# Patient Record
Sex: FEMALE | Race: BLACK OR AFRICAN AMERICAN | Employment: OTHER | URBAN - METROPOLITAN AREA
[De-identification: names, ages, dates, MRNs, and addresses within clinical notes are randomized per-mention and may not be internally consistent; named-entity substitution may affect disease eponyms.]

---

## 2017-07-10 ENCOUNTER — OFFICE VISIT (OUTPATIENT)
Dept: VASCULAR SURGERY | Age: 77
End: 2017-07-10

## 2017-07-10 VITALS
RESPIRATION RATE: 18 BRPM | SYSTOLIC BLOOD PRESSURE: 118 MMHG | HEIGHT: 65 IN | DIASTOLIC BLOOD PRESSURE: 60 MMHG | BODY MASS INDEX: 19.16 KG/M2 | HEART RATE: 62 BPM | WEIGHT: 115 LBS

## 2017-07-10 DIAGNOSIS — E11.59 TYPE 2 DIABETES MELLITUS WITH OTHER CIRCULATORY COMPLICATION, UNSPECIFIED LONG TERM INSULIN USE STATUS: ICD-10-CM

## 2017-07-10 DIAGNOSIS — Z53.1 REFUSAL OF BLOOD TRANSFUSIONS AS PATIENT IS JEHOVAH'S WITNESS: ICD-10-CM

## 2017-07-10 DIAGNOSIS — I70.213 ATHEROSCLEROSIS OF NATIVE ARTERY OF BOTH LOWER EXTREMITIES WITH INTERMITTENT CLAUDICATION (HCC): ICD-10-CM

## 2017-07-10 DIAGNOSIS — I74.09 AORTOILIAC OCCLUSIVE DISEASE (HCC): Primary | ICD-10-CM

## 2017-07-10 DIAGNOSIS — Z87.891 HISTORY OF TOBACCO ABUSE: ICD-10-CM

## 2017-07-10 RX ORDER — ATORVASTATIN CALCIUM 20 MG/1
TABLET, FILM COATED ORAL DAILY
COMMUNITY

## 2017-07-10 RX ORDER — GLIPIZIDE 5 MG/1
TABLET ORAL 2 TIMES DAILY
COMMUNITY
End: 2018-12-26

## 2017-07-10 RX ORDER — GUAIFENESIN 100 MG/5ML
81 LIQUID (ML) ORAL 2 TIMES DAILY
Status: ON HOLD | COMMUNITY
End: 2020-02-03 | Stop reason: SDUPTHER

## 2017-07-10 RX ORDER — LOSARTAN POTASSIUM 50 MG/1
TABLET ORAL DAILY
COMMUNITY
End: 2018-12-26

## 2017-07-10 RX ORDER — CHOLECALCIFEROL (VITAMIN D3) 125 MCG
CAPSULE ORAL
COMMUNITY

## 2017-07-10 RX ORDER — METFORMIN HYDROCHLORIDE 500 MG/1
TABLET ORAL 2 TIMES DAILY WITH MEALS
COMMUNITY
End: 2018-12-26

## 2017-07-10 NOTE — PROGRESS NOTES
Venecia Salinas    Chief Complaint   Patient presents with   [address] [place] [address]    Leg Pain       HPI    Venecia Salinas is a 65 y.o. female who presents to the office today with complaint of bilateral lower extremity claudication. She states that she is [employer] and does a good amount of walking. She states that several months ago she began experiencing increasing fatigue and weakness in her bilateral lower extremities extending from her hips to her feet. She states that she can only walk about 2-1/2 blocks now before she has to sit and rest.  She does not describe any \"pain\". No rest pain. Skin changes or ulcers. Patient is a diabetic and states that her blood sugars are \"somewhat controlled\". She is a former smoker but quit smoking over 20 years ago. 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 The iliofemoral and popliteal trifurcation vessels are affected. She also has total occlusion of the left popliteal and bilateral superficial femoral arteries as well as posterior tibial and dorsalis pedis arteries. She denies any fevers or chills. Otherwise she states that she is in her regular state of health and was without complaint. She denies any history of stroke or aneurysm. Past Medical History:   Diagnosis Date    Diabetes ([address]     Hypercholesterolemia     Hypertension      There is no problem list on file for this patient. History reviewed. No pertinent surgical history. Current Outpatient Prescriptions   Medication Sig Dispense Refill    metFORMIN (GLUCOPHAGE) 500 mg tablet Take  by mouth two (2) times daily (with meals).  glipiZIDE (GLUCOTROL) 5 mg tablet Take  by mouth two (2) times a day.  losartan (COZAAR) 50 mg tablet Take  by mouth daily.  cholecalciferol, vitamin D3, (VITAMIN D3) 2,000 unit tab Take  by mouth.       atorvastatin (LIPITOR) 20 mg tablet Take  by mouth daily.  aspirin 81 mg chewable tablet Take 81 mg by mouth two (2) times a day. No Known Allergies  Social History     Social History    Marital status: SINGLE     Spouse name: N/A    Number of children: N/A    Years of education: N/A     Occupational History    Not on file. Social History Main Topics    Smoking status: Former Smoker    Smokeless tobacco: Never Used    Alcohol use Not on file    Drug use: Not on file    Sexual activity: Not on file     Other Topics Concern    Not on file     Social History Narrative    No narrative on file      No family history on file. Review of Systems    Constitutional: negative   HEENT: negative   Respiratory: negative   Cardiovascular: negative   Gastrointestinal: negative   Genitourinary:negative   Hematologic/lymphatic: negative   Musculoskeletal: Positive for bilateral lower extremity weakness and fatigue with exertion  Neurological: negative   Behavioral/Psych: negative   Endocrine: negative   Allergic/Immunologic: negative      Physical Exam:    Visit Vitals    /60 (BP 1 Location: Right arm, BP Patient Position: Sitting)    Pulse 62    Resp 18    Ht 5' 5\" (1.651 m)    Wt 115 lb (52.2 kg)    BMI 19.14 kg/m2      General: Well-appearing female in no acute distress   HEENT: EOMI, no scleral icterus is noted. No carotid bruits are heard bilaterally   Cardiovascular: Regular rhythm normal S1-S2 no rubs murmurs or gallops   Pulmonary: No increased work or breathing is noted. Clear to auscultation bilaterally. No wheeze, rales or rhonchi. Abdomen: nondistended. Extremities: Warm and well perfused bilaterally. Pt has significant bilateral lower extremity edema on exam today. I am unable to palpate pedal pulses bilaterally. Unfortunately I am unable to find a Doppler signals as well.   Neuro: Cranial nerves II through XII are grossly intact   Integument: No ulcerations are identified visibly      Impression and Plan:  Fatuma Diaz is a 65 y.o. female with bilateral lower extremity claudication. 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 Arterial studies also showed total occlusion of the bilateral superficial femoral arteries and left popliteal artery. She also has total occlusions of the posterior tibial and dorsalis pedis arteries bilaterally by  arterial duplex. Discussed that we will obtain a CTA scan at this time to further assess the extent of her disease. She will follow-up afterwards for further surgical discussion. Patient does reiterate the fact that she is Islam and will refuse to receive any type of blood products including her own blood with Cell Saver. Patient is on daily aspirin and statin and will continue her current medication regimen. All questions were answered. Plan was discussed. Patient expresses understanding and agrees. [address] [place] [address]        PLEASE NOTE:  This document has been produced using voice recognition software. Unrecognized errors in transcription may be present.

## 2017-07-10 NOTE — MR AVS SNAPSHOT
Visit Information Date & Time Provider Department Dept. Phone Encounter #  
 7/10/2017 11:00 AM [facility] [phone] [id number] Your Appointments 8/3/2017 10:45 AM  
Follow Up with [facility] ([address]) Appt Note: Fup after studies at [facility] for CTA on 07.18.17 at [address] [place], [place] [address] [address] [facility] [address]  
[phone] [facility] [address] [facility] [address] Upcoming Health Maintenance Date Due DTaP/Tdap/Td series (1 - Tdap) 1/14/1960 ZOSTER VACCINE AGE 60> 1/14/1999 GLAUCOMA SCREENING Q2Y 1/14/2004 Pneumococcal 65+ Low/Medium Risk (1 of 2 - PCV13) 1/14/2004 MEDICARE YEARLY EXAM 1/14/2004 INFLUENZA AGE 9 TO ADULT 8/1/2017 Allergies as of 7/10/2017  Review Complete On: 7/10/2017 By: Tanmay Small LPN No Known Allergies Current Immunizations  Never Reviewed No immunizations on file. Not reviewed this visit You Were Diagnosed With   
  
 Codes Comments A[facility]iliac occlusive disease ([facility]    -  Primary ICD-10-CM: I74.09 
ICD-9-CM: 444.09 Atherosclerosis of native artery of both lower extremities with intermittent claudication ([facility]     ICD-10-CM: P60.311 ICD-9-CM: 440.21 Vitals BP Pulse Resp Height(growth percentile) Weight(growth percentile) BMI  
 118/60 (BP 1 Location: Right arm, BP Patient Position: Sitting) 62 18 5' 5\" (1.651 m) 115 lb (52.2 kg) 19.14 kg/m2 OB Status Smoking Status Unknown Former Smoker Vitals History BMI and BSA Data Body Mass Index Body Surface Area  
 19.14 kg/m 2 1.55 m 2 Your Updated Medication List  
  
   
This list is accurate as of: 7/10/17 11:50 AM.  Always use your most recent med list.  
  
  
  
  
 aspirin 81 mg chewable tablet Take 81 mg by mouth two (2) times a day.   
  
 atorvastatin 20 mg tablet Commonly known as:  LIPITOR Take  by mouth daily. glipiZIDE 5 mg tablet Commonly known as:  Annabel Gunn Take  by mouth two (2) times a day. losartan 50 mg tablet Commonly known as:  COZAAR Take  by mouth daily. metFORMIN 500 mg tablet Commonly known as:  GLUCOPHAGE Take  by mouth two (2) times daily (with meals). VITAMIN D3 2,000 unit Tab Generic drug:  cholecalciferol (vitamin D3) Take  by mouth. To-Do List   
 07/18/2017 9:00 AM  
  Appointment with [facility] CT RM 2 at [facility] RAD [address] ([phone]) DIET RESTRICTIONS  Nothing to eat or drink 4 hours prior to study May have water to take meds  GENERAL INSTRUCTIONS  If you were given medications to take for a contrast allergy prior to having this study, please arrange to have someone drive you to your appointment. If you have had a creatinine level drawn within the past 30 days, please bring most recent results to your appt. This study does not require you to drink contrast prior to your study. MEDICATIONS  Bring a complete list of all medications you are currently taking to include prescriptions, over-the-counter meds, herbals, vitamins & any dietary supplements. OUTSIDE FILMS  Bring outside films, CDs, and reports related to the study with you on the day of your exam.  QUESTIONS  Notify the CT Department if you have any questions concerning your study. [employer] - [phone] [facility] - [phone]  
  
 07/28/2017 Imaging:  CTA ABD ART W RUNOFF W WO CONT Referral Information Referral ID Referred By Referred To  
  
 [id number] MARY FELICIANO Not Available Visits Status Start Date End Date 1 New Request 7/10/17 7/10/18 If your referral has a status of pending review or denied, additional information will be sent to support the outcome of this decision. Introducing [facility]!    
 [employer] introduces [employer] patient portal. Now you can access parts of your medical record, email your doctor's office, and request medication refills online. 1. In your internet browser, go to [email or web address] 2. Click on the First Time User? Click Here link in the Sign In box. You will see the New Member Sign Up page. 3. Enter your [employer] Access Code exactly as it appears below. You will not need to use this code after youve completed the sign-up process. If you do not sign up before the expiration date, you must request a new code. · [employer] Access Code: [email or web address] Expires: 10/8/2017 11:02 AM 
 
4. Enter the last four digits of your Social Security Number (xxxx) and Date of Birth (mm/dd/yyyy) as indicated and click Submit. You will be taken to the next sign-up page. 5. Create a [employer] ID. This will be your [employer] login ID and cannot be changed, so think of one that is secure and easy to remember. 6. Create a [employer] password. You can change your password at any time. 7. Enter your Password Reset Question and Answer. This can be used at a later time if you forget your password. 8. Enter your e-mail address. You will receive e-mail notification when new information is available in [address] 9. Click Sign Up. You can now view and download portions of your medical record. 10. Click the Download Summary menu link to download a portable copy of your medical information. If you have questions, please visit the Frequently Asked Questions section of the [employer] website. Remember, [employer] is NOT to be used for urgent needs. For medical emergencies, dial 911. Now available from your iPhone and Android! Please provide this summary of care documentation to your next provider. Your primary care clinician is listed as ERASTO NAPIER. If you have any questions after today's visit, please call [phone].

## 2017-07-18 ENCOUNTER — HOSPITAL ENCOUNTER (OUTPATIENT)
Dept: CT IMAGING | Age: 77
Discharge: HOME | End: 2017-07-18
Attending: PHYSICIAN ASSISTANT
Payer: MEDICARE

## 2017-07-18 DIAGNOSIS — I70.213 ATHEROSCLEROSIS OF NATIVE ARTERY OF BOTH LOWER EXTREMITIES WITH INTERMITTENT CLAUDICATION (HCC): ICD-10-CM

## 2017-07-18 DIAGNOSIS — I74.09 AORTOILIAC OCCLUSIVE DISEASE (HCC): ICD-10-CM

## 2017-07-18 LAB — CREAT UR-MCNC: 1.5 MG/DL (ref 0.6–1.3)

## 2017-07-18 PROCEDURE — 82565 ASSAY OF CREATININE: CPT

## 2017-07-18 PROCEDURE — 75635 CT ANGIO ABDOMINAL ARTERIES: CPT

## 2017-07-18 PROCEDURE — 74011636320 HC RX REV CODE- 636/320: Performed by: PHYSICIAN ASSISTANT

## 2017-07-18 RX ADMIN — IOPAMIDOL 72 ML: 755 INJECTION, SOLUTION INTRAVENOUS at 10:26

## 2017-12-08 ENCOUNTER — HOSPITAL ENCOUNTER (OUTPATIENT)
Dept: MAMMOGRAPHY | Age: 77
Discharge: HOME | End: 2017-12-08
Attending: FAMILY MEDICINE
Payer: MEDICARE

## 2017-12-08 ENCOUNTER — HOSPITAL ENCOUNTER (OUTPATIENT)
Dept: ULTRASOUND IMAGING | Age: 77
Discharge: HOME | End: 2017-12-08
Attending: FAMILY MEDICINE
Payer: MEDICARE

## 2017-12-08 DIAGNOSIS — N64.4 BREAST PAIN, RIGHT: ICD-10-CM

## 2017-12-08 PROCEDURE — 77066 DX MAMMO INCL CAD BI: CPT

## 2018-12-18 ENCOUNTER — HOSPITAL ENCOUNTER (EMERGENCY)
Age: 78
Discharge: HOME | End: 2018-12-19
Attending: EMERGENCY MEDICINE | Admitting: EMERGENCY MEDICINE
Payer: MEDICARE

## 2018-12-18 DIAGNOSIS — R73.9 HYPERGLYCEMIA: Primary | ICD-10-CM

## 2018-12-18 LAB
ADMINISTERED INITIALS, ADMINIT: NORMAL
ALBUMIN SERPL-MCNC: 3.6 G/DL (ref 3.4–5)
ALBUMIN/GLOB SERPL: 0.8 {RATIO} (ref 0.8–1.7)
ALP SERPL-CCNC: 162 U/L (ref 45–117)
ALT SERPL-CCNC: 25 U/L (ref 13–56)
ANION GAP SERPL CALC-SCNC: 10 MMOL/L (ref 3–18)
AST SERPL-CCNC: 17 U/L (ref 15–37)
BASOPHILS # BLD: 0 K/UL (ref 0–0.1)
BASOPHILS NFR BLD: 0 % (ref 0–2)
BILIRUB SERPL-MCNC: 0.3 MG/DL (ref 0.2–1)
BUN SERPL-MCNC: 51 MG/DL (ref 7–18)
BUN/CREAT SERPL: 18 (ref 12–20)
CALCIUM SERPL-MCNC: 9.2 MG/DL (ref 8.5–10.1)
CHLORIDE SERPL-SCNC: 92 MMOL/L (ref 100–108)
CO2 SERPL-SCNC: 23 MMOL/L (ref 21–32)
CREAT SERPL-MCNC: 2.88 MG/DL (ref 0.6–1.3)
D50 ADMINISTERED, D50ADM: 0 ML
D50 ORDER, D50ORD: 0 ML
DIFFERENTIAL METHOD BLD: ABNORMAL
EOSINOPHIL # BLD: 0.1 K/UL (ref 0–0.4)
EOSINOPHIL NFR BLD: 1 % (ref 0–5)
ERYTHROCYTE [DISTWIDTH] IN BLOOD BY AUTOMATED COUNT: 12.6 % (ref 11.6–14.5)
EST. AVERAGE GLUCOSE BLD GHB EST-MCNC: ABNORMAL MG/DL
GLOBULIN SER CALC-MCNC: 4.7 G/DL (ref 2–4)
GLUCOSE BLD STRIP.AUTO-MCNC: 368 MG/DL (ref 70–110)
GLUCOSE BLD STRIP.AUTO-MCNC: 530 MG/DL (ref 70–110)
GLUCOSE BLD STRIP.AUTO-MCNC: >600 MG/DL (ref 70–110)
GLUCOSE BLD STRIP.AUTO-MCNC: >600 MG/DL (ref 70–110)
GLUCOSE SERPL-MCNC: 929 MG/DL (ref 74–99)
GLUCOSE, GLC: 368 MG/DL
GLUCOSE, GLC: 530 MG/DL
GLUCOSE, GLC: 601 MG/DL
GLUCOSE, GLC: 602 MG/DL
HBA1C MFR BLD: 15.6 % (ref 4.2–5.6)
HCT VFR BLD AUTO: 36 % (ref 35–45)
HGB BLD-MCNC: 11.8 G/DL (ref 12–16)
HIGH TARGET, HITG: 180 MG/DL
INR PPP: 1 (ref 0.8–1.2)
INSULIN ADMINSTERED, INSADM: 10.8 UNITS/HOUR
INSULIN ADMINSTERED, INSADM: 16.3 UNITS/HOUR
INSULIN ADMINSTERED, INSADM: 18.8 UNITS/HOUR
INSULIN ADMINSTERED, INSADM: 9.2 UNITS/HOUR
INSULIN ORDER, INSORD: 10.8 UNITS/HOUR
INSULIN ORDER, INSORD: 16.3 UNITS/HOUR
INSULIN ORDER, INSORD: 18.8 UNITS/HOUR
INSULIN ORDER, INSORD: 9.2 UNITS/HOUR
LOW TARGET, LOT: 140 MG/DL
LYMPHOCYTES # BLD: 1.4 K/UL (ref 0.9–3.6)
LYMPHOCYTES NFR BLD: 31 % (ref 21–52)
MAGNESIUM SERPL-MCNC: 2.9 MG/DL (ref 1.6–2.6)
MCH RBC QN AUTO: 33 PG (ref 24–34)
MCHC RBC AUTO-ENTMCNC: 32.8 G/DL (ref 31–37)
MCV RBC AUTO: 100.6 FL (ref 74–97)
MINUTES UNTIL NEXT BG, NBG: 60 MIN
MONOCYTES # BLD: 0.3 K/UL (ref 0.05–1.2)
MONOCYTES NFR BLD: 7 % (ref 3–10)
MULTIPLIER, MUL: 0.02
MULTIPLIER, MUL: 0.03
MULTIPLIER, MUL: 0.03
MULTIPLIER, MUL: 0.04
NEUTS SEG # BLD: 2.8 K/UL (ref 1.8–8)
NEUTS SEG NFR BLD: 61 % (ref 40–73)
ORDER INITIALS, ORDINIT: NORMAL
PHOSPHATE SERPL-MCNC: 3.9 MG/DL (ref 2.5–4.9)
PLATELET # BLD AUTO: 174 K/UL (ref 135–420)
PMV BLD AUTO: 12.1 FL (ref 9.2–11.8)
POTASSIUM SERPL-SCNC: 4.8 MMOL/L (ref 3.5–5.5)
PROT SERPL-MCNC: 8.3 G/DL (ref 6.4–8.2)
PROTHROMBIN TIME: 12.7 SEC (ref 11.5–15.2)
RBC # BLD AUTO: 3.58 M/UL (ref 4.2–5.3)
SODIUM SERPL-SCNC: 125 MMOL/L (ref 136–145)
WBC # BLD AUTO: 4.6 K/UL (ref 4.6–13.2)

## 2018-12-18 PROCEDURE — 83036 HEMOGLOBIN GLYCOSYLATED A1C: CPT

## 2018-12-18 PROCEDURE — 85610 PROTHROMBIN TIME: CPT

## 2018-12-18 PROCEDURE — 74011000258 HC RX REV CODE- 258: Performed by: EMERGENCY MEDICINE

## 2018-12-18 PROCEDURE — 99284 EMERGENCY DEPT VISIT MOD MDM: CPT

## 2018-12-18 PROCEDURE — 74011250636 HC RX REV CODE- 250/636: Performed by: EMERGENCY MEDICINE

## 2018-12-18 PROCEDURE — 84100 ASSAY OF PHOSPHORUS: CPT

## 2018-12-18 PROCEDURE — 74011636637 HC RX REV CODE- 636/637: Performed by: EMERGENCY MEDICINE

## 2018-12-18 PROCEDURE — 96375 TX/PRO/DX INJ NEW DRUG ADDON: CPT

## 2018-12-18 PROCEDURE — 96366 THER/PROPH/DIAG IV INF ADDON: CPT

## 2018-12-18 PROCEDURE — 96365 THER/PROPH/DIAG IV INF INIT: CPT

## 2018-12-18 PROCEDURE — 80053 COMPREHEN METABOLIC PANEL: CPT

## 2018-12-18 PROCEDURE — 83735 ASSAY OF MAGNESIUM: CPT

## 2018-12-18 PROCEDURE — 85025 COMPLETE CBC W/AUTO DIFF WBC: CPT

## 2018-12-18 PROCEDURE — 82962 GLUCOSE BLOOD TEST: CPT

## 2018-12-18 PROCEDURE — 74011250636 HC RX REV CODE- 250/636

## 2018-12-18 RX ORDER — MAGNESIUM SULFATE 100 %
4 CRYSTALS MISCELLANEOUS AS NEEDED
Status: DISCONTINUED | OUTPATIENT
Start: 2018-12-18 | End: 2018-12-19 | Stop reason: HOSPADM

## 2018-12-18 RX ORDER — DEXTROSE 50 % IN WATER (D50W) INTRAVENOUS SYRINGE
25-50 AS NEEDED
Status: DISCONTINUED | OUTPATIENT
Start: 2018-12-18 | End: 2018-12-19 | Stop reason: HOSPADM

## 2018-12-18 RX ORDER — MORPHINE SULFATE 4 MG/ML
2 INJECTION INTRAVENOUS
Status: COMPLETED | OUTPATIENT
Start: 2018-12-18 | End: 2018-12-18

## 2018-12-18 RX ORDER — MORPHINE SULFATE 4 MG/ML
INJECTION INTRAVENOUS
Status: COMPLETED
Start: 2018-12-18 | End: 2018-12-18

## 2018-12-18 RX ADMIN — SODIUM CHLORIDE 1000 ML: 900 INJECTION, SOLUTION INTRAVENOUS at 19:14

## 2018-12-18 RX ADMIN — SODIUM CHLORIDE 10.8 UNITS/HR: 9 INJECTION, SOLUTION INTRAVENOUS at 19:17

## 2018-12-18 RX ADMIN — MORPHINE SULFATE 2 MG: 4 INJECTION INTRAVENOUS at 21:48

## 2018-12-18 NOTE — ED PROVIDER NOTES
EMERGENCY DEPARTMENT HISTORY AND PHYSICAL EXAM    5:44 PM      Date: 12/18/2018  Patient Name: Tami Shea    History of Presenting Illness     No chief complaint on file. History Provided By: Patient    Chief Complaint: Hyperglycemia  Duration:  Today  Timing:  Acute  Location: Generalized  Quality: not described  Severity: not reported  Modifying Factors: No modifying or aggravating factors were reported. Associated Symptoms: denies any other associated signs or symptoms      Additional History (Context): Tami Shea is a 77 y.o. female with PMHx of HTN and DM who presents to the ED with c/o acute hyperglycemic episode today. States she feels \"okay\" currently. Patient takes Metformin for history of DM. Denies abdominal pain. No modifying or aggravating factors were reported. No other symptoms or concerns were expressed. PCP: Reji Hurley MD         Past History         Review of Systems       Review of Systems   Gastrointestinal: Negative for abdominal pain. Endocrine:        Positive for hyperglycemia   All other systems reviewed and are negative. Physical Exam         Physical Exam   Constitutional: She is oriented to person, place, and time. She appears well-developed. HENT:   Head: Normocephalic and atraumatic. Eyes: EOM are normal. Pupils are equal, round, and reactive to light. Neck: Normal range of motion. Neck supple. Cardiovascular: Normal rate, regular rhythm and normal heart sounds. Exam reveals no friction rub. No murmur heard. Pulmonary/Chest: Effort normal and breath sounds normal. No respiratory distress. She has no wheezes. Abdominal: Soft. She exhibits no distension. There is no tenderness. There is no rebound and no guarding. Musculoskeletal: Normal range of motion. Neurological: She is alert and oriented to person, place, and time. Skin: Skin is warm and dry. Psychiatric: She has a normal mood and affect.  Her behavior is normal. Thought content normal.         Diagnostic Study Results             Medical Decision Making   I am the first provider for this patient. I reviewed the vital signs, available nursing notes, past medical history, past surgical history, family history and social history. Vital Signs-Reviewed the patient's vital signs. ED Course: Progress Notes, Reevaluation, and Consults:      Provider Notes (Medical Decision Making): This is a 69-year-old female with a history of diabetes who had a fingerstick high reading in the office. I spoke to her physician Dr. Serrano Bodily back she came by the ED. We will check her glucose ensure she has not DKA. She feels well sensory doubtful she is in DKA. Likely his insulin and glucose reasonable amount for discharge. He spoke about her follow-up tomorrow. His phone [phone].     6:43 PM reviewed with pt  Will lower glucose and then have follow in am.   Turned over to dr Yumiko Jean Baptiste pending dispo. Diagnosis     Clinical Impression: No diagnosis found. _______________________________    Attestations:  Scribe Attestation     Raven acting as a scribe for and in the presence of Ran Nath MD      December 18, 2018 at 5:44 PM       Provider Attestation:      I personally performed the services described in the documentation, reviewed the documentation, as recorded by the scribe in my presence, and it accurately and completely records my words and actions.  December 18, 2018 at 5:44 PM - aRn Nath MD    _______________________________

## 2018-12-19 VITALS
OXYGEN SATURATION: 94 % | HEART RATE: 75 BPM | RESPIRATION RATE: 16 BRPM | SYSTOLIC BLOOD PRESSURE: 106 MMHG | DIASTOLIC BLOOD PRESSURE: 63 MMHG

## 2018-12-19 LAB
ANION GAP BLD CALC-SCNC: 19 MMOL/L (ref 10–20)
BUN BLD-MCNC: 40 MG/DL (ref 7–18)
CA-I BLD-MCNC: 1.22 MMOL/L (ref 1.12–1.32)
CHLORIDE BLD-SCNC: 107 MMOL/L (ref 100–108)
CO2 BLD-SCNC: 20 MMOL/L (ref 19–24)
CREAT UR-MCNC: 2.3 MG/DL (ref 0.6–1.3)
GLUCOSE BLD STRIP.AUTO-MCNC: 136 MG/DL (ref 74–106)
GLUCOSE BLD STRIP.AUTO-MCNC: 227 MG/DL (ref 70–110)
GLUCOSE BLD STRIP.AUTO-MCNC: 256 MG/DL (ref 70–110)
HCT VFR BLD CALC: 41 % (ref 36–49)
HGB BLD-MCNC: 13.9 G/DL (ref 12–16)
POTASSIUM BLD-SCNC: 3.6 MMOL/L (ref 3.5–5.5)
SODIUM BLD-SCNC: 141 MMOL/L (ref 136–145)

## 2018-12-19 PROCEDURE — 82962 GLUCOSE BLOOD TEST: CPT

## 2018-12-19 PROCEDURE — 80047 BASIC METABLC PNL IONIZED CA: CPT

## 2018-12-19 NOTE — DISCHARGE INSTRUCTIONS
Learning About High Blood Sugar  What is high blood sugar? Your body turns the food you eat into glucose (sugar), which it uses for energy. But if your body isn't able to use the sugar right away, it can build up in your blood and lead to high blood sugar. When the amount of sugar in your blood stays too high for too much of the time, you may have diabetes. Diabetes is a disease that can cause serious health problems. The good news is that lifestyle changes may help you get your blood sugar back to normal and avoid or delay diabetes. What causes high blood sugar? Sugar (glucose) can build up in your blood if you:  · Are overweight. · Have a family history of diabetes. · Take certain medicines, such as steroids. What are the symptoms? Having high blood sugar may not cause any symptoms at all. Or it may make you feel very thirsty or very hungry. You may also urinate more often than usual, have blurry vision, or lose weight without trying. How is high blood sugar treated? You can take steps to lower your blood sugar level if you understand what makes it get higher. Your doctor may want you to learn how to test your blood sugar level at home. Then you can see how illness, stress, or different kinds of food or medicine raise or lower your blood sugar level. Other tests may be needed to see if you have diabetes. How can you prevent high blood sugar? · Watch your weight. If you're overweight, losing just a small amount of weight may help. Reducing fat around your waist is most important. · Limit the amount of calories, sweets, and unhealthy fat you eat. Ask your doctor if a dietitian can help you. A registered dietitian can help you create meal plans that fit your lifestyle. · Get at least 30 minutes of exercise on most days of the week. Exercise helps control your blood sugar. It also helps you maintain a healthy weight. Walking is a good choice.  You also may want to do other activities, such as running, swimming, cycling, or playing tennis or team sports. · If your doctor prescribed medicines, take them exactly as prescribed. Call your doctor if you think you are having a problem with your medicine. You will get more details on the specific medicines your doctor prescribes. Follow-up care is a key part of your treatment and safety. Be sure to make and go to all appointments, and call your doctor if you are having problems. It's also a good idea to know your test results and keep a list of the medicines you take. Where can you learn more? Go to http://melvin-jesus.info/. Enter O108 in the search box to learn more about \"Learning About High Blood Sugar. \"  Current as of: December 7, 2017  Content Version: 11.8  © 5018-1223 [employer]. Care instructions adapted under license by [employer] (which disclaims liability or warranty for this information). If you have questions about a medical condition or this instruction, always ask your healthcare professional. [address] any warranty or liability for your use of this information.

## 2018-12-24 ENCOUNTER — HOSPITAL ENCOUNTER (OUTPATIENT)
Age: 78
Setting detail: OBSERVATION
Discharge: NURSING, REHAB OR LONG-TERM CARE | End: 2018-12-26
Attending: EMERGENCY MEDICINE | Admitting: INTERNAL MEDICINE
Payer: MEDICARE

## 2018-12-24 DIAGNOSIS — E87.5 ACUTE HYPERKALEMIA: ICD-10-CM

## 2018-12-24 DIAGNOSIS — R41.82 ALTERED MENTAL STATUS, UNSPECIFIED ALTERED MENTAL STATUS TYPE: ICD-10-CM

## 2018-12-24 DIAGNOSIS — R73.9 HYPERGLYCEMIA: Primary | ICD-10-CM

## 2018-12-24 DIAGNOSIS — R35.0 URINARY FREQUENCY: ICD-10-CM

## 2018-12-24 DIAGNOSIS — E87.1 HYPONATREMIA: ICD-10-CM

## 2018-12-24 DIAGNOSIS — G89.29 OTHER CHRONIC PAIN: ICD-10-CM

## 2018-12-24 LAB
ALBUMIN SERPL-MCNC: 3.1 G/DL (ref 3.4–5)
ALBUMIN/GLOB SERPL: 0.8 {RATIO} (ref 0.8–1.7)
ALP SERPL-CCNC: 145 U/L (ref 45–117)
ALT SERPL-CCNC: 38 U/L (ref 13–56)
ANION GAP SERPL CALC-SCNC: 15 MMOL/L (ref 3–18)
APPEARANCE UR: CLEAR
AST SERPL-CCNC: 46 U/L (ref 15–37)
BACTERIA URNS QL MICRO: ABNORMAL /HPF
BASOPHILS # BLD: 0 K/UL (ref 0–0.1)
BASOPHILS NFR BLD: 0 % (ref 0–2)
BILIRUB SERPL-MCNC: 0.4 MG/DL (ref 0.2–1)
BILIRUB UR QL: NEGATIVE
BUN SERPL-MCNC: 46 MG/DL (ref 7–18)
BUN/CREAT SERPL: 16 (ref 12–20)
CALCIUM SERPL-MCNC: 8.7 MG/DL (ref 8.5–10.1)
CHLORIDE SERPL-SCNC: 98 MMOL/L (ref 100–108)
CO2 SERPL-SCNC: 15 MMOL/L (ref 21–32)
COLOR UR: YELLOW
CREAT SERPL-MCNC: 2.9 MG/DL (ref 0.6–1.3)
DIFFERENTIAL METHOD BLD: ABNORMAL
EOSINOPHIL # BLD: 0 K/UL (ref 0–0.4)
EOSINOPHIL NFR BLD: 0 % (ref 0–5)
EPITH CASTS URNS QL MICRO: ABNORMAL /LPF (ref 0–5)
ERYTHROCYTE [DISTWIDTH] IN BLOOD BY AUTOMATED COUNT: 13.2 % (ref 11.6–14.5)
GLOBULIN SER CALC-MCNC: 3.9 G/DL (ref 2–4)
GLUCOSE BLD STRIP.AUTO-MCNC: >600 MG/DL (ref 70–110)
GLUCOSE SERPL-MCNC: 1155 MG/DL (ref 74–99)
GLUCOSE UR STRIP.AUTO-MCNC: >1000 MG/DL
HCT VFR BLD AUTO: 39 % (ref 35–45)
HGB BLD-MCNC: 12.4 G/DL (ref 12–16)
HGB UR QL STRIP: ABNORMAL
KETONES UR QL STRIP.AUTO: NEGATIVE MG/DL
LEUKOCYTE ESTERASE UR QL STRIP.AUTO: NEGATIVE
LYMPHOCYTES # BLD: 0.5 K/UL (ref 0.9–3.6)
LYMPHOCYTES NFR BLD: 10 % (ref 21–52)
MCH RBC QN AUTO: 33.2 PG (ref 24–34)
MCHC RBC AUTO-ENTMCNC: 31.8 G/DL (ref 31–37)
MCV RBC AUTO: 104.6 FL (ref 74–97)
MONOCYTES # BLD: 0.4 K/UL (ref 0.05–1.2)
MONOCYTES NFR BLD: 7 % (ref 3–10)
NEUTS SEG # BLD: 4.2 K/UL (ref 1.8–8)
NEUTS SEG NFR BLD: 83 % (ref 40–73)
NITRITE UR QL STRIP.AUTO: NEGATIVE
PH UR STRIP: 5 [PH] (ref 5–8)
PLATELET # BLD AUTO: 208 K/UL (ref 135–420)
PMV BLD AUTO: 12.4 FL (ref 9.2–11.8)
POTASSIUM SERPL-SCNC: 5.6 MMOL/L (ref 3.5–5.5)
PROT SERPL-MCNC: 7 G/DL (ref 6.4–8.2)
PROT UR STRIP-MCNC: NEGATIVE MG/DL
RBC # BLD AUTO: 3.73 M/UL (ref 4.2–5.3)
RBC #/AREA URNS HPF: ABNORMAL /HPF (ref 0–5)
SODIUM SERPL-SCNC: 128 MMOL/L (ref 136–145)
SP GR UR REFRACTOMETRY: 1.03 (ref 1–1.03)
UROBILINOGEN UR QL STRIP.AUTO: 0.2 EU/DL (ref 0.2–1)
WBC # BLD AUTO: 5.1 K/UL (ref 4.6–13.2)
WBC URNS QL MICRO: ABNORMAL /HPF (ref 0–4)

## 2018-12-24 PROCEDURE — 80053 COMPREHEN METABOLIC PANEL: CPT

## 2018-12-24 PROCEDURE — 94762 N-INVAS EAR/PLS OXIMTRY CONT: CPT

## 2018-12-24 PROCEDURE — 96361 HYDRATE IV INFUSION ADD-ON: CPT

## 2018-12-24 PROCEDURE — 82962 GLUCOSE BLOOD TEST: CPT

## 2018-12-24 PROCEDURE — 83036 HEMOGLOBIN GLYCOSYLATED A1C: CPT

## 2018-12-24 PROCEDURE — 93005 ELECTROCARDIOGRAM TRACING: CPT

## 2018-12-24 PROCEDURE — 74011250636 HC RX REV CODE- 250/636: Performed by: PHYSICIAN ASSISTANT

## 2018-12-24 PROCEDURE — 85610 PROTHROMBIN TIME: CPT

## 2018-12-24 PROCEDURE — 81001 URINALYSIS AUTO W/SCOPE: CPT

## 2018-12-24 PROCEDURE — 84100 ASSAY OF PHOSPHORUS: CPT

## 2018-12-24 PROCEDURE — 85025 COMPLETE CBC W/AUTO DIFF WBC: CPT

## 2018-12-24 PROCEDURE — 99285 EMERGENCY DEPT VISIT HI MDM: CPT

## 2018-12-24 PROCEDURE — 85730 THROMBOPLASTIN TIME PARTIAL: CPT

## 2018-12-24 RX ORDER — DEXTROSE 50 % IN WATER (D50W) INTRAVENOUS SYRINGE
25-50 AS NEEDED
Status: DISCONTINUED | OUTPATIENT
Start: 2018-12-24 | End: 2018-12-25 | Stop reason: SDUPTHER

## 2018-12-24 RX ORDER — MAGNESIUM SULFATE 100 %
4 CRYSTALS MISCELLANEOUS AS NEEDED
Status: DISCONTINUED | OUTPATIENT
Start: 2018-12-24 | End: 2018-12-26 | Stop reason: HOSPADM

## 2018-12-24 RX ORDER — SODIUM CHLORIDE 0.9 % (FLUSH) 0.9 %
5-10 SYRINGE (ML) INJECTION AS NEEDED
Status: DISCONTINUED | OUTPATIENT
Start: 2018-12-24 | End: 2018-12-26 | Stop reason: HOSPADM

## 2018-12-24 RX ADMIN — SODIUM CHLORIDE, SODIUM LACTATE, POTASSIUM CHLORIDE, AND CALCIUM CHLORIDE 1000 ML: 600; 310; 30; 20 INJECTION, SOLUTION INTRAVENOUS at 23:41

## 2018-12-24 RX ADMIN — SODIUM CHLORIDE 1000 ML: 900 INJECTION, SOLUTION INTRAVENOUS at 22:15

## 2018-12-24 RX ADMIN — SODIUM CHLORIDE, SODIUM LACTATE, POTASSIUM CHLORIDE, AND CALCIUM CHLORIDE 1000 ML: 600; 310; 30; 20 INJECTION, SOLUTION INTRAVENOUS at 23:43

## 2018-12-24 RX ADMIN — Medication 10 ML: at 22:15

## 2018-12-25 ENCOUNTER — APPOINTMENT (OUTPATIENT)
Dept: GENERAL RADIOLOGY | Age: 78
End: 2018-12-25
Attending: PHYSICIAN ASSISTANT
Payer: MEDICARE

## 2018-12-25 PROBLEM — R73.9 HYPERGLYCEMIA: Status: ACTIVE | Noted: 2018-12-25

## 2018-12-25 PROBLEM — N39.0 ACUTE UTI: Status: ACTIVE | Noted: 2018-12-25

## 2018-12-25 PROBLEM — E11.00 TYPE 2 DIABETES MELLITUS WITH HYPEROSMOLARITY (HCC): Status: ACTIVE | Noted: 2018-12-25

## 2018-12-25 PROBLEM — E86.0 DEHYDRATION: Status: ACTIVE | Noted: 2018-12-25

## 2018-12-25 PROBLEM — N17.9 ACUTE RENAL FAILURE (ARF) (HCC): Status: ACTIVE | Noted: 2018-12-25

## 2018-12-25 PROBLEM — Z91.199 NONCOMPLIANCE: Status: ACTIVE | Noted: 2018-12-25

## 2018-12-25 LAB
ADMINISTERED INITIALS, ADMINIT: NORMAL
ANION GAP SERPL CALC-SCNC: 12 MMOL/L (ref 3–18)
ANION GAP SERPL CALC-SCNC: 6 MMOL/L (ref 3–18)
ANION GAP SERPL CALC-SCNC: 8 MMOL/L (ref 3–18)
ANION GAP SERPL CALC-SCNC: 9 MMOL/L (ref 3–18)
APTT PPP: 30.6 SEC (ref 23–36.4)
ATRIAL RATE: 104 BPM
BASOPHILS # BLD: 0 K/UL (ref 0–0.1)
BASOPHILS NFR BLD: 0 % (ref 0–2)
BUN SERPL-MCNC: 27 MG/DL (ref 7–18)
BUN SERPL-MCNC: 32 MG/DL (ref 7–18)
BUN SERPL-MCNC: 39 MG/DL (ref 7–18)
BUN SERPL-MCNC: 40 MG/DL (ref 7–18)
BUN/CREAT SERPL: 13 (ref 12–20)
BUN/CREAT SERPL: 15 (ref 12–20)
BUN/CREAT SERPL: 16 (ref 12–20)
BUN/CREAT SERPL: 21 (ref 12–20)
CALCIUM SERPL-MCNC: 8.3 MG/DL (ref 8.5–10.1)
CALCIUM SERPL-MCNC: 8.4 MG/DL (ref 8.5–10.1)
CALCIUM SERPL-MCNC: 8.6 MG/DL (ref 8.5–10.1)
CALCIUM SERPL-MCNC: 9.4 MG/DL (ref 8.5–10.1)
CALCULATED P AXIS, ECG09: 67 DEGREES
CALCULATED R AXIS, ECG10: 20 DEGREES
CALCULATED T AXIS, ECG11: 25 DEGREES
CHLORIDE SERPL-SCNC: 109 MMOL/L (ref 100–108)
CHLORIDE SERPL-SCNC: 110 MMOL/L (ref 100–108)
CHLORIDE SERPL-SCNC: 111 MMOL/L (ref 100–108)
CHLORIDE SERPL-SCNC: 115 MMOL/L (ref 100–108)
CO2 SERPL-SCNC: 19 MMOL/L (ref 21–32)
CO2 SERPL-SCNC: 19 MMOL/L (ref 21–32)
CO2 SERPL-SCNC: 21 MMOL/L (ref 21–32)
CO2 SERPL-SCNC: 21 MMOL/L (ref 21–32)
CREAT SERPL-MCNC: 1.84 MG/DL (ref 0.6–1.3)
CREAT SERPL-MCNC: 2.03 MG/DL (ref 0.6–1.3)
CREAT SERPL-MCNC: 2.12 MG/DL (ref 0.6–1.3)
CREAT SERPL-MCNC: 2.51 MG/DL (ref 0.6–1.3)
D50 ADMINISTERED, D50ADM: 0 ML
D50 ADMINISTERED, D50ADM: NORMAL ML
D50 ORDER, D50ORD: 0 ML
D50 ORDER, D50ORD: NORMAL ML
DIAGNOSIS, 93000: NORMAL
DIFFERENTIAL METHOD BLD: ABNORMAL
EOSINOPHIL # BLD: 0 K/UL (ref 0–0.4)
EOSINOPHIL NFR BLD: 0 % (ref 0–5)
ERYTHROCYTE [DISTWIDTH] IN BLOOD BY AUTOMATED COUNT: 12.1 % (ref 11.6–14.5)
EST. AVERAGE GLUCOSE BLD GHB EST-MCNC: ABNORMAL MG/DL
GLUCOSE BLD STRIP.AUTO-MCNC: 176 MG/DL (ref 70–110)
GLUCOSE BLD STRIP.AUTO-MCNC: 264 MG/DL (ref 70–110)
GLUCOSE BLD STRIP.AUTO-MCNC: 306 MG/DL (ref 70–110)
GLUCOSE BLD STRIP.AUTO-MCNC: 471 MG/DL (ref 70–110)
GLUCOSE BLD STRIP.AUTO-MCNC: 481 MG/DL (ref 70–110)
GLUCOSE BLD STRIP.AUTO-MCNC: 527 MG/DL (ref 70–110)
GLUCOSE BLD STRIP.AUTO-MCNC: 562 MG/DL (ref 70–110)
GLUCOSE BLD STRIP.AUTO-MCNC: >600 MG/DL (ref 70–110)
GLUCOSE BLD STRIP.AUTO-MCNC: >600 MG/DL (ref 70–110)
GLUCOSE SERPL-MCNC: 278 MG/DL (ref 74–99)
GLUCOSE SERPL-MCNC: 347 MG/DL (ref 74–99)
GLUCOSE SERPL-MCNC: 388 MG/DL (ref 74–99)
GLUCOSE SERPL-MCNC: 562 MG/DL (ref 74–99)
GLUCOSE, GLC: 306 MG/DL
GLUCOSE, GLC: 471 MG/DL
GLUCOSE, GLC: 600 MG/DL
GLUCOSE, GLC: 601 MG/DL
GLUCOSE, GLC: 602 MG/DL
GLUCOSE, GLC: NORMAL MG/DL
HBA1C MFR BLD: >16 % (ref 4.2–5.6)
HCT VFR BLD AUTO: 30.3 % (ref 35–45)
HGB BLD-MCNC: 10.2 G/DL (ref 12–16)
HIGH TARGET, HITG: 180 MG/DL
HIGH TARGET, HITG: NORMAL MG/DL
INR PPP: 1 (ref 0.8–1.2)
INSULIN ADMINSTERED, INSADM: 10.8 UNITS/HOUR
INSULIN ADMINSTERED, INSADM: 12.3 UNITS/HOUR
INSULIN ADMINSTERED, INSADM: 16.2 UNITS/HOUR
INSULIN ADMINSTERED, INSADM: 21.7 UNITS/HOUR
INSULIN ADMINSTERED, INSADM: 4.9 UNITS/HOUR
INSULIN ADMINSTERED, INSADM: NORMAL UNITS/HOUR
INSULIN ORDER, INSORD: 10.8 UNITS/HOUR
INSULIN ORDER, INSORD: 12.3 UNITS/HOUR
INSULIN ORDER, INSORD: 16.2 UNITS/HOUR
INSULIN ORDER, INSORD: 21.7 UNITS/HOUR
INSULIN ORDER, INSORD: 4.9 UNITS/HOUR
INSULIN ORDER, INSORD: NORMAL UNITS/HOUR
LOW TARGET, LOT: 140 MG/DL
LOW TARGET, LOT: NORMAL MG/DL
LYMPHOCYTES # BLD: 0.6 K/UL (ref 0.9–3.6)
LYMPHOCYTES NFR BLD: 16 % (ref 21–52)
MCH RBC QN AUTO: 31.7 PG (ref 24–34)
MCHC RBC AUTO-ENTMCNC: 33.7 G/DL (ref 31–37)
MCV RBC AUTO: 94.1 FL (ref 74–97)
MINUTES UNTIL NEXT BG, NBG: 60 MIN
MINUTES UNTIL NEXT BG, NBG: NORMAL MIN
MONOCYTES # BLD: 0.2 K/UL (ref 0.05–1.2)
MONOCYTES NFR BLD: 6 % (ref 3–10)
MULTIPLIER, MUL: 0.02
MULTIPLIER, MUL: 0.02
MULTIPLIER, MUL: 0.03
MULTIPLIER, MUL: 0.03
MULTIPLIER, MUL: 0.04
MULTIPLIER, MUL: NORMAL
NEUTS SEG # BLD: 3.1 K/UL (ref 1.8–8)
NEUTS SEG NFR BLD: 78 % (ref 40–73)
ORDER INITIALS, ORDINIT: NORMAL
P-R INTERVAL, ECG05: 132 MS
PHOSPHATE SERPL-MCNC: 5.6 MG/DL (ref 2.5–4.9)
PLATELET # BLD AUTO: 188 K/UL (ref 135–420)
PMV BLD AUTO: 11.6 FL (ref 9.2–11.8)
POTASSIUM SERPL-SCNC: 4 MMOL/L (ref 3.5–5.5)
POTASSIUM SERPL-SCNC: 4.4 MMOL/L (ref 3.5–5.5)
POTASSIUM SERPL-SCNC: 4.6 MMOL/L (ref 3.5–5.5)
POTASSIUM SERPL-SCNC: 4.8 MMOL/L (ref 3.5–5.5)
PROTHROMBIN TIME: 12.9 SEC (ref 11.5–15.2)
Q-T INTERVAL, ECG07: 334 MS
QRS DURATION, ECG06: 80 MS
QTC CALCULATION (BEZET), ECG08: 439 MS
RBC # BLD AUTO: 3.22 M/UL (ref 4.2–5.3)
SODIUM SERPL-SCNC: 137 MMOL/L (ref 136–145)
SODIUM SERPL-SCNC: 138 MMOL/L (ref 136–145)
SODIUM SERPL-SCNC: 140 MMOL/L (ref 136–145)
SODIUM SERPL-SCNC: 145 MMOL/L (ref 136–145)
VENTRICULAR RATE, ECG03: 104 BPM
WBC # BLD AUTO: 4 K/UL (ref 4.6–13.2)

## 2018-12-25 PROCEDURE — 74011250636 HC RX REV CODE- 250/636: Performed by: INTERNAL MEDICINE

## 2018-12-25 PROCEDURE — 65390000012 HC CONDITION CODE 44 OBSERVATION

## 2018-12-25 PROCEDURE — 74011000258 HC RX REV CODE- 258: Performed by: PHYSICIAN ASSISTANT

## 2018-12-25 PROCEDURE — 96365 THER/PROPH/DIAG IV INF INIT: CPT

## 2018-12-25 PROCEDURE — 36415 COLL VENOUS BLD VENIPUNCTURE: CPT

## 2018-12-25 PROCEDURE — 96375 TX/PRO/DX INJ NEW DRUG ADDON: CPT

## 2018-12-25 PROCEDURE — 85025 COMPLETE CBC W/AUTO DIFF WBC: CPT

## 2018-12-25 PROCEDURE — 96366 THER/PROPH/DIAG IV INF ADDON: CPT

## 2018-12-25 PROCEDURE — 65660000000 HC RM CCU STEPDOWN

## 2018-12-25 PROCEDURE — 74011000250 HC RX REV CODE- 250: Performed by: PHYSICIAN ASSISTANT

## 2018-12-25 PROCEDURE — 74011636637 HC RX REV CODE- 636/637: Performed by: INTERNAL MEDICINE

## 2018-12-25 PROCEDURE — 74011250637 HC RX REV CODE- 250/637: Performed by: INTERNAL MEDICINE

## 2018-12-25 PROCEDURE — 74011636637 HC RX REV CODE- 636/637: Performed by: PHYSICIAN ASSISTANT

## 2018-12-25 PROCEDURE — 82962 GLUCOSE BLOOD TEST: CPT

## 2018-12-25 PROCEDURE — 96372 THER/PROPH/DIAG INJ SC/IM: CPT

## 2018-12-25 PROCEDURE — 74011000250 HC RX REV CODE- 250: Performed by: INTERNAL MEDICINE

## 2018-12-25 PROCEDURE — 74011250637 HC RX REV CODE- 250/637: Performed by: NURSE PRACTITIONER

## 2018-12-25 PROCEDURE — 80048 BASIC METABOLIC PNL TOTAL CA: CPT

## 2018-12-25 PROCEDURE — 77030038269 HC DRN EXT URIN PURWCK BARD -A

## 2018-12-25 PROCEDURE — 74011250636 HC RX REV CODE- 250/636: Performed by: PHYSICIAN ASSISTANT

## 2018-12-25 PROCEDURE — 71045 X-RAY EXAM CHEST 1 VIEW: CPT

## 2018-12-25 RX ORDER — INSULIN LISPRO 100 [IU]/ML
10 INJECTION, SOLUTION INTRAVENOUS; SUBCUTANEOUS
Status: COMPLETED | OUTPATIENT
Start: 2018-12-25 | End: 2018-12-25

## 2018-12-25 RX ORDER — HEPARIN SODIUM 5000 [USP'U]/ML
5000 INJECTION, SOLUTION INTRAVENOUS; SUBCUTANEOUS EVERY 8 HOURS
Status: DISCONTINUED | OUTPATIENT
Start: 2018-12-25 | End: 2018-12-26 | Stop reason: HOSPADM

## 2018-12-25 RX ORDER — DOCUSATE SODIUM 100 MG/1
100 CAPSULE, LIQUID FILLED ORAL
Status: DISCONTINUED | OUTPATIENT
Start: 2018-12-25 | End: 2018-12-26 | Stop reason: HOSPADM

## 2018-12-25 RX ORDER — ENOXAPARIN SODIUM 100 MG/ML
40 INJECTION SUBCUTANEOUS EVERY 24 HOURS
Status: DISCONTINUED | OUTPATIENT
Start: 2018-12-25 | End: 2018-12-25

## 2018-12-25 RX ORDER — INSULIN GLARGINE 100 [IU]/ML
10 INJECTION, SOLUTION SUBCUTANEOUS
Status: DISPENSED | OUTPATIENT
Start: 2018-12-25 | End: 2018-12-26

## 2018-12-25 RX ORDER — GUAIFENESIN 100 MG/5ML
81 LIQUID (ML) ORAL 2 TIMES DAILY
Status: DISCONTINUED | OUTPATIENT
Start: 2018-12-25 | End: 2018-12-26 | Stop reason: HOSPADM

## 2018-12-25 RX ORDER — AMLODIPINE BESYLATE 5 MG/1
5 TABLET ORAL DAILY
Status: DISCONTINUED | OUTPATIENT
Start: 2018-12-25 | End: 2018-12-26 | Stop reason: HOSPADM

## 2018-12-25 RX ORDER — SODIUM CHLORIDE 9 MG/ML
1000 INJECTION, SOLUTION INTRAVENOUS ONCE
Status: COMPLETED | OUTPATIENT
Start: 2018-12-25 | End: 2018-12-25

## 2018-12-25 RX ORDER — ATORVASTATIN CALCIUM 20 MG/1
20 TABLET, FILM COATED ORAL
Status: DISCONTINUED | OUTPATIENT
Start: 2018-12-25 | End: 2018-12-26 | Stop reason: HOSPADM

## 2018-12-25 RX ORDER — DEXTROSE 50 % IN WATER (D50W) INTRAVENOUS SYRINGE
25-50 AS NEEDED
Status: DISCONTINUED | OUTPATIENT
Start: 2018-12-25 | End: 2018-12-26 | Stop reason: HOSPADM

## 2018-12-25 RX ORDER — INSULIN GLARGINE 100 [IU]/ML
20 INJECTION, SOLUTION SUBCUTANEOUS DAILY
Status: DISCONTINUED | OUTPATIENT
Start: 2018-12-26 | End: 2018-12-26 | Stop reason: HOSPADM

## 2018-12-25 RX ORDER — SODIUM CHLORIDE 9 MG/ML
150 INJECTION, SOLUTION INTRAVENOUS CONTINUOUS
Status: DISPENSED | OUTPATIENT
Start: 2018-12-25 | End: 2018-12-26

## 2018-12-25 RX ORDER — ACETAMINOPHEN 325 MG/1
650 TABLET ORAL
Status: DISCONTINUED | OUTPATIENT
Start: 2018-12-25 | End: 2018-12-26 | Stop reason: HOSPADM

## 2018-12-25 RX ORDER — OXYCODONE AND ACETAMINOPHEN 5; 325 MG/1; MG/1
1 TABLET ORAL
Status: DISCONTINUED | OUTPATIENT
Start: 2018-12-25 | End: 2018-12-26 | Stop reason: HOSPADM

## 2018-12-25 RX ORDER — INSULIN LISPRO 100 [IU]/ML
INJECTION, SOLUTION INTRAVENOUS; SUBCUTANEOUS EVERY 4 HOURS
Status: DISCONTINUED | OUTPATIENT
Start: 2018-12-25 | End: 2018-12-26 | Stop reason: HOSPADM

## 2018-12-25 RX ORDER — NALOXONE HYDROCHLORIDE 0.4 MG/ML
0.4 INJECTION, SOLUTION INTRAMUSCULAR; INTRAVENOUS; SUBCUTANEOUS AS NEEDED
Status: DISCONTINUED | OUTPATIENT
Start: 2018-12-25 | End: 2018-12-26 | Stop reason: HOSPADM

## 2018-12-25 RX ORDER — FUROSEMIDE 10 MG/ML
40 INJECTION INTRAMUSCULAR; INTRAVENOUS ONCE
Status: COMPLETED | OUTPATIENT
Start: 2018-12-25 | End: 2018-12-25

## 2018-12-25 RX ORDER — IPRATROPIUM BROMIDE AND ALBUTEROL SULFATE 2.5; .5 MG/3ML; MG/3ML
3 SOLUTION RESPIRATORY (INHALATION)
Status: DISCONTINUED | OUTPATIENT
Start: 2018-12-25 | End: 2018-12-26 | Stop reason: HOSPADM

## 2018-12-25 RX ORDER — INSULIN GLARGINE 100 [IU]/ML
5 INJECTION, SOLUTION SUBCUTANEOUS ONCE
Status: COMPLETED | OUTPATIENT
Start: 2018-12-25 | End: 2018-12-25

## 2018-12-25 RX ORDER — INSULIN GLARGINE 100 [IU]/ML
10 INJECTION, SOLUTION SUBCUTANEOUS DAILY
Status: DISCONTINUED | OUTPATIENT
Start: 2018-12-25 | End: 2018-12-25

## 2018-12-25 RX ORDER — ONDANSETRON 2 MG/ML
4 INJECTION INTRAMUSCULAR; INTRAVENOUS
Status: DISCONTINUED | OUTPATIENT
Start: 2018-12-25 | End: 2018-12-26 | Stop reason: HOSPADM

## 2018-12-25 RX ADMIN — ATORVASTATIN CALCIUM 20 MG: 20 TABLET, FILM COATED ORAL at 23:42

## 2018-12-25 RX ADMIN — INSULIN GLARGINE 10 UNITS: 100 INJECTION, SOLUTION SUBCUTANEOUS at 16:32

## 2018-12-25 RX ADMIN — INSULIN LISPRO 10 UNITS: 100 INJECTION, SOLUTION INTRAVENOUS; SUBCUTANEOUS at 16:20

## 2018-12-25 RX ADMIN — HEPARIN SODIUM 5000 UNITS: 5000 INJECTION INTRAVENOUS; SUBCUTANEOUS at 11:03

## 2018-12-25 RX ADMIN — INSULIN LISPRO 10 UNITS: 100 INJECTION, SOLUTION INTRAVENOUS; SUBCUTANEOUS at 19:01

## 2018-12-25 RX ADMIN — INSULIN LISPRO 10 UNITS: 100 INJECTION, SOLUTION INTRAVENOUS; SUBCUTANEOUS at 16:18

## 2018-12-25 RX ADMIN — ASPIRIN 81 MG 81 MG: 81 TABLET ORAL at 18:56

## 2018-12-25 RX ADMIN — INSULIN GLARGINE 5 UNITS: 100 INJECTION, SOLUTION SUBCUTANEOUS at 23:46

## 2018-12-25 RX ADMIN — SODIUM CHLORIDE 1000 ML: 900 INJECTION, SOLUTION INTRAVENOUS at 06:11

## 2018-12-25 RX ADMIN — INSULIN LISPRO 6 UNITS: 100 INJECTION, SOLUTION INTRAVENOUS; SUBCUTANEOUS at 11:13

## 2018-12-25 RX ADMIN — ASPIRIN 81 MG 81 MG: 81 TABLET ORAL at 11:03

## 2018-12-25 RX ADMIN — SODIUM CHLORIDE 1000 ML: 900 INJECTION, SOLUTION INTRAVENOUS at 23:49

## 2018-12-25 RX ADMIN — SODIUM CHLORIDE 10.8 UNITS/HR: 9 INJECTION, SOLUTION INTRAVENOUS at 00:06

## 2018-12-25 RX ADMIN — ATORVASTATIN CALCIUM 20 MG: 20 TABLET, FILM COATED ORAL at 02:19

## 2018-12-25 RX ADMIN — INSULIN LISPRO 15 UNITS: 100 INJECTION, SOLUTION INTRAVENOUS; SUBCUTANEOUS at 23:45

## 2018-12-25 RX ADMIN — CEFTRIAXONE SODIUM 1 G: 1 INJECTION, POWDER, FOR SOLUTION INTRAMUSCULAR; INTRAVENOUS at 02:20

## 2018-12-25 RX ADMIN — AMLODIPINE BESYLATE 5 MG: 5 TABLET ORAL at 11:33

## 2018-12-25 RX ADMIN — INSULIN GLARGINE 10 UNITS: 100 INJECTION, SOLUTION SUBCUTANEOUS at 06:39

## 2018-12-25 RX ADMIN — HEPARIN SODIUM 5000 UNITS: 5000 INJECTION INTRAVENOUS; SUBCUTANEOUS at 16:28

## 2018-12-25 RX ADMIN — Medication 10 ML: at 00:11

## 2018-12-25 RX ADMIN — FUROSEMIDE 40 MG: 10 INJECTION, SOLUTION INTRAMUSCULAR; INTRAVENOUS at 23:44

## 2018-12-25 RX ADMIN — SODIUM CHLORIDE 150 ML/HR: 900 INJECTION, SOLUTION INTRAVENOUS at 11:23

## 2018-12-25 RX ADMIN — IPRATROPIUM BROMIDE AND ALBUTEROL SULFATE 3 ML: 2.5; .5 SOLUTION RESPIRATORY (INHALATION) at 23:48

## 2018-12-25 NOTE — ED PROVIDER NOTES
EMERGENCY DEPARTMENT HISTORY AND PHYSICAL EXAM    Date: 12/24/2018  Patient Name: Sydni Cabrera    History of Presenting Illness     Chief Complaint   Patient presents with    High Blood Sugar         History Provided By: Patient and EMS    Chief Complaint: High blood sugar   Duration: unknown   Timing:  Gradual  Location: n/a  Quality: n/a  Severity: Moderate  Modifying Factors: Refusing insulin therapy is making symptoms worse   Associated Symptoms: none       Additional History (Context): Sydni Cabrera is a 77 y.o. female with a history of Dm and HTN who presents for hyperglycemia. EMS reports her neighbors saw her crawling on her front porch and called 911, patient was seen on 12/18 for hyperglycemia as well, was not in DKA and was discharged home with [facility] follow up. Reports she did follow up and insulin was discussed patient states \" I am not taking that stuff\", patient reports unwilling to start insulin at home. Reports no pain. Pt denies any fevers or chills, headache, dizziness or light headedness, ENT issues, CP or discomfort, SOB, cough, n/v/d/c, abd pain, back pain, diaphoresis, melena/hematochezia, dysuria, hematuria, frequency, focal weakness/numbness/tingling, or rash. Patient has no other complaints at this time.       PCP: Gerber Dawson MD    Current Facility-Administered Medications   Medication Dose Route Frequency Provider Last Rate Last Dose    sodium chloride (NS) flush 5-10 mL  5-10 mL IntraVENous PRN Sharla Ulloa MD   10 mL at 12/25/18 0011    insulin regular (NOVOLIN R, HUMULIN R) 100 Units in 0.9% sodium chloride 100 mL infusion  0-50 Units/hr IntraVENous TITRATE PATRICE Fonseca 10.8 mL/hr at 12/25/18 0006 10.8 Units/hr at 12/25/18 0006    glucose chewable tablet 16 g  4 Tab Oral PRN PATRICE Fonseca        glucagon (GLUCAGEN) injection 1 mg  1 mg IntraMUSCular PRN PATRICE Fonseca        dextrose (D50W) injection syrg 12.5-25 g  25-50 mL IntraVENous PRN Abdiel Howard [place]        insulin regular (NOVOLIN,HUMULIN) 100 units/100 ml NS infusion (premix)  0-50 Units/hr IntraVENous NOW Abdiel Howard [place]         Current Outpatient Medications   Medication Sig Dispense Refill    metFORMIN (GLUCOPHAGE) 500 mg tablet Take  by mouth two (2) times daily (with meals).  glipiZIDE (GLUCOTROL) 5 mg tablet Take  by mouth two (2) times a day.  losartan (COZAAR) 50 mg tablet Take  by mouth daily.  cholecalciferol, vitamin D3, (VITAMIN D3) 2,000 unit tab Take  by mouth.  atorvastatin (LIPITOR) 20 mg tablet Take  by mouth daily.  aspirin 81 mg chewable tablet Take 81 mg by mouth two (2) times a day. Past History     Past Medical History:  Past Medical History:   Diagnosis Date    Diabetes ([facility] [address]     Hypercholesterolemia     Hypertension        Past Surgical History:  No past surgical history on file. Family History:  No family history on file. Social History:  Social History     Tobacco Use    Smoking status: Former Smoker    Smokeless tobacco: Never Used   Substance Use Topics    Alcohol use: Not on file    Drug use: Not on file       Allergies:  No Known Allergies      Review of Systems   Review of Systems   Constitutional: Negative for chills and fever. HENT: Negative for congestion, rhinorrhea and sore throat. Respiratory: Negative for cough and shortness of breath. Cardiovascular: Negative for chest pain. Gastrointestinal: Negative for abdominal pain, blood in stool, constipation, diarrhea, nausea and vomiting. Genitourinary: Negative for dysuria, frequency and hematuria. Musculoskeletal: Negative for back pain and myalgias. Skin: Negative for rash and wound. Neurological: Negative for dizziness and headaches. All other systems reviewed and are negative.     All Other Systems Negative  Physical Exam     Vitals:    12/24/18 2315 12/25/18 0015 12/25/18 0025 12/25/18 0033   BP:  139/57     Pulse: (!) 101   98   Resp: 20 Temp:       SpO2: 100%  95%    Weight:         Physical Exam   Constitutional: She is oriented to person, place, and time. She appears well-developed and well-nourished. No distress. HENT:   Head: Normocephalic and atraumatic. Eyes: Conjunctivae are normal.   Neck: Normal range of motion. Neck supple. Cardiovascular: Normal rate, regular rhythm and normal heart sounds. Pulmonary/Chest: Effort normal and breath sounds normal. No respiratory distress. She exhibits no tenderness. Abdominal: Soft. Bowel sounds are normal. She exhibits no distension. There is no tenderness. There is no rebound and no guarding. Musculoskeletal: She exhibits no edema or deformity. Neurological: She is alert and oriented to person, place, and time. Skin: Skin is warm and dry. She is not diaphoretic. Psychiatric: She has a normal mood and affect. Her behavior is normal.   Nursing note and vitals reviewed.         Diagnostic Study Results     Labs -     Recent Results (from the past 12 hour(s))   GLUCOSE, POC    Collection Time: 12/24/18  9:53 PM   Result Value Ref Range    Glucose (POC) >600 (HH) 70 - 110 mg/dL   URINALYSIS W/ RFLX MICROSCOPIC    Collection Time: 12/24/18 10:00 PM   Result Value Ref Range    Color YELLOW      Appearance CLEAR      Specific gravity 1.026 1.005 - 1.030      pH (UA) 5.0 5.0 - 8.0      Protein NEGATIVE  NEG mg/dL    Glucose >1,000 (A) NEG mg/dL    Ketone NEGATIVE  NEG mg/dL    Bilirubin NEGATIVE  NEG      Blood TRACE (A) NEG      Urobilinogen 0.2 0.2 - 1.0 EU/dL    Nitrites NEGATIVE  NEG      Leukocyte Esterase NEGATIVE  NEG     URINE MICROSCOPIC ONLY    Collection Time: 12/24/18 10:00 PM   Result Value Ref Range    WBC 5 to 9 0 - 4 /hpf    RBC 1 to 4 0 - 5 /hpf    Epithelial cells FEW 0 - 5 /lpf    Bacteria 1+ (A) NEG /hpf   EKG, 12 LEAD, INITIAL    Collection Time: 12/24/18 10:01 PM   Result Value Ref Range    Ventricular Rate 104 BPM    Atrial Rate 104 BPM    P-R Interval 132 ms    QRS Duration 80 ms    Q-T Interval 334 ms    QTC Calculation (Bezet) 439 ms    Calculated P Axis 67 degrees    Calculated R Axis 20 degrees    Calculated T Axis 25 degrees    Diagnosis       Sinus tachycardia  Nonspecific ST abnormality  Abnormal ECG  No previous ECGs available     CBC WITH AUTOMATED DIFF    Collection Time: 12/24/18 10:14 PM   Result Value Ref Range    WBC 5.1 4.6 - 13.2 K/uL    RBC 3.73 (L) 4.20 - 5.30 M/uL    HGB 12.4 12.0 - 16.0 g/dL    HCT 39.0 35.0 - 45.0 %    .6 (H) 74.0 - 97.0 FL    MCH 33.2 24.0 - 34.0 PG    MCHC 31.8 31.0 - 37.0 g/dL    RDW 13.2 11.6 - 14.5 %    PLATELET 608 516 - 592 K/uL    MPV 12.4 (H) 9.2 - 11.8 FL    NEUTROPHILS 83 (H) 40 - 73 %    LYMPHOCYTES 10 (L) 21 - 52 %    MONOCYTES 7 3 - 10 %    EOSINOPHILS 0 0 - 5 %    BASOPHILS 0 0 - 2 %    ABS. NEUTROPHILS 4.2 1.8 - 8.0 K/UL    ABS. LYMPHOCYTES 0.5 (L) 0.9 - 3.6 K/UL    ABS. MONOCYTES 0.4 0.05 - 1.2 K/UL    ABS. EOSINOPHILS 0.0 0.0 - 0.4 K/UL    ABS. BASOPHILS 0.0 0.0 - 0.1 K/UL    DF AUTOMATED     METABOLIC PANEL, COMPREHENSIVE    Collection Time: 12/24/18 10:30 PM   Result Value Ref Range    Sodium 128 (L) 136 - 145 mmol/L    Potassium 5.6 (H) 3.5 - 5.5 mmol/L    Chloride 98 (L) 100 - 108 mmol/L    CO2 15 (L) 21 - 32 mmol/L    Anion gap 15 3.0 - 18 mmol/L    Glucose 1,155 (HH) 74 - 99 mg/dL    BUN 46 (H) 7.0 - 18 MG/DL    Creatinine 2.90 (H) 0.6 - 1.3 MG/DL    BUN/Creatinine ratio 16 12 - 20      GFR est AA 19 (L) >60 ml/min/1.73m2    GFR est non-AA 16 (L) >60 ml/min/1.73m2    Calcium 8.7 8.5 - 10.1 MG/DL    Bilirubin, total 0.4 0.2 - 1.0 MG/DL    ALT (SGPT) 38 13 - 56 U/L    AST (SGOT) 46 (H) 15 - 37 U/L    Alk.  phosphatase 145 (H) 45 - 117 U/L    Protein, total 7.0 6.4 - 8.2 g/dL    Albumin 3.1 (L) 3.4 - 5.0 g/dL    Globulin 3.9 2.0 - 4.0 g/dL    A-G Ratio 0.8 0.8 - 1.7     GLUCOSTABILIZER    Collection Time: 12/25/18 12:10 AM   Result Value Ref Range    Glucose DELETED mg/dL    Insulin order DELETED units/hour Insulin adminstered DELETED units/hour    Multiplier DELETED     Low target DELETED mg/dL    High target DELETED mg/dL    D50 order DELETED ml    D50 administered DELETED ml    Minutes until next BG DELETED min    Order initials DELETED     Administered initials DELETED    GLUCOSTABILIZER    Collection Time: 12/25/18 12:15 AM   Result Value Ref Range    Glucose 600 mg/dL    Insulin order 10.8 units/hour    Insulin adminstered 10.8 units/hour    Multiplier 0.020     Low target 140 mg/dL    High target 180 mg/dL    D50 order 0.0 ml    D50 administered 0.00 ml    Minutes until next BG 60 min    Order initials rmm     Administered initials rmm        Radiologic Studies -   XR CHEST PORT    (Results Pending)     CT Results  (Last 48 hours)    None        CXR Results  (Last 48 hours)    None            Medical Decision Making   I am the first provider for this patient. I reviewed the vital signs, available nursing notes, past medical history, past surgical history, family history and social history. Vital Signs-Reviewed the patient's vital signs. Pulse Oximetry Analysis -  100 % RA    Records Reviewed: Nursing Notes and Old Medical Records     Procedures: None   Procedures    Provider Notes (Medical Decision Making):     Differential: DKA, hyper/hypo glycemia, electrolyte abnormalities, UTI    Plan: Will order fluids and basic labs and ua      11:37 PM  BG 1,155- K 5.6 will start glucose stabilizer, Not currently in DKA but likely in hyperosmolar state. Discussed case with Dr. Celina Saldana who agrees with admission for patient will order additional fluids and continue to assess and monitor    12:37 AM  Admission orders placed.  Patient agrees with plan and admission       MED RECONCILIATION:  Current Facility-Administered Medications   Medication Dose Route Frequency    sodium chloride (NS) flush 5-10 mL  5-10 mL IntraVENous PRN    insulin regular (NOVOLIN R, HUMULIN R) 100 Units in 0.9% sodium chloride 100 mL infusion  0-50 Units/hr IntraVENous TITRATE    glucose chewable tablet 16 g  4 Tab Oral PRN    glucagon (GLUCAGEN) injection 1 mg  1 mg IntraMUSCular PRN    dextrose (D50W) injection syrg 12.5-25 g  25-50 mL IntraVENous PRN    insulin regular (NOVOLIN,HUMULIN) 100 units/100 ml NS infusion (premix)  0-50 Units/hr IntraVENous NOW     Current Outpatient Medications   Medication Sig    metFORMIN (GLUCOPHAGE) 500 mg tablet Take  by mouth two (2) times daily (with meals).  glipiZIDE (GLUCOTROL) 5 mg tablet Take  by mouth two (2) times a day.  losartan (COZAAR) 50 mg tablet Take  by mouth daily.  cholecalciferol, vitamin D3, (VITAMIN D3) 2,000 unit tab Take  by mouth.  atorvastatin (LIPITOR) 20 mg tablet Take  by mouth daily.  aspirin 81 mg chewable tablet Take 81 mg by mouth two (2) times a day. Disposition:  Admit           Diagnosis     Clinical Impression:   1. Hyperglycemia    2. Acute hyperkalemia    3. Altered mental status, unspecified altered mental status type    4. Urinary frequency    5.  Hyponatremia

## 2018-12-25 NOTE — ED NOTES
Pt sleeping on stretcher in a position of comfort with no acute distress observed. Stable vital signs on monitor. Call bell within reach. Will continue to monitor pt and carry out orders.

## 2018-12-25 NOTE — ED NOTES
Pt resting on stretcher in a position of comfort with no acute distress observed. Stable vital signs on monitor. Call bell within reach. Will continue to monitor pt and carry out orders.

## 2018-12-25 NOTE — PROGRESS NOTES
Pt needs [facility] for HH, pt is [facility] can only use [facility] or [facility]. Returned to room, pt had many visitors, then pt was in heavy sleep.     Regino Marinelli, MSW  Case Management  [phone]

## 2018-12-25 NOTE — ED NOTES
TRANSFER - OUT REPORT:    Verbal report given to Philippe Smith RN(name) on Kirby Ibrahim  being transferred to (unit) for routine progression of care       Report consisted of patients Situation, Background, Assessment and   Recommendations(SBAR). Information from the following report(s) SBAR, ED Summary, Intake/Output, MAR and Recent Results was reviewed with the receiving nurse. Lines:   Peripheral IV 12/24/18 Right Arm (Active)   Site Assessment Clean, dry, & intact 12/24/2018 10:14 PM   Phlebitis Assessment 0 12/24/2018 10:14 PM   Infiltration Assessment 0 12/24/2018 10:14 PM   Dressing Status Clean, dry, & intact 12/24/2018 10:14 PM   Dressing Type Transparent 12/24/2018 10:14 PM   Hub Color/Line Status Pink 12/24/2018 10:14 PM        Opportunity for questions and clarification was provided.       Patient transported with:   [employer]

## 2018-12-25 NOTE — ED TRIAGE NOTES
Pt arrived via EMS with complaints of not feeling well since 0900. Per EMS pt's blood sugar was \"HI\" on glucometer.

## 2018-12-25 NOTE — PROGRESS NOTES
Reason for Admission:  Hyperglycemia                 RRAT Score:   9           Plan for utilizing home health:    Pt provided with list of [facility] agencies, asked for time to review the list. CM will return for [facility]. Likelihood of Readmission:   LOW                         Transition of Care Plan:            Initial assessment completed with patient. Face sheet information confirmed:  yes. The patient requests we communicate with patient in reference to medical care. This patient lives in a single family home with daughter. Patient is able to navigate steps as needed. Prior to hospitalization, patient was considered to be independent : yes . Cognitive status of patient: oriented to time, place, person and situation. Patient has a current ACP document on file: no  The patient's family will be available to transport patient home upon discharge. The patient has no DME in the home. Patient is not currently active with home health. Patient has not ever stayed in a skilled nursing facility or rehab. This patient is on dialysis :no    List of available Home Health was reviewed with the patient prior to discharge. Freedom of choice signed: no. Currently, the discharge plan is Home with Home Health. Care Management Interventions  PCP Verified by CM:  Yes  Last Visit to PCP: 12/21/18  Palliative Care Criteria Met (RRAT>21 & CHF Dx)?: No  Mode of Transport at Discharge: Self  Transition of Care Consult (CM Consult): [address] [place]: No  Discharge Durable Medical Equipment: No  Physical Therapy Consult: Yes  Occupational Therapy Consult: Yes  Speech Therapy Consult: No  Current Support Network: Lives Alone, Family Lives Nearby  Confirm Follow Up Transport: Family  Plan discussed with Pt/Family/Caregiver: Yes  [place] Resource Information Provided?: No  Discharge Location  Discharge Placement: Home with home health    LISA Garcia  Case Management  [phone]

## 2018-12-25 NOTE — H&P
History & Physical    Patient: Samia Rashid MRN: [id number]  CSN: [id number]    YOB: 1940  Age: 77 y.o. Sex: female      DOA: 12/24/2018    Chief Complaint:   Chief Complaint   Patient presents with    High Blood Sugar          HPI:     Samia Rashid is a 77 y.o.  female who has PMH of DM, HTN and non-compliance. Pt came to ER with a CC of generalized weakness and SOB. In ER, Pt had FS of > 1100 multiple electrolytes abnormalities that showed metabolic acidosis w/out widening AG. Pt admitted to not taking her Diabetic PO meds daily as she forgets while working. Pt also refused to start insulin at home  Pt was also found to have mild uti and will be admitted for hyperosmolar status and impending DKA   Slightly feeling better now and Denies CP/SOB/fever/abdominal pain and urinary Sx but continues to c/o generalized weakness and thirsty       Past Medical History:   Diagnosis Date    Diabetes ([facility] [address]     Hypercholesterolemia     Hypertension        No past surgical history on file. No family history on file. Social History     Socioeconomic History    Marital status: SINGLE     Spouse name: Not on file    Number of children: Not on file    Years of education: Not on file    Highest education level: Not on file   Tobacco Use    Smoking status: Former Smoker    Smokeless tobacco: Never Used       Prior to Admission medications    Medication Sig Start Date End Date Taking? Authorizing Provider   metFORMIN (GLUCOPHAGE) 500 mg tablet Take  by mouth two (2) times daily (with meals). Provider, Historical   glipiZIDE (GLUCOTROL) 5 mg tablet Take  by mouth two (2) times a day. Provider, Historical   losartan (COZAAR) 50 mg tablet Take  by mouth daily. Provider, Historical   cholecalciferol, vitamin D3, (VITAMIN D3) 2,000 unit tab Take  by mouth. Provider, Historical   atorvastatin (LIPITOR) 20 mg tablet Take  by mouth daily.     Provider, Historical aspirin 81 mg chewable tablet Take 81 mg by mouth two (2) times a day. Provider, Historical       No Known Allergies      Review of Systems  GENERAL: Patient alert, awake and oriented times 3, able to communicate full sentences but in distress. HEENT: No change in vision, no earache, tinnitus, sore throat or sinus congestion. NECK: No pain or stiffness. PULMONARY: +ve shortness of breath, No cough or wheeze. Cardiovascular: no pnd / orthopnea, no CP  GASTROINTESTINAL: No abdominal pain, nausea, vomiting or diarrhea, melena or bright red blood per rectum. GENITOURINARY: No urinary frequency, urgency, hesitancy or dysuria. MUSCULOSKELETAL: No joint or muscle pain, no back pain, no recent trauma. DERMATOLOGIC: No rash, no itching, no lesions. ENDOCRINE: No polyuria, polydipsia, no heat or cold intolerance. No recent change in weight. HEMATOLOGICAL: No anemia or easy bruising or bleeding. NEUROLOGIC: No headache, seizures, numbness, tingling or weakness. Physical Exam:     Physical Exam:  Visit Vitals  /57   Pulse 98   Temp 98.6 °F (37 °C)   Resp 20   Wt 70.3 kg (155 lb)   SpO2 95%   BMI 25.79 kg/m²      O2 Device: Room air    Temp (24hrs), Av.6 °F (37 °C), Min:98.6 °F (37 °C), Max:98.6 °F (37 °C)     190 -  0700  In: 1000 [I.V.:1000]  Out: -    No intake/output data recorded. General:  Alert, cooperative, in distress, appears stated age. Generally weak and dehydrated               Head: Normocephalic, without obvious abnormality, atraumatic. Eyes:  Conjunctivae/corneas clear. PERRL, EOMs intact. Nose: Nares normal. No drainage or sinus tenderness. Neck: Supple, symmetrical, trachea midline, no adenopathy, thyroid: no enlargement, no carotid bruit and no JVD. Lungs:   Decrease BS but hyerventilating    Heart:  Regular rate and rhythm, S1, S2 tachy     Abdomen: Soft, non-tender.  Bowel sounds normal.    Extremities: Extremities normal, atraumatic, no cyanosis or edema. Pulses: 2+ and symmetric all extremities. Skin:  No rashes or lesions   Neurologic: AAOx3, No focal motor or sensory deficit. Labs Reviewed:    Lab results reviewed. For significant abnormal values and values requiring intervention, see assessment and plan.   CXR and EKG    Procedures/imaging: see electronic medical records for all procedures/Xrays and details which were not copied into this note but were reviewed prior to creation of Plan      Assessment/Plan     Principal Problem:    Type [facility] diabetes mellitus [facility] (12/25/2018)    Active Problems:    Acute UTI (12/25/2018)      Dehydration (12/25/2018)      Acute renal failure (ARF) ([facility] [address] (12/25/2018)      Noncompliance (12/25/2018)       Pt is admitted for DM with hyperosmolar status and impending DKA  Non-compliance with her diabetic meds   ARF 2 ry to dehydration  Metabolic acidosis   UTI    Continue IVF and Insulin drip   Ceftriaxone  Hold ACE-I   Diabetic education       DVT/GI Prophylaxis: Hep SQ    Plan of care is discussed in details with Patient/Family at bedside and agreed upon    Romayne Silvas, MD  12/25/2018 12:44 AM

## 2018-12-25 NOTE — PROGRESS NOTES
[facility] Hospitalist Group  Progress Note    Patient: Audrey Males Age: 77 y.o. : 1940 MR#: [id number] SSN: [id number]  Date: 2018     Subjective:   Alert and oriented X 3. NAD. Resting comfortably in bed. Denies CP, f/c, n/v/d/c or abd pain. Pt reports she has been a diabetic for 8 years, followed by Dr. Freddie Ruano of [facility]. Pt states she was initially on insulin, which she did not like and was subsequently placed on oral medications for glycemic control. Pt states PCP Freddie Ruano has been wanting to place her back on insulin but she continues to refuse. She currently does not have a working glucometer. States has been taking all her medications as prescribed. Discussed case w/carmela Mcfarlandson Patrice at [phone]  Assessment/Plan:     1. DMT2 with hyperosmolarity. Uncontrolled. A1c >16.0  2. ARF  3. Dehydration-improved. 4. HTN  5. HLD-statin  6. Non-compliance    Plan. 1. Anion gap wnl. Initiated on insulin gtt, IVF in ED. Transitioned to SSI, Lantus with improvement of serum glucose. Electrolyte imbalance resolved. 2. Likely r/t #1, #3. Improving. Continue IVF, renal US pending. 3. Home regimen ACEi/ARBs held in setting of #2. Initiated on norvasc. Monitor. 4.  Discussed risks for non-compliance w/ medical management including but not limited to permanent injury or disability, death. Pt states understanding. Diabetic educator/case management following.     Additional Notes:      Case discussed with:  [x]Patient  [x]Family  [x]Nursing  [x]Case Management  DVT Prophylaxis:  []Lovenox  []Hep SQ  [x]SCDs  []Coumadin   []On Heparin gtt    Objective:   VS:   Visit Vitals  /82 (BP 1 Location: Left arm, BP Patient Position: At rest)   Pulse 98   Temp 97 °F (36.1 °C)   Resp 20   Wt 70.3 kg (155 lb)   SpO2 98%   BMI 25.79 kg/m²      Tmax/24hrs: Temp (24hrs), Av °F (36.7 °C), Min:97 °F (36.1 °C), Max:98.6 °F (37 °C)      Intake/Output Summary (Last 24 hours) at 12/25/2018 1103  Last data filed at 12/24/2018 2358  Gross per 24 hour   Intake 3000 ml   Output    Net 3000 ml       General:  Alert, NAD  Cardiovascular:  RRR  Pulmonary:  LSC throughout; respiratory effort WNL  GI:  +BS in all four quadrants, soft, non-tender  Extremities:  No edema; 2+ dorsalis pedis pulses bilaterally  Neuro: alert and oriented X 3.      Labs:    Recent Results (from the past 24 hour(s))   GLUCOSE, POC    Collection Time: 12/24/18  9:53 PM   Result Value Ref Range    Glucose (POC) >600 (HH) 70 - 110 mg/dL   URINALYSIS W/ RFLX MICROSCOPIC    Collection Time: 12/24/18 10:00 PM   Result Value Ref Range    Color YELLOW      Appearance CLEAR      Specific gravity 1.026 1.005 - 1.030      pH (UA) 5.0 5.0 - 8.0      Protein NEGATIVE  NEG mg/dL    Glucose >1,000 (A) NEG mg/dL    Ketone NEGATIVE  NEG mg/dL    Bilirubin NEGATIVE  NEG      Blood TRACE (A) NEG      Urobilinogen 0.2 0.2 - 1.0 EU/dL    Nitrites NEGATIVE  NEG      Leukocyte Esterase NEGATIVE  NEG     URINE MICROSCOPIC ONLY    Collection Time: 12/24/18 10:00 PM   Result Value Ref Range    WBC 5 to 9 0 - 4 /hpf    RBC 1 to 4 0 - 5 /hpf    Epithelial cells FEW 0 - 5 /lpf    Bacteria 1+ (A) NEG /hpf   EKG, 12 LEAD, INITIAL    Collection Time: 12/24/18 10:01 PM   Result Value Ref Range    Ventricular Rate 104 BPM    Atrial Rate 104 BPM    P-R Interval 132 ms    QRS Duration 80 ms    Q-T Interval 334 ms    QTC Calculation (Bezet) 439 ms    Calculated P Axis 67 degrees    Calculated R Axis 20 degrees    Calculated T Axis 25 degrees    Diagnosis       Sinus tachycardia  Nonspecific ST abnormality  Abnormal ECG  No previous ECGs available     CBC WITH AUTOMATED DIFF    Collection Time: 12/24/18 10:14 PM   Result Value Ref Range    WBC 5.1 4.6 - 13.2 K/uL    RBC 3.73 (L) 4.20 - 5.30 M/uL    HGB 12.4 12.0 - 16.0 g/dL    HCT 39.0 35.0 - 45.0 %    .6 (H) 74.0 - 97.0 FL    MCH 33.2 24.0 - 34.0 PG    MCHC 31.8 31.0 - 37.0 g/dL    RDW 13.2 11.6 - 14.5 %    PLATELET 720 640 - 354 K/uL    MPV 12.4 (H) 9.2 - 11.8 FL    NEUTROPHILS 83 (H) 40 - 73 %    LYMPHOCYTES 10 (L) 21 - 52 %    MONOCYTES 7 3 - 10 %    EOSINOPHILS 0 0 - 5 %    BASOPHILS 0 0 - 2 %    ABS. NEUTROPHILS 4.2 1.8 - 8.0 K/UL    ABS. LYMPHOCYTES 0.5 (L) 0.9 - 3.6 K/UL    ABS. MONOCYTES 0.4 0.05 - 1.2 K/UL    ABS. EOSINOPHILS 0.0 0.0 - 0.4 K/UL    ABS. BASOPHILS 0.0 0.0 - 0.1 K/UL    DF AUTOMATED     HEMOGLOBIN A1C WITH EAG    Collection Time: 12/24/18 10:14 PM   Result Value Ref Range    Hemoglobin A1c >16.0 (H) 4.2 - 5.6 %    Est. average glucose Cannot be calculated mg/dL   PHOSPHORUS    Collection Time: 12/24/18 10:14 PM   Result Value Ref Range    Phosphorus 5.6 (H) 2.5 - 4.9 MG/DL   PTT    Collection Time: 12/24/18 10:14 PM   Result Value Ref Range    aPTT 30.6 23.0 - 36.4 SEC   PROTHROMBIN TIME + INR    Collection Time: 12/24/18 10:14 PM   Result Value Ref Range    Prothrombin time 12.9 11.5 - 15.2 sec    INR 1.0 0.8 - 1.2     METABOLIC PANEL, COMPREHENSIVE    Collection Time: 12/24/18 10:30 PM   Result Value Ref Range    Sodium 128 (L) 136 - 145 mmol/L    Potassium 5.6 (H) 3.5 - 5.5 mmol/L    Chloride 98 (L) 100 - 108 mmol/L    CO2 15 (L) 21 - 32 mmol/L    Anion gap 15 3.0 - 18 mmol/L    Glucose 1,155 (HH) 74 - 99 mg/dL    BUN 46 (H) 7.0 - 18 MG/DL    Creatinine 2.90 (H) 0.6 - 1.3 MG/DL    BUN/Creatinine ratio 16 12 - 20      GFR est AA 19 (L) >60 ml/min/1.73m2    GFR est non-AA 16 (L) >60 ml/min/1.73m2    Calcium 8.7 8.5 - 10.1 MG/DL    Bilirubin, total 0.4 0.2 - 1.0 MG/DL    ALT (SGPT) 38 13 - 56 U/L    AST (SGOT) 46 (H) 15 - 37 U/L    Alk.  phosphatase 145 (H) 45 - 117 U/L    Protein, total 7.0 6.4 - 8.2 g/dL    Albumin 3.1 (L) 3.4 - 5.0 g/dL    Globulin 3.9 2.0 - 4.0 g/dL    A-G Ratio 0.8 0.8 - 1.7     GLUCOSTABILIZER    Collection Time: 12/25/18 12:10 AM   Result Value Ref Range    Glucose DELETED mg/dL    Insulin order DELETED units/hour    Insulin adminstered DELETED units/hour Multiplier DELETED     Low target DELETED mg/dL    High target DELETED mg/dL    D50 order DELETED ml    D50 administered DELETED ml    Minutes until next BG DELETED min    Order initials DELETED     Administered initials DELETED    GLUCOSTABILIZER    Collection Time: 12/25/18 12:15 AM   Result Value Ref Range    Glucose 600 mg/dL    Insulin order 10.8 units/hour    Insulin adminstered 10.8 units/hour    Multiplier 0.020     Low target 140 mg/dL    High target 180 mg/dL    D50 order 0.0 ml    D50 administered 0.00 ml    Minutes until next BG 60 min    Order initials rmm     Administered initials rmm    GLUCOSE, POC    Collection Time: 12/25/18  1:28 AM   Result Value Ref Range    Glucose (POC) >600 (HH) 70 - 110 mg/dL   GLUCOSTABILIZER    Collection Time: 12/25/18  1:28 AM   Result Value Ref Range    Glucose 601 mg/dL    Insulin order 16.2 units/hour    Insulin adminstered 16.2 units/hour    Multiplier 0.030     Low target 140 mg/dL    High target 180 mg/dL    D50 order 0.0 ml    D50 administered 0.00 ml    Minutes until next BG 60 min    Order initials rmm     Administered initials rmm    GLUCOSE, POC    Collection Time: 12/25/18  2:30 AM   Result Value Ref Range    Glucose (POC) >600 (HH) 70 - 110 mg/dL   GLUCOSTABILIZER    Collection Time: 12/25/18  2:31 AM   Result Value Ref Range    Glucose 602 mg/dL    Insulin order 21.7 units/hour    Insulin adminstered 21.7 units/hour    Multiplier 0.040     Low target 140 mg/dL    High target 180 mg/dL    D50 order 0.0 ml    D50 administered 0.00 ml    Minutes until next BG 60 min    Order initials rmm     Administered initials rmm    CBC WITH AUTOMATED DIFF    Collection Time: 12/25/18  3:20 AM   Result Value Ref Range    WBC 4.0 (L) 4.6 - 13.2 K/uL    RBC 3.22 (L) 4.20 - 5.30 M/uL    HGB 10.2 (L) 12.0 - 16.0 g/dL    HCT 30.3 (L) 35.0 - 45.0 %    MCV 94.1 74.0 - 97.0 FL    MCH 31.7 24.0 - 34.0 PG    MCHC 33.7 31.0 - 37.0 g/dL    RDW 12.1 11.6 - 14.5 %    PLATELET 453 298 - 420 K/uL    MPV 11.6 9.2 - 11.8 FL    NEUTROPHILS 78 (H) 40 - 73 %    LYMPHOCYTES 16 (L) 21 - 52 %    MONOCYTES 6 3 - 10 %    EOSINOPHILS 0 0 - 5 %    BASOPHILS 0 0 - 2 %    ABS. NEUTROPHILS 3.1 1.8 - 8.0 K/UL    ABS. LYMPHOCYTES 0.6 (L) 0.9 - 3.6 K/UL    ABS. MONOCYTES 0.2 0.05 - 1.2 K/UL    ABS. EOSINOPHILS 0.0 0.0 - 0.4 K/UL    ABS.  BASOPHILS 0.0 0.0 - 0.1 K/UL    DF AUTOMATED     METABOLIC PANEL, BASIC    Collection Time: 12/25/18  3:20 AM   Result Value Ref Range    Sodium 140 136 - 145 mmol/L    Potassium 4.0 3.5 - 5.5 mmol/L    Chloride 109 (H) 100 - 108 mmol/L    CO2 19 (L) 21 - 32 mmol/L    Anion gap 12 3.0 - 18 mmol/L    Glucose 388 (H) 74 - 99 mg/dL    BUN 40 (H) 7.0 - 18 MG/DL    Creatinine 2.51 (H) 0.6 - 1.3 MG/DL    BUN/Creatinine ratio 16 12 - 20      GFR est AA 22 (L) >60 ml/min/1.73m2    GFR est non-AA 18 (L) >60 ml/min/1.73m2    Calcium 9.4 8.5 - 10.1 MG/DL   GLUCOSE, POC    Collection Time: 12/25/18  3:48 AM   Result Value Ref Range    Glucose (POC) 471 (HH) 70 - 110 mg/dL   GLUCOSTABILIZER    Collection Time: 12/25/18  3:49 AM   Result Value Ref Range    Glucose 471 mg/dL    Insulin order 12.3 units/hour    Insulin adminstered 12.3 units/hour    Multiplier 0.030     Low target 140 mg/dL    High target 180 mg/dL    D50 order 0.0 ml    D50 administered 0.00 ml    Minutes until next BG 60 min    Order initials rmm     Administered initials rmm    GLUCOSE, POC    Collection Time: 12/25/18  4:55 AM   Result Value Ref Range    Glucose (POC) 306 (H) 70 - 110 mg/dL   GLUCOSTABILIZER    Collection Time: 12/25/18  4:55 AM   Result Value Ref Range    Glucose 306 mg/dL    Insulin order 4.9 units/hour    Insulin adminstered 4.9 units/hour    Multiplier 0.020     Low target 140 mg/dL    High target 180 mg/dL    D50 order 0.0 ml    D50 administered 0.00 ml    Minutes until next BG 60 min    Order initials rmm     Administered initials rmm    GLUCOSE, POC    Collection Time: 12/25/18  6:05 AM   Result Value Ref Range    Glucose (POC) 176 (H) 70 - 110 mg/dL       Signed By: Ayana Reyna NP     December 25, 2018

## 2018-12-26 ENCOUNTER — APPOINTMENT (OUTPATIENT)
Dept: ULTRASOUND IMAGING | Age: 78
End: 2018-12-26
Attending: NURSE PRACTITIONER
Payer: MEDICARE

## 2018-12-26 VITALS
OXYGEN SATURATION: 97 % | WEIGHT: 160.6 LBS | TEMPERATURE: 98.8 F | HEART RATE: 84 BPM | DIASTOLIC BLOOD PRESSURE: 66 MMHG | BODY MASS INDEX: 26.73 KG/M2 | RESPIRATION RATE: 18 BRPM | SYSTOLIC BLOOD PRESSURE: 119 MMHG

## 2018-12-26 LAB
ANION GAP SERPL CALC-SCNC: 7 MMOL/L (ref 3–18)
ANION GAP SERPL CALC-SCNC: 9 MMOL/L (ref 3–18)
ANION GAP SERPL CALC-SCNC: 9 MMOL/L (ref 3–18)
BASOPHILS # BLD: 0 K/UL (ref 0–0.1)
BASOPHILS NFR BLD: 0 % (ref 0–2)
BUN SERPL-MCNC: 22 MG/DL (ref 7–18)
BUN SERPL-MCNC: 23 MG/DL (ref 7–18)
BUN SERPL-MCNC: 24 MG/DL (ref 7–18)
BUN/CREAT SERPL: 13 (ref 12–20)
BUN/CREAT SERPL: 14 (ref 12–20)
BUN/CREAT SERPL: 15 (ref 12–20)
CALCIUM SERPL-MCNC: 7.8 MG/DL (ref 8.5–10.1)
CALCIUM SERPL-MCNC: 8.2 MG/DL (ref 8.5–10.1)
CALCIUM SERPL-MCNC: 8.6 MG/DL (ref 8.5–10.1)
CHLORIDE SERPL-SCNC: 112 MMOL/L (ref 100–108)
CHLORIDE SERPL-SCNC: 113 MMOL/L (ref 100–108)
CHLORIDE SERPL-SCNC: 116 MMOL/L (ref 100–108)
CO2 SERPL-SCNC: 18 MMOL/L (ref 21–32)
CO2 SERPL-SCNC: 22 MMOL/L (ref 21–32)
CO2 SERPL-SCNC: 22 MMOL/L (ref 21–32)
CREAT SERPL-MCNC: 1.57 MG/DL (ref 0.6–1.3)
CREAT SERPL-MCNC: 1.64 MG/DL (ref 0.6–1.3)
CREAT SERPL-MCNC: 1.65 MG/DL (ref 0.6–1.3)
DIFFERENTIAL METHOD BLD: ABNORMAL
EOSINOPHIL # BLD: 0.1 K/UL (ref 0–0.4)
EOSINOPHIL NFR BLD: 2 % (ref 0–5)
ERYTHROCYTE [DISTWIDTH] IN BLOOD BY AUTOMATED COUNT: 12.5 % (ref 11.6–14.5)
FOLATE SERPL-MCNC: >20 NG/ML (ref 3.1–17.5)
GLUCOSE BLD STRIP.AUTO-MCNC: 316 MG/DL (ref 70–110)
GLUCOSE BLD STRIP.AUTO-MCNC: 360 MG/DL (ref 70–110)
GLUCOSE BLD STRIP.AUTO-MCNC: 95 MG/DL (ref 70–110)
GLUCOSE BLD STRIP.AUTO-MCNC: >600 MG/DL (ref 70–110)
GLUCOSE SERPL-MCNC: 181 MG/DL (ref 74–99)
GLUCOSE SERPL-MCNC: 217 MG/DL (ref 74–99)
GLUCOSE SERPL-MCNC: 64 MG/DL (ref 74–99)
HCT VFR BLD AUTO: 30.6 % (ref 35–45)
HGB BLD-MCNC: 10.6 G/DL (ref 12–16)
LYMPHOCYTES # BLD: 1.7 K/UL (ref 0.9–3.6)
LYMPHOCYTES NFR BLD: 32 % (ref 21–52)
MAGNESIUM SERPL-MCNC: 1.9 MG/DL (ref 1.6–2.6)
MCH RBC QN AUTO: 33 PG (ref 24–34)
MCHC RBC AUTO-ENTMCNC: 34.6 G/DL (ref 31–37)
MCV RBC AUTO: 95.3 FL (ref 74–97)
MONOCYTES # BLD: 0.7 K/UL (ref 0.05–1.2)
MONOCYTES NFR BLD: 13 % (ref 3–10)
NEUTS SEG # BLD: 3 K/UL (ref 1.8–8)
NEUTS SEG NFR BLD: 53 % (ref 40–73)
PHOSPHATE SERPL-MCNC: 2 MG/DL (ref 2.5–4.9)
PLATELET # BLD AUTO: 158 K/UL (ref 135–420)
PMV BLD AUTO: 11.6 FL (ref 9.2–11.8)
POTASSIUM SERPL-SCNC: 3.7 MMOL/L (ref 3.5–5.5)
POTASSIUM SERPL-SCNC: 3.7 MMOL/L (ref 3.5–5.5)
POTASSIUM SERPL-SCNC: 4.3 MMOL/L (ref 3.5–5.5)
RBC # BLD AUTO: 3.21 M/UL (ref 4.2–5.3)
SODIUM SERPL-SCNC: 141 MMOL/L (ref 136–145)
SODIUM SERPL-SCNC: 143 MMOL/L (ref 136–145)
SODIUM SERPL-SCNC: 144 MMOL/L (ref 136–145)
VIT B12 SERPL-MCNC: 1247 PG/ML (ref 211–911)
WBC # BLD AUTO: 5.5 K/UL (ref 4.6–13.2)

## 2018-12-26 PROCEDURE — 85025 COMPLETE CBC W/AUTO DIFF WBC: CPT

## 2018-12-26 PROCEDURE — 77030027138 HC INCENT SPIROMETER -A

## 2018-12-26 PROCEDURE — G8979 MOBILITY GOAL STATUS: HCPCS

## 2018-12-26 PROCEDURE — 97165 OT EVAL LOW COMPLEX 30 MIN: CPT

## 2018-12-26 PROCEDURE — 74011250637 HC RX REV CODE- 250/637: Performed by: INTERNAL MEDICINE

## 2018-12-26 PROCEDURE — 97161 PT EVAL LOW COMPLEX 20 MIN: CPT

## 2018-12-26 PROCEDURE — 65390000012 HC CONDITION CODE 44 OBSERVATION

## 2018-12-26 PROCEDURE — 74011250636 HC RX REV CODE- 250/636: Performed by: INTERNAL MEDICINE

## 2018-12-26 PROCEDURE — 80048 BASIC METABOLIC PNL TOTAL CA: CPT

## 2018-12-26 PROCEDURE — 99218 HC RM OBSERVATION: CPT

## 2018-12-26 PROCEDURE — 76770 US EXAM ABDO BACK WALL COMP: CPT

## 2018-12-26 PROCEDURE — 97530 THERAPEUTIC ACTIVITIES: CPT

## 2018-12-26 PROCEDURE — 84100 ASSAY OF PHOSPHORUS: CPT

## 2018-12-26 PROCEDURE — 74011250637 HC RX REV CODE- 250/637: Performed by: NURSE PRACTITIONER

## 2018-12-26 PROCEDURE — 82607 VITAMIN B-12: CPT

## 2018-12-26 PROCEDURE — G8989 SELF CARE D/C STATUS: HCPCS

## 2018-12-26 PROCEDURE — G8987 SELF CARE CURRENT STATUS: HCPCS

## 2018-12-26 PROCEDURE — G8988 SELF CARE GOAL STATUS: HCPCS

## 2018-12-26 PROCEDURE — 96372 THER/PROPH/DIAG INJ SC/IM: CPT

## 2018-12-26 PROCEDURE — 83735 ASSAY OF MAGNESIUM: CPT

## 2018-12-26 PROCEDURE — 74011000250 HC RX REV CODE- 250: Performed by: INTERNAL MEDICINE

## 2018-12-26 PROCEDURE — G8978 MOBILITY CURRENT STATUS: HCPCS

## 2018-12-26 PROCEDURE — 36415 COLL VENOUS BLD VENIPUNCTURE: CPT

## 2018-12-26 PROCEDURE — 74011636637 HC RX REV CODE- 636/637: Performed by: INTERNAL MEDICINE

## 2018-12-26 RX ORDER — ACETAMINOPHEN 325 MG/1
650 TABLET ORAL
Qty: 30 TAB | Refills: 0 | Status: SHIPPED | OUTPATIENT
Start: 2018-12-26

## 2018-12-26 RX ORDER — INSULIN GLARGINE 100 [IU]/ML
30 INJECTION, SOLUTION SUBCUTANEOUS DAILY
Qty: 1 VIAL | Refills: 0 | Status: ON HOLD | OUTPATIENT
Start: 2018-12-26 | End: 2020-02-03 | Stop reason: SDUPTHER

## 2018-12-26 RX ORDER — IPRATROPIUM BROMIDE AND ALBUTEROL SULFATE 2.5; .5 MG/3ML; MG/3ML
3 SOLUTION RESPIRATORY (INHALATION)
Qty: 30 NEBULE | Refills: 0 | Status: SHIPPED | OUTPATIENT
Start: 2018-12-26

## 2018-12-26 RX ORDER — DOCUSATE SODIUM 100 MG/1
100 CAPSULE, LIQUID FILLED ORAL
Qty: 30 CAP | Refills: 0 | Status: SHIPPED | OUTPATIENT
Start: 2018-12-26 | End: 2019-03-26

## 2018-12-26 RX ORDER — AMLODIPINE BESYLATE 5 MG/1
5 TABLET ORAL DAILY
Qty: 30 TAB | Refills: 0 | Status: ON HOLD | OUTPATIENT
Start: 2018-12-27 | End: 2020-02-03 | Stop reason: SDUPTHER

## 2018-12-26 RX ORDER — OXYCODONE AND ACETAMINOPHEN 5; 325 MG/1; MG/1
1 TABLET ORAL
Qty: 2 TAB | Refills: 0 | Status: SHIPPED | OUTPATIENT
Start: 2018-12-26 | End: 2020-02-04

## 2018-12-26 RX ORDER — INSULIN LISPRO 100 [IU]/ML
INJECTION, SOLUTION INTRAVENOUS; SUBCUTANEOUS
Qty: 1 VIAL | Refills: 0 | Status: SHIPPED | OUTPATIENT
Start: 2018-12-26

## 2018-12-26 RX ADMIN — AMLODIPINE BESYLATE 5 MG: 5 TABLET ORAL at 10:12

## 2018-12-26 RX ADMIN — INSULIN GLARGINE 20 UNITS: 100 INJECTION, SOLUTION SUBCUTANEOUS at 10:12

## 2018-12-26 RX ADMIN — ASPIRIN 81 MG 81 MG: 81 TABLET ORAL at 10:12

## 2018-12-26 RX ADMIN — IPRATROPIUM BROMIDE AND ALBUTEROL SULFATE 3 ML: 2.5; .5 SOLUTION RESPIRATORY (INHALATION) at 15:00

## 2018-12-26 RX ADMIN — HEPARIN SODIUM 5000 UNITS: 5000 INJECTION INTRAVENOUS; SUBCUTANEOUS at 10:12

## 2018-12-26 RX ADMIN — INSULIN LISPRO 12 UNITS: 100 INJECTION, SOLUTION INTRAVENOUS; SUBCUTANEOUS at 14:00

## 2018-12-26 RX ADMIN — INSULIN LISPRO 15 UNITS: 100 INJECTION, SOLUTION INTRAVENOUS; SUBCUTANEOUS at 11:59

## 2018-12-26 RX ADMIN — HEPARIN SODIUM 5000 UNITS: 5000 INJECTION INTRAVENOUS; SUBCUTANEOUS at 02:12

## 2018-12-26 NOTE — PROGRESS NOTES
D/C order noted for today. Pt has been accepted to [facility]. Confirmed with Gina that bed is available today. Met with pt are agreeable to the transition plan today. [facility] authorization has been obtained. ** Transport to facility has been arranged through [address] at 6:30pm time. Pt's discharge summary has been forwarded to SNF via [facility]. Bedside RN has been updated to the plan. Please call report to [phone].     LISA Morris  Case Management  [phone]

## 2018-12-26 NOTE — PROGRESS NOTES
[facility] Hospitalist Group  Progress Note    Patient: Hill Ramirez Age: 77 y.o. : 1940 MR#: [id number] SSN: [id number]  Date: 2018     Subjective:   Alert and oriented X 3. NAD. Resting comfortably in bed. Denies CP, f/c, n/v/d/c or abd pain. followed by Dr. Alyse Gibson of [facility]. Discussed case w/carmela Jones at [phone], agrees w/ plan for SNF as rec by PT/OT-awaiting placement. Assessment/Plan:     1. DMT2 with hyperosmolarity. Uncontrolled. A1c >16.0  2. ARF on ? CKD- likely r/t #1. improving  3. Dehydration-resolved. 4. HTN- stable. 5. HLD-statin  6. Non-compliance  7. Physical decondition. 8. Hx falls, X 2 this month prior to admission. Plan. 1. Anion gap wnl. continue SSI, Lantus with improvement of serum glucose. Diabetic educator/case management following. 2. Likely r/t #1, #3. Improving. renal US pending. Avoid nephrotoxic agents. 3. Continue  norvasc. Monitor. 4.  Discussed risks for non-compliance w/ medical management including but not limited to permanent injury or disability, death. Pt states understanding. Diabetic educator/case management following. 5. PT/OT following. rec SNF. Awaiting placement. Additional Notes:      Case discussed with:  [x]Patient  [x]Family  [x]Nursing  [x]Case Management  DVT Prophylaxis:  []Lovenox  [x]Hep SQ  []SCDs  []Coumadin   []On Heparin gtt    Objective:   VS:   Visit Vitals  /70 (BP 1 Location: Left arm, BP Patient Position: At rest)   Pulse 82   Temp 98.6 °F (37 °C)   Resp 18   Wt 72.8 kg (160 lb 9.6 oz)   SpO2 100%   Breastfeeding?  No   BMI 26.73 kg/m²      Tmax/24hrs: Temp (24hrs), Av.3 °F (36.8 °C), Min:98.1 °F (36.7 °C), Max:98.6 °F (37 °C)      Intake/Output Summary (Last 24 hours) at 2018 0950  Last data filed at 2018 1148  Gross per 24 hour   Intake 2380 ml   Output 1600 ml   Net 780 ml       General:  Alert, NAD  Cardiovascular:  RRR  Pulmonary:  LSC throughout; respiratory effort WNL  GI:  +BS in all four quadrants, soft, non-tender  Extremities:  No edema; 2+ dorsalis pedis pulses bilaterally  Neuro: alert and oriented X 3.      Labs:    Recent Results (from the past 24 hour(s))   METABOLIC PANEL, BASIC    Collection Time: 12/25/18 10:05 AM   Result Value Ref Range    Sodium 145 136 - 145 mmol/L    Potassium 4.4 3.5 - 5.5 mmol/L    Chloride 115 (H) 100 - 108 mmol/L    CO2 21 21 - 32 mmol/L    Anion gap 9 3.0 - 18 mmol/L    Glucose 278 (H) 74 - 99 mg/dL    BUN 39 (H) 7.0 - 18 MG/DL    Creatinine 1.84 (H) 0.6 - 1.3 MG/DL    BUN/Creatinine ratio 21 (H) 12 - 20      GFR est AA 32 (L) >60 ml/min/1.73m2    GFR est non-AA 26 (L) >60 ml/min/1.73m2    Calcium 8.6 8.5 - 10.1 MG/DL   GLUCOSE, POC    Collection Time: 12/25/18 11:11 AM   Result Value Ref Range    Glucose (POC) 264 (H) 70 - 110 mg/dL   GLUCOSE, POC    Collection Time: 12/25/18  3:54 PM   Result Value Ref Range    Glucose (POC) 527 (HH) 70 - 440 mg/dL   METABOLIC PANEL, BASIC    Collection Time: 12/25/18  4:04 PM   Result Value Ref Range    Sodium 138 136 - 145 mmol/L    Potassium 4.8 3.5 - 5.5 mmol/L    Chloride 111 (H) 100 - 108 mmol/L    CO2 19 (L) 21 - 32 mmol/L    Anion gap 8 3.0 - 18 mmol/L    Glucose 562 (HH) 74 - 99 mg/dL    BUN 32 (H) 7.0 - 18 MG/DL    Creatinine 2.12 (H) 0.6 - 1.3 MG/DL    BUN/Creatinine ratio 15 12 - 20      GFR est AA 27 (L) >60 ml/min/1.73m2    GFR est non-AA 22 (L) >60 ml/min/1.73m2    Calcium 8.3 (L) 8.5 - 10.1 MG/DL   GLUCOSE, POC    Collection Time: 12/25/18  6:33 PM   Result Value Ref Range    Glucose (POC) >600 (HH) 70 - 110 mg/dL   GLUCOSE, POC    Collection Time: 12/25/18  6:48 PM   Result Value Ref Range    Glucose (POC) 562 (HH) 70 - 110 mg/dL   GLUCOSE, POC    Collection Time: 12/25/18  9:17 PM   Result Value Ref Range    Glucose (POC) 481 (HH) 70 - 272 mg/dL   METABOLIC PANEL, BASIC    Collection Time: 12/25/18  9:37 PM   Result Value Ref Range    Sodium 137 136 - 145 mmol/L    Potassium 4.6 3.5 - 5.5 mmol/L    Chloride 110 (H) 100 - 108 mmol/L    CO2 21 21 - 32 mmol/L    Anion gap 6 3.0 - 18 mmol/L    Glucose 347 (H) 74 - 99 mg/dL    BUN 27 (H) 7.0 - 18 MG/DL    Creatinine 2.03 (H) 0.6 - 1.3 MG/DL    BUN/Creatinine ratio 13 12 - 20      GFR est AA 29 (L) >60 ml/min/1.73m2    GFR est non-AA 24 (L) >60 ml/min/1.73m2    Calcium 8.4 (L) 8.5 - 10.1 MG/DL   MAGNESIUM    Collection Time: 12/26/18  4:52 AM   Result Value Ref Range    Magnesium 1.9 1.6 - 2.6 mg/dL   PHOSPHORUS    Collection Time: 12/26/18  4:52 AM   Result Value Ref Range    Phosphorus 2.0 (L) 2.5 - 4.9 MG/DL   CBC WITH AUTOMATED DIFF    Collection Time: 12/26/18  4:52 AM   Result Value Ref Range    WBC 5.5 4.6 - 13.2 K/uL    RBC 3.21 (L) 4.20 - 5.30 M/uL    HGB 10.6 (L) 12.0 - 16.0 g/dL    HCT 30.6 (L) 35.0 - 45.0 %    MCV 95.3 74.0 - 97.0 FL    MCH 33.0 24.0 - 34.0 PG    MCHC 34.6 31.0 - 37.0 g/dL    RDW 12.5 11.6 - 14.5 %    PLATELET 393 221 - 186 K/uL    MPV 11.6 9.2 - 11.8 FL    NEUTROPHILS 53 40 - 73 %    LYMPHOCYTES 32 21 - 52 %    MONOCYTES 13 (H) 3 - 10 %    EOSINOPHILS 2 0 - 5 %    BASOPHILS 0 0 - 2 %    ABS. NEUTROPHILS 3.0 1.8 - 8.0 K/UL    ABS. LYMPHOCYTES 1.7 0.9 - 3.6 K/UL    ABS. MONOCYTES 0.7 0.05 - 1.2 K/UL    ABS. EOSINOPHILS 0.1 0.0 - 0.4 K/UL    ABS.  BASOPHILS 0.0 0.0 - 0.1 K/UL    DF AUTOMATED     METABOLIC PANEL, BASIC    Collection Time: 12/26/18  4:52 AM   Result Value Ref Range    Sodium 143 136 - 145 mmol/L    Potassium 3.7 3.5 - 5.5 mmol/L    Chloride 116 (H) 100 - 108 mmol/L    CO2 18 (L) 21 - 32 mmol/L    Anion gap 9 3.0 - 18 mmol/L    Glucose 64 (L) 74 - 99 mg/dL    BUN 24 (H) 7.0 - 18 MG/DL    Creatinine 1.57 (H) 0.6 - 1.3 MG/DL    BUN/Creatinine ratio 15 12 - 20      GFR est AA 39 (L) >60 ml/min/1.73m2    GFR est non-AA 32 (L) >60 ml/min/1.73m2    Calcium 8.2 (L) 8.5 - 10.1 MG/DL   VITAMIN B12 & FOLATE    Collection Time: 12/26/18  4:52 AM   Result Value Ref Range    Vitamin B12 1,247 (H) 211 - 911 pg/mL    Folate >20.0 (H) 3.10 - 17.50 ng/mL   GLUCOSE, POC    Collection Time: 12/26/18  5:08 AM   Result Value Ref Range    Glucose (POC) 95 70 - 110 mg/dL       Signed By: Cynthia Fernandez NP     December 26, 2018

## 2018-12-26 NOTE — PROGRESS NOTES
Problem: Mobility Impaired (Adult and Pediatric)  Goal: *Acute Goals and Plan of Care (Insert Text)  Physical Therapy Goals  Initiated 12/26/2018 and to be accomplished within 7 day(s)  1. Patient will move from supine to sit and sit to supine  in bed with independence. 2.  Patient will transfer from bed to chair and chair to bed with independence using the least restrictive device. 3.  Patient will perform sit to stand with supervision/set-up. 4.  Patient will ambulate with supervision/set-up for 50 feet with the least restrictive device. Outcome: Progressing Towards Goal  physical Therapy EVALUATION    Patient: Josué Dillard (44 y.o. female)  Date: 12/26/2018  Primary Diagnosis: Hyperglycemia       Precautions:   Fall    OBJECTIVE/ASSESSMENT :  Based on the objective data described below, the patient presents with impaired functional mobility including bed mobility, transfers, ambulation, and general activity tolerance following admission for hyperglycemia. Patient presented today Semi-reclined in bed, agreeable to PT evaluation. Patient transferred sit-stand with min A and difficulty positioning R LE appropriately. Pt able to ambulate x 10 feet but had difficulty shifting weight onto R LE because of decreased sensation and pt stating it feels \"locked up\". Pt states this is a recent occurrence. Pt displayed impaired balance during standing activity. Pt returned to seated position and performed sit-supine with supervision. At conclusion of session, patient left resting comfortably in bed with call bell in reach, needs met, and nurse and NP notified.   Education:   [x]         Bed mobility  [x]         Transfers  [x]         Ambulation  [x]         Assistive device management  []         Stairs  []         Body mechanics  []         Position change  [x]         Activity pacing/energy conservation  []         Other:    Patient will benefit from skilled intervention to address the above impairments. Patients rehabilitation potential is considered to be Good  Factors which may influence rehabilitation potential include:   []         None noted  [x]         Mental ability/status  [x]         Medical condition  [x]         Home/family situation and support systems  []         Safety awareness  []         Pain tolerance/management  []         Other:      PLAN :  Recommendations and Planned Interventions:  [x]           Bed Mobility Training             [x]    Neuromuscular Re-Education  [x]           Transfer Training                   []    Orthotic/Prosthetic Training  [x]           Gait Training                          []    Modalities  [x]           Therapeutic Exercises          []    Edema Management/Control  [x]           Therapeutic Activities            [x]    Patient and Family Training/Education  []           Other (comment):    Frequency/Duration: Patient will be followed by physical therapy 1-2 times per day, 4-7 days per week to address goals. Discharge Recommendations: [facility]  Further Equipment Recommendations for Discharge: walker      SUBJECTIVE:   Patient stated My right foot feels locked up but I bet it will go away if I can just soak it in a hot pan of water.     OBJECTIVE DATA SUMMARY:     Past Medical History:   Diagnosis Date    Diabetes ([facility] [address]     Hypercholesterolemia     Hypertension    No past surgical history on file. Barriers to Learning/Limitations: yes;  cognitive  Compensate with: Visual Cues and Tactile Cues  Prior Level of Function/Home Situation: Pt lives alone in a 1st floor apartment, no steps to enter. Pt reports she has no assistance at home and was ambulating independently in her apartment with no AD. She was independent with ADLs.   Home Situation  Home Environment: Apartment  # Steps to Enter: 0  One/Two Story Residence: One story  Living Alone: Yes  Support Systems: None  Patient Expects to be Discharged to[de-identified] Private residence  Current DME Used/Available at Home: None  Critical Behavior:  Neurologic State: Alert;Confused(intermittent confusion)  Psychosocial  Patient Behaviors: Calm  Purposeful Interaction: Yes  Pt Identified Daily Priority: Clinical issues (comment)  Caritas Process: Establish trust  Caring Interventions: Reassure  Reassure: Therapeutic listening  Therapeutic Modalities: Humor  Strength:    Strength: Generally decreased, functional(R LE decreased grossly, does not support pt during amb)  Tone & Sensation:   Tone: Normal  Sensation: Impaired(decreased sensation R LE)   Range Of Motion:  AROM: Generally decreased, functional  Functional Mobility:  Bed Mobility:  Supine to Sit: Stand-by assistance  Sit to Supine: Stand-by assistance  Transfers:  Sit to Stand: Minimum assistance  Stand to Sit: Contact guard assistance  Balance:   Sitting: Intact  Standing: Impaired; With support  Standing - Static: Fair  Standing - Dynamic : Poor  Ambulation/Gait Training:  Distance (ft): 10 Feet (ft)  Assistive Device: Walker, rolling  Ambulation - Level of Assistance: Minimal assistance  Base of Support: Shift to left; Widened  Speed/Mariel: Slow    Pain:  Pre session: 0  Post session: 0  Activity Tolerance:     Please refer to the flowsheet for vital signs taken during this treatment. After treatment:   [] Patient left in no apparent distress sitting up in chair  [] Patient left sitting on EOB  [x] Patient left in no apparent distress in bed  [] Patient declined to be OOB at this time due to   _ Call bell left within reach  [x] Nursing notified(ELYSIA Kumar)  [] Caregiver present  [] Bed alarm activated  [] SCDs in place    COMMUNICATION/EDUCATION:   [x]         Fall prevention education was provided and the patient/caregiver indicated understanding. [x]         Patient/family have participated as able in goal setting and plan of care. [x]         Patient/family agree to work toward stated goals and plan of care.   [] Patient understands intent and goals of therapy, but is neutral about his/her participation. []         Patient is unable to participate in goal setting and plan of care. Thank you for this referral.  Koby Mcmahan, PT   Time Calculation: 11 mins      Mobility  Current  CJ= 20-39%   Goal  CI= 1-19%. The severity rating is based on the Level of Assistance required for Functional Mobility and ADLs.     Eval Complexity: History: MEDIUM  Complexity : 1-2 comorbidities / personal factors will impact the outcome/ POC Exam:LOW Complexity : 1-2 Standardized tests and measures addressing body structure, function, activity limitation and / or participation in recreation  Presentation: MEDIUM Complexity : Evolving with changing characteristics  Clinical Decision Making:Medium Complexity   Overall Complexity:MEDIUM

## 2018-12-26 NOTE — PROGRESS NOTES
conducted an initial consultation and Spiritual Assessment for Fanny Zapata, who is a 77 y.o.,female. Patients Primary Language is: [place]. According to the patients EMR Latter-day Affiliation is: Jehovah witness. The reason the Patient came to the hospital is:   Patient Active Problem List    Diagnosis Date Noted    Hyperglycemia 12/25/2018    Type 2 diabetes mellitus with hyperosmolarity ([facility] 12/25/2018    Acute UTI 12/25/2018    Dehydration 12/25/2018    [facility] 12/25/2018    Noncompliance 12/25/2018        The  provided the following Interventions:  Initiated a relationship of care and support. Provided information about Spiritual Care Services. Chart reviewed. Assessment:  Patient declined a visit from the . Plan:  Chaplains will continue to follow and will provide pastoral care on an as needed/requested basis.     [address]  [phone])

## 2018-12-26 NOTE — PROGRESS NOTES
Gardenia Lofton    Bedside report received from [facility],     Patient alert and oriented, no pain complaints. Vital signs are stable.

## 2018-12-26 NOTE — PROGRESS NOTES
.me    Bedside and Verbal shift change report given to *** (oncoming nurse) by Vielka Mohan RN (offgoing nurse). Report included the following information SBAR, Kardex, Intake/Output, MAR and Recent Results.

## 2018-12-26 NOTE — DIABETES MGMT
Diabetes Patient/Family Education Record  Factors That  May Influence Patients Ability  to Learn or  Comply with Recommendations   []   Language barrier    []   Cultural needs   [x]   Motivation    []   Cognitive limitation    []   Physical   []   Education    []   Physiological factors   []   Hearing/vision/speaking impairment   []   Mormonism beliefs    []   Financial factors   []  Other:   []  No factors identified at this time. Person Instructed:   [x]   Patient   []   Family   []  Other     Preference for Learning:   [x]   Verbal   [x]   Written   [x]  Demonstration     Level of Comprehension & Competence:    []  Good                                      [x] Fair                                     []  Poor                             [x]  Needs Reinforcement   [x]  Teachback completed    Education Component:   [x]  Medication management, including how to administer insulin (if appropriate) and potential medication interactions Pt reports only taking pills for her diabetes. States she used to take insulin but wanted to stop. She states she is willing to take insulin if needed.     [x]  Nutritional management -obtain usual meal pattern   []  Exercise   [x]  Signs, symptoms, and treatment of hyperglycemia and hypoglycemia   [] Prevention, recognition and treatment of hyperglycemia and hypoglycemia   [x]  Importance of blood glucose monitoring and how to obtain a blood glucose meter Pt provided with a Contour Next EZ glucometer   [x]  Instruction on use of the blood glucose meter   [x]  Discuss the importance of HbA1C monitoring >16%   []  Sick day guidelines   []  Proper use and disposal of lancets, needles, syringes or insulin pens (if appropriate)   [x]  Potential long-term complications (retinopathy, kidney disease, neuropathy, foot care)   [x] Information about whom to contact in case of emergency or for more information    [x]  Goal:  Patient/family will demonstrate understanding of Diabetes Self Management Skills by: 01/01/19  Plan for post-discharge education or self-management support:    [x] Outpatient class schedule provided            [] Patient Declined    [] Scheduled for outpatient classes (date) _______  Verify:  Does patient understand how diabetes medications work? Yes  Does patient know what their most recent A1c is? Yes, >16%  Does patient monitor glucose at home? No, reports needing a new glucometer  Does patient have a glucometer, testing supplies or difficulty obtaining diabetes medications?  Pt provided with a Contour Next [place], [address] [place] [address], CDE  pgr [phone]

## 2018-12-26 NOTE — DISCHARGE INSTRUCTIONS
DIET:  DIABETIC REGULAR DIET.       ACTIVITY: Activity as tolerated  Patient needs to be on Fall, aspiration, decubitus precaution. ·       PT/OT EVAL AND TREAT   ·             Speech EVAL AND TREAT   ·       Wound care EVAL AND TREAT   ·       Accuchecks  QAC and QHS  ·             DVT prophylaxis      ADDITIONAL INFORMATION: If you experience any of the following symptoms but not limited to Fever, chills, nausea, vomiting, diarrhea, change in mentation, falling, bleeding, shortness of breath, chest pain, please call your primary care physician or return to the emergency room if you cannot get hold of your doctor:      FOLLOW UP CARE:  1. Physician at [address] [place] [address] with diff, bmp, mg IN 1-2 DAYS. 2. CLOSE MONITOR OF GLYCEMIC LEVELS, BP.         Condition: Stable  Follow-up Appointments:   1. PCP IN 5-7 DAYS. 2. NEPHROLOGY IN 2-4 WEEKS.

## 2018-12-26 NOTE — PROGRESS NOTES
Pt is being recommended for SNF, met with pt, pt would only like to go to [address] [facility], [address] signed, referral sent in [facility]. Pt will need auth due to having Shyanne Garcia. Confirmed referral with Gudelia Mora.       Alicia Weems, MSADELAIDA  Case Management  [phone]

## 2018-12-26 NOTE — PROGRESS NOTES
Problem: Self Care Deficits Care Plan (Adult)  Goal: *Acute Goals and Plan of Care (Insert Text)  Outcome: Resolved/Met Date Met: 12/26/18  Occupational Therapy EVALUATION/discharge    Patient: Nicci Clayton (1 y.o. female)  Date: 12/26/2018  Primary Diagnosis: Hyperglycemia       Precautions:  Fall    ASSESSMENT AND RECOMMENDATIONS:  Upon entering the room, pt was semi-reclined in bed, alert, and agreeable to participate in OT evaluation. Based on the objective data described below, the patient presents with significant pain in her R foot affecting her functional balance. functional mobility, and ADL performance. She required (S) with increased time for bed mobility tasks and CG(A) for sit<>stand with use of a RW. Pt demonstrates she was able to walk around her room with CGA with Fair(-) dynamic standing balance. Will defer to PT for functional balance and mobility tasks. Pt reported it felt better standing up and did sit<>stand x3 during the session to relieve pain in her R foot. Pt demonstrated BUEs ROM are WFL, but has decreased, but functional BUE strength. Based on clinical judgement, the pt is able to do eating, grooming, and UB dressing with mod(I) to (I) and requires CGA for toileting and LB dressing (donning LB garments over hips) d/t Fair- dynamic standing balance. Educated pt on the role of OT, the benefits of skilled nursing facility, and compensatory strategies for donning LB garments and shoes with pt verbalizing good understanding. The pt has a supportive [employer] family to assist her PRN. Skilled occupational therapy is not indicated at this time. Discharge Recommendations: Skilled nursing facility; Pt may benefit from SNF to work on functional balance and mobility tasks to improve her performance in IADL tasks (cooking, cleaning, laundry) as pt stated she lives alone at home.   Further Equipment Recommendations for Discharge: Shower chair for added safety     Barriers to Learning/Limitations: None  Compensate with: visual, verbal, tactile, kinesthetic cues/model     COMPLEXITY     Eval Complexity: History: LOW Complexity : Brief history review ; Examination: LOW Complexity : 1-3 performance deficits relating to physical, cognitive , or psychosocial skils that result in activity limitations and / or participation restrictions ; Decision Making:LOW Complexity : No comorbidities that affect functional and no verbal or physical assistance needed to complete eval tasks  Assessment: Low Complexity        G-CODES:     Self Care  Current  CI= 1-19%   Goal  CI= 1-19%   D/C  CI= 1-19%. The severity rating is based on the Level of Assistance required for Functional Mobility and ADLs. SUBJECTIVE:   Patient stated My foot didn't feel like it was a part of me    OBJECTIVE DATA SUMMARY:     Past Medical History:   Diagnosis Date    Diabetes ([facility]     Hypercholesterolemia     Hypertension    No past surgical history on file. Prior Level of Function/Home Situation: Pt reports she was (I) with ADL/IADLs and did not require AD for ambulation PTA. Home Situation  Home Environment: Apartment  # Steps to Enter: 0  One/Two Story Residence: One story  Living Alone: Yes  Support Systems: [employer] / [employer] community, Friends \ neighbors  Patient Expects to be Discharged to[de-identified] Apartment  Current DME Used/Available at Home: Grab bars  Tub or Shower Type: Tub/Shower combination  [x]     Right hand dominant   []     Left hand dominant  Cognitive/Behavioral Status:  Neurologic State: Alert  Orientation Level: Oriented X4  Cognition: Follows commands  Safety/Judgement: Awareness of environment    Skin: Visible skin appeared intact    Edema: None noted    Vision/Perceptual:    Acuity: Within Defined Limits(without glasses)      Coordination:  Coordination: Within functional limits(BUEs)  Fine Motor Skills-Upper: Left Intact; Right Intact      Balance:  Sitting: Intact  Standing: Impaired; With support  Standing - Static: Fair  Standing - Dynamic : Fair(-)    Strength:  Strength: Generally decreased, functional(BUEs)    Tone & Sensation:  Tone: Normal(BUEs)  Sensation: Intact(BUEs); Impaired (R foot)    Range of Motion:  AROM: Within functional limits(BUEs)    Functional Mobility and Transfers for ADLs:  Bed Mobility:  Supine to Sit: Supervision  Sit to Supine: Supervision  Scooting: Supervision  Transfers:  Sit to Stand: Contact guard assistance (with RW; with increased time)    ADL Assessment:  Feeding: Independent    Oral Facial Hygiene/Grooming: Independent    Bathing: Contact guard assistance(d/t fair- standing balance)    Upper Body Dressing: Modified independent    Lower Body Dressing: Setup;Contact guard assistance(d/t fair- standing balance)    Toileting: Contact guard assistance(d/t fair- standing balance)    ADL Intervention:  Cognitive Retraining  Safety/Judgement: Awareness of environment      Pain:  Pt reports 7/10 pain or discomfort prior to treatment.    Pt reports 7/10 pain or discomfort post treatment. Activity Tolerance:   Fair    Please refer to the flowsheet for vital signs taken during this treatment. After treatment:   []  Patient left in no apparent distress sitting up in chair  [x]  Patient left in no apparent distress in bed  [x]  Call bell left within reach  [x]  Nursing notified  []  Caregiver present  []  Bed alarm activated    COMMUNICATION/EDUCATION:   Communication/Collaboration:  [x]      Home safety education was provided and the patient/caregiver indicated understanding. [x]      Patient/family have participated as able and agree with findings and recommendations. []      Patient is unable to participate in plan of care at this time.     Jeronimo Pablo MS, OTR/L  Time Calculation: 34 mins

## 2018-12-26 NOTE — ROUTINE PROCESS
Report received from [facility]. Patient is alert, receiving IV fluids as ordered. Continue to monitor. 2150  Accucheck  532, and 481. Dr Trev Peterson made aware, orders received. 2350  Lungs moist, bilateral wheezing noted.  made aware, orders received. Lasix given IV, duoneb given. Continue to monitor. 0200  Patient is alert, incontinent of urine, skin care given, linen changed. 0515  Accucheck 95, continue to monitor. 0745  Bedside, Verbal and Written shift change report given to Evie Garcia (oncoming nurse) by Nahid Beard (offgoing nurse). Report included the following information SBAR, Kardex, Intake/Output and MAR.

## 2018-12-26 NOTE — DIABETES MGMT
NUTRITIONAL ASSESSMENT GLYCEMIC CONTROL/ PLAN OF CARE     Nneka Brenal           77 y.o.           12/24/2018                 1. Hyperglycemia    2. Acute hyperkalemia    3. Altered mental status, unspecified altered mental status type    4. Urinary frequency    5. Hyponatremia       INTERVENTIONS/PLAN:   Consider changing frequency of correctional Lispro insulin to 4 times daily ACHS    ASSESSMENT:   Pt is a 77year old female with a past medical history significant for diabetes, hypertension, and hypercholesterolemia. Blood glucose >1100 on admission and A1c >16%. Blood glucose currently fluctuating. Pt is receiving Lantus insulin 20 units daily along with correctional Lispro insulin coverage every 4 hours. Pt visited at lunch. Pt was eating well and reports good appetite. Reviewed portion control for diabetes. Pt states she used to take insulin in the past but decided she no longer wanted to take it. Discussed elevated glucose and A1c. Pt states she would take insulin again if that is what she needs. Pt provided with a [employer] Next EZ glucometer with demonstration of use. Will continue to follow.      Diabetes Management:   Recent blood glucose:    12/25/2018 18:48 12/25/2018 21:17 12/26/2018 05:08 12/26/2018 11:32   562 (HH) 481 (HH) 95 360 (H)    Within target range (non-ICU: <140; ICU<180): [] Yes   [x]  No    Current Insulin regimen:  Lantus insulin 20 units daily   Lispro insulin every 4 hours (very resistant scale)  Home medication/insulin regimen:   Metformin 500 mg two times daily   Glipizide 5 mg two times daily   HbA1c: >16%  Adequate glycemic control PTA:  [] Yes  [x] No     SUBJECTIVE/OBJECTIVE:   Information obtained from: Patient, chart review     Diet: Diabetic consistent carbohydrate     Medications: [x]  Reviewed     Most Recent POC Glucose:   Recent Labs     12/26/18  0855 12/26/18  0452 12/25/18  2137 12/25/18  1604   * 64* 347* 562*       Labs:   Lab Results   Component Value Date/Time    Hemoglobin A1c >16.0 (H) 12/24/2018 10:14 PM     Lab Results   Component Value Date/Time    Sodium 141 12/26/2018 08:55 AM    Potassium 3.7 12/26/2018 08:55 AM    Chloride 112 (H) 12/26/2018 08:55 AM    CO2 22 12/26/2018 08:55 AM    Anion gap 7 12/26/2018 08:55 AM    Glucose 181 (H) 12/26/2018 08:55 AM    BUN 22 (H) 12/26/2018 08:55 AM    Creatinine 1.65 (H) 12/26/2018 08:55 AM    Calcium 8.6 12/26/2018 08:55 AM    Magnesium 1.9 12/26/2018 04:52 AM    Phosphorus 2.0 (L) 12/26/2018 04:52 AM    Albumin 3.1 (L) 12/24/2018 10:30 PM     Anthropometrics: Wt Readings from Last 1 Encounters:   12/26/18 72.8 kg (160 lb 9.6 oz)      Ht Readings from Last 1 Encounters:   07/10/17 5' 5\" (1.651 m)     Estimated Nutrition Needs:  2674-8396 Kcal/day, 58-73 grams protein/day   Based on:   [x]   Actual BW    []   IBW   []  Adjusted BW      Nutrition Diagnoses:    Altered nutrition related lab value related to diabetes as evidenced by Hemoglobin A1c of >16%  Nutrition Interventions: diabetes education, diabetic diet, coordination of care  Goal: Blood glucose will be within target range of  mg/dL by 12/29/18     Nutrition Monitoring and Evaluation    []     Monitor po intake on meal rounds  [x]     Continue inpatient monitoring and intervention  []     Other:    Marsha Morrison RD, CDE  pgr [phone]

## 2018-12-26 NOTE — DISCHARGE SUMMARY
[facility]  [facility] Summary       Patient: Araceli Brownlee Age: 77 y.o. : 1940 MR#: [id number] SSN: [id number]  PCP on record: Reji Kaye MD  Admit date: 2018  Discharge date: 2018    Disposition:    []Home   []Home with Home Health   [x]SNF/NH   []Rehab   []Home with family   []Alternate Facility:____________________    Admission Diagnoses:  Hyperglycemia    Discharge Diagnoses:                             1. DMT2 with hyperosmolarity. Uncontrolled. A1c >16.0 r/t non-compliance. 2. ARF on ? CKD- likely r/t #1. improving  3. Dehydration-resolved. 4. HTN- stable. 5. HLD-statin  6. Non-compliance  7. Physical decondition. No focal neuro deficits. 8. Hx falls, X 2 this month prior to admission. Discharge Medications:     Current Discharge Medication List      START taking these medications    Details   oxyCODONE-acetaminophen (PERCOCET) 5-325 mg per tablet Take 1 Tab by mouth every six (6) hours as needed. Max Daily Amount: 4 Tabs. Qty: 2 Tab, Refills: 0    Associated Diagnoses: Other chronic pain      insulin lispro (HUMALOG) 100 unit/mL injection Less than 150 =   0 units  150 -199 =   3 units  200 -249 =   6 units  250 -299 =   9 units  300 -349 =   12 units  350 and above =   15 units, CALL MD  Qty: 1 Vial, Refills: 0      insulin glargine (LANTUS) 100 unit/mL injection 30 Units by SubCUTAneous route daily. Qty: 1 Vial, Refills: 0      docusate sodium (COLACE) 100 mg capsule Take 1 Cap by mouth two (2) times daily as needed for Constipation for up to 90 days. Qty: 30 Cap, Refills: 0      amLODIPine (NORVASC) 5 mg tablet Take 1 Tab by mouth daily. Qty: 30 Tab, Refills: 0      albuterol-ipratropium (DUO-NEB) 2.5 mg-0.5 mg/3 ml nebu 3 mL by Nebulization route every six (6) hours as needed (wheezing).   Qty: 30 Nebule, Refills: 0      acetaminophen (TYLENOL) 325 mg tablet Take 2 Tabs by mouth every six (6) hours as needed. Qty: 30 Tab, Refills: 0      OTHER REPEAT CBC W/ DIFF, CMP, Mg in 1-2 days. Qty: 1 mL, Refills: 0         CONTINUE these medications which have NOT CHANGED    Details   cholecalciferol, vitamin D3, (VITAMIN D3) 2,000 unit tab Take  by mouth. atorvastatin (LIPITOR) 20 mg tablet Take  by mouth daily. aspirin 81 mg chewable tablet Take 81 mg by mouth two (2) times a day. STOP taking these medications       metFORMIN (GLUCOPHAGE) 500 mg tablet Comments:   Reason for Stopping:         glipiZIDE (GLUCOTROL) 5 mg tablet Comments:   Reason for Stopping:         losartan (COZAAR) 50 mg tablet Comments:   Reason for Stopping:               Consults:    - Diabetic Educator  - PT/OT     Procedures:  -   NONE    Significant Diagnostic Studies:   -  CXR ON 12/25/18  FINDINGS:     Frontal view of the chest obtained at 0035 hours. The cardiomediastinal  silhouette is within normal limits. Mild central venous congestion. .  There is  no evidence of focal consolidation, pleural effusion or pneumothorax.          IMPRESSION  IMPRESSION:     Mild central venous congestion. RENAL US ON 12/25/18  Findings:     Small echogenic kidneys noted bilaterally with right kidney measuring 7.9 x 3.8  x 4.0 cm. Left kidney measures 8.4 x 4.2 x 6 3.6 cm. Questionable duplicated  collecting system on the right. No hydronephrosis. No nephrolithiasis. Visualized spleen and bladder are within normal limits.     IMPRESSION  Impression:     No hydronephrosis or nephrolithiasis. -Questionable duplicated collecting system on the right. Hospital Course by Problem   HPI per admitting MD  \"Chantal Larson is a 77 y.o.  female who has PMH of DM, HTN and non-compliance. Pt came to ER with a CC of generalized weakness and SOB. In ER, Pt had FS of > 1100 multiple electrolytes abnormalities that showed metabolic acidosis w/out widening AG. Pt admitted to not taking her Diabetic PO meds daily as she forgets while working. Pt also refused to start insulin at home Pt was also found to have mild uti and will be admitted for hyperosmolar status and impending DKA. Slightly feeling better now and Denies CP/SOB/fever/abdominal pain and urinary Sx but continues to c/o generalized weakness and thirsty\"    1. DMT2 with hyperosmolarity. Uncontrolled. A1c >16.0 r/t non-compliance. Admitting diagnosis. Anion gap wnl on admission. Initiated on insulin gtt, IVF in ED. Transitioned to SSI, Lantus with improvement of serum glucose. Electrolyte imbalance resolved as well with stated interventions. Diabetic educator followed. No further inpatient interventions warranted. Exam reassuring. Discharged to SNF r/t #7, 8.     2. ARF superimposed on suspected CKD in the setting of uncontrolled DMT2. Improving. Renal US showed No hydronephrosis or nephrolithiasis. Questionable duplicated collecting system on the right. Cr continued to improve w/ discontinuation of home  ACEi/ARBs, IVF, correction of #1. No further inpatient interventions warranted. OP f/u with nephrology in 2-4 weeks. 3. Dehydration-resolved w/ ivf.     4. HTN- stable. home  ACEi/ARBs discontinued given EFRAIN w/ suspected underlying CKD in the setting of uncontrolled DM. Initiated on Norvasc. BP stable throughout hospital course. No further inpatient interventions warranted. OP f/u with  PCP. 5. HLD-statin    6. Non-compliance. Pt reports she has been a diabetic for 8 years, followed by Dr. Alvaro Gray of [place]. Pt states she was initially on insulin, which she did not like and was subsequently placed on oral medications for glycemic control. Pt states PCP Alvaro Gray has been wanting to place her back on insulin but she continues to refuse. She currently does not have a working glucometer home. Diabetic educator /  followed. No further inpatient interventions warranted. OP f/u PCP. 7. Physical decondition. No focal neuro deficits.  In pt w/ hx of frequent falls X 2 this month prior to admission. PT/OT followed. rec SNF. Today's examination of the patient revealed:     Subjective:   Alert and oriented X 3. NAD. Resting comfortably in bed. Denies CP, f/c, n/v/d/c or abd pain. followed by Dr. Linda Kasper of [place]. Discussed case w/carmela Grullon at [phone], agrees w/ plan for SNF as rec by PT/OT. Objective:   VS:   Visit Vitals  /66 (BP 1 Location: Left arm, BP Patient Position: At rest)   Pulse 84   Temp 98.8 °F (37.1 °C)   Resp 18   Wt 72.8 kg (160 lb 9.6 oz)   SpO2 97%   Breastfeeding? No   BMI 26.73 kg/m²      Tmax/24hrs: Temp (24hrs), Av.6 °F (37 °C), Min:98.2 °F (36.8 °C), Max:99.2 °F (37.3 °C)     Input/Output:     Intake/Output Summary (Last 24 hours) at 2018 1655  Last data filed at 2018 9185  Gross per 24 hour   Intake 2380 ml   Output 1600 ml   Net 780 ml       General:  Alert, NAD  Cardiovascular:  RRR  Pulmonary:  LSC throughout; respiratory effort WNL  GI:  +BS in all four quadrants, soft, non-tender  Extremities:  No edema; 2+ dorsalis pedis pulses bilaterally  Neuro: alert and oriented X 3. Strength 4/5 bilateral upper / lower ext.      Labs:    Recent Results (from the past 24 hour(s))   GLUCOSE, POC    Collection Time: 18  6:33 PM   Result Value Ref Range    Glucose (POC) >600 (HH) 70 - 110 mg/dL   GLUCOSE, POC    Collection Time: 18  6:48 PM   Result Value Ref Range    Glucose (POC) 562 (HH) 70 - 110 mg/dL   GLUCOSE, POC    Collection Time: 18  9:17 PM   Result Value Ref Range    Glucose (POC) 481 (HH) 70 - 441 mg/dL   METABOLIC PANEL, BASIC    Collection Time: 18  9:37 PM   Result Value Ref Range    Sodium 137 136 - 145 mmol/L    Potassium 4.6 3.5 - 5.5 mmol/L    Chloride 110 (H) 100 - 108 mmol/L    CO2 21 21 - 32 mmol/L    Anion gap 6 3.0 - 18 mmol/L    Glucose 347 (H) 74 - 99 mg/dL    BUN 27 (H) 7.0 - 18 MG/DL    Creatinine 2.03 (H) 0.6 - 1.3 MG/DL    BUN/Creatinine ratio 13 12 - 20 GFR est AA 29 (L) >60 ml/min/1.73m2    GFR est non-AA 24 (L) >60 ml/min/1.73m2    Calcium 8.4 (L) 8.5 - 10.1 MG/DL   MAGNESIUM    Collection Time: 12/26/18  4:52 AM   Result Value Ref Range    Magnesium 1.9 1.6 - 2.6 mg/dL   PHOSPHORUS    Collection Time: 12/26/18  4:52 AM   Result Value Ref Range    Phosphorus 2.0 (L) 2.5 - 4.9 MG/DL   CBC WITH AUTOMATED DIFF    Collection Time: 12/26/18  4:52 AM   Result Value Ref Range    WBC 5.5 4.6 - 13.2 K/uL    RBC 3.21 (L) 4.20 - 5.30 M/uL    HGB 10.6 (L) 12.0 - 16.0 g/dL    HCT 30.6 (L) 35.0 - 45.0 %    MCV 95.3 74.0 - 97.0 FL    MCH 33.0 24.0 - 34.0 PG    MCHC 34.6 31.0 - 37.0 g/dL    RDW 12.5 11.6 - 14.5 %    PLATELET 256 155 - 172 K/uL    MPV 11.6 9.2 - 11.8 FL    NEUTROPHILS 53 40 - 73 %    LYMPHOCYTES 32 21 - 52 %    MONOCYTES 13 (H) 3 - 10 %    EOSINOPHILS 2 0 - 5 %    BASOPHILS 0 0 - 2 %    ABS. NEUTROPHILS 3.0 1.8 - 8.0 K/UL    ABS. LYMPHOCYTES 1.7 0.9 - 3.6 K/UL    ABS. MONOCYTES 0.7 0.05 - 1.2 K/UL    ABS. EOSINOPHILS 0.1 0.0 - 0.4 K/UL    ABS.  BASOPHILS 0.0 0.0 - 0.1 K/UL    DF AUTOMATED     METABOLIC PANEL, BASIC    Collection Time: 12/26/18  4:52 AM   Result Value Ref Range    Sodium 143 136 - 145 mmol/L    Potassium 3.7 3.5 - 5.5 mmol/L    Chloride 116 (H) 100 - 108 mmol/L    CO2 18 (L) 21 - 32 mmol/L    Anion gap 9 3.0 - 18 mmol/L    Glucose 64 (L) 74 - 99 mg/dL    BUN 24 (H) 7.0 - 18 MG/DL    Creatinine 1.57 (H) 0.6 - 1.3 MG/DL    BUN/Creatinine ratio 15 12 - 20      GFR est AA 39 (L) >60 ml/min/1.73m2    GFR est non-AA 32 (L) >60 ml/min/1.73m2    Calcium 8.2 (L) 8.5 - 10.1 MG/DL   VITAMIN B12 & FOLATE    Collection Time: 12/26/18  4:52 AM   Result Value Ref Range    Vitamin B12 1,247 (H) 211 - 911 pg/mL    Folate >20.0 (H) 3.10 - 17.50 ng/mL   GLUCOSE, POC    Collection Time: 12/26/18  5:08 AM   Result Value Ref Range    Glucose (POC) 95 70 - 815 mg/dL   METABOLIC PANEL, BASIC    Collection Time: 12/26/18  8:55 AM   Result Value Ref Range    Sodium 141 136 - 145 mmol/L    Potassium 3.7 3.5 - 5.5 mmol/L    Chloride 112 (H) 100 - 108 mmol/L    CO2 22 21 - 32 mmol/L    Anion gap 7 3.0 - 18 mmol/L    Glucose 181 (H) 74 - 99 mg/dL    BUN 22 (H) 7.0 - 18 MG/DL    Creatinine 1.65 (H) 0.6 - 1.3 MG/DL    BUN/Creatinine ratio 13 12 - 20      GFR est AA 36 (L) >60 ml/min/1.73m2    GFR est non-AA 30 (L) >60 ml/min/1.73m2    Calcium 8.6 8.5 - 10.1 MG/DL   GLUCOSE, POC    Collection Time: 12/26/18 11:32 AM   Result Value Ref Range    Glucose (POC) 360 (H) 70 - 110 mg/dL   GLUCOSE, POC    Collection Time: 12/26/18  2:57 PM   Result Value Ref Range    Glucose (POC) 316 (H) 70 - 815 mg/dL   METABOLIC PANEL, BASIC    Collection Time: 12/26/18  4:12 PM   Result Value Ref Range    Sodium 144 136 - 145 mmol/L    Potassium 4.3 3.5 - 5.5 mmol/L    Chloride 113 (H) 100 - 108 mmol/L    CO2 22 21 - 32 mmol/L    Anion gap 9 3.0 - 18 mmol/L    Glucose 217 (H) 74 - 99 mg/dL    BUN 23 (H) 7.0 - 18 MG/DL    Creatinine 1.64 (H) 0.6 - 1.3 MG/DL    BUN/Creatinine ratio 14 12 - 20      GFR est AA 37 (L) >60 ml/min/1.73m2    GFR est non-AA 30 (L) >60 ml/min/1.73m2    Calcium 7.8 (L) 8.5 - 10.1 MG/DL       DIET:  DIABETIC REGULAR DIET. ACTIVITY: Activity as tolerated  Patient needs to be on Fall, aspiration, decubitus precaution. ·    PT/OT EVAL AND TREAT   ·             Speech EVAL AND TREAT   ·  Wound care EVAL AND TREAT   ·  Accuchecks  QAC and QHS  ·             DVT prophylaxis     ADDITIONAL INFORMATION: If you experience any of the following symptoms but not limited to Fever, chills, nausea, vomiting, diarrhea, change in mentation, falling, bleeding, shortness of breath, chest pain, please call your primary care physician or return to the emergency room if you cannot get hold of your doctor:     FOLLOW UP CARE:  1. Physician at [address] [place] [address] with diff, bmp, mg IN 1-2 DAYS. 2. CLOSE MONITOR OF GLYCEMIC LEVELS, BP. Condition: Stable  Follow-up Appointments:   1.  PCP IN 5-7 DAYS.    2. NEPHROLOGY IN 2-4 WEEKS.     >30 minutes spent coordinating this discharge (review instructions/follow-up, prescriptions, preparing report for sign off)    Signed:  Mauro Adams NP  12/26/2018  4:55 PM

## 2018-12-26 NOTE — PHYSICIAN ADVISORY
Letter of Admission Status Determination    Tiffanie Simmons   Age: 77 y.o. MRN: [id number]    Date of hospitalization: 12/24/2018    I have reviewed this case as it involves a patient admitted as Inpatient that does not meet payor's criteria for Inpatient admission. Discharge is anticipated today. Therefore, I recommend changing the admission status to OBSERVATION. The final decision regarding the patient's hospitalization status depends on the attending physician's judgment.       Richar Abreu MD, ROSA ISELA, [address] [facility], [address] [place] [address]  [phone]

## 2019-01-11 ENCOUNTER — HOME HEALTH ADMISSION (OUTPATIENT)
Dept: HOME HEALTH SERVICES | Facility: HOME HEALTH | Age: 79
End: 2019-01-11

## 2019-01-13 ENCOUNTER — HOME CARE VISIT (OUTPATIENT)
Dept: SCHEDULING | Facility: HOME HEALTH | Age: 79
End: 2019-01-13

## 2019-01-13 ENCOUNTER — HOME CARE VISIT (OUTPATIENT)
Dept: HOME HEALTH SERVICES | Facility: HOME HEALTH | Age: 79
End: 2019-01-13

## 2019-01-16 ENCOUNTER — HOME CARE VISIT (OUTPATIENT)
Dept: HOME HEALTH SERVICES | Facility: HOME HEALTH | Age: 79
End: 2019-01-16

## 2019-02-08 ENCOUNTER — HOSPITAL ENCOUNTER (OUTPATIENT)
Dept: BONE DENSITY | Age: 79
Discharge: HOME | End: 2019-02-08
Attending: FAMILY MEDICINE
Payer: MEDICARE

## 2019-02-08 DIAGNOSIS — Z13.820 SCREENING FOR OSTEOPOROSIS: ICD-10-CM

## 2019-02-08 PROCEDURE — 77080 DXA BONE DENSITY AXIAL: CPT

## 2019-05-31 ENCOUNTER — HOSPITAL ENCOUNTER (OUTPATIENT)
Dept: LAB | Age: 79
Discharge: HOME | End: 2019-05-31
Payer: MEDICARE

## 2019-05-31 LAB
FOLATE SERPL-MCNC: >20 NG/ML (ref 3.1–17.5)
IRON SATN MFR SERPL: 36 %
IRON SERPL-MCNC: 92 UG/DL (ref 50–175)
RETICS/RBC NFR AUTO: 1.5 % (ref 0.5–2.3)
TIBC SERPL-MCNC: 256 UG/DL (ref 250–450)
VIT B12 SERPL-MCNC: 423 PG/ML (ref 211–911)

## 2019-05-31 PROCEDURE — 83540 ASSAY OF IRON: CPT

## 2019-05-31 PROCEDURE — 85045 AUTOMATED RETICULOCYTE COUNT: CPT

## 2019-05-31 PROCEDURE — 36415 COLL VENOUS BLD VENIPUNCTURE: CPT

## 2019-05-31 PROCEDURE — 82607 VITAMIN B-12: CPT

## 2019-05-31 PROCEDURE — 82746 ASSAY OF FOLIC ACID SERUM: CPT

## 2020-01-02 ENCOUNTER — APPOINTMENT (OUTPATIENT)
Dept: GENERAL RADIOLOGY | Age: 80
DRG: 616 | End: 2020-01-02
Attending: STUDENT IN AN ORGANIZED HEALTH CARE EDUCATION/TRAINING PROGRAM
Payer: MEDICARE

## 2020-01-02 ENCOUNTER — HOSPITAL ENCOUNTER (INPATIENT)
Age: 80
LOS: 32 days | Discharge: NURSING, REHAB OR LONG-TERM CARE | DRG: 616 | End: 2020-02-04
Attending: EMERGENCY MEDICINE | Admitting: HOSPITALIST
Payer: MEDICARE

## 2020-01-02 DIAGNOSIS — I99.8 ISCHEMIA OF RIGHT LOWER EXTREMITY: ICD-10-CM

## 2020-01-02 DIAGNOSIS — N17.9 ACUTE RENAL FAILURE, UNSPECIFIED ACUTE RENAL FAILURE TYPE (HCC): ICD-10-CM

## 2020-01-02 DIAGNOSIS — E11.01 HYPERGLYCEMIC HYPEROSMOLAR NONKETOTIC COMA (HCC): ICD-10-CM

## 2020-01-02 DIAGNOSIS — I70.211 ATHEROSCLEROSIS OF NATIVE ARTERY OF RIGHT LOWER EXTREMITY WITH INTERMITTENT CLAUDICATION (HCC): ICD-10-CM

## 2020-01-02 DIAGNOSIS — I99.8 ISCHEMIA OF FOOT: ICD-10-CM

## 2020-01-02 DIAGNOSIS — K85.90 ACUTE PANCREATITIS, UNSPECIFIED COMPLICATION STATUS, UNSPECIFIED PANCREATITIS TYPE: Primary | ICD-10-CM

## 2020-01-02 DIAGNOSIS — E11.00 TYPE 2 DIABETES MELLITUS WITH HYPEROSMOLARITY WITHOUT COMA, UNSPECIFIED WHETHER LONG TERM INSULIN USE (HCC): ICD-10-CM

## 2020-01-02 DIAGNOSIS — Z71.89 GOALS OF CARE, COUNSELING/DISCUSSION: ICD-10-CM

## 2020-01-02 LAB
ADMINISTERED INITIALS, ADMINIT: NORMAL
ADMINISTERED INITIALS, ADMINIT: NORMAL
ANION GAP BLD CALC-SCNC: 18 MMOL/L (ref 10–20)
BASOPHILS # BLD: 0 K/UL (ref 0–0.1)
BASOPHILS NFR BLD: 0 % (ref 0–2)
BUN BLD-MCNC: 81 MG/DL (ref 7–18)
CA-I BLD-MCNC: 1.08 MMOL/L (ref 1.12–1.32)
CHLORIDE BLD-SCNC: 113 MMOL/L (ref 100–108)
CO2 BLD-SCNC: 16 MMOL/L (ref 19–24)
CREAT UR-MCNC: 4.5 MG/DL (ref 0.6–1.3)
D50 ADMINISTERED, D50ADM: 0 ML
D50 ADMINISTERED, D50ADM: 0 ML
D50 ORDER, D50ORD: 0 ML
D50 ORDER, D50ORD: 0 ML
DIFFERENTIAL METHOD BLD: ABNORMAL
EOSINOPHIL # BLD: 0 K/UL (ref 0–0.4)
EOSINOPHIL NFR BLD: 0 % (ref 0–5)
ERYTHROCYTE [DISTWIDTH] IN BLOOD BY AUTOMATED COUNT: 14.2 % (ref 11.6–14.5)
EST. AVERAGE GLUCOSE BLD GHB EST-MCNC: 361 MG/DL
GLUCOSE BLD STRIP.AUTO-MCNC: >600 MG/DL (ref 70–110)
GLUCOSE BLD STRIP.AUTO-MCNC: >600 MG/DL (ref 70–110)
GLUCOSE BLD STRIP.AUTO-MCNC: >700 MG/DL (ref 74–106)
GLUCOSE, GLC: 600 MG/DL
GLUCOSE, GLC: 602 MG/DL
HBA1C MFR BLD: 14.2 % (ref 4.2–5.6)
HCT VFR BLD AUTO: 41.8 % (ref 35–45)
HCT VFR BLD CALC: 38 % (ref 36–49)
HGB BLD-MCNC: 12.9 G/DL (ref 12–16)
HGB BLD-MCNC: 13.4 G/DL (ref 12–16)
HIGH TARGET, HITG: 180 MG/DL
HIGH TARGET, HITG: 180 MG/DL
INSULIN ADMINSTERED, INSADM: 10.8 UNITS/HOUR
INSULIN ADMINSTERED, INSADM: 16.3 UNITS/HOUR
INSULIN ORDER, INSORD: 10.8 UNITS/HOUR
INSULIN ORDER, INSORD: 16.3 UNITS/HOUR
LACTATE BLD-SCNC: 3 MMOL/L (ref 0.4–2)
LOW TARGET, LOT: 140 MG/DL
LOW TARGET, LOT: 140 MG/DL
LYMPHOCYTES # BLD: 0.6 K/UL (ref 0.9–3.6)
LYMPHOCYTES NFR BLD: 7 % (ref 21–52)
MAGNESIUM SERPL-MCNC: 3.5 MG/DL (ref 1.6–2.6)
MCH RBC QN AUTO: 32.6 PG (ref 24–34)
MCHC RBC AUTO-ENTMCNC: 32.1 G/DL (ref 31–37)
MCV RBC AUTO: 101.7 FL (ref 74–97)
MINUTES UNTIL NEXT BG, NBG: 60 MIN
MINUTES UNTIL NEXT BG, NBG: 60 MIN
MONOCYTES # BLD: 0.3 K/UL (ref 0.05–1.2)
MONOCYTES NFR BLD: 4 % (ref 3–10)
MULTIPLIER, MUL: 0.02
MULTIPLIER, MUL: 0.03
NEUTS SEG # BLD: 7.8 K/UL (ref 1.8–8)
NEUTS SEG NFR BLD: 89 % (ref 40–73)
ORDER INITIALS, ORDINIT: NORMAL
ORDER INITIALS, ORDINIT: NORMAL
PLATELET # BLD AUTO: 224 K/UL (ref 135–420)
PMV BLD AUTO: 12.9 FL (ref 9.2–11.8)
POTASSIUM BLD-SCNC: 5.6 MMOL/L (ref 3.5–5.5)
RBC # BLD AUTO: 4.11 M/UL (ref 4.2–5.3)
SODIUM BLD-SCNC: 140 MMOL/L (ref 136–145)
WBC # BLD AUTO: 8.8 K/UL (ref 4.6–13.2)

## 2020-01-02 PROCEDURE — 83735 ASSAY OF MAGNESIUM: CPT

## 2020-01-02 PROCEDURE — 96365 THER/PROPH/DIAG IV INF INIT: CPT

## 2020-01-02 PROCEDURE — 83930 ASSAY OF BLOOD OSMOLALITY: CPT

## 2020-01-02 PROCEDURE — 80047 BASIC METABLC PNL IONIZED CA: CPT

## 2020-01-02 PROCEDURE — 84484 ASSAY OF TROPONIN QUANT: CPT

## 2020-01-02 PROCEDURE — 84100 ASSAY OF PHOSPHORUS: CPT

## 2020-01-02 PROCEDURE — 85025 COMPLETE CBC W/AUTO DIFF WBC: CPT

## 2020-01-02 PROCEDURE — 99285 EMERGENCY DEPT VISIT HI MDM: CPT

## 2020-01-02 PROCEDURE — 80053 COMPREHEN METABOLIC PANEL: CPT

## 2020-01-02 PROCEDURE — 71045 X-RAY EXAM CHEST 1 VIEW: CPT

## 2020-01-02 PROCEDURE — 74011636637 HC RX REV CODE- 636/637: Performed by: STUDENT IN AN ORGANIZED HEALTH CARE EDUCATION/TRAINING PROGRAM

## 2020-01-02 PROCEDURE — 83690 ASSAY OF LIPASE: CPT

## 2020-01-02 PROCEDURE — 82962 GLUCOSE BLOOD TEST: CPT

## 2020-01-02 PROCEDURE — 83036 HEMOGLOBIN GLYCOSYLATED A1C: CPT

## 2020-01-02 PROCEDURE — 83605 ASSAY OF LACTIC ACID: CPT

## 2020-01-02 PROCEDURE — 82010 KETONE BODYS QUAN: CPT

## 2020-01-02 PROCEDURE — 93005 ELECTROCARDIOGRAM TRACING: CPT

## 2020-01-02 PROCEDURE — 96366 THER/PROPH/DIAG IV INF ADDON: CPT

## 2020-01-02 PROCEDURE — 87040 BLOOD CULTURE FOR BACTERIA: CPT

## 2020-01-02 PROCEDURE — 74011250636 HC RX REV CODE- 250/636: Performed by: STUDENT IN AN ORGANIZED HEALTH CARE EDUCATION/TRAINING PROGRAM

## 2020-01-02 RX ORDER — MAGNESIUM SULFATE 100 %
4 CRYSTALS MISCELLANEOUS AS NEEDED
Status: DISCONTINUED | OUTPATIENT
Start: 2020-01-02 | End: 2020-02-01

## 2020-01-02 RX ORDER — DEXTROSE 50 % IN WATER (D50W) INTRAVENOUS SYRINGE
25-50 AS NEEDED
Status: DISCONTINUED | OUTPATIENT
Start: 2020-01-02 | End: 2020-01-04 | Stop reason: RX

## 2020-01-02 RX ADMIN — HUMAN INSULIN 10.8 UNITS/HR: 100 INJECTION, SOLUTION SUBCUTANEOUS at 22:04

## 2020-01-02 RX ADMIN — SODIUM CHLORIDE 1000 ML: 900 INJECTION, SOLUTION INTRAVENOUS at 21:45

## 2020-01-02 NOTE — Clinical Note
Vessel: right AA w/ Runoff, Power injection to the artery. Single view taken. PSI = 400. Rate of rise = 0.5 sec. Injection rate = 4 mL/sec. Total injected volume = 30 mL.

## 2020-01-02 NOTE — Clinical Note
Peripheral Lesion 1. Balloon inflated using single inflation technique. Pressure = 10 drake; Duration = 21 sec.

## 2020-01-02 NOTE — Clinical Note
Power injection to the Aortagram, artery. Single view taken. PSI = 1000. Rate of rise = 0.5 sec. Injection rate = 10 mL/sec. Total injected volume = 20 mL.

## 2020-01-02 NOTE — Clinical Note
Vessel: right iliac, Power injection to the artery. Single view taken. PSI = 1000. Rate of rise = 0.5 sec. Injection rate = 5 mL/sec. Total injected volume = 10 mL.

## 2020-01-02 NOTE — Clinical Note
TRANSFER - OUT REPORT:     Verbal report given to: Ameena Decker RN. Report consisted of patient's Situation, Background, Assessment and   Recommendations(SBAR). Opportunity for questions and clarification was provided. Patient transported with a Cardiac Cath Tech / Patient Care Tech. Patient transported to: [address]

## 2020-01-02 NOTE — Clinical Note
Bilateral groin prepped with ChloraPrep and draped. Wet prep solution applied at: 1505. Wet prep solution dried at: 1508. Wet prep elapsed drying time: 3 mins.

## 2020-01-02 NOTE — Clinical Note
TRANSFER - IN REPORT:     Verbal report received from: Lorie Monae RN. Report consisted of patient's Situation, Background, Assessment and   Recommendations(SBAR). Opportunity for questions and clarification was provided. Assessment completed upon patient's arrival to unit and care assumed. Patient transported with a Cardiac Cath Tech / Patient Care Tech.

## 2020-01-03 ENCOUNTER — APPOINTMENT (OUTPATIENT)
Dept: CT IMAGING | Age: 80
DRG: 616 | End: 2020-01-03
Attending: EMERGENCY MEDICINE
Payer: MEDICARE

## 2020-01-03 PROBLEM — K85.90 PANCREATITIS: Status: ACTIVE | Noted: 2020-01-03

## 2020-01-03 PROBLEM — E11.01 HYPEROSMOLAR HYPERGLYCEMIC COMA DUE TO DIABETES MELLITUS WITHOUT KETOACIDOSIS (HCC): Status: ACTIVE | Noted: 2020-01-03

## 2020-01-03 PROBLEM — E11.00 HYPEROSMOLAR NON-KETOTIC STATE IN PATIENT WITH TYPE 2 DIABETES MELLITUS (HCC): Status: ACTIVE | Noted: 2020-01-03

## 2020-01-03 LAB
ADMINISTERED INITIALS, ADMINIT: NORMAL
ALBUMIN SERPL-MCNC: 3.2 G/DL (ref 3.4–5)
ALBUMIN/GLOB SERPL: 0.7 {RATIO} (ref 0.8–1.7)
ALP SERPL-CCNC: 109 U/L (ref 45–117)
ALT SERPL-CCNC: 22 U/L (ref 13–56)
ANION GAP SERPL CALC-SCNC: 11 MMOL/L (ref 3–18)
ANION GAP SERPL CALC-SCNC: 11 MMOL/L (ref 3–18)
ANION GAP SERPL CALC-SCNC: 14 MMOL/L (ref 3–18)
APPEARANCE UR: ABNORMAL
AST SERPL-CCNC: 12 U/L (ref 10–38)
ATRIAL RATE: 83 BPM
B-OH-BUTYR SERPL-SCNC: 0.11 MMOL/L
BILIRUB SERPL-MCNC: 0.3 MG/DL (ref 0.2–1)
BILIRUB UR QL: NEGATIVE
BUN SERPL-MCNC: 84 MG/DL (ref 7–18)
BUN SERPL-MCNC: 92 MG/DL (ref 7–18)
BUN SERPL-MCNC: 95 MG/DL (ref 7–18)
BUN/CREAT SERPL: 20 (ref 12–20)
BUN/CREAT SERPL: 22 (ref 12–20)
BUN/CREAT SERPL: 26 (ref 12–20)
CALCIUM SERPL-MCNC: 8.8 MG/DL (ref 8.5–10.1)
CALCIUM SERPL-MCNC: 8.9 MG/DL (ref 8.5–10.1)
CALCIUM SERPL-MCNC: 9.2 MG/DL (ref 8.5–10.1)
CALCULATED P AXIS, ECG09: 55 DEGREES
CALCULATED R AXIS, ECG10: -14 DEGREES
CALCULATED T AXIS, ECG11: 47 DEGREES
CHLORIDE SERPL-SCNC: 109 MMOL/L (ref 100–111)
CHLORIDE SERPL-SCNC: 122 MMOL/L (ref 100–111)
CHLORIDE SERPL-SCNC: 124 MMOL/L (ref 100–111)
CO2 SERPL-SCNC: 16 MMOL/L (ref 21–32)
CO2 SERPL-SCNC: 16 MMOL/L (ref 21–32)
CO2 SERPL-SCNC: 19 MMOL/L (ref 21–32)
COLOR UR: YELLOW
CREAT SERPL-MCNC: 3.27 MG/DL (ref 0.6–1.3)
CREAT SERPL-MCNC: 4.21 MG/DL (ref 0.6–1.3)
CREAT SERPL-MCNC: 4.7 MG/DL (ref 0.6–1.3)
D50 ADMINISTERED, D50ADM: 0 ML
D50 ORDER, D50ORD: 0 ML
DIAGNOSIS, 93000: NORMAL
EPITH CASTS URNS QL MICRO: NORMAL /LPF (ref 0–5)
GLOBULIN SER CALC-MCNC: 4.9 G/DL (ref 2–4)
GLUCOSE BLD STRIP.AUTO-MCNC: 161 MG/DL (ref 70–110)
GLUCOSE BLD STRIP.AUTO-MCNC: 193 MG/DL (ref 70–110)
GLUCOSE BLD STRIP.AUTO-MCNC: 194 MG/DL (ref 70–110)
GLUCOSE BLD STRIP.AUTO-MCNC: 208 MG/DL (ref 70–110)
GLUCOSE BLD STRIP.AUTO-MCNC: 221 MG/DL (ref 70–110)
GLUCOSE BLD STRIP.AUTO-MCNC: 231 MG/DL (ref 70–110)
GLUCOSE BLD STRIP.AUTO-MCNC: 262 MG/DL (ref 70–110)
GLUCOSE BLD STRIP.AUTO-MCNC: 294 MG/DL (ref 70–110)
GLUCOSE BLD STRIP.AUTO-MCNC: 390 MG/DL (ref 70–110)
GLUCOSE BLD STRIP.AUTO-MCNC: 485 MG/DL (ref 70–110)
GLUCOSE BLD STRIP.AUTO-MCNC: >600 MG/DL (ref 70–110)
GLUCOSE BLD STRIP.AUTO-MCNC: >600 MG/DL (ref 70–110)
GLUCOSE SERPL-MCNC: 180 MG/DL (ref 74–99)
GLUCOSE SERPL-MCNC: 344 MG/DL (ref 74–99)
GLUCOSE SERPL-MCNC: 935 MG/DL (ref 74–99)
GLUCOSE UR STRIP.AUTO-MCNC: >1000 MG/DL
GLUCOSE, GLC: 161 MG/DL
GLUCOSE, GLC: 193 MG/DL
GLUCOSE, GLC: 194 MG/DL
GLUCOSE, GLC: 208 MG/DL
GLUCOSE, GLC: 221 MG/DL
GLUCOSE, GLC: 294 MG/DL
GLUCOSE, GLC: 390 MG/DL
GLUCOSE, GLC: 485 MG/DL
GLUCOSE, GLC: 603 MG/DL
GLUCOSE, GLC: 604 MG/DL
HGB UR QL STRIP: NEGATIVE
HIGH TARGET, HITG: 180 MG/DL
HYALINE CASTS URNS QL MICRO: NORMAL /LPF (ref 0–2)
INSULIN ADMINSTERED, INSADM: 1 UNITS/HOUR
INSULIN ADMINSTERED, INSADM: 1.3 UNITS/HOUR
INSULIN ADMINSTERED, INSADM: 17 UNITS/HOUR
INSULIN ADMINSTERED, INSADM: 2.7 UNITS/HOUR
INSULIN ADMINSTERED, INSADM: 21.7 UNITS/HOUR
INSULIN ADMINSTERED, INSADM: 27.2 UNITS/HOUR
INSULIN ADMINSTERED, INSADM: 4.4 UNITS/HOUR
INSULIN ADMINSTERED, INSADM: 4.7 UNITS/HOUR
INSULIN ADMINSTERED, INSADM: 6.4 UNITS/HOUR
INSULIN ADMINSTERED, INSADM: 9.9 UNITS/HOUR
INSULIN ORDER, INSORD: 1 UNITS/HOUR
INSULIN ORDER, INSORD: 1.3 UNITS/HOUR
INSULIN ORDER, INSORD: 17 UNITS/HOUR
INSULIN ORDER, INSORD: 2.7 UNITS/HOUR
INSULIN ORDER, INSORD: 21.7 UNITS/HOUR
INSULIN ORDER, INSORD: 27.2 UNITS/HOUR
INSULIN ORDER, INSORD: 4.4 UNITS/HOUR
INSULIN ORDER, INSORD: 4.7 UNITS/HOUR
INSULIN ORDER, INSORD: 6.4 UNITS/HOUR
INSULIN ORDER, INSORD: 9.9 UNITS/HOUR
KETONES UR QL STRIP.AUTO: NEGATIVE MG/DL
LACTATE BLD-SCNC: 0.93 MMOL/L (ref 0.4–2)
LACTATE BLD-SCNC: 4.87 MMOL/L (ref 0.4–2)
LACTATE BLD-SCNC: 4.94 MMOL/L (ref 0.4–2)
LEUKOCYTE ESTERASE UR QL STRIP.AUTO: NEGATIVE
LIPASE SERPL-CCNC: ABNORMAL U/L (ref 73–393)
LOW TARGET, LOT: 140 MG/DL
MAGNESIUM SERPL-MCNC: 3.2 MG/DL (ref 1.6–2.6)
MAGNESIUM SERPL-MCNC: 3.3 MG/DL (ref 1.6–2.6)
MINUTES UNTIL NEXT BG, NBG: 60 MIN
MULTIPLIER, MUL: 0.01
MULTIPLIER, MUL: 0.01
MULTIPLIER, MUL: 0.02
MULTIPLIER, MUL: 0.02
MULTIPLIER, MUL: 0.03
MULTIPLIER, MUL: 0.03
MULTIPLIER, MUL: 0.04
MULTIPLIER, MUL: 0.05
NITRITE UR QL STRIP.AUTO: NEGATIVE
ORDER INITIALS, ORDINIT: NORMAL
OSMOLALITY SERPL: 391 MOSM/KG H2O (ref 280–301)
P-R INTERVAL, ECG05: 140 MS
PH UR STRIP: 5 [PH] (ref 5–8)
PHOSPHATE SERPL-MCNC: 2.8 MG/DL (ref 2.5–4.9)
PHOSPHATE SERPL-MCNC: 3.1 MG/DL (ref 2.5–4.9)
PHOSPHATE SERPL-MCNC: 6.6 MG/DL (ref 2.5–4.9)
POTASSIUM SERPL-SCNC: 4.2 MMOL/L (ref 3.5–5.5)
POTASSIUM SERPL-SCNC: 4.9 MMOL/L (ref 3.5–5.5)
POTASSIUM SERPL-SCNC: 5.6 MMOL/L (ref 3.5–5.5)
PROT SERPL-MCNC: 8.1 G/DL (ref 6.4–8.2)
PROT UR STRIP-MCNC: ABNORMAL MG/DL
Q-T INTERVAL, ECG07: 378 MS
QRS DURATION, ECG06: 98 MS
QTC CALCULATION (BEZET), ECG08: 444 MS
RBC #/AREA URNS HPF: NEGATIVE /HPF (ref 0–5)
SODIUM SERPL-SCNC: 139 MMOL/L (ref 136–145)
SODIUM SERPL-SCNC: 151 MMOL/L (ref 136–145)
SODIUM SERPL-SCNC: 152 MMOL/L (ref 136–145)
SP GR UR REFRACTOMETRY: 1.02 (ref 1–1.03)
TROPONIN I SERPL-MCNC: <0.02 NG/ML (ref 0–0.04)
UROBILINOGEN UR QL STRIP.AUTO: 0.2 EU/DL (ref 0.2–1)
VENTRICULAR RATE, ECG03: 83 BPM
WBC URNS QL MICRO: NORMAL /HPF (ref 0–4)

## 2020-01-03 PROCEDURE — 74176 CT ABD & PELVIS W/O CONTRAST: CPT

## 2020-01-03 PROCEDURE — 83735 ASSAY OF MAGNESIUM: CPT

## 2020-01-03 PROCEDURE — 74011250636 HC RX REV CODE- 250/636: Performed by: INTERNAL MEDICINE

## 2020-01-03 PROCEDURE — 74011636637 HC RX REV CODE- 636/637: Performed by: STUDENT IN AN ORGANIZED HEALTH CARE EDUCATION/TRAINING PROGRAM

## 2020-01-03 PROCEDURE — 74011636637 HC RX REV CODE- 636/637: Performed by: PHYSICIAN ASSISTANT

## 2020-01-03 PROCEDURE — 51798 US URINE CAPACITY MEASURE: CPT

## 2020-01-03 PROCEDURE — 74011250636 HC RX REV CODE- 250/636: Performed by: STUDENT IN AN ORGANIZED HEALTH CARE EDUCATION/TRAINING PROGRAM

## 2020-01-03 PROCEDURE — 76450000000

## 2020-01-03 PROCEDURE — 83605 ASSAY OF LACTIC ACID: CPT

## 2020-01-03 PROCEDURE — 84100 ASSAY OF PHOSPHORUS: CPT

## 2020-01-03 PROCEDURE — 81001 URINALYSIS AUTO W/SCOPE: CPT

## 2020-01-03 PROCEDURE — 74011000250 HC RX REV CODE- 250: Performed by: EMERGENCY MEDICINE

## 2020-01-03 PROCEDURE — 74011000258 HC RX REV CODE- 258: Performed by: PHYSICIAN ASSISTANT

## 2020-01-03 PROCEDURE — 74011000250 HC RX REV CODE- 250: Performed by: INTERNAL MEDICINE

## 2020-01-03 PROCEDURE — 74011250636 HC RX REV CODE- 250/636: Performed by: EMERGENCY MEDICINE

## 2020-01-03 PROCEDURE — 80048 BASIC METABOLIC PNL TOTAL CA: CPT

## 2020-01-03 PROCEDURE — 65660000000 HC RM CCU STEPDOWN

## 2020-01-03 PROCEDURE — 82962 GLUCOSE BLOOD TEST: CPT

## 2020-01-03 PROCEDURE — 74011250636 HC RX REV CODE- 250/636: Performed by: PHYSICIAN ASSISTANT

## 2020-01-03 RX ORDER — FENTANYL CITRATE 50 UG/ML
INJECTION, SOLUTION INTRAMUSCULAR; INTRAVENOUS
Status: DISPENSED
Start: 2020-01-03 | End: 2020-01-03

## 2020-01-03 RX ORDER — HEPARIN SODIUM 5000 [USP'U]/ML
5000 INJECTION, SOLUTION INTRAVENOUS; SUBCUTANEOUS EVERY 8 HOURS
Status: DISCONTINUED | OUTPATIENT
Start: 2020-01-03 | End: 2020-02-04 | Stop reason: HOSPADM

## 2020-01-03 RX ORDER — INSULIN GLARGINE 100 [IU]/ML
20 INJECTION, SOLUTION SUBCUTANEOUS ONCE
Status: COMPLETED | OUTPATIENT
Start: 2020-01-03 | End: 2020-01-03

## 2020-01-03 RX ORDER — FENTANYL CITRATE 50 UG/ML
25 INJECTION, SOLUTION INTRAMUSCULAR; INTRAVENOUS
Status: COMPLETED | OUTPATIENT
Start: 2020-01-03 | End: 2020-01-03

## 2020-01-03 RX ORDER — DEXTROSE MONOHYDRATE AND SODIUM CHLORIDE 5; .45 G/100ML; G/100ML
125 INJECTION, SOLUTION INTRAVENOUS CONTINUOUS
Status: DISCONTINUED | OUTPATIENT
Start: 2020-01-03 | End: 2020-01-03

## 2020-01-03 RX ORDER — SODIUM CHLORIDE 9 MG/ML
1000 INJECTION, SOLUTION INTRAVENOUS ONCE
Status: COMPLETED | OUTPATIENT
Start: 2020-01-03 | End: 2020-01-03

## 2020-01-03 RX ORDER — INSULIN GLARGINE 100 [IU]/ML
20 INJECTION, SOLUTION SUBCUTANEOUS DAILY
Status: DISCONTINUED | OUTPATIENT
Start: 2020-01-04 | End: 2020-01-04

## 2020-01-03 RX ORDER — DEXTROSE 50 % IN WATER (D50W) INTRAVENOUS SYRINGE
25-50 AS NEEDED
Status: DISCONTINUED | OUTPATIENT
Start: 2020-01-03 | End: 2020-01-04 | Stop reason: RX

## 2020-01-03 RX ORDER — INSULIN LISPRO 100 [IU]/ML
INJECTION, SOLUTION INTRAVENOUS; SUBCUTANEOUS EVERY 6 HOURS
Status: DISCONTINUED | OUTPATIENT
Start: 2020-01-03 | End: 2020-01-10

## 2020-01-03 RX ADMIN — HUMAN INSULIN 4.7 UNITS/HR: 100 INJECTION, SOLUTION SUBCUTANEOUS at 05:04

## 2020-01-03 RX ADMIN — HEPARIN SODIUM 5000 UNITS: 5000 INJECTION INTRAVENOUS; SUBCUTANEOUS at 04:39

## 2020-01-03 RX ADMIN — SODIUM BICARBONATE: 84 INJECTION, SOLUTION INTRAVENOUS at 15:27

## 2020-01-03 RX ADMIN — HUMAN INSULIN 2.7 UNITS/HR: 100 INJECTION, SOLUTION SUBCUTANEOUS at 08:30

## 2020-01-03 RX ADMIN — FAMOTIDINE 20 MG: 10 INJECTION, SOLUTION INTRAVENOUS at 04:40

## 2020-01-03 RX ADMIN — HUMAN INSULIN 17 UNITS/HR: 100 INJECTION, SOLUTION SUBCUTANEOUS at 03:34

## 2020-01-03 RX ADMIN — SODIUM CHLORIDE 1000 ML: 900 INJECTION, SOLUTION INTRAVENOUS at 01:07

## 2020-01-03 RX ADMIN — SODIUM CHLORIDE, SODIUM ACETATE ANHYDROUS, SODIUM GLUCONATE, POTASSIUM CHLORIDE, AND MAGNESIUM CHLORIDE 1000 ML: 526; 222; 502; 37; 30 INJECTION, SOLUTION INTRAVENOUS at 04:40

## 2020-01-03 RX ADMIN — HEPARIN SODIUM 5000 UNITS: 5000 INJECTION INTRAVENOUS; SUBCUTANEOUS at 21:19

## 2020-01-03 RX ADMIN — HUMAN INSULIN 1.3 UNITS/HR: 100 INJECTION, SOLUTION SUBCUTANEOUS at 06:06

## 2020-01-03 RX ADMIN — DEXTROSE MONOHYDRATE AND SODIUM CHLORIDE 125 ML/HR: 5; .45 INJECTION, SOLUTION INTRAVENOUS at 08:58

## 2020-01-03 RX ADMIN — INSULIN GLARGINE 20 UNITS: 100 INJECTION, SOLUTION SUBCUTANEOUS at 09:25

## 2020-01-03 RX ADMIN — HUMAN INSULIN 1 UNITS/HR: 100 INJECTION, SOLUTION SUBCUTANEOUS at 07:12

## 2020-01-03 RX ADMIN — HEPARIN SODIUM 5000 UNITS: 5000 INJECTION INTRAVENOUS; SUBCUTANEOUS at 11:09

## 2020-01-03 RX ADMIN — SODIUM CHLORIDE 1000 ML: 900 INJECTION, SOLUTION INTRAVENOUS at 03:38

## 2020-01-03 RX ADMIN — HUMAN INSULIN 9.9 UNITS/HR: 100 INJECTION, SOLUTION SUBCUTANEOUS at 03:59

## 2020-01-03 RX ADMIN — FENTANYL CITRATE 25 MCG: 50 INJECTION, SOLUTION INTRAMUSCULAR; INTRAVENOUS at 01:09

## 2020-01-03 RX ADMIN — HUMAN INSULIN 17 UNITS/HR: 100 INJECTION, SOLUTION SUBCUTANEOUS at 02:57

## 2020-01-03 NOTE — ACP (ADVANCE CARE PLANNING)
Palliative Medicine    Pt does not have an Advance Directive on file in EMR. She is unable to complete one d/t mental status (confused d/t high glucose). Pt's niece by marriage Elvie Kemp was present at the bedside and was able to provide some history on the pt. Pt is  and never had children. She has [place]" adult siblings that are living, mostly in [place]. They would be the legal NOK. Pt had been living alone prior to admission and per Tom Woodward was a [employer] running the [email or web address]. She states that over the past few months that the pt's house has fallen into a state of disrepair. She is unsure if the pt was supposed to be taking insulin but said she saw no signs of it at the pt's house. When speaking with the pt all she would say is \"I'm fine, I'm fine\". Our team will reassess on Monday in the hopes that the pt will be mentally clear and able to complete an AMD.      ACP documents you currently have include:  [] Advance Directive or Living Will  [] Durable Do Not Resuscitate  [] Physician Orders for Scope of Treatment (POST)  [] Medical Power of   [x] Other - None    Thank you for the Palliative Medicine consult and allowing us to participate in the care of Mrs. Bonita Garcia. Will continue to monitor and provide support.     Lady Hilario RN, BSN  Palliative Medicine Inpatient RN  [facility]  Palliative COPE Line: [phone] ([phone])

## 2020-01-03 NOTE — CONSULTS
Consult Note  Consult requested by:   Sarai Valdovinos is a [de-identified] y.o. female [address] who is being seen on consult for renal failure  Chief Complaint   Patient presents with    High Blood Sugar     Admission diagnosis: <principal problem not specified>     HPI: [de-identified] y o  [place] female admitted with hyperglycemic hyperosmolar coma,asked to evaluate for acute/crf .pt was referred to my office by her pcp for evaluation of crf stage 3,hx of dm and htn,however she was admitted last night to the hospital for severe hyperglycemia,worsening renal failure,had ct scan last night with iv contrast.according to the daughter,pt is not compliant with her medications ,insulin. she had poor intake over past week. pt is unable to give any history. her ct scan showed mild pancreatitis and atrophic kidneys  Past Medical History:   Diagnosis Date    Diabetes ([address]     Hypercholesterolemia     Hypertension       History reviewed. No pertinent surgical history.     Social History     Socioeconomic History    Marital status:      Spouse name: Not on file    Number of children: Not on file    Years of education: Not on file    Highest education level: Not on file   Occupational History    Not on file   Social Needs    Financial resource strain: Not on file    Food insecurity:     Worry: Not on file     Inability: Not on file    Transportation needs:     Medical: Not on file     Non-medical: Not on file   Tobacco Use    Smoking status: Former Smoker    Smokeless tobacco: Never Used   Substance and Sexual Activity    Alcohol use: Not on file    Drug use: Not on file    Sexual activity: Not on file   Lifestyle    Physical activity:     Days per week: Not on file     Minutes per session: Not on file    Stress: Not on file   Relationships    Social connections:     Talks on phone: Not on file     Gets together: Not on file     Attends Voodoo service: Not on file Active member of club or organization: Not on file     Attends meetings of clubs or organizations: Not on file     Relationship status: Not on file    Intimate partner violence:     Fear of current or ex partner: Not on file     Emotionally abused: Not on file     Physically abused: Not on file     Forced sexual activity: Not on file   Other Topics Concern    Not on file   Social History Narrative    Not on file       History reviewed. No pertinent family history. No Known Allergies     Home Medications:     Prior to Admission Medications   Prescriptions Last Dose Informant Patient Reported? Taking? OTHER   No No   Sig: REPEAT CBC W/ DIFF, CMP, Mg in 1-2 days. acetaminophen (TYLENOL) 325 mg tablet   No No   Sig: Take 2 Tabs by mouth every six (6) hours as needed. albuterol-ipratropium (DUO-NEB) 2.5 mg-0.5 mg/3 ml nebu   No No   Sig: 3 mL by Nebulization route every six (6) hours as needed (wheezing). amLODIPine (NORVASC) 5 mg tablet   No No   Sig: Take 1 Tab by mouth daily. aspirin 81 mg chewable tablet   Yes No   Sig: Take 81 mg by mouth two (2) times a day. atorvastatin (LIPITOR) 20 mg tablet   Yes No   Sig: Take  by mouth daily. cholecalciferol, vitamin D3, (VITAMIN D3) 2,000 unit tab   Yes No   Sig: Take  by mouth. glipiZIDE (GLUCOTROL) 5 mg tablet   Yes No   Sig: Take 5 mg by mouth two (2) times a day. hydroCHLOROthiazide (HYDRODIURIL) 25 mg tablet   Yes No   Sig: Take 25 mg by mouth daily. insulin glargine (LANTUS) 100 unit/mL injection   No No   Si Units by SubCUTAneous route daily. insulin lispro (HUMALOG) 100 unit/mL injection   No No   Sig: Less than 150 =   0 units  150 -199 =   3 units  200 -249 =   6 units  250 -299 =   9 units  300 -349 =   12 units  350 and above =   15 units, CALL MD   loratadine (CLARITIN) 10 mg tablet   Yes No   Sig: Take 10 mg by mouth daily. losartan (COZAAR) 100 mg tablet   Yes No   Sig: Take 100 mg by mouth daily.    metFORMIN (GLUCOPHAGE) 500 mg tablet   Yes No   Sig: Take 500 mg by mouth daily (with breakfast). oxyCODONE-acetaminophen (PERCOCET) 5-325 mg per tablet   No No   Sig: Take 1 Tab by mouth every six (6) hours as needed. Max Daily Amount: 4 Tabs. Facility-Administered Medications: None       Current Facility-Administered Medications   Medication Dose Route Frequency    heparin (porcine) injection 5,000 Units  5,000 Units SubCUTAneous Q8H    famotidine (PF) (PEPCID) 20 mg in 0.9% sodium chloride 10 mL injection  20 mg IntraVENous Q48H    dextrose 5 % - 0.45% NaCl infusion  125 mL/hr IntraVENous CONTINUOUS    insulin lispro (HUMALOG) injection   SubCUTAneous Q6H    dextrose (D50W) injection syrg 12.5-25 g  25-50 mL IntraVENous PRN    glucose chewable tablet 16 g  4 Tab Oral PRN    glucagon (GLUCAGEN) injection 1 mg  1 mg IntraMUSCular PRN    dextrose (D50W) injection syrg 12.5-25 g  25-50 mL IntraVENous PRN    insulin regular (NOVOLIN,HUMULIN) 100 units/100 ml NS infusion (premix)  0-50 Units/hr IntraVENous TITRATE     Current Outpatient Medications   Medication Sig    hydroCHLOROthiazide (HYDRODIURIL) 25 mg tablet Take 25 mg by mouth daily.  loratadine (CLARITIN) 10 mg tablet Take 10 mg by mouth daily.  losartan (COZAAR) 100 mg tablet Take 100 mg by mouth daily.  glipiZIDE (GLUCOTROL) 5 mg tablet Take 5 mg by mouth two (2) times a day.  metFORMIN (GLUCOPHAGE) 500 mg tablet Take 500 mg by mouth daily (with breakfast).  oxyCODONE-acetaminophen (PERCOCET) 5-325 mg per tablet Take 1 Tab by mouth every six (6) hours as needed. Max Daily Amount: 4 Tabs.  insulin lispro (HUMALOG) 100 unit/mL injection Less than 150 =   0 units  150 -199 =   3 units  200 -249 =   6 units  250 -299 =   9 units  300 -349 =   12 units  350 and above =   15 units, CALL MD    insulin glargine (LANTUS) 100 unit/mL injection 30 Units by SubCUTAneous route daily.  amLODIPine (NORVASC) 5 mg tablet Take 1 Tab by mouth daily.     albuterol-ipratropium (DUO-NEB) 2.5 mg-0.5 mg/3 ml nebu 3 mL by Nebulization route every six (6) hours as needed (wheezing).  acetaminophen (TYLENOL) 325 mg tablet Take 2 Tabs by mouth every six (6) hours as needed.  OTHER REPEAT CBC W/ DIFF, CMP, Mg in 1-2 days.  cholecalciferol, vitamin D3, (VITAMIN D3) 2,000 unit tab Take  by mouth.  atorvastatin (LIPITOR) 20 mg tablet Take  by mouth daily.  aspirin 81 mg chewable tablet Take 81 mg by mouth two (2) times a day. Review of Systems:   Review of systems not obtained due to patient factors. Data Review:    Labs: Results:       Chemistry Recent Labs     01/03/20  0430 01/02/20  2325   * 935*   * 139   K 4.2 5.6*   * 109   CO2 16* 16*   BUN 92* 95*   CREA 4.21* 4.70*   CA 8.8 9.2   AGAP 11 14   BUCR 22* 20   AP  --  109   TP  --  8.1   ALB  --  3.2*   GLOB  --  4.9*   AGRAT  --  0.7*      PTH  Lab Results   Component Value Date/Time    Calcium 8.8 01/03/2020 04:30 AM    Phosphorus 2.8 01/03/2020 04:30 AM      CBC w/Diff Recent Labs     01/02/20  2116   WBC 8.8   RBC 4.11*   HGB 13.4   HCT 41.8      GRANS 89*   LYMPH 7*   EOS 0      Coagulation No results for input(s): PTP, INR, APTT, INREXT in the last 72 hours. Iron/Ferritin No results for input(s): IRON in the last 72 hours. No lab exists for component: TIBCCALC   BNP No results for input(s): BNPP in the last 72 hours. Cardiac Enzymes No results for input(s): CPK, CKND1, WILDA in the last 72 hours.     No lab exists for component: CKRMB, TROIP   Liver Enzymes Recent Labs     01/02/20  2325   TP 8.1   ALB 3.2*      SGOT 12      Thyroid Studies Lab Results   Component Value Date/Time    TSH 3.70 04/23/2012 12:02 PM        Urinalysis Lab Results   Component Value Date/Time    Color YELLOW 01/03/2020 01:00 AM    Appearance CLOUDY 01/03/2020 01:00 AM    Specific gravity 1.024 01/03/2020 01:00 AM    pH (UA) 5.0 01/03/2020 01:00 AM    Protein TRACE (A) 01/03/2020 01:00 AM    Glucose >1,000 (A) 01/03/2020 01:00 AM    Ketone NEGATIVE  01/03/2020 01:00 AM    Bilirubin NEGATIVE  01/03/2020 01:00 AM    Urobilinogen 0.2 01/03/2020 01:00 AM    Nitrites NEGATIVE  01/03/2020 01:00 AM    Leukocyte Esterase NEGATIVE  01/03/2020 01:00 AM    Epithelial cells FEW 01/03/2020 01:00 AM    Bacteria 1+ (A) 12/24/2018 10:00 PM    WBC 0 to 2 01/03/2020 01:00 AM    RBC NEGATIVE  01/03/2020 01:00 AM         IMAGES:   XR Results (maximum last 3): Results from Hospital Encounter encounter on 01/02/20   XR CHEST PORT    Narrative Examination: Portable AP chest     History: Altered mental status    Comparison: December 25, 2018    Findings: There is bibasilar atelectasis. There is mild pulmonary vascular  congestion. There is no pneumothorax. Heart size is stable. Tortuous thoracic  aorta. Degenerative changes of the left shoulder. Impression Impression:  1. Mild pulmonary vascular congestion without overt edema. Results from [facility] encounter on 12/24/18   XR CHEST PORT    Narrative PORTABLE CHEST RADIOGRAPH     CPT CODE: 54472     INDICATION: Malaise, hyperglycemia. COMPARISON: 5/23/2006. FINDINGS:    Frontal view of the chest obtained at 0035 hours. The cardiomediastinal  silhouette is within normal limits. Mild central venous congestion. .  There is  no evidence of focal consolidation, pleural effusion or pneumothorax. Impression IMPRESSION:    Mild central venous congestion. CT Results (maximum last 3): Results from [facility] encounter on 01/02/20   CT ABD PELV WO CONT    Narrative CT Abdomen And Pelvis with Intravenous Contrast    INDICATION: Generalized abdominal pain. Elevated lipase. .    TECHNIQUE: 5 mm collimation axial images obtained from the diaphragm to the  level of the pubic symphysis following the uneventful administration of  100 cc  of low osmolar, nonionic intravenous contrast.    Dose reduction techniques used:  Automated exposure control, adjustment of the  mAs and/or kVp according to patient size, standardized low-dose protocol, and/or  iterative reconstruction technique. COMPARISON: July 18, 2017. ABDOMEN FINDINGS:    Lung Bases: There is bibasilar atelectasis. Atelectasis is most conspicuous in  the lingula. The heart is top normal in size. .    Liver: Normal attenuation. No evidence for mass. Gallbladder: Present and appears normal. No biliary ductal dilatation. Pancreas: There is mild peripancreatic edema. .    Spleen: Normal in size. No evidence of mass. .    Adrenal Glands: No evidence for mass. Kidneys:     Right: Atrophic kidneys. No cortical mass. No hydronephrosis. Left:  Atrophic kidneys. No cortical mass. No hydronephrosis. Lymph Nodes: No lymphadenopathy. Aorta: Atherosclerosis. PELVIS FINDINGS:     Bowel: Small Bowel: Normal in caliber with normal wall thickness. Large Bowel: There are scattered colonic diverticula. Sims Pulliam Appendix: No secondary signs of acute appendicitis. Bladder: Underdistended. Extensive bilateral pelvic masses with calcification, likely fibroid uterus. No  definite suspicious adnexal mass. No ascites. Bones: Degenerative changes of the spine. Osteopenia. Impression Impression:    Mild uncomplicated pancreatitis. Fibroid uterus. Additional incidentals as above. Results from Hospital Encounter encounter on 07/18/17   CTA ABD ART W RUNOFF W WO CONT    Narrative EXAM: CT Scan Of Abdomen And Pelvis With CT Angiography Abdominal Aorta And  Bilateral Runoff    TECHNIQUE: Axial CT images were acquired with CT angiographic evaluation using a    multi detector CT. 3D CT angiographic reconstructions were performed and  analyzed for the abdominal aorta, iliac vessels and lower extremity arteries. The lungs bases are clear  There is fatty infiltration of the liver. The spleen is unremarkable.   The kidneys pancreas and kidneys are unremarkable. The IVC is unremarkable. Stomach and small bowel are unremarkable. Diverticulosis is evident without  diverticulitis. The bladder is unremarkable. Myomatous uterus    CT Angiographic Evaluation Of The Aorta, Iliacs And Peripheral Runoff: The abdominal aorta measures 2.3 cm as it enters the abdomen and is relatively  smooth in contour. The celiac is widely patent. The SMA is widely patent. The right renal artery originates in the descending thoracic aorta. The left  renal artery is unremarkable. The mid abdominal aorta measures 1.3 x 1.6 cm with irregular soft plaque    The distal abdominal aorta measures 1.5 x 1.6 cm with the lumen of 8 mm. There  is calcified and noncalcified plaque narrowing the lumen    There is extensive plaque within the right common iliac which has a near  complete occlusion in its midportion. The internal iliac is highly stenotic with predominantly calcified plaque. The right external iliac is widely patent. The right common femoral is patent. Profunda is patent. Official femoral on the right is occluded    Popliteal reconstitutes in the popliteal fossa. There is calcified and  noncalcified plaque and a moderate degree of stenosis evident. At the  trifurcation level and anterior tibial is patent. The tibioperoneal trunk is  occluded at that point. There is reconstitution of the peroneal in the mid calf  which is patent down to the ankle. The posterior tibial is not opacified. The anterior tibial is patent to the ankle at which point no longer is  opacified. Dorsalis pedis appears to be patent and is supplied by collateral  from the reconstituted peroneal. The plantar artery is not evident proximally        Left common iliac measures about 1 cm with about 50% of its lumen occluded by  soft tissue plaque. Left internal iliac is occluded at its origin. It reconstitutes distally  The external is widely patent. The common femoral is widely patent.   Profunda is patent. Superficial femoral is occluded at its origin  A threadlike popliteal reconstitutes in the popliteal fossa. The anterior tibial  is patent to the dorsalis pedis. 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 There is high-grade stenosis of the right common iliac compromised right-sided  inflow. On the left the inflow appears adequate. Both superficial arteries are occluded. Reconstitution of a right popliteal. The proximal calf the anterior tibial is  patent; only the peroneal extends to the foot and feces the dorsalis pedis    On the left there is only a threadlike reconstituted popliteal. The anterior  tibial is patent to the dorsalis pedis. Both peroneal and posterior tibial are  occluded    All CT scans at this facility are performed using dose optimization technique as  appropriate to the performed exam, to include automated exposure control,  adjustment of the mA and/or kV according to patient's size (Including  appropriate matching for site-specific examinations), or use of iterative  reconstruction technique. MRI Results (maximum last 3): No results found for this or any previous visit. Nuclear Medicine Results (maximum last 3): No results found for this or any previous visit. US Results (maximum last 3): Results from [facility] encounter on 12/24/18   US RETROPERITONEUM COMP    Narrative Ultrasound retroperitoneal complete    Indication: Acute kidney injury, chronic kidney disease    Comparison: None    Findings:    Small echogenic kidneys noted bilaterally with right kidney measuring 7.9 x 3.8  x 4.0 cm. Left kidney measures 8.4 x 4.2 x 6 3.6 cm. Questionable duplicated  collecting system on the right. No hydronephrosis. No nephrolithiasis. Visualized spleen and bladder are within normal limits.       Impression Impression:    No hydronephrosis or nephrolithiasis. -Questionable duplicated collecting system on the right. Results from [facility] encounter on 04/25/16   US BREAST LT LIMITED=<3 QUAD    Narrative EXAM: Diagnostic Mammogram of Both Breasts and Focused Ultrasound of the Left  Breast    INDICATION: Follow-up on calcifications in the right breast and lesion in the  left breast.    TECHNIQUE: Diagnostic digital mammogram of both breasts performed with CC and  MLO views. CAD was utilized to further enhance the diagnostic ability of the  digital mammogram.    Additional spot compression CC and MLO views obtained of the left breast.  Magnification views of the right breast obtained. Full field ML views of both  breasts obtained. Focus ultrasound of the left breast performed. COMPARISON: 3/24/2014 and 3/10/2014    PARENCHYMAL RADIODENSITY: The breast parenchyma shows scattered fibroglandular  densities. FINDINGS:   Mammogram: The calcifications in the right breast are unchanged. No area of  suspicious microcalcifications identified. The punctate area of calcifications  in the upper-outer quadrant is unchanged. The nodule in the left breast is  unchanged. No suspicious mass identified. Left Breast Focused Ultrasound: Scanning was performed by the radiologist and  sonographer. Left breast 3 cm from the nipple at the 9:00 position, there is a  0.8 x 0.5 x 1.1 cm wider than tall mildly hypoechoic nodule with through  transmission and no internal vascularity with a smooth border. This is unchanged  since the prior examination. Impression IMPRESSION:  1. Benign findings. Resume annual screening mammography. BI-RADS Category 2    These findings were discussed with the patient in person. Indications to return  sooner were discussed with the patient such as new palpable nodule, nipple  inversion, spontaneous nipple discharge especially if bloody, and architectural  changes. Patient demonstrated understanding.      The lack of mammographic evidence of malignancy should not deter further work-up  of a clinically significant palpable finding. Radiodense breast tissue may  obscure an early malignancy or a palpable finding. 10-15% of breast cancers are  not detected by mammography. Results from [facility] encounter on 03/24/14   US BREAST LT    Narrative Diagnostic Digital Mammogram bilateral with CAD  Targeted breast ultrasound    HISTORY: Callback from screening mammogram    COMPARISON: mammogram March 10 2014  MAMMOGRAM FINDINGS: Digital mammography was performed with left spot CC, spot  MLO, and ML , magnification right CC and right mediolateral views. Interpretation performed in conjunction with computed aided detection system  (iCAD software version 7.2-H high setting). Any areas marked were correlated  with the mammogram.    The breast tissue is composed of scattered fibroglandular tissue densities. There is a 3 mm group of predominantly coarse appearing microcalcifications in  the upper outer right breast, 8 cm from the nipple at 11:00. The small mass in  the lower inner left breast persists. ULTRASOUND FINDINGS: Ultrasound evaluation was performed of the inner left  breast in the area of the mass seen on the mammogram. There is an 8 x 5 x 10 mm  hypoechoic circumscribed shadowing solid mass in the 9:00 left breast, 3 cm  from the nipple. Ultrasound-guided core biopsy is recommended. Findings were  discussed with the patient. She does not want biopsy at this time and said she  prefer to wait. Patient was referred to Titi Messina, breast center nurse coordinator  to facilitate notification of the ordering physician and scheduling of followup  appointments, however she refused. Ultrasound evaluation of the left axilla demonstrates no adenopathy. Impression Impression:    1. Ultrasound guided core biopsy is recommended of a 1 cm mass in the 9:00 left  breast. Patient does not want biopsy at this time as above.   2. Six-month followup right diagnostic mammogram with magnification views is  recommended to document stability of a probably benign 3 mm group of  microcalcifications in the upper outer right breast.    BI-RADS 4 - Suspicious abnormality, biopsy should be considered       DEXA Results (maximum last 3): Results from Hospital Encounter encounter on 02/08/19   DEXA BONE DENSITY STUDY AXIAL    Narrative DEXA BONE DENSITOMETRY, CENTRAL    CPT CODE: 38976    INDICATION: Postmenopausal. History of osteopenia. Hypertension. High  cholesterol. Vitamin D deficiency. Taking calcium and vitamin D.       TECHNIQUE: Using GE LUNAR Prodigy densitometer, bone density measurement was  performed in the lumbar spine in addition to the proximal left and right femora  and the right forearm. T score refers to standard deviations above or below  average compared to a young adult of the same sex. Z score refers to standard  deviations above or below average compared to a patient of the same sex, age,  race and weight. COMPARISON: The patient's prior studies are no longer available on the bone  densitometry unit for comparison trending due to a prior computer issue on the  densitometry unit. The data from the prior study of 25 April 2016 has been  included on this study. FINDINGS:     Lumbar Spine Levels: L1-L2  Mean Bone Mineral Density (BMD):  1.147 g/cm2  (1.160 BMD on previous study)  T Score: -0.2     (-0.1 T score on previous)  Z Score: 0.8    On PA image of the lumbar spine obtained for localization of vertebral levels  (not a diagnostic quality radiograph), it is noted that there is apparent  increased density at L3 and L4. The density is not well characterized on the  basis of this image, and may be due to underlying degenerative changes, prior  surgery, trauma, or other osseous process. Since this density spuriously  increases measured bone mineral density, this level has been excluded.      Distal 1/3 Radius BMD:  0.656 g/cm2  (0.693 BMD on previous study)  T Score: -2.6     (-2.2 T score on previous)  Z Score: -0.6    Left Total Proximal Femur BMD: 0.751  (0.741 BMD on previous study)  T Score: -2.0     (-2.1 T score on previous)  Z Score: -1.1    Right Total Proximal Femur BMD: 0.786  (0.795 BMD on previous study)  T Score: -1.8     (-1.7 T score on previous)  Z Score: -0.8    Left Femoral Neck BMD:  0.713 g/cm2    T Score: -2.3  Z Score: -1.2    Right Femoral Neck BMD:  0.747 g/cm2    T Score: -2.1  Z Score: -0.9       Impression IMPRESSION:    1. BMD measures consistent with osteoporosis. 2.  This will serve as the new baseline study at this institution. Based upon current ISCD guidelines, the patient's overall diagnostic category,  selected using WHO criteria in postmenopausal women and males aged 48 and above,  is selected based upon the lowest T score from among the lumbar spine, total  femur, femoral neck, (or distal third radius if measured). WHO Definition of Osteoporosis and Osteopenia on DXA (specified for post  menopausal  females):      Normal:                     T Score at or above -1 SD    Osteopenia:               T Score between -1 and -2.5 SD    Osteoporosis:             T Score at or below -2.5 SD    The risk of fracture approximately doubles for each 1 SD decrease in T score. It is important to consider other factors in assessing a patient's risk of  fracture, including age, risk of falling/injury, history of fragility fracture,  family history of osteoporosis, smoking, low weight. Various fracture risk tools have been developed for adult patients and are  available online. For example, the FRAX tool developed by [facility] is widely used. Reference www.iscd.org.     It is also important to note that DXA measures bone density but does not  distinguish among causes of decreased bone density, which include primary vs.  secondary osteoporosis (such as metabolic bone disorders or possible effects of  medications) and also other conditions (such as osteomalacia). Clinical  considerations should determine what additional evaluation may be warranted to  exclude secondary conditions in a patient with low bone density. It is  suggested that secondary causes of low bone density be considered in patients  with Z score lower than -2.0, and patients who are not responding to therapy for  osteoporosis on followup DXA despite compliance with medications. Please note that reliable, valid comparisons can not be made between studies  which have been performed on different densitometers. If clinically warranted,  follow up study performed at this site would best permit assessment of trend for  possible change in bone mineral density over time in comparison to this study. Thank you for this referral.   Results from [facility] encounter on 04/25/16   DEXA BONE DENSITY STUDY AXIAL    Narrative DEXA BONE DENSITOMETRY, CENTRAL    CPT CODE: 45436    INDICATION: Postmenopausal. History of osteopenia. Hypertension. TECHNIQUE: Using GE LUNAR Prodigy densitometer, bone density measurement was  performed in the lumbar spine the proximal left and right femora and the right  forearm. T Score refers to standard deviations above or below average compared  to a young adult of the same sex. Z Score refers to standard deviations above or  below average compared to a patient of the same sex, age, race and weight. COMPARISON: March 10, 2014. FINDINGS:     Lumbar Spine Levels: L1-L4  Mean Bone Mineral Density (BMD):  1.299 g/cm2    T Score: 0.8       Z Score: 2.5      BMD increased 5.0%, which is statistically significant within a 95 percent  confidence interval compared to preceding study. On PA image of the lumbar spine obtained for localization of vertebral levels  (not a diagnostic quality radiograph), there is     apparent increased density  at multiple levels.   The density is not well characterized on the basis of this  image, but likely degenerative. These changes render the lumbar spine of  questionable diagnostic validity as a site for densitometry in this patient. Distal1/3 Radius BMD:  0.693 g/cm2   T Score: -2.2       Z Score: -0.4   BMD decreased 1.3%, which is not statistically significant within a 95 percent  confidence interval compared to preceding study. Left Total Proximal Femur BMD: 0.741 g/cm2    T Score:  -2.1       Z Score:  -0.8       BMD decreased 5.4%, which is statistically significant within a 95 percent  confidence interval compared to preceding study. Right Total Proximal Femur BMD: 0.795 g/cm2  T Score:  -1.7      Z Score:  -0.4       BMD decreased 3.5%, which is not statistically significant within a 95 percent  confidence interval compared to preceding study. Left Femoral Neck BMD:  0.714 g/cm2   T Score:  -2.3  Z Score:  -0.9    Right Femoral Neck BMD:  0.768 g/cm2   T Score:  -1.9  Z Score:  -0.5        Impression IMPRESSION:    1. BMD MEASURES CONSISTENT WITH OSTEOPENIA. 2.  COMPARED TO THE PRECEDING STUDY, BMD HAS DECREASED. MEASURED BMD OF THE  LUMBAR SPINE HAS SLIGHTLY INCREASED COMPARED TO THE PRECEDING STUDY, THE LUMBAR  SPINE IS CONSIDERED OF QUESTIONABLE VALIDITY AS DISCUSSED ABOVE. Based upon current ISCD guidelines, the patient's overall diagnostic category,  selected using WHO criteria in postmenopausal women and males aged 48 and above,  is selected based upon the lowest T Score from among the lumbar spine, total  femur, femoral neck, (or distal third radius if measured). WHO Definition of Osteoporosis and Osteopenia on DXA (specified for  post-menopausal  females):      Normal:                     T Score at or above -1 SD    Osteopenia:              T Score between -1 and -2.5 SD    Osteoporosis:           T Score at or below -2.5 SD    The risk of fracture approximately doubles for each 1 SD decrease in T Score.    It is important to consider other factors in assessing a patient's risk of  fracture, including age, risk of falling/injury, history of fragility fracture,  family history of osteoporosis, smoking, low weight. It is also important to note that DXA measures bone density but does not  distinguish among causes of decreased bone density, which include primary versus  secondary osteoporosis (such as metabolic bone disorders or possible effects of  medications) and also other conditions (such as osteomalacia). Clinical  considerations should determine what additional evaluation may be warranted to  exclude secondary conditions in a patient with low bone density. Please note that reliable, valid comparisons can not be made between studies  which have been performed on different densitometers. If clinically warranted,  follow up study performed at this site would best permit assessment of trend for  possible change in bone mineral density over time in comparison to this study. Thank you for this referral.     Results from [facility] encounter on 03/10/14   DEXA BONE DENSITY STUDY AXIAL    Narrative DXA BONE DENSITOMETRY, CENTRAL    CPT CODE: 43023, 20396    INDICATION: Post menopausal.    TECHNIQUE: Using GE LUNAR Prodigy densitometer, bone density measurement was  performed in the lumbar spine, the proximal left and right femora and the right  forearm. T Score refers to standard deviations above or below average compared  to a young adult of the same sex. Z Score refers to standard deviations above  or below average compared to a patient of the same sex, age, race and weight. COMPARISON: None available. FINDINGS:    Lumbar Spine Levels: L1-L4  Mean Bone Mineral Density (BMD):  1.237 g/cm2  T Score: 0.3  Z Score: 1.1    On PA image of the lumbar spine obtained for localization of vertebral levels  (not a diagnostic quality radiograph),there is     apparent increased density  at  multiple levels.   The density is not well characterized on the basis of  this image, but likely degenerative. These changes render the lumbar spine of  questionable diagnostic validity as a site for densitometry in this patient. Distal 1/3 Radius BMD:  0.702 g/cm2  T Score: -2.1  Z Score: -0.5    Left Total Proximal Femur BMD: 0.783 g/cm2  T Score:  -1.8  Z Score:  -1.3    Right Total Proximal Femur BMD: 0.824 g/cm2  T Score:  -1.5  Z Score:  -0.9    Left Femoral Neck BMD:  0.795 g/cm2  T Score: -1.7  Z Score: -1.0    Right Femoral Neck BMD:  0.801 g/cm2  T Score: -1.7  Z Score: -0.9      Impression IMPRESSION:    BMD MEASURES CONSISTENT WITH OSTEOPENIA. Based upon current ISCD guidelines, the patient's overall diagnostic category,  selected using WHO criteria in postmenopausal women and males aged 48 and  above, is selected based upon the lowest T Score from among the lumbar spine,  total femur, femoral neck, (or distal third radius if measured). WHO Definition of Osteoporosis and Osteopenia on DXA (specified for post  menopausal  females):    Normal:                     T Score at or above -1 SD  Osteopenia:              T Score between -1 and -2.5 SD  Osteoporosis:          T Score at or below -2.5 SD    The risk of fracture approximately doubles for each 1 SD decrease in T Score. It is important to consider other factors in assessing a patient's risk of  fracture, including age, risk of falling/injury, history of fragility fracture,  family history of osteoporosis, smoking, low weight. It is also important to note that DXA measures bone density but does not  distinguish among causes of decreased bone density, which include primary  versus secondary osteoporosis (such as metabolic bone disorders or possible  effects of medications) and also other conditions (such as osteomalacia).   Clinical considerations should determine what additional evaluation may be  warranted to exclude secondary conditions in a patient with low bone density. Please note that reliable, valid comparisons can not be made between studies  which have been performed on different densitometers. If clinically warranted,  follow up study performed at this site would best permit assessment of trend  for possible change in bone mineral density over time in comparison to this  study. Thank you for this referral.       [facility] Results (maximum last 3): Results from [place] encounter on 12/08/17   [facility] MAMMO BI DX INCL CAD    Narrative DIGITAL BILATERAL DIAGNOSTIC MAMMOGRAM with CAD     HISTORY: 3 day duration of short bursts of sharp right breast pain which move  around in the upper outer quadrant of the right breast.    COMPARISON: Mammogram 16, 14.    TECHNIQUE: Computer assisted detection, iCAD Second Look 7. 2- H was utilized. CC  and MLO views obtained of both breasts. Additional right breast ML view  obtained. FINDINGS:     No new focal mass is seen. There is no distortion . There are no suspicious  calcifications. Impression IMPRESSION:    No suspicious finding. BIRADS 2: Benign finding(s)    There are scattered areas of fibroglandular density. Results from [place] encounter on 04/25/16   [facility] MAMMO BI DX DIGTL    Narrative EXAM: Diagnostic Mammogram of Both Breasts and Focused Ultrasound of the Left  Breast    INDICATION: Follow-up on calcifications in the right breast and lesion in the  left breast.    TECHNIQUE: Diagnostic digital mammogram of both breasts performed with CC and  MLO views. CAD was utilized to further enhance the diagnostic ability of the  digital mammogram.    Additional spot compression CC and MLO views obtained of the left breast.  Magnification views of the right breast obtained. Full field ML views of both  breasts obtained. Focus ultrasound of the left breast performed.     COMPARISON: 3/24/2014 and 3/10/2014    PARENCHYMAL RADIODENSITY: The breast parenchyma shows scattered fibroglandular  densities. FINDINGS:   Mammogram: The calcifications in the right breast are unchanged. No area of  suspicious microcalcifications identified. The punctate area of calcifications  in the upper-outer quadrant is unchanged. The nodule in the left breast is  unchanged. No suspicious mass identified. Left Breast Focused Ultrasound: Scanning was performed by the radiologist and  sonographer. Left breast 3 cm from the nipple at the 9:00 position, there is a  0.8 x 0.5 x 1.1 cm wider than tall mildly hypoechoic nodule with through  transmission and no internal vascularity with a smooth border. This is unchanged  since the prior examination. Impression IMPRESSION:  1. Benign findings. Resume annual screening mammography. BI-RADS Category 2    These findings were discussed with the patient in person. Indications to return  sooner were discussed with the patient such as new palpable nodule, nipple  inversion, spontaneous nipple discharge especially if bloody, and architectural  changes. Patient demonstrated understanding. The lack of mammographic evidence of malignancy should not deter further work-up  of a clinically significant palpable finding. Radiodense breast tissue may  obscure an early malignancy or a palpable finding. 10-15% of breast cancers are  not detected by mammography. Results from [place] encounter on 03/24/14   [facility] MAMMO BI DX DIGTL    Narrative Diagnostic Digital Mammogram bilateral with CAD  Targeted breast ultrasound    HISTORY: Callback from screening mammogram    COMPARISON: mammogram March 10 2014  MAMMOGRAM FINDINGS: Digital mammography was performed with left spot CC, spot  MLO, and ML , magnification right CC and right mediolateral views. Interpretation performed in conjunction with computed aided detection system  (iCAD software version 7.2-H high setting).  Any areas marked were correlated  with the mammogram.    The breast tissue is composed of scattered fibroglandular tissue densities. There is a 3 mm group of predominantly coarse appearing microcalcifications in  the upper outer right breast, 8 cm from the nipple at 11:00. The small mass in  the lower inner left breast persists. ULTRASOUND FINDINGS: Ultrasound evaluation was performed of the inner left  breast in the area of the mass seen on the mammogram. There is an 8 x 5 x 10 mm  hypoechoic circumscribed shadowing solid mass in the 9:00 left breast, 3 cm  from the nipple. Ultrasound-guided core biopsy is recommended. Findings were  discussed with the patient. She does not want biopsy at this time and said she  prefer to wait. Patient was referred to [facility] nurse coordinator  to facilitate notification of the ordering physician and scheduling of followup  appointments, however she refused. Ultrasound evaluation of the left axilla demonstrates no adenopathy. Impression Impression:    1. Ultrasound guided core biopsy is recommended of a 1 cm mass in the 9:00 left  breast. Patient does not want biopsy at this time as above. 2. Six-month followup right diagnostic mammogram with magnification views is  recommended to document stability of a probably benign 3 mm group of  microcalcifications in the upper outer right breast.    BI-RADS 4 - Suspicious abnormality, biopsy should be considered       IR Results (maximum last 3): No results found for this or any previous visit. VAS/US Results (maximum last 3): Results from [facility] encounter on 07/12/17   DUPLEX LOWER EXT ARTERY BILAT       PET Results (maximum last 3): No results found for this or any previous visit. No results found for this or any previous visit.   @[email or web address]([id number])    CULTURE:   )  Recent Labs     01/02/20  2206   CULT NO GROWTH AFTER 8 HOURS  NO GROWTH AFTER 8 HOURS     Recent Labs     01/02/20  2206   CULT NO GROWTH AFTER 8 HOURS  NO GROWTH AFTER 8 HOURS       Physical Assessment:     Visit Vitals  BP 154/83 (BP 1 Location: Right arm, BP Patient Position: At rest)   Pulse (!) 106   Temp 97.6 °F (36.4 °C)   Resp 21   Wt 68.9 kg (151 lb 14.4 oz)   SpO2 97%   BMI 25.28 kg/m²     Last 3 Recorded Weights in this Encounter    01/02/20 2149   Weight: 68.9 kg (151 lb 14.4 oz)       Intake/Output Summary (Last 24 hours) at 1/3/2020 1332  Last data filed at 1/3/2020 0858  Gross per 24 hour   Intake 537.5 ml   Output    Net 537.5 ml       Physial Exam:  General appearance: poorly responsive  Skin: normal coloration and turgor, no rashes, no suspicious skin lesions noted. HEENT: Head; normocephalic, atraumatic. CONTRERAS. ENT- ENT exam normal, no neck nodes or sinus tenderness. Lungs: clear to auscultation bilaterally  Heart: regular rate and rhythm, S1, S2 normal, no murmur, click, rub or gallop  Abdomen: soft, non-tender. Bowel sounds normal. No masses,  no organomegaly  Extremities: extremities normal, atraumatic, no cyanosis or edema    PLAN / RECOMMENDATION:    Acute/crf stage 3,related to dehydration from hyperglycemia ,poor po intake.her daughter brought her Forrest Cee is taking hctz,not taking cozaar or metformin. continue iv hydration,recovery will probably be delayed due to iv contrast that she received last night. discussed with daughter about potential need for dialysis  Questionable urinary retention,gutierrez cath placed,no documented urine output  Pancreatitis,very high lipase  Hypernatremia due to ivf,change to hypotonic fluids  Metabolic acidosis,add bicarb to ivf  Hyperglycemic coma,was not taking her insulin according to daughter     Thank you for the consultation to participate in patient's care. I have personally discussed my plan with the referring physician.      Stephanie Dsouza MD  January 3, 2020

## 2020-01-03 NOTE — H&P
[facility]  Pulmonary, Critical Care, and Sleep Medicine    Name: Sloan Mascorro MRN: [id number]   : 1940 Hospital: [address] [place] [address]   Date: 1/3/2020        Critical Care History and Physical      IMPRESSION:   · HHS with anion gap acidosis, no ketonuria  · Metabolic acidosis due to above  · Metabolic encephalopathy due to hyperglycemia, hyperuremia   · Acute pancreatitis- no abdominal pain, Lipase 24,474  · Corrected Na 152  · Hyperkalemia- will correct on insulin gtt  · Acute on chronic renal failure- Cr 4.7, likely multifactorial of pre renal with hypovolemia w/ ? Post renal as patient has had urinary retention   · DMII- uncontrolled, refused insulin, A1C 14.2     Patient Active Problem List   Diagnosis Code    Hyperglycemia R73.9    Type 2 diabetes mellitus with hyperosmolarity (HCC) E11.00    Acute UTI N39.0    Dehydration E86.0    Acute renal failure (ARF) ([facility] [address] N17.9    Noncompliance Z91.19    Pancreatitis K85.90    Hyperosmolar hyperglycemic coma due to diabetes mellitus without ketoacidosis ([facility] [address] E11.01        RECOMMENDATIONS:   Neuro: no acute issues   Pulm: Supplemental O2 via NC, titrate flow for goal SPO2> 90% pulmonary hygiene care, Aspiration precautions, Keep HOB >30 degrees  CVS:no acute issues  GI: SUP, NPO, fluids for pancreatitis, NGT if needed  Renal:  Trend Renal indices, Strict Is/Os, Gutierrez if needed for continued retention   Hem/Onc: Daily CBC. Monitor for s/o active bleeding. I/D:Trend WBCs and temperature curve. Endocrine: glucose stabilizer, insulin gtt  Metabolic:  Q4 BMP, mag, phos. Trend lytes, replace as needed. Musc/Skin: no acute issues, wound care  Full Code  Discussed w/ attending physician      Best practice :  Glycemic control  IHI ICU bundles:   none  Mech Vent patients- VAP bundle, aim to keep peak plateau pressure 32-50DW H2O  Stress ulcer prophylaxis. famotidine   DVT prophylaxis.  SQH  Need for Lines, gutierrez assessed. Restraints need. Palliative care evaluation. ordered    Subjective/History: This patient has been seen and evaluated at the request of Dr. Jeremiah Conklin for HHS.    01/03/20    Patient is a [de-identified] y.o. female w/ history of DM presenting to the ICU for management of HHS and pancreatitis. Patient was brought to ED for lethargy and found to be hyperglycemic with high lipase. Insulin gtt started and CT abd completed showing mild pancreatitis. On my exam patient is sleepy and not answering questions, there is no family at bedside. History obtained via chart. Per chart review patient is noncompliant with medication and has refused insulin. Past Medical History:   Diagnosis Date    Diabetes ([facility] [address]     Hypercholesterolemia     Hypertension         History reviewed. No pertinent surgical history. Prior to Admission medications    Medication Sig Start Date End Date Taking? Authorizing Provider   hydroCHLOROthiazide (HYDRODIURIL) 25 mg tablet Take 25 mg by mouth daily. Provider, Historical   loratadine (CLARITIN) 10 mg tablet Take 10 mg by mouth daily. Provider, Historical   losartan (COZAAR) 100 mg tablet Take 100 mg by mouth daily. Provider, Historical   glipiZIDE (GLUCOTROL) 5 mg tablet Take 5 mg by mouth two (2) times a day. Provider, Historical   metFORMIN (GLUCOPHAGE) 500 mg tablet Take 500 mg by mouth daily (with breakfast). Provider, Historical   oxyCODONE-acetaminophen (PERCOCET) 5-325 mg per tablet Take 1 Tab by mouth every six (6) hours as needed. Max Daily Amount: 4 Tabs. 12/26/18   Maureen Velazquez NP   insulin lispro (HUMALOG) 100 unit/mL injection Less than 150 =   0 units  150 -199 =   3 units  200 -249 =   6 units  250 -299 =   9 units  300 -349 =   12 units  350 and above =   15 units, CALL MD 12/26/18   Maureen Velazquez NP   insulin glargine (LANTUS) 100 unit/mL injection 30 Units by SubCUTAneous route daily.  12/26/18   Maureen Velazquez NP   amLODIPine (NORVASC) 5 mg tablet Take 1 Tab by mouth daily. 18   Dee Lunsford NP   albuterol-ipratropium (DUO-NEB) 2.5 mg-0.5 mg/3 ml nebu 3 mL by Nebulization route every six (6) hours as needed (wheezing). 18   Dee Lunsford NP   acetaminophen (TYLENOL) 325 mg tablet Take 2 Tabs by mouth every six (6) hours as needed. 18   Dee Lunsford NP   OTHER REPEAT CBC W/ DIFF, CMP, Mg in 1-2 days. 18   Dee Lunsford NP   cholecalciferol, vitamin D3, (VITAMIN D3) 2,000 unit tab Take  by mouth. Provider, Historical   atorvastatin (LIPITOR) 20 mg tablet Take  by mouth daily. Provider, Historical   aspirin 81 mg chewable tablet Take 81 mg by mouth two (2) times a day. Provider, Historical       Current Facility-Administered Medications   Medication Dose Route Frequency    fentaNYL citrate (PF) 50 mcg/mL injection        electrolyte-r (NORMOSOL R) infusion   IntraVENous CONTINUOUS    0.9% sodium chloride infusion 1,000 mL  1,000 mL IntraVENous ONCE    insulin regular (NOVOLIN,HUMULIN) 100 units/100 ml NS infusion (premix)  0-50 Units/hr IntraVENous TITRATE       No Known Allergies     Social History     Tobacco Use    Smoking status: Former Smoker    Smokeless tobacco: Never Used   Substance Use Topics    Alcohol use: Not on file        History reviewed. No pertinent family history. Review of Systems:  Review of systems not obtained due to patient factors. Objective:   Vital Signs:    Visit Vitals  /68   Pulse (!) 109   Temp 97.1 °F (36.2 °C)   Resp 16   Wt 68.9 kg (151 lb 14.4 oz)   SpO2 100%   BMI 25.28 kg/m²       O2 Device: Room air       Temp (24hrs), Av.1 °F (36.2 °C), Min:97.1 °F (36.2 °C), Max:97.1 °F (36.2 °C)       Intake/Output:   Last shift:      No intake/output data recorded. Last 3 shifts: No intake/output data recorded. No intake or output data in the 24 hours ending 20 0338        Physical Exam:     General:  Alert, cooperative, no distress. Lethargic    Head:  Normocephalic, without obvious abnormality, atraumatic. Eyes:  Conjunctivae/corneas clear. PERRL,   Nose: Nares normal. Septum midline. Mucosa normal. No drainage or sinus tenderness. Throat: Lips, mucosa, and tongue normal. Teeth and gums normal.   Neck: Supple, symmetrical, trachea midline, no adenopathy   Back:   Symmetric, no curvature. ROM normal.   Lungs:   Clear to auscultation bilaterally. Chest wall:  No tenderness or deformity. Heart:  Regular rate and rhythm, S1, S2 normal, no murmur, click, rub or gallop. Abdomen:   Soft, non-tender. Bowel sounds normal. No masses,  No organomegaly. Extremities: Extremities normal, atraumatic, no cyanosis or edema. Pulses: 2+ and symmetric all extremities.    Skin: Skin color, texture, turgor normal. No rashes or lesions   Lymph nodes:  Cervical, supraclavicular, and axillary nodes normal.   Neurologic: Grossly nonfocal, alert to self, states she \"feels good\"     Devices:  · none    Data:     Recent Results (from the past 24 hour(s))   EKG, 12 LEAD, INITIAL    Collection Time: 01/02/20  8:25 PM   Result Value Ref Range    Ventricular Rate 83 BPM    Atrial Rate 83 BPM    P-R Interval 140 ms    QRS Duration 98 ms    Q-T Interval 378 ms    QTC Calculation (Bezet) 444 ms    Calculated P Axis 55 degrees    Calculated R Axis -14 degrees    Calculated T Axis 47 degrees    Diagnosis       Normal sinus rhythm  Anterior infarct , age undetermined  Abnormal ECG  When compared with ECG of 24-DEC-2018 22:01,  ST elevation now present in Inferior leads  ST no longer depressed in Lateral leads  Nonspecific T wave abnormality no longer evident in Inferior leads     POC LACTIC ACID    Collection Time: 01/02/20  9:14 PM   Result Value Ref Range    Lactic Acid (POC) 3.00 (HH) 0.40 - 2.00 mmol/L   HEMOGLOBIN A1C WITH EAG    Collection Time: 01/02/20  9:14 PM   Result Value Ref Range    Hemoglobin A1c 14.2 (H) 4.2 - 5.6 %    Est. average glucose 361 mg/dL CBC WITH AUTOMATED DIFF    Collection Time: 01/02/20  9:16 PM   Result Value Ref Range    WBC 8.8 4.6 - 13.2 K/uL    RBC 4.11 (L) 4.20 - 5.30 M/uL    HGB 13.4 12.0 - 16.0 g/dL    HCT 41.8 35.0 - 45.0 %    .7 (H) 74.0 - 97.0 FL    MCH 32.6 24.0 - 34.0 PG    MCHC 32.1 31.0 - 37.0 g/dL    RDW 14.2 11.6 - 14.5 %    PLATELET 693 957 - 869 K/uL    MPV 12.9 (H) 9.2 - 11.8 FL    NEUTROPHILS 89 (H) 40 - 73 %    LYMPHOCYTES 7 (L) 21 - 52 %    MONOCYTES 4 3 - 10 %    EOSINOPHILS 0 0 - 5 %    BASOPHILS 0 0 - 2 %    ABS. NEUTROPHILS 7.8 1.8 - 8.0 K/UL    ABS. LYMPHOCYTES 0.6 (L) 0.9 - 3.6 K/UL    ABS. MONOCYTES 0.3 0.05 - 1.2 K/UL    ABS. EOSINOPHILS 0.0 0.0 - 0.4 K/UL    ABS.  BASOPHILS 0.0 0.0 - 0.1 K/UL    DF AUTOMATED     GLUCOSE, POC    Collection Time: 01/02/20  9:52 PM   Result Value Ref Range    Glucose (POC) >600 (HH) 70 - 110 mg/dL   GLUCOSTABILIZER    Collection Time: 01/02/20 10:12 PM   Result Value Ref Range    Glucose 600 mg/dL    Insulin order 10.8 units/hour    Insulin adminstered 10.8 units/hour    Multiplier 0.020     Low target 140 mg/dL    High target 180 mg/dL    D50 order 0.0 ml    D50 administered 0.00 ml    Minutes until next BG 60 min    Order initials SS     Administered initials SS    GLUCOSE, POC    Collection Time: 01/02/20 11:17 PM   Result Value Ref Range    Glucose (POC) >600 (HH) 70 - 110 mg/dL   GLUCOSTABILIZER    Collection Time: 01/02/20 11:22 PM   Result Value Ref Range    Glucose 602 mg/dL    Insulin order 16.3 units/hour    Insulin adminstered 16.3 units/hour    Multiplier 0.030     Low target 140 mg/dL    High target 180 mg/dL    D50 order 0.0 ml    D50 administered 0.00 ml    Minutes until next BG 60 min    Order initials SS     Administered initials SS    LIPASE    Collection Time: 01/02/20 11:25 PM   Result Value Ref Range    Lipase 24,474 (H) 73 - 393 U/L   MAGNESIUM    Collection Time: 01/02/20 11:25 PM   Result Value Ref Range    Magnesium 3.5 (H) 1.6 - 2.6 mg/dL   METABOLIC PANEL, COMPREHENSIVE    Collection Time: 01/02/20 11:25 PM   Result Value Ref Range    Sodium 139 136 - 145 mmol/L    Potassium 5.6 (H) 3.5 - 5.5 mmol/L    Chloride 109 100 - 111 mmol/L    CO2 16 (L) 21 - 32 mmol/L    Anion gap 14 3.0 - 18 mmol/L    Glucose 935 (HH) 74 - 99 mg/dL    BUN 95 (H) 7.0 - 18 MG/DL    Creatinine 4.70 (H) 0.6 - 1.3 MG/DL    BUN/Creatinine ratio 20 12 - 20      GFR est AA 11 (L) >60 ml/min/1.73m2    GFR est non-AA 9 (L) >60 ml/min/1.73m2    Calcium 9.2 8.5 - 10.1 MG/DL    Bilirubin, total 0.3 0.2 - 1.0 MG/DL    ALT (SGPT) 22 13 - 56 U/L    AST (SGOT) 12 10 - 38 U/L    Alk.  phosphatase 109 45 - 117 U/L    Protein, total 8.1 6.4 - 8.2 g/dL    Albumin 3.2 (L) 3.4 - 5.0 g/dL    Globulin 4.9 (H) 2.0 - 4.0 g/dL    A-G Ratio 0.7 (L) 0.8 - 1.7     TROPONIN I    Collection Time: 01/02/20 11:25 PM   Result Value Ref Range    Troponin-I, QT <0.02 0.0 - 0.045 NG/ML   PHOSPHORUS    Collection Time: 01/02/20 11:25 PM   Result Value Ref Range    Phosphorus 6.6 (H) 2.5 - 4.9 MG/DL   POC CHEM8    Collection Time: 01/02/20 11:32 PM   Result Value Ref Range    CO2, POC 16 (L) 19 - 24 MMOL/L    Glucose, POC >700 (HH) 74 - 106 MG/DL    BUN, POC 81 (H) 7 - 18 MG/DL    Creatinine, POC 4.5 (H) 0.6 - 1.3 MG/DL    GFRAA, POC 11 (L) >60 ml/min/1.73m2    GFRNA, POC 9 (L) >60 ml/min/1.73m2    Sodium,  136 - 145 MMOL/L    Potassium, POC 5.6 (H) 3.5 - 5.5 MMOL/L    Calcium, ionized (POC) 1.08 (L) 1.12 - 1.32 mmol/L    Chloride,  (H) 100 - 108 MMOL/L    Anion gap, POC 18 10 - 20      Hematocrit, POC 38 36 - 49 %    Hemoglobin, POC 12.9 12 - 16 G/DL   POC LACTIC ACID    Collection Time: 01/03/20 12:31 AM   Result Value Ref Range    Lactic Acid (POC) 4.87 (HH) 0.40 - 2.00 mmol/L   GLUCOSE, POC    Collection Time: 01/03/20 12:32 AM   Result Value Ref Range    Glucose (POC) >600 (HH) 70 - 110 mg/dL   GLUCOSTABILIZER    Collection Time: 01/03/20 12:33 AM   Result Value Ref Range    Glucose 603 mg/dL    Insulin order 21.7 units/hour    Insulin adminstered 21.7 units/hour    Multiplier 0.040     Low target 140 mg/dL    High target 180 mg/dL    D50 order 0.0 ml    D50 administered 0.00 ml    Minutes until next BG 60 min    Order initials SS     Administered initials SS    URINALYSIS W/ RFLX MICROSCOPIC    Collection Time: 01/03/20  1:00 AM   Result Value Ref Range    Color YELLOW      Appearance CLOUDY      Specific gravity 1.024 1.005 - 1.030      pH (UA) 5.0 5.0 - 8.0      Protein TRACE (A) NEG mg/dL    Glucose >1,000 (A) NEG mg/dL    Ketone NEGATIVE  NEG mg/dL    Bilirubin NEGATIVE  NEG      Blood NEGATIVE  NEG      Urobilinogen 0.2 0.2 - 1.0 EU/dL    Nitrites NEGATIVE  NEG      Leukocyte Esterase NEGATIVE  NEG     URINE MICROSCOPIC ONLY    Collection Time: 01/03/20  1:00 AM   Result Value Ref Range    WBC 0 to 2 0 - 4 /hpf    RBC NEGATIVE  0 - 5 /hpf    Epithelial cells FEW 0 - 5 /lpf    Hyaline cast 0 to 1 0 - 2 /lpf   GLUCOSE, POC    Collection Time: 01/03/20  1:44 AM   Result Value Ref Range    Glucose (POC) >600 (HH) 70 - 110 mg/dL   GLUCOSTABILIZER    Collection Time: 01/03/20  1:49 AM   Result Value Ref Range    Glucose 604 mg/dL    Insulin order 27.2 units/hour    Insulin adminstered 27.2 units/hour    Multiplier 0.050     Low target 140 mg/dL    High target 180 mg/dL    D50 order 0.0 ml    D50 administered 0.00 ml    Minutes until next BG 60 min    Order initials SS     Administered initials SS    GLUCOSE, POC    Collection Time: 01/03/20  2:54 AM   Result Value Ref Range    Glucose (POC) 485 (HH) 70 - 110 mg/dL   GLUCOSTABILIZER    Collection Time: 01/03/20  2:55 AM   Result Value Ref Range    Glucose 485 mg/dL    Insulin order 17.0 units/hour    Insulin adminstered 17.0 units/hour    Multiplier 0.040     Low target 140 mg/dL    High target 180 mg/dL    D50 order 0.0 ml    D50 administered 0.00 ml    Minutes until next BG 60 min    Order initials SS     Administered initials SS            No results for input(s): FIO2I, IFO2, HCO3I, IHCO3, HCOPOC, PCO2I, PCOPOC, IPHI, PHI, PHPOC, PO2I, PO2POC in the last 72 hours. No lab exists for component: IPOC2    Telemetry:normal sinus rhythm    Imaging:  I have personally reviewed the patients radiographs and have reviewed the reports:    XR Results (most recent):  Results from Hospital Encounter encounter on 12/24/18   XR CHEST PORT    Narrative PORTABLE CHEST RADIOGRAPH     CPT CODE: 94220     INDICATION: Malaise, hyperglycemia. COMPARISON: 5/23/2006. FINDINGS:    Frontal view of the chest obtained at 0035 hours. The cardiomediastinal  silhouette is within normal limits. Mild central venous congestion. .  There is  no evidence of focal consolidation, pleural effusion or pneumothorax. Impression IMPRESSION:    Mild central venous congestion. CT Results (most recent):  Results from Hospital Encounter encounter on 01/02/20   CT ABD PELV WO CONT    Narrative CT Abdomen And Pelvis with Intravenous Contrast    INDICATION: Generalized abdominal pain. Elevated lipase. .    TECHNIQUE: 5 mm collimation axial images obtained from the diaphragm to the  level of the pubic symphysis following the uneventful administration of  100 cc  of low osmolar, nonionic intravenous contrast.    Dose reduction techniques used: Automated exposure control, adjustment of the  mAs and/or kVp according to patient size, standardized low-dose protocol, and/or  iterative reconstruction technique. COMPARISON: July 18, 2017. ABDOMEN FINDINGS:    Lung Bases: There is bibasilar atelectasis. Atelectasis is most conspicuous in  the lingula. The heart is top normal in size. .    Liver: Normal attenuation. No evidence for mass. Gallbladder: Present and appears normal. No biliary ductal dilatation. Pancreas: There is mild peripancreatic edema. .    Spleen: Normal in size. No evidence of mass. .    Adrenal Glands: No evidence for mass. Kidneys:     Right: Atrophic kidneys.   No cortical mass. No hydronephrosis. Left:  Atrophic kidneys. No cortical mass. No hydronephrosis. Lymph Nodes: No lymphadenopathy. Aorta: Atherosclerosis. PELVIS FINDINGS:     Bowel: Small Bowel: Normal in caliber with normal wall thickness. Large Bowel: There are scattered colonic diverticula. Louie Doroteo Appendix: No secondary signs of acute appendicitis. Bladder: Underdistended. Extensive bilateral pelvic masses with calcification, likely fibroid uterus. No  definite suspicious adnexal mass. No ascites. Bones: Degenerative changes of the spine. Osteopenia. Impression Impression:    Mild uncomplicated pancreatitis. Fibroid uterus. Additional incidentals as above. Ck Mohan PA-C  01/03/20  Pulmonary, Critical Care Medicine  [address] [place] [address] [facility]         Late entry for date of service 1/3/2020: I saw and evaluated the patient, performing the key elements of the service. I discussed the findings, assessment and plan with the PA and agree with the PA's findings and plan as documented in the PA's note. All edits and changes made above or are mentioned in my addendum. Total of 79 min critical care time spent at bedside during the course of care providing evaluation,management and care decisions and ordering appropriate treatment related to critical care problems exclusive of procedures. The reason for providing this level of medical care for this critically ill patient was due a critical illness that impaired one or more vital organ systems such that there was a high probability of imminent or life threatening deterioration in the patients condition. This care involved high complexity decision making to assess, manipulate, and support vital system functions, to treat this degree vital organ system failure and to prevent further life threatening deterioration of the patients condition.     79-year-old female with a past medical history of diabetes, presented to the [facility]"" ER for symptoms of lethargy and weakness, along with high blood sugar. While in the ER, patient was found to have a very elevated blood glucose, serum glucose initially was 935, with mild elevated anion gap, secondary to lactic acidosis, so hyperglycemia was secondary to nonketotic hyperosmolar hyperglycemia. Further work-up revealed patient had elevated lipase of 24,000. Beta hydroxybutyrate was 0.11, lactic acid initially was 3, trended up to 5, repeated this evening down to 1. Family notes that the patient's mental status was a bit limited, although patient is sleepy, able to answer questions appropriately, denies any abdominal pain on abdominal exam which is a bit odd. Started patient on aggressive IV fluids, along with IV insulin. This morning since patient not have an anion gap, glycemic control was consulted and Lantus was given, and then transitioned off of insulin drip. Multiple phone calls had to be made to the ER nurse, as she was not obtaining labs on a consistent basis, difficult to obtain repeat labs. Will trend lipase, if improves, will switch patient from dextrose fluid to p.o. Given patient's age and pancreatitis, she is at a high risk for decompensation however patient is not on vasopressors, so I reevaluated the patient again and transferred the patient to stepdown. This was also discussed with the hospitalist service. Nephrology consulted in the ER, they will monitor electrolytes and adjust IV fluids. Also concerns for contrast-induced nephropathy given patient received IV contrast.  CT scan shows mild uncomplicated pancreatitis. Continue supportive care. Of note patient's hemoglobin A1c is 14.2    Prognosis is guarded given pancreatitis in the setting of advanced age and burden of chronic disease (uncontrolled diabetes and its sequela).       Donell Meyer MD/MPH     Pulmonary, Critical Care Medicine  [facility]

## 2020-01-03 NOTE — ED TRIAGE NOTES
Patient arrived via EMS with c/o high blood sugar. Patient is oriented to person and place. Patient denies any complaints. Patient denies any at home care for her blood sugar.

## 2020-01-03 NOTE — PROGRESS NOTES
[facility]  [facility]    Patient: Rigoberto Conley Age: [de-identified] y.o. : 1940 MR#: [id number] SSN: [id number]  Date/Time: 1/3/2020     Subjective:     Patient seen and evaluated, lying in bed, NAD, sleepy, niece at bedside        Assessment/Plan:     1. Type 2 diabetes  uncontrolled with HHS and anion gap acidosis-was initially started on insulin drip however her numbers appear to be better now and she is currently on Lantus and sliding scale, seen by diabetic educator in the ER and will be transferred to telemetry. It appears that the patient has been noncompliant with her insulin. 2. Acute on chronic renal failure Stage 3 likely secondary to hyperglycemia, IV fluids, nephrology consulted. 3. Electrolyte abnormalities secondary to hyperglycemia. 4. Acute pancreatitis lipase 24K -n.p.o., IV fluidswe will monitor  DVT prophylaxisHeparin  Full code          Case discussed with:  []Patient  [x]Family  []Nursing  []Case Management  DVT Prophylaxis:  []Lovenox  [x]Hep SQ  []SCDs  []Coumadin   []On Heparin gtt    Objective:   VS:   Visit Vitals  /83 (BP 1 Location: Right arm, BP Patient Position: At rest)   Pulse (!) 106   Temp 97.6 °F (36.4 °C)   Resp 21   Wt 68.9 kg (151 lb 14.4 oz)   SpO2 97%   BMI 25.28 kg/m²      Tmax/24hrs: Temp (24hrs), Av.8 °F (36.6 °C), Min:97.1 °F (36.2 °C), Max:98.8 °F (37.1 °C)  IOBRIEF    Intake/Output Summary (Last 24 hours) at 1/3/2020 1401  Last data filed at 1/3/2020 0858  Gross per 24 hour   Intake 537.5 ml   Output    Net 537.5 ml       General:  Alert, cooperative, no acute distress    HEENT: PERRLA, anicteric sclerae. Pulmonary:  CTA Bilaterally. No Wheezing/Rhonchi/Rales. Cardiovascular: Regular rate and Rhythm. GI:  Soft, Non distended, Non tender. + Bowel sounds. Extremities:  No edema, cyanosis, clubbing. No calf tenderness.    Neurologic: Sleepy but arousable  Additional:    Medications:   Current Facility-Administered Medications   Medication Dose Route Frequency    heparin (porcine) injection 5,000 Units  5,000 Units SubCUTAneous Q8H    famotidine (PF) (PEPCID) 20 mg in 0.9% sodium chloride 10 mL injection  20 mg IntraVENous Q48H    insulin lispro (HUMALOG) injection   SubCUTAneous Q6H    dextrose (D50W) injection syrg 12.5-25 g  25-50 mL IntraVENous PRN    sodium bicarbonate (8.4%) 50 mEq in dextrose 5% 1,000 mL infusion   IntraVENous CONTINUOUS    glucose chewable tablet 16 g  4 Tab Oral PRN    glucagon (GLUCAGEN) injection 1 mg  1 mg IntraMUSCular PRN    dextrose (D50W) injection syrg 12.5-25 g  25-50 mL IntraVENous PRN    insulin regular (NOVOLIN,HUMULIN) 100 units/100 ml NS infusion (premix)  0-50 Units/hr IntraVENous TITRATE     Current Outpatient Medications   Medication Sig    hydroCHLOROthiazide (HYDRODIURIL) 25 mg tablet Take 25 mg by mouth daily.  loratadine (CLARITIN) 10 mg tablet Take 10 mg by mouth daily.  losartan (COZAAR) 100 mg tablet Take 100 mg by mouth daily.  glipiZIDE (GLUCOTROL) 5 mg tablet Take 5 mg by mouth two (2) times a day.  metFORMIN (GLUCOPHAGE) 500 mg tablet Take 500 mg by mouth daily (with breakfast).  oxyCODONE-acetaminophen (PERCOCET) 5-325 mg per tablet Take 1 Tab by mouth every six (6) hours as needed. Max Daily Amount: 4 Tabs.  insulin lispro (HUMALOG) 100 unit/mL injection Less than 150 =   0 units  150 -199 =   3 units  200 -249 =   6 units  250 -299 =   9 units  300 -349 =   12 units  350 and above =   15 units, CALL MD    insulin glargine (LANTUS) 100 unit/mL injection 30 Units by SubCUTAneous route daily.  amLODIPine (NORVASC) 5 mg tablet Take 1 Tab by mouth daily.  albuterol-ipratropium (DUO-NEB) 2.5 mg-0.5 mg/3 ml nebu 3 mL by Nebulization route every six (6) hours as needed (wheezing).  acetaminophen (TYLENOL) 325 mg tablet Take 2 Tabs by mouth every six (6) hours as needed.  OTHER REPEAT CBC W/ DIFF, CMP, Mg in 1-2 days.  cholecalciferol, vitamin D3, (VITAMIN D3) 2,000 unit tab Take  by mouth.  atorvastatin (LIPITOR) 20 mg tablet Take  by mouth daily.  aspirin 81 mg chewable tablet Take 81 mg by mouth two (2) times a day. Labs:    Recent Results (from the past 48 hour(s))   EKG, 12 LEAD, INITIAL    Collection Time: 01/02/20  8:25 PM   Result Value Ref Range    Ventricular Rate 83 BPM    Atrial Rate 83 BPM    P-R Interval 140 ms    QRS Duration 98 ms    Q-T Interval 378 ms    QTC Calculation (Bezet) 444 ms    Calculated P Axis 55 degrees    Calculated R Axis -14 degrees    Calculated T Axis 47 degrees    Diagnosis       Normal sinus rhythm  Cannot exclude anterior MI versus lead placement issue. Abnormal ECG  When compared with ECG of 24-DEC-2018 22:01,  Nonspecific T wave abnormality no longer evident in Inferior leads  Confirmed by Julia Goodman MD, Sammie Mcghee ([id number]) on 1/3/2020 8:46:15 AM     POC LACTIC ACID    Collection Time: 01/02/20  9:14 PM   Result Value Ref Range    Lactic Acid (POC) 3.00 (HH) 0.40 - 2.00 mmol/L   HEMOGLOBIN A1C WITH EAG    Collection Time: 01/02/20  9:14 PM   Result Value Ref Range    Hemoglobin A1c 14.2 (H) 4.2 - 5.6 %    Est. average glucose 361 mg/dL   CBC WITH AUTOMATED DIFF    Collection Time: 01/02/20  9:16 PM   Result Value Ref Range    WBC 8.8 4.6 - 13.2 K/uL    RBC 4.11 (L) 4.20 - 5.30 M/uL    HGB 13.4 12.0 - 16.0 g/dL    HCT 41.8 35.0 - 45.0 %    .7 (H) 74.0 - 97.0 FL    MCH 32.6 24.0 - 34.0 PG    MCHC 32.1 31.0 - 37.0 g/dL    RDW 14.2 11.6 - 14.5 %    PLATELET 235 588 - 607 K/uL    MPV 12.9 (H) 9.2 - 11.8 FL    NEUTROPHILS 89 (H) 40 - 73 %    LYMPHOCYTES 7 (L) 21 - 52 %    MONOCYTES 4 3 - 10 %    EOSINOPHILS 0 0 - 5 %    BASOPHILS 0 0 - 2 %    ABS. NEUTROPHILS 7.8 1.8 - 8.0 K/UL    ABS. LYMPHOCYTES 0.6 (L) 0.9 - 3.6 K/UL    ABS. MONOCYTES 0.3 0.05 - 1.2 K/UL    ABS. EOSINOPHILS 0.0 0.0 - 0.4 K/UL    ABS.  BASOPHILS 0.0 0.0 - 0.1 K/UL    DF AUTOMATED     GLUCOSE, POC    Collection Time: 01/02/20  9:52 PM   Result Value Ref Range    Glucose (POC) >600 (HH) 70 - 110 mg/dL   CULTURE, BLOOD    Collection Time: 01/02/20 10:06 PM   Result Value Ref Range    Special Requests: NO SPECIAL REQUESTS      Culture result: NO GROWTH AFTER 8 HOURS     CULTURE, BLOOD    Collection Time: 01/02/20 10:06 PM   Result Value Ref Range    Special Requests: NO SPECIAL REQUESTS      Culture result: NO GROWTH AFTER 8 HOURS     GLUCOSTABILIZER    Collection Time: 01/02/20 10:12 PM   Result Value Ref Range    Glucose 600 mg/dL    Insulin order 10.8 units/hour    Insulin adminstered 10.8 units/hour    Multiplier 0.020     Low target 140 mg/dL    High target 180 mg/dL    D50 order 0.0 ml    D50 administered 0.00 ml    Minutes until next BG 60 min    Order initials SS     Administered initials SS    GLUCOSE, POC    Collection Time: 01/02/20 11:17 PM   Result Value Ref Range    Glucose (POC) >600 (HH) 70 - 110 mg/dL   GLUCOSTABILIZER    Collection Time: 01/02/20 11:22 PM   Result Value Ref Range    Glucose 602 mg/dL    Insulin order 16.3 units/hour    Insulin adminstered 16.3 units/hour    Multiplier 0.030     Low target 140 mg/dL    High target 180 mg/dL    D50 order 0.0 ml    D50 administered 0.00 ml    Minutes until next BG 60 min    Order initials SS     Administered initials SS    BETA-HYDROXYBUTYRATE    Collection Time: 01/02/20 11:25 PM   Result Value Ref Range    B-hydroxybutyrate 0.11 <0.40 mmol/L   LIPASE    Collection Time: 01/02/20 11:25 PM   Result Value Ref Range    Lipase 24,474 (H) 73 - 393 U/L   MAGNESIUM    Collection Time: 01/02/20 11:25 PM   Result Value Ref Range    Magnesium 3.5 (H) 1.6 - 2.6 mg/dL   METABOLIC PANEL, COMPREHENSIVE    Collection Time: 01/02/20 11:25 PM   Result Value Ref Range    Sodium 139 136 - 145 mmol/L    Potassium 5.6 (H) 3.5 - 5.5 mmol/L    Chloride 109 100 - 111 mmol/L    CO2 16 (L) 21 - 32 mmol/L    Anion gap 14 3.0 - 18 mmol/L    Glucose 935 (HH) 74 - 99 mg/dL    BUN 95 (H) 7.0 - 18 MG/DL    Creatinine 4.70 (H) 0.6 - 1.3 MG/DL    BUN/Creatinine ratio 20 12 - 20      GFR est AA 11 (L) >60 ml/min/1.73m2    GFR est non-AA 9 (L) >60 ml/min/1.73m2    Calcium 9.2 8.5 - 10.1 MG/DL    Bilirubin, total 0.3 0.2 - 1.0 MG/DL    ALT (SGPT) 22 13 - 56 U/L    AST (SGOT) 12 10 - 38 U/L    Alk.  phosphatase 109 45 - 117 U/L    Protein, total 8.1 6.4 - 8.2 g/dL    Albumin 3.2 (L) 3.4 - 5.0 g/dL    Globulin 4.9 (H) 2.0 - 4.0 g/dL    A-G Ratio 0.7 (L) 0.8 - 1.7     TROPONIN I    Collection Time: 01/02/20 11:25 PM   Result Value Ref Range    Troponin-I, QT <0.02 0.0 - 0.045 NG/ML   PHOSPHORUS    Collection Time: 01/02/20 11:25 PM   Result Value Ref Range    Phosphorus 6.6 (H) 2.5 - 4.9 MG/DL   POC CHEM8    Collection Time: 01/02/20 11:32 PM   Result Value Ref Range    CO2, POC 16 (L) 19 - 24 MMOL/L    Glucose, POC >700 (HH) 74 - 106 MG/DL    BUN, POC 81 (H) 7 - 18 MG/DL    Creatinine, POC 4.5 (H) 0.6 - 1.3 MG/DL    GFRAA, POC 11 (L) >60 ml/min/1.73m2    GFRNA, POC 9 (L) >60 ml/min/1.73m2    Sodium,  136 - 145 MMOL/L    Potassium, POC 5.6 (H) 3.5 - 5.5 MMOL/L    Calcium, ionized (POC) 1.08 (L) 1.12 - 1.32 mmol/L    Chloride,  (H) 100 - 108 MMOL/L    Anion gap, POC 18 10 - 20      Hematocrit, POC 38 36 - 49 %    Hemoglobin, POC 12.9 12 - 16 G/DL   POC LACTIC ACID    Collection Time: 01/03/20 12:31 AM   Result Value Ref Range    Lactic Acid (POC) 4.87 (HH) 0.40 - 2.00 mmol/L   GLUCOSE, POC    Collection Time: 01/03/20 12:32 AM   Result Value Ref Range    Glucose (POC) >600 (HH) 70 - 110 mg/dL   GLUCOSTABILIZER    Collection Time: 01/03/20 12:33 AM   Result Value Ref Range    Glucose 603 mg/dL    Insulin order 21.7 units/hour    Insulin adminstered 21.7 units/hour    Multiplier 0.040     Low target 140 mg/dL    High target 180 mg/dL    D50 order 0.0 ml    D50 administered 0.00 ml    Minutes until next BG 60 min    Order initials SS     Administered initials SS    URINALYSIS W/ RFLX MICROSCOPIC Collection Time: 01/03/20  1:00 AM   Result Value Ref Range    Color YELLOW      Appearance CLOUDY      Specific gravity 1.024 1.005 - 1.030      pH (UA) 5.0 5.0 - 8.0      Protein TRACE (A) NEG mg/dL    Glucose >1,000 (A) NEG mg/dL    Ketone NEGATIVE  NEG mg/dL    Bilirubin NEGATIVE  NEG      Blood NEGATIVE  NEG      Urobilinogen 0.2 0.2 - 1.0 EU/dL    Nitrites NEGATIVE  NEG      Leukocyte Esterase NEGATIVE  NEG     URINE MICROSCOPIC ONLY    Collection Time: 01/03/20  1:00 AM   Result Value Ref Range    WBC 0 to 2 0 - 4 /hpf    RBC NEGATIVE  0 - 5 /hpf    Epithelial cells FEW 0 - 5 /lpf    Hyaline cast 0 to 1 0 - 2 /lpf   GLUCOSE, POC    Collection Time: 01/03/20  1:44 AM   Result Value Ref Range    Glucose (POC) >600 (HH) 70 - 110 mg/dL   GLUCOSTABILIZER    Collection Time: 01/03/20  1:49 AM   Result Value Ref Range    Glucose 604 mg/dL    Insulin order 27.2 units/hour    Insulin adminstered 27.2 units/hour    Multiplier 0.050     Low target 140 mg/dL    High target 180 mg/dL    D50 order 0.0 ml    D50 administered 0.00 ml    Minutes until next BG 60 min    Order initials SS     Administered initials SS    GLUCOSE, POC    Collection Time: 01/03/20  2:54 AM   Result Value Ref Range    Glucose (POC) 485 (HH) 70 - 110 mg/dL   GLUCOSTABILIZER    Collection Time: 01/03/20  2:55 AM   Result Value Ref Range    Glucose 485 mg/dL    Insulin order 17.0 units/hour    Insulin adminstered 17.0 units/hour    Multiplier 0.040     Low target 140 mg/dL    High target 180 mg/dL    D50 order 0.0 ml    D50 administered 0.00 ml    Minutes until next BG 60 min    Order initials SS     Administered initials SS    GLUCOSE, POC    Collection Time: 01/03/20  3:58 AM   Result Value Ref Range    Glucose (POC) 390 (H) 70 - 110 mg/dL   GLUCOSTABILIZER    Collection Time: 01/03/20  3:58 AM   Result Value Ref Range    Glucose 390 mg/dL    Insulin order 9.9 units/hour    Insulin adminstered 9.9 units/hour    Multiplier 0.030     Low target 140 mg/dL    High target 180 mg/dL    D50 order 0.0 ml    D50 administered 0.00 ml    Minutes until next BG 60 min    Order initials SS     Administered initials SS    METABOLIC PANEL, BASIC    Collection Time: 01/03/20  4:30 AM   Result Value Ref Range    Sodium 151 (H) 136 - 145 mmol/L    Potassium 4.2 3.5 - 5.5 mmol/L    Chloride 124 (H) 100 - 111 mmol/L    CO2 16 (L) 21 - 32 mmol/L    Anion gap 11 3.0 - 18 mmol/L    Glucose 344 (H) 74 - 99 mg/dL    BUN 92 (H) 7.0 - 18 MG/DL    Creatinine 4.21 (H) 0.6 - 1.3 MG/DL    BUN/Creatinine ratio 22 (H) 12 - 20      GFR est AA 12 (L) >60 ml/min/1.73m2    GFR est non-AA 10 (L) >60 ml/min/1.73m2    Calcium 8.8 8.5 - 10.1 MG/DL   MAGNESIUM    Collection Time: 01/03/20  4:30 AM   Result Value Ref Range    Magnesium 3.2 (H) 1.6 - 2.6 mg/dL   PHOSPHORUS    Collection Time: 01/03/20  4:30 AM   Result Value Ref Range    Phosphorus 2.8 2.5 - 4.9 MG/DL   POC LACTIC ACID    Collection Time: 01/03/20  4:35 AM   Result Value Ref Range    Lactic Acid (POC) 4.94 (HH) 0.40 - 2.00 mmol/L   GLUCOSE, POC    Collection Time: 01/03/20  5:02 AM   Result Value Ref Range    Glucose (POC) 294 (H) 70 - 110 mg/dL   GLUCOSTABILIZER    Collection Time: 01/03/20  5:03 AM   Result Value Ref Range    Glucose 294 mg/dL    Insulin order 4.7 units/hour    Insulin adminstered 4.7 units/hour    Multiplier 0.020     Low target 140 mg/dL    High target 180 mg/dL    D50 order 0.0 ml    D50 administered 0.00 ml    Minutes until next BG 60 min    Order initials SS     Administered initials SS    GLUCOSE, POC    Collection Time: 01/03/20  6:05 AM   Result Value Ref Range    Glucose (POC) 193 (H) 70 - 110 mg/dL   GLUCOSTABILIZER    Collection Time: 01/03/20  6:05 AM   Result Value Ref Range    Glucose 193 mg/dL    Insulin order 1.3 units/hour    Insulin adminstered 1.3 units/hour    Multiplier 0.010     Low target 140 mg/dL    High target 180 mg/dL    D50 order 0.0 ml    D50 administered 0.00 ml    Minutes until next BG 60 min    Order initials SS     Administered initials SS    GLUCOSE, POC    Collection Time: 01/03/20  7:10 AM   Result Value Ref Range    Glucose (POC) 161 (H) 70 - 110 mg/dL   GLUCOSTABILIZER    Collection Time: 01/03/20  7:11 AM   Result Value Ref Range    Glucose 161 mg/dL    Insulin order 1.0 units/hour    Insulin adminstered 1.0 units/hour    Multiplier 0.010     Low target 140 mg/dL    High target 180 mg/dL    D50 order 0.0 ml    D50 administered 0.00 ml    Minutes until next BG 60 min    Order initials SS     Administered initials SS    GLUCOSE, POC    Collection Time: 01/03/20  8:27 AM   Result Value Ref Range    Glucose (POC) 194 (H) 70 - 110 mg/dL   GLUCOSTABILIZER    Collection Time: 01/03/20  8:28 AM   Result Value Ref Range    Glucose 194 mg/dL    Insulin order 2.7 units/hour    Insulin adminstered 2.7 units/hour    Multiplier 0.020     Low target 140 mg/dL    High target 180 mg/dL    D50 order 0.0 ml    D50 administered 0.00 ml    Minutes until next BG 60 min    Order initials SS     Administered initials SS    GLUCOSE, POC    Collection Time: 01/03/20  9:53 AM   Result Value Ref Range    Glucose (POC) 208 (H) 70 - 110 mg/dL   GLUCOSTABILIZER    Collection Time: 01/03/20  9:58 AM   Result Value Ref Range    Glucose 208 mg/dL    Insulin order 4.4 units/hour    Insulin adminstered 4.4 units/hour    Multiplier 0.030     Low target 140 mg/dL    High target 180 mg/dL    D50 order 0.0 ml    D50 administered 0.00 ml    Minutes until next BG 60 min    Order initials rdc     Administered initials rdc    GLUCOSE, POC    Collection Time: 01/03/20 10:59 AM   Result Value Ref Range    Glucose (POC) 221 (H) 70 - 110 mg/dL   GLUCOSTABILIZER    Collection Time: 01/03/20 11:02 AM   Result Value Ref Range    Glucose 221 mg/dL    Insulin order 6.4 units/hour    Insulin adminstered 6.4 units/hour    Multiplier 0.040     Low target 140 mg/dL    High target 180 mg/dL    D50 order 0.0 ml    D50 administered 0.00 ml Minutes until next BG 60 min    Order initials rdc     Administered initials rdc    GLUCOSE, POC    Collection Time: 01/03/20 12:09 PM   Result Value Ref Range    Glucose (POC) 231 (H) 70 - 110 mg/dL       Signed By: Reggie Mackenzie MD     January 3, 2020      Disclaimer: Sections of this note are dictated using utilizing voice recognition software. Minor typographical errors may be present. If questions arise, please do not hesitate to contact me or call our department.

## 2020-01-03 NOTE — DIABETES MGMT
Glycemic Control Plan of Care    Reattempted to speak to patient this afternoon but she remain sleepy. T2DM with current A1c of 14.2% (02/02/2020). Pending assessment of home diabetes management and educational needs when patient is able to stay awake. Noted her niece, Roberta Aguayo, came to ED but she was gone by the time I was able to meet/speak to her. No phone number available for contact at this time. Home diabetes medications: Patient remain sleepy therefore unable to verify the following list of home diabetes medications on 01/03/2019:  Glipizide 5 mg BID. Metformin 500 mg daily with breakfast  Humalog sliding scale insulin   Lantus insulin 30 units daily. 01/03/2020: Transitioned from regular insulin drip via GlucoStabilizer to SC insulin. Rhetta Hussar was discontinued after lantus insulin 20 units was given. Called and discussed daily lantus insulin rec with Dr. Zeus Stewart. Recommendation(s):  1.) Basal lantus insulin 20 units daily starting 01/04/2020. Titrate dose to 30 units daily if BG remain above target range. Assessment:  Patient is [de-identified]year old with past medical history including diabetes mellitus, hypertension and hypercholesterolemia - was admitted to ED on 01/03/2020 with report of high blood sugar and high lipase. Noted:  HHS. T2DM. Acute pancreatitis. Acute on chronic renal failure stage 3. Most recent blood glucose values:    Results for Joy Bobby (MRN [id number]) as of 1/3/2020 14:29   Ref.  Range 1/3/2020 00:32 1/3/2020 01:44 1/3/2020 02:54 1/3/2020 03:58 1/3/2020 04:35 1/3/2020 05:02 1/3/2020 06:05 1/3/2020 07:10 1/3/2020 08:27 1/3/2020 09:53 1/3/2020 10:59 1/3/2020 12:09   GLUCOSE,FAST - POC Latest Ref Range: 70 - 110 mg/dL >600 (HH) >600 (HH) 485 (HH) 390 (H)  294 (H) 193 (H) 161 (H) 194 (H) 208 (H) 221 (H) 231 (H)     Current A1C: 14.2% (02/02/2020) which is equivalent to estimated average blood glucose of 361 mg/dL during the past 2-3 months. Current hospital diabetes medications:  Correctional lispro insulin every 6 hours. Very resistant dose. Total daily dose insulin requirement previous day: pt admitted 01/03/2020 (ED). Diet: NPO.     Goals:  Blood glucose will be within target range of  mg/dL by 01/06/2020    Education:  Pending assessment of home diabetes management and educational needs when patient is able to stay awake.   ___  Refer to Diabetes Education Record  ___  Education not indicated at this time    Kain Duran RN [facility]  Pager: [phone]

## 2020-01-03 NOTE — ED NOTES
Delayed administration of lantus due to none in pyxis, pharmacy called made aware states will bring some.  Pt remains stable on insulin drip will continue to monitor

## 2020-01-03 NOTE — DIABETES MGMT
GLYCEMIC CONTROL:     Seen patient briefly in ED. Pt falls asleep when I attempted to speak to her therefore unable to obtain/verify home diabetes meds she was taking at home. No family members or home caregiver available at bedside. Recommendation(s): transition from regular insulin drip via GlucoStabilizer to SC insulin when labs, insulin drip and BG values remain stable. Noted order entered for lantus insulin 20 units x1 dose. Discussed with JUNE Blount, to d/c regular insulin drip via GlucoStabilizer two hours after first dose of lantus insulin.     Marcia Smith RN [facility]  Pager: [phone]

## 2020-01-03 NOTE — ED PROVIDER NOTES
EMERGENCY DEPARTMENT HISTORY AND PHYSICAL EXAM    11:01 PM      Date: 1/2/2020  Patient Name: Ryder Carty    History of Presenting Illness     Chief Complaint   Patient presents with    High Blood Sugar         History Provided By: Patient and EMS  Location/Duration/Severity/Modifying factors   [de-identified] y/o F with generalized weakness and AMS- somnolence. Family called EMS and accucheck was >600. Patient manages her own medication but often forgets and has refused to be on insulin during past admissions. Only takes glipizide and metformin. No n/v.          PCP: Erica Ye MD    Current Facility-Administered Medications   Medication Dose Route Frequency Provider Last Rate Last Dose    sodium chloride 0.9 % bolus infusion 1,000 mL  1,000 mL IntraVENous ONCE Homar Brooke MD        glucose chewable tablet 16 g  4 Tab Oral PRN Homar Brooke MD        glucagon (GLUCAGEN) injection 1 mg  1 mg IntraMUSCular PRN La Cassidy MD        dextrose (D50W) injection syrg 12.5-25 g  25-50 mL IntraVENous PRN Homra Villatoro MD        insulin regular (NOVOLIN,HUMULIN) 100 units/100 ml NS infusion (premix)  0-50 Units/hr IntraVENous TITRATE Breann Villatoro MD 10.8 mL/hr at 01/02/20 2204 10.8 Units/hr at 01/02/20 2204     Current Outpatient Medications   Medication Sig Dispense Refill    hydroCHLOROthiazide (HYDRODIURIL) 25 mg tablet Take 25 mg by mouth daily.  loratadine (CLARITIN) 10 mg tablet Take 10 mg by mouth daily.  losartan (COZAAR) 100 mg tablet Take 100 mg by mouth daily.  glipiZIDE (GLUCOTROL) 5 mg tablet Take 5 mg by mouth two (2) times a day.  metFORMIN (GLUCOPHAGE) 500 mg tablet Take 500 mg by mouth daily (with breakfast).  oxyCODONE-acetaminophen (PERCOCET) 5-325 mg per tablet Take 1 Tab by mouth every six (6) hours as needed. Max Daily Amount: 4 Tabs.  2 Tab 0    insulin lispro (HUMALOG) 100 unit/mL injection Less than 150 =   0 units  150 -199 =   3 units  200 -249 =   6 units  250 -299 =   9 units  300 -349 =   12 units  350 and above =   15 units, CALL MD 1 Vial 0    insulin glargine (LANTUS) 100 unit/mL injection 30 Units by SubCUTAneous route daily. 1 Vial 0    amLODIPine (NORVASC) 5 mg tablet Take 1 Tab by mouth daily. 30 Tab 0    albuterol-ipratropium (DUO-NEB) 2.5 mg-0.5 mg/3 ml nebu 3 mL by Nebulization route every six (6) hours as needed (wheezing). 30 Nebule 0    acetaminophen (TYLENOL) 325 mg tablet Take 2 Tabs by mouth every six (6) hours as needed. 30 Tab 0    OTHER REPEAT CBC W/ DIFF, CMP, Mg in 1-2 days. 1 mL 0    cholecalciferol, vitamin D3, (VITAMIN D3) 2,000 unit tab Take  by mouth.  atorvastatin (LIPITOR) 20 mg tablet Take  by mouth daily.  aspirin 81 mg chewable tablet Take 81 mg by mouth two (2) times a day. Past History     Past Medical History:  Past Medical History:   Diagnosis Date    Diabetes ([address]     Hypercholesterolemia     Hypertension        Past Surgical History:  History reviewed. No pertinent surgical history. Family History:  History reviewed. No pertinent family history. Social History:  Social History     Tobacco Use    Smoking status: Former Smoker    Smokeless tobacco: Never Used   Substance Use Topics    Alcohol use: Not on file    Drug use: Not on file       Allergies:  No Known Allergies      Review of Systems     Review of Systems   Unable to perform ROS: Mental status change         Physical Exam     Visit Vitals  Wt 68.9 kg (151 lb 14.4 oz)   BMI 25.28 kg/m²       Physical Exam  Vitals signs and nursing note reviewed. Constitutional:       Appearance: She is ill-appearing. Comments: Somnolent   HENT:      Head: Normocephalic and atraumatic. Right Ear: External ear normal.      Left Ear: External ear normal.      Nose: Nose normal.      Mouth/Throat:      Mouth: Mucous membranes are dry.    Eyes:      Conjunctiva/sclera: Conjunctivae normal. Pupils: Pupils are equal, round, and reactive to light. Neck:      Musculoskeletal: Neck supple. Cardiovascular:      Rate and Rhythm: Normal rate and regular rhythm. Pulses: Normal pulses. Heart sounds: No murmur. Pulmonary:      Effort: Pulmonary effort is normal. No respiratory distress. Breath sounds: Normal breath sounds. Abdominal:      General: There is no distension. Palpations: Abdomen is soft. Tenderness: There is no tenderness. Musculoskeletal:         General: No swelling or tenderness. Skin:     General: Skin is warm and dry. Capillary Refill: Capillary refill takes less than 2 seconds. Neurological:      General: No focal deficit present. Cranial Nerves: No cranial nerve deficit. Motor: No weakness.       Comments: Oriented x 2, somnolent           Diagnostic Study Results     Labs -  Recent Results (from the past 12 hour(s))   EKG, 12 LEAD, INITIAL    Collection Time: 01/02/20  8:25 PM   Result Value Ref Range    Ventricular Rate 83 BPM    Atrial Rate 83 BPM    P-R Interval 140 ms    QRS Duration 98 ms    Q-T Interval 378 ms    QTC Calculation (Bezet) 444 ms    Calculated P Axis 55 degrees    Calculated R Axis -14 degrees    Calculated T Axis 47 degrees    Diagnosis       Normal sinus rhythm  Anterior infarct , age undetermined  Abnormal ECG  When compared with ECG of 24-DEC-2018 22:01,  ST elevation now present in Inferior leads  ST no longer depressed in Lateral leads  Nonspecific T wave abnormality no longer evident in Inferior leads     POC LACTIC ACID    Collection Time: 01/02/20  9:14 PM   Result Value Ref Range    Lactic Acid (POC) 3.00 (HH) 0.40 - 2.00 mmol/L   CBC WITH AUTOMATED DIFF    Collection Time: 01/02/20  9:16 PM   Result Value Ref Range    WBC 8.8 4.6 - 13.2 K/uL    RBC 4.11 (L) 4.20 - 5.30 M/uL    HGB 13.4 12.0 - 16.0 g/dL    HCT 41.8 35.0 - 45.0 %    .7 (H) 74.0 - 97.0 FL    MCH 32.6 24.0 - 34.0 PG    MCHC 32.1 31.0 - 37.0 g/dL    RDW 14.2 11.6 - 14.5 %    PLATELET 582 251 - 991 K/uL    MPV 12.9 (H) 9.2 - 11.8 FL    NEUTROPHILS 89 (H) 40 - 73 %    LYMPHOCYTES 7 (L) 21 - 52 %    MONOCYTES 4 3 - 10 %    EOSINOPHILS 0 0 - 5 %    BASOPHILS 0 0 - 2 %    ABS. NEUTROPHILS 7.8 1.8 - 8.0 K/UL    ABS. LYMPHOCYTES 0.6 (L) 0.9 - 3.6 K/UL    ABS. MONOCYTES 0.3 0.05 - 1.2 K/UL    ABS. EOSINOPHILS 0.0 0.0 - 0.4 K/UL    ABS. BASOPHILS 0.0 0.0 - 0.1 K/UL    DF AUTOMATED     GLUCOSE, POC    Collection Time: 01/02/20  9:52 PM   Result Value Ref Range    Glucose (POC) >600 (HH) 70 - 110 mg/dL   GLUCOSTABILIZER    Collection Time: 01/02/20 10:12 PM   Result Value Ref Range    Glucose 600 mg/dL    Insulin order 10.8 units/hour    Insulin adminstered 10.8 units/hour    Multiplier 0.020     Low target 140 mg/dL    High target 180 mg/dL    D50 order 0.0 ml    D50 administered 0.00 ml    Minutes until next BG 60 min    Order initials SS     Administered initials SS        Radiologic Studies -   XR CHEST PORT    (Results Pending)         Medical Decision Making   I am the first provider for this patient. I reviewed the vital signs, available nursing notes, past medical history, past surgical history, family history and social history. Vital Signs-Reviewed the patient's vital signs. EKG: NSR at rate 83, normal intervals, ST elevation in V1 and V2 consistent with early repolarization. Unchanged from previous    Records Reviewed: Nursing Notes, Old Medical Records, Previous electrocardiograms, Previous Radiology Studies and Previous Laboratory Studies (Time of Review: 11:01 PM)    ED Course: Progress Notes, Reevaluation, and Consults:         Provider Notes (Medical Decision Making):   MDM  [de-identified] y/o F with DKA and likely some component of HHS/dehydration. Patient ill-appearing and oriented x 2 with somnolence on arrival. More alert after 1L IVF but still altered and now complaining of abdominal pain. Lactate elevated at 3, no leukocytosis.  CXR, EKG unremarkable. UA ordered, Chem panel has been sent to lab 3 times and keeps hemolyzing. Istat with potassium 5.6 and glucose >700. Started on insulin drip. Chem panel finally resulted with  but no elevated anion gap. PH 7.1. Troponin negative. Lipase 24,474 and Cr 4.7 with BUN 95. Will get CT noncon of abd/pelvis and admit to ICU. HR high 90s/low 100s and BP stable during ED stay. Diagnosis     Clinical Impression:   1. Acute pancreatitis, unspecified complication status, unspecified pancreatitis type    2. Hyperglycemic hyperosmolar nonketotic coma ([facility] [address]    3. Acute renal failure, unspecified acute renal failure type ([facility] [address]        Disposition: Admitted    Follow-up Information    None          Patient's Medications   Start Taking    No medications on file   Continue Taking    ACETAMINOPHEN (TYLENOL) 325 MG TABLET    Take 2 Tabs by mouth every six (6) hours as needed. ALBUTEROL-IPRATROPIUM (DUO-NEB) 2.5 MG-0.5 MG/3 ML NEBU    3 mL by Nebulization route every six (6) hours as needed (wheezing). AMLODIPINE (NORVASC) 5 MG TABLET    Take 1 Tab by mouth daily. ASPIRIN 81 MG CHEWABLE TABLET    Take 81 mg by mouth two (2) times a day. ATORVASTATIN (LIPITOR) 20 MG TABLET    Take  by mouth daily. CHOLECALCIFEROL, VITAMIN D3, (VITAMIN D3) 2,000 UNIT TAB    Take  by mouth. GLIPIZIDE (GLUCOTROL) 5 MG TABLET    Take 5 mg by mouth two (2) times a day. HYDROCHLOROTHIAZIDE (HYDRODIURIL) 25 MG TABLET    Take 25 mg by mouth daily. INSULIN GLARGINE (LANTUS) 100 UNIT/ML INJECTION    30 Units by SubCUTAneous route daily. INSULIN LISPRO (HUMALOG) 100 UNIT/ML INJECTION    Less than 150 =   0 units  150 -199 =   3 units  200 -249 =   6 units  250 -299 =   9 units  300 -349 =   12 units  350 and above =   15 units, CALL MD    LORATADINE (CLARITIN) 10 MG TABLET    Take 10 mg by mouth daily. LOSARTAN (COZAAR) 100 MG TABLET    Take 100 mg by mouth daily.     METFORMIN (GLUCOPHAGE) 500 MG TABLET    Take 500 mg by mouth daily (with breakfast). OTHER    REPEAT CBC W/ DIFF, CMP, Mg in 1-2 days. OXYCODONE-ACETAMINOPHEN (PERCOCET) 5-325 MG PER TABLET    Take 1 Tab by mouth every six (6) hours as needed. Max Daily Amount: 4 Tabs. These Medications have changed    No medications on file   Stop Taking    No medications on file     Disclaimer: Sections of this note are dictated using utilizing voice recognition software. Minor typographical errors may be present. If questions arise, please do not hesitate to contact me or call our department.

## 2020-01-03 NOTE — PALLIATIVE CARE
PALLIATIVE MEDICINE NOTE      Palliative med team consisting of this author and Juan Jose Garzon RN, met with Pt and her niece, Marianela Napoles, at bedside in the ED. Briefly introduced our team and described our role in Pt's care. Pt was asleep and difficult to rouse. She only responded, \"I'm fine\" a few times when we asked how she is and if she knows where she is. Marianela Napoles is Pt's niece by marriage. Pt is  and doesn't have any children. She lives alone and was still driving and quite active \"running the streets\" prior to admission. Marianela Napoles was aware the Pt has diabetes which was dx'ed about 6 years ago, but she is unaware that Pt was supposed to be on insulin. \"I just knew about the pills, not that she was on the needle. \"  Pt wasn't \"one to ask for help and didn't want anyone in her house. \" She went on to state that Pt's house was recently more cluttered which isn't typical for her, so she may have been declining and struggling for awhile. Pt has several living siblings, though they're not very involved with Pt, and Marianela Napoles is closest to her. Pt does not have an AMD on file, so this team will meet with her Monday, 1/6, to see if she's alert enough to complete one. Provided our contact info to Marianela Napoles. She thanked us for our visit and concern. Thank you for the consult and the opportunity to assist in Ms. Thompson's care. We will cont to follow to provide support to Pt and family.     Julia Shipley, [facility]  Palliative Medicine

## 2020-01-03 NOTE — PROGRESS NOTES
Pt has been downgraded to Tele by ICU this morning   Pt seen & evaluated , discussed with family at bedside.   Continue Lantus & SSi   [facility] - Nephrology on board , continue IVF   Full note to follow

## 2020-01-03 NOTE — PROGRESS NOTES
NUTRITION    Nursing Referral: [facility]     RECOMMENDATIONS / PLAN:     - Monitor GI symptoms and readiness for diet initiation pending improvement in mentation.  - Continue IV fluids.  - Continue RD inpatient monitoring and evaluation. NUTRITION INTERVENTIONS & DIAGNOSIS:     - Meals/snacks: NPO  - IV fluids: sodium bicarbonate in D5 at 125 mL/hr     Nutrition Diagnosis: Inadequate oral intake related to altered GI function as evidenced by pt NPO with acute pancreatitis     ASSESSMENT:     Pancreatitis, hyperglycemia and acute on chronic renal failure. Per MD appears to be non-compliant with insulin, transitioned off insulin drip to lantus. Remains NPO on maintenance IVF with altered mentation and drowsy per chart review. Nutritional intake adequate to meet patients estimated nutritional needs:  No    Diet: DIET NPO      Food Allergies: NKFA  Current Appetite: Not Applicable - NPO  Appetite/meal intake prior to admission: Unable to determine at this time  Feeding Limitations:  [] Swallowing difficulty    [] Chewing difficulty    [] Other:  Current Meal Intake: No data found. BM: PTA  Skin Integrity: WDL  Edema:   [x] No     [] Yes   Pertinent Medications: Reviewed: famotidine, SSI, (lantus 20 units to start)    Recent Labs     01/03/20  1448 01/03/20  0430 01/02/20  2325   * 151* 139   K 4.9 4.2 5.6*   * 124* 109   CO2 19* 16* 16*   * 344* 935*   BUN 84* 92* 95*   CREA 3.27* 4.21* 4.70*   CA 8.9 8.8 9.2   MG 3.3* 3.2* 3.5*   PHOS 3.1 2.8 6.6*   ALB  --   --  3.2*   SGOT  --   --  12   ALT  --   --  22       Intake/Output Summary (Last 24 hours) at 1/3/2020 1528  Last data filed at 1/3/2020 1459  Gross per 24 hour   Intake 1837.5 ml   Output    Net 1837.5 ml       Anthropometrics:  Ht Readings from Last 1 Encounters:   07/10/17 5' 5\" (1.651 m)     Last 3 Recorded Weights in this Encounter    01/02/20 2149   Weight: 68.9 kg (151 lb 14.4 oz)     Body mass index is 25.28 kg/m².        Weight History:     Weight Metrics 1/2/2020 12/26/2018 7/10/2017   Weight 151 lb 14.4 oz 160 lb 9.6 oz 115 lb   BMI 25.28 kg/m2 26.73 kg/m2 19.14 kg/m2        Admitting Diagnosis: Pancreatitis [K85.90]  Hyperosmolar hyperglycemic coma due to diabetes mellitus without ketoacidosis ([facility] [address] [E11.01]  Pertinent PMHx: DM, HTN, hypercholesterolemia    Education Needs:        [x] None identified  [] Identified - Not appropriate at this time  []  Identified and addressed - refer to education log  Learning Limitations:   [] None identified  [x] Identified: confused; disoriented    Cultural, Advent & ethnic food preferences:  [x] None identified    [] Identified and addressed     ESTIMATED NUTRITION NEEDS:     Calories: 3235-7243 kcal (MSJx1.2-1.3) based on  [x] Actual BW: 69 kg      [] IBW   Protein:  gm (1.2-1.5 gm/kg) based on  [x] Actual BW      [] IBW   Fluid: 1 mL/kcal     MONITORING & EVALUATION:     Nutrition Goal(s):   - Nutritional needs will be met through adequate oral intake or nutrition support within the next 5-7 days. Outcome: New/Initial goal     Monitoring:   [x] Food and nutrient intake   [x] Food and nutrient administration  [x] Comparative standards   [x] Nutrition-focused physical findings   [x] Anthropometric Measurements   [x] Treatment/therapy   [x] Biochemical data, medical tests, and procedures        Previous Recommendations (for follow-up assessments only):  Not Applicable     Discharge Planning: Nutritional discharge needs unknown at this time. Participated in care planning, discharge planning, & interdisciplinary rounds as appropriate.       Monica Oconnell RD  Pager: [phone]

## 2020-01-03 NOTE — DIABETES MGMT
Glycemic Control Plan of Care    Reattempted to speak to patient this afternoon but she remain sleepy. T2DM with current A1c of 14.2% (02/02/2020). Pending assessment of home diabetes management and educational needs when patient is able to stay awake. Noted her niece, Felicia Multani, came to ED but she was gone by the time I was able to meet/speak to her. No phone number available for contact at this time. Home diabetes medications: Patient remain sleepy therefore unable to verify the following list of home diabetes medications on 01/03/2019:  Glipizide 5 mg BID. Metformin 500 mg daily with breakfast  Humalog sliding scale insulin   Lantus insulin 30 units daily. 01/03/2020: Transitioned from regular insulin drip via GlucoStabilizer to SC insulin. [employer] was discontinued after lantus insulin 20 units was given. Recommendation(s):  1.) Basal lantus insulin 20 units daily starting 01/04/2020. Titrate dose to 30 units daily if BG remain above target range. Assessment:  Patient is [de-identified]year old with past medical history including diabetes mellitus, hypertension and hypercholesterolemia - was admitted to ED on 01/03/2020 with report of high blood sugar and high lipase. Noted:  HHS. T2DM. Acute pancreatitis. Acute on chronic renal failure stage 3. Most recent blood glucose values:    Results for Emily Proctor (MRN [id number]) as of 1/3/2020 14:29   Ref. Range 1/3/2020 00:32 1/3/2020 01:44 1/3/2020 02:54 1/3/2020 03:58 1/3/2020 04:35 1/3/2020 05:02 1/3/2020 06:05 1/3/2020 07:10 1/3/2020 08:27 1/3/2020 09:53 1/3/2020 10:59 1/3/2020 12:09   GLUCOSE,FAST - POC Latest Ref Range: 70 - 110 mg/dL >600 (HH) >600 (HH) 485 (HH) 390 (H)  294 (H) 193 (H) 161 (H) 194 (H) 208 (H) 221 (H) 231 (H)     Current A1C: 14.2% (02/02/2020) which is equivalent to estimated average blood glucose of 361 mg/dL during the past 2-3 months.     Current hospital diabetes medications:  Correctional lispro insulin every 6 hours. Very resistant dose. Total daily dose insulin requirement previous day: pt admitted 01/03/2020 (ED). Diet: NPO.     Goals:  Blood glucose will be within target range of  mg/dL by 01/06/2020    Education:  Pending assessment of home diabetes management and educational needs when patient is able to stay awake.   ___  Refer to Diabetes Education Record  ___  Education not indicated at this time    Stella Marin RN [facility]  Pager: [phone]

## 2020-01-04 ENCOUNTER — APPOINTMENT (OUTPATIENT)
Dept: VASCULAR SURGERY | Age: 80
DRG: 616 | End: 2020-01-04
Attending: SURGERY
Payer: MEDICARE

## 2020-01-04 LAB
ALBUMIN SERPL-MCNC: 2.6 G/DL (ref 3.4–5)
ALBUMIN/GLOB SERPL: 0.7 {RATIO} (ref 0.8–1.7)
ALP SERPL-CCNC: 100 U/L (ref 45–117)
ALT SERPL-CCNC: 29 U/L (ref 13–56)
ANION GAP SERPL CALC-SCNC: 8 MMOL/L (ref 3–18)
AST SERPL-CCNC: 56 U/L (ref 10–38)
BASOPHILS # BLD: 0 K/UL (ref 0–0.1)
BASOPHILS NFR BLD: 0 % (ref 0–2)
BILIRUB DIRECT SERPL-MCNC: 0.1 MG/DL (ref 0–0.2)
BILIRUB SERPL-MCNC: 0.6 MG/DL (ref 0.2–1)
BUN SERPL-MCNC: 51 MG/DL (ref 7–18)
BUN SERPL-MCNC: 69 MG/DL (ref 7–18)
BUN SERPL-MCNC: 73 MG/DL (ref 7–18)
BUN/CREAT SERPL: 25 (ref 12–20)
BUN/CREAT SERPL: 26 (ref 12–20)
BUN/CREAT SERPL: 26 (ref 12–20)
CALCIUM SERPL-MCNC: 8 MG/DL (ref 8.5–10.1)
CALCIUM SERPL-MCNC: 8.1 MG/DL (ref 8.5–10.1)
CALCIUM SERPL-MCNC: 8.5 MG/DL (ref 8.5–10.1)
CHLORIDE SERPL-SCNC: 119 MMOL/L (ref 100–111)
CHLORIDE SERPL-SCNC: 119 MMOL/L (ref 100–111)
CHLORIDE SERPL-SCNC: 120 MMOL/L (ref 100–111)
CHOLEST SERPL-MCNC: 173 MG/DL
CO2 SERPL-SCNC: 21 MMOL/L (ref 21–32)
CO2 SERPL-SCNC: 21 MMOL/L (ref 21–32)
CO2 SERPL-SCNC: 23 MMOL/L (ref 21–32)
CREAT SERPL-MCNC: 2.02 MG/DL (ref 0.6–1.3)
CREAT SERPL-MCNC: 2.63 MG/DL (ref 0.6–1.3)
CREAT SERPL-MCNC: 2.78 MG/DL (ref 0.6–1.3)
DIFFERENTIAL METHOD BLD: ABNORMAL
EOSINOPHIL # BLD: 0.1 K/UL (ref 0–0.4)
EOSINOPHIL NFR BLD: 2 % (ref 0–5)
ERYTHROCYTE [DISTWIDTH] IN BLOOD BY AUTOMATED COUNT: 13.7 % (ref 11.6–14.5)
GLOBULIN SER CALC-MCNC: 3.8 G/DL (ref 2–4)
GLUCOSE BLD STRIP.AUTO-MCNC: 164 MG/DL (ref 70–110)
GLUCOSE BLD STRIP.AUTO-MCNC: 202 MG/DL (ref 70–110)
GLUCOSE BLD STRIP.AUTO-MCNC: 265 MG/DL (ref 70–110)
GLUCOSE BLD STRIP.AUTO-MCNC: 301 MG/DL (ref 70–110)
GLUCOSE BLD STRIP.AUTO-MCNC: 335 MG/DL (ref 70–110)
GLUCOSE SERPL-MCNC: 201 MG/DL (ref 74–99)
GLUCOSE SERPL-MCNC: 241 MG/DL (ref 74–99)
GLUCOSE SERPL-MCNC: 299 MG/DL (ref 74–99)
HCT VFR BLD AUTO: 31.6 % (ref 35–45)
HDLC SERPL-MCNC: 33 MG/DL (ref 40–60)
HDLC SERPL: 5.2 {RATIO} (ref 0–5)
HGB BLD-MCNC: 10.8 G/DL (ref 12–16)
LACTATE SERPL-SCNC: 1.4 MMOL/L (ref 0.4–2)
LACTATE SERPL-SCNC: 2.8 MMOL/L (ref 0.4–2)
LDLC SERPL CALC-MCNC: 109 MG/DL (ref 0–100)
LIPASE SERPL-CCNC: 4389 U/L (ref 73–393)
LIPID PROFILE,FLP: ABNORMAL
LYMPHOCYTES # BLD: 1.2 K/UL (ref 0.9–3.6)
LYMPHOCYTES NFR BLD: 19 % (ref 21–52)
MAGNESIUM SERPL-MCNC: 2.7 MG/DL (ref 1.6–2.6)
MAGNESIUM SERPL-MCNC: 3 MG/DL (ref 1.6–2.6)
MAGNESIUM SERPL-MCNC: 3 MG/DL (ref 1.6–2.6)
MCH RBC QN AUTO: 32 PG (ref 24–34)
MCHC RBC AUTO-ENTMCNC: 34.2 G/DL (ref 31–37)
MCV RBC AUTO: 93.5 FL (ref 74–97)
MONOCYTES # BLD: 0.6 K/UL (ref 0.05–1.2)
MONOCYTES NFR BLD: 9 % (ref 3–10)
NEUTS SEG # BLD: 4.4 K/UL (ref 1.8–8)
NEUTS SEG NFR BLD: 70 % (ref 40–73)
PHOSPHATE SERPL-MCNC: 1.8 MG/DL (ref 2.5–4.9)
PHOSPHATE SERPL-MCNC: 2.2 MG/DL (ref 2.5–4.9)
PHOSPHATE SERPL-MCNC: 2.3 MG/DL (ref 2.5–4.9)
PLATELET # BLD AUTO: 136 K/UL (ref 135–420)
PMV BLD AUTO: 11.4 FL (ref 9.2–11.8)
POTASSIUM SERPL-SCNC: 4 MMOL/L (ref 3.5–5.5)
POTASSIUM SERPL-SCNC: 4.1 MMOL/L (ref 3.5–5.5)
POTASSIUM SERPL-SCNC: 4.6 MMOL/L (ref 3.5–5.5)
PROT SERPL-MCNC: 6.4 G/DL (ref 6.4–8.2)
RBC # BLD AUTO: 3.38 M/UL (ref 4.2–5.3)
SODIUM SERPL-SCNC: 148 MMOL/L (ref 136–145)
SODIUM SERPL-SCNC: 149 MMOL/L (ref 136–145)
SODIUM SERPL-SCNC: 150 MMOL/L (ref 136–145)
TRIGL SERPL-MCNC: 155 MG/DL (ref ?–150)
VLDLC SERPL CALC-MCNC: 31 MG/DL
WBC # BLD AUTO: 6.3 K/UL (ref 4.6–13.2)

## 2020-01-04 PROCEDURE — 74011000250 HC RX REV CODE- 250: Performed by: INTERNAL MEDICINE

## 2020-01-04 PROCEDURE — 83735 ASSAY OF MAGNESIUM: CPT

## 2020-01-04 PROCEDURE — 74011636637 HC RX REV CODE- 636/637: Performed by: HOSPITALIST

## 2020-01-04 PROCEDURE — 74011250636 HC RX REV CODE- 250/636: Performed by: PHYSICIAN ASSISTANT

## 2020-01-04 PROCEDURE — 84100 ASSAY OF PHOSPHORUS: CPT

## 2020-01-04 PROCEDURE — 36415 COLL VENOUS BLD VENIPUNCTURE: CPT

## 2020-01-04 PROCEDURE — 93922 UPR/L XTREMITY ART 2 LEVELS: CPT

## 2020-01-04 PROCEDURE — 74011250636 HC RX REV CODE- 250/636: Performed by: INTERNAL MEDICINE

## 2020-01-04 PROCEDURE — 80048 BASIC METABOLIC PNL TOTAL CA: CPT

## 2020-01-04 PROCEDURE — 83690 ASSAY OF LIPASE: CPT

## 2020-01-04 PROCEDURE — 82787 IGG 1 2 3 OR 4 EACH: CPT

## 2020-01-04 PROCEDURE — 85025 COMPLETE CBC W/AUTO DIFF WBC: CPT

## 2020-01-04 PROCEDURE — 80076 HEPATIC FUNCTION PANEL: CPT

## 2020-01-04 PROCEDURE — 83605 ASSAY OF LACTIC ACID: CPT

## 2020-01-04 PROCEDURE — 65660000000 HC RM CCU STEPDOWN

## 2020-01-04 PROCEDURE — 82962 GLUCOSE BLOOD TEST: CPT

## 2020-01-04 PROCEDURE — 93923 UPR/LXTR ART STDY 3+ LVLS: CPT

## 2020-01-04 PROCEDURE — 80061 LIPID PANEL: CPT

## 2020-01-04 PROCEDURE — 74011000258 HC RX REV CODE- 258: Performed by: INTERNAL MEDICINE

## 2020-01-04 RX ORDER — INSULIN GLARGINE 100 [IU]/ML
26 INJECTION, SOLUTION SUBCUTANEOUS DAILY
Status: DISCONTINUED | OUTPATIENT
Start: 2020-01-05 | End: 2020-01-06

## 2020-01-04 RX ORDER — GUAIFENESIN 100 MG/5ML
81 LIQUID (ML) ORAL DAILY
Status: DISCONTINUED | OUTPATIENT
Start: 2020-01-05 | End: 2020-02-04 | Stop reason: HOSPADM

## 2020-01-04 RX ORDER — DEXTROSE MONOHYDRATE AND SODIUM CHLORIDE 5; .45 G/100ML; G/100ML
125 INJECTION, SOLUTION INTRAVENOUS CONTINUOUS
Status: DISCONTINUED | OUTPATIENT
Start: 2020-01-04 | End: 2020-01-06

## 2020-01-04 RX ORDER — MORPHINE SULFATE 2 MG/ML
2 INJECTION, SOLUTION INTRAMUSCULAR; INTRAVENOUS
Status: DISCONTINUED | OUTPATIENT
Start: 2020-01-04 | End: 2020-01-18 | Stop reason: SDUPTHER

## 2020-01-04 RX ORDER — DEXTROSE MONOHYDRATE 100 MG/ML
125-250 INJECTION, SOLUTION INTRAVENOUS AS NEEDED
Status: DISCONTINUED | OUTPATIENT
Start: 2020-01-04 | End: 2020-02-04 | Stop reason: HOSPADM

## 2020-01-04 RX ORDER — OXYCODONE AND ACETAMINOPHEN 5; 325 MG/1; MG/1
1-2 TABLET ORAL
Status: DISCONTINUED | OUTPATIENT
Start: 2020-01-04 | End: 2020-02-04 | Stop reason: HOSPADM

## 2020-01-04 RX ORDER — DEXTROSE MONOHYDRATE 100 MG/ML
125-250 INJECTION, SOLUTION INTRAVENOUS AS NEEDED
Status: DISCONTINUED | OUTPATIENT
Start: 2020-01-04 | End: 2020-01-05

## 2020-01-04 RX ORDER — INSULIN GLARGINE 100 [IU]/ML
6 INJECTION, SOLUTION SUBCUTANEOUS
Status: COMPLETED | OUTPATIENT
Start: 2020-01-04 | End: 2020-01-04

## 2020-01-04 RX ADMIN — HEPARIN SODIUM 5000 UNITS: 5000 INJECTION INTRAVENOUS; SUBCUTANEOUS at 13:12

## 2020-01-04 RX ADMIN — DEXTROSE MONOHYDRATE AND SODIUM CHLORIDE 125 ML/HR: 5; .45 INJECTION, SOLUTION INTRAVENOUS at 18:52

## 2020-01-04 RX ADMIN — INSULIN LISPRO 9 UNITS: 100 INJECTION, SOLUTION INTRAVENOUS; SUBCUTANEOUS at 18:54

## 2020-01-04 RX ADMIN — INSULIN GLARGINE 6 UNITS: 100 INJECTION, SOLUTION SUBCUTANEOUS at 12:44

## 2020-01-04 RX ADMIN — INSULIN LISPRO 12 UNITS: 100 INJECTION, SOLUTION INTRAVENOUS; SUBCUTANEOUS at 06:57

## 2020-01-04 RX ADMIN — INSULIN LISPRO 12 UNITS: 100 INJECTION, SOLUTION INTRAVENOUS; SUBCUTANEOUS at 00:41

## 2020-01-04 RX ADMIN — SODIUM BICARBONATE: 84 INJECTION, SOLUTION INTRAVENOUS at 00:10

## 2020-01-04 RX ADMIN — POTASSIUM PHOSPHATE, MONOBASIC AND POTASSIUM PHOSPHATE, DIBASIC: 224; 236 INJECTION, SOLUTION INTRAVENOUS at 14:35

## 2020-01-04 RX ADMIN — HEPARIN SODIUM 5000 UNITS: 5000 INJECTION INTRAVENOUS; SUBCUTANEOUS at 22:31

## 2020-01-04 RX ADMIN — INSULIN GLARGINE 20 UNITS: 100 INJECTION, SOLUTION SUBCUTANEOUS at 09:00

## 2020-01-04 RX ADMIN — HEPARIN SODIUM 5000 UNITS: 5000 INJECTION INTRAVENOUS; SUBCUTANEOUS at 07:01

## 2020-01-04 NOTE — PROGRESS NOTES
[facility]  Progress Note    Patient: Francesco Ramachandran Age: [de-identified] y.o. : 1940 MR#: [id number] SSN: [id number]  Date/Time: 2020     Subjective: Patient lying in the bed, denies any abdominal pain, no nausea or vomiting. Patient complains she is hungry and wants to eat. Patient also complained of having soreness in the right foot. On asking states that she has been having soreness on and off and feeling cold in her foot for last few days. Patient states her soreness got worse last couple of days. Patient has known history of peripheral artery disease. 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 Assessment/Plan:     1. Type 2 diabetes  uncontrolled with HHS: Off insulin drip, will increase Lantus and continue SSI. 2. Acute on chronic renal failure Stage 3: Multifactorial including dehydration, hyperglycemia: Continue IV fluids with bicarb per nephrology, nephrology input noted  3. Right lower extremity ischemic changes: Acute on chronic: We will get stat arterial duplex, discussed with Dr. Theresa Mcleod, recommends duplex and will see the patient later on today. Vascular does not recommend any further intervention including heparinization. Vascular thinks this is probably chronic. 4. Acute pancreatitis: Unclear etiology, CT scan negative for GB stones, pain improving, lipase trending down, will get right upper quadrant ultrasound, will get GI involved. Will be able to start clear liquids once vascular clears. 5. Metabolic acidosis due to renal issues: Continue bicarb drip per nephrology  6. Acute metabolic encephalopathy: Due to above reasons, slow improvement. Unknown baseline. 7. Hypertension: BP lower side, will continue to hold all hypertensive medications for now. 8. Hypophosphatemia: Replacement per nephrology   9. DVT prophylaxis: Heparin  10.  Full code  Discussed with the patient in detail including my above assessment and plan. Also tried to reach the family member niece but no response left a message. Patient states she does have sister and brothers but cannot recall any of the phone numbers. Discussed with RN, to obtain stat duplex and call me with results. Patient can be downgraded to telemetry bed. I spent 40 minutes with the patient in face-to-face consultation, of which greater than 50% was spent in counseling and coordination of care as described above    Case discussed with:  [x]Patient  []Family  [x]Nursing  []Case Management  DVT Prophylaxis:  []Lovenox  [x]Hep SQ  []SCDs  []Coumadin   []On Heparin gtt    Objective:   VS:   Visit Vitals  /73   Pulse 86   Temp 97.6 °F (36.4 °C)   Resp 19   Wt 68.9 kg (151 lb 14.4 oz)   SpO2 97%   Breastfeeding No   BMI 25.28 kg/m²      Tmax/24hrs: No data recorded. IOBRIEF    Intake/Output Summary (Last 24 hours) at 1/4/2020 1324  Last data filed at 1/3/2020 1459  Gross per 24 hour   Intake 1300 ml   Output    Net 1300 ml       General:  Alert, cooperative, no acute distress    HEENT: PERRLA, anicteric sclerae. Pulmonary:  CTA Bilaterally. No Wheezing/Rales. Cardiovascular: Regular rate and Rhythm. GI:  Soft, Non distended, Non tender. + Bowel sounds. Extremities:  No edema  Neurologic: AA O x2, mild confusion, moves all extremities well. Additional: Right feet: cold to touch, toes are more colder, toes are slightly dusky in color, tenderness present, signs of cyanosis present. Chronic skin changes noted. Left foot warm to touch and no signs of cyanosis.     Medications:   Current Facility-Administered Medications   Medication Dose Route Frequency    potassium phosphate 20 mmol in 0.9% sodium chloride 250 mL infusion   IntraVENous ONCE    [START ON 1/5/2020] insulin glargine (LANTUS) injection 26 Units  26 Units SubCUTAneous DAILY    insulin glargine (LANTUS) injection 6 Units  6 Units SubCUTAneous NOW    [START ON 1/5/2020] aspirin chewable tablet 81 mg  81 mg Oral DAILY    heparin (porcine) injection 5,000 Units  5,000 Units SubCUTAneous Q8H    famotidine (PF) (PEPCID) 20 mg in 0.9% sodium chloride 10 mL injection  20 mg IntraVENous Q48H    insulin lispro (HUMALOG) injection   SubCUTAneous Q6H    dextrose (D50W) injection syrg 12.5-25 g  25-50 mL IntraVENous PRN    sodium bicarbonate (8.4%) 50 mEq in dextrose 5% 1,000 mL infusion   IntraVENous CONTINUOUS    influenza vaccine 2019-20 (6 mos+)(PF) (FLUARIX/FLULAVAL/FLUZONE QUAD) injection 0.5 mL  0.5 mL IntraMUSCular PRIOR TO DISCHARGE    glucose chewable tablet 16 g  4 Tab Oral PRN    glucagon (GLUCAGEN) injection 1 mg  1 mg IntraMUSCular PRN    dextrose (D50W) injection syrg 12.5-25 g  25-50 mL IntraVENous PRN     Current Outpatient Medications   Medication Sig    hydroCHLOROthiazide (HYDRODIURIL) 25 mg tablet Take 25 mg by mouth daily.  loratadine (CLARITIN) 10 mg tablet Take 10 mg by mouth daily.  losartan (COZAAR) 100 mg tablet Take 100 mg by mouth daily.  glipiZIDE (GLUCOTROL) 5 mg tablet Take 5 mg by mouth two (2) times a day.  metFORMIN (GLUCOPHAGE) 500 mg tablet Take 500 mg by mouth daily (with breakfast).  oxyCODONE-acetaminophen (PERCOCET) 5-325 mg per tablet Take 1 Tab by mouth every six (6) hours as needed. Max Daily Amount: 4 Tabs.  insulin lispro (HUMALOG) 100 unit/mL injection Less than 150 =   0 units  150 -199 =   3 units  200 -249 =   6 units  250 -299 =   9 units  300 -349 =   12 units  350 and above =   15 units, CALL MD    insulin glargine (LANTUS) 100 unit/mL injection 30 Units by SubCUTAneous route daily.  amLODIPine (NORVASC) 5 mg tablet Take 1 Tab by mouth daily.  albuterol-ipratropium (DUO-NEB) 2.5 mg-0.5 mg/3 ml nebu 3 mL by Nebulization route every six (6) hours as needed (wheezing).  acetaminophen (TYLENOL) 325 mg tablet Take 2 Tabs by mouth every six (6) hours as needed.     OTHER REPEAT CBC W/ DIFF, CMP, Mg in 1-2 days.  cholecalciferol, vitamin D3, (VITAMIN D3) 2,000 unit tab Take  by mouth.  atorvastatin (LIPITOR) 20 mg tablet Take  by mouth daily.  aspirin 81 mg chewable tablet Take 81 mg by mouth two (2) times a day. Labs:    Recent Results (from the past 48 hour(s))   EKG, 12 LEAD, INITIAL    Collection Time: 01/02/20  8:25 PM   Result Value Ref Range    Ventricular Rate 83 BPM    Atrial Rate 83 BPM    P-R Interval 140 ms    QRS Duration 98 ms    Q-T Interval 378 ms    QTC Calculation (Bezet) 444 ms    Calculated P Axis 55 degrees    Calculated R Axis -14 degrees    Calculated T Axis 47 degrees    Diagnosis       Normal sinus rhythm  Cannot exclude anterior MI versus lead placement issue. Abnormal ECG  When compared with ECG of 24-DEC-2018 22:01,  Nonspecific T wave abnormality no longer evident in Inferior leads  Confirmed by Norma Pendleton MD, Kimberli Hankins ([id number]) on 1/3/2020 8:46:15 AM     POC LACTIC ACID    Collection Time: 01/02/20  9:14 PM   Result Value Ref Range    Lactic Acid (POC) 3.00 (HH) 0.40 - 2.00 mmol/L   HEMOGLOBIN A1C WITH EAG    Collection Time: 01/02/20  9:14 PM   Result Value Ref Range    Hemoglobin A1c 14.2 (H) 4.2 - 5.6 %    Est. average glucose 361 mg/dL   CBC WITH AUTOMATED DIFF    Collection Time: 01/02/20  9:16 PM   Result Value Ref Range    WBC 8.8 4.6 - 13.2 K/uL    RBC 4.11 (L) 4.20 - 5.30 M/uL    HGB 13.4 12.0 - 16.0 g/dL    HCT 41.8 35.0 - 45.0 %    .7 (H) 74.0 - 97.0 FL    MCH 32.6 24.0 - 34.0 PG    MCHC 32.1 31.0 - 37.0 g/dL    RDW 14.2 11.6 - 14.5 %    PLATELET 128 460 - 292 K/uL    MPV 12.9 (H) 9.2 - 11.8 FL    NEUTROPHILS 89 (H) 40 - 73 %    LYMPHOCYTES 7 (L) 21 - 52 %    MONOCYTES 4 3 - 10 %    EOSINOPHILS 0 0 - 5 %    BASOPHILS 0 0 - 2 %    ABS. NEUTROPHILS 7.8 1.8 - 8.0 K/UL    ABS. LYMPHOCYTES 0.6 (L) 0.9 - 3.6 K/UL    ABS. MONOCYTES 0.3 0.05 - 1.2 K/UL    ABS. EOSINOPHILS 0.0 0.0 - 0.4 K/UL    ABS.  BASOPHILS 0.0 0.0 - 0.1 K/UL    DF AUTOMATED     GLUCOSE, POC Collection Time: 01/02/20  9:52 PM   Result Value Ref Range    Glucose (POC) >600 (HH) 70 - 110 mg/dL   CULTURE, BLOOD    Collection Time: 01/02/20 10:06 PM   Result Value Ref Range    Special Requests: NO SPECIAL REQUESTS      Culture result: NO GROWTH 2 DAYS     CULTURE, BLOOD    Collection Time: 01/02/20 10:06 PM   Result Value Ref Range    Special Requests: NO SPECIAL REQUESTS      Culture result: NO GROWTH 2 DAYS     GLUCOSTABILIZER    Collection Time: 01/02/20 10:12 PM   Result Value Ref Range    Glucose 600 mg/dL    Insulin order 10.8 units/hour    Insulin adminstered 10.8 units/hour    Multiplier 0.020     Low target 140 mg/dL    High target 180 mg/dL    D50 order 0.0 ml    D50 administered 0.00 ml    Minutes until next BG 60 min    Order initials SS     Administered initials SS    GLUCOSE, POC    Collection Time: 01/02/20 11:17 PM   Result Value Ref Range    Glucose (POC) >600 (HH) 70 - 110 mg/dL   GLUCOSTABILIZER    Collection Time: 01/02/20 11:22 PM   Result Value Ref Range    Glucose 602 mg/dL    Insulin order 16.3 units/hour    Insulin adminstered 16.3 units/hour    Multiplier 0.030     Low target 140 mg/dL    High target 180 mg/dL    D50 order 0.0 ml    D50 administered 0.00 ml    Minutes until next BG 60 min    Order initials SS     Administered initials SS    BETA-HYDROXYBUTYRATE    Collection Time: 01/02/20 11:25 PM   Result Value Ref Range    B-hydroxybutyrate 0.11 <0.40 mmol/L   LIPASE    Collection Time: 01/02/20 11:25 PM   Result Value Ref Range    Lipase 24,474 (H) 73 - 393 U/L   MAGNESIUM    Collection Time: 01/02/20 11:25 PM   Result Value Ref Range    Magnesium 3.5 (H) 1.6 - 2.6 mg/dL   METABOLIC PANEL, COMPREHENSIVE    Collection Time: 01/02/20 11:25 PM   Result Value Ref Range    Sodium 139 136 - 145 mmol/L    Potassium 5.6 (H) 3.5 - 5.5 mmol/L    Chloride 109 100 - 111 mmol/L    CO2 16 (L) 21 - 32 mmol/L    Anion gap 14 3.0 - 18 mmol/L    Glucose 935 (HH) 74 - 99 mg/dL    BUN 95 (H) 7.0 - 18 MG/DL    Creatinine 4.70 (H) 0.6 - 1.3 MG/DL    BUN/Creatinine ratio 20 12 - 20      GFR est AA 11 (L) >60 ml/min/1.73m2    GFR est non-AA 9 (L) >60 ml/min/1.73m2    Calcium 9.2 8.5 - 10.1 MG/DL    Bilirubin, total 0.3 0.2 - 1.0 MG/DL    ALT (SGPT) 22 13 - 56 U/L    AST (SGOT) 12 10 - 38 U/L    Alk.  phosphatase 109 45 - 117 U/L    Protein, total 8.1 6.4 - 8.2 g/dL    Albumin 3.2 (L) 3.4 - 5.0 g/dL    Globulin 4.9 (H) 2.0 - 4.0 g/dL    A-G Ratio 0.7 (L) 0.8 - 1.7     TROPONIN I    Collection Time: 01/02/20 11:25 PM   Result Value Ref Range    Troponin-I, QT <0.02 0.0 - 0.045 NG/ML   PHOSPHORUS    Collection Time: 01/02/20 11:25 PM   Result Value Ref Range    Phosphorus 6.6 (H) 2.5 - 4.9 MG/DL   OSMOLALITY, SERUM/PLASMA    Collection Time: 01/02/20 11:25 PM   Result Value Ref Range    Osmolality, serum/plasma 391 (H) 280 - 301 MOSM/kg H2O   POC CHEM8    Collection Time: 01/02/20 11:32 PM   Result Value Ref Range    CO2, POC 16 (L) 19 - 24 MMOL/L    Glucose, POC >700 (HH) 74 - 106 MG/DL    BUN, POC 81 (H) 7 - 18 MG/DL    Creatinine, POC 4.5 (H) 0.6 - 1.3 MG/DL    GFRAA, POC 11 (L) >60 ml/min/1.73m2    GFRNA, POC 9 (L) >60 ml/min/1.73m2    Sodium,  136 - 145 MMOL/L    Potassium, POC 5.6 (H) 3.5 - 5.5 MMOL/L    Calcium, ionized (POC) 1.08 (L) 1.12 - 1.32 mmol/L    Chloride,  (H) 100 - 108 MMOL/L    Anion gap, POC 18 10 - 20      Hematocrit, POC 38 36 - 49 %    Hemoglobin, POC 12.9 12 - 16 G/DL   POC LACTIC ACID    Collection Time: 01/03/20 12:31 AM   Result Value Ref Range    Lactic Acid (POC) 4.87 (HH) 0.40 - 2.00 mmol/L   GLUCOSE, POC    Collection Time: 01/03/20 12:32 AM   Result Value Ref Range    Glucose (POC) >600 (HH) 70 - 110 mg/dL   GLUCOSTABILIZER    Collection Time: 01/03/20 12:33 AM   Result Value Ref Range    Glucose 603 mg/dL    Insulin order 21.7 units/hour    Insulin adminstered 21.7 units/hour    Multiplier 0.040     Low target 140 mg/dL    High target 180 mg/dL    D50 order 0.0 ml D50 administered 0.00 ml    Minutes until next BG 60 min    Order initials SS     Administered initials SS    URINALYSIS W/ RFLX MICROSCOPIC    Collection Time: 01/03/20  1:00 AM   Result Value Ref Range    Color YELLOW      Appearance CLOUDY      Specific gravity 1.024 1.005 - 1.030      pH (UA) 5.0 5.0 - 8.0      Protein TRACE (A) NEG mg/dL    Glucose >1,000 (A) NEG mg/dL    Ketone NEGATIVE  NEG mg/dL    Bilirubin NEGATIVE  NEG      Blood NEGATIVE  NEG      Urobilinogen 0.2 0.2 - 1.0 EU/dL    Nitrites NEGATIVE  NEG      Leukocyte Esterase NEGATIVE  NEG     URINE MICROSCOPIC ONLY    Collection Time: 01/03/20  1:00 AM   Result Value Ref Range    WBC 0 to 2 0 - 4 /hpf    RBC NEGATIVE  0 - 5 /hpf    Epithelial cells FEW 0 - 5 /lpf    Hyaline cast 0 to 1 0 - 2 /lpf   GLUCOSE, POC    Collection Time: 01/03/20  1:44 AM   Result Value Ref Range    Glucose (POC) >600 (HH) 70 - 110 mg/dL   GLUCOSTABILIZER    Collection Time: 01/03/20  1:49 AM   Result Value Ref Range    Glucose 604 mg/dL    Insulin order 27.2 units/hour    Insulin adminstered 27.2 units/hour    Multiplier 0.050     Low target 140 mg/dL    High target 180 mg/dL    D50 order 0.0 ml    D50 administered 0.00 ml    Minutes until next BG 60 min    Order initials SS     Administered initials SS    GLUCOSE, POC    Collection Time: 01/03/20  2:54 AM   Result Value Ref Range    Glucose (POC) 485 (HH) 70 - 110 mg/dL   GLUCOSTABILIZER    Collection Time: 01/03/20  2:55 AM   Result Value Ref Range    Glucose 485 mg/dL    Insulin order 17.0 units/hour    Insulin adminstered 17.0 units/hour    Multiplier 0.040     Low target 140 mg/dL    High target 180 mg/dL    D50 order 0.0 ml    D50 administered 0.00 ml    Minutes until next BG 60 min    Order initials SS     Administered initials SS    GLUCOSE, POC    Collection Time: 01/03/20  3:58 AM   Result Value Ref Range    Glucose (POC) 390 (H) 70 - 110 mg/dL   GLUCOSTABILIZER    Collection Time: 01/03/20  3:58 AM   Result Value Ref Range    Glucose 390 mg/dL    Insulin order 9.9 units/hour    Insulin adminstered 9.9 units/hour    Multiplier 0.030     Low target 140 mg/dL    High target 180 mg/dL    D50 order 0.0 ml    D50 administered 0.00 ml    Minutes until next BG 60 min    Order initials SS     Administered initials SS    METABOLIC PANEL, BASIC    Collection Time: 01/03/20  4:30 AM   Result Value Ref Range    Sodium 151 (H) 136 - 145 mmol/L    Potassium 4.2 3.5 - 5.5 mmol/L    Chloride 124 (H) 100 - 111 mmol/L    CO2 16 (L) 21 - 32 mmol/L    Anion gap 11 3.0 - 18 mmol/L    Glucose 344 (H) 74 - 99 mg/dL    BUN 92 (H) 7.0 - 18 MG/DL    Creatinine 4.21 (H) 0.6 - 1.3 MG/DL    BUN/Creatinine ratio 22 (H) 12 - 20      GFR est AA 12 (L) >60 ml/min/1.73m2    GFR est non-AA 10 (L) >60 ml/min/1.73m2    Calcium 8.8 8.5 - 10.1 MG/DL   MAGNESIUM    Collection Time: 01/03/20  4:30 AM   Result Value Ref Range    Magnesium 3.2 (H) 1.6 - 2.6 mg/dL   PHOSPHORUS    Collection Time: 01/03/20  4:30 AM   Result Value Ref Range    Phosphorus 2.8 2.5 - 4.9 MG/DL   POC LACTIC ACID    Collection Time: 01/03/20  4:35 AM   Result Value Ref Range    Lactic Acid (POC) 4.94 (HH) 0.40 - 2.00 mmol/L   GLUCOSE, POC    Collection Time: 01/03/20  5:02 AM   Result Value Ref Range    Glucose (POC) 294 (H) 70 - 110 mg/dL   GLUCOSTABILIZER    Collection Time: 01/03/20  5:03 AM   Result Value Ref Range    Glucose 294 mg/dL    Insulin order 4.7 units/hour    Insulin adminstered 4.7 units/hour    Multiplier 0.020     Low target 140 mg/dL    High target 180 mg/dL    D50 order 0.0 ml    D50 administered 0.00 ml    Minutes until next BG 60 min    Order initials SS     Administered initials SS    GLUCOSE, POC    Collection Time: 01/03/20  6:05 AM   Result Value Ref Range    Glucose (POC) 193 (H) 70 - 110 mg/dL   GLUCOSTABILIZER    Collection Time: 01/03/20  6:05 AM   Result Value Ref Range    Glucose 193 mg/dL    Insulin order 1.3 units/hour    Insulin adminstered 1.3 units/hour    Multiplier 0.010     Low target 140 mg/dL    High target 180 mg/dL    D50 order 0.0 ml    D50 administered 0.00 ml    Minutes until next BG 60 min    Order initials SS     Administered initials SS    GLUCOSE, POC    Collection Time: 01/03/20  7:10 AM   Result Value Ref Range    Glucose (POC) 161 (H) 70 - 110 mg/dL   GLUCOSTABILIZER    Collection Time: 01/03/20  7:11 AM   Result Value Ref Range    Glucose 161 mg/dL    Insulin order 1.0 units/hour    Insulin adminstered 1.0 units/hour    Multiplier 0.010     Low target 140 mg/dL    High target 180 mg/dL    D50 order 0.0 ml    D50 administered 0.00 ml    Minutes until next BG 60 min    Order initials SS     Administered initials SS    GLUCOSE, POC    Collection Time: 01/03/20  8:27 AM   Result Value Ref Range    Glucose (POC) 194 (H) 70 - 110 mg/dL   GLUCOSTABILIZER    Collection Time: 01/03/20  8:28 AM   Result Value Ref Range    Glucose 194 mg/dL    Insulin order 2.7 units/hour    Insulin adminstered 2.7 units/hour    Multiplier 0.020     Low target 140 mg/dL    High target 180 mg/dL    D50 order 0.0 ml    D50 administered 0.00 ml    Minutes until next BG 60 min    Order initials SS     Administered initials SS    GLUCOSE, POC    Collection Time: 01/03/20  9:53 AM   Result Value Ref Range    Glucose (POC) 208 (H) 70 - 110 mg/dL   GLUCOSTABILIZER    Collection Time: 01/03/20  9:58 AM   Result Value Ref Range    Glucose 208 mg/dL    Insulin order 4.4 units/hour    Insulin adminstered 4.4 units/hour    Multiplier 0.030     Low target 140 mg/dL    High target 180 mg/dL    D50 order 0.0 ml    D50 administered 0.00 ml    Minutes until next BG 60 min    Order initials rdc     Administered initials rdc    GLUCOSE, POC    Collection Time: 01/03/20 10:59 AM   Result Value Ref Range    Glucose (POC) 221 (H) 70 - 110 mg/dL   GLUCOSTABILIZER    Collection Time: 01/03/20 11:02 AM   Result Value Ref Range    Glucose 221 mg/dL    Insulin order 6.4 units/hour    Insulin adminstered 6.4 units/hour    Multiplier 0.040     Low target 140 mg/dL    High target 180 mg/dL    D50 order 0.0 ml    D50 administered 0.00 ml    Minutes until next BG 60 min    Order initials rdc     Administered initials rdc    GLUCOSE, POC    Collection Time: 01/03/20 12:09 PM   Result Value Ref Range    Glucose (POC) 231 (H) 70 - 266 mg/dL   METABOLIC PANEL, BASIC    Collection Time: 01/03/20  2:48 PM   Result Value Ref Range    Sodium 152 (H) 136 - 145 mmol/L    Potassium 4.9 3.5 - 5.5 mmol/L    Chloride 122 (H) 100 - 111 mmol/L    CO2 19 (L) 21 - 32 mmol/L    Anion gap 11 3.0 - 18 mmol/L    Glucose 180 (H) 74 - 99 mg/dL    BUN 84 (H) 7.0 - 18 MG/DL    Creatinine 3.27 (H) 0.6 - 1.3 MG/DL    BUN/Creatinine ratio 26 (H) 12 - 20      GFR est AA 16 (L) >60 ml/min/1.73m2    GFR est non-AA 14 (L) >60 ml/min/1.73m2    Calcium 8.9 8.5 - 10.1 MG/DL   MAGNESIUM    Collection Time: 01/03/20  2:48 PM   Result Value Ref Range    Magnesium 3.3 (H) 1.6 - 2.6 mg/dL   PHOSPHORUS    Collection Time: 01/03/20  2:48 PM   Result Value Ref Range    Phosphorus 3.1 2.5 - 4.9 MG/DL   GLUCOSE, POC    Collection Time: 01/03/20  6:56 PM   Result Value Ref Range    Glucose (POC) 262 (H) 70 - 110 mg/dL   POC LACTIC ACID    Collection Time: 01/03/20  7:16 PM   Result Value Ref Range    Lactic Acid (POC) 0.93 0.40 - 2.00 mmol/L   GLUCOSE, POC    Collection Time: 01/04/20 12:33 AM   Result Value Ref Range    Glucose (POC) 335 (H) 70 - 829 mg/dL   METABOLIC PANEL, BASIC    Collection Time: 01/04/20  2:35 AM   Result Value Ref Range    Sodium 149 (H) 136 - 145 mmol/L    Potassium 4.6 3.5 - 5.5 mmol/L    Chloride 120 (H) 100 - 111 mmol/L    CO2 21 21 - 32 mmol/L    Anion gap 8 3.0 - 18 mmol/L    Glucose 299 (H) 74 - 99 mg/dL    BUN 73 (H) 7.0 - 18 MG/DL    Creatinine 2.78 (H) 0.6 - 1.3 MG/DL    BUN/Creatinine ratio 26 (H) 12 - 20      GFR est AA 20 (L) >60 ml/min/1.73m2    GFR est non-AA 16 (L) >60 ml/min/1.73m2    Calcium 8.1 (L) 8.5 - 10.1 MG/DL   MAGNESIUM    Collection Time: 01/04/20  2:35 AM   Result Value Ref Range    Magnesium 3.0 (H) 1.6 - 2.6 mg/dL   PHOSPHORUS    Collection Time: 01/04/20  2:35 AM   Result Value Ref Range    Phosphorus 2.2 (L) 2.5 - 4.9 MG/DL   LIPID PANEL    Collection Time: 01/04/20  2:35 AM   Result Value Ref Range    LIPID PROFILE          Cholesterol, total 173 <200 MG/DL    Triglyceride 155 (H) <150 MG/DL    HDL Cholesterol 33 (L) 40 - 60 MG/DL    LDL, calculated 109 (H) 0 - 100 MG/DL    VLDL, calculated 31 MG/DL    CHOL/HDL Ratio 5.2 (H) 0 - 5.0     METABOLIC PANEL, BASIC    Collection Time: 01/04/20  5:00 AM   Result Value Ref Range    Sodium 148 (H) 136 - 145 mmol/L    Potassium 4.0 3.5 - 5.5 mmol/L    Chloride 119 (H) 100 - 111 mmol/L    CO2 21 21 - 32 mmol/L    Anion gap 8 3.0 - 18 mmol/L    Glucose 241 (H) 74 - 99 mg/dL    BUN 69 (H) 7.0 - 18 MG/DL    Creatinine 2.63 (H) 0.6 - 1.3 MG/DL    BUN/Creatinine ratio 26 (H) 12 - 20      GFR est AA 21 (L) >60 ml/min/1.73m2    GFR est non-AA 17 (L) >60 ml/min/1.73m2    Calcium 8.5 8.5 - 10.1 MG/DL   MAGNESIUM    Collection Time: 01/04/20  5:00 AM   Result Value Ref Range    Magnesium 3.0 (H) 1.6 - 2.6 mg/dL   PHOSPHORUS    Collection Time: 01/04/20  5:00 AM   Result Value Ref Range    Phosphorus 1.8 (L) 2.5 - 4.9 MG/DL   CBC WITH AUTOMATED DIFF    Collection Time: 01/04/20  5:00 AM   Result Value Ref Range    WBC 6.3 4.6 - 13.2 K/uL    RBC 3.38 (L) 4.20 - 5.30 M/uL    HGB 10.8 (L) 12.0 - 16.0 g/dL    HCT 31.6 (L) 35.0 - 45.0 %    MCV 93.5 74.0 - 97.0 FL    MCH 32.0 24.0 - 34.0 PG    MCHC 34.2 31.0 - 37.0 g/dL    RDW 13.7 11.6 - 14.5 %    PLATELET 509 555 - 558 K/uL    MPV 11.4 9.2 - 11.8 FL    NEUTROPHILS 70 40 - 73 %    LYMPHOCYTES 19 (L) 21 - 52 %    MONOCYTES 9 3 - 10 %    EOSINOPHILS 2 0 - 5 %    BASOPHILS 0 0 - 2 %    ABS. NEUTROPHILS 4.4 1.8 - 8.0 K/UL    ABS. LYMPHOCYTES 1.2 0.9 - 3.6 K/UL    ABS. MONOCYTES 0.6 0.05 - 1.2 K/UL    ABS.  EOSINOPHILS 0.1 0.0 - 0.4 K/UL ABS. BASOPHILS 0.0 0.0 - 0.1 K/UL    DF AUTOMATED     LIPASE    Collection Time: 01/04/20  5:00 AM   Result Value Ref Range    Lipase 4,389 (H) 73 - 393 U/L   GLUCOSE, POC    Collection Time: 01/04/20  6:54 AM   Result Value Ref Range    Glucose (POC) 301 (H) 70 - 110 mg/dL   GLUCOSE, POC    Collection Time: 01/04/20  9:32 AM   Result Value Ref Range    Glucose (POC) 265 (H) 70 - 110 mg/dL       Signed By: Mare Olson MD     January 4, 2020      Disclaimer: Sections of this note are dictated using utilizing voice recognition software. Minor typographical errors may be present. If questions arise, please do not hesitate to contact me or call our department.

## 2020-01-04 NOTE — ED NOTES
Bedside, Verbal and Written shift change report given to [address] [place] [address] (oncoming nurse) by Cabrera WATKINS(offgoing nurse). Report included the following information SBAR, Kardex, and MAR. Hourly rounding and  chart checks completed.

## 2020-01-04 NOTE — CONSULTS
[email or web address]  [phone]    GASTROENTEROLOGY CONSULT      Impression:   1. Acute pancreatitis, lipase 24K on admit; trending down. Mild inflammation noted on imaging (no gallstones on CT). . BISAP score 4. Will get IgG 4  2. DMII, uncontrolled with HHS with anion gap acidosis  3. Hyperkalemia  4. Acute on chronic renal failure  5. Metabolic encephalopathy      Plan:     1. IVF, monitor BUN/HCT  2. Glucose control per primary team  3. NPO for now- can trial clears and advance as tolerated per primary team  4. Will get RUQ US to r/o gallstones  4 Medical management per primary team.       Chief Complaint: acute pancreatitis. HPI:  Kady Varela is a [de-identified] y.o. female who I am being asked to see in consultation for an opinion regarding the above. Patient is alert and oriented now but poor historian regarding events that brought her to the hospital.  She states she had \"some kind of seizure or something\" that brought her to the ER. She states she was told her glucose levels were up but she is not sure she \"believes it\"  She denies abdominal pain, N/V, changes in bowel habits, constipation/diarrhea and states she is hungry and would like to eat. Denies hematochezia/melena. She denies any previous episodes of pancreatitis. She denies having previous EGD or colonoscopy. PMH:   Past Medical History:   Diagnosis Date    Diabetes ([facility] [address]     Hypercholesterolemia     Hypertension        PSH:   History reviewed. No pertinent surgical history.     Social HX:   Social History     Socioeconomic History    Marital status:      Spouse name: Not on file    Number of children: Not on file    Years of education: Not on file    Highest education level: Not on file   Occupational History    Not on file   Social Needs    Financial resource strain: Not on file    Food insecurity:     Worry: Not on file     Inability: Not on file    Transportation needs:     Medical: Not on file     Non-medical: Not on file   Tobacco Use    Smoking status: Former Smoker    Smokeless tobacco: Never Used   Substance and Sexual Activity    Alcohol use: Not on file    Drug use: Not on file    Sexual activity: Not on file   Lifestyle    Physical activity:     Days per week: Not on file     Minutes per session: Not on file    Stress: Not on file   Relationships    Social connections:     Talks on phone: Not on file     Gets together: Not on file     Attends Mosque service: Not on file     Active member of club or organization: Not on file     Attends meetings of clubs or organizations: Not on file     Relationship status: Not on file    Intimate partner violence:     Fear of current or ex partner: Not on file     Emotionally abused: Not on file     Physically abused: Not on file     Forced sexual activity: Not on file   Other Topics Concern    Not on file   Social History Narrative    Not on file       FHX:   History reviewed. No pertinent family history. Allergy:   No Known Allergies    Patient Active Problem List   Diagnosis Code    Hyperglycemia R73.9    Type 2 diabetes mellitus with hyperosmolarity ([facility]) E11.00    Acute UTI N39.0    Dehydration E86.0    Acute renal failure (ARF) ([facility]) N17.9    Noncompliance Z91.19    Pancreatitis K85.90    Hyperosmolar hyperglycemic coma due to diabetes mellitus without ketoacidosis ([facility]) E11.01    Hyperosmolar non-ketotic state in patient with type 2 diabetes mellitus ([facility] [address] E11.00       Home Medications:     (Not in a hospital admission)      Review of Systems:     Constitutional: No fevers, chills, weight loss, fatigue. Skin: No rashes, pruritis, jaundice, ulcerations, erythema. HENT: No headaches, nosebleeds, sinus pressure, rhinorrhea, sore throat. Eyes: No visual changes, blurred vision, eye pain, photophobia, jaundice. Cardiovascular: No chest pain, heart palpitations. Respiratory: No cough, SOB, wheezing, chest discomfort, orthopnea. Gastrointestinal: See HPI   Genitourinary: No dysuria, bleeding, discharge, pyuria. Musculoskeletal: No weakness, arthralgias, wasting. Endo: No sweats. Heme: No bruising, easy bleeding. Allergies: As noted. Neurological: Cranial nerves intact. Alert and oriented. Gait not assessed. Psychiatric:  No anxiety, depression, hallucinations. Visit Vitals  /73   Pulse 86   Temp 97.6 °F (36.4 °C)   Resp 19   Wt 68.9 kg (151 lb 14.4 oz)   SpO2 97%   Breastfeeding No   BMI 25.28 kg/m²       Physical Assessment:     constitutional: appearance: well developed, well nourished, normal habitus, no deformities, in no acute distress. skin: inspection: no rashes, ulcers, icterus or other lesions; no clubbing or telangiectasias. palpation: no induration or subcutaneos nodules. eyes: inspection: normal conjunctivae and lids; no jaundice pupils: normal  ENMT: mouth: normal oral mucosa,lips and gums; good dentition. oropharynx: normal tongue, hard and soft palate; posterior pharynx without erithema, exudate or lesions. neck: thyroid: normal size, consistency and position; no masses or tenderness. respiratory: effort: normal chest excursion; no intercostal retraction or accessory muscle use. cardiovascular: abdominal aorta: normal size and position; no bruits. palpation: PMI of normal size and position; normal rhythm; no thrill or murmurs. abdominal: abdomen: normal consistency; no tenderness or masses. hernias: no hernias appreciated. liver: normal size and consistency. spleen: not palpable. rectal: hemoccult/guaiac: not performed. musculoskeletal: digits and nails: no clubbing, cyanosis, petechiae or other inflammatory conditions. gait: not evaluated head and neck: normal range of motion; no pain, crepitation or contracture. spine/ribs/pelvis: normal range of motion; no pain, deformity or contracture.    neurologic: cranial nerves: II-XII normal.   psychiatric: judgement/insight: within normal limits. memory: poor historian mood and affect: no evidence of depression, anxiety or agitation. orientation: oriented to time, space and person. Basic Metabolic Profile   Recent Labs     01/04/20  0500   *   K 4.0   *   CO2 21   BUN 69*   *   CA 8.5   MG 3.0*   PHOS 1.8*         CBC w/Diff    Recent Labs     01/04/20  0500   WBC 6.3   RBC 3.38*   HGB 10.8*   HCT 31.6*   MCV 93.5   MCH 32.0   MCHC 34.2   RDW 13.7       Recent Labs     01/04/20  0500   GRANS 70   LYMPH 19*   EOS 2        Hepatic Function   Recent Labs     01/04/20  0500 01/02/20  2325   ALB  --  3.2*   TP  --  8.1   TBILI  --  0.3   SGOT  --  12   AP  --  109   LPSE 4,389* 24,474*        Coags   No results for input(s): PTP, INR, APTT, INREXT in the last 72 hours. Lyle Call NP. [facility] 1948, [address] [facility]  [email or web address]

## 2020-01-04 NOTE — PROGRESS NOTES
RENAL DAILY PROGRESS NOTE    Patient: Bere Mccoy               Sex: female          DOA: 1/2/2020  7:37 PM        YOB: 1940      Age:  [de-identified] y.o.        LOS:  LOS: 1 day     Subjective:     Bere Mccoy is a [de-identified] y.o.  who presents with Pancreatitis [K85.90]  Hyperosmolar hyperglycemic coma due to diabetes mellitus without ketoacidosis ([facility] [address] [E11.01]  Hyperosmolar non-ketotic state in patient with type 2 diabetes mellitus ([facility] [address] [E11.00].    Asked to evaluate for worsening renal failure,hx of crf stage 3,admitted with hyperglycemic coma  Chief complains: Patient denies nausea, vomiting, chest pain, dizziness, shortness of breath or headache.  - Reviewed last 24 hrs events     Current Facility-Administered Medications   Medication Dose Route Frequency    potassium phosphate 20 mmol in 0.9% sodium chloride 250 mL infusion   IntraVENous ONCE    [START ON 1/5/2020] insulin glargine (LANTUS) injection 26 Units  26 Units SubCUTAneous DAILY    [START ON 1/5/2020] aspirin chewable tablet 81 mg  81 mg Oral DAILY    oxyCODONE-acetaminophen (PERCOCET) 5-325 mg per tablet 1-2 Tab  1-2 Tab Oral Q6H PRN    morphine injection 2 mg  2 mg IntraVENous Q4H PRN    dextrose 5 % - 0.45% NaCl infusion  125 mL/hr IntraVENous CONTINUOUS    heparin (porcine) injection 5,000 Units  5,000 Units SubCUTAneous Q8H    famotidine (PF) (PEPCID) 20 mg in 0.9% sodium chloride 10 mL injection  20 mg IntraVENous Q48H    insulin lispro (HUMALOG) injection   SubCUTAneous Q6H    dextrose (D50W) injection syrg 12.5-25 g  25-50 mL IntraVENous PRN    influenza vaccine 2019-20 (6 mos+)(PF) (FLUARIX/FLULAVAL/FLUZONE QUAD) injection 0.5 mL  0.5 mL IntraMUSCular PRIOR TO DISCHARGE    glucose chewable tablet 16 g  4 Tab Oral PRN    glucagon (GLUCAGEN) injection 1 mg  1 mg IntraMUSCular PRN    dextrose (D50W) injection syrg 12.5-25 g  25-50 mL IntraVENous PRN       Objective:     Visit Vitals  /73   Pulse 86 Temp 97.6 °F (36.4 °C)   Resp 19   Wt 68.9 kg (151 lb 14.4 oz)   SpO2 97%   Breastfeeding No   BMI 25.28 kg/m²     No intake or output data in the 24 hours ending 01/04/20 1552    Physical Examination:     RS: Chest is bilateral equal, no wheezing / rales / crackles  CVS: S1-S2 heard, RRR, No S3 / murmur  Abdomen: Soft, Non tender, Not distended, Positive bowel sounds, no organomegaly, no CVA / supra pubic tenderness  Extremities: No edema, diminished pulses  CNS: Awake   HEENT: Head is atraumatic, PERRLA, conjunctiva pink & non icteric. No JVD or carotid bruit       Data Review:      Labs:     Hematology:   Recent Labs     01/04/20  0500 01/02/20  2116   WBC 6.3 8.8   HGB 10.8* 13.4   HCT 31.6* 41.8     Chemistry:   Recent Labs     01/04/20  0500 01/04/20  0235 01/03/20  1448 01/03/20  0430 01/02/20  2325   BUN 69* 73* 84* 92* 95*   CREA 2.63* 2.78* 3.27* 4.21* 4.70*   CA 8.5 8.1* 8.9 8.8 9.2   ALB 2.6*  --   --   --  3.2*   K 4.0 4.6 4.9 4.2 5.6*   * 149* 152* 151* 139   * 120* 122* 124* 109   CO2 21 21 19* 16* 16*   PHOS 1.8* 2.2* 3.1 2.8 6.6*   * 299* 180* 344* 935*        Images:    XR (Most Recent). CXR reviewed by me and compared with previous CXR Results from Hospital Encounter encounter on 01/02/20   XR CHEST PORT    Narrative Examination: Portable AP chest     History: Altered mental status    Comparison: December 25, 2018    Findings: There is bibasilar atelectasis. There is mild pulmonary vascular  congestion. There is no pneumothorax. Heart size is stable. Tortuous thoracic  aorta. Degenerative changes of the left shoulder. Impression Impression:  1. Mild pulmonary vascular congestion without overt edema. CT (Most Recent) Results from Hospital Encounter encounter on 01/02/20   CT ABD PELV WO CONT    Narrative CT Abdomen And Pelvis with Intravenous Contrast    INDICATION: Generalized abdominal pain. Elevated lipase. .    TECHNIQUE: 5 mm collimation axial images obtained from the diaphragm to the  level of the pubic symphysis following the uneventful administration of  100 cc  of low osmolar, nonionic intravenous contrast.    Dose reduction techniques used: Automated exposure control, adjustment of the  mAs and/or kVp according to patient size, standardized low-dose protocol, and/or  iterative reconstruction technique. COMPARISON: July 18, 2017. ABDOMEN FINDINGS:    Lung Bases: There is bibasilar atelectasis. Atelectasis is most conspicuous in  the lingula. The heart is top normal in size. .    Liver: Normal attenuation. No evidence for mass. Gallbladder: Present and appears normal. No biliary ductal dilatation. Pancreas: There is mild peripancreatic edema. .    Spleen: Normal in size. No evidence of mass. .    Adrenal Glands: No evidence for mass. Kidneys:     Right: Atrophic kidneys. No cortical mass. No hydronephrosis. Left:  Atrophic kidneys. No cortical mass. No hydronephrosis. Lymph Nodes: No lymphadenopathy. Aorta: Atherosclerosis. PELVIS FINDINGS:     Bowel: Small Bowel: Normal in caliber with normal wall thickness. Large Bowel: There are scattered colonic diverticula. Leonor Ku Appendix: No secondary signs of acute appendicitis. Bladder: Underdistended. Extensive bilateral pelvic masses with calcification, likely fibroid uterus. No  definite suspicious adnexal mass. No ascites. Bones: Degenerative changes of the spine. Osteopenia. Impression Impression:    Mild uncomplicated pancreatitis. Fibroid uterus. Additional incidentals as above. EKG No results found for this or any previous visit. I have personally reviewed the old medical records and patient's labs    Plan / Recommendation:      1.  Acute/crf stage 3,improved with ivf ,correction of hyperglycemia  2.hypernatremia,change to hypotonic fluids  3.acidosis improved,stop iv bicarb  4.hypokalemia,hypophosphatemia,replace  5.pancreatitis improved  6.pvd,for dopplers study    D/w Dr. Rohith Schmitt MD  Nephrology  1/4/2020

## 2020-01-04 NOTE — ED NOTES
TRANSFER - OUT REPORT:    Verbal report given to Javon Nguyen RN(name) on Kady Varela  being transferred to [address] [place] [address](unit) for routine progression of care       Report consisted of patients Situation, Background, Assessment and   Recommendations(SBAR). Information from the following report(s) SBAR, ED Summary and MAR was reviewed with the receiving nurse. Lines:   Peripheral IV 01/02/20 Right Antecubital (Active)       Peripheral IV 01/02/20 Left; Outer Wrist (Active)       Peripheral IV 01/03/20 Right; Inner Wrist (Active)        Opportunity for questions and clarification was provided.

## 2020-01-04 NOTE — PROGRESS NOTES
Received call from vascular regarding patient's duplex LUCIEN. Per vascular, patient demonstrates severe to critical insufficiency in both legs. Dr. Giovanni Schroeder notified. New orders to advance diet to clear liquid diet and advance as tolerated to Diabetes consistent carb diet.

## 2020-01-04 NOTE — PROGRESS NOTES
Patient arrived on unit with family at bedside. No complaints of pain or discomfort or evidence of respiratory distress. Patient AOx3. Admission assessment and 4 eye skin assessment completed. Bed in lowest locked position, call light within reach, side rails x3.

## 2020-01-05 ENCOUNTER — APPOINTMENT (OUTPATIENT)
Dept: VASCULAR SURGERY | Age: 80
DRG: 616 | End: 2020-01-05
Attending: PHYSICIAN ASSISTANT
Payer: MEDICARE

## 2020-01-05 LAB
ALBUMIN SERPL-MCNC: 2.2 G/DL (ref 3.4–5)
ALBUMIN/GLOB SERPL: 0.5 {RATIO} (ref 0.8–1.7)
ALP SERPL-CCNC: 96 U/L (ref 45–117)
ALT SERPL-CCNC: 34 U/L (ref 13–56)
ANION GAP SERPL CALC-SCNC: 7 MMOL/L (ref 3–18)
AST SERPL-CCNC: 76 U/L (ref 10–38)
BASOPHILS # BLD: 0 K/UL (ref 0–0.1)
BASOPHILS NFR BLD: 0 % (ref 0–2)
BILIRUB SERPL-MCNC: 0.7 MG/DL (ref 0.2–1)
BUN SERPL-MCNC: 46 MG/DL (ref 7–18)
BUN/CREAT SERPL: 23 (ref 12–20)
CALCIUM SERPL-MCNC: 8.2 MG/DL (ref 8.5–10.1)
CHLORIDE SERPL-SCNC: 115 MMOL/L (ref 100–111)
CO2 SERPL-SCNC: 22 MMOL/L (ref 21–32)
CREAT SERPL-MCNC: 2 MG/DL (ref 0.6–1.3)
DIFFERENTIAL METHOD BLD: ABNORMAL
EOSINOPHIL # BLD: 0.1 K/UL (ref 0–0.4)
EOSINOPHIL NFR BLD: 1 % (ref 0–5)
ERYTHROCYTE [DISTWIDTH] IN BLOOD BY AUTOMATED COUNT: 13.9 % (ref 11.6–14.5)
GLOBULIN SER CALC-MCNC: 4.5 G/DL (ref 2–4)
GLUCOSE BLD STRIP.AUTO-MCNC: 194 MG/DL (ref 70–110)
GLUCOSE BLD STRIP.AUTO-MCNC: 212 MG/DL (ref 70–110)
GLUCOSE BLD STRIP.AUTO-MCNC: 340 MG/DL (ref 70–110)
GLUCOSE BLD STRIP.AUTO-MCNC: 376 MG/DL (ref 70–110)
GLUCOSE SERPL-MCNC: 267 MG/DL (ref 74–99)
HCT VFR BLD AUTO: 32.3 % (ref 35–45)
HGB BLD-MCNC: 10.8 G/DL (ref 12–16)
LACTATE SERPL-SCNC: 2 MMOL/L (ref 0.4–2)
LIPASE SERPL-CCNC: 4542 U/L (ref 73–393)
LYMPHOCYTES # BLD: 1.2 K/UL (ref 0.9–3.6)
LYMPHOCYTES NFR BLD: 21 % (ref 21–52)
MAGNESIUM SERPL-MCNC: 2.8 MG/DL (ref 1.6–2.6)
MCH RBC QN AUTO: 31.5 PG (ref 24–34)
MCHC RBC AUTO-ENTMCNC: 33.4 G/DL (ref 31–37)
MCV RBC AUTO: 94.2 FL (ref 74–97)
MONOCYTES # BLD: 0.6 K/UL (ref 0.05–1.2)
MONOCYTES NFR BLD: 11 % (ref 3–10)
NEUTS SEG # BLD: 3.7 K/UL (ref 1.8–8)
NEUTS SEG NFR BLD: 67 % (ref 40–73)
PHOSPHATE SERPL-MCNC: 2.4 MG/DL (ref 2.5–4.9)
PLATELET # BLD AUTO: 139 K/UL (ref 135–420)
PMV BLD AUTO: 12.1 FL (ref 9.2–11.8)
POTASSIUM SERPL-SCNC: 4.5 MMOL/L (ref 3.5–5.5)
PROT SERPL-MCNC: 6.7 G/DL (ref 6.4–8.2)
RBC # BLD AUTO: 3.43 M/UL (ref 4.2–5.3)
SODIUM SERPL-SCNC: 144 MMOL/L (ref 136–145)
WBC # BLD AUTO: 5.5 K/UL (ref 4.6–13.2)

## 2020-01-05 PROCEDURE — 83735 ASSAY OF MAGNESIUM: CPT

## 2020-01-05 PROCEDURE — 74011636637 HC RX REV CODE- 636/637: Performed by: HOSPITALIST

## 2020-01-05 PROCEDURE — 74011250636 HC RX REV CODE- 250/636: Performed by: PHYSICIAN ASSISTANT

## 2020-01-05 PROCEDURE — 82962 GLUCOSE BLOOD TEST: CPT

## 2020-01-05 PROCEDURE — 74011000250 HC RX REV CODE- 250: Performed by: EMERGENCY MEDICINE

## 2020-01-05 PROCEDURE — 74011250636 HC RX REV CODE- 250/636: Performed by: EMERGENCY MEDICINE

## 2020-01-05 PROCEDURE — 85025 COMPLETE CBC W/AUTO DIFF WBC: CPT

## 2020-01-05 PROCEDURE — 74011250637 HC RX REV CODE- 250/637: Performed by: HOSPITALIST

## 2020-01-05 PROCEDURE — 65660000000 HC RM CCU STEPDOWN

## 2020-01-05 PROCEDURE — 74011000258 HC RX REV CODE- 258: Performed by: HOSPITALIST

## 2020-01-05 PROCEDURE — 83690 ASSAY OF LIPASE: CPT

## 2020-01-05 PROCEDURE — 80053 COMPREHEN METABOLIC PANEL: CPT

## 2020-01-05 PROCEDURE — 74011250637 HC RX REV CODE- 250/637: Performed by: NURSE PRACTITIONER

## 2020-01-05 PROCEDURE — 84100 ASSAY OF PHOSPHORUS: CPT

## 2020-01-05 PROCEDURE — 36415 COLL VENOUS BLD VENIPUNCTURE: CPT

## 2020-01-05 PROCEDURE — 93925 LOWER EXTREMITY STUDY: CPT

## 2020-01-05 PROCEDURE — 83605 ASSAY OF LACTIC ACID: CPT

## 2020-01-05 RX ORDER — FACIAL-BODY WIPES
10 EACH TOPICAL DAILY PRN
Status: DISCONTINUED | OUTPATIENT
Start: 2020-01-05 | End: 2020-02-04 | Stop reason: HOSPADM

## 2020-01-05 RX ORDER — POLYETHYLENE GLYCOL 3350 17 G/17G
17 POWDER, FOR SOLUTION ORAL DAILY
Status: DISCONTINUED | OUTPATIENT
Start: 2020-01-05 | End: 2020-02-04 | Stop reason: HOSPADM

## 2020-01-05 RX ADMIN — INSULIN LISPRO 15 UNITS: 100 INJECTION, SOLUTION INTRAVENOUS; SUBCUTANEOUS at 13:45

## 2020-01-05 RX ADMIN — INSULIN LISPRO 3 UNITS: 100 INJECTION, SOLUTION INTRAVENOUS; SUBCUTANEOUS at 00:20

## 2020-01-05 RX ADMIN — HEPARIN SODIUM 5000 UNITS: 5000 INJECTION INTRAVENOUS; SUBCUTANEOUS at 13:46

## 2020-01-05 RX ADMIN — INSULIN LISPRO 12 UNITS: 100 INJECTION, SOLUTION INTRAVENOUS; SUBCUTANEOUS at 05:04

## 2020-01-05 RX ADMIN — Medication 81 MG: at 10:23

## 2020-01-05 RX ADMIN — POLYETHYLENE GLYCOL 3350 17 G: 17 POWDER, FOR SOLUTION ORAL at 13:44

## 2020-01-05 RX ADMIN — INSULIN GLARGINE 26 UNITS: 100 INJECTION, SOLUTION SUBCUTANEOUS at 10:23

## 2020-01-05 RX ADMIN — FAMOTIDINE 20 MG: 10 INJECTION, SOLUTION INTRAVENOUS at 04:53

## 2020-01-05 RX ADMIN — INSULIN LISPRO 6 UNITS: 100 INJECTION, SOLUTION INTRAVENOUS; SUBCUTANEOUS at 17:55

## 2020-01-05 RX ADMIN — INSULIN LISPRO 3 UNITS: 100 INJECTION, SOLUTION INTRAVENOUS; SUBCUTANEOUS at 23:59

## 2020-01-05 RX ADMIN — DEXTROSE MONOHYDRATE AND SODIUM CHLORIDE 125 ML/HR: 5; .45 INJECTION, SOLUTION INTRAVENOUS at 17:59

## 2020-01-05 RX ADMIN — DEXTROSE MONOHYDRATE AND SODIUM CHLORIDE 200 ML/HR: 5; .45 INJECTION, SOLUTION INTRAVENOUS at 05:08

## 2020-01-05 RX ADMIN — DEXTROSE MONOHYDRATE AND SODIUM CHLORIDE 200 ML/HR: 5; .45 INJECTION, SOLUTION INTRAVENOUS at 00:48

## 2020-01-05 RX ADMIN — HEPARIN SODIUM 5000 UNITS: 5000 INJECTION INTRAVENOUS; SUBCUTANEOUS at 04:53

## 2020-01-05 NOTE — PROGRESS NOTES
Patient Lactic Acid level at 2.0. Md aware. Decreased IV fluid D5 1/2 NS rate to 125ml/hr as per Md order. Patient tolerated clear liquid diet. Made MD aware that patient requested a change in diet. New order for GI Lite.

## 2020-01-05 NOTE — PROGRESS NOTES
Patient is not available to be assessed at this time.       [address] [facility].   [phone]

## 2020-01-05 NOTE — PROGRESS NOTES
[email or web address]  [phone]    Gastroenterology follow up-Progress note    Impression:  1. Acute pancreatitis, lipase 24K on admit; trending down. Mild inflammation noted on imaging (no gallstones on CT). . BISAP score 4. Will get IgG 4 (pending) BUN/HCT stable  2. DMII, uncontrolled with HHS with anion gap acidosis  3. Hyperkalemia  4. Acute on chronic renal failure  5. Metabolic encephalopathy  6. Constipation:     Plan:  1. IVF, monitor BUN/HCT  2. Glucose control per primary team  3. Can have diet  4. Will get RUQ US to r/o gallstones  5. Will add dulcolax and miralax for bowel regimen  6 Medical management per primary team    Chief Complaint: pancreatitis      Subjective:  No abdominal pain but constipated and wants bowel regimen. Tolerating diet, no N/V.     ROS: Denies any fevers, chills, rash.      Eyes: conjunctiva normal, EOM normal   Neck: ROM normal, supple and trachea normal   Cardiovascular: heart normal, intact distal pulses, normal rate and regular rhythm   Pulmonary/Chest Wall: breath sounds normal and effort normal   Abdominal: appearance normal, bowel sounds normal and soft, non-acute, non-tender     Patient Active Problem List   Diagnosis Code    Hyperglycemia R73.9    Type 2 diabetes mellitus with hyperosmolarity ([facility]) E11.00    Acute UTI N39.0    Dehydration E86.0    Acute renal failure (ARF) ([facility]) N17.9    Noncompliance Z91.19    Pancreatitis K85.90    Hyperosmolar hyperglycemic coma due to diabetes mellitus without ketoacidosis ([facility]) E11.01    Hyperosmolar non-ketotic state in patient with type 2 diabetes mellitus ([facility] [address] E11.00         Visit Vitals  /65   Pulse 88   Temp 97.8 °F (36.6 °C)   Resp 16   Wt 68.9 kg (151 lb 14.4 oz)   SpO2 100%   Breastfeeding No   BMI 25.28 kg/m²           Intake/Output Summary (Last 24 hours) at 1/5/2020 1041  Last data filed at 1/5/2020 1014  Gross per 24 hour   Intake 0 ml   Output 1202 ml   Net -1202 ml       CBC w/Diff    Lab Results   Component Value Date/Time    WBC 5.5 01/05/2020 01:19 AM    RBC 3.43 (L) 01/05/2020 01:19 AM    HGB 10.8 (L) 01/05/2020 01:19 AM    HCT 32.3 (L) 01/05/2020 01:19 AM    MCV 94.2 01/05/2020 01:19 AM    MCH 31.5 01/05/2020 01:19 AM    MCHC 33.4 01/05/2020 01:19 AM    RDW 13.9 01/05/2020 01:19 AM     01/05/2020 01:19 AM    Lab Results   Component Value Date/Time    GRANS 67 01/05/2020 01:19 AM    LYMPH 21 01/05/2020 01:19 AM    EOS 1 01/05/2020 01:19 AM    BASOS 0 01/05/2020 01:19 AM      Basic Metabolic Profile   Recent Labs     01/05/20  0119      K 4.5   *   CO2 22   BUN 46*   CA 8.2*   MG 2.8*   PHOS 2.4*        Hepatic Function    Lab Results   Component Value Date/Time    ALB 2.2 (L) 01/05/2020 01:19 AM    TP 6.7 01/05/2020 01:19 AM    AP 96 01/05/2020 01:19 AM    Lab Results   Component Value Date/Time    SGOT 76 (H) 01/05/2020 01:19 AM          Coags   No results for input(s): PTP, INR, APTT, INREXT in the last 72 hours. Cody Pereyra NP    [facility]. [email or web address]  Phone: [phone]

## 2020-01-05 NOTE — CONSULTS
[address]    Name:  Willa Benitez  MR#:   [id number]  :  1940  ACCOUNT #:  [de-identified]  DATE OF SERVICE:  2020    REASON FOR CONSULTATION:  Vascular consult called regarding no pulses, right foot. HISTORY OF PRESENT ILLNESS:  She has been admitted to the hospital, but she has been in the emergency department because of no bed status. She was admitted with anion gap metabolic acidosis, metabolic encephalopathy, acute pancreatitis, hyperkalemia, and acute-on-chronic chronic renal failure, uncontrolled diabetes. I am able to arouse her now, she says she is not having any pain, she just is sleepy. I had asked her if her right foot has been hurting her, she said yes, she has had progressive claudication. Two to three years ago was two-and-a-half blocks, now it is down to less than a block. She does not have pain at rest right now. PAST MEDICAL HISTORY:  Significant for hyperglycemia, diabetes, UTI, dehydration, renal failure, noncompliance, pancreatitis, hyperglycemic coma. PAST SURGICAL HISTORY:  She denies. SOCIAL HISTORY:  She is a [employer], does not receive blood under any circumstance. She is a former smoker, not currently. No alcohol use. ALLERGIES:  NONE. HOME MEDICATIONS:  Include hydrochlorothiazide, Claritin, Cozaar, Glucotrol, Glucophage, Percocet, Humalog, Lantus, Norvasc, Tylenol, vitamin D, Lipitor 20, and 81 mg of aspirin. REVIEW OF SYSTEMS:  NEURO:  She is status post hyperosmolar coma, difficult to get a history regarding this as well. ENDOCRINE:  Positive for diabetes. PULMONARY:  She is a former smoker. Denies shortness of breath. CARDIAC:  History of hypertension. GI:  Denies constipation or diarrhea. EXTREMITIES:  History of PAD. History of claudication. No history of blood clot reported to me. History of noncompliance.     PHYSICAL EXAMINATION:  GENERAL:  I saw her at the bedside in the ER, the nurse was present with me. VITAL SIGNS:  Her current vital signs, her pulse rate is 86, blood pressure is 112/73, her last temperature was 97.6. HEENT:  Head is atraumatic and normocephalic. NECK:  No JVD. CHEST:  Clear at this time. CARDIAC:  Regular. ABDOMEN:  Soft, nontender. EXTREMITIES:  Upper extremities, range of motion and strength seem equal.  She is slow to move on command just because of somnolent state. Her left lower extremity seems warm. Skin is intact. On the right lower extremity, she has some contractures of the toes, the foot is more cold, there are nonpalpable pulses. LABORATORY DATA:  Her current labs reveal a hemoglobin of 10, a creatinine of 2.6, but this is down from 4.7 two days ago. 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 On the left, the superficial femoral artery is occluded. Popliteal string sign and tibial vessels are occluded. DIAGNOSIS AND PLAN:  The patient is admitted to the hospital with critical diabetic issues. She is going up to her bed now. We will follow up with her exam and history again when she is more awake. She does not have any acute vascular issue right now, but she certainly has some critical chronic issues. She has bilateral critical vascular disease. I asked what her decisions and goals are regarding this, again this testing was from 07/2017. I am not going to reorder a CTA because of her creatinine. She is just recovering from acute-on-chronic renal injury. Certainly, her options will be limited, angiogram or a CTA, will require a contrast.  We can start with a Doppler as we were to assess change since her 2017 studies.       Phi Verdin DO [id number]  D:  01/04/2020 14:45  T:  01/04/2020 19:04  JOB #:  [id number]

## 2020-01-05 NOTE — PROGRESS NOTES
Problem: Diabetes Self-Management  Goal: *Disease process and treatment process  Description  Define diabetes and identify own type of diabetes; list 3 options for treating diabetes. Outcome: Progressing Towards Goal  Goal: *Incorporating nutritional management into lifestyle  Description  Describe effect of type, amount and timing of food on blood glucose; list 3 methods for planning meals. Outcome: Progressing Towards Goal  Goal: *Incorporating physical activity into lifestyle  Description  State effect of exercise on blood glucose levels. Outcome: Progressing Towards Goal  Goal: *Developing strategies to promote health/change behavior  Description  Define the ABC's of diabetes; identify appropriate screenings, schedule and personal plan for screenings. Outcome: Progressing Towards Goal  Goal: *Using medications safely  Description  State effect of diabetes medications on diabetes; name diabetes medication taking, action and side effects. Outcome: Progressing Towards Goal  Goal: *Monitoring blood glucose, interpreting and using results  Description  Identify recommended blood glucose targets  and personal targets. Outcome: Progressing Towards Goal  Goal: *Prevention, detection, treatment of acute complications  Description  List symptoms of hyper- and hypoglycemia; describe how to treat low blood sugar and actions for lowering  high blood glucose level. Outcome: Progressing Towards Goal  Goal: *Prevention, detection and treatment of chronic complications  Description  Define the natural course of diabetes and describe the relationship of blood glucose levels to long term complications of diabetes.   Outcome: Progressing Towards Goal  Goal: *Developing strategies to address psychosocial issues  Description  Describe feelings about living with diabetes; identify support needed and support network  Outcome: Progressing Towards Goal  Goal: *Insulin pump training  Outcome: Progressing Towards Goal  Goal: *Sick day guidelines  Outcome: Progressing Towards Goal  Goal: *Patient Specific Goal (EDIT GOAL, INSERT TEXT)  Outcome: Progressing Towards Goal     Problem: Patient Education: Go to Patient Education Activity  Goal: Patient/Family Education  Outcome: Progressing Towards Goal     Problem: Falls - Risk of  Goal: *Absence of Falls  Description  Document Raman Reas Fall Risk and appropriate interventions in the flowsheet. Outcome: Progressing Towards Goal  Note: Fall Risk Interventions:  Mobility Interventions: Bed/chair exit alarm, Patient to call before getting OOB, Utilize walker, cane, or other assistive device    Mentation Interventions: Bed/chair exit alarm    Medication Interventions: Bed/chair exit alarm, Patient to call before getting OOB    Elimination Interventions: Bed/chair exit alarm, Call light in reach, Patient to call for help with toileting needs              Problem: Patient Education: Go to Patient Education Activity  Goal: Patient/Family Education  Outcome: Progressing Towards Goal     Problem: Pressure Injury - Risk of  Goal: *Prevention of pressure injury  Description  Document Marc Scale and appropriate interventions in the flowsheet.   Outcome: Progressing Towards Goal  Note: Pressure Injury Interventions:  Sensory Interventions: Assess need for specialty bed, Keep linens dry and wrinkle-free, Minimize linen layers    Moisture Interventions: Absorbent underpads, Check for incontinence Q2 hours and as needed, Minimize layers    Activity Interventions: Assess need for specialty bed, Increase time out of bed, PT/OT evaluation    Mobility Interventions: Assess need for specialty bed, Float heels, Pressure redistribution bed/mattress (bed type)    Nutrition Interventions: Document food/fluid/supplement intake    Friction and Shear Interventions: Apply protective barrier, creams and emollients, Lift sheet, Minimize layers                Problem: Patient Education: Go to Patient Education Activity  Goal: Patient/Family Education  Outcome: Progressing Towards Goal     Problem: Pain  Goal: *Control of Pain  Outcome: Progressing Towards Goal  Goal: *PALLIATIVE CARE:  Alleviation of Pain  Outcome: Progressing Towards Goal     Problem: Patient Education: Go to Patient Education Activity  Goal: Patient/Family Education  Outcome: Progressing Towards Goal     Problem: Injury - Risk of, Adverse Drug Event  Goal: *Absence of adverse drug events  Outcome: Progressing Towards Goal  Goal: *Absence of medication errors  Outcome: Progressing Towards Goal  Goal: *Knowledge of prescribed medications  Outcome: Progressing Towards Goal     Problem: Patient Education: Go to Patient Education Activity  Goal: Patient/Family Education  Outcome: Progressing Towards Goal     Problem: Altered Thought Process (Adult/Pediatric)  Goal: *STG: Participates in treatment plan  Outcome: Progressing Towards Goal  Goal: *STG: Remains safe in hospital  Outcome: Progressing Towards Goal  Goal: *STG: Seeks staff when feelings of anxiety and fear arise  Outcome: Progressing Towards Goal  Goal: *STG: Complies with medication therapy  Outcome: Progressing Towards Goal  Goal: *STG: Attends activities and groups  Outcome: Progressing Towards Goal  Goal: *STG: Decreased delusional thinking  Outcome: Progressing Towards Goal  Goal: *STG: Decreased hallucinations  Outcome: Progressing Towards Goal  Goal: *STG: Absence of lethality  Outcome: Progressing Towards Goal  Goal: *STG: Demonstrates ability to understand and use improved judgment in daily activities and relationships  Outcome: Progressing Towards Goal  Goal: *LTG: Returns to baseline functioning  Outcome: Progressing Towards Goal  Goal: Interventions  Outcome: Progressing Towards Goal     Problem: Patient Education: Go to Patient Education Activity  Goal: Patient/Family Education  Outcome: Progressing Towards Goal     Problem: Nutrition Deficit  Goal: *Optimize nutritional status  Outcome: Progressing Towards Goal     Problem: Infection - Risk of, Urinary Catheter-Associated Urinary Tract Infection  Goal: *Absence of infection signs and symptoms  1/5/2020 0932 by Donald Mejia, RN  Outcome: Progressing Towards Goal  1/5/2020 0931 by Donald Mejia, RN  Outcome: Progressing Towards Goal     Problem: Patient Education: Go to Patient Education Activity  Goal: Patient/Family Education  1/5/2020 0932 by Donald Mejia, RN  Outcome: Progressing Towards Goal  1/5/2020 0931 by Donald Mejia RN  Outcome: Progressing Towards Goal

## 2020-01-05 NOTE — PROGRESS NOTES
Problem: Infection - Risk of, Urinary Catheter-Associated Urinary Tract Infection  Goal: *Absence of infection signs and symptoms  Outcome: Progressing Towards Goal     Problem: Patient Education: Go to Patient Education Activity  Goal: Patient/Family Education  Outcome: Progressing Towards Goal

## 2020-01-05 NOTE — PROGRESS NOTES
Received critical lab for lactic acid @ 2.8. Spoke with Md and recived VO to increase IV fluids D5 1/2 NS to 200 ml/hx 5 hours then recheck CMP Phos Mag and Lactic Acid at 0400. Iv fluid rate increased to 200  as ordered. New orders placed for labs.

## 2020-01-05 NOTE — PROGRESS NOTES
RENAL DAILY PROGRESS NOTE    Patient: Jaron Yao               Sex: female          DOA: 1/2/2020  7:37 PM        YOB: 1940      Age:  [de-identified] y.o.        LOS:  LOS: 2 days     Subjective:     Jaron Yao is a [de-identified] y.o.  who presents with Pancreatitis [K85.90]  Hyperosmolar hyperglycemic coma due to diabetes mellitus without ketoacidosis ([facility] [address] [E11.01]  Hyperosmolar non-ketotic state in patient with type 2 diabetes mellitus ([facility] [address] [E11.00].    Asked to evaluate for worsening renal failure,hx of crf stage 3,admitted with hyperglycemic coma  Chief complains: Patient denies nausea, vomiting, chest pain, dizziness, shortness of breath or headache.  - Reviewed last 24 hrs events     Current Facility-Administered Medications   Medication Dose Route Frequency    bisacodyl (DULCOLAX) suppository 10 mg  10 mg Rectal DAILY PRN    polyethylene glycol (MIRALAX) packet 17 g  17 g Oral DAILY    .HEIGHT DOCUMENTATION REQUIRED  1 Each Other ONCE    insulin glargine (LANTUS) injection 26 Units  26 Units SubCUTAneous DAILY    aspirin chewable tablet 81 mg  81 mg Oral DAILY    oxyCODONE-acetaminophen (PERCOCET) 5-325 mg per tablet 1-2 Tab  1-2 Tab Oral Q6H PRN    morphine injection 2 mg  2 mg IntraVENous Q4H PRN    dextrose 5 % - 0.45% NaCl infusion  125 mL/hr IntraVENous CONTINUOUS    dextrose 10% infusion 125-250 mL  125-250 mL IntraVENous PRN    heparin (porcine) injection 5,000 Units  5,000 Units SubCUTAneous Q8H    famotidine (PF) (PEPCID) 20 mg in 0.9% sodium chloride 10 mL injection  20 mg IntraVENous Q48H    insulin lispro (HUMALOG) injection   SubCUTAneous Q6H    influenza vaccine 2019-20 (6 mos+)(PF) (FLUARIX/FLULAVAL/FLUZONE QUAD) injection 0.5 mL  0.5 mL IntraMUSCular PRIOR TO DISCHARGE    glucose chewable tablet 16 g  4 Tab Oral PRN    glucagon (GLUCAGEN) injection 1 mg  1 mg IntraMUSCular PRN       Objective:     Visit Vitals  /63 (BP 1 Location: Left arm, BP Patient Position: At rest)   Pulse 100   Temp 98.8 °F (37.1 °C)   Resp 16   Wt 68.9 kg (151 lb 14.4 oz)   SpO2 99%   Breastfeeding No   BMI 25.28 kg/m²       Intake/Output Summary (Last 24 hours) at 1/5/2020 1413  Last data filed at 1/5/2020 1208  Gross per 24 hour   Intake 0 ml   Output 1902 ml   Net -1902 ml       Physical Examination:     RS: Chest is bilateral equal, no wheezing / rales / crackles  CVS: S1-S2 heard, RRR, No S3 / murmur  Abdomen: Soft, Non tender, Not distended, Positive bowel sounds, no organomegaly, no CVA / supra pubic tenderness  Extremities: No edema, diminished pulses  CNS: Awake   HEENT: Head is atraumatic, PERRLA, conjunctiva pink & non icteric. No JVD or carotid bruit       Data Review:      Labs:     Hematology:   Recent Labs     01/05/20  0119 01/04/20  0500 01/02/20  2116   WBC 5.5 6.3 8.8   HGB 10.8* 10.8* 13.4   HCT 32.3* 31.6* 41.8     Chemistry:   Recent Labs     01/05/20  0119 01/04/20  2101 01/04/20  0500 01/04/20  0235 01/03/20  1448 01/03/20  0430 01/02/20  2325   BUN 46* 51* 69* 73* 84* 92* 95*   CREA 2.00* 2.02* 2.63* 2.78* 3.27* 4.21* 4.70*   CA 8.2* 8.0* 8.5 8.1* 8.9 8.8 9.2   ALB 2.2*  --  2.6*  --   --   --  3.2*   K 4.5 4.1 4.0 4.6 4.9 4.2 5.6*    150* 148* 149* 152* 151* 139   * 119* 119* 120* 122* 124* 109   CO2 22 23 21 21 19* 16* 16*   PHOS 2.4* 2.3* 1.8* 2.2* 3.1 2.8 6.6*   * 201* 241* 299* 180* 344* 935*        Images:    XR (Most Recent). CXR reviewed by me and compared with previous CXR Results from Hospital Encounter encounter on 01/02/20   XR CHEST PORT    Narrative Examination: Portable AP chest     History: Altered mental status    Comparison: December 25, 2018    Findings: There is bibasilar atelectasis. There is mild pulmonary vascular  congestion. There is no pneumothorax. Heart size is stable. Tortuous thoracic  aorta. Degenerative changes of the left shoulder. Impression Impression:  1.   Mild pulmonary vascular congestion without overt edema. CT (Most Recent) Results from Hospital Encounter encounter on 01/02/20   CT ABD PELV WO CONT    Narrative CT Abdomen And Pelvis with Intravenous Contrast    INDICATION: Generalized abdominal pain. Elevated lipase. .    TECHNIQUE: 5 mm collimation axial images obtained from the diaphragm to the  level of the pubic symphysis following the uneventful administration of  100 cc  of low osmolar, nonionic intravenous contrast.    Dose reduction techniques used: Automated exposure control, adjustment of the  mAs and/or kVp according to patient size, standardized low-dose protocol, and/or  iterative reconstruction technique. COMPARISON: July 18, 2017. ABDOMEN FINDINGS:    Lung Bases: There is bibasilar atelectasis. Atelectasis is most conspicuous in  the lingula. The heart is top normal in size. .    Liver: Normal attenuation. No evidence for mass. Gallbladder: Present and appears normal. No biliary ductal dilatation. Pancreas: There is mild peripancreatic edema. .    Spleen: Normal in size. No evidence of mass. .    Adrenal Glands: No evidence for mass. Kidneys:     Right: Atrophic kidneys. No cortical mass. No hydronephrosis. Left:  Atrophic kidneys. No cortical mass. No hydronephrosis. Lymph Nodes: No lymphadenopathy. Aorta: Atherosclerosis. PELVIS FINDINGS:     Bowel: Small Bowel: Normal in caliber with normal wall thickness. Large Bowel: There are scattered colonic diverticula. Lerry Mulhall Appendix: No secondary signs of acute appendicitis. Bladder: Underdistended. Extensive bilateral pelvic masses with calcification, likely fibroid uterus. No  definite suspicious adnexal mass. No ascites. Bones: Degenerative changes of the spine. Osteopenia. Impression Impression:    Mild uncomplicated pancreatitis. Fibroid uterus. Additional incidentals as above. EKG No results found for this or any previous visit.      I have personally reviewed the old medical records and patient's labs    Plan / Recommendation:      1.  Acute/crf stage 3,improved with ivf ,correction of hyperglycemia  2.hypernatremia,improving on  hypotonic fluids  3.acidosis improved,stop iv bicarb  4.hypokalemia,hypophosphatemia,replaced  5.pancreatitis improved  6.pvd,plans by dr Marcos Alaniz    D/w Dr. Maik Quintana MD  Nephrology  1/5/2020

## 2020-01-05 NOTE — PROGRESS NOTES
Pt sleeping   Did not awaken  As suggested in consult yesterday by dr Juan A Vega, get arterial duplex of lower extremities.  Ok to do tomorrow  Pt not able to get CTA due to renal status  Known critical dz based on key  Duplex will show areas of patency for consideration of any treatment options to discuss

## 2020-01-05 NOTE — PROGRESS NOTES
Problem: Diabetes Self-Management  Goal: *Disease process and treatment process  Description  Define diabetes and identify own type of diabetes; list 3 options for treating diabetes. Outcome: Progressing Towards Goal  Goal: *Incorporating nutritional management into lifestyle  Description  Describe effect of type, amount and timing of food on blood glucose; list 3 methods for planning meals. Outcome: Progressing Towards Goal  Goal: *Incorporating physical activity into lifestyle  Description  State effect of exercise on blood glucose levels. Outcome: Progressing Towards Goal  Goal: *Developing strategies to promote health/change behavior  Description  Define the ABC's of diabetes; identify appropriate screenings, schedule and personal plan for screenings. Outcome: Progressing Towards Goal  Goal: *Using medications safely  Description  State effect of diabetes medications on diabetes; name diabetes medication taking, action and side effects. Outcome: Progressing Towards Goal  Goal: *Monitoring blood glucose, interpreting and using results  Description  Identify recommended blood glucose targets  and personal targets. Outcome: Progressing Towards Goal  Goal: *Prevention, detection, treatment of acute complications  Description  List symptoms of hyper- and hypoglycemia; describe how to treat low blood sugar and actions for lowering  high blood glucose level. Outcome: Progressing Towards Goal  Goal: *Prevention, detection and treatment of chronic complications  Description  Define the natural course of diabetes and describe the relationship of blood glucose levels to long term complications of diabetes.   Outcome: Progressing Towards Goal  Goal: *Developing strategies to address psychosocial issues  Description  Describe feelings about living with diabetes; identify support needed and support network  Outcome: Progressing Towards Goal  Goal: *Insulin pump training  Outcome: Progressing Towards Goal  Goal: *Sick day guidelines  Outcome: Progressing Towards Goal  Goal: *Patient Specific Goal (EDIT GOAL, INSERT TEXT)  Outcome: Progressing Towards Goal     Problem: Falls - Risk of  Goal: *Absence of Falls  Description  Document Raman Reas Fall Risk and appropriate interventions in the flowsheet. Outcome: Progressing Towards Goal  Note: Fall Risk Interventions:  Mobility Interventions: Bed/chair exit alarm, Patient to call before getting OOB, Utilize walker, cane, or other assistive device    Mentation Interventions: Bed/chair exit alarm    Medication Interventions: Bed/chair exit alarm, Patient to call before getting OOB    Elimination Interventions: Bed/chair exit alarm, Call light in reach, Patient to call for help with toileting needs              Problem: Pressure Injury - Risk of  Goal: *Prevention of pressure injury  Description  Document Marc Scale and appropriate interventions in the flowsheet.   Outcome: Progressing Towards Goal  Note: Pressure Injury Interventions:  Sensory Interventions: Assess need for specialty bed, Keep linens dry and wrinkle-free, Minimize linen layers    Moisture Interventions: Absorbent underpads, Check for incontinence Q2 hours and as needed, Minimize layers    Activity Interventions: Assess need for specialty bed, Increase time out of bed, PT/OT evaluation    Mobility Interventions: Assess need for specialty bed, Float heels, Pressure redistribution bed/mattress (bed type)    Nutrition Interventions: Document food/fluid/supplement intake    Friction and Shear Interventions: Apply protective barrier, creams and emollients, Lift sheet, Minimize layers                Problem: Nutrition Deficit  Goal: *Optimize nutritional status  Outcome: Progressing Towards Goal

## 2020-01-05 NOTE — ROUTINE PROCESS
Bedside shift change report given to Savana Carrasco RN  (oncoming nurse) by Judit Staples RN  (offgoing nurse). Report included the following information SBAR, Kardex and MAR.

## 2020-01-05 NOTE — PROGRESS NOTES
[facility] Hospitalist Group  Progress Note    Patient: Bere Mccoy Age: [de-identified] y.o. : 1940 MR#: [id number] SSN: [id number]  Date/Time: 2020     Subjective:    Pt has c/o constipation  Assessment/Plan:     1. Type 2 diabetes  uncontrolled with HHS: Off insulin drip,  Lantus increased, pt has only received one dose of increased lantus thus far, will cont to monitor and adjust and continue SSI. 2. Acute on chronic renal failure Stage 3: Multifactorial including dehydration, hyperglycemia: Continue IV fluids with bicarb per nephrology, nephrology input noted  3. 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 4. Acute pancreatitis: Unclear etiology, CT scan negative for GB stones, pain improving, lipase trending down, will get right upper quadrant ultrasound, will get GI involved. Will start diet as tolerated. 5. Metabolic acidosis due to renal issues-resolved  6. Acute metabolic encephalopathy: Improved  7. Hypertension:   8. Hypophosphatemia: Replacement per nephrology   9. DVT prophylaxis: Heparin  10.  Full code        Case discussed with:  [x]Patient  []Family  [x]Nursing  []Case Management  DVT Prophylaxis:  []Lovenox  [x]Hep SQ  []SCDs  []Coumadin   []On Heparin gtt    Objective:   VS:   Visit Vitals  /63 (BP 1 Location: Left arm, BP Patient Position: At rest)   Pulse 100   Temp 98.8 °F (37.1 °C)   Resp 16   Wt 68.9 kg (151 lb 14.4 oz)   SpO2 99%   Breastfeeding No   BMI 25.28 kg/m²      Tmax/24hrs: Temp (24hrs), Av.4 °F (36.9 °C), Min:97.7 °F (36.5 °C), Max:99 °F (37.2 °C)  IOBRIEF    Intake/Output Summary (Last 24 hours) at 2020 1543  Last data filed at 2020 1534  Gross per 24 hour   Intake 0 ml   Output 2102 ml   Net -2102 ml       General:  Alert, cooperative, no acute distress    HEENT: PERRLA, anicteric sclerae. Pulmonary:  CTA Bilaterally. No Wheezing/Rales. Cardiovascular: Regular rate and Rhythm. GI:  Soft, Non distended, Non tender. + Bowel sounds. Extremities:  No edema  Neurologic: AA O x2,  moves all extremities well. Additional: Right feet:distal cyanosis and cool to touch mostly from the first metatarsophalangeal joint distally, toes are colder, toes are slightly dusky in color, tenderness present, signs of cyanosis present. Chronic skin changes noted. Left foot warm to touch and no signs of cyanosis.     Medications:   Current Facility-Administered Medications   Medication Dose Route Frequency    bisacodyl (DULCOLAX) suppository 10 mg  10 mg Rectal DAILY PRN    polyethylene glycol (MIRALAX) packet 17 g  17 g Oral DAILY    .HEIGHT DOCUMENTATION REQUIRED  1 Each Other ONCE    insulin glargine (LANTUS) injection 26 Units  26 Units SubCUTAneous DAILY    aspirin chewable tablet 81 mg  81 mg Oral DAILY    oxyCODONE-acetaminophen (PERCOCET) 5-325 mg per tablet 1-2 Tab  1-2 Tab Oral Q6H PRN    morphine injection 2 mg  2 mg IntraVENous Q4H PRN    dextrose 5 % - 0.45% NaCl infusion  125 mL/hr IntraVENous CONTINUOUS    dextrose 10% infusion 125-250 mL  125-250 mL IntraVENous PRN    heparin (porcine) injection 5,000 Units  5,000 Units SubCUTAneous Q8H    famotidine (PF) (PEPCID) 20 mg in 0.9% sodium chloride 10 mL injection  20 mg IntraVENous Q48H    insulin lispro (HUMALOG) injection   SubCUTAneous Q6H    influenza vaccine 2019-20 (6 mos+)(PF) (FLUARIX/FLULAVAL/FLUZONE QUAD) injection 0.5 mL  0.5 mL IntraMUSCular PRIOR TO DISCHARGE    glucose chewable tablet 16 g  4 Tab Oral PRN    glucagon (GLUCAGEN) injection 1 mg  1 mg IntraMUSCular PRN       Labs:    Recent Results (from the past 48 hour(s))   GLUCOSE, POC    Collection Time: 01/03/20  6:56 PM   Result Value Ref Range    Glucose (POC) 262 (H) 70 - 110 mg/dL   POC LACTIC ACID    Collection Time: 01/03/20  7:16 PM   Result Value Ref Range    Lactic Acid (POC) 0.93 0.40 - 2.00 mmol/L   GLUCOSE, POC    Collection Time: 01/04/20 12:33 AM   Result Value Ref Range    Glucose (POC) 335 (H) 70 - 169 mg/dL   METABOLIC PANEL, BASIC    Collection Time: 01/04/20  2:35 AM   Result Value Ref Range    Sodium 149 (H) 136 - 145 mmol/L    Potassium 4.6 3.5 - 5.5 mmol/L    Chloride 120 (H) 100 - 111 mmol/L    CO2 21 21 - 32 mmol/L    Anion gap 8 3.0 - 18 mmol/L    Glucose 299 (H) 74 - 99 mg/dL    BUN 73 (H) 7.0 - 18 MG/DL    Creatinine 2.78 (H) 0.6 - 1.3 MG/DL    BUN/Creatinine ratio 26 (H) 12 - 20      GFR est AA 20 (L) >60 ml/min/1.73m2    GFR est non-AA 16 (L) >60 ml/min/1.73m2    Calcium 8.1 (L) 8.5 - 10.1 MG/DL   MAGNESIUM    Collection Time: 01/04/20  2:35 AM   Result Value Ref Range    Magnesium 3.0 (H) 1.6 - 2.6 mg/dL   PHOSPHORUS    Collection Time: 01/04/20  2:35 AM   Result Value Ref Range    Phosphorus 2.2 (L) 2.5 - 4.9 MG/DL   LIPID PANEL    Collection Time: 01/04/20  2:35 AM   Result Value Ref Range    LIPID PROFILE          Cholesterol, total 173 <200 MG/DL    Triglyceride 155 (H) <150 MG/DL    HDL Cholesterol 33 (L) 40 - 60 MG/DL    LDL, calculated 109 (H) 0 - 100 MG/DL    VLDL, calculated 31 MG/DL    CHOL/HDL Ratio 5.2 (H) 0 - 5.0     METABOLIC PANEL, BASIC    Collection Time: 01/04/20  5:00 AM   Result Value Ref Range    Sodium 148 (H) 136 - 145 mmol/L    Potassium 4.0 3.5 - 5.5 mmol/L    Chloride 119 (H) 100 - 111 mmol/L    CO2 21 21 - 32 mmol/L    Anion gap 8 3.0 - 18 mmol/L    Glucose 241 (H) 74 - 99 mg/dL    BUN 69 (H) 7.0 - 18 MG/DL    Creatinine 2.63 (H) 0.6 - 1.3 MG/DL    BUN/Creatinine ratio 26 (H) 12 - 20      GFR est AA 21 (L) >60 ml/min/1.73m2    GFR est non-AA 17 (L) >60 ml/min/1.73m2    Calcium 8.5 8.5 - 10.1 MG/DL   MAGNESIUM    Collection Time: 01/04/20  5:00 AM   Result Value Ref Range    Magnesium 3.0 (H) 1.6 - 2.6 mg/dL   PHOSPHORUS    Collection Time: 01/04/20  5:00 AM   Result Value Ref Range    Phosphorus 1.8 (L) 2.5 - 4.9 MG/DL   CBC WITH AUTOMATED DIFF    Collection Time: 01/04/20  5:00 AM   Result Value Ref Range    WBC 6.3 4.6 - 13.2 K/uL    RBC 3.38 (L) 4.20 - 5.30 M/uL    HGB 10.8 (L) 12.0 - 16.0 g/dL    HCT 31.6 (L) 35.0 - 45.0 %    MCV 93.5 74.0 - 97.0 FL    MCH 32.0 24.0 - 34.0 PG    MCHC 34.2 31.0 - 37.0 g/dL    RDW 13.7 11.6 - 14.5 %    PLATELET 122 145 - 964 K/uL    MPV 11.4 9.2 - 11.8 FL    NEUTROPHILS 70 40 - 73 %    LYMPHOCYTES 19 (L) 21 - 52 %    MONOCYTES 9 3 - 10 %    EOSINOPHILS 2 0 - 5 %    BASOPHILS 0 0 - 2 %    ABS. NEUTROPHILS 4.4 1.8 - 8.0 K/UL    ABS. LYMPHOCYTES 1.2 0.9 - 3.6 K/UL    ABS. MONOCYTES 0.6 0.05 - 1.2 K/UL    ABS. EOSINOPHILS 0.1 0.0 - 0.4 K/UL    ABS. BASOPHILS 0.0 0.0 - 0.1 K/UL    DF AUTOMATED     LIPASE    Collection Time: 01/04/20  5:00 AM   Result Value Ref Range    Lipase 4,389 (H) 73 - 393 U/L   HEPATIC FUNCTION PANEL    Collection Time: 01/04/20  5:00 AM   Result Value Ref Range    Protein, total 6.4 6.4 - 8.2 g/dL    Albumin 2.6 (L) 3.4 - 5.0 g/dL    Globulin 3.8 2.0 - 4.0 g/dL    A-G Ratio 0.7 (L) 0.8 - 1.7      Bilirubin, total 0.6 0.2 - 1.0 MG/DL    Bilirubin, direct 0.1 0.0 - 0.2 MG/DL    Alk.  phosphatase 100 45 - 117 U/L    AST (SGOT) 56 (H) 10 - 38 U/L    ALT (SGPT) 29 13 - 56 U/L   GLUCOSE, POC    Collection Time: 01/04/20  6:54 AM   Result Value Ref Range    Glucose (POC) 301 (H) 70 - 110 mg/dL   GLUCOSE, POC    Collection Time: 01/04/20  9:32 AM   Result Value Ref Range    Glucose (POC) 265 (H) 70 - 110 mg/dL   GLUCOSE, POC    Collection Time: 01/04/20  1:47 PM   Result Value Ref Range    Glucose (POC) 202 (H) 70 - 110 mg/dL   LACTIC ACID    Collection Time: 01/04/20  4:50 PM   Result Value Ref Range    Lactic acid 1.4 0.4 - 2.0 MMOL/L   METABOLIC PANEL, BASIC    Collection Time: 01/04/20  9:01 PM   Result Value Ref Range    Sodium 150 (H) 136 - 145 mmol/L    Potassium 4.1 3.5 - 5.5 mmol/L    Chloride 119 (H) 100 - 111 mmol/L    CO2 23 21 - 32 mmol/L    Anion gap 8 3.0 - 18 mmol/L    Glucose 201 (H) 74 - 99 mg/dL    BUN 51 (H) 7.0 - 18 MG/DL    Creatinine 2.02 (H) 0.6 - 1.3 MG/DL    BUN/Creatinine ratio 25 (H) 12 - 20      GFR est AA 29 (L) >60 ml/min/1.73m2    GFR est non-AA 24 (L) >60 ml/min/1.73m2    Calcium 8.0 (L) 8.5 - 10.1 MG/DL   MAGNESIUM    Collection Time: 01/04/20  9:01 PM   Result Value Ref Range    Magnesium 2.7 (H) 1.6 - 2.6 mg/dL   PHOSPHORUS    Collection Time: 01/04/20  9:01 PM   Result Value Ref Range    Phosphorus 2.3 (L) 2.5 - 4.9 MG/DL   LACTIC ACID    Collection Time: 01/04/20  9:01 PM   Result Value Ref Range    Lactic acid 2.8 (HH) 0.4 - 2.0 MMOL/L   GLUCOSE, POC    Collection Time: 01/04/20 11:42 PM   Result Value Ref Range    Glucose (POC) 164 (H) 70 - 110 mg/dL   CBC WITH AUTOMATED DIFF    Collection Time: 01/05/20  1:19 AM   Result Value Ref Range    WBC 5.5 4.6 - 13.2 K/uL    RBC 3.43 (L) 4.20 - 5.30 M/uL    HGB 10.8 (L) 12.0 - 16.0 g/dL    HCT 32.3 (L) 35.0 - 45.0 %    MCV 94.2 74.0 - 97.0 FL    MCH 31.5 24.0 - 34.0 PG    MCHC 33.4 31.0 - 37.0 g/dL    RDW 13.9 11.6 - 14.5 %    PLATELET 003 842 - 366 K/uL    MPV 12.1 (H) 9.2 - 11.8 FL    NEUTROPHILS 67 40 - 73 %    LYMPHOCYTES 21 21 - 52 %    MONOCYTES 11 (H) 3 - 10 %    EOSINOPHILS 1 0 - 5 %    BASOPHILS 0 0 - 2 %    ABS. NEUTROPHILS 3.7 1.8 - 8.0 K/UL    ABS. LYMPHOCYTES 1.2 0.9 - 3.6 K/UL    ABS. MONOCYTES 0.6 0.05 - 1.2 K/UL    ABS. EOSINOPHILS 0.1 0.0 - 0.4 K/UL    ABS.  BASOPHILS 0.0 0.0 - 0.1 K/UL    DF AUTOMATED     METABOLIC PANEL, COMPREHENSIVE    Collection Time: 01/05/20  1:19 AM   Result Value Ref Range    Sodium 144 136 - 145 mmol/L    Potassium 4.5 3.5 - 5.5 mmol/L    Chloride 115 (H) 100 - 111 mmol/L    CO2 22 21 - 32 mmol/L    Anion gap 7 3.0 - 18 mmol/L    Glucose 267 (H) 74 - 99 mg/dL    BUN 46 (H) 7.0 - 18 MG/DL    Creatinine 2.00 (H) 0.6 - 1.3 MG/DL    BUN/Creatinine ratio 23 (H) 12 - 20      GFR est AA 29 (L) >60 ml/min/1.73m2    GFR est non-AA 24 (L) >60 ml/min/1.73m2    Calcium 8.2 (L) 8.5 - 10.1 MG/DL    Bilirubin, total 0.7 0.2 - 1.0 MG/DL    ALT (SGPT) 34 13 - 56 U/L    AST (SGOT) 76 (H) 10 - 38 U/L    Alk.  phosphatase 96 45 - 117 U/L    Protein, total 6.7 6.4 - 8.2 g/dL    Albumin 2.2 (L) 3.4 - 5.0 g/dL    Globulin 4.5 (H) 2.0 - 4.0 g/dL    A-G Ratio 0.5 (L) 0.8 - 1.7     LIPASE    Collection Time: 01/05/20  1:19 AM   Result Value Ref Range    Lipase 4,542 (H) 73 - 393 U/L   MAGNESIUM    Collection Time: 01/05/20  1:19 AM   Result Value Ref Range    Magnesium 2.8 (H) 1.6 - 2.6 mg/dL   PHOSPHORUS    Collection Time: 01/05/20  1:19 AM   Result Value Ref Range    Phosphorus 2.4 (L) 2.5 - 4.9 MG/DL   LACTIC ACID    Collection Time: 01/05/20  1:19 AM   Result Value Ref Range    Lactic acid 2.0 0.4 - 2.0 MMOL/L   GLUCOSE, POC    Collection Time: 01/05/20  4:47 AM   Result Value Ref Range    Glucose (POC) 340 (H) 70 - 110 mg/dL   GLUCOSE, POC    Collection Time: 01/05/20 11:37 AM   Result Value Ref Range    Glucose (POC) 376 (H) 70 - 110 mg/dL   DUPLEX LOWER EXT ARTERY BILAT    Collection Time: 01/05/20  1:17 PM   Result Value Ref Range    Right CFA dist sys PSV 89.5 cm/s    Right Prox PFA A PSV 74.2 cm/s    Right super femoral dist sys PSV 22.9 cm/s    Right super femoral mid sys PSV 14.7 cm/s    Right super femoral prox sys PSV 31.4 cm/s    Right popliteal dist sys PSV 21.0 cm/s    Right popliteal prox sys PSV 16.8 cm/s    Right mid PTA sys PSV 23.8 cm/s    Right Prox PTA PSV 26.4 cm/s    Right Dist EDIE Velocity 22.9 cm/s    Right Mid EDIE Velocity 18.2 cm/s    Right Prox EDIE Velocity 17.9 cm/s    Left CFA dist sys .0 cm/s    Left Prox PFA A .8 cm/s    Left super femoral dist sys PSV 48.2 cm/s    Left super femoral mid sys PSV 36.0 cm/s    Left super femoral prox sys PSV 28.6 cm/s    Left popliteal dist sys PSV 22.6 cm/s    Left popliteal prox sys PSV 32.6 cm/s    Left Dist PTA PSV 25.8 cm/s    Left mid PTA sys PSV 26.5 cm/s    Left Prox PTA PSV 42.0 cm/s    Left Dist EDIE Velocity 36.9 cm/s    Left Mid EDIE Velocity 33.8 cm/s    Left Prox EDIE Velocity 23.9 cm/s    Right SFA Prox Michael Ratio 0.4     Right SFA Mid Michael Ratio 0.5     Right SFA Dist Michael Ratio 1.56     Right Pop A Prox Michael Ratio 0.73     Left SFA Prox Michael Ratio 0.19     Left SFA Mid Michael Ratio 1.26     Left SFA Dist Michael Ratio 1.34     Left Pop A Prox Michael Ratio 0.68        Signed By: Marck Velez MD     January 5, 2020      Disclaimer: Sections of this note are dictated using utilizing voice recognition software. Minor typographical errors may be present. If questions arise, please do not hesitate to contact me or call our department.

## 2020-01-05 NOTE — PROGRESS NOTES
Bedside shift change report given to Judit Staples RN (oncoming nurse) by Richard mcwilliams RN (offgoing nurse). Report included the following information SBAR, Kardex and MAR.

## 2020-01-05 NOTE — PROGRESS NOTES
Patient is Aox4 with no complaints of pain or discomfort at this time. AM assessment completed. Bed in lowest locked position, call light within reach, side rails x3. Problem: Diabetes Self-Management  Goal: *Disease process and treatment process  Description  Define diabetes and identify own type of diabetes; list 3 options for treating diabetes. Outcome: Progressing Towards Goal  Goal: *Incorporating nutritional management into lifestyle  Description  Describe effect of type, amount and timing of food on blood glucose; list 3 methods for planning meals. Outcome: Progressing Towards Goal  Goal: *Incorporating physical activity into lifestyle  Description  State effect of exercise on blood glucose levels. Outcome: Progressing Towards Goal  Goal: *Developing strategies to promote health/change behavior  Description  Define the ABC's of diabetes; identify appropriate screenings, schedule and personal plan for screenings. Outcome: Progressing Towards Goal  Goal: *Using medications safely  Description  State effect of diabetes medications on diabetes; name diabetes medication taking, action and side effects. Outcome: Progressing Towards Goal  Goal: *Monitoring blood glucose, interpreting and using results  Description  Identify recommended blood glucose targets  and personal targets. Outcome: Progressing Towards Goal  Goal: *Prevention, detection, treatment of acute complications  Description  List symptoms of hyper- and hypoglycemia; describe how to treat low blood sugar and actions for lowering  high blood glucose level. Outcome: Progressing Towards Goal  Goal: *Prevention, detection and treatment of chronic complications  Description  Define the natural course of diabetes and describe the relationship of blood glucose levels to long term complications of diabetes.   Outcome: Progressing Towards Goal  Goal: *Developing strategies to address psychosocial issues  Description  Describe feelings about living with diabetes; identify support needed and support network  Outcome: Progressing Towards Goal  Goal: *Insulin pump training  Outcome: Progressing Towards Goal  Goal: *Sick day guidelines  Outcome: Progressing Towards Goal  Goal: *Patient Specific Goal (EDIT GOAL, INSERT TEXT)  Outcome: Progressing Towards Goal     Problem: Patient Education: Go to Patient Education Activity  Goal: Patient/Family Education  Outcome: Progressing Towards Goal     Problem: Falls - Risk of  Goal: *Absence of Falls  Description  Document Nadya Lowry Fall Risk and appropriate interventions in the flowsheet. Outcome: Progressing Towards Goal  Note: Fall Risk Interventions:  Mobility Interventions: Bed/chair exit alarm, Patient to call before getting OOB, PT Consult for mobility concerns    Mentation Interventions: Bed/chair exit alarm    Medication Interventions: Bed/chair exit alarm    Elimination Interventions: Bed/chair exit alarm, Call light in reach, Patient to call for help with toileting needs              Problem: Patient Education: Go to Patient Education Activity  Goal: Patient/Family Education  Outcome: Progressing Towards Goal     Problem: Pressure Injury - Risk of  Goal: *Prevention of pressure injury  Description  Document Marc Scale and appropriate interventions in the flowsheet. Outcome: Progressing Towards Goal  Note: Pressure Injury Interventions:  Sensory Interventions: Assess changes in LOC, Maintain/enhance activity level, Minimize linen layers, Pressure redistribution bed/mattress (bed type)    Moisture Interventions: Apply protective barrier, creams and emollients, Absorbent underpads, Internal/External urinary devices    Activity Interventions: Increase time out of bed, Assess need for specialty bed    Mobility Interventions: Pressure redistribution bed/mattress (bed type), PT/OT evaluation, Turn and reposition approx.  every two hours(pillow and wedges)    Nutrition Interventions: Document food/fluid/supplement intake    Friction and Shear Interventions: Apply protective barrier, creams and emollients, Transfer aides:transfer board/Anibal lift/ceiling lift, Transferring/repositioning devices                Problem: Patient Education: Go to Patient Education Activity  Goal: Patient/Family Education  Outcome: Progressing Towards Goal     Problem: Pain  Goal: *Control of Pain  Outcome: Progressing Towards Goal  Goal: *PALLIATIVE CARE:  Alleviation of Pain  Outcome: Progressing Towards Goal     Problem: Patient Education: Go to Patient Education Activity  Goal: Patient/Family Education  Outcome: Progressing Towards Goal     Problem: Injury - Risk of, Adverse Drug Event  Goal: *Absence of adverse drug events  Outcome: Progressing Towards Goal  Goal: *Absence of medication errors  Outcome: Progressing Towards Goal  Goal: *Knowledge of prescribed medications  Outcome: Progressing Towards Goal     Problem: Patient Education: Go to Patient Education Activity  Goal: Patient/Family Education  Outcome: Progressing Towards Goal     Problem: Altered Thought Process (Adult/Pediatric)  Goal: *STG: Participates in treatment plan  Outcome: Progressing Towards Goal  Goal: *STG: Remains safe in hospital  Outcome: Progressing Towards Goal  Goal: *STG: Seeks staff when feelings of anxiety and fear arise  Outcome: Progressing Towards Goal  Goal: *STG: Complies with medication therapy  Outcome: Progressing Towards Goal  Goal: *STG: Attends activities and groups  Outcome: Progressing Towards Goal  Goal: *STG: Decreased delusional thinking  Outcome: Progressing Towards Goal  Goal: *STG: Decreased hallucinations  Outcome: Progressing Towards Goal  Goal: *STG: Absence of lethality  Outcome: Progressing Towards Goal  Goal: *STG: Demonstrates ability to understand and use improved judgment in daily activities and relationships  Outcome: Progressing Towards Goal  Goal: *LTG: Returns to baseline functioning  Outcome: Progressing Towards Goal  Goal: Interventions  Outcome: Progressing Towards Goal     Problem: Patient Education: Go to Patient Education Activity  Goal: Patient/Family Education  Outcome: Progressing Towards Goal     Problem: Nutrition Deficit  Goal: *Optimize nutritional status  Outcome: Progressing Towards Goal     Problem: Infection - Risk of, Urinary Catheter-Associated Urinary Tract Infection  Goal: *Absence of infection signs and symptoms  Outcome: Progressing Towards Goal     Problem: Patient Education: Go to Patient Education Activity  Goal: Patient/Family Education  Outcome: Progressing Towards Goal

## 2020-01-06 ENCOUNTER — HOSPITAL ENCOUNTER (OUTPATIENT)
Dept: ULTRASOUND IMAGING | Age: 80
Discharge: HOME | DRG: 616 | End: 2020-01-06
Attending: HOSPITALIST
Payer: MEDICARE

## 2020-01-06 LAB
BASOPHILS # BLD: 0 K/UL (ref 0–0.1)
BASOPHILS NFR BLD: 0 % (ref 0–2)
DIFFERENTIAL METHOD BLD: ABNORMAL
EOSINOPHIL # BLD: 0.2 K/UL (ref 0–0.4)
EOSINOPHIL NFR BLD: 3 % (ref 0–5)
ERYTHROCYTE [DISTWIDTH] IN BLOOD BY AUTOMATED COUNT: 13.7 % (ref 11.6–14.5)
GLUCOSE BLD STRIP.AUTO-MCNC: 140 MG/DL (ref 70–110)
GLUCOSE BLD STRIP.AUTO-MCNC: 211 MG/DL (ref 70–110)
GLUCOSE BLD STRIP.AUTO-MCNC: 214 MG/DL (ref 70–110)
HCT VFR BLD AUTO: 30.6 % (ref 35–45)
HGB BLD-MCNC: 10.4 G/DL (ref 12–16)
LEFT CFA DIST SYS PSV: 153 CM/S
LEFT DIST ATA VELOCITY: 36.9 CM/S
LEFT DIST PTA PSV: 25.8 CM/S
LEFT MID ATA VELOCITY: 33.8 CM/S
LEFT POP A PROX VEL RATIO: 0.68
LEFT POPLITEAL DIST SYS PSV: 22.6 CM/S
LEFT POPLITEAL PROX SYS PSV: 32.6 CM/S
LEFT PROX ATA VELOCITY: 23.9 CM/S
LEFT PROX PFA A PSV: 114.8 CM/S
LEFT PROX PTA PSV: 42 CM/S
LEFT PTA MID SYS PSV: 26.5 CM/S
LEFT SFA DIST VEL RATIO: 1.34
LEFT SFA MID VEL RATIO: 1.26
LEFT SFA PROX VEL RATIO: 0.19
LEFT SUPER FEMORAL DIST SYS PSV: 48.2 CM/S
LEFT SUPER FEMORAL MID SYS PSV: 36 CM/S
LEFT SUPER FEMORAL PROX SYS PSV: 28.6 CM/S
LYMPHOCYTES # BLD: 1.5 K/UL (ref 0.9–3.6)
LYMPHOCYTES NFR BLD: 25 % (ref 21–52)
MCH RBC QN AUTO: 32.4 PG (ref 24–34)
MCHC RBC AUTO-ENTMCNC: 34 G/DL (ref 31–37)
MCV RBC AUTO: 95.3 FL (ref 74–97)
MONOCYTES # BLD: 0.7 K/UL (ref 0.05–1.2)
MONOCYTES NFR BLD: 11 % (ref 3–10)
NEUTS SEG # BLD: 3.7 K/UL (ref 1.8–8)
NEUTS SEG NFR BLD: 61 % (ref 40–73)
PLATELET # BLD AUTO: 134 K/UL (ref 135–420)
PMV BLD AUTO: 11.8 FL (ref 9.2–11.8)
RBC # BLD AUTO: 3.21 M/UL (ref 4.2–5.3)
RIGHT CFA DIST SYS PSV: 89.5 CM/S
RIGHT DIST ATA VELOCITY: 22.9 CM/S
RIGHT MID ATA VELOCITY: 18.2 CM/S
RIGHT POP A PROX VEL RATIO: 0.73
RIGHT POPLITEAL DIST SYS PSV: 21 CM/S
RIGHT POPLITEAL PROX SYS PSV: 16.8 CM/S
RIGHT PROX ATA VELOCITY: 17.9 CM/S
RIGHT PROX PFA A PSV: 74.2 CM/S
RIGHT PROX PTA PSV: 26.4 CM/S
RIGHT PTA MID SYS PSV: 23.8 CM/S
RIGHT SFA DIST VEL RATIO: 1.56
RIGHT SFA MID VEL RATIO: 0.5
RIGHT SFA PROX VEL RATIO: 0.4
RIGHT SUPER FEMORAL DIST SYS PSV: 22.9 CM/S
RIGHT SUPER FEMORAL MID SYS PSV: 14.7 CM/S
RIGHT SUPER FEMORAL PROX SYS PSV: 31.4 CM/S
WBC # BLD AUTO: 6 K/UL (ref 4.6–13.2)

## 2020-01-06 PROCEDURE — 85025 COMPLETE CBC W/AUTO DIFF WBC: CPT

## 2020-01-06 PROCEDURE — 74011250636 HC RX REV CODE- 250/636: Performed by: INTERNAL MEDICINE

## 2020-01-06 PROCEDURE — 82962 GLUCOSE BLOOD TEST: CPT

## 2020-01-06 PROCEDURE — 76705 ECHO EXAM OF ABDOMEN: CPT

## 2020-01-06 PROCEDURE — 90471 IMMUNIZATION ADMIN: CPT

## 2020-01-06 PROCEDURE — 74011636637 HC RX REV CODE- 636/637: Performed by: INTERNAL MEDICINE

## 2020-01-06 PROCEDURE — 74011000258 HC RX REV CODE- 258: Performed by: INTERNAL MEDICINE

## 2020-01-06 PROCEDURE — 65660000000 HC RM CCU STEPDOWN

## 2020-01-06 PROCEDURE — 74011250637 HC RX REV CODE- 250/637: Performed by: HOSPITALIST

## 2020-01-06 PROCEDURE — 74011636637 HC RX REV CODE- 636/637: Performed by: HOSPITALIST

## 2020-01-06 PROCEDURE — 36415 COLL VENOUS BLD VENIPUNCTURE: CPT

## 2020-01-06 PROCEDURE — 74011250636 HC RX REV CODE- 250/636: Performed by: PHYSICIAN ASSISTANT

## 2020-01-06 PROCEDURE — 74011250637 HC RX REV CODE- 250/637: Performed by: NURSE PRACTITIONER

## 2020-01-06 PROCEDURE — 90686 IIV4 VACC NO PRSV 0.5 ML IM: CPT | Performed by: INTERNAL MEDICINE

## 2020-01-06 PROCEDURE — 74011000258 HC RX REV CODE- 258: Performed by: HOSPITALIST

## 2020-01-06 RX ORDER — SODIUM CHLORIDE 450 MG/100ML
40 INJECTION, SOLUTION INTRAVENOUS CONTINUOUS
Status: DISCONTINUED | OUTPATIENT
Start: 2020-01-06 | End: 2020-01-07

## 2020-01-06 RX ORDER — INSULIN GLARGINE 100 [IU]/ML
28 INJECTION, SOLUTION SUBCUTANEOUS DAILY
Status: DISCONTINUED | OUTPATIENT
Start: 2020-01-07 | End: 2020-01-23

## 2020-01-06 RX ORDER — INSULIN GLARGINE 100 [IU]/ML
2 INJECTION, SOLUTION SUBCUTANEOUS ONCE
Status: COMPLETED | OUTPATIENT
Start: 2020-01-06 | End: 2020-01-06

## 2020-01-06 RX ADMIN — HEPARIN SODIUM 5000 UNITS: 5000 INJECTION INTRAVENOUS; SUBCUTANEOUS at 20:58

## 2020-01-06 RX ADMIN — INSULIN GLARGINE 2 UNITS: 100 INJECTION, SOLUTION SUBCUTANEOUS at 16:32

## 2020-01-06 RX ADMIN — OXYCODONE HYDROCHLORIDE AND ACETAMINOPHEN 2 TABLET: 5; 325 TABLET ORAL at 18:55

## 2020-01-06 RX ADMIN — DEXTROSE MONOHYDRATE AND SODIUM CHLORIDE 125 ML/HR: 5; .45 INJECTION, SOLUTION INTRAVENOUS at 12:06

## 2020-01-06 RX ADMIN — INSULIN LISPRO 6 UNITS: 100 INJECTION, SOLUTION INTRAVENOUS; SUBCUTANEOUS at 18:48

## 2020-01-06 RX ADMIN — INSULIN LISPRO 6 UNITS: 100 INJECTION, SOLUTION INTRAVENOUS; SUBCUTANEOUS at 23:32

## 2020-01-06 RX ADMIN — POLYETHYLENE GLYCOL 3350 17 G: 17 POWDER, FOR SOLUTION ORAL at 09:00

## 2020-01-06 RX ADMIN — SODIUM CHLORIDE 40 ML/HR: 450 INJECTION, SOLUTION INTRAVENOUS at 16:16

## 2020-01-06 RX ADMIN — INSULIN GLARGINE 26 UNITS: 100 INJECTION, SOLUTION SUBCUTANEOUS at 09:00

## 2020-01-06 RX ADMIN — Medication 81 MG: at 10:02

## 2020-01-06 RX ADMIN — INFLUENZA VIRUS VACCINE 0.5 ML: 15; 15; 15; 15 SUSPENSION INTRAMUSCULAR at 10:10

## 2020-01-06 RX ADMIN — DEXTROSE MONOHYDRATE AND SODIUM CHLORIDE 125 ML/HR: 5; .45 INJECTION, SOLUTION INTRAVENOUS at 02:53

## 2020-01-06 RX ADMIN — INSULIN LISPRO 6 UNITS: 100 INJECTION, SOLUTION INTRAVENOUS; SUBCUTANEOUS at 11:25

## 2020-01-06 RX ADMIN — HEPARIN SODIUM 5000 UNITS: 5000 INJECTION INTRAVENOUS; SUBCUTANEOUS at 05:16

## 2020-01-06 RX ADMIN — OXYCODONE HYDROCHLORIDE AND ACETAMINOPHEN 1 TABLET: 5; 325 TABLET ORAL at 10:00

## 2020-01-06 NOTE — PROGRESS NOTES
[facility] Hospitalist Group  Progress Note    Patient: Abigail Aparicio Age: [de-identified] y.o. : 1940 MR#: [id number] SSN: [id number]  Date/Time: 2020     Subjective:    Pt has no complaints. Assessment/Plan:     1. Type 2 diabetes Now Off insulin drip,  Lantus increased recently. 2. Acute on chronic renal failure Stage 3: likely mostly prerenal.  Continue IV fluids, nephrology input noted  3. Right lower extremity ischemic changes: LORENA indicative of severe to critical arterial insuff at illeo-fem level, fem-pop level.:Arterial duplex result noted pt noted to have monophasic flow, vasc following awaiting medical clearance and improvement in renal function before intervening. 4. Acute pancreatitis: Unclear etiology, Pt does have history of regular EtOH consumption and AST:ALT ratio that goes along with EtOH habit. CT scan negative for GB stones, RUQ us pending, IgG4 result pending. GI following. Pain improving, lipase trending down, GI following,   5. Metabolic acidosis due to renal issues-resolved  6. Acute metabolic encephalopathy: Improved  7. Hypertension:   8. Hypophosphatemia: Replacement per nephrology   9. DVT prophylaxis: Heparin  10.  Full code        Case discussed with:  [x]Patient  []Family  [x]Nursing  []Case Management  DVT Prophylaxis:  []Lovenox  [x]Hep SQ  []SCDs  []Coumadin   []On Heparin gtt    Objective:   VS:   Visit Vitals  /58 (BP 1 Location: Right arm, BP Patient Position: At rest)   Pulse 61   Temp 98.6 °F (37 °C)   Resp 15   Ht 5' 5\" (1.651 m)   Wt 74.7 kg (164 lb 9.6 oz)   SpO2 98%   Breastfeeding No   BMI 27.39 kg/m²      Tmax/24hrs: Temp (24hrs), Av.2 °F (36.8 °C), Min:97.7 °F (36.5 °C), Max:98.6 °F (37 °C)  IOBRIEF    Intake/Output Summary (Last 24 hours) at 2020 1725  Last data filed at 2020 0920  Gross per 24 hour   Intake 370 ml   Output 1750 ml   Net -1380 ml       General:  Alert, cooperative, no acute distress    HEENT: PERRLA, anicteric sclerae. Pulmonary:  CTA Bilaterally. No Wheezing/Rales. Cardiovascular: Regular rate and Rhythm. GI:  Soft, Non distended, Non tender. + Bowel sounds. Extremities:  No edema  Neurologic: AA O x2,  moves all extremities well. Additional: Right feet:distal cyanosis and cool to touch mostly from the first metatarsophalangeal joint distally, toes, with clear demarcation between dusky distal aspect of foot and rest of foot. Left foot warm to touch and no signs of cyanosis.     Medications:   Current Facility-Administered Medications   Medication Dose Route Frequency    0.45% sodium chloride infusion  40 mL/hr IntraVENous CONTINUOUS    [START ON 1/7/2020] insulin glargine (LANTUS) injection 28 Units  28 Units SubCUTAneous DAILY    bisacodyl (DULCOLAX) suppository 10 mg  10 mg Rectal DAILY PRN    polyethylene glycol (MIRALAX) packet 17 g  17 g Oral DAILY    aspirin chewable tablet 81 mg  81 mg Oral DAILY    oxyCODONE-acetaminophen (PERCOCET) 5-325 mg per tablet 1-2 Tab  1-2 Tab Oral Q6H PRN    morphine injection 2 mg  2 mg IntraVENous Q4H PRN    dextrose 10% infusion 125-250 mL  125-250 mL IntraVENous PRN    heparin (porcine) injection 5,000 Units  5,000 Units SubCUTAneous Q8H    famotidine (PF) (PEPCID) 20 mg in 0.9% sodium chloride 10 mL injection  20 mg IntraVENous Q48H    insulin lispro (HUMALOG) injection   SubCUTAneous Q6H    glucose chewable tablet 16 g  4 Tab Oral PRN    glucagon (GLUCAGEN) injection 1 mg  1 mg IntraMUSCular PRN       Labs:    Recent Results (from the past 48 hour(s))   METABOLIC PANEL, BASIC    Collection Time: 01/04/20  9:01 PM   Result Value Ref Range    Sodium 150 (H) 136 - 145 mmol/L    Potassium 4.1 3.5 - 5.5 mmol/L    Chloride 119 (H) 100 - 111 mmol/L    CO2 23 21 - 32 mmol/L    Anion gap 8 3.0 - 18 mmol/L    Glucose 201 (H) 74 - 99 mg/dL    BUN 51 (H) 7.0 - 18 MG/DL    Creatinine 2.02 (H) 0.6 - 1.3 MG/DL    BUN/Creatinine ratio 25 (H) 12 - 20      GFR est AA 29 (L) >60 ml/min/1.73m2    GFR est non-AA 24 (L) >60 ml/min/1.73m2    Calcium 8.0 (L) 8.5 - 10.1 MG/DL   MAGNESIUM    Collection Time: 01/04/20  9:01 PM   Result Value Ref Range    Magnesium 2.7 (H) 1.6 - 2.6 mg/dL   PHOSPHORUS    Collection Time: 01/04/20  9:01 PM   Result Value Ref Range    Phosphorus 2.3 (L) 2.5 - 4.9 MG/DL   LACTIC ACID    Collection Time: 01/04/20  9:01 PM   Result Value Ref Range    Lactic acid 2.8 (HH) 0.4 - 2.0 MMOL/L   GLUCOSE, POC    Collection Time: 01/04/20 11:42 PM   Result Value Ref Range    Glucose (POC) 164 (H) 70 - 110 mg/dL   CBC WITH AUTOMATED DIFF    Collection Time: 01/05/20  1:19 AM   Result Value Ref Range    WBC 5.5 4.6 - 13.2 K/uL    RBC 3.43 (L) 4.20 - 5.30 M/uL    HGB 10.8 (L) 12.0 - 16.0 g/dL    HCT 32.3 (L) 35.0 - 45.0 %    MCV 94.2 74.0 - 97.0 FL    MCH 31.5 24.0 - 34.0 PG    MCHC 33.4 31.0 - 37.0 g/dL    RDW 13.9 11.6 - 14.5 %    PLATELET 463 119 - 058 K/uL    MPV 12.1 (H) 9.2 - 11.8 FL    NEUTROPHILS 67 40 - 73 %    LYMPHOCYTES 21 21 - 52 %    MONOCYTES 11 (H) 3 - 10 %    EOSINOPHILS 1 0 - 5 %    BASOPHILS 0 0 - 2 %    ABS. NEUTROPHILS 3.7 1.8 - 8.0 K/UL    ABS. LYMPHOCYTES 1.2 0.9 - 3.6 K/UL    ABS. MONOCYTES 0.6 0.05 - 1.2 K/UL    ABS. EOSINOPHILS 0.1 0.0 - 0.4 K/UL    ABS. BASOPHILS 0.0 0.0 - 0.1 K/UL    DF AUTOMATED     METABOLIC PANEL, COMPREHENSIVE    Collection Time: 01/05/20  1:19 AM   Result Value Ref Range    Sodium 144 136 - 145 mmol/L    Potassium 4.5 3.5 - 5.5 mmol/L    Chloride 115 (H) 100 - 111 mmol/L    CO2 22 21 - 32 mmol/L    Anion gap 7 3.0 - 18 mmol/L    Glucose 267 (H) 74 - 99 mg/dL    BUN 46 (H) 7.0 - 18 MG/DL    Creatinine 2.00 (H) 0.6 - 1.3 MG/DL    BUN/Creatinine ratio 23 (H) 12 - 20      GFR est AA 29 (L) >60 ml/min/1.73m2    GFR est non-AA 24 (L) >60 ml/min/1.73m2    Calcium 8.2 (L) 8.5 - 10.1 MG/DL    Bilirubin, total 0.7 0.2 - 1.0 MG/DL    ALT (SGPT) 34 13 - 56 U/L    AST (SGOT) 76 (H) 10 - 38 U/L    Alk.  phosphatase 96 45 - 117 U/L Protein, total 6.7 6.4 - 8.2 g/dL    Albumin 2.2 (L) 3.4 - 5.0 g/dL    Globulin 4.5 (H) 2.0 - 4.0 g/dL    A-G Ratio 0.5 (L) 0.8 - 1.7     LIPASE    Collection Time: 01/05/20  1:19 AM   Result Value Ref Range    Lipase 4,542 (H) 73 - 393 U/L   MAGNESIUM    Collection Time: 01/05/20  1:19 AM   Result Value Ref Range    Magnesium 2.8 (H) 1.6 - 2.6 mg/dL   PHOSPHORUS    Collection Time: 01/05/20  1:19 AM   Result Value Ref Range    Phosphorus 2.4 (L) 2.5 - 4.9 MG/DL   LACTIC ACID    Collection Time: 01/05/20  1:19 AM   Result Value Ref Range    Lactic acid 2.0 0.4 - 2.0 MMOL/L   GLUCOSE, POC    Collection Time: 01/05/20  4:47 AM   Result Value Ref Range    Glucose (POC) 340 (H) 70 - 110 mg/dL   GLUCOSE, POC    Collection Time: 01/05/20 11:37 AM   Result Value Ref Range    Glucose (POC) 376 (H) 70 - 110 mg/dL   DUPLEX LOWER EXT ARTERY BILAT    Collection Time: 01/05/20  1:17 PM   Result Value Ref Range    Right CFA dist sys PSV 89.5 cm/s    Right Prox PFA A PSV 74.2 cm/s    Right super femoral dist sys PSV 22.9 cm/s    Right super femoral mid sys PSV 14.7 cm/s    Right super femoral prox sys PSV 31.4 cm/s    Right popliteal dist sys PSV 21.0 cm/s    Right popliteal prox sys PSV 16.8 cm/s    Right mid PTA sys PSV 23.8 cm/s    Right Prox PTA PSV 26.4 cm/s    Right Dist EDIE Velocity 22.9 cm/s    Right Mid EDIE Velocity 18.2 cm/s    Right Prox EDIE Velocity 17.9 cm/s    Left CFA dist sys .0 cm/s    Left Prox PFA A .8 cm/s    Left super femoral dist sys PSV 48.2 cm/s    Left super femoral mid sys PSV 36.0 cm/s    Left super femoral prox sys PSV 28.6 cm/s    Left popliteal dist sys PSV 22.6 cm/s    Left popliteal prox sys PSV 32.6 cm/s    Left Dist PTA PSV 25.8 cm/s    Left mid PTA sys PSV 26.5 cm/s    Left Prox PTA PSV 42.0 cm/s    Left Dist EDIE Velocity 36.9 cm/s    Left Mid EDIE Velocity 33.8 cm/s    Left Prox EDIE Velocity 23.9 cm/s    Right SFA Prox Michael Ratio 0.4     Right SFA Mid Michael Ratio 0.5     Right SFA Dist Michael Ratio 1.56     Right Pop A Prox Michael Ratio 0.73     Left SFA Prox Michael Ratio 0.19     Left SFA Mid Michael Ratio 1.26     Left SFA Dist Michael Ratio 1.34     Left Pop A Prox Michael Ratio 0.68    GLUCOSE, POC    Collection Time: 01/05/20  4:39 PM   Result Value Ref Range    Glucose (POC) 212 (H) 70 - 110 mg/dL   GLUCOSE, POC    Collection Time: 01/05/20 11:47 PM   Result Value Ref Range    Glucose (POC) 194 (H) 70 - 110 mg/dL   CBC WITH AUTOMATED DIFF    Collection Time: 01/06/20  4:59 AM   Result Value Ref Range    WBC 6.0 4.6 - 13.2 K/uL    RBC 3.21 (L) 4.20 - 5.30 M/uL    HGB 10.4 (L) 12.0 - 16.0 g/dL    HCT 30.6 (L) 35.0 - 45.0 %    MCV 95.3 74.0 - 97.0 FL    MCH 32.4 24.0 - 34.0 PG    MCHC 34.0 31.0 - 37.0 g/dL    RDW 13.7 11.6 - 14.5 %    PLATELET 046 (L) 420 - 420 K/uL    MPV 11.8 9.2 - 11.8 FL    NEUTROPHILS 61 40 - 73 %    LYMPHOCYTES 25 21 - 52 %    MONOCYTES 11 (H) 3 - 10 %    EOSINOPHILS 3 0 - 5 %    BASOPHILS 0 0 - 2 %    ABS. NEUTROPHILS 3.7 1.8 - 8.0 K/UL    ABS. LYMPHOCYTES 1.5 0.9 - 3.6 K/UL    ABS. MONOCYTES 0.7 0.05 - 1.2 K/UL    ABS. EOSINOPHILS 0.2 0.0 - 0.4 K/UL    ABS. BASOPHILS 0.0 0.0 - 0.1 K/UL    DF AUTOMATED     GLUCOSE, POC    Collection Time: 01/06/20  5:26 AM   Result Value Ref Range    Glucose (POC) 140 (H) 70 - 110 mg/dL   GLUCOSE, POC    Collection Time: 01/06/20 11:02 AM   Result Value Ref Range    Glucose (POC) 214 (H) 70 - 110 mg/dL       Signed By: Bill Kohli MD     January 6, 2020      Disclaimer: Sections of this note are dictated using utilizing voice recognition software. Minor typographical errors may be present. If questions arise, please do not hesitate to contact me or call our department.

## 2020-01-06 NOTE — PROGRESS NOTES
Problem: Diabetes Self-Management  Goal: *Disease process and treatment process  Description  Define diabetes and identify own type of diabetes; list 3 options for treating diabetes. Outcome: Not Progressing Towards Goal  Goal: *Incorporating nutritional management into lifestyle  Description  Describe effect of type, amount and timing of food on blood glucose; list 3 methods for planning meals. Outcome: Not Progressing Towards Goal  Goal: *Incorporating physical activity into lifestyle  Description  State effect of exercise on blood glucose levels. Outcome: Not Progressing Towards Goal  Goal: *Developing strategies to promote health/change behavior  Description  Define the ABC's of diabetes; identify appropriate screenings, schedule and personal plan for screenings. Outcome: Not Progressing Towards Goal  Goal: *Using medications safely  Description  State effect of diabetes medications on diabetes; name diabetes medication taking, action and side effects. Outcome: Not Progressing Towards Goal  Goal: *Monitoring blood glucose, interpreting and using results  Description  Identify recommended blood glucose targets  and personal targets. Outcome: Not Progressing Towards Goal  Goal: *Prevention, detection, treatment of acute complications  Description  List symptoms of hyper- and hypoglycemia; describe how to treat low blood sugar and actions for lowering  high blood glucose level. Outcome: Not Progressing Towards Goal  Goal: *Prevention, detection and treatment of chronic complications  Description  Define the natural course of diabetes and describe the relationship of blood glucose levels to long term complications of diabetes.   Outcome: Not Progressing Towards Goal  Goal: *Developing strategies to address psychosocial issues  Description  Describe feelings about living with diabetes; identify support needed and support network  Outcome: Not Progressing Towards Goal  Goal: *Insulin pump training  Outcome: Not Progressing Towards Goal  Goal: *Sick day guidelines  Outcome: Not Progressing Towards Goal  Goal: *Patient Specific Goal (EDIT GOAL, INSERT TEXT)  Outcome: Not Progressing Towards Goal     Problem: Patient Education: Go to Patient Education Activity  Goal: Patient/Family Education  Outcome: Not Progressing Towards Goal     Problem: Falls - Risk of  Goal: *Absence of Falls  Description  Document You Sims Fall Risk and appropriate interventions in the flowsheet. Outcome: Not Progressing Towards Goal  Note: Fall Risk Interventions:  Mobility Interventions: Bed/chair exit alarm, Patient to call before getting OOB    Mentation Interventions: Bed/chair exit alarm, Door open when patient unattended, Eyeglasses and hearing aids    Medication Interventions: Bed/chair exit alarm, Patient to call before getting OOB    Elimination Interventions: Bed/chair exit alarm, Call light in reach, Patient to call for help with toileting needs              Problem: Patient Education: Go to Patient Education Activity  Goal: Patient/Family Education  Outcome: Not Progressing Towards Goal     Problem: Pressure Injury - Risk of  Goal: *Prevention of pressure injury  Description  Document Marc Scale and appropriate interventions in the flowsheet.   Outcome: Not Progressing Towards Goal  Note: Pressure Injury Interventions:  Sensory Interventions: Assess changes in LOC, Float heels, Keep linens dry and wrinkle-free, Minimize linen layers    Moisture Interventions: Absorbent underpads, Internal/External urinary devices, Minimize layers    Activity Interventions: Increase time out of bed, PT/OT evaluation    Mobility Interventions: Pressure redistribution bed/mattress (bed type)    Nutrition Interventions: Document food/fluid/supplement intake    Friction and Shear Interventions: Apply protective barrier, creams and emollients, Minimize layers                Problem: Patient Education: Go to Patient Education Activity  Goal: Patient/Family Education  Outcome: Not Progressing Towards Goal     Problem: Pain  Goal: *Control of Pain  Outcome: Not Progressing Towards Goal  Goal: *PALLIATIVE CARE:  Alleviation of Pain  Outcome: Not Progressing Towards Goal     Problem: Patient Education: Go to Patient Education Activity  Goal: Patient/Family Education  Outcome: Not Progressing Towards Goal     Problem: Injury - Risk of, Adverse Drug Event  Goal: *Absence of adverse drug events  Outcome: Not Progressing Towards Goal  Goal: *Absence of medication errors  Outcome: Not Progressing Towards Goal  Goal: *Knowledge of prescribed medications  Outcome: Not Progressing Towards Goal     Problem: Patient Education: Go to Patient Education Activity  Goal: Patient/Family Education  Outcome: Not Progressing Towards Goal     Problem: Altered Thought Process (Adult/Pediatric)  Goal: *STG: Participates in treatment plan  Outcome: Not Progressing Towards Goal  Goal: *STG: Remains safe in hospital  Outcome: Not Progressing Towards Goal  Goal: *STG: Seeks staff when feelings of anxiety and fear arise  Outcome: Not Progressing Towards Goal  Goal: *STG: Complies with medication therapy  Outcome: Not Progressing Towards Goal  Goal: *STG: Attends activities and groups  Outcome: Not Progressing Towards Goal  Goal: *STG: Decreased delusional thinking  Outcome: Not Progressing Towards Goal  Goal: *STG: Decreased hallucinations  Outcome: Not Progressing Towards Goal  Goal: *STG: Absence of lethality  Outcome: Not Progressing Towards Goal  Goal: *STG: Demonstrates ability to understand and use improved judgment in daily activities and relationships  Outcome: Not Progressing Towards Goal  Goal: *LTG: Returns to baseline functioning  Outcome: Not Progressing Towards Goal  Goal: Interventions  Outcome: Not Progressing Towards Goal     Problem: Patient Education: Go to Patient Education Activity  Goal: Patient/Family Education  Outcome: Not Progressing Towards Goal     Problem: Nutrition Deficit  Goal: *Optimize nutritional status  Outcome: Not Progressing Towards Goal     Problem: Infection - Risk of, Urinary Catheter-Associated Urinary Tract Infection  Goal: *Absence of infection signs and symptoms  Outcome: Not Progressing Towards Goal     Problem: Patient Education: Go to Patient Education Activity  Goal: Patient/Family Education  Outcome: Not Progressing Towards Goal

## 2020-01-06 NOTE — ROUTINE PROCESS
Bedside shift change report given to Radha Gonzalez RN  (oncoming nurse) by Tiffany Brown RN  (offgoing nurse). Report included the following information SBAR, Kardex and MAR.

## 2020-01-06 NOTE — PROGRESS NOTES
Palliative Medicine    Palliative Medicine team [facility] CHRISTOPHERW, Latrell Doyle NP and Rosalba Harris RN met with pt at her bedside. Present also at the bedside were her nephew Dorothy Wolfe and a \"spiritual friend\". The pt was drowsy but oriented. She kept on falling asleep during our conversation and the pt's nephew told us that she had recently been medicated for pain. Code status was briefly discussed and the pt is leaning towards a DNR, however we did not feel comfortable taking this response from her d/t her drowsiness. Will broach code status again when pt is more alert. Dorothy Wolfe reports that pt has completed an AMD in the past and that they will get a copy for the hospital.  Ms. Klever Waller was thankful for our visit. For now, the pt remains FULL code with FULL aggressive medical management. Potential dispo plan will be home once medically stable. Thank you for the Palliative Medicine consult and allowing us to participate in the care of Ms. Klever Waller. Will continue to monitor and provide support.     Rosalba Harris RN, BSN  Palliative Medicine Inpatient RN  [facility]  Palliative COPE Line: [phone]

## 2020-01-06 NOTE — PROGRESS NOTES
Bedside shift change report given to Khanh Sargent RN (oncoming nurse) by Daniella Cowart RN (offgoing nurse). Report included the following information SBAR, Kardex and MAR.

## 2020-01-06 NOTE — PROGRESS NOTES
Problem: Diabetes Self-Management  Goal: *Disease process and treatment process  Description  Define diabetes and identify own type of diabetes; list 3 options for treating diabetes. Outcome: Progressing Towards Goal  Goal: *Incorporating nutritional management into lifestyle  Description  Describe effect of type, amount and timing of food on blood glucose; list 3 methods for planning meals. Outcome: Progressing Towards Goal  Goal: *Incorporating physical activity into lifestyle  Description  State effect of exercise on blood glucose levels. Outcome: Progressing Towards Goal  Goal: *Developing strategies to promote health/change behavior  Description  Define the ABC's of diabetes; identify appropriate screenings, schedule and personal plan for screenings. Outcome: Progressing Towards Goal  Goal: *Using medications safely  Description  State effect of diabetes medications on diabetes; name diabetes medication taking, action and side effects. Outcome: Progressing Towards Goal  Goal: *Monitoring blood glucose, interpreting and using results  Description  Identify recommended blood glucose targets  and personal targets. Outcome: Progressing Towards Goal  Goal: *Prevention, detection, treatment of acute complications  Description  List symptoms of hyper- and hypoglycemia; describe how to treat low blood sugar and actions for lowering  high blood glucose level. Outcome: Progressing Towards Goal  Goal: *Prevention, detection and treatment of chronic complications  Description  Define the natural course of diabetes and describe the relationship of blood glucose levels to long term complications of diabetes.   Outcome: Progressing Towards Goal  Goal: *Developing strategies to address psychosocial issues  Description  Describe feelings about living with diabetes; identify support needed and support network  Outcome: Progressing Towards Goal  Goal: *Insulin pump training  Outcome: Progressing Towards Goal  Goal: *Sick day guidelines  Outcome: Progressing Towards Goal  Goal: *Patient Specific Goal (EDIT GOAL, INSERT TEXT)  Outcome: Progressing Towards Goal     Problem: Infection - Risk of, Urinary Catheter-Associated Urinary Tract Infection  Goal: *Absence of infection signs and symptoms  Outcome: Progressing Towards Goal

## 2020-01-06 NOTE — DIABETES MGMT
Glycemic Control Plan of Care    Reattempted to speak to patient this afternoon but she remain sleepy. T2DM with current A1c of 14.2% (02/02/2020). See separate notes, 01/06/2020, for assessment of home diabetes management and education. Patient stayed awake, participated in discussion and answered the questions appropriately. Patient stated, \"I didn't take my insulin because I got tired of it. I only took the glipizide and metformin. \"  Home diabetes medications: As reported by patient on 01/06/2020:  Glipizide but she cannot remember dose and frequency. Metformin but she cannot remember dose and frequency. Lantus insulin daily but she cannot remember the dose and stated, \"it's been years since the last time I took it. I stopped it on my own because I got tired taking injections. She didn't tell her doctor. Patient stated that she didn't have problem drawing and injecting insulin when she was taking it. POC BG range on 01/05/2020: 194-376. Patient on 26 units lantus insulin daily and received a total of 39 units sliding scale insulin at very resistant dose. POC BG report on 01/06/2020 at time of review; 140, 18    Called Dr. Malathi Wilkerson for lantus insulin dose recommendation. Recommendation(s):  1.) obtained order to increase basal lantus insulin dose to 28 units daily starting today, 01/06/2020.  2.) modified GI lite diet no concentrated sweets by adding diabetic consistent [id number].     Assessment:  Patient is [de-identified]year old with past medical history including diabetes mellitus, hypertension and hypercholesterolemia - was admitted to ED on 01/03/2020 with report of high blood sugar and high lipase. Noted:  HHS. T2DM. Acute pancreatitis. Acute on chronic renal failure stage 3. Most recent blood glucose values:    Results for Emily Proctor (MRN [id number]) as of 1/6/2020 15:12   Ref.  Range 1/5/2020 04:47 1/5/2020 11:37 1/5/2020 16:39 1/5/2020 23:47   GLUCOSE,FAST - POC Latest Ref Range: 70 - 110 mg/dL 340 (H) 376 (H) 212 (H) 194 (H)     Results for Apurva Dos Santos (MRN [id number]) as of 1/6/2020 15:12   Ref. Range 1/6/2020 05:26 1/6/2020 11:02   GLUCOSE,FAST - POC Latest Ref Range: 70 - 110 mg/dL 140 (H) 214 (H)     Current A1C: 14.2% (02/02/2020) which is equivalent to estimated average blood glucose of 361 mg/dL during the past 2-3 months. Current hospital diabetes medications:  Basal lantus insulin 28 units daily. Correctional lispro insulin every 6 hours. Very resistant dose.     Total daily dose insulin requirement previous day: 01/05/2020  Lantus: 26 units  Lispro: 39 units  TDD insulin: 65 units    Diet: GI lite no concentrated sweets; diabetic consistent carb 1500-1600kcal    Goals:  Blood glucose will be within target range of  mg/dL by 01/09/2020    Education:    _X__  Refer to Diabetes Education Record: 01/06/2020  ___  Education not indicated at this time    Christopher Herrera RN [facility]  Pager: [phone]

## 2020-01-06 NOTE — PROGRESS NOTES
[email or web address]  [phone]    Gastroenterology follow up-Progress note    Impression:  1. Acute pancreatitis, lipase 24K on admit; trending down. Mild inflammation noted on imaging (no gallstones on CT). . BISAP score 4. Will get IgG 4 (pending) BUN/HCT stable, improving  2. DMII, uncontrolled with HHS with anion gap acidosis  3. Hyperkalemia  4. Acute on chronic renal failure  5. Metabolic encephalopathy  6. Constipation- now having BMs    Plan:  1. IVF, monitor BUN/HCT  2. Glucose control per primary team  3. Can have diet  4. Will get RUQ US to r/o gallstones- pending  5. Continue dulcolax and miralax for bowel regimen  6 Medical management per primary team    Chief Complaint: pancreatitis      Subjective:  No abdominal pain; tolerating diet without N/V. Moving her bowels now. ROS: Denies any fevers, chills, rash.      Eyes: conjunctiva normal, EOM normal   Neck: ROM normal, supple and trachea normal   Cardiovascular: heart normal, intact distal pulses, normal rate and regular rhythm   Pulmonary/Chest Wall: breath sounds normal and effort normal   Abdominal: appearance normal, bowel sounds normal and soft, non-acute, non-tender     Patient Active Problem List   Diagnosis Code    Hyperglycemia R73.9    Type 2 diabetes mellitus with hyperosmolarity ([facility]) E11.00    Acute UTI N39.0    Dehydration E86.0    Acute renal failure (ARF) ([facility]) N17.9    Noncompliance Z91.19    Pancreatitis K85.90    Hyperosmolar hyperglycemic coma due to diabetes mellitus without ketoacidosis ([facility]) E11.01    Hyperosmolar non-ketotic state in patient with type 2 diabetes mellitus ([facility] [address] E11.00         Visit Vitals  /58 (BP 1 Location: Right arm, BP Patient Position: At rest)   Pulse 61   Temp 98.6 °F (37 °C)   Resp 15   Ht 5' 5\" (1.651 m)   Wt 74.7 kg (164 lb 9.6 oz)   SpO2 98%   Breastfeeding No   BMI 27.39 kg/m²           Intake/Output Summary (Last 24 hours) at 1/6/2020 1111  Last data filed at 1/6/2020 0920  Gross per 24 hour   Intake 370 ml   Output 2650 ml   Net -2280 ml       CBC w/Diff    Lab Results   Component Value Date/Time    WBC 6.0 01/06/2020 04:59 AM    RBC 3.21 (L) 01/06/2020 04:59 AM    HGB 10.4 (L) 01/06/2020 04:59 AM    HCT 30.6 (L) 01/06/2020 04:59 AM    MCV 95.3 01/06/2020 04:59 AM    MCH 32.4 01/06/2020 04:59 AM    MCHC 34.0 01/06/2020 04:59 AM    RDW 13.7 01/06/2020 04:59 AM     (L) 01/06/2020 04:59 AM    Lab Results   Component Value Date/Time    GRANS 61 01/06/2020 04:59 AM    LYMPH 25 01/06/2020 04:59 AM    EOS 3 01/06/2020 04:59 AM    BASOS 0 01/06/2020 04:59 AM      Basic Metabolic Profile   Recent Labs     01/05/20  0119      K 4.5   *   CO2 22   BUN 46*   CA 8.2*   MG 2.8*   PHOS 2.4*        Hepatic Function    Lab Results   Component Value Date/Time    ALB 2.2 (L) 01/05/2020 01:19 AM    TP 6.7 01/05/2020 01:19 AM    AP 96 01/05/2020 01:19 AM    Lab Results   Component Value Date/Time    SGOT 76 (H) 01/05/2020 01:19 AM          Coags   No results for input(s): PTP, INR, APTT, INREXT, INREXT in the last 72 hours. Irene Macias NP    [facility]. [email or web address]  Phone: [phone]

## 2020-01-06 NOTE — PROGRESS NOTES
Reason for Admission:  Pancreatitis [K85.90]  Hyperosmolar hyperglycemic coma due to diabetes mellitus without ketoacidosis ([facility] [address] [E11.01]  Hyperosmolar non-ketotic state in patient with type 2 diabetes mellitus ([facility] [address] [E11.00]                 RRAT Score:    18           Plan for utilizing home health:    None at this time                     Likelihood of Readmission:   Moderate                         Do you (patient/family) have any concerns for transition/discharge?  no    Transition of Care Plan:   Pt will discharge to home    Initial assessment completed with patient. Cognitive status of patient: alert and oriented. Face sheet information confirmed:  no. The patient designates her niece Daysi Just to participate in her discharge plan and to receive any needed information. This patient lives in a apartment alone. Patient is not able to navigate steps as needed. Prior to hospitalization, patient was considered to be independent with ADLs/IADLS : yes . Patient has a current ACP document on file: no     The patient's family will be available to transport patient home upon discharge. The patient does not have any medical equipment available in the home. Patient is not currently active with home health. Patient has not stayed in a skilled nursing facility or rehab. This patient is on dialysis :no    Currently, the discharge plan is Home. Pts PCP is Manuel Albarran. The patient states that she can obtain her medications from the pharmacy, and take her medications as directed. Patient's current insurance is [facility]      Care Management Interventions  PCP Verified by CM: Yes(John Weir)  Mode of Transport at Discharge:  Other (see comment)(Pt states her family will transport her home)  Transition of Care Consult (CM Consult): Discharge Planning  Current Support Network: Lives Alone  Confirm Follow Up Transport: Family  Discharge Location  Discharge Placement: MARINE Garcia, RN    Pager: [phone]

## 2020-01-06 NOTE — CONSULTS
Palliative Medicine Consult    Patient Name: Abigail Aparicio  YOB: 1940    Date of Initial Consult: 1/6/2020  Reason for Consult: Goals of care discussions  Requesting Provider: Jonathan Gonzalez MD  Primary Care Physician: Eleni Garcia MD      SUMMARY:   Abigail Aparicio is a [de-identified] y.o. with a past history of DMT2, hypercholesterolemia, and hypertension, who was admitted on 1/2/2020 from home with a diagnosis of DMT2, uncontrolled with HHS, Acute on chronic renal failure Stage 3, Right lower extremity ischemic changes, acute pancreatitis, and metabolic acidosis. Current medical issues leading to Palliative Medicine involvement include: goals of care discussion for an [de-identified]year old female with uncontrolled DMT2 with history of medical noncompliance with insulin regimen. PALLIATIVE DIAGNOSES:   1. Goals of care discussions  2. Uncontrolled DMT2  3. Acute on chronic renal failure Stage 3  4. Right lower extremity ischemia  5. Acute pancreatitis       PLAN:   1. Goals of care discussions: Palliative medicine team including LISA Garcia RN, and I met with patient at patient's bedside. Patient is alert and oriented x 4, though drowsy, nodding off to sleep frequently during conversation. Present at bedside were patient's nephew Ellen Ashton and a \"Spiritual friend. \" Discussed our role as palliative, and offered support as patient shares her desire to get back to walking in her community and socializing. Patient states she is quite active in her community and loves talking to all the residents. Discussed the importance of an AMD, and per [facility], patient has an AMD, and will bring us a copy today. Per patient, she is a Rastafari who does not ever wish to receive blood products. Discussed the benefits and burdens of CPR in the event of cardiac or pulmonary arrest. Patient is leaning toward DNR but she is nodding off to sleep so it is not appropriate to continue conversation at this time. Will follow up once patient has had some rest. Until then, patient remains a full code with full interventions. Will continue to follow for more goals of care discussions and support. 2. Uncontrolled DMT2: Patient arrived in [facility], requiring insulin gtt. Off insulin gtt,on lantus and sliding scale. Patient is not convinced she has blood sugar problems. Known history of medical noncompliance to insulin regimen. 3. Acute of chronic renal failure, stage 3:  Multifactorial including dehydration and hyperglycemia. Start half NS and monitor per nephrology. 4. Right lower extremity ischemia: LORENA indicative of severe to critical arterial insuff at [place]-fem level, fem-pop level. Vascular following, and recommends arterial duplex. Result pending. 5. Acute pancreatitis: GI following. lipase 24K on admit; trending down. RUQ US to r/o gallstones- pending. 6. Initial consult note routed to primary continuity provider  7.  Communicated plan of care with: Palliative IDT, patient, family       GOALS OF CARE / TREATMENT PREFERENCES:   [====Goals of Care====]  GOALS OF CARE: Full code with full interventions  Patient/Health Care Proxy Stated Goals: Prolong life      TREATMENT PREFERENCES:   Code Status: Full Code    Advance Care Planning:  Advance Care Planning 1/3/2020   Patient's Healthcare Decision Maker is: Legal Next of Kin   Confirm Advance Directive None   Patient Would Like to Complete Advance Directive Unable       Medical Interventions: Full interventions           The palliative care team has discussed with patient / health care proxy about goals of care / treatment preferences for patient.  [====Goals of Care====]         HISTORY:     History obtained from: patient, chart    CHIEF COMPLAINT: Feeling better    HPI/SUBJECTIVE:    The patient is:   [x] Verbal and participatory  [] Non-participatory due to:   Alert and oriented x 4, drowsy    Clinical Pain Assessment (nonverbal scale for severity on nonverbal patients): Clinical Pain Assessment  Severity: 0            FUNCTIONAL ASSESSMENT:     Palliative Performance Scale (PPS):  PPS: 60       PSYCHOSOCIAL/SPIRITUAL SCREENING:     Advance Care Planning:  Advance Care Planning 1/3/2020   Patient's Healthcare Decision Maker is: Legal Next of Kin   Confirm Advance Directive None   Patient Would Like to Complete Advance Directive Unable        Any spiritual / Mandaen concerns: Jeanna Expose witness, NO BLOOD  [x] Yes /  [x] No    Caregiver Burnout:  [] Yes /  [x] No /  [] No Caregiver Present      Anticipatory grief assessment:   [x] Normal  / [] Maladaptive          REVIEW OF SYSTEMS:     Positive and pertinent negative findings in ROS are noted above in HPI. The following systems were [x] reviewed / [] unable to be reviewed as noted in HPI  Other findings are noted below. Systems: constitutional, ears/nose/mouth/throat, respiratory, gastrointestinal, genitourinary, musculoskeletal, integumentary, neurologic, psychiatric, endocrine. Positive findings noted below. Modified ESAS Completed by: provider     Drowsiness: 6     Pain: 0   Anxiety: 0 Nausea: 0     Dyspnea: 0     Constipation: No              PHYSICAL EXAM:     From RN flowsheet:  Wt Readings from Last 3 Encounters:   01/05/20 74.7 kg (164 lb 9.6 oz)   12/26/18 72.8 kg (160 lb 9.6 oz)   07/10/17 52.2 kg (115 lb)     Blood pressure 142/58, pulse 61, temperature 98.6 °F (37 °C), resp. rate 15, height 5' 5\" (1.651 m), weight 74.7 kg (164 lb 9.6 oz), SpO2 98 %, not currently breastfeeding.     Pain Scale 1: Numeric (0 - 10)  Pain Intensity 1: 0     Pain Location 1: Leg  Pain Orientation 1: Right     Pain Intervention(s) 1: Medication (see MAR)      Constitutional: Awake, Drowsy, NAD  Eyes: pupils equal, anicteric  ENMT: no nasal discharge, moist mucous membranes  Cardiovascular: regular rhythm  Respiratory: breathing not labored, symmetric  Gastrointestinal: soft non-tender, +bowel sounds  Musculoskeletal: no deformity, no tenderness to palpation  Skin: warm, dry  Neurologic: following commands, moving all extremities  Psychiatric: full affect, no hallucinations         HISTORY:     Active Problems:    Pancreatitis (1/3/2020)      Hyperosmolar hyperglycemic coma due to diabetes mellitus without ketoacidosis ([facility] (1/3/2020)      Hyperosmolar non-ketotic state in patient with type 2 diabetes mellitus ([facility] (1/3/2020)      Past Medical History:   Diagnosis Date    Diabetes ([facility]     Hypercholesterolemia     Hypertension       History reviewed. No pertinent surgical history. History reviewed. No pertinent family history. History reviewed, no pertinent family history.   Social History     Tobacco Use    Smoking status: Former Smoker    Smokeless tobacco: Never Used   Substance Use Topics    Alcohol use: Not on file     No Known Allergies   Current Facility-Administered Medications   Medication Dose Route Frequency    0.45% sodium chloride infusion  40 mL/hr IntraVENous CONTINUOUS    [START ON 1/7/2020] insulin glargine (LANTUS) injection 28 Units  28 Units SubCUTAneous DAILY    insulin glargine (LANTUS) injection 2 Units  2 Units SubCUTAneous ONCE    bisacodyl (DULCOLAX) suppository 10 mg  10 mg Rectal DAILY PRN    polyethylene glycol (MIRALAX) packet 17 g  17 g Oral DAILY    aspirin chewable tablet 81 mg  81 mg Oral DAILY    oxyCODONE-acetaminophen (PERCOCET) 5-325 mg per tablet 1-2 Tab  1-2 Tab Oral Q6H PRN    morphine injection 2 mg  2 mg IntraVENous Q4H PRN    dextrose 10% infusion 125-250 mL  125-250 mL IntraVENous PRN    heparin (porcine) injection 5,000 Units  5,000 Units SubCUTAneous Q8H    famotidine (PF) (PEPCID) 20 mg in 0.9% sodium chloride 10 mL injection  20 mg IntraVENous Q48H    insulin lispro (HUMALOG) injection   SubCUTAneous Q6H    glucose chewable tablet 16 g  4 Tab Oral PRN    glucagon (GLUCAGEN) injection 1 mg  1 mg IntraMUSCular PRN          LAB AND IMAGING FINDINGS:     Lab Results Component Value Date/Time    WBC 6.0 01/06/2020 04:59 AM    HGB 10.4 (L) 01/06/2020 04:59 AM    PLATELET 766 (L) 53/90/8316 04:59 AM     Lab Results   Component Value Date/Time    Sodium 144 01/05/2020 01:19 AM    Potassium 4.5 01/05/2020 01:19 AM    Chloride 115 (H) 01/05/2020 01:19 AM    CO2 22 01/05/2020 01:19 AM    BUN 46 (H) 01/05/2020 01:19 AM    Creatinine 2.00 (H) 01/05/2020 01:19 AM    Calcium 8.2 (L) 01/05/2020 01:19 AM    Magnesium 2.8 (H) 01/05/2020 01:19 AM    Phosphorus 2.4 (L) 01/05/2020 01:19 AM      Lab Results   Component Value Date/Time    AST (SGOT) 76 (H) 01/05/2020 01:19 AM    Alk. phosphatase 96 01/05/2020 01:19 AM    Protein, total 6.7 01/05/2020 01:19 AM    Albumin 2.2 (L) 01/05/2020 01:19 AM    Globulin 4.5 (H) 01/05/2020 01:19 AM     Lab Results   Component Value Date/Time    INR 1.0 12/24/2018 10:14 PM    Prothrombin time 12.9 12/24/2018 10:14 PM    aPTT 30.6 12/24/2018 10:14 PM      Lab Results   Component Value Date/Time    Iron 92 05/31/2019 12:07 PM    TIBC 256 05/31/2019 12:07 PM    Iron % saturation 36 05/31/2019 12:07 PM      No results found for: PH, PCO2, PO2  No components found for: GLPOC   No results found for: CPK, CKMB             Total time: 30 minutes  Counseling / coordination time, spent as noted above: 20 minutes  > 50% counseling / coordination?: yes, patient, family    Prolonged service was provided for  []30 min   []75 min in face to face time in the presence of the patient, spent as noted above. Time Start:   Time End:   Note: this can only be billed with 67745 (initial) or 37123 (follow up). If multiple start / stop times, list each separately.

## 2020-01-06 NOTE — PALLIATIVE CARE
PALLIATIVE MEDICINE NOTE    Palliative med team consisting of this author, Marty Castillo, ELYSIA, and Julián Mills, RN, met with Pt at bedside. Her nephew, Nicky Bansal, who is  to Alli whom we met Friday was present along with \"a spiritual friend\" from her [employer] who was reading prayers to her. Introduced our team since Pt wasn't alert when we met her on Friday. Pt was initially more alert and talkative in the first 5 min of the visit, but then she got sleepy and started to drift in and out. Our team broached whether Pt is interested in completing an AMD, and Nicky Bansal relayed that Pt has one at home. \"We have an advance directive through our [employer], [employer]. \"  Pt then stated that it's on file with [employer]. The current one names Alli and Nicky Bansal and medical power of attorneys. Unfortunately, we don't have a copy here, so Nicky Bansal will bring one in today. Pt may complete a new one naming someone different as she would like Nicky Bansal and Alli to handle mainly financial issues . We then briefly broached code status and whether Pt would want resuscitation, and she quickly responded, \"No\". We asked if she'd like to sign a DDNR, and she stated that she would. However, she was very sleepy by that time, so she did not sign the document. She is agreeable to us following up with her tomorrow. Thank you for the consult ands the opportunity to assist in Ms. Thompson's care. We will cont to follow to provide support to Pt and family.     Sheryl Lang, [facility]  Palliative medicine

## 2020-01-06 NOTE — PROGRESS NOTES
Vascular Surgery Progress Note    Admit Date: 2020  POD * No surgery found *    Procedure:  * No surgery found *      Subjective:     Patient has no new complaints. Remains quite lethargic and falling asleep on exam. Easily arousable. Denies pain currently. Objective:     Blood pressure 142/58, pulse 61, temperature 98.6 °F (37 °C), resp. rate 15, height 5' 5\" (1.651 m), weight 164 lb 9.6 oz (74.7 kg), SpO2 98 %, not currently breastfeeding. Temp (24hrs), Av.3 °F (36.8 °C), Min:97.7 °F (36.5 °C), Max:98.7 °F (37.1 °C)      Physical Exam:  GENERAL: fatigued, cooperative, no distress, appears stated age, lethargic, LUNG:  no resp distress, ABDOMEN:  ND, soft. and EXTREMITIES:  no BLE edema. feet warm to touch, right toes cool compared to left. she does have darkened discoloration of the right toes to the mid foot. no ulcers. Labs: Results:       Chemistry Recent Labs     20  01120  21020  0500   * 201* 241*    150* 148*   K 4.5 4.1 4.0   * 119* 119*   CO2 22 23 21   BUN 46* 51* 69*   CREA 2.00* 2.02* 2.63*   CA 8.2* 8.0* 8.5   AGAP 7 8 8   BUCR 23* 25* 26*   AP 96  --  100   TP 6.7  --  6.4   ALB 2.2*  --  2.6*   GLOB 4.5*  --  3.8   AGRAT 0.5*  --  0.7*      CBC w/Diff Recent Labs     20  0459 20  0119 20  0500   WBC 6.0 5.5 6.3   RBC 3.21* 3.43* 3.38*   HGB 10.4* 10.8* 10.8*   HCT 30.6* 32.3* 31.6*   * 139 136   GRANS 61 67 70   LYMPH 25 21 19*   EOS 3 1 2      Microbiology No results for input(s): CULT in the last 72 hours. Coagulation No results for input(s): PTP, INR, APTT, INREXT in the last 72 hours.       Data Review: images and reports reviewed    Assessment:     Active Problems:    Pancreatitis (1/3/2020)      Hyperosmolar hyperglycemic coma due to diabetes mellitus without ketoacidosis ([facility] [address] (1/3/2020)      Hyperosmolar non-ketotic state in patient with type 2 diabetes mellitus ([facility] [address] (1/3/2020)        Plan/Recommendations/Medical Decision Making:     Continue present treatment per primary team  Arterial study shows diffuse plaquing seen throughout bilaterally with monophasic flow throughout bilaterally. Discussed intervention with angiogram once renal function improves and she is cleared medically. Will continue to follow along peripherally and further surgical planning pending improvement in medical status   Please call with further questions or concerns.      Greer Valentine

## 2020-01-06 NOTE — PROGRESS NOTES
RENAL DAILY PROGRESS NOTE             [de-identified] F with DM, HTN, PAD, admitted with acute pancreatitis, seen for renal failure  Subjective:     Complaint:   Overnight events noted  Feels okay   She has gutierrez catheter   no nausea, vomiting, chest pain, short of breath, cough, seizure. IMPRESSION:   Acute kidney injury , pre renal, dehydration from severe pancreatitis   Metabolic acidosis , better   Hypernatremia   Anemia  Severe PAD, right foot cynosis    PLAN:    She has non oliguric EFRAIN , improving renal function gradually. I would change IV fluid to half NS at rate 40cc/hr, monitor renal function and serum sodium. DC gutierrez catheter and monitor for urine retention, discussed with nursing staff. Discussed with Dr. Jelani Romero. Current Facility-Administered Medications   Medication Dose Route Frequency    bisacodyl (DULCOLAX) suppository 10 mg  10 mg Rectal DAILY PRN    polyethylene glycol (MIRALAX) packet 17 g  17 g Oral DAILY    insulin glargine (LANTUS) injection 26 Units  26 Units SubCUTAneous DAILY    aspirin chewable tablet 81 mg  81 mg Oral DAILY    oxyCODONE-acetaminophen (PERCOCET) 5-325 mg per tablet 1-2 Tab  1-2 Tab Oral Q6H PRN    morphine injection 2 mg  2 mg IntraVENous Q4H PRN    dextrose 5 % - 0.45% NaCl infusion  125 mL/hr IntraVENous CONTINUOUS    dextrose 10% infusion 125-250 mL  125-250 mL IntraVENous PRN    heparin (porcine) injection 5,000 Units  5,000 Units SubCUTAneous Q8H    famotidine (PF) (PEPCID) 20 mg in 0.9% sodium chloride 10 mL injection  20 mg IntraVENous Q48H    insulin lispro (HUMALOG) injection   SubCUTAneous Q6H    glucose chewable tablet 16 g  4 Tab Oral PRN    glucagon (GLUCAGEN) injection 1 mg  1 mg IntraMUSCular PRN       Review of Symptoms: comprehensive ROS negative except above.    Objective:     Patient Vitals for the past 24 hrs:   Temp Pulse Resp BP SpO2   01/06/20 1059 98.6 °F (37 °C) 61 15 142/58 98 %   01/06/20 0729 98.6 °F (37 °C) 93 18 148/54 93 %   01/06/20 0536 98 °F (36.7 °C) 96 17 153/73 98 %   01/06/20 0046 97.7 °F (36.5 °C) 94 16 160/78 98 %   01/05/20 2010 98.3 °F (36.8 °C) 82 16 105/60 98 %   01/05/20 1618 98.7 °F (37.1 °C) 90 18 120/71 99 %        Weight change:      01/04 1901 - 01/06 0700  In: 120 [P.O.:120]  Out: 3351 [Urine:3350]    Intake/Output Summary (Last 24 hours) at 1/6/2020 1302  Last data filed at 1/6/2020 0920  Gross per 24 hour   Intake 370 ml   Output 1950 ml   Net -1580 ml     Physical Exam:   General: comfortable, no acute distress   HEENT sclera anicteric, supple neck, no thyromegaly  CVS: S1S2 heard,  no rub  RS: + air entry b/l,   Abd: Soft, Non tender, Not distended,  Neuro: non focal, awake, alert , CN II-XII are grossly intact  Extrm: right foot cynosis , cool on touch  Musculoskeletal: No gross joints or bone deformities         Data Review:     LABS:   Hematology:   Recent Labs     01/06/20  0459 01/05/20  0119 01/04/20  0500   WBC 6.0 5.5 6.3   HGB 10.4* 10.8* 10.8*   HCT 30.6* 32.3* 31.6*     Chemistry:   Recent Labs     01/05/20  0119 01/04/20  2101 01/04/20  0500 01/04/20  0235 01/03/20  1448   BUN 46* 51* 69* 73* 84*   CREA 2.00* 2.02* 2.63* 2.78* 3.27*   CA 8.2* 8.0* 8.5 8.1* 8.9   ALB 2.2*  --  2.6*  --   --    K 4.5 4.1 4.0 4.6 4.9    150* 148* 149* 152*   * 119* 119* 120* 122*   CO2 22 23 21 21 19*   PHOS 2.4* 2.3* 1.8* 2.2* 3.1   * 201* 241* 299* 180*            Procedures/imaging: see electronic medical records for all procedures, Xrays and details which were not copied into this note but were reviewed prior to creation of Plan          Assessment & Plan:     As above         Clifton Orozco MD  1/6/2020  1:02 PM

## 2020-01-06 NOTE — DIABETES MGMT
Diabetes Patient/Family Education Record  Factors That  May Influence Patients Ability  to Learn or  Comply with Recommendations   []   Language barrier    []   Cultural needs   []   Motivation    []   Cognitive limitation    []   Physical   [x]   Education    []   Physiological factors   []   Hearing/vision/speaking impairment   []   Holiness beliefs    []   Financial factors   []  Other:   []  No factors identified at this time. Person Instructed:   [x]   Patient   []   Family   []  Other     Preference for Learning:   [x]   Verbal   [x]   Written: diab educ packet   []  Demonstration     Level of Comprehension & Competence:    [x]  Good                                      [] Fair                                     []  Poor                             []  Needs Reinforcement   [x]  Teachback completed    Education Component: Seen patient this afternoon and she was able to stay awake. She answered and provided information about home diabetes management. Patient stated, \"I didn't do what I was told to do to take care of my diabetes. \"     [x]  Medication management, including how to administer insulin (if appropriate) and potential medication interactions: Yes. Patient reported list of prescribed diabetes medications:  Lantus insulin (vial). She cannot remember the dose because - \"it has been a very long since I last took it. I got tired taking insulin and only took my diabetes pills. \"  Glipizide once daily. She cannot remember the dose. Metformin 500 mg daily. She cannot remember the dose. [x]  Nutritional management -obtain usual meal pattern: Yes. Patient reported that she - \"ate everything I wanted and drank regular pepsi 2-4x daily. My family told me to stop drinking it but I didn't listen. \" Educated patient that one regular can of regular soda has about 10-12 teaspoon of sugar. Patient responded, \"then I drank a lot of sugar. \"   Patient is willing to make dietary changes to follow recommended serving size/portion control of carbs (starches, fruits, dairy) and limit intake of concentrated sweets. She's willing to switch to diet soda. []  Exercise   [x]  Signs, symptoms, and treatment of hyperglycemia and hypoglycemia: Yes. Patient stated, \"I know about low blood sugar but I did not have that problem because my sugar has been high for a long time. \"     [x] Prevention, recognition and treatment of hyperglycemia and hypoglycemia: Yes. Patient verbalized understanding. [x]  Importance of blood glucose monitoring and how to obtain a blood glucose meter: Yes. Patient stated, \"I have the machine and supplies to check my sugar but I seldom checked my sugar. \" Educated patient about home blood sugar monitoring. She verbalized understanding and agreed to monitor her blood sugar regularly from now on.      []  Instruction on use of the blood glucose meter   [x]  Discuss the importance of HbA1C monitoring: Yes. Discussed A1c level with patient and educated her. At this time, her current A1c level is 14.2% (02/02/2020) which is equivalent to estimated average blood glucose of 361 mg/dL during the past 2-3 months. Informed patient the recommended A1c level is <7% therefore her current A1c level of 14.2% indicate that her diabetes is out of control and discussed the risks of potential complications including kidney damage. See list below discussed with patient. []  Sick day guidelines   [x]  Proper use and disposal of lancets, needles, syringes or insulin pens (if appropriate): Yes. Educated. [x]  Potential long-term complications (retinopathy, kidney disease, neuropathy, foot care): Yes. Educated. [x] Information about whom to contact in case of emergency or for more information: Yes.     [x]  Goal:  Patient/family will demonstrate understanding of Diabetes Self Management Skills by: 01/13/2020  Plan for post-discharge education or self-management support:    [x] Outpatient class schedule provided            [] Patient Declined    [] Scheduled for outpatient classes (date) _______  Verify:  Does patient understand how diabetes medications work? No, because she did not understand the risks of stopping insulin. Educated patient. Does patient know what their most recent A1c is? No. Educated. Does patient monitor glucose at home? No, not regularly despite having glucose meter and testing supplies. Does patient have difficulty obtaining diabetes medications and testing supplies?  No.       Kevon Haq RN [facility]  Pager: [phone]

## 2020-01-07 LAB
ANION GAP SERPL CALC-SCNC: 6 MMOL/L (ref 3–18)
BUN SERPL-MCNC: 26 MG/DL (ref 7–18)
BUN/CREAT SERPL: 18 (ref 12–20)
CALCIUM SERPL-MCNC: 7.4 MG/DL (ref 8.5–10.1)
CHLORIDE SERPL-SCNC: 117 MMOL/L (ref 100–111)
CO2 SERPL-SCNC: 21 MMOL/L (ref 21–32)
CREAT SERPL-MCNC: 1.47 MG/DL (ref 0.6–1.3)
GLUCOSE BLD STRIP.AUTO-MCNC: 139 MG/DL (ref 70–110)
GLUCOSE BLD STRIP.AUTO-MCNC: 209 MG/DL (ref 70–110)
GLUCOSE BLD STRIP.AUTO-MCNC: 242 MG/DL (ref 70–110)
GLUCOSE BLD STRIP.AUTO-MCNC: 57 MG/DL (ref 70–110)
GLUCOSE BLD STRIP.AUTO-MCNC: 58 MG/DL (ref 70–110)
GLUCOSE BLD STRIP.AUTO-MCNC: 79 MG/DL (ref 70–110)
GLUCOSE SERPL-MCNC: 150 MG/DL (ref 74–99)
IGG4 SER-MCNC: 10 MG/DL (ref 2–96)
POTASSIUM SERPL-SCNC: 4.4 MMOL/L (ref 3.5–5.5)
SODIUM SERPL-SCNC: 144 MMOL/L (ref 136–145)

## 2020-01-07 PROCEDURE — 74011250636 HC RX REV CODE- 250/636: Performed by: HOSPITALIST

## 2020-01-07 PROCEDURE — 74011000250 HC RX REV CODE- 250: Performed by: EMERGENCY MEDICINE

## 2020-01-07 PROCEDURE — 74011250637 HC RX REV CODE- 250/637: Performed by: NURSE PRACTITIONER

## 2020-01-07 PROCEDURE — 74011250636 HC RX REV CODE- 250/636: Performed by: PHYSICIAN ASSISTANT

## 2020-01-07 PROCEDURE — 82962 GLUCOSE BLOOD TEST: CPT

## 2020-01-07 PROCEDURE — 74011250637 HC RX REV CODE- 250/637: Performed by: HOSPITALIST

## 2020-01-07 PROCEDURE — 74011636637 HC RX REV CODE- 636/637: Performed by: HOSPITALIST

## 2020-01-07 PROCEDURE — 74011636637 HC RX REV CODE- 636/637: Performed by: INTERNAL MEDICINE

## 2020-01-07 PROCEDURE — 65660000000 HC RM CCU STEPDOWN

## 2020-01-07 PROCEDURE — 74011250636 HC RX REV CODE- 250/636: Performed by: EMERGENCY MEDICINE

## 2020-01-07 PROCEDURE — 80048 BASIC METABOLIC PNL TOTAL CA: CPT

## 2020-01-07 PROCEDURE — 36415 COLL VENOUS BLD VENIPUNCTURE: CPT

## 2020-01-07 RX ORDER — FAMOTIDINE 20 MG/1
20 TABLET, FILM COATED ORAL DAILY
Status: DISCONTINUED | OUTPATIENT
Start: 2020-01-08 | End: 2020-02-04 | Stop reason: HOSPADM

## 2020-01-07 RX ADMIN — MORPHINE SULFATE 2 MG: 2 INJECTION, SOLUTION INTRAMUSCULAR; INTRAVENOUS at 10:10

## 2020-01-07 RX ADMIN — OXYCODONE HYDROCHLORIDE AND ACETAMINOPHEN 2 TABLET: 5; 325 TABLET ORAL at 18:12

## 2020-01-07 RX ADMIN — OXYCODONE HYDROCHLORIDE AND ACETAMINOPHEN 2 TABLET: 5; 325 TABLET ORAL at 23:10

## 2020-01-07 RX ADMIN — OXYCODONE HYDROCHLORIDE AND ACETAMINOPHEN 1 TABLET: 5; 325 TABLET ORAL at 07:04

## 2020-01-07 RX ADMIN — HEPARIN SODIUM 5000 UNITS: 5000 INJECTION INTRAVENOUS; SUBCUTANEOUS at 03:46

## 2020-01-07 RX ADMIN — INSULIN GLARGINE 28 UNITS: 100 INJECTION, SOLUTION SUBCUTANEOUS at 10:02

## 2020-01-07 RX ADMIN — FAMOTIDINE 20 MG: 10 INJECTION, SOLUTION INTRAVENOUS at 03:46

## 2020-01-07 RX ADMIN — Medication 81 MG: at 10:02

## 2020-01-07 RX ADMIN — INSULIN LISPRO 6 UNITS: 100 INJECTION, SOLUTION INTRAVENOUS; SUBCUTANEOUS at 18:12

## 2020-01-07 RX ADMIN — HEPARIN SODIUM 5000 UNITS: 5000 INJECTION INTRAVENOUS; SUBCUTANEOUS at 12:39

## 2020-01-07 RX ADMIN — INSULIN LISPRO 6 UNITS: 100 INJECTION, SOLUTION INTRAVENOUS; SUBCUTANEOUS at 12:24

## 2020-01-07 RX ADMIN — HEPARIN SODIUM 5000 UNITS: 5000 INJECTION INTRAVENOUS; SUBCUTANEOUS at 19:34

## 2020-01-07 NOTE — PROGRESS NOTES
Problem: Diabetes Self-Management  Goal: *Disease process and treatment process  Description  Define diabetes and identify own type of diabetes; list 3 options for treating diabetes. Outcome: Progressing Towards Goal  Goal: *Incorporating nutritional management into lifestyle  Description  Describe effect of type, amount and timing of food on blood glucose; list 3 methods for planning meals. Outcome: Progressing Towards Goal  Goal: *Incorporating physical activity into lifestyle  Description  State effect of exercise on blood glucose levels. Outcome: Progressing Towards Goal  Goal: *Developing strategies to promote health/change behavior  Description  Define the ABC's of diabetes; identify appropriate screenings, schedule and personal plan for screenings. Outcome: Progressing Towards Goal  Goal: *Using medications safely  Description  State effect of diabetes medications on diabetes; name diabetes medication taking, action and side effects. Outcome: Progressing Towards Goal  Goal: *Monitoring blood glucose, interpreting and using results  Description  Identify recommended blood glucose targets  and personal targets. Outcome: Progressing Towards Goal  Goal: *Prevention, detection, treatment of acute complications  Description  List symptoms of hyper- and hypoglycemia; describe how to treat low blood sugar and actions for lowering  high blood glucose level. Outcome: Progressing Towards Goal  Goal: *Prevention, detection and treatment of chronic complications  Description  Define the natural course of diabetes and describe the relationship of blood glucose levels to long term complications of diabetes.   Outcome: Progressing Towards Goal  Goal: *Developing strategies to address psychosocial issues  Description  Describe feelings about living with diabetes; identify support needed and support network  Outcome: Progressing Towards Goal  Goal: *Insulin pump training  Outcome: Progressing Towards Goal  Goal: *Sick day guidelines  Outcome: Progressing Towards Goal  Goal: *Patient Specific Goal (EDIT GOAL, INSERT TEXT)  Outcome: Progressing Towards Goal     Problem: Patient Education: Go to Patient Education Activity  Goal: Patient/Family Education  Outcome: Progressing Towards Goal     Problem: Falls - Risk of  Goal: *Absence of Falls  Description  Document You Sims Fall Risk and appropriate interventions in the flowsheet. Outcome: Progressing Towards Goal  Note: Fall Risk Interventions:  Mobility Interventions: Patient to call before getting OOB, PT Consult for mobility concerns    Mentation Interventions: Adequate sleep, hydration, pain control, Bed/chair exit alarm    Medication Interventions: Patient to call before getting OOB, Teach patient to arise slowly    Elimination Interventions: Toileting schedule/hourly rounds, Call light in reach              Problem: Patient Education: Go to Patient Education Activity  Goal: Patient/Family Education  Outcome: Progressing Towards Goal     Problem: Pressure Injury - Risk of  Goal: *Prevention of pressure injury  Description  Document Marc Scale and appropriate interventions in the flowsheet.   Outcome: Progressing Towards Goal  Note: Pressure Injury Interventions:  Sensory Interventions: Minimize linen layers    Moisture Interventions: Check for incontinence Q2 hours and as needed, Absorbent underpads    Activity Interventions: PT/OT evaluation, Increase time out of bed    Mobility Interventions: PT/OT evaluation    Nutrition Interventions: Offer support with meals,snacks and hydration, Document food/fluid/supplement intake    Friction and Shear Interventions: Minimize layers                Problem: Patient Education: Go to Patient Education Activity  Goal: Patient/Family Education  Outcome: Progressing Towards Goal     Problem: Pain  Goal: *Control of Pain  Outcome: Progressing Towards Goal  Goal: *PALLIATIVE CARE:  Alleviation of Pain  Outcome: Progressing Towards Goal Problem: Patient Education: Go to Patient Education Activity  Goal: Patient/Family Education  Outcome: Progressing Towards Goal     Problem: Injury - Risk of, Adverse Drug Event  Goal: *Absence of adverse drug events  Outcome: Progressing Towards Goal  Goal: *Absence of medication errors  Outcome: Progressing Towards Goal  Goal: *Knowledge of prescribed medications  Outcome: Progressing Towards Goal     Problem: Patient Education: Go to Patient Education Activity  Goal: Patient/Family Education  Outcome: Progressing Towards Goal     Problem: Altered Thought Process (Adult/Pediatric)  Goal: *STG: Participates in treatment plan  Outcome: Progressing Towards Goal  Goal: *STG: Remains safe in hospital  Outcome: Progressing Towards Goal  Goal: *STG: Seeks staff when feelings of anxiety and fear arise  Outcome: Progressing Towards Goal  Goal: *STG: Complies with medication therapy  Outcome: Progressing Towards Goal  Goal: *STG: Attends activities and groups  Outcome: Progressing Towards Goal  Goal: *STG: Decreased delusional thinking  Outcome: Progressing Towards Goal  Goal: *STG: Decreased hallucinations  Outcome: Progressing Towards Goal  Goal: *STG: Absence of lethality  Outcome: Progressing Towards Goal  Goal: *STG: Demonstrates ability to understand and use improved judgment in daily activities and relationships  Outcome: Progressing Towards Goal  Goal: *LTG: Returns to baseline functioning  Outcome: Progressing Towards Goal  Goal: Interventions  Outcome: Progressing Towards Goal     Problem: Patient Education: Go to Patient Education Activity  Goal: Patient/Family Education  Outcome: Progressing Towards Goal     Problem: Nutrition Deficit  Goal: *Optimize nutritional status  Outcome: Progressing Towards Goal     Problem: Infection - Risk of, Urinary Catheter-Associated Urinary Tract Infection  Goal: *Absence of infection signs and symptoms  Outcome: Progressing Towards Goal     Problem: Patient Education: Go to Patient Education Activity  Goal: Patient/Family Education  Outcome: Progressing Towards Goal

## 2020-01-07 NOTE — PALLIATIVE CARE
PALLIATIVE MEDICINE NOTE      Palliative med team consisting of this author and Saw Ward NP, met with Pt at bedside. She's feeling OK other than being a little drowsy after medications. She initially thought we were the \"Medi[employer]Ca[facility] people to help me with this list\", so we clarified our role as PMT. She again tried to show us the list of tasks of that she needs assistance with at home, so we stressed that care mgt would address those concerns. Pt completed a new AMD yesterday which has been scanned into EMR. We  broached code status and reviewed the benefits/burdens of CPR. She is leaning towards DNR but would like to review the form and think about this choice until tomorrow. Provided her with a voided copy of the DDNR. She denied other concerns at this time. Thank you for the consult and the opportunity to assist in Ms. Thompson's care. We will cont to follow to provide support to Pt and family.     Raquel Rico, OSORIO  Palliative Medicine

## 2020-01-07 NOTE — PROGRESS NOTES
1130: Lab notified RN that patient refused lab draw. 1140: RN spoke with Dr. Gabriel Holliday who the PICC line can be used for lab draws and heparin infusion if it flushes and is not occuluded.

## 2020-01-07 NOTE — PROGRESS NOTES
Vascular Surgery Progress Note    Admit Date: 2020  POD * No surgery found *    Procedure:  * No surgery found *      Subjective:     Patient has no new complaints. Much more alert today and feeling better. family at bedside visiting. She is complaining of pain in the right foot today now that she is more alert. Objective:     Blood pressure 135/70, pulse 65, temperature 97.7 °F (36.5 °C), resp. rate 16, height 5' 5\" (1.651 m), weight 164 lb 9.6 oz (74.7 kg), SpO2 98 %, not currently breastfeeding. Temp (24hrs), Av.4 °F (36.3 °C), Min:97 °F (36.1 °C), Max:97.8 °F (36.6 °C)      Physical Exam:  GENERAL: alert, cooperative, no distress, appears stated age, lethargic, LUNG:  no resp distress, ABDOMEN:  ND, soft. and EXTREMITIES:  no BLE edema. feet warm to touch, right toes cool compared to left. right toes to mid foot dusky in appearance. no ulcers. Labs: Results:       Chemistry Recent Labs     20  0930 20  0119 20  2101   * 267* 201*    144 150*   K 4.4 4.5 4.1   * 115* 119*   CO2 21 22 23   BUN 26* 46* 51*   CREA 1.47* 2.00* 2.02*   CA 7.4* 8.2* 8.0*   AGAP 6 7 8   BUCR 18 23* 25*   AP  --  96  --    TP  --  6.7  --    ALB  --  2.2*  --    GLOB  --  4.5*  --    AGRAT  --  0.5*  --       CBC w/Diff Recent Labs     20  0459 20  0119   WBC 6.0 5.5   RBC 3.21* 3.43*   HGB 10.4* 10.8*   HCT 30.6* 32.3*   * 139   GRANS 61 67   LYMPH 25 21   EOS 3 1      Microbiology No results for input(s): CULT in the last 72 hours. Coagulation No results for input(s): PTP, INR, APTT, INREXT, INREXT in the last 72 hours.       Data Review: images and reports reviewed    Assessment:     Active Problems:    Pancreatitis (1/3/2020)      Hyperosmolar hyperglycemic coma due to diabetes mellitus without ketoacidosis ([facility] [address] (1/3/2020)      Hyperosmolar non-ketotic state in patient with type 2 diabetes mellitus ([facility] [address] (1/3/2020)        Plan/Recommendations/Medical Decision Making:     Continue present treatment per primary team  Patient discussed with Renal and Medicine. OK to proceed with vascular workup for ischemic right foot. CTA abd with run off ordered. Monitor renal function closely.    Further surgical discussion pending results of CTA  Patient and family express understanding     Greer Valentine

## 2020-01-07 NOTE — DIABETES MGMT
GLYCEMIC CONTROL AND NUTRITION    Assessment/Recommendations:  Blood glucose this am 139 mg/dl  Noted lantus dose increased to 28 units daily  Continue corrective insulin coverage as needed  Will continue inpatient monitoring. Most recent blood glucose values:  Results for Mert Low (MRN [id number]) as of 1/7/2020 13:21   Ref. Range 1/6/2020 05:26 1/6/2020 11:02 1/6/2020 23:29 1/7/2020 05:27 1/7/2020 11:36   GLUCOSE,FAST - POC Latest Ref Range: 70 - 110 mg/dL 140 (H) 214 (H) 211 (H) 139 (H) 209 (H)     Current A1C of 14.2 % is equivalent to average blood glucose of 361 mg/dl over the past 2-3 months. Current hospital diabetes medications:   lantus 28 units daily  Lispro corrective insulin coverage  Previous day's insulin requirements:   Lantus 28 units  Lispro 18 units corrective insulin coverage  Home diabetes medications: per RN Diabetes noted from 01/06/19  Patient reported list of prescribed diabetes medications:  Lantus insulin (vial). She cannot remember the dose because - \"it has been a very long since I last took it. I got tired taking insulin and only took my diabetes pills. \"  Glipizide once daily. She cannot remember the dose. Metformin 500 mg daily. She cannot remember the dose. Diet:    GI lite.  No concentrated sweets  Education:  _x__Refer to Diabetes Education Record             ____Education not indicated at this time      Emma Singh [facility] CDE  Ext [phone]

## 2020-01-07 NOTE — ROUTINE PROCESS
Bedside and Verbal shift change report given to Sharon and Juli, RN (oncoming nurse) by Juan Xiao RN (offgoing nurse). Report included the following information SBAR, Kardex and MAR.

## 2020-01-07 NOTE — PROGRESS NOTES
Bedside shift change report given to Shine (oncoming nurse) by Zerita Phoenix (offgoing nurse). Report included the following information SBAR, Kardex, Intake/Output and MAR.

## 2020-01-07 NOTE — PROGRESS NOTES
[email or web address]  [phone]    Gastroenterology follow up-Progress note    Impression:  1. Acute pancreatitis, lipase 24K on admit; trending down. Mild inflammation noted on imaging (no gallstones on CT). . BISAP score 4. Will get IgG 4 (pending) BUN/HCT stable, improving. RUQ US pending; likely alcohol related  2. DMII, uncontrolled with HHS with anion gap acidosis  3. Hyperkalemia  4. Acute on chronic renal failure  5. Metabolic encephalopathy  6. Constipation- now having BMs    Plan:  1. IVF, monitor BUN/HCT  2. Glucose control per primary team  3. Regular diet  4. RUQ US to r/o gallstones- pending  5. Continue dulcolax and miralax for bowel regimen  6. Alcohol cessation  7 Medical management per primary team  8. Will need outpatient CT pancreas in about 8-12 weeks to ensure no residual changes and no mass. Chief Complaint: pancreatitis      Subjective:  No abdominal pain; tolerating diet without N/V. Moving her bowels now. ROS: Denies any fevers, chills, rash.      Eyes: conjunctiva normal, EOM normal   Neck: ROM normal, supple and trachea normal   Cardiovascular: heart normal, intact distal pulses, normal rate and regular rhythm   Pulmonary/Chest Wall: breath sounds normal and effort normal   Abdominal: appearance normal, bowel sounds normal and soft, non-acute, non-tender     Patient Active Problem List   Diagnosis Code    Hyperglycemia R73.9    Type 2 diabetes mellitus with hyperosmolarity (HCC) E11.00    Acute UTI N39.0    Dehydration E86.0    Acute renal failure (ARF) (HCC) N17.9    Noncompliance Z91.19    Pancreatitis K85.90    Hyperosmolar hyperglycemic coma due to diabetes mellitus without ketoacidosis (HCC) E11.01    Hyperosmolar non-ketotic state in patient with type 2 diabetes mellitus ([facility] [address] E11.00         Visit Vitals  /70 (BP 1 Location: Right arm, BP Patient Position: At rest)   Pulse 65   Temp 97.7 °F (36.5 °C)   Resp 16   Ht 5' 5\" (1.651 m)   Wt 74.7 kg (164 lb 9.6 oz)   SpO2 98%   Breastfeeding No   BMI 27.39 kg/m²           Intake/Output Summary (Last 24 hours) at 1/7/2020 1257  Last data filed at 1/7/2020 0904  Gross per 24 hour   Intake 490 ml   Output    Net 490 ml       CBC w/Diff    Lab Results   Component Value Date/Time    WBC 6.0 01/06/2020 04:59 AM    RBC 3.21 (L) 01/06/2020 04:59 AM    HGB 10.4 (L) 01/06/2020 04:59 AM    HCT 30.6 (L) 01/06/2020 04:59 AM    MCV 95.3 01/06/2020 04:59 AM    MCH 32.4 01/06/2020 04:59 AM    MCHC 34.0 01/06/2020 04:59 AM    RDW 13.7 01/06/2020 04:59 AM     (L) 01/06/2020 04:59 AM    Lab Results   Component Value Date/Time    GRANS 61 01/06/2020 04:59 AM    LYMPH 25 01/06/2020 04:59 AM    EOS 3 01/06/2020 04:59 AM    BASOS 0 01/06/2020 04:59 AM      Basic Metabolic Profile   Recent Labs     01/07/20  0930 01/05/20  0119    144   K 4.4 4.5   * 115*   CO2 21 22   BUN 26* 46*   CA 7.4* 8.2*   MG  --  2.8*   PHOS  --  2.4*        Hepatic Function    Lab Results   Component Value Date/Time    ALB 2.2 (L) 01/05/2020 01:19 AM    TP 6.7 01/05/2020 01:19 AM    AP 96 01/05/2020 01:19 AM    Lab Results   Component Value Date/Time    SGOT 76 (H) 01/05/2020 01:19 AM          Coags   No results for input(s): PTP, INR, APTT, INREXT, INREXT in the last 72 hours. Karthik Coronel NP    [facility]. [email or web address]  Phone: [phone]

## 2020-01-07 NOTE — ROUTINE PROCESS
Bedside shift change report given to Larose Curling, RN (oncoming nurse) by Pato Murray RN (offgoing nurse). Report included the following information SBAR, Kardex and MAR.

## 2020-01-07 NOTE — CONSULTS
Palliative Medicine Consult    Patient Name: Tyree Coy  YOB: 1940    Date of Initial Consult: 1/6/2020, Follow up 1/7/2020  Reason for Consult: Goals of care discussions  Requesting Provider: Ling Daly MD  Primary Care Physician: Rody Escoto MD      SUMMARY:   Tyree Coy is a [de-identified] y.o. with a past history of DMT2, hypercholesterolemia, and hypertension, who was admitted on 1/2/2020 from home with a diagnosis of DMT2, uncontrolled with HHS, Acute on chronic renal failure Stage 3, Right lower extremity ischemic changes, acute pancreatitis, and metabolic acidosis. Current medical issues leading to Palliative Medicine involvement include: goals of care discussion for an [de-identified]year old female with uncontrolled DMT2 with history of medical noncompliance with insulin regimen. 1/7/2020: Patient is feeling well, states she recently had morphine for pain and feels drowsy. Denies pain at current time. AMD on patient's chart. PALLIATIVE DIAGNOSES:   1. Goals of care discussions  2. Uncontrolled DMT2  3. Acute on chronic renal failure Stage 3  4. Right lower extremity ischemia  5. Acute pancreatitis       PLAN:   1/7/2020: Palliative medicine team including LISA Fernandes LCSW and I met with patient at patient's bedside. No family present. Patient is awake, alert, and oriented x 4. Reviewed current AMD on file, naming Barbara Linton as primary MPOA. Reeducated on the benefits and burdens of CPR in the event of cardiac or pulmonary arrest. Patient is leaning toward DNR but she wants some time to review DDNR form prior to signing. Left a voided copy of the DDNR form at bedside, and wants to remain a full code with full interventions while she is reviewing the form and thinking about DNR status. Will continue to follow for goals of care discussions and support. See previous visits below:    1/6/2020  1. Goals of care discussions: Palliative medicine team including Joe Rowland, RN, and I met with patient at patient's bedside. Patient is alert and oriented x 4, though drowsy, nodding off to sleep frequently during conversation. Present at bedside were patient's nephew Rl Bella and a \"Spiritual friend. \" Discussed our role as palliative, and offered support as patient shares her desire to get back to walking in her community and socializing. Patient states she is quite active in her community and loves talking to all the residents. Discussed the importance of an AMD, and per Dorothy Gutierrez, patient has an AMD, and will bring us a copy today. Per patient, she is a [employer] who does not ever wish to receive blood products. Discussed the benefits and burdens of CPR in the event of cardiac or pulmonary arrest. Patient is leaning toward DNR but she is nodding off to sleep so it is not appropriate to continue conversation at this time. Will follow up once patient has had some rest. Until then, patient remains a full code with full interventions. Will continue to follow for more goals of care discussions and support. 1. Uncontrolled DMT2: Patient arrived in [facility], requiring insulin gtt. Off insulin gtt,on lantus and sliding scale. Patient is not convinced she has blood sugar problems. Known history of medical noncompliance to insulin regimen. 2. Acute of chronic renal failure, stage 3:  Multifactorial including dehydration and hyperglycemia. Start half NS and monitor per nephrology. 3. Right lower extremity ischemia: LORENA indicative of severe to critical arterial insuff at illeo-fem level, fem-pop level. Vascular following, and recommends arterial duplex. Result pending. 4. Acute pancreatitis: GI following. lipase 24K on admit; trending down. RUQ US to r/o gallstones- pending. 5. Initial consult note routed to primary continuity provider  6.  Communicated plan of care with: Palliative IDT, patient, family       GOALS OF CARE / TREATMENT PREFERENCES:   [====Goals of Care====]  GOALS OF CARE: Full code with full interventions  Patient/Health Care Proxy Stated Goals: Prolong life      TREATMENT PREFERENCES:   Code Status: Full Code    Advance Care Planning:  Advance Care Planning 1/3/2020   Patient's Healthcare Decision Maker is: Legal Next of Kin   Confirm Advance Directive None   Patient Would Like to Complete Advance Directive Unable       Medical Interventions: Full interventions           The palliative care team has discussed with patient / health care proxy about goals of care / treatment preferences for patient.  [====Goals of Care====]         HISTORY:     History obtained from: patient, chart    CHIEF COMPLAINT: Feeling better    HPI/SUBJECTIVE:    The patient is:   [x] Verbal and participatory  [] Non-participatory due to:   Alert and oriented x 4    Clinical Pain Assessment (nonverbal scale for severity on nonverbal patients):   Clinical Pain Assessment  Severity: 0            FUNCTIONAL ASSESSMENT:     Palliative Performance Scale (PPS):  PPS: 60        PSYCHOSOCIAL/SPIRITUAL SCREENING:     Advance Care Planning:  Advance Care Planning 1/3/2020   Patient's Healthcare Decision Maker is: Legal Next of Kin   Confirm Advance Directive None   Patient Would Like to Complete Advance Directive Unable        Any spiritual / Worship concerns: Jerelyn Gianni witness, NO BLOOD  [x] Yes /  [] No    Caregiver Burnout:  [] Yes /  [x] No /  [] No Caregiver Present      Anticipatory grief assessment:   [x] Normal  / [] Maladaptive          REVIEW OF SYSTEMS:     Positive and pertinent negative findings in ROS are noted above in HPI. The following systems were [x] reviewed / [] unable to be reviewed as noted in HPI  Other findings are noted below. Systems: constitutional, ears/nose/mouth/throat, respiratory, gastrointestinal, genitourinary, musculoskeletal, integumentary, neurologic, psychiatric, endocrine. Positive findings noted below.   Modified ESAS Completed by: provider     Drowsiness: 6     Pain: 0 Anxiety: 0 Nausea: 0     Dyspnea: 0     Constipation: No              PHYSICAL EXAM:     From RN flowsheet:  Wt Readings from Last 3 Encounters:   01/05/20 74.7 kg (164 lb 9.6 oz)   12/26/18 72.8 kg (160 lb 9.6 oz)   07/10/17 52.2 kg (115 lb)     Blood pressure 135/70, pulse 65, temperature 97.7 °F (36.5 °C), resp. rate 16, height 5' 5\" (1.651 m), weight 74.7 kg (164 lb 9.6 oz), SpO2 98 %, not currently breastfeeding. Pain Scale 1: Numeric (0 - 10)  Pain Intensity 1: 8     Pain Location 1: Leg  Pain Orientation 1: Right  Pain Description 1: Aching  Pain Intervention(s) 1: Medication (see MAR)      Constitutional: Awake, Drowsy, NAD  Eyes: pupils equal, anicteric  ENMT: no nasal discharge, moist mucous membranes  Cardiovascular: regular rhythm  Respiratory: breathing not labored, symmetric  Gastrointestinal: soft non-tender, +bowel sounds  Musculoskeletal: no deformity, no tenderness to palpation  Skin: warm, dry  Neurologic: following commands, moving all extremities  Psychiatric: full affect, no hallucinations         HISTORY:     Active Problems:    Pancreatitis (1/3/2020)      Hyperosmolar hyperglycemic coma due to diabetes mellitus without ketoacidosis ([facility] [address] (1/3/2020)      Hyperosmolar non-ketotic state in patient with type 2 diabetes mellitus ([facility] [address] (1/3/2020)      Past Medical History:   Diagnosis Date    [facility]     Hypercholesterolemia     Hypertension       History reviewed. No pertinent surgical history. History reviewed. No pertinent family history. History reviewed, no pertinent family history.   Social History     Tobacco Use    Smoking status: Former Smoker    Smokeless tobacco: Never Used   Substance Use Topics    Alcohol use: Not on file     No Known Allergies   Current Facility-Administered Medications   Medication Dose Route Frequency    [START ON 1/8/2020] famotidine (PEPCID) tablet 20 mg  20 mg Oral DAILY    insulin glargine (LANTUS) injection 28 Units  28 Units SubCUTAneous DAILY    bisacodyl (DULCOLAX) suppository 10 mg  10 mg Rectal DAILY PRN    polyethylene glycol (MIRALAX) packet 17 g  17 g Oral DAILY    aspirin chewable tablet 81 mg  81 mg Oral DAILY    oxyCODONE-acetaminophen (PERCOCET) 5-325 mg per tablet 1-2 Tab  1-2 Tab Oral Q6H PRN    morphine injection 2 mg  2 mg IntraVENous Q4H PRN    dextrose 10% infusion 125-250 mL  125-250 mL IntraVENous PRN    heparin (porcine) injection 5,000 Units  5,000 Units SubCUTAneous Q8H    insulin lispro (HUMALOG) injection   SubCUTAneous Q6H    glucose chewable tablet 16 g  4 Tab Oral PRN    glucagon (GLUCAGEN) injection 1 mg  1 mg IntraMUSCular PRN          LAB AND IMAGING FINDINGS:     Lab Results   Component Value Date/Time    WBC 6.0 01/06/2020 04:59 AM    HGB 10.4 (L) 01/06/2020 04:59 AM    PLATELET 160 (L) 31/60/0336 04:59 AM     Lab Results   Component Value Date/Time    Sodium 144 01/07/2020 09:30 AM    Potassium 4.4 01/07/2020 09:30 AM    Chloride 117 (H) 01/07/2020 09:30 AM    CO2 21 01/07/2020 09:30 AM    BUN 26 (H) 01/07/2020 09:30 AM    Creatinine 1.47 (H) 01/07/2020 09:30 AM    Calcium 7.4 (L) 01/07/2020 09:30 AM    Magnesium 2.8 (H) 01/05/2020 01:19 AM    Phosphorus 2.4 (L) 01/05/2020 01:19 AM      Lab Results   Component Value Date/Time    AST (SGOT) 76 (H) 01/05/2020 01:19 AM    Alk.  phosphatase 96 01/05/2020 01:19 AM    Protein, total 6.7 01/05/2020 01:19 AM    Albumin 2.2 (L) 01/05/2020 01:19 AM    Globulin 4.5 (H) 01/05/2020 01:19 AM     Lab Results   Component Value Date/Time    INR 1.0 12/24/2018 10:14 PM    Prothrombin time 12.9 12/24/2018 10:14 PM    aPTT 30.6 12/24/2018 10:14 PM      Lab Results   Component Value Date/Time    Iron 92 05/31/2019 12:07 PM    TIBC 256 05/31/2019 12:07 PM    Iron % saturation 36 05/31/2019 12:07 PM      No results found for: PH, PCO2, PO2  No components found for: GLPOC   No results found for: CPK, CKMB             Total time: 15 minutes  Counseling / coordination time, spent as noted above: 10 minutes  > 50% counseling / coordination?: yes, patient, family    Prolonged service was provided for  []30 min   []75 min in face to face time in the presence of the patient, spent as noted above. Time Start:   Time End:   Note: this can only be billed with 73317 (initial) or 88372 (follow up). If multiple start / stop times, list each separately.

## 2020-01-07 NOTE — ACP (ADVANCE CARE PLANNING)
Palliative Medicine      Pt has an Advance Directive on file in EMR. It's an AMD done through their [facility] witness Congregation which specifies no blood products. Primary Decision Maker (Health Care Agent): Shanika Henson  Relationship to patient: Jazz Giron number: [phone]  X Named in a scanned document   Legal Next of Kin  Guardian    Secondary Decision Maker ([address]): Felicia Zaragozatoñito  Relationship to patient: Niece by marriage   Phone number: [phone]  X Named in a scanned document   Legal Next of Kin  Guardian      ACP documents you current have include:  X Advance Directive or Living Will  Durable Do Not Resuscitate  Physician Orders for Scope of Treatment (POST)  Medical Power of   Other    CODE STATUS:  FULL CODE WITH FULL AGGRESSIVE MEASURES    Discharge Plan:   Home with additional help with ADLs    Thank you for the consult and the opportunity to assist in Ms. Thompson's Care.     Cassidy Bonner, [facility]  Palliative Medicine Inpatient

## 2020-01-07 NOTE — PROGRESS NOTES
Hospitalist Progress Note    Patient: Lucero Sanchez Age: [de-identified] y.o. : 1940 MR#: [id number] SSN: [id number]  Date/Time: 2020 8:36 AM    DOA: 2020  PCP: Jon Cardenas MD    Subjective:     Patient has pain in her right foot, otherwise, she is doing well without complaint. No stomach pain, eating ok now. No fever. FSBS better control. Renal function improved, Nephrology follows. GI follows. Vascular follows, night be able to go for vascular procedure pending imaging finding        ROS: no fever/chills, no headache, no dizziness, no facial pain, no sinus congestion,   No swallowing pain, No chest pain, no palpitation, no shortness of breath, no abd pain,  No diarrhea, no urinary complaint, +right leg pain or swelling      Assessment/Plan:     1)  Right lower extremity ischemia   2)  Hyperglycemia with Type 2 diabetes Now Off insulin drip,  Lantus increased recently. 3)  Acute on chronic renal failure Stage 3: likely mostly prerenal.  4)  Acute pancreatitis, resolving   5)  Metabolic acidosis due to renal issues-resolved  6)  Acute metabolic encephalopathy: Improved  7)  Hypertension:   8)  Hypophosphatemia  9)  Normocytic anemia of chronic disease     Patient is stable with improve renal function, CTA abd with run off ordered by vascular. Pain control. Monitor renal function closely. Avoid nephrotoxicity, gutierrez cath removed. Hold IV fluid  GI follows, RUQ US pending   Advanced diet  ISS, Lantus continues. Resume statin, aspirin   Alcohol cessation advised. Palliative care team follow up for goal of care.      Full Code     Additional Notes:    Time spent >30 minutes    Case discussed with:  [x]Patient  []Family  [x]Nursing  [x]Case Management  DVT Prophylaxis:  []Lovenox  [x]Hep SQ  []SCDs  []Coumadin   []On Heparin gtt    Signed By: Yuni Bennett MD     2020 8:36 AM              Objective:   VS:   Visit Vitals  /70 (BP 1 Location: Right arm, BP Patient Position: At rest)   Pulse 65   Temp 97.7 °F (36.5 °C)   Resp 16   Ht 5' 5\" (1.651 m)   Wt 74.7 kg (164 lb 9.6 oz)   SpO2 98%   Breastfeeding No   BMI 27.39 kg/m²      Tmax/24hrs: Temp (24hrs), Av.6 °F (36.4 °C), Min:97 °F (36.1 °C), Max:98.6 °F (37 °C)      Intake/Output Summary (Last 24 hours) at 2020 0836  Last data filed at 2020 0615  Gross per 24 hour   Intake 620 ml   Output 500 ml   Net 120 ml       General:  Cooperative, Not in acute distress, speaks in full sentence while in bed  HEENT: PERRL, EOMI, supple neck, no JVD, dry oral mucosa  Cardiovascular: S1S2 regular, no rub/gallop   Pulmonary: Clear air entry bilaterally, no wheezing, no crackle  GI:  Soft, non tender, non distended, +bs, no guarding   Extremities:  No pedal edema, +distal pulses appreciated   Ischemic purpura right lower foot  Neuro: AOx3, moving all extremities, no gross deficit.      Additional:       Current Facility-Administered Medications   Medication Dose Route Frequency    0.45% sodium chloride infusion  40 mL/hr IntraVENous CONTINUOUS    insulin glargine (LANTUS) injection 28 Units  28 Units SubCUTAneous DAILY    bisacodyl (DULCOLAX) suppository 10 mg  10 mg Rectal DAILY PRN    polyethylene glycol (MIRALAX) packet 17 g  17 g Oral DAILY    aspirin chewable tablet 81 mg  81 mg Oral DAILY    oxyCODONE-acetaminophen (PERCOCET) 5-325 mg per tablet 1-2 Tab  1-2 Tab Oral Q6H PRN    morphine injection 2 mg  2 mg IntraVENous Q4H PRN    dextrose 10% infusion 125-250 mL  125-250 mL IntraVENous PRN    heparin (porcine) injection 5,000 Units  5,000 Units SubCUTAneous Q8H    famotidine (PF) (PEPCID) 20 mg in 0.9% sodium chloride 10 mL injection  20 mg IntraVENous Q48H    insulin lispro (HUMALOG) injection   SubCUTAneous Q6H    glucose chewable tablet 16 g  4 Tab Oral PRN    glucagon (GLUCAGEN) injection 1 mg  1 mg IntraMUSCular PRN            Lab/Data Review:  Labs: Results:       Chemistry Recent Labs     20  0119 01/04/20  2101   * 201*    150*   K 4.5 4.1   * 119*   CO2 22 23   BUN 46* 51*   CREA 2.00* 2.02*   BUCR 23* 25*   AGAP 7 8   CA 8.2* 8.0*   PHOS 2.4* 2.3*     Recent Labs     01/05/20  0119   ALT 34   SGOT 76*   TP 6.7   ALB 2.2*   GLOB 4.5*   AGRAT 0.5*      CBC w/Diff Recent Labs     01/06/20  0459 01/05/20  0119   WBC 6.0 5.5   RBC 3.21* 3.43*   HGB 10.4* 10.8*   HCT 30.6* 32.3*   MCV 95.3 94.2   MCH 32.4 31.5   MCHC 34.0 33.4   RDW 13.7 13.9   * 139   GRANS 61 67   LYMPH 25 21   EOS 3 1      Coagulation No results for input(s): PTP, INR, APTT, INREXT in the last 72 hours. Iron/Ferritin Lab Results   Component Value Date/Time    Iron 92 05/31/2019 12:07 PM    TIBC 256 05/31/2019 12:07 PM    Iron % saturation 36 05/31/2019 12:07 PM       BNP    Cardiac Enzymes Lab Results   Component Value Date/Time    Troponin-I, QT <0.02 01/02/2020 11:25 PM        Lactic Acid    Thyroid Studies          All Micro Results     Procedure Component Value Units Date/Time    CULTURE, BLOOD [[id number]] Collected:  01/02/20 2206    Order Status:  Completed Specimen:  Blood Updated:  01/07/20 0814     Special Requests: NO SPECIAL REQUESTS        Culture result: NO GROWTH 5 DAYS       CULTURE, BLOOD [[id number]] Collected:  01/02/20 2206    Order Status:  Completed Specimen:  Blood Updated:  01/07/20 0814     Special Requests: NO SPECIAL REQUESTS        Culture result: NO GROWTH 5 DAYS               Images:    CT (Most Recent). CT Results (most recent):  Results from Hospital Encounter encounter on 01/02/20   CT ABD PELV WO CONT    Narrative CT Abdomen And Pelvis with Intravenous Contrast    INDICATION: Generalized abdominal pain. Elevated lipase. .    TECHNIQUE: 5 mm collimation axial images obtained from the diaphragm to the  level of the pubic symphysis following the uneventful administration of  100 cc  of low osmolar, nonionic intravenous contrast.    Dose reduction techniques used:  Automated exposure control, adjustment of the  mAs and/or kVp according to patient size, standardized low-dose protocol, and/or  iterative reconstruction technique. COMPARISON: July 18, 2017. ABDOMEN FINDINGS:    Lung Bases: There is bibasilar atelectasis. Atelectasis is most conspicuous in  the lingula. The heart is top normal in size. .    Liver: Normal attenuation. No evidence for mass. Gallbladder: Present and appears normal. No biliary ductal dilatation. Pancreas: There is mild peripancreatic edema. .    Spleen: Normal in size. No evidence of mass. .    Adrenal Glands: No evidence for mass. Kidneys:     Right: Atrophic kidneys. No cortical mass. No hydronephrosis. Left:  Atrophic kidneys. No cortical mass. No hydronephrosis. Lymph Nodes: No lymphadenopathy. Aorta: Atherosclerosis. PELVIS FINDINGS:     Bowel: Small Bowel: Normal in caliber with normal wall thickness. Large Bowel: There are scattered colonic diverticula. Salo Shillings Appendix: No secondary signs of acute appendicitis. Bladder: Underdistended. Extensive bilateral pelvic masses with calcification, likely fibroid uterus. No  definite suspicious adnexal mass. No ascites. Bones: Degenerative changes of the spine. Osteopenia. Impression Impression:    Mild uncomplicated pancreatitis. Fibroid uterus. Additional incidentals as above. XRAY (Most Recent)      EKG No results found for this or any previous visit.      2D ECHO

## 2020-01-07 NOTE — PROGRESS NOTES
CM completed and successfully submitted LTSS screening for processing in DMAS portal.     MARINE Lee, RN    Pager: [phone]

## 2020-01-07 NOTE — INTERDISCIPLINARY ROUNDS
Interdisciplinary Round Note   Patient Information:   Nuris Phipps   [birth year]   Reason for Admission: Pancreatitis [K85.90]  Hyperosmolar hyperglycemic coma due to diabetes mellitus without ketoacidosis ([facility] [address] [E11.01]  Hyperosmolar non-ketotic state in patient with type 2 diabetes mellitus ([facility] [address] [E11.00]   Attending Provider:   Troy Lindsey MD  Primary Care Physician:       Rowdy Gabriel MD       [phone]   Estimated discharge date:  01/09/20   Hospital day: 3  [unfilled]  3d 21h  RRAT Score: Medium Risk            18       Total Score        3 Has Seen PCP in Last 6 Months (Yes=3, No=0)    5 Pt. Coverage (Medicare=5 , Medicaid, or Self-Pay=4)    10 Charlson Comorbidity Score (Age + Comorbid Conditions)        Criteria that do not apply:    . Living with Significant Other. Assisted Living. LTAC. SNF. or   Rehab    Patient Length of Stay (>5 days = 3)    IP Visits Last 12 Months (1-3=4, 4=9, >4=11)       normal sinus rhythm     No   none   Other  None      Chemical      Lines, Drains, & Airways  peripheral IV       IV Antibiotics:    Current Antimicrobial Therapy (168h ago, onward)    None        GI Prophylaxis: GI Prophylaxis: no   Type:  None       Recent Glucose Results:   Lab Results   Component Value Date/Time    GLUCPOC 214 (H) 01/06/2020 11:02 AM    GLUCPOC 140 (H) 01/06/2020 05:26 AM    GLUCPOC 194 (H) 01/05/2020 11:47 PM      Activity Level:   Activity Level: Bed Rest    Needs assistance with ADLs: no       Goals for Today:  D/C US brenda of the ADB, Pain control, Hyratyion   Recommendations:   Discharge Disposition: TBD, pending progress  P.T, O.T. and Nutrition  Care Management involvement for home health follow up for:  DME, mobility and assistance with ADL's    Needs for Discharge: TBD IDR Team:  Dr. Ruben Judge RN, Silvano Jimenez RN  Recommendations from IDR team:  Vascular team consulted about foot    Other Notes:  None

## 2020-01-07 NOTE — PROGRESS NOTES
RENAL DAILY PROGRESS NOTE             [de-identified] F with DM, HTN, PAD, admitted with acute pancreatitis, seen for renal failure  Subjective:     Complaint:   Overnight events noted      IMPRESSION:   Acute kidney injury , pre renal, dehydration from severe pancreatitis   Metabolic acidosis , better   Hypernatremia   Anemia  Severe PAD, right foot cynosis    PLAN:   Her renal function continue to improve, likely close to her baseline. Discussed with vascular colleague. DC IV fluid today. Monitor for urine retention. DC gutierrez catheter and monitor for urine retention, discussed with nursing staff. Discussed with Dr. Heide Thapa. Current Facility-Administered Medications   Medication Dose Route Frequency    [START ON 1/8/2020] famotidine (PEPCID) tablet 20 mg  20 mg Oral DAILY    insulin glargine (LANTUS) injection 28 Units  28 Units SubCUTAneous DAILY    bisacodyl (DULCOLAX) suppository 10 mg  10 mg Rectal DAILY PRN    polyethylene glycol (MIRALAX) packet 17 g  17 g Oral DAILY    aspirin chewable tablet 81 mg  81 mg Oral DAILY    oxyCODONE-acetaminophen (PERCOCET) 5-325 mg per tablet 1-2 Tab  1-2 Tab Oral Q6H PRN    morphine injection 2 mg  2 mg IntraVENous Q4H PRN    dextrose 10% infusion 125-250 mL  125-250 mL IntraVENous PRN    heparin (porcine) injection 5,000 Units  5,000 Units SubCUTAneous Q8H    insulin lispro (HUMALOG) injection   SubCUTAneous Q6H    glucose chewable tablet 16 g  4 Tab Oral PRN    glucagon (GLUCAGEN) injection 1 mg  1 mg IntraMUSCular PRN       Review of Symptoms: comprehensive ROS negative except above.    Objective:     Patient Vitals for the past 24 hrs:   Temp Pulse Resp BP SpO2   01/07/20 0756 97.7 °F (36.5 °C) 65 16 135/70 98 %   01/07/20 0426 97.8 °F (36.6 °C) 61 18 128/66 98 %   01/07/20 0024 97 °F (36.1 °C) 60 17 133/63 97 %   01/06/20 2014 97.1 °F (36.2 °C) 72 16 139/69 98 %        Weight change:      01/05 1901 - 01/07 0700  In: 620 [P.O.:620]  Out: 1750 [PCMPD:3260]    Intake/Output Summary (Last 24 hours) at 1/7/2020 1115  Last data filed at 1/7/2020 0904  Gross per 24 hour   Intake 490 ml   Output    Net 490 ml     Physical Exam:   General: comfortable, no acute distress   HEENT sclera anicteric, supple neck, no thyromegaly  CVS: S1S2 heard,  no rub  RS: + air entry b/l,   Abd: Soft, Non tender, Not distended,  Neuro: non focal, awake, alert , CN II-XII are grossly intact  Extrm: right foot cynosis , cool on touch  Musculoskeletal: No gross joints or bone deformities         Data Review:     LABS:   Hematology:   Recent Labs     01/06/20  0459 01/05/20  0119   WBC 6.0 5.5   HGB 10.4* 10.8*   HCT 30.6* 32.3*     Chemistry:   Recent Labs     01/07/20  0930 01/05/20  0119 01/04/20  2101   BUN 26* 46* 51*   CREA 1.47* 2.00* 2.02*   CA 7.4* 8.2* 8.0*   ALB  --  2.2*  --    K 4.4 4.5 4.1    144 150*   * 115* 119*   CO2 21 22 23   PHOS  --  2.4* 2.3*   * 267* 201*            Procedures/imaging: see electronic medical records for all procedures, Xrays and details which were not copied into this note but were reviewed prior to creation of Plan          Assessment & Plan:     As above         Nick Thornton MD  1/7/2020  1:02 PM

## 2020-01-08 ENCOUNTER — APPOINTMENT (OUTPATIENT)
Dept: CT IMAGING | Age: 80
DRG: 616 | End: 2020-01-08
Attending: PHYSICIAN ASSISTANT
Payer: MEDICARE

## 2020-01-08 LAB
ANION GAP SERPL CALC-SCNC: 5 MMOL/L (ref 3–18)
BACTERIA SPEC CULT: NORMAL
BACTERIA SPEC CULT: NORMAL
BUN SERPL-MCNC: 25 MG/DL (ref 7–18)
BUN/CREAT SERPL: 15 (ref 12–20)
CALCIUM SERPL-MCNC: 7.7 MG/DL (ref 8.5–10.1)
CHLORIDE SERPL-SCNC: 116 MMOL/L (ref 100–111)
CO2 SERPL-SCNC: 20 MMOL/L (ref 21–32)
CREAT SERPL-MCNC: 1.67 MG/DL (ref 0.6–1.3)
GLUCOSE BLD STRIP.AUTO-MCNC: 226 MG/DL (ref 70–110)
GLUCOSE BLD STRIP.AUTO-MCNC: 252 MG/DL (ref 70–110)
GLUCOSE BLD STRIP.AUTO-MCNC: 99 MG/DL (ref 70–110)
GLUCOSE SERPL-MCNC: 129 MG/DL (ref 74–99)
POTASSIUM SERPL-SCNC: 4.8 MMOL/L (ref 3.5–5.5)
SERVICE CMNT-IMP: NORMAL
SERVICE CMNT-IMP: NORMAL
SODIUM SERPL-SCNC: 141 MMOL/L (ref 136–145)

## 2020-01-08 PROCEDURE — 74011250637 HC RX REV CODE- 250/637: Performed by: HOSPITALIST

## 2020-01-08 PROCEDURE — 74011250637 HC RX REV CODE- 250/637: Performed by: EMERGENCY MEDICINE

## 2020-01-08 PROCEDURE — 74011636637 HC RX REV CODE- 636/637: Performed by: HOSPITALIST

## 2020-01-08 PROCEDURE — 74011636637 HC RX REV CODE- 636/637: Performed by: INTERNAL MEDICINE

## 2020-01-08 PROCEDURE — 75635 CT ANGIO ABDOMINAL ARTERIES: CPT

## 2020-01-08 PROCEDURE — 74011636320 HC RX REV CODE- 636/320: Performed by: INTERNAL MEDICINE

## 2020-01-08 PROCEDURE — 74011250636 HC RX REV CODE- 250/636: Performed by: PHYSICIAN ASSISTANT

## 2020-01-08 PROCEDURE — 74011250637 HC RX REV CODE- 250/637: Performed by: NURSE PRACTITIONER

## 2020-01-08 PROCEDURE — 65660000000 HC RM CCU STEPDOWN

## 2020-01-08 PROCEDURE — 36415 COLL VENOUS BLD VENIPUNCTURE: CPT

## 2020-01-08 PROCEDURE — 82962 GLUCOSE BLOOD TEST: CPT

## 2020-01-08 PROCEDURE — 80048 BASIC METABOLIC PNL TOTAL CA: CPT

## 2020-01-08 RX ADMIN — HEPARIN SODIUM 5000 UNITS: 5000 INJECTION INTRAVENOUS; SUBCUTANEOUS at 04:25

## 2020-01-08 RX ADMIN — OXYCODONE HYDROCHLORIDE AND ACETAMINOPHEN 1 TABLET: 5; 325 TABLET ORAL at 17:30

## 2020-01-08 RX ADMIN — HEPARIN SODIUM 5000 UNITS: 5000 INJECTION INTRAVENOUS; SUBCUTANEOUS at 12:10

## 2020-01-08 RX ADMIN — HEPARIN SODIUM 5000 UNITS: 5000 INJECTION INTRAVENOUS; SUBCUTANEOUS at 19:58

## 2020-01-08 RX ADMIN — FAMOTIDINE 20 MG: 20 TABLET, FILM COATED ORAL at 10:00

## 2020-01-08 RX ADMIN — POLYETHYLENE GLYCOL 3350 17 G: 17 POWDER, FOR SOLUTION ORAL at 10:00

## 2020-01-08 RX ADMIN — INSULIN GLARGINE 28 UNITS: 100 INJECTION, SOLUTION SUBCUTANEOUS at 10:00

## 2020-01-08 RX ADMIN — INSULIN LISPRO 6 UNITS: 100 INJECTION, SOLUTION INTRAVENOUS; SUBCUTANEOUS at 17:12

## 2020-01-08 RX ADMIN — INSULIN LISPRO 9 UNITS: 100 INJECTION, SOLUTION INTRAVENOUS; SUBCUTANEOUS at 11:30

## 2020-01-08 RX ADMIN — IOPAMIDOL 70 ML: 755 INJECTION, SOLUTION INTRAVENOUS at 09:18

## 2020-01-08 RX ADMIN — Medication 81 MG: at 09:00

## 2020-01-08 NOTE — PROGRESS NOTES
Palliative Medicine Consult  [facility] [phone]  [facility]: [phone]    Palliative Medicine team members Chantal Gaines LCSW and this writer met with Patient at bedside. No family or friends present in room on our arrival, but 2 ladies friends arrived just behind us. Ms. Nic Jose shared she did not get time to read the papers left with her yesterday. She stated Edger Ruby are the wrong friends, I need their husbands before I can do the paperwork. Pt had just returned from vascular study. Pt expressed some pain in right lower extremity, no shortness of breath or nausea, having completed most of her morning tray. Right lower extremity ischemia noted. Due to patient with visitors at this time, code status was not discussed during this encounter. Palliative team will continue to be follow to address code status and goals of care.      Current code status = FULL CODE-FULL INTERVENTIONS       Fernando Irene RN, [facility]  Palliative Medicine Inpatient RN   [facility]   Palliative COPE Line: [phone])

## 2020-01-08 NOTE — PROGRESS NOTES
NUTRITION    Nursing Referral: [facility]     RECOMMENDATIONS / PLAN:     - Add supplement: Glucerna Shake BID  - Update food preferences in diet order  - Continue RD inpatient monitoring and evaluation. NUTRITION INTERVENTIONS & DIAGNOSIS:     - Meals/snacks: modified composition  - Medical food supplement therapy: initiate     Nutrition Diagnosis: Inadequate oral intake related to decreased appetite, food preferences, and altered GI function as evidenced by fair/variable po intake of some meals, c/o of some foods being \"too heavy\" for her to tolerate. ASSESSMENT:     1/8: Pt on po diet. Tolerating most meals; stated some foods are \"too heavy\" feeling, preferences discussed. Sometimes has fair meal intake. Agreeable to nutrition supplement. 1/3: Pancreatitis, hyperglycemia and acute on chronic renal failure. Per MD appears to be non-compliant with insulin, transitioned off insulin drip to lantus. Remains NPO on maintenance IVF with altered mentation and drowsy per chart review. Nutritional intake adequate to meet patients estimated nutritional needs:  No    Diet: DIET GI LITE (POST SURGICAL) No Conc.  Sweets      Food Allergies: NKFA  Current Appetite: Fair  Appetite/meal intake prior to admission: Unable to determine at this time  Feeding Limitations:  [] Swallowing difficulty    [] Chewing difficulty    [] Other:  Current Meal Intake:   Patient Vitals for the past 100 hrs:   % Diet Eaten   01/08/20 0934 0 %   01/07/20 1809 100 %   01/07/20 1459 50 %   01/07/20 0904 90 %   01/06/20 1750 75 %   01/06/20 0911 25 %   01/05/20 1758 0 %   01/05/20 1534 0 %   01/05/20 1014 0 %   01/04/20 1831 0 %       BM: 1/6  Skin Integrity: WDL  Edema:   [x] No     [] Yes   Pertinent Medications: Reviewed: pepcid, bowel regimen, lantus 28 units, SSI    Recent Labs     01/08/20  0220 01/07/20  0930    144   K 4.8 4.4   * 117*   CO2 20* 21   * 150*   BUN 25* 26*   CREA 1.67* 1.47*   CA 7.7* 7.4* Intake/Output Summary (Last 24 hours) at 1/8/2020 1126  Last data filed at 1/8/2020 0934  Gross per 24 hour   Intake 600 ml   Output    Net 600 ml       Anthropometrics:  Ht Readings from Last 1 Encounters:   01/05/20 5' 5\" (1.651 m)     Last 3 Recorded Weights in this Encounter    01/02/20 2149 01/05/20 1617   Weight: 68.9 kg (151 lb 14.4 oz) 74.7 kg (164 lb 9.6 oz)     Body mass index is 27.39 kg/m². Weight History:     Weight Metrics 1/5/2020 12/26/2018 7/10/2017   Weight 164 lb 9.6 oz 160 lb 9.6 oz 115 lb   BMI 27.39 kg/m2 26.73 kg/m2 19.14 kg/m2        Admitting Diagnosis: Pancreatitis [K85.90]  Hyperosmolar hyperglycemic coma due to diabetes mellitus without ketoacidosis (HCC) [E11.01]  Hyperosmolar non-ketotic state in patient with type 2 diabetes mellitus ([facility] [E11.00]  Pertinent PMHx: DM, HTN, hypercholesterolemia    Education Needs:        [x] None identified  [] Identified - Not appropriate at this time  []  Identified and addressed - refer to education log  Learning Limitations:   [x] None identified  [] Identified:   Cultural, Gnosticism & ethnic food preferences:  [x] None identified    [] Identified and addressed     ESTIMATED NUTRITION NEEDS:     Calories: 6646-9286 kcal (MSJx1.2-1.3) based on  [x] Actual BW: 69 kg      [] IBW   Protein:  gm (1.2-1.5 gm/kg) based on  [x] Actual BW      [] IBW   Fluid: 1 mL/kcal     MONITORING & EVALUATION:     Nutrition Goal(s):   - Nutritional needs will be met through adequate oral intake or nutrition support within the next 5-7 days. Outcome: Progressing towards goal     Monitoring:   [x] Food and nutrient intake   [x] Food and nutrient administration  [x] Comparative standards   [x] Nutrition-focused physical findings   [x] Anthropometric Measurements   [x] Treatment/therapy   [x] Biochemical data, medical tests, and procedures        Previous Recommendations (for follow-up assessments only):   Implemented      Discharge Planning: Nutritional discharge needs unknown at this time. Participated in care planning, discharge planning, & interdisciplinary rounds as appropriate.       Gayla Kent RD  Pager: [phone]

## 2020-01-08 NOTE — PROGRESS NOTES
Problem: Diabetes Self-Management  Goal: *Disease process and treatment process  Description  Define diabetes and identify own type of diabetes; list 3 options for treating diabetes. Outcome: Progressing Towards Goal  Goal: *Incorporating nutritional management into lifestyle  Description  Describe effect of type, amount and timing of food on blood glucose; list 3 methods for planning meals. Outcome: Progressing Towards Goal  Goal: *Incorporating physical activity into lifestyle  Description  State effect of exercise on blood glucose levels. Outcome: Progressing Towards Goal  Goal: *Developing strategies to promote health/change behavior  Description  Define the ABC's of diabetes; identify appropriate screenings, schedule and personal plan for screenings. Outcome: Progressing Towards Goal  Goal: *Using medications safely  Description  State effect of diabetes medications on diabetes; name diabetes medication taking, action and side effects. Outcome: Progressing Towards Goal  Goal: *Monitoring blood glucose, interpreting and using results  Description  Identify recommended blood glucose targets  and personal targets. Outcome: Progressing Towards Goal  Goal: *Prevention, detection, treatment of acute complications  Description  List symptoms of hyper- and hypoglycemia; describe how to treat low blood sugar and actions for lowering  high blood glucose level. Outcome: Progressing Towards Goal  Goal: *Prevention, detection and treatment of chronic complications  Description  Define the natural course of diabetes and describe the relationship of blood glucose levels to long term complications of diabetes.   Outcome: Progressing Towards Goal  Goal: *Developing strategies to address psychosocial issues  Description  Describe feelings about living with diabetes; identify support needed and support network  Outcome: Progressing Towards Goal     Problem: Falls - Risk of  Goal: *Absence of Falls  Description  Document Vlad Fall Risk and appropriate interventions in the flowsheet. Outcome: Progressing Towards Goal  Note: Fall Risk Interventions:  Mobility Interventions: Bed/chair exit alarm, Patient to call before getting OOB    Mentation Interventions: Adequate sleep, hydration, pain control    Medication Interventions: Bed/chair exit alarm    Elimination Interventions: Bed/chair exit alarm              Problem: Pressure Injury - Risk of  Goal: *Prevention of pressure injury  Description  Document Marc Scale and appropriate interventions in the flowsheet.   Outcome: Progressing Towards Goal  Note: Pressure Injury Interventions:  Sensory Interventions: Minimize linen layers    Moisture Interventions: Absorbent underpads    Activity Interventions: Pressure redistribution bed/mattress(bed type)    Mobility Interventions: Pressure redistribution bed/mattress (bed type)    Nutrition Interventions: Document food/fluid/supplement intake    Friction and Shear Interventions: Minimize layers                Problem: Pain  Goal: *Control of Pain  Outcome: Progressing Towards Goal  Goal: *PALLIATIVE CARE:  Alleviation of Pain  Outcome: Progressing Towards Goal     Problem: Patient Education: Go to Patient Education Activity  Goal: Patient/Family Education  Outcome: Progressing Towards Goal

## 2020-01-08 NOTE — PROGRESS NOTES
Problem: Diabetes Self-Management  Goal: *Disease process and treatment process  Description  Define diabetes and identify own type of diabetes; list 3 options for treating diabetes. Outcome: Progressing Towards Goal  Goal: *Incorporating nutritional management into lifestyle  Description  Describe effect of type, amount and timing of food on blood glucose; list 3 methods for planning meals. Outcome: Progressing Towards Goal  Goal: *Incorporating physical activity into lifestyle  Description  State effect of exercise on blood glucose levels. Outcome: Progressing Towards Goal  Goal: *Developing strategies to promote health/change behavior  Description  Define the ABC's of diabetes; identify appropriate screenings, schedule and personal plan for screenings. Outcome: Progressing Towards Goal  Goal: *Using medications safely  Description  State effect of diabetes medications on diabetes; name diabetes medication taking, action and side effects. Outcome: Progressing Towards Goal  Goal: *Monitoring blood glucose, interpreting and using results  Description  Identify recommended blood glucose targets  and personal targets. Outcome: Progressing Towards Goal  Goal: *Prevention, detection, treatment of acute complications  Description  List symptoms of hyper- and hypoglycemia; describe how to treat low blood sugar and actions for lowering  high blood glucose level. Outcome: Progressing Towards Goal  Goal: *Prevention, detection and treatment of chronic complications  Description  Define the natural course of diabetes and describe the relationship of blood glucose levels to long term complications of diabetes.   Outcome: Progressing Towards Goal  Goal: *Developing strategies to address psychosocial issues  Description  Describe feelings about living with diabetes; identify support needed and support network  Outcome: Progressing Towards Goal  Goal: *Sick day guidelines  Outcome: Progressing Towards Goal  Goal: *Patient Specific Goal (EDIT GOAL, INSERT TEXT)  Outcome: Progressing Towards Goal     Problem: Patient Education: Go to Patient Education Activity  Goal: Patient/Family Education  Outcome: Progressing Towards Goal     Problem: Falls - Risk of  Goal: *Absence of Falls  Description  Document Durenda Stefano Fall Risk and appropriate interventions in the flowsheet. Outcome: Progressing Towards Goal  Note: Fall Risk Interventions:  Mobility Interventions: Bed/chair exit alarm, Patient to call before getting OOB    Mentation Interventions: Adequate sleep, hydration, pain control, Bed/chair exit alarm    Medication Interventions: Bed/chair exit alarm    Elimination Interventions: Bed/chair exit alarm, Call light in reach              Problem: Patient Education: Go to Patient Education Activity  Goal: Patient/Family Education  Outcome: Progressing Towards Goal     Problem: Pressure Injury - Risk of  Goal: *Prevention of pressure injury  Description  Document Marc Scale and appropriate interventions in the flowsheet.   Outcome: Progressing Towards Goal  Note: Pressure Injury Interventions:  Sensory Interventions: Minimize linen layers    Moisture Interventions: Absorbent underpads, Check for incontinence Q2 hours and as needed    Activity Interventions: Pressure redistribution bed/mattress(bed type), PT/OT evaluation    Mobility Interventions: Pressure redistribution bed/mattress (bed type), PT/OT evaluation    Nutrition Interventions: Document food/fluid/supplement intake    Friction and Shear Interventions: Minimize layers                Problem: Patient Education: Go to Patient Education Activity  Goal: Patient/Family Education  Outcome: Progressing Towards Goal     Problem: Pain  Goal: *Control of Pain  Outcome: Progressing Towards Goal     Problem: Patient Education: Go to Patient Education Activity  Goal: Patient/Family Education  Outcome: Progressing Towards Goal     Problem: Injury - Risk of, Adverse Drug Event  Goal: *Absence of adverse drug events  Outcome: Progressing Towards Goal  Goal: *Absence of medication errors  Outcome: Progressing Towards Goal  Goal: *Knowledge of prescribed medications  Outcome: Progressing Towards Goal     Problem: Patient Education: Go to Patient Education Activity  Goal: Patient/Family Education  Outcome: Progressing Towards Goal     Problem: Altered Thought Process (Adult/Pediatric)  Goal: *STG: Participates in treatment plan  Outcome: Progressing Towards Goal  Goal: *STG: Remains safe in hospital  Outcome: Progressing Towards Goal  Goal: *STG: Seeks staff when feelings of anxiety and fear arise  Outcome: Progressing Towards Goal  Goal: *STG: Complies with medication therapy  Outcome: Progressing Towards Goal  Goal: *STG: Attends activities and groups  Outcome: Progressing Towards Goal  Goal: *STG: Decreased delusional thinking  Outcome: Progressing Towards Goal  Goal: *STG: Decreased hallucinations  Outcome: Progressing Towards Goal  Goal: *STG: Absence of lethality  Outcome: Progressing Towards Goal  Goal: *STG: Demonstrates ability to understand and use improved judgment in daily activities and relationships  Outcome: Progressing Towards Goal  Goal: *LTG: Returns to baseline functioning  Outcome: Progressing Towards Goal  Goal: Interventions  Outcome: Progressing Towards Goal     Problem: Patient Education: Go to Patient Education Activity  Goal: Patient/Family Education  Outcome: Progressing Towards Goal     Problem: Nutrition Deficit  Goal: *Optimize nutritional status  Outcome: Progressing Towards Goal     Problem: Infection - Risk of, Urinary Catheter-Associated Urinary Tract Infection  Goal: *Absence of infection signs and symptoms  Outcome: Progressing Towards Goal     Problem: Patient Education: Go to Patient Education Activity  Goal: Patient/Family Education  Outcome: Progressing Towards Goal

## 2020-01-08 NOTE — PROGRESS NOTES
Pt off floor for CTA  Will await reading and then follow up later today/tomorrow for results discussion

## 2020-01-08 NOTE — PROGRESS NOTES
Hospitalist Progress Note    Patient: Liz Crowell Age: [de-identified] y.o. : 1940 MR#: [id number] SSN: [id number]  Date/Time: 2020 9:31 AM    DOA: 2020  PCP: Yahaira Stephen MD    Subjective:     Still with right foot pain. No stomach pain, no nausea/vomiting. No fever. FSBS better control, HgbA1c 14.2% (stated that her PCP took her off insulin)  Renal function is stabilized   GI sign off, she needs outpt CT pancreas in 8-12 weeks to ensure no residual change    Vascular follows, CTA abd with run off pending. Family members at bedside, pt allowed for update on her clinical status to her family member     ROS: no fever/chills, no headache, no dizziness, no facial pain, no sinus congestion,   No swallowing pain, No chest pain, no palpitation, no shortness of breath, no abd pain,  No diarrhea, no urinary complaint, ++righ leg pain or swelling    Assessment/Plan:     1)  Right lower extremity ischemia associate with uncontrolled DM2       LORENA indicates severe to critical arterial insuff at illeo-fem level, fem-pop level. Arterial duplex result noted to have monophasic flow  2)  Hyperglycemia with Type 2 diabetes, stable       Hyperosmolar non-ketotic state in type 2 diabetes mellitus   3)  Acute on chronic renal failure Stage 3: likely mostly prerenal.  4)  Metabolic acidosis due to renal issues-resolved  5)  Acute metabolic encephalopathy  6)  Acute pancreatitis, resolved, no pain with food intake   7)  Hypertension   8)  Hypophosphatemia  9)  Normocytic anemia of chronic disease        CTA abd with run off ordered by vascular. Vascular plan pending CTA finding   Family updated on plan of care  Pain control. Monitor renal function closely. Avoid nephrotoxicity, gutierrez cath removed. Hold IV fluid  Advanced diet  ISS, Lantus continues. Resumes statin, aspirin   Alcohol cessation advised. Palliative care team follow up for goal of care.    GI signed off, pt needs repeat CT pancreas in 8-12weeks     Full Code     Additional Notes:    Time spent >30 minutes    Case discussed with:  [x]Patient  []Family  [x]Nursing  [x]Case Management  DVT Prophylaxis:  []Lovenox  [x]Hep SQ  []SCDs  []Coumadin   []On Heparin gtt    Signed By: Dale Dahl MD     2020 9:31 AM              Objective:   VS:   Visit Vitals  /67 (BP 1 Location: Left arm, BP Patient Position: At rest)   Pulse 76   Temp 98.7 °F (37.1 °C)   Resp 16   Ht 5' 5\" (1.651 m)   Wt 74.7 kg (164 lb 9.6 oz)   SpO2 97%   Breastfeeding No   BMI 27.39 kg/m²      Tmax/24hrs: Temp (24hrs), Av.2 °F (36.8 °C), Min:97.6 °F (36.4 °C), Max:98.7 °F (37.1 °C)      Intake/Output Summary (Last 24 hours) at 2020 0931  Last data filed at 2020 0003  Gross per 24 hour   Intake 600 ml   Output    Net 600 ml       General:  Cooperative, Not in acute distress, speaks in full sentence while in bed  Family at bedside  HEENT: PERRL, EOMI, supple neck, no JVD, dry oral mucosa  Cardiovascular: S1S2 regular, no rub/gallop   Pulmonary: Clear air entry bilaterally, no wheezing, no crackle  GI:  Soft, non tender, non distended, +bs, no guarding   Extremities:  No pedal edema, +distal pulses appreciated   Ischemic change in right lower foot   Neuro: AOx3, moving all extremities, no gross deficit.      Additional:       Current Facility-Administered Medications   Medication Dose Route Frequency    famotidine (PEPCID) tablet 20 mg  20 mg Oral DAILY    insulin glargine (LANTUS) injection 28 Units  28 Units SubCUTAneous DAILY    bisacodyl (DULCOLAX) suppository 10 mg  10 mg Rectal DAILY PRN    polyethylene glycol (MIRALAX) packet 17 g  17 g Oral DAILY    aspirin chewable tablet 81 mg  81 mg Oral DAILY    oxyCODONE-acetaminophen (PERCOCET) 5-325 mg per tablet 1-2 Tab  1-2 Tab Oral Q6H PRN    morphine injection 2 mg  2 mg IntraVENous Q4H PRN    dextrose 10% infusion 125-250 mL  125-250 mL IntraVENous PRN    heparin (porcine) injection 5,000 Units 5,000 Units SubCUTAneous Q8H    insulin lispro (HUMALOG) injection   SubCUTAneous Q6H    glucose chewable tablet 16 g  4 Tab Oral PRN    glucagon (GLUCAGEN) injection 1 mg  1 mg IntraMUSCular PRN            Lab/Data Review:  Labs: Results:       Chemistry Recent Labs     01/08/20  0220 01/07/20  0930   * 150*    144   K 4.8 4.4   * 117*   CO2 20* 21   BUN 25* 26*   CREA 1.67* 1.47*   BUCR 15 18   AGAP 5 6   CA 7.7* 7.4*     No results for input(s): TBIL, ALT, SGOT, ALKP, TP, ALB, GLOB, AGRAT in the last 72 hours. CBC w/Diff Recent Labs     01/06/20  0459   WBC 6.0   RBC 3.21*   HGB 10.4*   HCT 30.6*   MCV 95.3   MCH 32.4   MCHC 34.0   RDW 13.7   *   GRANS 61   LYMPH 25   EOS 3      Coagulation No results for input(s): PTP, INR, APTT, INREXT, INREXT in the last 72 hours. Iron/Ferritin Lab Results   Component Value Date/Time    Iron 92 05/31/2019 12:07 PM    TIBC 256 05/31/2019 12:07 PM    Iron % saturation 36 05/31/2019 12:07 PM       BNP    Cardiac Enzymes Lab Results   Component Value Date/Time    Troponin-I, QT <0.02 01/02/2020 11:25 PM        Lactic Acid    Thyroid Studies          All Micro Results     Procedure Component Value Units Date/Time    CULTURE, BLOOD [[id number]] Collected:  01/02/20 2206    Order Status:  Completed Specimen:  Blood Updated:  01/08/20 0642     Special Requests: NO SPECIAL REQUESTS        Culture result: NO GROWTH 6 DAYS       CULTURE, BLOOD [[id number]] Collected:  01/02/20 2206    Order Status:  Completed Specimen:  Blood Updated:  01/08/20 1284     Special Requests: NO SPECIAL REQUESTS        Culture result: NO GROWTH 6 DAYS               Images:    CT (Most Recent). CT Results (most recent):  Results from Hospital Encounter encounter on 01/02/20   CT ABD PELV WO CONT    Narrative CT Abdomen And Pelvis with Intravenous Contrast    INDICATION: Generalized abdominal pain. Elevated lipase. .    TECHNIQUE: 5 mm collimation axial images obtained from the diaphragm to the  level of the pubic symphysis following the uneventful administration of  100 cc  of low osmolar, nonionic intravenous contrast.    Dose reduction techniques used: Automated exposure control, adjustment of the  mAs and/or kVp according to patient size, standardized low-dose protocol, and/or  iterative reconstruction technique. COMPARISON: July 18, 2017. ABDOMEN FINDINGS:    Lung Bases: There is bibasilar atelectasis. Atelectasis is most conspicuous in  the lingula. The heart is top normal in size. .    Liver: Normal attenuation. No evidence for mass. Gallbladder: Present and appears normal. No biliary ductal dilatation. Pancreas: There is mild peripancreatic edema. .    Spleen: Normal in size. No evidence of mass. .    Adrenal Glands: No evidence for mass. Kidneys:     Right: Atrophic kidneys. No cortical mass. No hydronephrosis. Left:  Atrophic kidneys. No cortical mass. No hydronephrosis. Lymph Nodes: No lymphadenopathy. Aorta: Atherosclerosis. PELVIS FINDINGS:     Bowel: Small Bowel: Normal in caliber with normal wall thickness. Large Bowel: There are scattered colonic diverticula. Niya Alfred Appendix: No secondary signs of acute appendicitis. Bladder: Underdistended. Extensive bilateral pelvic masses with calcification, likely fibroid uterus. No  definite suspicious adnexal mass. No ascites. Bones: Degenerative changes of the spine. Osteopenia. Impression Impression:    Mild uncomplicated pancreatitis. Fibroid uterus. Additional incidentals as above. [id number] Results (most recent):  Results from [place] encounter on 12/24/18   US RETROPERITONEUM COMP    Narrative Ultrasound retroperitoneal complete    Indication: Acute kidney injury, chronic kidney disease    Comparison: None    Findings:    Small echogenic kidneys noted bilaterally with right kidney measuring 7.9 x 3.8  x 4.0 cm. Left kidney measures 8.4 x 4.2 x 6 3.6 cm. Questionable duplicated  collecting system on the right. No hydronephrosis. No nephrolithiasis. Visualized spleen and bladder are within normal limits. Impression Impression:    No hydronephrosis or nephrolithiasis. -Questionable duplicated collecting system on the right. EKG No results found for this or any previous visit. Duplex art Interpretation Summary     · Diffuse plaquing seen throughout the vessels interrogated in the bilateral lower extremities. · Monophasic flow also noted in these vessels. · Technically difficult study due to calcific plaquing. · Unable to take an ankle-brachial index due to the diminished nature of the waveforms and patient's inability to withstand pressures. Lower Extremity Arterial Findings     Right Lower Arterial     Monophasic Doppler waveforms in the right distal common femoral, profunda femoris, proximal superficial femoral, middle superficial femoral, distal superficial femoral, proximal popliteal, distal popliteal, anterior tibial, posterior tibial and dorsalis pedis artery. Unable to image the distal posterior tibial artery. Left Lower Arterial     Monophasic Doppler waveforms in the left distal common femoral, profunda femoris, proximal superficial femoral, middle superficial femoral, distal superficial femoral, proximal popliteal, distal popliteal, anterior tibial, posterior tibial and dorsalis pedis artery.

## 2020-01-08 NOTE — ROUTINE PROCESS
Bedside and Verbal shift change report given to Garfield Narayanan RN (oncoming nurse) by Kimberli Neil RN (offgoing nurse). Report included the following information SBAR, Kardex and MAR.

## 2020-01-08 NOTE — PALLIATIVE CARE
PALLIATIVE MEDICINE NOTE      Palliative med team consisting of this author and Joseluis Gill RN, met with Pt at bedside. As soon as she saw this [place], she said, \"I didn't do that yet because the brothers haven't been here yet. I recognized you right away\" (referring to the [facility] form this team left with her for review yesterday). Francisco Bernal should be coming by later, so we'll talk about it then. \"  Pt also had 2 female visitors who arrived at the same time as this team.  Pt relayed that \"that vascular testing went easier than I thought this morning, so that was good. \"  She continues to have concerns about her rt foot but denied other pain or discomfort. Pt reported that she ate breakfast well and denied any nausea/vomiting. She is agreeable to this team continuing to follow. Thank you for the consult and the opportunity to assist in Ms. Thompson's care. We will cont to follow to provide support the Pt and family.     Aditi Proctor, [facility]  Palliative Medicine

## 2020-01-08 NOTE — PROGRESS NOTES
[email or web address]  [phone]    Gastroenterology follow up-Progress note    Impression:  1. Acute pancreatitis, lipase 24K on admit; trending down. Mild inflammation noted on imaging (no gallstones on CT). . BISAP score 4. IgG4 normal; BUN/HCT stable, improving. RUQ US pending; likely alcohol related  2. DMII, uncontrolled with HHS with anion gap acidosis  3. Hyperkalemia  4. Acute on chronic renal failure  5. Metabolic encephalopathy  6. Constipation- now having BMs    Plan:  1. Glucose control per primary team  2. Regular diet  3. RUQ US to r/o gallstones- pending  4. Continue dulcolax and miralax for bowel regimen  5. Alcohol cessation  6 Medical management per primary team  7. Will need outpatient CT pancreas in about 8-12 weeks to ensure no residual changes and no mass. 8.  Once RUQ US reviewed we will plan to sign off and have patient f/u as outpatient in a few weeks    Chief Complaint: pancreatitis      Subjective:  No abdominal pain; tolerating diet without N/V (prior to being made NPO. Moving her bowels now. ROS: Denies any fevers, chills, rash. Eyes: conjunctiva normal, EOM normal   Neck: ROM normal, supple and trachea normal   Cardiovascular: heart normal, intact distal pulses, normal rate and regular rhythm   Pulmonary/Chest Wall: breath sounds normal and effort normal   Abdominal: appearance normal, bowel sounds normal and soft, non-acute, non-tender     Patient Active Problem List   Diagnosis Code    Hyperglycemia R73.9    Type 2 diabetes mellitus with hyperosmolarity (HCC) E11.00    Acute UTI N39.0    Dehydration E86.0    Acute renal failure (ARF) (HCC) N17.9    Noncompliance Z91.19    Pancreatitis K85.90    Hyperosmolar hyperglycemic coma due to diabetes mellitus without ketoacidosis (HCC) E11.01    Hyperosmolar non-ketotic state in patient with type 2 diabetes mellitus ([facility] [address] E11.00         Visit Vitals  /67 (BP 1 Location: Left arm, BP Patient Position:  At rest)   Pulse 76   Temp 98.7 °F (37.1 °C)   Resp 16   Ht 5' 5\" (1.651 m)   Wt 74.7 kg (164 lb 9.6 oz)   SpO2 97%   Breastfeeding No   BMI 27.39 kg/m²           Intake/Output Summary (Last 24 hours) at 1/8/2020 0901  Last data filed at 1/8/2020 0003  Gross per 24 hour   Intake 720 ml   Output    Net 720 ml       CBC w/Diff    Lab Results   Component Value Date/Time    WBC 6.0 01/06/2020 04:59 AM    RBC 3.21 (L) 01/06/2020 04:59 AM    HGB 10.4 (L) 01/06/2020 04:59 AM    HCT 30.6 (L) 01/06/2020 04:59 AM    MCV 95.3 01/06/2020 04:59 AM    MCH 32.4 01/06/2020 04:59 AM    MCHC 34.0 01/06/2020 04:59 AM    RDW 13.7 01/06/2020 04:59 AM     (L) 01/06/2020 04:59 AM    Lab Results   Component Value Date/Time    GRANS 61 01/06/2020 04:59 AM    LYMPH 25 01/06/2020 04:59 AM    EOS 3 01/06/2020 04:59 AM    BASOS 0 01/06/2020 04:59 AM      Basic Metabolic Profile   Recent Labs     01/08/20  0220      K 4.8   *   CO2 20*   BUN 25*   CA 7.7*        Hepatic Function    Lab Results   Component Value Date/Time    ALB 2.2 (L) 01/05/2020 01:19 AM    TP 6.7 01/05/2020 01:19 AM    AP 96 01/05/2020 01:19 AM    Lab Results   Component Value Date/Time    SGOT 76 (H) 01/05/2020 01:19 AM          Coags   No results for input(s): PTP, INR, APTT, INREXT, INREXT in the last 72 hours. Arlyn Bains NP    [facility]. [email or web address]  Phone: [phone]

## 2020-01-08 NOTE — DIABETES MGMT
GLYCEMIC CONTROL AND NUTRITION    Assessment/Recommendations:  Lab glucose this am 129 mg/dl  Continue basal and corrective insulin coverage as ordered  Will continue inpatient monitoring. Most recent blood glucose values:  Results for Alden Young (MRN [id number]) as of 1/8/2020 08:22   Ref. Range 1/7/2020 15:32 1/7/2020 23:17 1/7/2020 23:18 1/7/2020 23:37 1/8/2020 05:41   GLUCOSE,FAST - POC Latest Ref Range: 70 - 110 mg/dL 242 (H) 57 (L) 58 (L) 79 99       Current A1C of 14.2 % is equivalent to average blood glucose of 361 mg/dl over the past 2-3 months. Current hospital diabetes medications:   lantus 28 units daily  Lispro corrective insulin coverage  Previous day's insulin requirements:   Lantus 28 units  Lispro 12 units corrective insulin coverage  Home diabetes medications: per RN Diabetes noted from 01/06/19  Patient reported list of prescribed diabetes medications:  Lantus insulin (vial). She cannot remember the dose because - \"it has been a very long since I last took it. I got tired taking insulin and only took my diabetes pills. \"  Glipizide once daily. She cannot remember the dose. Metformin 500 mg daily. She cannot remember the dose. Diet:    GI lite.  No concentrated sweets  Education:  _x__Refer to Diabetes Education Record             ____Education not indicated at this time      Fifi Madrigal [facility] CDE  Ext [phone]

## 2020-01-09 ENCOUNTER — APPOINTMENT (OUTPATIENT)
Dept: GENERAL RADIOLOGY | Age: 80
DRG: 616 | End: 2020-01-09
Attending: INTERNAL MEDICINE
Payer: MEDICARE

## 2020-01-09 LAB
ANION GAP SERPL CALC-SCNC: 6 MMOL/L (ref 3–18)
BASOPHILS # BLD: 0 K/UL (ref 0–0.06)
BASOPHILS NFR BLD: 0 % (ref 0–3)
BUN SERPL-MCNC: 25 MG/DL (ref 7–18)
BUN/CREAT SERPL: 15 (ref 12–20)
CALCIUM SERPL-MCNC: 8.3 MG/DL (ref 8.5–10.1)
CHLORIDE SERPL-SCNC: 116 MMOL/L (ref 100–111)
CO2 SERPL-SCNC: 22 MMOL/L (ref 21–32)
CREAT SERPL-MCNC: 1.63 MG/DL (ref 0.6–1.3)
DIFFERENTIAL METHOD BLD: ABNORMAL
EOSINOPHIL # BLD: 0.2 K/UL (ref 0–0.4)
EOSINOPHIL NFR BLD: 3 % (ref 0–5)
ERYTHROCYTE [DISTWIDTH] IN BLOOD BY AUTOMATED COUNT: 14.4 % (ref 11.6–14.5)
GLUCOSE BLD STRIP.AUTO-MCNC: 107 MG/DL (ref 70–110)
GLUCOSE BLD STRIP.AUTO-MCNC: 111 MG/DL (ref 70–110)
GLUCOSE BLD STRIP.AUTO-MCNC: 152 MG/DL (ref 70–110)
GLUCOSE BLD STRIP.AUTO-MCNC: 168 MG/DL (ref 70–110)
GLUCOSE BLD STRIP.AUTO-MCNC: 207 MG/DL (ref 70–110)
GLUCOSE BLD STRIP.AUTO-MCNC: 316 MG/DL (ref 70–110)
GLUCOSE BLD STRIP.AUTO-MCNC: 64 MG/DL (ref 70–110)
GLUCOSE BLD STRIP.AUTO-MCNC: 64 MG/DL (ref 70–110)
GLUCOSE SERPL-MCNC: 50 MG/DL (ref 74–99)
HCT VFR BLD AUTO: 25.5 % (ref 35–45)
HGB BLD-MCNC: 8.6 G/DL (ref 12–16)
LIPASE SERPL-CCNC: 1160 U/L (ref 73–393)
LYMPHOCYTES # BLD: 3.2 K/UL (ref 0.8–3.5)
LYMPHOCYTES NFR BLD: 43 % (ref 20–51)
MCH RBC QN AUTO: 32.8 PG (ref 24–34)
MCHC RBC AUTO-ENTMCNC: 33.7 G/DL (ref 31–37)
MCV RBC AUTO: 97.3 FL (ref 74–97)
MONOCYTES # BLD: 1.5 K/UL (ref 0–1)
MONOCYTES NFR BLD: 20 % (ref 2–9)
NEUTS BAND NFR BLD MANUAL: 2 % (ref 0–5)
NEUTS SEG # BLD: 2.6 K/UL (ref 1.8–8)
NEUTS SEG NFR BLD: 32 % (ref 42–75)
PLATELET # BLD AUTO: 219 K/UL (ref 135–420)
PLATELET COMMENTS,PCOM: ABNORMAL
PMV BLD AUTO: 11.3 FL (ref 9.2–11.8)
POTASSIUM SERPL-SCNC: 5 MMOL/L (ref 3.5–5.5)
RBC # BLD AUTO: 2.62 M/UL (ref 4.2–5.3)
RBC MORPH BLD: ABNORMAL
RBC MORPH BLD: ABNORMAL
SODIUM SERPL-SCNC: 144 MMOL/L (ref 136–145)
WBC # BLD AUTO: 7.5 K/UL (ref 4.6–13.2)

## 2020-01-09 PROCEDURE — 83690 ASSAY OF LIPASE: CPT

## 2020-01-09 PROCEDURE — 74011636637 HC RX REV CODE- 636/637: Performed by: HOSPITALIST

## 2020-01-09 PROCEDURE — 74011250637 HC RX REV CODE- 250/637: Performed by: HOSPITALIST

## 2020-01-09 PROCEDURE — 74011636637 HC RX REV CODE- 636/637: Performed by: INTERNAL MEDICINE

## 2020-01-09 PROCEDURE — 80048 BASIC METABOLIC PNL TOTAL CA: CPT

## 2020-01-09 PROCEDURE — 74011250637 HC RX REV CODE- 250/637: Performed by: EMERGENCY MEDICINE

## 2020-01-09 PROCEDURE — 65660000000 HC RM CCU STEPDOWN

## 2020-01-09 PROCEDURE — 85025 COMPLETE CBC W/AUTO DIFF WBC: CPT

## 2020-01-09 PROCEDURE — 36415 COLL VENOUS BLD VENIPUNCTURE: CPT

## 2020-01-09 PROCEDURE — 74011250637 HC RX REV CODE- 250/637: Performed by: NURSE PRACTITIONER

## 2020-01-09 PROCEDURE — 82962 GLUCOSE BLOOD TEST: CPT

## 2020-01-09 PROCEDURE — 74011250636 HC RX REV CODE- 250/636: Performed by: PHYSICIAN ASSISTANT

## 2020-01-09 PROCEDURE — 73630 X-RAY EXAM OF FOOT: CPT

## 2020-01-09 RX ADMIN — HEPARIN SODIUM 5000 UNITS: 5000 INJECTION INTRAVENOUS; SUBCUTANEOUS at 12:05

## 2020-01-09 RX ADMIN — POLYETHYLENE GLYCOL 3350 17 G: 17 POWDER, FOR SOLUTION ORAL at 09:00

## 2020-01-09 RX ADMIN — FAMOTIDINE 20 MG: 20 TABLET, FILM COATED ORAL at 08:54

## 2020-01-09 RX ADMIN — OXYCODONE HYDROCHLORIDE AND ACETAMINOPHEN 1 TABLET: 5; 325 TABLET ORAL at 16:23

## 2020-01-09 RX ADMIN — INSULIN LISPRO 2 UNITS: 100 INJECTION, SOLUTION INTRAVENOUS; SUBCUTANEOUS at 23:18

## 2020-01-09 RX ADMIN — HEPARIN SODIUM 5000 UNITS: 5000 INJECTION INTRAVENOUS; SUBCUTANEOUS at 04:02

## 2020-01-09 RX ADMIN — Medication 81 MG: at 08:54

## 2020-01-09 RX ADMIN — OXYCODONE HYDROCHLORIDE AND ACETAMINOPHEN 2 TABLET: 5; 325 TABLET ORAL at 00:03

## 2020-01-09 RX ADMIN — HEPARIN SODIUM 5000 UNITS: 5000 INJECTION INTRAVENOUS; SUBCUTANEOUS at 23:18

## 2020-01-09 RX ADMIN — INSULIN LISPRO 6 UNITS: 100 INJECTION, SOLUTION INTRAVENOUS; SUBCUTANEOUS at 00:14

## 2020-01-09 RX ADMIN — INSULIN GLARGINE 28 UNITS: 100 INJECTION, SOLUTION SUBCUTANEOUS at 09:54

## 2020-01-09 RX ADMIN — INSULIN LISPRO 8 UNITS: 100 INJECTION, SOLUTION INTRAVENOUS; SUBCUTANEOUS at 11:30

## 2020-01-09 NOTE — PROGRESS NOTES
Multilevel vascular occlusive disease  Will discuss with pt and team what are expectations for intervention and when  Had CTA, watch creat  rec to follow

## 2020-01-09 NOTE — PROGRESS NOTES
conducted a Follow up consultation and Spiritual Assessment for Abigail Aparicio, who is a [de-identified] y.o.,female. The  provided the following Interventions:  Continued the relationship of care and support. Listened empathically. Offered prayer and assurance of continued prayer on patients behalf. Chart reviewed. The following outcomes were achieved:  Patient expressed gratitude for 's visit. Assessment:  There are no further spiritual or Yarsani issues which require Spiritual Care Services interventions at this time. Plan:  Chaplains will continue to follow and will provide pastoral care on an as needed/requested basis.  recommends bedside caregivers page  on duty if patient shows signs of acute spiritual or emotional distress.        [id number] [facility]   [phone]

## 2020-01-09 NOTE — PROGRESS NOTES
Hospitalist Progress Note    Patient: Susan Ray Age: [de-identified] y.o. : 1940 MR#: [id number] SSN: [id number]  Date/Time: 2020 10:05 AM    DOA: 2020  PCP: Vernon Lambert MD    Subjective:     CTA abd with run off with multifocal stenosis right lower extremity. Spoke with Dr. Lexi Krishnamurthy, he is to speak with patient of possible angioplasty and stenting +possible bypass graft. Pt was updated on care plan, she stated that she will do if that is the recommendation   Still with right foot pain. No stomach pain, no nausea/vomiting. NO FEVER  FSBS better control, HgbA1c 14.2% (stated that her PCP took her off insulin)  Renal function is stabilized         ROS: no fever/chills, no headache, no dizziness, no facial pain, no sinus congestion,   No swallowing pain, No chest pain, no palpitation, no shortness of breath, no abd pain,  No diarrhea, no urinary complaint, ++righ leg pain or swelling    Assessment/Plan:     1)  Right lower extremity ischemia associate with uncontrolled DM2       LORENA indicates severe to critical arterial insuff at [place]o-fem level, fem-pop level. Arterial duplex result noted to have monophasic flow       CTA with multifocal stenosis of right lower extremity  2)  Hyperglycemia with Type 2 diabetes, stable       Hyperosmolar non-ketotic state in type 2 diabetes mellitus   3)  Acute on chronic renal failure Stage 3: likely mostly prerenal.  4)  Metabolic acidosis due to renal issues-resolved  5)  Acute metabolic encephalopathy  6)  Acute pancreatitis, resolved, no pain with food intake   7)  Hypertension   8)  Hypophosphatemia  9)  Normocytic anemia of chronic disease        Vascular considers angioplasty with stent vs bypass graft  Renal function is stabilizing and might not be prohibiting for further vascular procedure   Currently, no evidence of infection but with check with xray as mentioned on CT. Pain control.  Close monitor as high risk    Monitor renal function closely. Avoid nephrotoxicity, gutierrez cath removed. Hold IV fluid  Advanced diet  ISS, Lantus continues. Resumes statin, aspirin   Alcohol cessation advised. Palliative care team follow up for goal of care. GI signed off, pt needs repeat CT pancreas in 8-12weeks     Full Code     Additional Notes:    Time spent >30 minutes    Case discussed with:  [x]Patient  []Family  [x]Nursing  [x]Case Management  DVT Prophylaxis:  []Lovenox  [x]Hep SQ  []SCDs  []Coumadin   []On Heparin gtt    Signed By: Yuni Bennett MD     2020 10:05 AM              Objective:   VS:   Visit Vitals  BP 93/68 (BP 1 Location: Left arm, BP Patient Position: At rest)   Pulse 83   Temp 97.2 °F (36.2 °C)   Resp 13   Ht 5' 5\" (1.651 m)   Wt 74.7 kg (164 lb 9.6 oz)   SpO2 97%   Breastfeeding No   BMI 27.39 kg/m²      Tmax/24hrs: Temp (24hrs), Av.2 °F (36.8 °C), Min:97.2 °F (36.2 °C), Max:98.8 °F (37.1 °C)      Intake/Output Summary (Last 24 hours) at 2020 1005  Last data filed at 2020 1804  Gross per 24 hour   Intake 360 ml   Output    Net 360 ml       General:  Cooperative, Not in acute distress, speaks in full sentence while in bed  Family at bedside  HEENT: PERRL, EOMI, supple neck, no JVD, dry oral mucosa  Cardiovascular: S1S2 regular, no rub/gallop   Pulmonary: Clear air entry bilaterally, no wheezing, no crackle  GI:  Soft, non tender, non distended, +bs, no guarding   Extremities:  No pedal edema, +distal pulses appreciated   Ischemic change in right lower foot   Neuro: AOx3, moving all extremities, no gross deficit.      Additional:       Current Facility-Administered Medications   Medication Dose Route Frequency    famotidine (PEPCID) tablet 20 mg  20 mg Oral DAILY    insulin glargine (LANTUS) injection 28 Units  28 Units SubCUTAneous DAILY    bisacodyl (DULCOLAX) suppository 10 mg  10 mg Rectal DAILY PRN    polyethylene glycol (MIRALAX) packet 17 g  17 g Oral DAILY    aspirin chewable tablet 81 mg  81 mg Oral DAILY  oxyCODONE-acetaminophen (PERCOCET) 5-325 mg per tablet 1-2 Tab  1-2 Tab Oral Q6H PRN    morphine injection 2 mg  2 mg IntraVENous Q4H PRN    dextrose 10% infusion 125-250 mL  125-250 mL IntraVENous PRN    heparin (porcine) injection 5,000 Units  5,000 Units SubCUTAneous Q8H    insulin lispro (HUMALOG) injection   SubCUTAneous Q6H    glucose chewable tablet 16 g  4 Tab Oral PRN    glucagon (GLUCAGEN) injection 1 mg  1 mg IntraMUSCular PRN            Lab/Data Review:  Labs: Results:       Chemistry Recent Labs     01/09/20  0335 01/08/20 0220 01/07/20  0930   GLU 50* 129* 150*    141 144   K 5.0 4.8 4.4   * 116* 117*   CO2 22 20* 21   BUN 25* 25* 26*   CREA 1.63* 1.67* 1.47*   BUCR 15 15 18   AGAP 6 5 6   CA 8.3* 7.7* 7.4*     No results for input(s): TBIL, ALT, SGOT, ALKP, TP, ALB, GLOB, AGRAT in the last 72 hours. CBC w/Diff Recent Labs     01/09/20 0335   WBC 7.5   RBC 2.62*   HGB 8.6*   HCT 25.5*   MCV 97.3*   MCH 32.8   MCHC 33.7   RDW 14.4      BANDS 2   GRANS 32*   LYMPH 43   EOS 3      Coagulation No results for input(s): PTP, INR, APTT, INREXT, INREXT in the last 72 hours.     Iron/Ferritin Lab Results   Component Value Date/Time    Iron 92 05/31/2019 12:07 PM    TIBC 256 05/31/2019 12:07 PM    Iron % saturation 36 05/31/2019 12:07 PM       BNP    Cardiac Enzymes Lab Results   Component Value Date/Time    Troponin-I, QT <0.02 01/02/2020 11:25 PM        Lactic Acid    Thyroid Studies          All Micro Results     Procedure Component Value Units Date/Time    CULTURE, BLOOD [[id number]] Collected:  01/02/20 2206    Order Status:  Completed Specimen:  Blood Updated:  01/08/20 0642     Special Requests: NO SPECIAL REQUESTS        Culture result: NO GROWTH 6 DAYS       CULTURE, BLOOD [[id number]] Collected:  01/02/20 2206    Order Status:  Completed Specimen:  Blood Updated:  01/08/20 0642     Special Requests: NO SPECIAL REQUESTS        Culture result: NO GROWTH 6 DAYS Images:    CT (Most Recent). CT Results (most recent):  Results from Hospital Encounter encounter on 01/02/20   CT ABD PELV WO CONT    Narrative CT Abdomen And Pelvis with Intravenous Contrast    INDICATION: Generalized abdominal pain. Elevated lipase. .    TECHNIQUE: 5 mm collimation axial images obtained from the diaphragm to the  level of the pubic symphysis following the uneventful administration of  100 cc  of low osmolar, nonionic intravenous contrast.    Dose reduction techniques used: Automated exposure control, adjustment of the  mAs and/or kVp according to patient size, standardized low-dose protocol, and/or  iterative reconstruction technique. COMPARISON: July 18, 2017. ABDOMEN FINDINGS:    Lung Bases: There is bibasilar atelectasis. Atelectasis is most conspicuous in  the lingula. The heart is top normal in size. .    Liver: Normal attenuation. No evidence for mass. Gallbladder: Present and appears normal. No biliary ductal dilatation. Pancreas: There is mild peripancreatic edema. .    Spleen: Normal in size. No evidence of mass. .    Adrenal Glands: No evidence for mass. Kidneys:     Right: Atrophic kidneys. No cortical mass. No hydronephrosis. Left:  Atrophic kidneys. No cortical mass. No hydronephrosis. Lymph Nodes: No lymphadenopathy. Aorta: Atherosclerosis. PELVIS FINDINGS:     Bowel: Small Bowel: Normal in caliber with normal wall thickness. Large Bowel: There are scattered colonic diverticula. Loletta Nunnery Appendix: No secondary signs of acute appendicitis. Bladder: Underdistended. Extensive bilateral pelvic masses with calcification, likely fibroid uterus. No  definite suspicious adnexal mass. No ascites. Bones: Degenerative changes of the spine. Osteopenia. Impression Impression:    Mild uncomplicated pancreatitis. Fibroid uterus. Additional incidentals as above.      CT Results (most recent):  Results from [facility] Encounter encounter on 01/02/20   CTA ABD ART W RUNOFF W WO CONT    Narrative PROCEDURE: CTA of abdomen and pelvis and bilateral lower extremity runoff with  intravenous contrast administration. HISTORY: PAD, ischemia of the right foot. TECHNIQUE: Multidetector helical CT angiography was carried out from low chest  through the feet following dynamic 70 cc Isovue-300 intravenous contrast  administration . Scan timing was performed using automated bolus tracking/SMART  Prep. 3-D and MPR angiographic image reconstruction was performed on  independent workstation and separately reviewed. Parenchymal imaging is limited  by the arterial phase of contrast administration. All CT scans at this facility  are performed using dose optimization techniques as appropriate to a performed  exam, to include automated exposure control, adjustment of the mA and/or kV  according to patient's size (including appropriate matching for site specific  examinations), or use of iterative reconstruction technique. COMPARISON: CT abdomen/pelvis 1/3/2020. CTA abdomen/pelvis with extremity runoff  7/18/2017. FINDINGS:    VASCULAR FINDINGS:    Right lower extremity:  Multifocal stenosis throughout the right posterior tibialis artery due to  atherosclerotic calcifications as well as the peroneal artery.  -The right posterior tibialis artery is not opacified beyond the mid right lower  leg.  -The anterior tibial artery is not opacified beyond the right lower leg just  above the tibial plafond.  -The peroneal artery is opacified beyond the level of the tibial plafond. Proximally, there is multifocal stenosis of the right femoral artery with loss  of opacification at the mid to distal thigh. Reconstitution of opacification at  the level of the posterior tibialis artery likely due to collaterals from  geniculate arteries and the deep femoral artery.     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  -Left lower leg perfusion is contributed by collaterals from the deep femoral  artery and geniculate arteries. Multifocal stenosis/occlusion of the left  posterior tibialis artery and left peroneal artery. No opacification of the  peroneal artery or posterior tibialis artery is seen to be on the mid to  proximal third of the left lower leg. The left dorsal pedis is opacified, and  likely contributes to some additional opacified vessel seen in the left foot. Abdominal aorta:  -Severe atherosclerosis with moderate compromise of the lumen, similar to prior  exam of 7/2017.  -No evidence for abdominal aortic aneurysm. -Severe stenosis at the proximal aspect of the right common iliac artery, well  opacified distally, similar to prior exam. Multifocal moderate stenosis at the  left common iliac artery similar to prior exam.  -Multifocal stenosis at the internal iliac arteries, with good opacification  distally.  -Celiac artery, SMA, left renal artery, and right renal artery are grossly  patent. The right renal artery again shown to originate from the descending  thoracic aorta. ALFREDA is not well visualized. ABDOMEN/PELVIS FINDINGS:  Lower chest: Small right pleural effusion with overlying atelectasis. Liver: Stable subcentimeter hypodense lesions liver, too small to characterize  but stability suggests benignity    Biliary: Gallbladder is mildly distended but otherwise unremarkable. Spleen: Negative    Pancreas: There is some peripancreatic edema adjacent to the pancreatic tail. Adrenal glands: Diffuse thickening of the adrenal glands, without discrete mass,  similar to prior exam.    Kidneys: Malrotated kidneys again noted, without evidence for hydronephrosis. GI tract: The bowel appears nonobstructed. Question of mild mural thickening at  the distal esophagus. Questionable mild mural thickening at the greater  curvature of the stomach adjacent to the edema along the pancreatic tail.   Colonic diverticulosis, without evidence for diverticulitis. Normal appendix. Peritoneum: No free air    Lymph nodes: No lymphadenopathy. Pelvis: Urinary bladder is unremarkable. Fibroid uterus again noted. Body wall/soft tissues: Small fat-containing umbilical hernia. Small right lower  spigelian hernia containing a small amount of fluid measuring 2.5 x 1.2 cm  (image 203), previously measured 2.9 x 1.6 cm. Mild edema along the right flank. Bones:  -Bilateral moderate knee osteoarthrosis. Small right knee joint effusion and  trace left knee joint effusion.  -Diffuse soft tissue swelling at the feet, right greater than left. Trace  subcutaneous emphysema adjacent to the right fifth MTP joint. Left distal tibial  and talar hardware noted. -Multilevel degenerative spondylosis and disc disease noted, not well evaluated  on current exam.  -Moderate degenerative change at the hips      Impression IMPRESSION:  1. Right lower extremity:  Multifocal stenosis throughout the right posterior tibialis artery due to  atherosclerotic calcifications as well as the peroneal artery.  -The right posterior tibialis artery is not opacified beyond the mid right lower  leg.  -The anterior tibial artery is not opacified beyond the right lower leg just  above the tibial plafond.  -The peroneal artery is opacified beyond the level of the tibial plafond. Proximally, there is multifocal stenosis of the right femoral artery with loss  of opacification at the mid to distal thigh. Reconstitution of opacification at  the level of the posterior tibialis artery likely due to collaterals from  geniculate arteries and the deep femoral artery. 2. Diffuse soft tissue swelling at bilateral feet, right greater than left. -Suggestion of trace air along the right fifth MTP joint, could be degenerative  but infectious process not excluded. Consider dedicated right foot x-ray for  further evaluation.     3. Chronic multilevel stenosis of the aorta, iliac arteries, and left lower  extremity as described. 4. Peripancreatic edema along the pancreatic tail is increased since 1/3/2020.  -Associated adjacent mild presumed reactive mural thickening of the greater  curvature of the stomach. 5. Small right pleural effusion. 6. Suggestion of mild mural thickening of the distal esophagus, may indicate  nonspecific esophagitis. 7. Bilateral knee osteoarthrosis with small right knee joint effusion and trace  left knee joint effusion. 8. Fibroid uterus. 9. Colonic diverticulosis, without evidence for diverticulitis. 10. Additional nonacute findings are as described. XR Results (most recent):  Results from Hospital Encounter encounter on 01/02/20   XR FOOT RT MIN 3 V    Narrative EXAM:  XR FOOT RT MIN 3 V    INDICATION:   report of air in right fifth MTP join    COMPARISON:  None. FINDINGS:  3 views of the right foot demonstrate limited study, the patient has  cooperation issues with positioning the foot. Considerable hammertoe deformity  of all the toes noted. The foot is held in extension at the ankle. There is no  obvious fracture or dislocation. Small plantar calcaneal spur noted. Impression IMPRESSION: Positioning issues, no acute abnormality identified. No bone  destruction or soft tissue air       [id number] [facility] Results (most recent):  Results from Hospital Encounter encounter on 01/02/20   US ABD LTD    Narrative EXAMINATION: Ultrasound abdomen limited, right upper quadrant    INDICATION: Pancreatitis    COMPARISON: CT 1/3/2020    TECHNIQUE: Grayscale, color, spectral sonographic images of the right upper  quadrant obtained. FINDINGS:    Pancreas: Unremarkable. Tail not well visualized. IVC: Unremarkable. Liver: 15.2 cm length. Slightly coarsened echotexture. Portal vein normal  caliber with antegrade flow. Right kidney: 6.1 cm length. Increased echotexture. No hydronephrosis. No focal  abnormality. Gallbladder: No calculi or wall thickening. Negative Woodlake sign. Biliary tree: Common bile duct 0.5 cm caliber. No intrahepatic duct dilatation. Impression IMPRESSION:    Coarsened hepatic echotexture suggests nonspecific hepatocellular disease. Atrophic echogenic right kidney consistent with chronic medical renal disease. No other significant findings. No evidence of cholelithiasis. EKG No results found for this or any previous visit. Duplex art Interpretation Summary     · Diffuse plaquing seen throughout the vessels interrogated in the bilateral lower extremities. · Monophasic flow also noted in these vessels. · Technically difficult study due to calcific plaquing. · Unable to take an ankle-brachial index due to the diminished nature of the waveforms and patient's inability to withstand pressures. Lower Extremity Arterial Findings     Right Lower Arterial     Monophasic Doppler waveforms in the right distal common femoral, profunda femoris, proximal superficial femoral, middle superficial femoral, distal superficial femoral, proximal popliteal, distal popliteal, anterior tibial, posterior tibial and dorsalis pedis artery. Unable to image the distal posterior tibial artery. Left Lower Arterial     Monophasic Doppler waveforms in the left distal common femoral, profunda femoris, proximal superficial femoral, middle superficial femoral, distal superficial femoral, proximal popliteal, distal popliteal, anterior tibial, posterior tibial and dorsalis pedis artery.

## 2020-01-09 NOTE — INTERDISCIPLINARY ROUNDS
Interdisciplinary Round Note   Patient Information:   Fawn Billings   109[birth year]   Reason for Admission: Pancreatitis [K85.90]  Hyperosmolar hyperglycemic coma due to diabetes mellitus without ketoacidosis ([facility] [E11.01]  Hyperosmolar non-ketotic state in patient with type 2 diabetes mellitus ([facility] [E11.00]   Attending Provider:   Omari Oh MD  Primary Care Physician:       Jonathan Castro MD       [phone]   Estimated discharge date:  1/20/2020   Hospital day: 5  [unfilled]  3d 21h  RRAT Score: High Risk            21       Total Score        3 Has Seen PCP in Last 6 Months (Yes=3, No=0)    3 Patient Length of Stay (>5 days = 3)    5 Pt. Coverage (Medicare=5 , Medicaid, or Self-Pay=4)    10 Charlson Comorbidity Score (Age + Comorbid Conditions)        Criteria that do not apply:    . Living with Significant Other. Assisted Living. LTAC. SNF. or   Rehab    IP Visits Last 12 Months (1-3=4, 4=9, >4=11)       normal sinus rhythm     No    Other      Chemical      Lines, Drains, & Airways  Peripheral IV lines       IV Antibiotics:    Current Antimicrobial Therapy (168h ago, onward)    None        GI Prophylaxis: GI Prophylaxis: no   Type: PPI      Recent Glucose Results:   Lab Results   Component Value Date/Time     (H) 01/08/2020 02:20 AM    GLUCPOC 226 (H) 01/08/2020 03:45 PM    GLUCPOC 252 (H) 01/08/2020 11:54 AM    GLUCPOC 99 01/08/2020 05:41 AM      Activity Level: Activity Level:  Up with Assistance    Needs assistance with ADLs: yes       Goals for Today: pain management,    Recommendations:   Discharge Disposition: Home Independent  P.T, O.T. and Nutrition  Care Management involvement for home health follow up for:  mobility and assistance with ADL's    Needs for Discharge:  IDR Team: Dr. Arron Holt, RN  Recommendations from IDR team: none    Other Notes: none

## 2020-01-09 NOTE — DIABETES MGMT
GLYCEMIC CONTROL AND NUTRITION    Assessment/Recommendations:  Lab glucose this am 50 mg/dl  Recommend decreasing lantus dose to 26 units daily  Continue corrective insulin coverage as needed  Reduced corrective scale to normal sensitivity related to hypoglycemia  Will continue inpatient monitoring. Most recent blood glucose values:  Results for Maria C Mcdonnell (MRN [id number]) as of 1/9/2020 12:16   Ref. Range 1/9/2020 05:35 1/9/2020 05:37 1/9/2020 05:57 1/9/2020 08:14 1/9/2020 11:37   GLUCOSE,FAST - POC Latest Ref Range: 70 - 110 mg/dL 64 (L) 64 (L) 107 168 (H) 316 (H)       Current A1C of 14.2 % is equivalent to average blood glucose of 361 mg/dl over the past 2-3 months. Current hospital diabetes medications:   lantus 28 units daily  Lispro corrective insulin coverage  Previous day's insulin requirements:   Lantus 28 units  Lispro 15 units corrective insulin coverage  Home diabetes medications: per RN Diabetes noted from 01/06/19  Patient reported list of prescribed diabetes medications:  Lantus insulin (vial). She cannot remember the dose because - \"it has been a very long since I last took it. I got tired taking insulin and only took my diabetes pills. \"  Glipizide once daily. She cannot remember the dose. Metformin 500 mg daily. She cannot remember the dose. Diet:    GI lite. No concentrated sweets.  With supplements  Education:  _x__Refer to Diabetes Education Record             ____Education not indicated at this time      Babar Marquez, [address] [facility] CDE  Ext [phone]

## 2020-01-09 NOTE — CONSULTS
Palliative Medicine Consult    Patient Name: Nidia Walker  YOB: 1940    Date of Initial Consult: 1/6/2020, Follow up 1/7/2020, Follow up 1/9/2020  Reason for Consult: Goals of care discussions  Requesting Provider: Lissa Taylor MD  Primary Care Physician: Ran David MD      SUMMARY:   Nidia Walker is a [de-identified] y.o. with a past history of DMT2, hypercholesterolemia, and hypertension, who was admitted on 1/2/2020 from home with a diagnosis of DMT2, uncontrolled with HHS, Acute on chronic renal failure Stage 3, Right lower extremity ischemic changes, acute pancreatitis, and metabolic acidosis. Current medical issues leading to Palliative Medicine involvement include: goals of care discussion for an [de-identified]year old female with uncontrolled DMT2 with history of medical noncompliance with insulin regimen. 1/7/2020: Patient is feeling well, states she recently had morphine for pain and feels drowsy. Denies pain at current time. AMD on patient's chart. 1/9/2020: Patient is feeling well, more awake today and less drowsy. She is hopeful for hospital discharge soon. No distress. No changes in goals of care. PALLIATIVE DIAGNOSES:   1. Goals of care discussions  2. Uncontrolled DMT2  3. Acute on chronic renal failure Stage 3  4. Right lower extremity ischemia  5. Acute pancreatitis       PLAN:   1/9/2020: Palliative medicine team including LISA Fernandes LCSW and I met with patient at patient's bedside. No family present. Patient is awake, alert, and oriented x 4. Support offered as she shares her desire to discharge from hospital. Revisited the benefits and burdens of CPR in the event of cardiac or pulmonary arrest. Patient is desiring full code with full interventions, as she doesn't feel comfortable signing a DDNR form or committing to DNR status, though she states she wouldn't want resuscitation multiple times.  Encouraged patient to complete the DDNR form with her PCP if she is more comfortable doing this. Our team left card with patient in the event she decides to change her code status and complete the DDNR form. Patient is aware that without this DDNR form, she is at risk for resuscitation. At this time, continue full code with full interventions per patient wishes. We will sign off as goals of care have been established. We will continue to follow should patient call us for assistance with form completion/code status changes. See previous visits below:    1/7/2020: Palliative medicine team including LISA Fernandes LCSW and I met with patient at patient's bedside. No family present. Patient is awake, alert, and oriented x 4. Reviewed current AMD on file, naming Aidee Schuster as primary MPOA. Reeducated on the benefits and burdens of CPR in the event of cardiac or pulmonary arrest. Patient is leaning toward DNR but she wants some time to review DDNR form prior to signing. Left a voided copy of the DDNR form at bedside, and wants to remain a full code with full interventions while she is reviewing the form and thinking about DNR status. Will continue to follow for goals of care discussions and support. 1/6/2020  1. Goals of care discussions: Palliative medicine team including LISA Braga RN, and I met with patient at patient's bedside. Patient is alert and oriented x 4, though drowsy, nodding off to sleep frequently during conversation. Present at bedside were patient's nephew Jerod Matthew and a \"Spiritual friend. \" Discussed our role as palliative, and offered support as patient shares her desire to get back to walking in her community and socializing. Patient states she is quite active in her community and loves talking to all the residents. Discussed the importance of an AMD, and per Adam Cassidy, patient has an AMD, and will bring us a copy today. Per patient, she is a [employer] who does not ever wish to receive blood products.  Discussed the benefits and burdens of CPR in the event of cardiac or pulmonary arrest. Patient is leaning toward DNR but she is nodding off to sleep so it is not appropriate to continue conversation at this time. Will follow up once patient has had some rest. Until then, patient remains a full code with full interventions. Will continue to follow for more goals of care discussions and support. 1. Uncontrolled DMT2: Patient arrived in [facility], requiring insulin gtt. Off insulin gtt,on lantus and sliding scale. Patient is not convinced she has blood sugar problems. Known history of medical noncompliance to insulin regimen. 2. Acute of chronic renal failure, stage 3:  Multifactorial including dehydration and hyperglycemia. Start half NS and monitor per nephrology. 3. Right lower extremity ischemia: LORENA indicative of severe to critical arterial insuff at illeo-fem level, fem-pop level. Vascular following, and recommends arterial duplex. Result pending. 4. Acute pancreatitis: GI following. lipase 24K on admit; trending down. RUQ US to r/o gallstones- pending. 5. Initial consult note routed to primary continuity provider  6.  Communicated plan of care with: Palliative IDT, patient, family       GOALS OF CARE / TREATMENT PREFERENCES:   [====Goals of Care====]  GOALS OF CARE: Full code with full interventions  Patient/Health Care Proxy Stated Goals: Prolong life      TREATMENT PREFERENCES:   Code Status: Full Code    Advance Care Planning:  Advance Care Planning 1/3/2020   Patient's Healthcare Decision Maker is: Legal Next of Kin   Confirm Advance Directive None   Patient Would Like to Complete Advance Directive Unable       Medical Interventions: Full interventions           The palliative care team has discussed with patient / health care proxy about goals of care / treatment preferences for patient.  [====Goals of Care====]         HISTORY:     History obtained from: patient, chart    CHIEF COMPLAINT: Feeling better    HPI/SUBJECTIVE:    The patient is:   [x] Verbal and participatory  [] Non-participatory due to:   Alert and oriented x 4, more awake    Clinical Pain Assessment (nonverbal scale for severity on nonverbal patients):   Clinical Pain Assessment  Severity: 0            FUNCTIONAL ASSESSMENT:     Palliative Performance Scale (PPS):  PPS: 60        PSYCHOSOCIAL/SPIRITUAL SCREENING:     Advance Care Planning:  Advance Care Planning 1/3/2020   Patient's Healthcare Decision Maker is: Legal Next of Kin   Confirm Advance Directive None   Patient Would Like to Complete Advance Directive Unable        Any spiritual / Roman [employer] concerns: Deep Eder witness, NO BLOOD  [x] Yes /  [] No    Caregiver Burnout:  [] Yes /  [x] No /  [] No Caregiver Present      Anticipatory grief assessment:   [x] Normal  / [] Maladaptive          REVIEW OF SYSTEMS:     Positive and pertinent negative findings in ROS are noted above in HPI. The following systems were [x] reviewed / [] unable to be reviewed as noted in HPI  Other findings are noted below. Systems: constitutional, ears/nose/mouth/throat, respiratory, gastrointestinal, genitourinary, musculoskeletal, integumentary, neurologic, psychiatric, endocrine. Positive findings noted below. Modified ESAS Completed by: provider     Drowsiness: 6     Pain: 0   Anxiety: 0 Nausea: 0     Dyspnea: 0     Constipation: No     Stool Occurrence(s): 1        PHYSICAL EXAM:     From RN flowsheet:  Wt Readings from Last 3 Encounters:   01/05/20 74.7 kg (164 lb 9.6 oz)   12/26/18 72.8 kg (160 lb 9.6 oz)   07/10/17 52.2 kg (115 lb)     Blood pressure 93/68, pulse 83, temperature 97.2 °F (36.2 °C), resp. rate 13, height 5' 5\" (1.651 m), weight 74.7 kg (164 lb 9.6 oz), SpO2 97 %, not currently breastfeeding.     Pain Scale 1: Numeric (0 - 10)  Pain Intensity 1: 0     Pain Location 1: Foot, Leg  Pain Orientation 1: Right  Pain Description 1: Aching, Burning  Pain Intervention(s) 1: Medication (see MAR)      Constitutional: Awake, Drowsy, NAD  Eyes: pupils equal, anicteric  ENMT: no nasal discharge, moist mucous membranes  Cardiovascular: regular rhythm  Respiratory: breathing not labored, symmetric  Gastrointestinal: soft non-tender, +bowel sounds  Musculoskeletal: no deformity, no tenderness to palpation  Skin: warm, dry  Neurologic: following commands, moving all extremities  Psychiatric: full affect, no hallucinations         HISTORY:     Active Problems:    Pancreatitis (1/3/2020)      Hyperosmolar hyperglycemic coma due to diabetes mellitus without ketoacidosis ([facility] (1/3/2020)      Hyperosmolar non-ketotic state in patient with type 2 diabetes mellitus ([facility] (1/3/2020)      Past Medical History:   Diagnosis Date    Diabetes ([facility]     Hypercholesterolemia     Hypertension       History reviewed. No pertinent surgical history. History reviewed. No pertinent family history. History reviewed, no pertinent family history.   Social History     Tobacco Use    Smoking status: Former Smoker    Smokeless tobacco: Never Used   Substance Use Topics    Alcohol use: Not on file     No Known Allergies   Current Facility-Administered Medications   Medication Dose Route Frequency    famotidine (PEPCID) tablet 20 mg  20 mg Oral DAILY    insulin glargine (LANTUS) injection 28 Units  28 Units SubCUTAneous DAILY    bisacodyl (DULCOLAX) suppository 10 mg  10 mg Rectal DAILY PRN    polyethylene glycol (MIRALAX) packet 17 g  17 g Oral DAILY    aspirin chewable tablet 81 mg  81 mg Oral DAILY    oxyCODONE-acetaminophen (PERCOCET) 5-325 mg per tablet 1-2 Tab  1-2 Tab Oral Q6H PRN    morphine injection 2 mg  2 mg IntraVENous Q4H PRN    dextrose 10% infusion 125-250 mL  125-250 mL IntraVENous PRN    heparin (porcine) injection 5,000 Units  5,000 Units SubCUTAneous Q8H    insulin lispro (HUMALOG) injection   SubCUTAneous Q6H    glucose chewable tablet 16 g  4 Tab Oral PRN    glucagon (GLUCAGEN) injection 1 mg  1 mg IntraMUSCular PRN          LAB AND IMAGING FINDINGS:     Lab Results   Component Value Date/Time    WBC 7.5 01/09/2020 03:35 AM    HGB 8.6 (L) 01/09/2020 03:35 AM    PLATELET 426 93/37/3320 03:35 AM     Lab Results   Component Value Date/Time    Sodium 144 01/09/2020 03:35 AM    Potassium 5.0 01/09/2020 03:35 AM    Chloride 116 (H) 01/09/2020 03:35 AM    CO2 22 01/09/2020 03:35 AM    BUN 25 (H) 01/09/2020 03:35 AM    Creatinine 1.63 (H) 01/09/2020 03:35 AM    Calcium 8.3 (L) 01/09/2020 03:35 AM    Magnesium 2.8 (H) 01/05/2020 01:19 AM    Phosphorus 2.4 (L) 01/05/2020 01:19 AM      Lab Results   Component Value Date/Time    AST (SGOT) 76 (H) 01/05/2020 01:19 AM    Alk. phosphatase 96 01/05/2020 01:19 AM    Protein, total 6.7 01/05/2020 01:19 AM    Albumin 2.2 (L) 01/05/2020 01:19 AM    Globulin 4.5 (H) 01/05/2020 01:19 AM     Lab Results   Component Value Date/Time    INR 1.0 12/24/2018 10:14 PM    Prothrombin time 12.9 12/24/2018 10:14 PM    aPTT 30.6 12/24/2018 10:14 PM      Lab Results   Component Value Date/Time    Iron 92 05/31/2019 12:07 PM    TIBC 256 05/31/2019 12:07 PM    Iron % saturation 36 05/31/2019 12:07 PM      No results found for: PH, PCO2, PO2  No components found for: GLPOC   No results found for: CPK, CKMB             Total time: 15 minutes  Counseling / coordination time, spent as noted above: 10 minutes  > 50% counseling / coordination?: yes, patient    Prolonged service was provided for  []30 min   []75 min in face to face time in the presence of the patient, spent as noted above. Time Start:   Time End:   Note: this can only be billed with 74904 (initial) or 44521 (follow up). If multiple start / stop times, list each separately.

## 2020-01-09 NOTE — PROGRESS NOTES
[facility]: [phone] ([phone])   [facility]: [phone]     Goals of care defined, PM team will sign off. Please reconsult team as needed, if appropriate. Thank you for the Palliative Medicine consult and allowing us to participate in the care of Ms. Eris San.      CODE STATUS- FULL CODE - FULL INTERVENTIONS          Imelda Leigh RN, [facility]  Palliative Medicine Inpatient RN  DR. CARREONS [facility]   Palliative [facility] Line: [phone] ([phone])

## 2020-01-09 NOTE — ROUTINE PROCESS
Bedside shift change report given to Blake Lucas RN (oncoming nurse) by Kelley Herrera RN (offgoing nurse). Report included the following information SBAR, Kardex and MAR.

## 2020-01-09 NOTE — PROGRESS NOTES
Problem: Diabetes Self-Management  Goal: *Disease process and treatment process  Description  Define diabetes and identify own type of diabetes; list 3 options for treating diabetes. Outcome: Progressing Towards Goal  Goal: *Incorporating nutritional management into lifestyle  Description  Describe effect of type, amount and timing of food on blood glucose; list 3 methods for planning meals. Outcome: Progressing Towards Goal  Goal: *Incorporating physical activity into lifestyle  Description  State effect of exercise on blood glucose levels. Outcome: Progressing Towards Goal  Goal: *Developing strategies to promote health/change behavior  Description  Define the ABC's of diabetes; identify appropriate screenings, schedule and personal plan for screenings. Outcome: Progressing Towards Goal  Goal: *Using medications safely  Description  State effect of diabetes medications on diabetes; name diabetes medication taking, action and side effects. Outcome: Progressing Towards Goal  Goal: *Monitoring blood glucose, interpreting and using results  Description  Identify recommended blood glucose targets  and personal targets. Outcome: Progressing Towards Goal  Goal: *Prevention, detection, treatment of acute complications  Description  List symptoms of hyper- and hypoglycemia; describe how to treat low blood sugar and actions for lowering  high blood glucose level. Outcome: Progressing Towards Goal  Goal: *Prevention, detection and treatment of chronic complications  Description  Define the natural course of diabetes and describe the relationship of blood glucose levels to long term complications of diabetes.   Outcome: Progressing Towards Goal  Goal: *Developing strategies to address psychosocial issues  Description  Describe feelings about living with diabetes; identify support needed and support network  Outcome: Progressing Towards Goal     Problem: Patient Education: Go to Patient Education Activity  Goal: Patient/Family Education  Outcome: Progressing Towards Goal     Problem: Patient Education: Go to Patient Education Activity  Goal: Patient/Family Education  Outcome: Progressing Towards Goal     Problem: Pressure Injury - Risk of  Goal: *Prevention of pressure injury  Description  Document Marc Scale and appropriate interventions in the flowsheet. Outcome: Progressing Towards Goal  Note: Pressure Injury Interventions:  Sensory Interventions: Minimize linen layers    Moisture Interventions: Absorbent underpads    Activity Interventions: Pressure redistribution bed/mattress(bed type)    Mobility Interventions: Pressure redistribution bed/mattress (bed type)    Nutrition Interventions: Document food/fluid/supplement intake    Friction and Shear Interventions: Minimize layers                Problem: Pressure Injury - Risk of  Goal: *Prevention of pressure injury  Description  Document Marc Scale and appropriate interventions in the flowsheet.   Outcome: Progressing Towards Goal  Note: Pressure Injury Interventions:  Sensory Interventions: Minimize linen layers    Moisture Interventions: Absorbent underpads    Activity Interventions: Pressure redistribution bed/mattress(bed type)    Mobility Interventions: Pressure redistribution bed/mattress (bed type)    Nutrition Interventions: Document food/fluid/supplement intake    Friction and Shear Interventions: Minimize layers                Problem: Patient Education: Go to Patient Education Activity  Goal: Patient/Family Education  Outcome: Progressing Towards Goal     Problem: Pain  Goal: *Control of Pain  Outcome: Progressing Towards Goal     Problem: Patient Education: Go to Patient Education Activity  Goal: Patient/Family Education  Outcome: Progressing Towards Goal     Problem: Altered Thought Process (Adult/Pediatric)  Goal: *STG: Remains safe in hospital  Outcome: Progressing Towards Goal  Goal: *STG: Absence of lethality  Outcome: Progressing Towards Goal  Goal: *LTG: Returns to baseline functioning  Outcome: Progressing Towards Goal     Problem: Nutrition Deficit  Goal: *Optimize nutritional status  Outcome: Progressing Towards Goal

## 2020-01-09 NOTE — ROUTINE PROCESS
Bedside and Verbal shift change report given to Bonita Blanco RN (oncoming nurse) by Cynthia Skinner RN (offgoing nurse). Report included the following information SBAR, Kardex and MAR.

## 2020-01-09 NOTE — PROGRESS NOTES
Problem: Diabetes Self-Management  Goal: *Disease process and treatment process  Description  Define diabetes and identify own type of diabetes; list 3 options for treating diabetes. Outcome: Progressing Towards Goal  Goal: *Incorporating nutritional management into lifestyle  Description  Describe effect of type, amount and timing of food on blood glucose; list 3 methods for planning meals. Outcome: Progressing Towards Goal  Goal: *Incorporating physical activity into lifestyle  Description  State effect of exercise on blood glucose levels. Outcome: Progressing Towards Goal  Goal: *Developing strategies to promote health/change behavior  Description  Define the ABC's of diabetes; identify appropriate screenings, schedule and personal plan for screenings. Outcome: Progressing Towards Goal  Goal: *Using medications safely  Description  State effect of diabetes medications on diabetes; name diabetes medication taking, action and side effects. Outcome: Progressing Towards Goal  Goal: *Monitoring blood glucose, interpreting and using results  Description  Identify recommended blood glucose targets  and personal targets. Outcome: Progressing Towards Goal  Goal: *Prevention, detection, treatment of acute complications  Description  List symptoms of hyper- and hypoglycemia; describe how to treat low blood sugar and actions for lowering  high blood glucose level. Outcome: Progressing Towards Goal  Goal: *Prevention, detection and treatment of chronic complications  Description  Define the natural course of diabetes and describe the relationship of blood glucose levels to long term complications of diabetes.   Outcome: Progressing Towards Goal  Goal: *Developing strategies to address psychosocial issues  Description  Describe feelings about living with diabetes; identify support needed and support network  Outcome: Progressing Towards Goal  Goal: *Sick day guidelines  Outcome: Progressing Towards Goal  Goal: *Patient Specific Goal (EDIT GOAL, INSERT TEXT)  Outcome: Progressing Towards Goal     Problem: Patient Education: Go to Patient Education Activity  Goal: Patient/Family Education  Outcome: Progressing Towards Goal     Problem: Falls - Risk of  Goal: *Absence of Falls  Description  Document Manuelmildred Jose Raulmoy Fall Risk and appropriate interventions in the flowsheet. Outcome: Progressing Towards Goal  Note: Fall Risk Interventions:  Mobility Interventions: Bed/chair exit alarm, Patient to call before getting OOB    Mentation Interventions: Adequate sleep, hydration, pain control, Bed/chair exit alarm    Medication Interventions: Bed/chair exit alarm, Patient to call before getting OOB    Elimination Interventions: Bed/chair exit alarm, Call light in reach              Problem: Patient Education: Go to Patient Education Activity  Goal: Patient/Family Education  Outcome: Progressing Towards Goal     Problem: Pressure Injury - Risk of  Goal: *Prevention of pressure injury  Description  Document Marc Scale and appropriate interventions in the flowsheet.   Outcome: Progressing Towards Goal  Note: Pressure Injury Interventions:  Sensory Interventions: Minimize linen layers    Moisture Interventions: Absorbent underpads    Activity Interventions: Pressure redistribution bed/mattress(bed type)    Mobility Interventions: Pressure redistribution bed/mattress (bed type)    Nutrition Interventions: Document food/fluid/supplement intake    Friction and Shear Interventions: Minimize layers                Problem: Patient Education: Go to Patient Education Activity  Goal: Patient/Family Education  Outcome: Progressing Towards Goal     Problem: Pain  Goal: *Control of Pain  Outcome: Progressing Towards Goal  Goal: *PALLIATIVE CARE:  Alleviation of Pain  Outcome: Progressing Towards Goal     Problem: Patient Education: Go to Patient Education Activity  Goal: Patient/Family Education  Outcome: Progressing Towards Goal     Problem: Injury - Risk of, Adverse Drug Event  Goal: *Absence of adverse drug events  Outcome: Progressing Towards Goal  Goal: *Absence of medication errors  Outcome: Progressing Towards Goal  Goal: *Knowledge of prescribed medications  Outcome: Progressing Towards Goal     Problem: Patient Education: Go to Patient Education Activity  Goal: Patient/Family Education  Outcome: Progressing Towards Goal     Problem: Altered Thought Process (Adult/Pediatric)  Goal: *STG: Participates in treatment plan  Outcome: Progressing Towards Goal  Goal: *STG: Remains safe in hospital  Outcome: Progressing Towards Goal  Goal: *STG: Seeks staff when feelings of anxiety and fear arise  Outcome: Progressing Towards Goal  Goal: *STG: Complies with medication therapy  Outcome: Progressing Towards Goal  Goal: *STG: Attends activities and groups  Outcome: Progressing Towards Goal  Goal: *STG: Decreased delusional thinking  Outcome: Progressing Towards Goal  Goal: *STG: Decreased hallucinations  Outcome: Progressing Towards Goal  Goal: *STG: Absence of lethality  Outcome: Progressing Towards Goal  Goal: *STG: Demonstrates ability to understand and use improved judgment in daily activities and relationships  Outcome: Progressing Towards Goal  Goal: *LTG: Returns to baseline functioning  Outcome: Progressing Towards Goal  Goal: Interventions  Outcome: Progressing Towards Goal     Problem: Patient Education: Go to Patient Education Activity  Goal: Patient/Family Education  Outcome: Progressing Towards Goal     Problem: Nutrition Deficit  Goal: *Optimize nutritional status  Outcome: Progressing Towards Goal     Problem: Infection - Risk of, Urinary Catheter-Associated Urinary Tract Infection  Goal: *Absence of infection signs and symptoms  Outcome: Progressing Towards Goal     Problem: Patient Education: Go to Patient Education Activity  Goal: Patient/Family Education  Outcome: Progressing Towards Goal

## 2020-01-09 NOTE — INTERDISCIPLINARY ROUNDS
Interdisciplinary Round Note   Patient Information:   Sunny Khan   025/31   Reason for Admission: Pancreatitis [K85.90]  Hyperosmolar hyperglycemic coma due to diabetes mellitus without ketoacidosis ([facility] [address] [E11.01]  Hyperosmolar non-ketotic state in patient with type 2 diabetes mellitus ([facility] [address] [E11.00]   Attending Provider:   Trey Arroyo MD  Primary Care Physician:       Neida Camilo MD       [phone]   Estimated discharge date:   01/15/20   Hospital day: 5  [unfilled]  3d 21h  RRAT Score: High Risk            21       Total Score        3 Has Seen PCP in Last 6 Months (Yes=3, No=0)    3 Patient Length of Stay (>5 days = 3)    5 Pt. Coverage (Medicare=5 , Medicaid, or Self-Pay=4)    10 Charlson Comorbidity Score (Age + Comorbid Conditions)        Criteria that do not apply:    . Living with Significant Other. Assisted Living. LTAC. SNF. or   Rehab    IP Visits Last 12 Months (1-3=4, 4=9, >4=11)       normal sinus rhythm     No   none   Other  None      Not needed      Lines, Drains, & Airways  None       IV Antibiotics:    Current Antimicrobial Therapy (168h ago, onward)    None        GI Prophylaxis: GI Prophylaxis: no   Type:  None       Recent Glucose Results:   Lab Results   Component Value Date/Time     (H) 01/08/2020 02:20 AM    GLUCPOC 226 (H) 01/08/2020 03:45 PM    GLUCPOC 252 (H) 01/08/2020 11:54 AM    GLUCPOC 99 01/08/2020 05:41 AM      Activity Level: Activity Level:  Up with Assistance    Needs assistance with ADLs: no       Goals for Today: CT, DM control, pain management   Recommendations:   Discharge Disposition: TBD, pending progress  P.T, O.T. and DM management   Care Management involvement for home health follow up for:  mobility and assistance with ADL's    Needs for Discharge:  transportation IDR Team:  Dr. Nicol Michelle RN  Recommendations from [address] [place] [address] team:  None     Other Notes:  Waiting on vascular consult

## 2020-01-10 LAB
ANION GAP SERPL CALC-SCNC: 6 MMOL/L (ref 3–18)
BUN SERPL-MCNC: 24 MG/DL (ref 7–18)
BUN/CREAT SERPL: 14 (ref 12–20)
CALCIUM SERPL-MCNC: 9.2 MG/DL (ref 8.5–10.1)
CHLORIDE SERPL-SCNC: 112 MMOL/L (ref 100–111)
CO2 SERPL-SCNC: 23 MMOL/L (ref 21–32)
CREAT SERPL-MCNC: 1.66 MG/DL (ref 0.6–1.3)
ERYTHROCYTE [DISTWIDTH] IN BLOOD BY AUTOMATED COUNT: 14.5 % (ref 11.6–14.5)
GLUCOSE BLD STRIP.AUTO-MCNC: 180 MG/DL (ref 70–110)
GLUCOSE BLD STRIP.AUTO-MCNC: 301 MG/DL (ref 70–110)
GLUCOSE BLD STRIP.AUTO-MCNC: 301 MG/DL (ref 70–110)
GLUCOSE BLD STRIP.AUTO-MCNC: 85 MG/DL (ref 70–110)
GLUCOSE SERPL-MCNC: 177 MG/DL (ref 74–99)
HCT VFR BLD AUTO: 28.2 % (ref 35–45)
HGB BLD-MCNC: 9.1 G/DL (ref 12–16)
MCH RBC QN AUTO: 31.8 PG (ref 24–34)
MCHC RBC AUTO-ENTMCNC: 32.3 G/DL (ref 31–37)
MCV RBC AUTO: 98.6 FL (ref 74–97)
PLATELET # BLD AUTO: 250 K/UL (ref 135–420)
PMV BLD AUTO: 10.9 FL (ref 9.2–11.8)
POTASSIUM SERPL-SCNC: 5.7 MMOL/L (ref 3.5–5.5)
RBC # BLD AUTO: 2.86 M/UL (ref 4.2–5.3)
SODIUM SERPL-SCNC: 141 MMOL/L (ref 136–145)
WBC # BLD AUTO: 7.5 K/UL (ref 4.6–13.2)

## 2020-01-10 PROCEDURE — 82962 GLUCOSE BLOOD TEST: CPT

## 2020-01-10 PROCEDURE — 74011636637 HC RX REV CODE- 636/637: Performed by: INTERNAL MEDICINE

## 2020-01-10 PROCEDURE — 74011250636 HC RX REV CODE- 250/636: Performed by: PHYSICIAN ASSISTANT

## 2020-01-10 PROCEDURE — 74011250637 HC RX REV CODE- 250/637: Performed by: EMERGENCY MEDICINE

## 2020-01-10 PROCEDURE — 74011250637 HC RX REV CODE- 250/637: Performed by: HOSPITALIST

## 2020-01-10 PROCEDURE — 74011250637 HC RX REV CODE- 250/637: Performed by: INTERNAL MEDICINE

## 2020-01-10 PROCEDURE — 36415 COLL VENOUS BLD VENIPUNCTURE: CPT

## 2020-01-10 PROCEDURE — 80048 BASIC METABOLIC PNL TOTAL CA: CPT

## 2020-01-10 PROCEDURE — 74011250637 HC RX REV CODE- 250/637: Performed by: NURSE PRACTITIONER

## 2020-01-10 PROCEDURE — 85027 COMPLETE CBC AUTOMATED: CPT

## 2020-01-10 PROCEDURE — 65660000000 HC RM CCU STEPDOWN

## 2020-01-10 RX ORDER — INSULIN LISPRO 100 [IU]/ML
INJECTION, SOLUTION INTRAVENOUS; SUBCUTANEOUS
Status: DISCONTINUED | OUTPATIENT
Start: 2020-01-10 | End: 2020-02-01

## 2020-01-10 RX ORDER — SODIUM POLYSTYRENE SULFONATE 15 G/60ML
15 SUSPENSION ORAL; RECTAL
Status: COMPLETED | OUTPATIENT
Start: 2020-01-10 | End: 2020-01-10

## 2020-01-10 RX ADMIN — POLYETHYLENE GLYCOL 3350 17 G: 17 POWDER, FOR SOLUTION ORAL at 09:44

## 2020-01-10 RX ADMIN — Medication 81 MG: at 09:44

## 2020-01-10 RX ADMIN — INSULIN LISPRO 8 UNITS: 100 INJECTION, SOLUTION INTRAVENOUS; SUBCUTANEOUS at 16:36

## 2020-01-10 RX ADMIN — INSULIN LISPRO 2 UNITS: 100 INJECTION, SOLUTION INTRAVENOUS; SUBCUTANEOUS at 09:44

## 2020-01-10 RX ADMIN — INSULIN GLARGINE 28 UNITS: 100 INJECTION, SOLUTION SUBCUTANEOUS at 09:44

## 2020-01-10 RX ADMIN — HEPARIN SODIUM 5000 UNITS: 5000 INJECTION INTRAVENOUS; SUBCUTANEOUS at 05:27

## 2020-01-10 RX ADMIN — SODIUM POLYSTYRENE SULFONATE 15 G: 15 SUSPENSION ORAL; RECTAL at 09:43

## 2020-01-10 RX ADMIN — FAMOTIDINE 20 MG: 20 TABLET, FILM COATED ORAL at 09:44

## 2020-01-10 NOTE — DIABETES MGMT
Glycemic Control Plan of Care    Reattempted to speak to patient this afternoon but she remain sleepy. T2DM with current A1c of 14.2% (02/02/2020). See separate notes, 01/06/2020, for assessment of home diabetes management and education. Patient stayed awake, participated in discussion and answered the questions appropriately. Patient stated, \"I didn't take my insulin because I got tired of it. I only took the glipizide and metformin. \"  Home diabetes medications: As reported by patient on 01/06/2020:  Glipizide but she cannot remember dose and frequency. Metformin but she cannot remember dose and frequency. Lantus insulin daily but she cannot remember the dose and stated, \"it's been years since the last time I took it. I stopped it on my own because I got tired taking injections. She didn't tell her doctor. Patient stated that she didn't have problem drawing and injecting insulin when she was taking it.    01/10/2020: F/U discussion with patient about about the impt of diabetes med compliance and she responded by saying that she will resume taking insulin from now. POC BG range on 01/09/2020: . POC BG report on 01/10/2020 at time of review: 180    Seen patient this morning and reported that was given orange juice yesterday morning around 5 because her blood sugar dropped too low and she didn't need additional juice because her blood sugar went up. Patient stated that she's eating meals with good appetite but she was eating more food at home therefore she can use bedtime snack to prevent her blood sugar dropping early in the morning. Current Meal Intake:  Patient Vitals for the past 100 hrs:   % Diet Eaten   01/10/20 0936 50 %   01/09/20 1856 65 %   01/09/20 1300 75 %   01/09/20 0930 100 %   01/08/20 1804 90 %   01/08/20 0934 0 %   01/07/20 1809 100 %   01/07/20 1459 50 %   01/07/20 0904 90 %   01/06/20 1750 75 %   01/06/20 0911 25 %       Recommendation(s):  1.) add diabetic bed snack.   2.) consider decreasing basal lantus insulin dose from 28 to 26 units daily if patient experience another episode of hypoglycemia. Assessment:  Patient is [de-identified]year old with past medical history including diabetes mellitus, hypertension and hypercholesterolemia - was admitted to ED on 01/03/2020 with report of high blood sugar and high lipase. Noted:  HHS. T2DM. Acute pancreatitis. Acute on chronic renal failure stage 3. Most recent blood glucose values:    Results for Keyla Xie (MRN [id number]) as of 1/10/2020 11:17   Ref. Range 1/9/2020 00:09 1/9/2020 05:35 1/9/2020 05:37 1/9/2020 05:57 1/9/2020 08:14 1/9/2020 11:37 1/9/2020 17:33 1/9/2020 20:59   GLUCOSE,FAST - POC Latest Ref Range: 70 - 110 mg/dL 207 (H) 64 (L) 64 (L) 107 168 (H) 316 (H) 111 (H) 152 (H)     Results for Keyla Xie (MRN [id number]) as of 1/10/2020 11:17   Ref. Range 1/10/2020 08:43   GLUCOSE,FAST - POC Latest Ref Range: 70 - 110 mg/dL 180 (H)       Current A1C: 14.2% (02/02/2020) which is equivalent to estimated average blood glucose of 361 mg/dL during the past 2-3 months. Current hospital diabetes medications:  Basal lantus insulin 28 units daily. Correctional lispro insulin every 6 hours. Very resistant dose.     Total daily dose insulin requirement previous day: 01/09/2020  Lantus: 26 units  Lispro: 16 units  TDD insulin: 44 units    Diet: Diabetic consistent carb regular; nutr suppl: glucerna shake    Goals:  Blood glucose will be within target range of  mg/dL by 01/13/2020    Education:    _X__  Refer to Diabetes Education Record: 01/06/2020  ___  Education not indicated at this time    Arlette Cook RN [facility]  Pager: [phone]

## 2020-01-10 NOTE — ROUTINE PROCESS
Bedside shift change report given to Cammie Adams [address]  (oncoming nurse) by [address]  (offgoing nurse). Report included the following information SBAR, Kardex and MAR.

## 2020-01-10 NOTE — INTERDISCIPLINARY ROUNDS
Interdisciplinary Round Note   Patient Information:   Fawn Billings   225/16   Reason for Admission: Pancreatitis [K85.90]  Hyperosmolar hyperglycemic coma due to diabetes mellitus without ketoacidosis ([facility] [address] [E11.01]  Hyperosmolar non-ketotic state in patient with type 2 diabetes mellitus ([facility] [address] [E11.00]   Attending Provider:   Omari Oh MD  Primary Care Physician:       Jonathan Castro MD       [phone]   Estimated discharge date:  1/16/2020   Hospital day: 6  [unfilled]  3d 21h  RRAT Score: High Risk            21       Total Score        3 Has Seen PCP in Last 6 Months (Yes=3, No=0)    3 Patient Length of Stay (>5 days = 3)    5 Pt. Coverage (Medicare=5 , Medicaid, or Self-Pay=4)    10 Charlson Comorbidity Score (Age + Comorbid Conditions)        Criteria that do not apply:    . Living with Significant Other. Assisted Living. LTAC. SNF. or   Rehab    IP Visits Last 12 Months (1-3=4, 4=9, >4=11)       normal sinus rhythm     No  Interference with medical treatment  Other      Not needed      Lines, Drains, & Airways  None       IV Antibiotics:    Current Antimicrobial Therapy (168h ago, onward)    None        GI Prophylaxis: GI Prophylaxis: no   Type: PPI      Recent Glucose Results:   Lab Results   Component Value Date/Time    GLU 50 (LL) 01/09/2020 03:35 AM    GLUCPOC 111 (H) 01/09/2020 05:33 PM    GLUCPOC 316 (H) 01/09/2020 11:37 AM    GLUCPOC 168 (H) 01/09/2020 08:14 AM      Activity Level:   Activity Level: Chair    Needs assistance with ADLs: no       Goals for Today: Pain, Nutrition, Palliative, Mobility   Recommendations:   Discharge Disposition: TBD, pending progress  P.T, O.T., CM and Nutrition  Pharmacy evaluation and teaching, Care Management involvement for home health follow up for:  assistance with ADL's, Behavioral Health evaluation, Palliative Care evaluation and Spiritual Care evaluation    Needs for Discharge: Assistance with ADL's as patient lives alone IDR Team: Dr. Gali France, Faiza Angulo RN  Recommendations from IDR team: Pain, Mobility, Palliative, Nutrition    Other Notes: none

## 2020-01-10 NOTE — PROGRESS NOTES
Reviewed plans for right leg angiogram/possible intervention with patient and that she has been cleared by medical team to be able to proceed    Will follow up with scheduling arrangements/orders, perhaps be able to do early next week

## 2020-01-10 NOTE — PROGRESS NOTES
Hospitalist Progress Note    Patient: Sarai Valdovinos Age: [de-identified] y.o. : 1940 MR#: [id number] SSN: [id number]  Date/Time: 1/10/2020 10:05 AM    DOA: 2020  PCP: Tiffany Flores MD    Subjective:     Spoke with Nephrology, renal function is stabilizing. Vascular will follow up for possible stent vs bypass graft   CTA abd with run off with multifocal stenosis right lower extremity. Still with right foot pain. No stomach pain, no nausea/vomiting. NO FEVER  FSBS better control, HgbA1c 14.2% (stated that her PCP took her off insulin)        ROS: no fever/chills, no headache, no dizziness, no facial pain, no sinus congestion,   No swallowing pain, No chest pain, no palpitation, no shortness of breath, no abd pain,  No diarrhea, no urinary complaint, ++righ leg pain or swelling    Assessment/Plan:     1)  Right lower extremity ischemia associate with uncontrolled DM2       LORENA indicates severe to critical arterial insuff at [place]-fem level, fem-pop level. Arterial duplex result noted to have monophasic flow       CTA with multifocal stenosis of right lower extremity  2)  Hyperglycemia with Type 2 diabetes, stable       Hyperosmolar non-ketotic state in type 2 diabetes mellitus   3)  Acute on chronic renal failure Stage 3: likely mostly prerenal.  4)  Metabolic acidosis due to renal issues-resolved  5)  Acute metabolic encephalopathy  6)  Acute pancreatitis, resolved, no pain with food intake   7)  Hypertension   8)  Hypophosphatemia  9)  Normocytic anemia of chronic disease        Vascular considers angioplasty with stent vs bypass graft this coming week  Awaiting for Renal function to stabilize as it might not be prohibiting for further vascular procedure   Currently, no evidence of infection   Pain control. Close monitor as high risk    Monitor renal function closely. Avoid nephrotoxicity, gutierrez cath removed. Hold IV fluid  Advanced diet  ISS, Lantus continues.  Resumes statin, aspirin Alcohol cessation advised. Palliative care team follow up for goal of care. GI signed off, pt needs repeat CT pancreas in 8-12weeks     Full Code     Additional Notes:    Time spent >20 minutes    Case discussed with:  [x]Patient  []Family  [x]Nursing  [x]Case Management  DVT Prophylaxis:  []Lovenox  [x]Hep SQ  []SCDs  []Coumadin   []On Heparin gtt    Signed By: Dontae Desai MD     January 10, 2020 12:02 PM              Objective:   VS:   Visit Vitals  /70 (BP 1 Location: Left arm, BP Patient Position: At rest)   Pulse 91   Temp 97.9 °F (36.6 °C)   Resp 20   Ht 5' 5\" (1.651 m)   Wt 74.7 kg (164 lb 9.6 oz)   SpO2 95%   Breastfeeding No   BMI 27.39 kg/m²      Tmax/24hrs: Temp (24hrs), Av.6 °F (37 °C), Min:97.9 °F (36.6 °C), Max:99.5 °F (37.5 °C)      Intake/Output Summary (Last 24 hours) at 1/10/2020 1202  Last data filed at 1/10/2020 1017  Gross per 24 hour   Intake 710 ml   Output 1550 ml   Net -840 ml       General:  Cooperative, Not in acute distress, speaks in full sentence while in bed  HEENT: PERRL, EOMI, supple neck, no JVD, dry oral mucosa  Cardiovascular: S1S2 regular, no rub/gallop   Pulmonary: Clear air entry bilaterally, no wheezing, no crackle  GI:  Soft, non tender, non distended, +bs, no guarding   Extremities:  No pedal edema, +distal pulses appreciated   Ischemic change in right lower foot   Neuro: AOx3, moving all extremities, no gross deficit.      Additional:       Current Facility-Administered Medications   Medication Dose Route Frequency    insulin lispro (HUMALOG) injection   SubCUTAneous AC&HS    famotidine (PEPCID) tablet 20 mg  20 mg Oral DAILY    insulin glargine (LANTUS) injection 28 Units  28 Units SubCUTAneous DAILY    bisacodyl (DULCOLAX) suppository 10 mg  10 mg Rectal DAILY PRN    polyethylene glycol (MIRALAX) packet 17 g  17 g Oral DAILY    aspirin chewable tablet 81 mg  81 mg Oral DAILY    oxyCODONE-acetaminophen (PERCOCET) 5-325 mg per tablet 1-2 Tab  1-2 Tab Oral Q6H PRN    morphine injection 2 mg  2 mg IntraVENous Q4H PRN    dextrose 10% infusion 125-250 mL  125-250 mL IntraVENous PRN    heparin (porcine) injection 5,000 Units  5,000 Units SubCUTAneous Q8H    glucose chewable tablet 16 g  4 Tab Oral PRN    glucagon (GLUCAGEN) injection 1 mg  1 mg IntraMUSCular PRN            Lab/Data Review:  Labs: Results:       Chemistry Recent Labs     01/10/20  0535 01/09/20  0335 01/08/20  0220   * 50* 129*    144 141   K 5.7* 5.0 4.8   * 116* 116*   CO2 23 22 20*   BUN 24* 25* 25*   CREA 1.66* 1.63* 1.67*   BUCR 14 15 15   AGAP 6 6 5   CA 9.2 8.3* 7.7*     No results for input(s): TBIL, ALT, SGOT, ALKP, TP, ALB, GLOB, AGRAT in the last 72 hours. CBC w/Diff Recent Labs     01/10/20  0535 01/09/20  0335   WBC 7.5 7.5   RBC 2.86* 2.62*   HGB 9.1* 8.6*   HCT 28.2* 25.5*   MCV 98.6* 97.3*   MCH 31.8 32.8   MCHC 32.3 33.7   RDW 14.5 14.4    219   BANDS  --  2   GRANS  --  32*   LYMPH  --  43   EOS  --  3      Coagulation No results for input(s): PTP, INR, APTT, INREXT, INREXT in the last 72 hours. Iron/Ferritin Lab Results   Component Value Date/Time    Iron 92 05/31/2019 12:07 PM    TIBC 256 05/31/2019 12:07 PM    Iron % saturation 36 05/31/2019 12:07 PM       BNP    Cardiac Enzymes Lab Results   Component Value Date/Time    Troponin-I, QT <0.02 01/02/2020 11:25 PM        Lactic Acid    Thyroid Studies          All Micro Results     Procedure Component Value Units Date/Time    CULTURE, BLOOD [[id number]] Collected:  01/02/20 2206    Order Status:  Completed Specimen:  Blood Updated:  01/08/20 0642     Special Requests: NO SPECIAL REQUESTS        Culture result: NO GROWTH 6 DAYS       CULTURE, BLOOD [[id number]] Collected:  01/02/20 2206    Order Status:  Completed Specimen:  Blood Updated:  01/08/20 5204     Special Requests: NO SPECIAL REQUESTS        Culture result: NO GROWTH 6 DAYS               Images:    CT (Most Recent).  CT Results (most recent):  Results from [place] encounter on 01/02/20   CT ABD PELV WO CONT    Narrative CT Abdomen And Pelvis with Intravenous Contrast    INDICATION: Generalized abdominal pain. Elevated lipase. .    TECHNIQUE: 5 mm collimation axial images obtained from the diaphragm to the  level of the pubic symphysis following the uneventful administration of  100 cc  of low osmolar, nonionic intravenous contrast.    Dose reduction techniques used: Automated exposure control, adjustment of the  mAs and/or kVp according to patient size, standardized low-dose protocol, and/or  iterative reconstruction technique. COMPARISON: July 18, 2017. ABDOMEN FINDINGS:    Lung Bases: There is bibasilar atelectasis. Atelectasis is most conspicuous in  the lingula. The heart is top normal in size. .    Liver: Normal attenuation. No evidence for mass. Gallbladder: Present and appears normal. No biliary ductal dilatation. Pancreas: There is mild peripancreatic edema. .    Spleen: Normal in size. No evidence of mass. .    Adrenal Glands: No evidence for mass. Kidneys:     Right: Atrophic kidneys. No cortical mass. No hydronephrosis. Left:  Atrophic kidneys. No cortical mass. No hydronephrosis. Lymph Nodes: No lymphadenopathy. Aorta: Atherosclerosis. PELVIS FINDINGS:     Bowel: Small Bowel: Normal in caliber with normal wall thickness. Large Bowel: There are scattered colonic diverticula. Candance Huger Appendix: No secondary signs of acute appendicitis. Bladder: Underdistended. Extensive bilateral pelvic masses with calcification, likely fibroid uterus. No  definite suspicious adnexal mass. No ascites. Bones: Degenerative changes of the spine. Osteopenia. Impression Impression:    Mild uncomplicated pancreatitis. Fibroid uterus. Additional incidentals as above.      CT Results (most recent):  Results from Hospital Encounter encounter on 01/02/20   CTA ABD ART W RUNOFF W WO CONT    Narrative PROCEDURE: CTA of abdomen and pelvis and bilateral lower extremity runoff with  intravenous contrast administration. HISTORY: PAD, ischemia of the right foot. TECHNIQUE: Multidetector helical CT angiography was carried out from low chest  through the feet following dynamic 70 cc Isovue-300 intravenous contrast  administration . Scan timing was performed using automated bolus tracking/SMART  Prep. 3-D and MPR angiographic image reconstruction was performed on  independent workstation and separately reviewed. Parenchymal imaging is limited  by the arterial phase of contrast administration. All CT scans at this facility  are performed using dose optimization techniques as appropriate to a performed  exam, to include automated exposure control, adjustment of the mA and/or kV  according to patient's size (including appropriate matching for site specific  examinations), or use of iterative reconstruction technique. COMPARISON: CT abdomen/pelvis 1/3/2020. CTA abdomen/pelvis with extremity runoff  7/18/2017. FINDINGS:    VASCULAR FINDINGS:    Right lower extremity:  Multifocal stenosis throughout the right posterior tibialis artery due to  atherosclerotic calcifications as well as the peroneal artery.  -The right posterior tibialis artery is not opacified beyond the mid right lower  leg.  -The anterior tibial artery is not opacified beyond the right lower leg just  above the tibial plafond.  -The peroneal artery is opacified beyond the level of the tibial plafond. Proximally, there is multifocal stenosis of the right femoral artery with loss  of opacification at the mid to distal thigh. Reconstitution of opacification at  the level of the posterior tibialis artery likely due to collaterals from  geniculate arteries and the deep femoral artery.     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  -Left lower leg perfusion is contributed by collaterals from the deep femoral  artery and geniculate arteries. Multifocal stenosis/occlusion of the left  posterior tibialis artery and left peroneal artery. No opacification of the  peroneal artery or posterior tibialis artery is seen to be on the mid to  proximal third of the left lower leg. The left dorsal pedis is opacified, and  likely contributes to some additional opacified vessel seen in the left foot. Abdominal aorta:  -Severe atherosclerosis with moderate compromise of the lumen, similar to prior  exam of 7/2017.  -No evidence for abdominal aortic aneurysm. -Severe stenosis at the proximal aspect of the right common iliac artery, well  opacified distally, similar to prior exam. Multifocal moderate stenosis at the  left common iliac artery similar to prior exam.  -Multifocal stenosis at the internal iliac arteries, with good opacification  distally.  -Celiac artery, SMA, left renal artery, and right renal artery are grossly  patent. The right renal artery again shown to originate from the descending  thoracic aorta. ALFREDA is not well visualized. ABDOMEN/PELVIS FINDINGS:  Lower chest: Small right pleural effusion with overlying atelectasis. Liver: Stable subcentimeter hypodense lesions liver, too small to characterize  but stability suggests benignity    Biliary: Gallbladder is mildly distended but otherwise unremarkable. Spleen: Negative    Pancreas: There is some peripancreatic edema adjacent to the pancreatic tail. Adrenal glands: Diffuse thickening of the adrenal glands, without discrete mass,  similar to prior exam.    Kidneys: Malrotated kidneys again noted, without evidence for hydronephrosis. GI tract: The bowel appears nonobstructed. Question of mild mural thickening at  the distal esophagus. Questionable mild mural thickening at the greater  curvature of the stomach adjacent to the edema along the pancreatic tail. Colonic diverticulosis, without evidence for diverticulitis.  Normal appendix. Peritoneum: No free air    Lymph nodes: No lymphadenopathy. Pelvis: Urinary bladder is unremarkable. Fibroid uterus again noted. Body wall/soft tissues: Small fat-containing umbilical hernia. Small right lower  spigelian hernia containing a small amount of fluid measuring 2.5 x 1.2 cm  (image 203), previously measured 2.9 x 1.6 cm. Mild edema along the right flank. Bones:  -Bilateral moderate knee osteoarthrosis. Small right knee joint effusion and  trace left knee joint effusion.  -Diffuse soft tissue swelling at the feet, right greater than left. Trace  subcutaneous emphysema adjacent to the right fifth MTP joint. Left distal tibial  and talar hardware noted. -Multilevel degenerative spondylosis and disc disease noted, not well evaluated  on current exam.  -Moderate degenerative change at the hips      Impression IMPRESSION:  1. Right lower extremity:  Multifocal stenosis throughout the right posterior tibialis artery due to  atherosclerotic calcifications as well as the peroneal artery.  -The right posterior tibialis artery is not opacified beyond the mid right lower  leg.  -The anterior tibial artery is not opacified beyond the right lower leg just  above the tibial plafond.  -The peroneal artery is opacified beyond the level of the tibial plafond. Proximally, there is multifocal stenosis of the right femoral artery with loss  of opacification at the mid to distal thigh. Reconstitution of opacification at  the level of the posterior tibialis artery likely due to collaterals from  geniculate arteries and the deep femoral artery. 2. Diffuse soft tissue swelling at bilateral feet, right greater than left. -Suggestion of trace air along the right fifth MTP joint, could be degenerative  but infectious process not excluded. Consider dedicated right foot x-ray for  further evaluation.     3. Chronic multilevel stenosis of the aorta, iliac arteries, and left lower  extremity as described. 4. Peripancreatic edema along the pancreatic tail is increased since 1/3/2020.  -Associated adjacent mild presumed reactive mural thickening of the greater  curvature of the stomach. 5. Small right pleural effusion. 6. Suggestion of mild mural thickening of the distal esophagus, may indicate  nonspecific esophagitis. 7. Bilateral knee osteoarthrosis with small right knee joint effusion and trace  left knee joint effusion. 8. Fibroid uterus. 9. Colonic diverticulosis, without evidence for diverticulitis. 10. Additional nonacute findings are as described. XR Results (most recent):  Results from Hospital Encounter encounter on 01/02/20   XR FOOT RT MIN 3 V    Narrative EXAM:  XR FOOT RT MIN 3 V    INDICATION:   report of air in right fifth MTP join    COMPARISON:  None. FINDINGS:  3 views of the right foot demonstrate limited study, the patient has  cooperation issues with positioning the foot. Considerable hammertoe deformity  of all the toes noted. The foot is held in extension at the ankle. There is no  obvious fracture or dislocation. Small plantar calcaneal spur noted. Impression IMPRESSION: Positioning issues, no acute abnormality identified. No bone  destruction or soft tissue air       [id number] Results (most recent):  Results from Hospital Encounter encounter on 01/02/20   [email or web address]    Narrative EXAMINATION: Ultrasound abdomen limited, right upper quadrant    INDICATION: Pancreatitis    COMPARISON: CT 1/3/2020    TECHNIQUE: Grayscale, color, spectral sonographic images of the right upper  quadrant obtained. FINDINGS:    Pancreas: Unremarkable. Tail not well visualized. IVC: Unremarkable. Liver: 15.2 cm length. Slightly coarsened echotexture. Portal vein normal  caliber with antegrade flow. Right kidney: 6.1 cm length. Increased echotexture. No hydronephrosis. No focal  abnormality. Gallbladder: No calculi or wall thickening.  Negative [place] sign.    Biliary tree: Common bile duct 0.5 cm caliber. No intrahepatic duct dilatation. Impression IMPRESSION:    Coarsened hepatic echotexture suggests nonspecific hepatocellular disease. Atrophic echogenic right kidney consistent with chronic medical renal disease. No other significant findings. No evidence of cholelithiasis. EKG No results found for this or any previous visit. Duplex art Interpretation Summary     · Diffuse plaquing seen throughout the vessels interrogated in the bilateral lower extremities. · Monophasic flow also noted in these vessels. · Technically difficult study due to calcific plaquing. · Unable to take an ankle-brachial index due to the diminished nature of the waveforms and patient's inability to withstand pressures. Lower Extremity Arterial Findings     Right Lower Arterial     Monophasic Doppler waveforms in the right distal common femoral, profunda femoris, proximal superficial femoral, middle superficial femoral, distal superficial femoral, proximal popliteal, distal popliteal, anterior tibial, posterior tibial and dorsalis pedis artery. Unable to image the distal posterior tibial artery. Left Lower Arterial     Monophasic Doppler waveforms in the left distal common femoral, profunda femoris, proximal superficial femoral, middle superficial femoral, distal superficial femoral, proximal popliteal, distal popliteal, anterior tibial, posterior tibial and dorsalis pedis artery.

## 2020-01-10 NOTE — ROUTINE PROCESS
Bedside shift change report given to Marcos Tomas RN  (oncoming nurse) by Hao Davidson RN  (offgoing nurse). Report included the following information SBAR, Kardex and MAR.

## 2020-01-10 NOTE — ROUTINE PROCESS
Bedside and Verbal shift change report given to Bony Gutierrez RN (oncoming nurse) by Saúl Means RN (offgoing nurse). Report included the following information SBAR, Kardex and MAR.

## 2020-01-10 NOTE — PROGRESS NOTES
RENAL DAILY PROGRESS NOTE             [de-identified] F with DM, HTN, PAD, admitted with acute pancreatitis, seen for renal failure  Subjective:     Complaint:   Overnight events noted  She is awaiting for vascular intervention. She had a CT abdomen and pelvis with bilateral lower extremity runoff. She denies for any change in her urine output. IMPRESSION:   Acute kidney injury , pre renal, dehydration from severe pancreatitis   Metabolic acidosis , better   Hypernatremia   Hyperkalemia, mild  Anemia  Severe PAD, right foot cynosis    PLAN:   · She has mild hyperkalemia, renal function relatively stable. Ordered Kayexalate today. · Need low K diet, consulted dietitian. DC gutierrez catheter and monitor for urine retention, discussed with nursing staff. Current Facility-Administered Medications   Medication Dose Route Frequency    insulin lispro (HUMALOG) injection   SubCUTAneous AC&HS    famotidine (PEPCID) tablet 20 mg  20 mg Oral DAILY    insulin glargine (LANTUS) injection 28 Units  28 Units SubCUTAneous DAILY    bisacodyl (DULCOLAX) suppository 10 mg  10 mg Rectal DAILY PRN    polyethylene glycol (MIRALAX) packet 17 g  17 g Oral DAILY    aspirin chewable tablet 81 mg  81 mg Oral DAILY    oxyCODONE-acetaminophen (PERCOCET) 5-325 mg per tablet 1-2 Tab  1-2 Tab Oral Q6H PRN    morphine injection 2 mg  2 mg IntraVENous Q4H PRN    dextrose 10% infusion 125-250 mL  125-250 mL IntraVENous PRN    heparin (porcine) injection 5,000 Units  5,000 Units SubCUTAneous Q8H    glucose chewable tablet 16 g  4 Tab Oral PRN    glucagon (GLUCAGEN) injection 1 mg  1 mg IntraMUSCular PRN       Review of Symptoms: comprehensive ROS negative except above.    Objective:     Patient Vitals for the past 24 hrs:   Temp Pulse Resp BP SpO2   01/10/20 0849 98.4 °F (36.9 °C) 91 18 143/72 96 %   01/10/20 0522 98.6 °F (37 °C) (!) 107 18 115/70 98 %   01/10/20 0008 99.5 °F (37.5 °C) 90 18 169/75 96 %   01/09/20 2012 98.5 °F (36.9 °C) 80 18 140/57 96 %   01/09/20 1742 98.4 °F (36.9 °C) 75 17 152/63 98 %   01/09/20 1135 98.3 °F (36.8 °C) 77 18 155/69 97 %        Weight change:      01/08 1901 - 01/10 0700  In: 720 [P.O.:720]  Out: 925 [Urine:925]    Intake/Output Summary (Last 24 hours) at 1/10/2020 1054  Last data filed at 1/10/2020 1017  Gross per 24 hour   Intake 710 ml   Output 1550 ml   Net -840 ml     Physical Exam:   General: comfortable, no acute distress   HEENT sclera anicteric, supple neck, no thyromegaly  CVS: S1S2 heard,  no rub  RS: + air entry b/l,   Abd: Soft, Non tender, Not distended,  Neuro: non focal, awake, alert , CN II-XII are grossly intact  Extrm: right foot cynosis , cool on touch  Musculoskeletal: No gross joints or bone deformities         Data Review:     LABS:   Hematology:   Recent Labs     01/10/20  0535 01/09/20  0335   WBC 7.5 7.5   HGB 9.1* 8.6*   HCT 28.2* 25.5*     Chemistry:   Recent Labs     01/10/20  0535 01/09/20  0335 01/08/20  0220   BUN 24* 25* 25*   CREA 1.66* 1.63* 1.67*   CA 9.2 8.3* 7.7*   K 5.7* 5.0 4.8    144 141   * 116* 116*   CO2 23 22 20*   * 50* 129*            Procedures/imaging: see electronic medical records for all procedures, Xrays and details which were not copied into this note but were reviewed prior to creation of Plan          Assessment & Plan:     As above         Faviola De La Rosa MD  1/10/2020  1:02 PM

## 2020-01-10 NOTE — PROGRESS NOTES
NUTRITION    Nursing Referral: [facility]  Nutrition Consult: Diet Education, Other     RECOMMENDATIONS / PLAN:     - Low potassium diet education provided today. - Modify nutritional supplements: Nepro Shake once daily. - Continue RD inpatient monitoring and evaluation. NUTRITION INTERVENTIONS & DIAGNOSIS:     - Meals/snacks: modified composition  - Medical food supplement therapy: Glucerna Shake, BID- modify  - Nutrition Education: low potassium diet education provided 1/10/2020    Nutrition Diagnosis: Predicted inadequate energy intake related to appetite as evidenced by pt with overall fair meal intake since admission, variable  Altered nutrition related laboratory values related to decreased renal function as evidenced by hyperkalemia. ASSESSMENT:     1/10: Poor to fair meal intake, denies nausea/vomiting or pain. Dislikes Glucerna and noted hyperkalemia (kayexalate given). EFRAIN 2/2 dehydration from severe pancreatitis. Nutritional needs modified. 1/8: Pt on po diet. Tolerating most meals; stated some foods are \"too heavy\" feeling, preferences discussed. Sometimes has fair meal intake. Agreeable to nutrition supplement. 1/3: Pancreatitis, hyperglycemia and acute on chronic renal failure. Per MD appears to be non-compliant with insulin, transitioned off insulin drip to lantus. Remains NPO on maintenance IVF with altered mentation and drowsy per chart review.     Nutritional intake adequate to meet patients estimated nutritional needs:  Unable to determine at this time    Diet: DIET NUTRITIONAL SUPPLEMENTS Breakfast, Dinner; GLUCERNA SHAKE  DIET DIABETIC CONSISTENT CARB Regular      Food Allergies: NKFA  Current Appetite: Fair  Appetite/meal intake prior to admission: Unable to determine at this time  Feeding Limitations:  [] Swallowing difficulty    [] Chewing difficulty    [] Other:  Current Meal Intake:   Patient Vitals for the past 100 hrs:   % Diet Eaten   01/10/20 1307 20 %   01/10/20 0936 50 % 01/09/20 1856 65 %   01/09/20 1300 75 %   01/09/20 0930 100 %   01/08/20 1804 90 %   01/08/20 0934 0 %   01/07/20 1809 100 %   01/07/20 1459 50 %   01/07/20 0904 90 %   01/06/20 1750 75 %       BM: 1/8  Skin Integrity: WDL  Edema:   [] No     [x] Yes   Pertinent Medications: Reviewed: pepcid, bowel regimen, lantus 28 units, SSI    Recent Labs     01/10/20  0535 01/09/20  0335 01/08/20  0220    144 141   K 5.7* 5.0 4.8   * 116* 116*   CO2 23 22 20*   * 50* 129*   BUN 24* 25* 25*   CREA 1.66* 1.63* 1.67*   CA 9.2 8.3* 7.7*       Intake/Output Summary (Last 24 hours) at 1/10/2020 1545  Last data filed at 1/10/2020 1307  Gross per 24 hour   Intake 590 ml   Output 625 ml   Net -35 ml       Anthropometrics:  Ht Readings from Last 1 Encounters:   01/05/20 5' 5\" (1.651 m)     Last 3 Recorded Weights in this Encounter    01/02/20 2149 01/05/20 1617   Weight: 68.9 kg (151 lb 14.4 oz) 74.7 kg (164 lb 9.6 oz)     Body mass index is 27.39 kg/m².        Weight History:     Weight Metrics 1/5/2020 12/26/2018 7/10/2017   Weight 164 lb 9.6 oz 160 lb 9.6 oz 115 lb   BMI 27.39 kg/m2 26.73 kg/m2 19.14 kg/m2        Admitting Diagnosis: Pancreatitis [K85.90]  Hyperosmolar hyperglycemic coma due to diabetes mellitus without ketoacidosis (HCC) [E11.01]  Hyperosmolar non-ketotic state in patient with type 2 diabetes mellitus ([facility] [address] [E11.00]  Pertinent PMHx: DM, HTN, hypercholesterolemia    Education Needs:        [] None identified  [] Identified - Not appropriate at this time  [x]  Identified and addressed - refer to education log  Learning Limitations:   [x] None identified  [] Identified:   Cultural, Sikhism & ethnic food preferences:  [x] None identified    [] Identified and addressed     ESTIMATED NUTRITION NEEDS:     Calories: 0504-3312 kcal (20-30 kcal/kg) based on  [x] Actual BW: 69 kg      [] IBW   Protein:55-83 gm (0.8-1.2 gm/kg) based on  [x] Actual BW      [] IBW   Fluid: 1 mL/kcal     MONITORING & EVALUATION:     Nutrition Goal(s): goals modified  - PO nutrition intake will meet >75% of patient estimated nutritional needs within the next 7 days. Outcome: Progressing towards goal    - Patient will increase knowledge of appropriate food choices on a low potassium diet prior to discharge. Outcome: New/Initial goal      Monitoring:   [x] Food and nutrient intake   [x] Food and nutrient administration  [x] Comparative standards   [x] Nutrition-focused physical findings   [x] Anthropometric Measurements   [x] Treatment/therapy   [x] Biochemical data, medical tests, and procedures        Previous Recommendations (for follow-up assessments only): Implemented      Discharge Planning: Nutritional discharge needs unknown at this time. Participated in care planning, discharge planning, & interdisciplinary rounds as appropriate.       Feliz Duarte RD  Pager: [phone]

## 2020-01-10 NOTE — PROGRESS NOTES
Problem: Diabetes Self-Management  Goal: *Disease process and treatment process  Description  Define diabetes and identify own type of diabetes; list 3 options for treating diabetes. Outcome: Progressing Towards Goal  Goal: *Incorporating nutritional management into lifestyle  Description  Describe effect of type, amount and timing of food on blood glucose; list 3 methods for planning meals. Outcome: Progressing Towards Goal  Goal: *Incorporating physical activity into lifestyle  Description  State effect of exercise on blood glucose levels. Outcome: Progressing Towards Goal  Goal: *Developing strategies to promote health/change behavior  Description  Define the ABC's of diabetes; identify appropriate screenings, schedule and personal plan for screenings. Outcome: Progressing Towards Goal  Goal: *Using medications safely  Description  State effect of diabetes medications on diabetes; name diabetes medication taking, action and side effects. Outcome: Progressing Towards Goal  Goal: *Monitoring blood glucose, interpreting and using results  Description  Identify recommended blood glucose targets  and personal targets. Outcome: Progressing Towards Goal  Goal: *Prevention, detection, treatment of acute complications  Description  List symptoms of hyper- and hypoglycemia; describe how to treat low blood sugar and actions for lowering  high blood glucose level. Outcome: Progressing Towards Goal  Goal: *Prevention, detection and treatment of chronic complications  Description  Define the natural course of diabetes and describe the relationship of blood glucose levels to long term complications of diabetes.   Outcome: Progressing Towards Goal  Goal: *Developing strategies to address psychosocial issues  Description  Describe feelings about living with diabetes; identify support needed and support network  Outcome: Progressing Towards Goal  Goal: *Insulin pump training  Outcome: Progressing Towards Goal  Goal: *Sick day guidelines  Outcome: Progressing Towards Goal  Goal: *Patient Specific Goal (EDIT GOAL, INSERT TEXT)  Outcome: Progressing Towards Goal     Problem: Falls - Risk of  Goal: *Absence of Falls  Description  Document Deniz Young Fall Risk and appropriate interventions in the flowsheet. Outcome: Progressing Towards Goal  Note: Fall Risk Interventions:  Mobility Interventions: Bed/chair exit alarm    Mentation Interventions: Adequate sleep, hydration, pain control    Medication Interventions: Bed/chair exit alarm    Elimination Interventions: Bed/chair exit alarm              Problem: Pressure Injury - Risk of  Goal: *Prevention of pressure injury  Description  Document Marc Scale and appropriate interventions in the flowsheet.   Outcome: Progressing Towards Goal  Note: Pressure Injury Interventions:  Sensory Interventions: Minimize linen layers    Moisture Interventions: Absorbent underpads    Activity Interventions: Pressure redistribution bed/mattress(bed type)    Mobility Interventions: Pressure redistribution bed/mattress (bed type)    Nutrition Interventions: Document food/fluid/supplement intake    Friction and Shear Interventions: Minimize layers                Problem: Pain  Goal: *Control of Pain  Outcome: Progressing Towards Goal  Goal: *PALLIATIVE CARE:  Alleviation of Pain  Outcome: Progressing Towards Goal

## 2020-01-11 LAB
ANION GAP SERPL CALC-SCNC: 1 MMOL/L (ref 3–18)
BUN SERPL-MCNC: 19 MG/DL (ref 7–18)
BUN/CREAT SERPL: 12 (ref 12–20)
CALCIUM SERPL-MCNC: 8.7 MG/DL (ref 8.5–10.1)
CHLORIDE SERPL-SCNC: 110 MMOL/L (ref 100–111)
CO2 SERPL-SCNC: 28 MMOL/L (ref 21–32)
CREAT SERPL-MCNC: 1.63 MG/DL (ref 0.6–1.3)
GLUCOSE BLD STRIP.AUTO-MCNC: 113 MG/DL (ref 70–110)
GLUCOSE BLD STRIP.AUTO-MCNC: 157 MG/DL (ref 70–110)
GLUCOSE BLD STRIP.AUTO-MCNC: 215 MG/DL (ref 70–110)
GLUCOSE BLD STRIP.AUTO-MCNC: 241 MG/DL (ref 70–110)
GLUCOSE BLD STRIP.AUTO-MCNC: 47 MG/DL (ref 70–110)
GLUCOSE BLD STRIP.AUTO-MCNC: 482 MG/DL (ref 70–110)
GLUCOSE BLD STRIP.AUTO-MCNC: 518 MG/DL (ref 70–110)
GLUCOSE SERPL-MCNC: 366 MG/DL (ref 74–99)
POTASSIUM SERPL-SCNC: 5.8 MMOL/L (ref 3.5–5.5)
SODIUM SERPL-SCNC: 139 MMOL/L (ref 136–145)

## 2020-01-11 PROCEDURE — 74011636637 HC RX REV CODE- 636/637: Performed by: INTERNAL MEDICINE

## 2020-01-11 PROCEDURE — 36415 COLL VENOUS BLD VENIPUNCTURE: CPT

## 2020-01-11 PROCEDURE — 65660000000 HC RM CCU STEPDOWN

## 2020-01-11 PROCEDURE — 74011250637 HC RX REV CODE- 250/637: Performed by: HOSPITALIST

## 2020-01-11 PROCEDURE — 82962 GLUCOSE BLOOD TEST: CPT

## 2020-01-11 PROCEDURE — 74011636637 HC RX REV CODE- 636/637: Performed by: HOSPITALIST

## 2020-01-11 PROCEDURE — 74011250636 HC RX REV CODE- 250/636: Performed by: INTERNAL MEDICINE

## 2020-01-11 PROCEDURE — 74011250636 HC RX REV CODE- 250/636: Performed by: PHYSICIAN ASSISTANT

## 2020-01-11 PROCEDURE — 74011250637 HC RX REV CODE- 250/637: Performed by: EMERGENCY MEDICINE

## 2020-01-11 PROCEDURE — 74011250637 HC RX REV CODE- 250/637: Performed by: NURSE PRACTITIONER

## 2020-01-11 PROCEDURE — 80048 BASIC METABOLIC PNL TOTAL CA: CPT

## 2020-01-11 PROCEDURE — 74011250636 HC RX REV CODE- 250/636: Performed by: HOSPITALIST

## 2020-01-11 RX ORDER — FUROSEMIDE 10 MG/ML
INJECTION INTRAMUSCULAR; INTRAVENOUS
Status: DISPENSED
Start: 2020-01-11 | End: 2020-01-11

## 2020-01-11 RX ORDER — FUROSEMIDE 10 MG/ML
40 INJECTION INTRAMUSCULAR; INTRAVENOUS ONCE
Status: COMPLETED | OUTPATIENT
Start: 2020-01-11 | End: 2020-01-11

## 2020-01-11 RX ORDER — DEXTROSE 25 % IN WATER 25 %
2.5 SYRINGE (ML) INTRAVENOUS AS NEEDED
Status: DISCONTINUED | OUTPATIENT
Start: 2020-01-11 | End: 2020-02-01

## 2020-01-11 RX ORDER — SODIUM CHLORIDE 9 MG/ML
100 INJECTION, SOLUTION INTRAVENOUS CONTINUOUS
Status: DISCONTINUED | OUTPATIENT
Start: 2020-01-11 | End: 2020-01-12

## 2020-01-11 RX ADMIN — INSULIN LISPRO 4 UNITS: 100 INJECTION, SOLUTION INTRAVENOUS; SUBCUTANEOUS at 09:15

## 2020-01-11 RX ADMIN — Medication 81 MG: at 09:21

## 2020-01-11 RX ADMIN — MORPHINE SULFATE 2 MG: 2 INJECTION, SOLUTION INTRAMUSCULAR; INTRAVENOUS at 01:12

## 2020-01-11 RX ADMIN — HEPARIN SODIUM 5000 UNITS: 5000 INJECTION INTRAVENOUS; SUBCUTANEOUS at 11:49

## 2020-01-11 RX ADMIN — OXYCODONE HYDROCHLORIDE AND ACETAMINOPHEN 1 TABLET: 5; 325 TABLET ORAL at 09:26

## 2020-01-11 RX ADMIN — POLYETHYLENE GLYCOL 3350 17 G: 17 POWDER, FOR SOLUTION ORAL at 09:21

## 2020-01-11 RX ADMIN — INSULIN LISPRO 10 UNITS: 100 INJECTION, SOLUTION INTRAVENOUS; SUBCUTANEOUS at 12:12

## 2020-01-11 RX ADMIN — INSULIN LISPRO 2 UNITS: 100 INJECTION, SOLUTION INTRAVENOUS; SUBCUTANEOUS at 22:29

## 2020-01-11 RX ADMIN — FAMOTIDINE 20 MG: 20 TABLET, FILM COATED ORAL at 09:21

## 2020-01-11 RX ADMIN — HEPARIN SODIUM 5000 UNITS: 5000 INJECTION INTRAVENOUS; SUBCUTANEOUS at 01:13

## 2020-01-11 RX ADMIN — OXYCODONE HYDROCHLORIDE AND ACETAMINOPHEN 2 TABLET: 5; 325 TABLET ORAL at 22:29

## 2020-01-11 RX ADMIN — FUROSEMIDE 40 MG: 10 INJECTION, SOLUTION INTRAMUSCULAR; INTRAVENOUS at 11:45

## 2020-01-11 RX ADMIN — SODIUM CHLORIDE 100 ML/HR: 900 INJECTION, SOLUTION INTRAVENOUS at 11:09

## 2020-01-11 RX ADMIN — INSULIN HUMAN 10 UNITS: 100 INJECTION, SOLUTION PARENTERAL at 11:42

## 2020-01-11 RX ADMIN — INSULIN GLARGINE 28 UNITS: 100 INJECTION, SOLUTION SUBCUTANEOUS at 09:23

## 2020-01-11 RX ADMIN — HEPARIN SODIUM 5000 UNITS: 5000 INJECTION INTRAVENOUS; SUBCUTANEOUS at 22:31

## 2020-01-11 NOTE — PROGRESS NOTES
Problem: Diabetes Self-Management  Goal: *Disease process and treatment process  Description  Define diabetes and identify own type of diabetes; list 3 options for treating diabetes. Outcome: Progressing Towards Goal  Goal: *Incorporating nutritional management into lifestyle  Description  Describe effect of type, amount and timing of food on blood glucose; list 3 methods for planning meals. Outcome: Progressing Towards Goal  Goal: *Incorporating physical activity into lifestyle  Description  State effect of exercise on blood glucose levels. Outcome: Progressing Towards Goal  Goal: *Developing strategies to promote health/change behavior  Description  Define the ABC's of diabetes; identify appropriate screenings, schedule and personal plan for screenings. Outcome: Progressing Towards Goal  Goal: *Using medications safely  Description  State effect of diabetes medications on diabetes; name diabetes medication taking, action and side effects. Outcome: Progressing Towards Goal  Goal: *Monitoring blood glucose, interpreting and using results  Description  Identify recommended blood glucose targets  and personal targets. Outcome: Progressing Towards Goal  Goal: *Prevention, detection, treatment of acute complications  Description  List symptoms of hyper- and hypoglycemia; describe how to treat low blood sugar and actions for lowering  high blood glucose level. Outcome: Progressing Towards Goal  Goal: *Prevention, detection and treatment of chronic complications  Description  Define the natural course of diabetes and describe the relationship of blood glucose levels to long term complications of diabetes.   Outcome: Progressing Towards Goal  Goal: *Developing strategies to address psychosocial issues  Description  Describe feelings about living with diabetes; identify support needed and support network  Outcome: Progressing Towards Goal  Goal: *Insulin pump training  Outcome: Progressing Towards Goal  Goal: *Sick day guidelines  Outcome: Progressing Towards Goal  Goal: *Patient Specific Goal (EDIT GOAL, INSERT TEXT)  Outcome: Progressing Towards Goal     Problem: Falls - Risk of  Goal: *Absence of Falls  Description  Document Branden Hood Fall Risk and appropriate interventions in the flowsheet.   Outcome: Progressing Towards Goal  Note: Fall Risk Interventions:  Mobility Interventions: Bed/chair exit alarm, Patient to call before getting OOB, Utilize gait belt for transfers/ambulation    Mentation Interventions: Bed/chair exit alarm, Door open when patient unattended, More frequent rounding    Medication Interventions: Bed/chair exit alarm, Patient to call before getting OOB, Utilize gait belt for transfers/ambulation    Elimination Interventions: Bed/chair exit alarm, Call light in reach, Patient to call for help with toileting needs              Problem: Injury - Risk of, Adverse Drug Event  Goal: *Absence of adverse drug events  Outcome: Progressing Towards Goal  Goal: *Absence of medication errors  Outcome: Progressing Towards Goal  Goal: *Knowledge of prescribed medications  Outcome: Progressing Towards Goal     Problem: Infection - Risk of, Urinary Catheter-Associated Urinary Tract Infection  Goal: *Absence of infection signs and symptoms  Outcome: Progressing Towards Goal

## 2020-01-11 NOTE — PROGRESS NOTES
Pt .   10 units Humulin given as ordered. Patient's BG re-checked at reading at 519.  10 units regular insulin given as ordered. Hospitalist group paged regarding further instructions. Patient asymptomatic. Will continue to monitor.

## 2020-01-11 NOTE — PROGRESS NOTES
Hospitalist Progress Note    Patient: Anastacio Butt Age: [de-identified] y.o. : 1940 MR#: [id number] SSN: [id number]  Date/Time: 2020 10:05 AM    DOA: 2020  PCP: Adrian Conley MD    Subjective:   Patient seen and examined at bedside. Family members for in the room. Nurses aide in the room. Patient is feeling well overall. She denies any abdominal pain fevers chills shortness of breath. She did not eat lunch today as her blood sugars were over 500. She tells me that usually in the morning she does not eat breakfast.  While she is in the hospital however her breakfast includes milk cereal and juice. She has several questions regarding the planned intervention for her right lower extremity which were answered to the best of my knowledge. She currently does not have any pain. ROS: no fever/chills, no headache, no dizziness, no facial pain, no sinus congestion,   No swallowing pain, No chest pain, no palpitation, no shortness of breath, no abd pain,  No diarrhea, no urinary complaint, ++righ leg pain or swelling    Assessment/Plan:     1)  Right lower extremity ischemia associate with uncontrolled DM2       LORENA indicates severe to critical arterial insuff at [place]-fem level, fem-pop level.        Arterial duplex result noted to have monophasic flow       CTA with multifocal stenosis of right lower extremity  2)  Hyperglycemia with Type 2 diabetes, stable       Hyperosmolar non-ketotic state in type 2 diabetes mellitus   3)  Acute on chronic renal failure Stage 3: likely mostly prerenal.  4)  Metabolic acidosis due to renal issues-resolved  5)  Acute metabolic encephalopathy  6)  Acute pancreatitis, resolved, no pain with food intake   7)  Hypertension   8)  Hypophosphatemia  9)  Normocytic anemia of chronic disease        Vascular considers angioplasty with stent vs bypass graft this coming week   -Discussed with patient and family members questions answered to the best of my ability; Follow CBC BMP  Appreciate nephrology input. Currently, no evidence of infection   Pain control. Close monitor as high risk    Monitor renal function closely. Avoid nephrotoxicity, gutierrez cath removed. Hold IV fluid  Advanced diet  ISS, Lantus continues. Resumes statin, aspirin    -Discussed with the patient and her family is adjusting her morning diet to involve less sugary foods as she has been eating cereal and milk and juice in the mornings. Alcohol cessation advised. Palliative care team follow up for goal of care. GI signed off, pt needs repeat CT pancreas in 8-12weeks     Full Code     Additional Notes:    Time spent >20 minutes    Case discussed with:  [x]Patient  []Family  [x]Nursing  [x]Case Management  DVT Prophylaxis:  []Lovenox  [x]Hep SQ  []SCDs  []Coumadin   []On Heparin gtt    Signed By: Nanda Adrian MD     2020 12:02 PM              Objective:   VS:   Visit Vitals  /62   Pulse 83   Temp 97.9 °F (36.6 °C)   Resp 18   Ht 5' 5\" (1.651 m)   Wt 74.7 kg (164 lb 9.6 oz)   SpO2 97%   Breastfeeding No   BMI 27.39 kg/m²      Tmax/24hrs: Temp (24hrs), Av.9 °F (37.2 °C), Min:97.9 °F (36.6 °C), Max:99.7 °F (37.6 °C)      Intake/Output Summary (Last 24 hours) at 2020 1436  Last data filed at 2020 1745  Gross per 24 hour   Intake 580 ml   Output 350 ml   Net 230 ml       General:  Cooperative, Not in acute distress, speaks in full sentence while in bed  HEENT: PERRL, EOMI, supple neck, no JVD, dry oral mucosa  Cardiovascular: S1S2 regular, no rub/gallop   Pulmonary: Clear air entry bilaterally, no wheezing, no crackle  GI:  Soft, non tender, non distended, +bs, no guarding   Extremities:  No pedal edema, +distal pulses appreciated   Ischemic change in right lower foot   Neuro: AOx3, moving all extremities, no gross deficit.      Additional:       Current Facility-Administered Medications   Medication Dose Route Frequency    dextrose (D25W) 25% injection 10 mL  2.5 g IntraVENous PRN    0.9% sodium chloride infusion  100 mL/hr IntraVENous CONTINUOUS    insulin NPH (NOVOLIN N, HUMULIN N) 100 unit/mL injection        insulin lispro (HUMALOG) injection   SubCUTAneous AC&HS    famotidine (PEPCID) tablet 20 mg  20 mg Oral DAILY    insulin glargine (LANTUS) injection 28 Units  28 Units SubCUTAneous DAILY    bisacodyl (DULCOLAX) suppository 10 mg  10 mg Rectal DAILY PRN    polyethylene glycol (MIRALAX) packet 17 g  17 g Oral DAILY    aspirin chewable tablet 81 mg  81 mg Oral DAILY    oxyCODONE-acetaminophen (PERCOCET) 5-325 mg per tablet 1-2 Tab  1-2 Tab Oral Q6H PRN    morphine injection 2 mg  2 mg IntraVENous Q4H PRN    dextrose 10% infusion 125-250 mL  125-250 mL IntraVENous PRN    heparin (porcine) injection 5,000 Units  5,000 Units SubCUTAneous Q8H    glucose chewable tablet 16 g  4 Tab Oral PRN    glucagon (GLUCAGEN) injection 1 mg  1 mg IntraMUSCular PRN            Lab/Data Review:  Labs: Results:       Chemistry Recent Labs     01/11/20  0350 01/10/20  0535 01/09/20  0335   * 177* 50*    141 144   K 5.8* 5.7* 5.0    112* 116*   CO2 28 23 22   BUN 19* 24* 25*   CREA 1.63* 1.66* 1.63*   BUCR 12 14 15   AGAP 1* 6 6   CA 8.7 9.2 8.3*     No results for input(s): TBIL, ALT, SGOT, ALKP, TP, ALB, GLOB, AGRAT in the last 72 hours. CBC w/Diff Recent Labs     01/10/20  0535 01/09/20  0335   WBC 7.5 7.5   RBC 2.86* 2.62*   HGB 9.1* 8.6*   HCT 28.2* 25.5*   MCV 98.6* 97.3*   MCH 31.8 32.8   MCHC 32.3 33.7   RDW 14.5 14.4    219   BANDS  --  2   GRANS  --  32*   LYMPH  --  43   EOS  --  3      Coagulation No results for input(s): PTP, INR, APTT, INREXT, INREXT in the last 72 hours.     Iron/Ferritin Lab Results   Component Value Date/Time    Iron 92 05/31/2019 12:07 PM    TIBC 256 05/31/2019 12:07 PM    Iron % saturation 36 05/31/2019 12:07 PM       BNP    Cardiac Enzymes Lab Results   Component Value Date/Time    Troponin-I, QT <0.02 01/02/2020 11:25 PM        Lactic Acid    Thyroid Studies          All Micro Results     Procedure Component Value Units Date/Time    CULTURE, BLOOD [[id number]] Collected:  01/02/20 2206    Order Status:  Completed Specimen:  Blood Updated:  01/08/20 0642     Special Requests: NO SPECIAL REQUESTS        Culture result: NO GROWTH 6 DAYS       CULTURE, BLOOD [[id number]] Collected:  01/02/20 2206    Order Status:  Completed Specimen:  Blood Updated:  01/08/20 1816     Special Requests: NO SPECIAL REQUESTS        Culture result: NO GROWTH 6 DAYS               Images:    CT (Most Recent). CT Results (most recent):  Results from Hospital Encounter encounter on 01/02/20   CT ABD PELV WO CONT    Narrative CT Abdomen And Pelvis with Intravenous Contrast    INDICATION: Generalized abdominal pain. Elevated lipase. .    TECHNIQUE: 5 mm collimation axial images obtained from the diaphragm to the  level of the pubic symphysis following the uneventful administration of  100 cc  of low osmolar, nonionic intravenous contrast.    Dose reduction techniques used: Automated exposure control, adjustment of the  mAs and/or kVp according to patient size, standardized low-dose protocol, and/or  iterative reconstruction technique. COMPARISON: July 18, 2017. ABDOMEN FINDINGS:    Lung Bases: There is bibasilar atelectasis. Atelectasis is most conspicuous in  the lingula. The heart is top normal in size. .    Liver: Normal attenuation. No evidence for mass. Gallbladder: Present and appears normal. No biliary ductal dilatation. Pancreas: There is mild peripancreatic edema. .    Spleen: Normal in size. No evidence of mass. .    Adrenal Glands: No evidence for mass. Kidneys:     Right: Atrophic kidneys. No cortical mass. No hydronephrosis. Left:  Atrophic kidneys. No cortical mass. No hydronephrosis. Lymph Nodes: No lymphadenopathy. Aorta: Atherosclerosis.     PELVIS FINDINGS:     Bowel: Small Bowel: Normal in caliber with normal wall thickness. Large Bowel: There are scattered colonic diverticula. Lerry Serafin Appendix: No secondary signs of acute appendicitis. Bladder: Underdistended. Extensive bilateral pelvic masses with calcification, likely fibroid uterus. No  definite suspicious adnexal mass. No ascites. Bones: Degenerative changes of the spine. Osteopenia. Impression Impression:    Mild uncomplicated pancreatitis. Fibroid uterus. Additional incidentals as above. CT Results (most recent):  Results from Hospital Encounter encounter on 01/02/20   CTA ABD ART W RUNOFF W WO CONT    Narrative PROCEDURE: CTA of abdomen and pelvis and bilateral lower extremity runoff with  intravenous contrast administration. HISTORY: PAD, ischemia of the right foot. TECHNIQUE: Multidetector helical CT angiography was carried out from low chest  through the feet following dynamic 70 cc Isovue-300 intravenous contrast  administration . Scan timing was performed using automated bolus tracking/SMART  Prep. 3-D and MPR angiographic image reconstruction was performed on  independent workstation and separately reviewed. Parenchymal imaging is limited  by the arterial phase of contrast administration. All CT scans at this facility  are performed using dose optimization techniques as appropriate to a performed  exam, to include automated exposure control, adjustment of the mA and/or kV  according to patient's size (including appropriate matching for site specific  examinations), or use of iterative reconstruction technique. COMPARISON: CT abdomen/pelvis 1/3/2020. CTA abdomen/pelvis with extremity runoff  7/18/2017.     FINDINGS:    VASCULAR FINDINGS:    Right lower extremity:  Multifocal stenosis throughout the right posterior tibialis artery due to  atherosclerotic calcifications as well as the peroneal artery.  -The right posterior tibialis artery is not opacified beyond the mid right lower  leg.  -The anterior tibial artery is not opacified beyond the right lower leg just  above the tibial plafond.  -The peroneal artery is opacified beyond the level of the tibial plafond. Proximally, there is multifocal stenosis of the right femoral artery with loss  of opacification at the mid to distal thigh. Reconstitution of opacification at  the level of the posterior tibialis artery likely due to collaterals from  geniculate arteries and the deep femoral artery. 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  -Left lower leg perfusion is contributed by collaterals from the deep femoral  artery and geniculate arteries. Multifocal stenosis/occlusion of the left  posterior tibialis artery and left peroneal artery. No opacification of the  peroneal artery or posterior tibialis artery is seen to be on the mid to  proximal third of the left lower leg. The left dorsal pedis is opacified, and  likely contributes to some additional opacified vessel seen in the left foot. Abdominal aorta:  -Severe atherosclerosis with moderate compromise of the lumen, similar to prior  exam of 7/2017.  -No evidence for abdominal aortic aneurysm. -Severe stenosis at the proximal aspect of the right common iliac artery, well  opacified distally, similar to prior exam. Multifocal moderate stenosis at the  left common iliac artery similar to prior exam.  -Multifocal stenosis at the internal iliac arteries, with good opacification  distally.  -Celiac artery, SMA, left renal artery, and right renal artery are grossly  patent. The right renal artery again shown to originate from the descending  thoracic aorta. ALFREDA is not well visualized. ABDOMEN/PELVIS FINDINGS:  Lower chest: Small right pleural effusion with overlying atelectasis. Liver: Stable subcentimeter hypodense lesions liver, too small to characterize  but stability suggests benignity    Biliary: Gallbladder is mildly distended but otherwise unremarkable.     Spleen: Negative    Pancreas: There is some peripancreatic edema adjacent to the pancreatic tail. Adrenal glands: Diffuse thickening of the adrenal glands, without discrete mass,  similar to prior exam.    Kidneys: Malrotated kidneys again noted, without evidence for hydronephrosis. GI tract: The bowel appears nonobstructed. Question of mild mural thickening at  the distal esophagus. Questionable mild mural thickening at the greater  curvature of the stomach adjacent to the edema along the pancreatic tail. Colonic diverticulosis, without evidence for diverticulitis. Normal appendix. Peritoneum: No free air    Lymph nodes: No lymphadenopathy. Pelvis: Urinary bladder is unremarkable. Fibroid uterus again noted. Body wall/soft tissues: Small fat-containing umbilical hernia. Small right lower  spigelian hernia containing a small amount of fluid measuring 2.5 x 1.2 cm  (image 203), previously measured 2.9 x 1.6 cm. Mild edema along the right flank. Bones:  -Bilateral moderate knee osteoarthrosis. Small right knee joint effusion and  trace left knee joint effusion.  -Diffuse soft tissue swelling at the feet, right greater than left. Trace  subcutaneous emphysema adjacent to the right fifth MTP joint. Left distal tibial  and talar hardware noted. -Multilevel degenerative spondylosis and disc disease noted, not well evaluated  on current exam.  -Moderate degenerative change at the hips      Impression IMPRESSION:  1. Right lower extremity:  Multifocal stenosis throughout the right posterior tibialis artery due to  atherosclerotic calcifications as well as the peroneal artery.  -The right posterior tibialis artery is not opacified beyond the mid right lower  leg.  -The anterior tibial artery is not opacified beyond the right lower leg just  above the tibial plafond.  -The peroneal artery is opacified beyond the level of the tibial plafond.     Proximally, there is multifocal stenosis of the right femoral artery with loss  of opacification at the mid to distal thigh. Reconstitution of opacification at  the level of the posterior tibialis artery likely due to collaterals from  geniculate arteries and the deep femoral artery. 2. Diffuse soft tissue swelling at bilateral feet, right greater than left. -Suggestion of trace air along the right fifth MTP joint, could be degenerative  but infectious process not excluded. Consider dedicated right foot x-ray for  further evaluation. 3. Chronic multilevel stenosis of the aorta, iliac arteries, and left lower  extremity as described. 4. Peripancreatic edema along the pancreatic tail is increased since 1/3/2020.  -Associated adjacent mild presumed reactive mural thickening of the greater  curvature of the stomach. 5. Small right pleural effusion. 6. Suggestion of mild mural thickening of the distal esophagus, may indicate  nonspecific esophagitis. 7. Bilateral knee osteoarthrosis with small right knee joint effusion and trace  left knee joint effusion. 8. Fibroid uterus. 9. Colonic diverticulosis, without evidence for diverticulitis. 10. Additional nonacute findings are as described. XR Results (most recent):  Results from Hospital Encounter encounter on 01/02/20   XR FOOT RT MIN 3 V    Narrative EXAM:  XR FOOT RT MIN 3 V    INDICATION:   report of air in right fifth MTP join    COMPARISON:  None. FINDINGS:  3 views of the right foot demonstrate limited study, the patient has  cooperation issues with positioning the foot. Considerable hammertoe deformity  of all the toes noted. The foot is held in extension at the ankle. There is no  obvious fracture or dislocation. Small plantar calcaneal spur noted. Impression IMPRESSION: Positioning issues, no acute abnormality identified.  No bone  destruction or soft tissue air       [facility] Results (most recent):  Results from Hospital Encounter encounter on 01/02/20   US ABD LTD    Narrative EXAMINATION: Ultrasound abdomen limited, right upper quadrant    INDICATION: Pancreatitis    COMPARISON: CT 1/3/2020    TECHNIQUE: Grayscale, color, spectral sonographic images of the right upper  quadrant obtained. FINDINGS:    Pancreas: Unremarkable. Tail not well visualized. IVC: Unremarkable. Liver: 15.2 cm length. Slightly coarsened echotexture. Portal vein normal  caliber with antegrade flow. Right kidney: 6.1 cm length. Increased echotexture. No hydronephrosis. No focal  abnormality. Gallbladder: No calculi or wall thickening. Negative [place] sign. Biliary tree: Common bile duct 0.5 cm caliber. No intrahepatic duct dilatation. Impression IMPRESSION:    Coarsened hepatic echotexture suggests nonspecific hepatocellular disease. Atrophic echogenic right kidney consistent with chronic medical renal disease. No other significant findings. No evidence of cholelithiasis. EKG No results found for this or any previous visit. Duplex art Interpretation Summary     · Diffuse plaquing seen throughout the vessels interrogated in the bilateral lower extremities. · Monophasic flow also noted in these vessels. · Technically difficult study due to calcific plaquing. · Unable to take an ankle-brachial index due to the diminished nature of the waveforms and patient's inability to withstand pressures. Lower Extremity Arterial Findings     Right Lower Arterial     Monophasic Doppler waveforms in the right distal common femoral, profunda femoris, proximal superficial femoral, middle superficial femoral, distal superficial femoral, proximal popliteal, distal popliteal, anterior tibial, posterior tibial and dorsalis pedis artery. Unable to image the distal posterior tibial artery.    Left Lower Arterial     Monophasic Doppler waveforms in the left distal common femoral, profunda femoris, proximal superficial femoral, middle superficial femoral, distal superficial femoral, proximal popliteal, distal popliteal, anterior tibial, posterior tibial and dorsalis pedis artery.

## 2020-01-11 NOTE — ROUTINE PROCESS
Bedside shift change report given to José Antonio Raymundo RN (oncoming nurse) by Jonnathan Villanueva RN  (offgoing nurse). Report included the following information SBAR, Kardex and MAR.

## 2020-01-11 NOTE — PROGRESS NOTES
RENAL DAILY PROGRESS NOTE             [de-identified] F with DM, HTN, PAD, admitted with acute pancreatitis, seen for renal failure  Subjective:     Complaint:   Overnight events noted  She is awaiting for vascular intervention. IMPRESSION:   Acute kidney injury , pre renal, dehydration from severe pancreatitis   Metabolic acidosis , better   Hypernatremia   Hyperkalemia, mild  Anemia  Severe PAD, right foot cynosis    PLAN:   · Her potassium remain on higher side. Ordered normal saline 500 cc with 1 dose of Lasix. Monitor potassium  Adjust medication for current renal function status. Current Facility-Administered Medications   Medication Dose Route Frequency    insulin NPH (NOVOLIN N, HUMULIN N) 100 unit/mL injection        dextrose (D25W) 25% injection 10 mL  2.5 g IntraVENous PRN    0.9% sodium chloride infusion  100 mL/hr IntraVENous CONTINUOUS    insulin lispro (HUMALOG) injection   SubCUTAneous AC&HS    famotidine (PEPCID) tablet 20 mg  20 mg Oral DAILY    insulin glargine (LANTUS) injection 28 Units  28 Units SubCUTAneous DAILY    bisacodyl (DULCOLAX) suppository 10 mg  10 mg Rectal DAILY PRN    polyethylene glycol (MIRALAX) packet 17 g  17 g Oral DAILY    aspirin chewable tablet 81 mg  81 mg Oral DAILY    oxyCODONE-acetaminophen (PERCOCET) 5-325 mg per tablet 1-2 Tab  1-2 Tab Oral Q6H PRN    morphine injection 2 mg  2 mg IntraVENous Q4H PRN    dextrose 10% infusion 125-250 mL  125-250 mL IntraVENous PRN    heparin (porcine) injection 5,000 Units  5,000 Units SubCUTAneous Q8H    glucose chewable tablet 16 g  4 Tab Oral PRN    glucagon (GLUCAGEN) injection 1 mg  1 mg IntraMUSCular PRN       Review of Symptoms: comprehensive ROS negative except above.    Objective:     Patient Vitals for the past 24 hrs:   Temp Pulse Resp BP SpO2   01/11/20 0810 98.7 °F (37.1 °C) 79 18 137/67 96 %   01/11/20 0507 98.7 °F (37.1 °C) 83 18 151/83 96 %   01/11/20 0115 99.1 °F (37.3 °C) 98 18 125/63 98 %   01/11/20 0018 99.7 °F (37.6 °C) (!) 116 18 147/69 96 %   01/10/20 2026 99.4 °F (37.4 °C) (!) 102 18 155/76 95 %   01/10/20 1514 98.8 °F (37.1 °C) 95 18 180/83 95 %        Weight change:      01/09 1901 - 01/11 0700  In: 690 [P.O.:690]  Out: 975 [Urine:975]    Intake/Output Summary (Last 24 hours) at 1/11/2020 1210  Last data filed at 1/11/2020 0906  Gross per 24 hour   Intake 700 ml   Output 350 ml   Net 350 ml     Physical Exam:   General: comfortable, no acute distress   HEENT sclera anicteric, supple neck, no thyromegaly  CVS: S1S2 heard,  no rub  RS: + air entry b/l,   Abd: Soft, Non tender, Not distended,  Neuro: non focal, awake, alert , CN II-XII are grossly intact  Extrm: right foot cynosis , cool on touch  Musculoskeletal: No gross joints or bone deformities         Data Review:     LABS:   Hematology:   Recent Labs     01/10/20  0535 01/09/20  0335   WBC 7.5 7.5   HGB 9.1* 8.6*   HCT 28.2* 25.5*     Chemistry:   Recent Labs     01/11/20  0350 01/10/20  0535 01/09/20  0335   BUN 19* 24* 25*   CREA 1.63* 1.66* 1.63*   CA 8.7 9.2 8.3*   K 5.8* 5.7* 5.0    141 144    112* 116*   CO2 28 23 22   * 177* 50*            Procedures/imaging: see electronic medical records for all procedures, Xrays and details which were not copied into this note but were reviewed prior to creation of Plan          Assessment & Plan:     As above         Salima Pham MD  1/11/2020  1:02 PM

## 2020-01-12 LAB
ANION GAP SERPL CALC-SCNC: 7 MMOL/L (ref 3–18)
BUN SERPL-MCNC: 23 MG/DL (ref 7–18)
BUN/CREAT SERPL: 14 (ref 12–20)
CALCIUM SERPL-MCNC: 8.7 MG/DL (ref 8.5–10.1)
CHLORIDE SERPL-SCNC: 111 MMOL/L (ref 100–111)
CO2 SERPL-SCNC: 24 MMOL/L (ref 21–32)
CREAT SERPL-MCNC: 1.69 MG/DL (ref 0.6–1.3)
GLUCOSE BLD STRIP.AUTO-MCNC: 124 MG/DL (ref 70–110)
GLUCOSE BLD STRIP.AUTO-MCNC: 157 MG/DL (ref 70–110)
GLUCOSE BLD STRIP.AUTO-MCNC: 172 MG/DL (ref 70–110)
GLUCOSE BLD STRIP.AUTO-MCNC: 191 MG/DL (ref 70–110)
GLUCOSE SERPL-MCNC: 69 MG/DL (ref 74–99)
POTASSIUM SERPL-SCNC: 5 MMOL/L (ref 3.5–5.5)
SODIUM SERPL-SCNC: 142 MMOL/L (ref 136–145)

## 2020-01-12 PROCEDURE — 74011250637 HC RX REV CODE- 250/637: Performed by: EMERGENCY MEDICINE

## 2020-01-12 PROCEDURE — 74011250637 HC RX REV CODE- 250/637: Performed by: HOSPITALIST

## 2020-01-12 PROCEDURE — 77030010545

## 2020-01-12 PROCEDURE — 36415 COLL VENOUS BLD VENIPUNCTURE: CPT

## 2020-01-12 PROCEDURE — 77030038269 HC DRN EXT URIN PURWCK BARD -A

## 2020-01-12 PROCEDURE — 65660000000 HC RM CCU STEPDOWN

## 2020-01-12 PROCEDURE — 74011636637 HC RX REV CODE- 636/637: Performed by: INTERNAL MEDICINE

## 2020-01-12 PROCEDURE — 80048 BASIC METABOLIC PNL TOTAL CA: CPT

## 2020-01-12 PROCEDURE — 74011250636 HC RX REV CODE- 250/636: Performed by: INTERNAL MEDICINE

## 2020-01-12 PROCEDURE — 74011250636 HC RX REV CODE- 250/636: Performed by: PHYSICIAN ASSISTANT

## 2020-01-12 PROCEDURE — 74011000258 HC RX REV CODE- 258: Performed by: INTERNAL MEDICINE

## 2020-01-12 PROCEDURE — 82962 GLUCOSE BLOOD TEST: CPT

## 2020-01-12 PROCEDURE — 74011250637 HC RX REV CODE- 250/637: Performed by: NURSE PRACTITIONER

## 2020-01-12 RX ORDER — DEXTROSE MONOHYDRATE AND SODIUM CHLORIDE 5; .9 G/100ML; G/100ML
100 INJECTION, SOLUTION INTRAVENOUS CONTINUOUS
Status: DISCONTINUED | OUTPATIENT
Start: 2020-01-12 | End: 2020-01-14

## 2020-01-12 RX ADMIN — Medication 81 MG: at 09:11

## 2020-01-12 RX ADMIN — INSULIN LISPRO 2 UNITS: 100 INJECTION, SOLUTION INTRAVENOUS; SUBCUTANEOUS at 09:10

## 2020-01-12 RX ADMIN — HEPARIN SODIUM 5000 UNITS: 5000 INJECTION INTRAVENOUS; SUBCUTANEOUS at 20:40

## 2020-01-12 RX ADMIN — FAMOTIDINE 20 MG: 20 TABLET, FILM COATED ORAL at 09:11

## 2020-01-12 RX ADMIN — INSULIN LISPRO 2 UNITS: 100 INJECTION, SOLUTION INTRAVENOUS; SUBCUTANEOUS at 17:13

## 2020-01-12 RX ADMIN — INSULIN GLARGINE 28 UNITS: 100 INJECTION, SOLUTION SUBCUTANEOUS at 09:10

## 2020-01-12 RX ADMIN — OXYCODONE HYDROCHLORIDE AND ACETAMINOPHEN 2 TABLET: 5; 325 TABLET ORAL at 20:40

## 2020-01-12 RX ADMIN — SODIUM CHLORIDE 100 ML/HR: 900 INJECTION, SOLUTION INTRAVENOUS at 14:34

## 2020-01-12 RX ADMIN — SODIUM CHLORIDE 100 ML/HR: 900 INJECTION, SOLUTION INTRAVENOUS at 05:07

## 2020-01-12 RX ADMIN — INSULIN LISPRO 2 UNITS: 100 INJECTION, SOLUTION INTRAVENOUS; SUBCUTANEOUS at 23:32

## 2020-01-12 RX ADMIN — POLYETHYLENE GLYCOL 3350 17 G: 17 POWDER, FOR SOLUTION ORAL at 09:11

## 2020-01-12 RX ADMIN — HEPARIN SODIUM 5000 UNITS: 5000 INJECTION INTRAVENOUS; SUBCUTANEOUS at 05:13

## 2020-01-12 RX ADMIN — DEXTROSE MONOHYDRATE AND SODIUM CHLORIDE 100 ML/HR: 5; .9 INJECTION, SOLUTION INTRAVENOUS at 23:31

## 2020-01-12 RX ADMIN — HEPARIN SODIUM 5000 UNITS: 5000 INJECTION INTRAVENOUS; SUBCUTANEOUS at 14:19

## 2020-01-12 RX ADMIN — OXYCODONE HYDROCHLORIDE AND ACETAMINOPHEN 2 TABLET: 5; 325 TABLET ORAL at 05:14

## 2020-01-12 RX ADMIN — OXYCODONE HYDROCHLORIDE AND ACETAMINOPHEN 2 TABLET: 5; 325 TABLET ORAL at 14:27

## 2020-01-12 NOTE — PROGRESS NOTES
Problem: Falls - Risk of  Goal: *Absence of Falls  Description  Document Amanda Velez Fall Risk and appropriate interventions in the flowsheet. Outcome: Progressing Towards Goal  Note: Fall Risk Interventions:  Mobility Interventions: Bed/chair exit alarm, Patient to call before getting OOB    Mentation Interventions: Adequate sleep, hydration, pain control, Bed/chair exit alarm, Door open when patient unattended    Medication Interventions: Patient to call before getting OOB, Bed/chair exit alarm    Elimination Interventions: Call light in reach, Bed/chair exit alarm      Problem: Pressure Injury - Risk of  Goal: *Prevention of pressure injury  Description  Document Marc Scale and appropriate interventions in the flowsheet.   Outcome: Progressing Towards Goal  Note: Pressure Injury Interventions:  Sensory Interventions: Assess changes in LOC, Check visual cues for pain    Moisture Interventions: Absorbent underpads, Check for incontinence Q2 hours and as needed    Activity Interventions: Pressure redistribution bed/mattress(bed type)    Mobility Interventions: HOB 30 degrees or less, Pressure redistribution bed/mattress (bed type)    Nutrition Interventions: Document food/fluid/supplement intake    Friction and Shear Interventions: HOB 30 degrees or less         Problem: Pain  Goal: *Control of Pain  Outcome: Progressing Towards Goal

## 2020-01-12 NOTE — PROGRESS NOTES
Hospitalist Progress Note    Patient: Kiran Perez Age: [de-identified] y.o. : 1940 MR#: [id number] SSN: [id number]  Date/Time: 2020 10:05 AM    DOA: 2020  PCP: Himanshu Arambula MD    Subjective:   Patient seen and examined at bedside. She is awake and very talkative. She is eating lunch. She states she continues to have right lower extremity pain and has not been able to get up and move around. Assessment/Plan:     1)  Right lower extremity ischemia associate with uncontrolled DM2: On exam toes are black and cold. -  LORENA indicates severe to critical arterial insuff at [place]-fem level, fem-pop level. -  arterial duplex result noted to have monophasic flow      -  CTA with multifocal stenosis of right lower extremity  2)  Hyperglycemia with Type 2 diabetes, stable       Hyperosmolar non-ketotic state in type 2 diabetes mellitus   3)  Acute on chronic renal failure Stage 3: likely mostly prerenal.  4)  Metabolic acidosis due to renal issues-resolved  5)  Acute metabolic encephalopathy  6)  Acute pancreatitis, resolved, no pain with food intake   7)  Hypertension   8)  Hypophosphatemia  9)  Normocytic anemia of chronic disease      Discussed with vascular surgery on rounds today. We will plan for angiogram potentially tomorrow. We will make the patient n.p.o. after midnight  Follow CBC BMP  Appreciate nephrology input. Currently, no evidence of infection   Pain control. Close monitor as high risk    Monitor renal function closely. Avoid nephrotoxicity, gutierrez cath removed. Hold IV fluid  Advanced diet  ISS, Lantus continues. Resumes statin, aspirin    -Discussed with the patient and her family is adjusting her morning diet to involve less sugary foods as she has been eating cereal and milk and juice in the mornings. Alcohol cessation advised. Palliative care team follow up for goal of care.    GI signed off, pt needs repeat CT pancreas in 8-12weeks     Full Code     Additional Notes: Time spent >20 minutes    Case discussed with:  [x]Patient  []Family  [x]Nursing  [x]Case Management  DVT Prophylaxis:  []Lovenox  [x]Hep SQ  []SCDs  []Coumadin   []On Heparin gtt    Signed By: María Ram MD     2020 12:02 PM              Objective:   VS:   Visit Vitals  /78 (BP 1 Location: Right arm, BP Patient Position: At rest)   Pulse 65   Temp 98.6 °F (37 °C)   Resp 15   Ht 5' 5\" (1.651 m)   Wt 74.7 kg (164 lb 9.6 oz)   SpO2 98%   Breastfeeding No   BMI 27.39 kg/m²      Tmax/24hrs: Temp (24hrs), Av.5 °F (36.9 °C), Min:98.2 °F (36.8 °C), Max:98.8 °F (37.1 °C)      Intake/Output Summary (Last 24 hours) at 2020 1442  Last data filed at 2020 1427  Gross per 24 hour   Intake 1560 ml   Output    Net 1560 ml       General:  Cooperative, Not in acute distress, speaks in full sentence while in bed  HEENT: PERRL, EOMI, supple neck, no JVD, dry oral mucosa  Cardiovascular: S1S2 regular, no rub/gallop   Pulmonary: Clear air entry bilaterally, no wheezing, no crackle  GI:  Soft, non tender, non distended, +bs, no guarding   Extremities:  No pedal edema, +distal pulses appreciated;    Right foot is progressively black and cold. The third third and fifth toes are starting to shriveled. There is also a new blister on the dorsal portion of the foot at the base of the third through fifth toes. The proximal area is warm whereas the distal half it is blackened and cold  Neuro: AOx3, moving all extremities, no gross deficit.      Additional:       Current Facility-Administered Medications   Medication Dose Route Frequency    dextrose (D25W) 25% injection 10 mL  2.5 g IntraVENous PRN    0.9% sodium chloride infusion  100 mL/hr IntraVENous CONTINUOUS    insulin lispro (HUMALOG) injection   SubCUTAneous AC&HS    famotidine (PEPCID) tablet 20 mg  20 mg Oral DAILY    insulin glargine (LANTUS) injection 28 Units  28 Units SubCUTAneous DAILY    bisacodyl (DULCOLAX) suppository 10 mg  10 mg Rectal DAILY PRN    polyethylene glycol (MIRALAX) packet 17 g  17 g Oral DAILY    aspirin chewable tablet 81 mg  81 mg Oral DAILY    oxyCODONE-acetaminophen (PERCOCET) 5-325 mg per tablet 1-2 Tab  1-2 Tab Oral Q6H PRN    morphine injection 2 mg  2 mg IntraVENous Q4H PRN    dextrose 10% infusion 125-250 mL  125-250 mL IntraVENous PRN    heparin (porcine) injection 5,000 Units  5,000 Units SubCUTAneous Q8H    glucose chewable tablet 16 g  4 Tab Oral PRN    glucagon (GLUCAGEN) injection 1 mg  1 mg IntraMUSCular PRN            Lab/Data Review:  Labs: Results:       Chemistry Recent Labs     01/12/20  0321 01/11/20  0350 01/10/20  0535   GLU 69* 366* 177*    139 141   K 5.0 5.8* 5.7*    110 112*   CO2 24 28 23   BUN 23* 19* 24*   CREA 1.69* 1.63* 1.66*   BUCR 14 12 14   AGAP 7 1* 6   CA 8.7 8.7 9.2     No results for input(s): TBIL, ALT, SGOT, ALKP, TP, ALB, GLOB, AGRAT in the last 72 hours. CBC w/Diff Recent Labs     01/10/20  0535   WBC 7.5   RBC 2.86*   HGB 9.1*   HCT 28.2*   MCV 98.6*   MCH 31.8   MCHC 32.3   RDW 14.5         Coagulation No results for input(s): PTP, INR, APTT, INREXT, INREXT in the last 72 hours.     Iron/Ferritin Lab Results   Component Value Date/Time    Iron 92 05/31/2019 12:07 PM    TIBC 256 05/31/2019 12:07 PM    Iron % saturation 36 05/31/2019 12:07 PM       BNP    Cardiac Enzymes Lab Results   Component Value Date/Time    Troponin-I, QT <0.02 01/02/2020 11:25 PM        Lactic Acid    Thyroid Studies          All Micro Results     Procedure Component Value Units Date/Time    CULTURE, BLOOD [[id number]] Collected:  01/02/20 2206    Order Status:  Completed Specimen:  Blood Updated:  01/08/20 0642     Special Requests: NO SPECIAL REQUESTS        Culture result: NO GROWTH 6 DAYS       CULTURE, BLOOD [[id number]] Collected:  01/02/20 2206    Order Status:  Completed Specimen:  Blood Updated:  01/08/20 0642     Special Requests: NO SPECIAL REQUESTS        Culture result: NO GROWTH 6 DAYS               Images:    CT (Most Recent). CT Results (most recent):  Results from Hospital Encounter encounter on 01/02/20   CT ABD PELV WO CONT    Narrative CT Abdomen And Pelvis with Intravenous Contrast    INDICATION: Generalized abdominal pain. Elevated lipase. .    TECHNIQUE: 5 mm collimation axial images obtained from the diaphragm to the  level of the pubic symphysis following the uneventful administration of  100 cc  of low osmolar, nonionic intravenous contrast.    Dose reduction techniques used: Automated exposure control, adjustment of the  mAs and/or kVp according to patient size, standardized low-dose protocol, and/or  iterative reconstruction technique. COMPARISON: July 18, 2017. ABDOMEN FINDINGS:    Lung Bases: There is bibasilar atelectasis. Atelectasis is most conspicuous in  the lingula. The heart is top normal in size. .    Liver: Normal attenuation. No evidence for mass. Gallbladder: Present and appears normal. No biliary ductal dilatation. Pancreas: There is mild peripancreatic edema. .    Spleen: Normal in size. No evidence of mass. .    Adrenal Glands: No evidence for mass. Kidneys:     Right: Atrophic kidneys. No cortical mass. No hydronephrosis. Left:  Atrophic kidneys. No cortical mass. No hydronephrosis. Lymph Nodes: No lymphadenopathy. Aorta: Atherosclerosis. PELVIS FINDINGS:     Bowel: Small Bowel: Normal in caliber with normal wall thickness. Large Bowel: There are scattered colonic diverticula. Ethelene Gauss Appendix: No secondary signs of acute appendicitis. Bladder: Underdistended. Extensive bilateral pelvic masses with calcification, likely fibroid uterus. No  definite suspicious adnexal mass. No ascites. Bones: Degenerative changes of the spine. Osteopenia. Impression Impression:    Mild uncomplicated pancreatitis. Fibroid uterus. Additional incidentals as above.      CT Results (most recent):  Results from [facility] Encounter encounter on 01/02/20   CTA ABD ART W RUNOFF W WO CONT    Narrative PROCEDURE: CTA of abdomen and pelvis and bilateral lower extremity runoff with  intravenous contrast administration. HISTORY: PAD, ischemia of the right foot. TECHNIQUE: Multidetector helical CT angiography was carried out from low chest  through the feet following dynamic 70 cc Isovue-300 intravenous contrast  administration . Scan timing was performed using automated bolus tracking/SMART  Prep. 3-D and MPR angiographic image reconstruction was performed on  independent workstation and separately reviewed. Parenchymal imaging is limited  by the arterial phase of contrast administration. All CT scans at this facility  are performed using dose optimization techniques as appropriate to a performed  exam, to include automated exposure control, adjustment of the mA and/or kV  according to patient's size (including appropriate matching for site specific  examinations), or use of iterative reconstruction technique. COMPARISON: CT abdomen/pelvis 1/3/2020. CTA abdomen/pelvis with extremity runoff  7/18/2017. FINDINGS:    VASCULAR FINDINGS:    Right lower extremity:  Multifocal stenosis throughout the right posterior tibialis artery due to  atherosclerotic calcifications as well as the peroneal artery.  -The right posterior tibialis artery is not opacified beyond the mid right lower  leg.  -The anterior tibial artery is not opacified beyond the right lower leg just  above the tibial plafond.  -The peroneal artery is opacified beyond the level of the tibial plafond. Proximally, there is multifocal stenosis of the right femoral artery with loss  of opacification at the mid to distal thigh. Reconstitution of opacification at  the level of the posterior tibialis artery likely due to collaterals from  geniculate arteries and the deep femoral artery.     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  -Left lower leg perfusion is contributed by collaterals from the deep femoral  artery and geniculate arteries. Multifocal stenosis/occlusion of the left  posterior tibialis artery and left peroneal artery. No opacification of the  peroneal artery or posterior tibialis artery is seen to be on the mid to  proximal third of the left lower leg. The left dorsal pedis is opacified, and  likely contributes to some additional opacified vessel seen in the left foot. Abdominal aorta:  -Severe atherosclerosis with moderate compromise of the lumen, similar to prior  exam of 7/2017.  -No evidence for abdominal aortic aneurysm. -Severe stenosis at the proximal aspect of the right common iliac artery, well  opacified distally, similar to prior exam. Multifocal moderate stenosis at the  left common iliac artery similar to prior exam.  -Multifocal stenosis at the internal iliac arteries, with good opacification  distally.  -Celiac artery, SMA, left renal artery, and right renal artery are grossly  patent. The right renal artery again shown to originate from the descending  thoracic aorta. ALFREDA is not well visualized. ABDOMEN/PELVIS FINDINGS:  Lower chest: Small right pleural effusion with overlying atelectasis. Liver: Stable subcentimeter hypodense lesions liver, too small to characterize  but stability suggests benignity    Biliary: Gallbladder is mildly distended but otherwise unremarkable. Spleen: Negative    Pancreas: There is some peripancreatic edema adjacent to the pancreatic tail. Adrenal glands: Diffuse thickening of the adrenal glands, without discrete mass,  similar to prior exam.    Kidneys: Malrotated kidneys again noted, without evidence for hydronephrosis. GI tract: The bowel appears nonobstructed. Question of mild mural thickening at  the distal esophagus. Questionable mild mural thickening at the greater  curvature of the stomach adjacent to the edema along the pancreatic tail.   Colonic diverticulosis, without evidence for diverticulitis. Normal appendix. Peritoneum: No free air    Lymph nodes: No lymphadenopathy. Pelvis: Urinary bladder is unremarkable. Fibroid uterus again noted. Body wall/soft tissues: Small fat-containing umbilical hernia. Small right lower  spigelian hernia containing a small amount of fluid measuring 2.5 x 1.2 cm  (image 203), previously measured 2.9 x 1.6 cm. Mild edema along the right flank. Bones:  -Bilateral moderate knee osteoarthrosis. Small right knee joint effusion and  trace left knee joint effusion.  -Diffuse soft tissue swelling at the feet, right greater than left. Trace  subcutaneous emphysema adjacent to the right fifth MTP joint. Left distal tibial  and talar hardware noted. -Multilevel degenerative spondylosis and disc disease noted, not well evaluated  on current exam.  -Moderate degenerative change at the hips      Impression IMPRESSION:  1. Right lower extremity:  Multifocal stenosis throughout the right posterior tibialis artery due to  atherosclerotic calcifications as well as the peroneal artery.  -The right posterior tibialis artery is not opacified beyond the mid right lower  leg.  -The anterior tibial artery is not opacified beyond the right lower leg just  above the tibial plafond.  -The peroneal artery is opacified beyond the level of the tibial plafond. Proximally, there is multifocal stenosis of the right femoral artery with loss  of opacification at the mid to distal thigh. Reconstitution of opacification at  the level of the posterior tibialis artery likely due to collaterals from  geniculate arteries and the deep femoral artery. 2. Diffuse soft tissue swelling at bilateral feet, right greater than left. -Suggestion of trace air along the right fifth MTP joint, could be degenerative  but infectious process not excluded. Consider dedicated right foot x-ray for  further evaluation.     3. Chronic multilevel stenosis of the aorta, iliac arteries, and left lower  extremity as described. 4. Peripancreatic edema along the pancreatic tail is increased since 1/3/2020.  -Associated adjacent mild presumed reactive mural thickening of the greater  curvature of the stomach. 5. Small right pleural effusion. 6. Suggestion of mild mural thickening of the distal esophagus, may indicate  nonspecific esophagitis. 7. Bilateral knee osteoarthrosis with small right knee joint effusion and trace  left knee joint effusion. 8. Fibroid uterus. 9. Colonic diverticulosis, without evidence for diverticulitis. 10. Additional nonacute findings are as described. XR Results (most recent):  Results from Hospital Encounter encounter on 01/02/20   XR FOOT RT MIN 3 V    Narrative EXAM:  XR FOOT RT MIN 3 V    INDICATION:   report of air in right fifth MTP join    COMPARISON:  None. FINDINGS:  3 views of the right foot demonstrate limited study, the patient has  cooperation issues with positioning the foot. Considerable hammertoe deformity  of all the toes noted. The foot is held in extension at the ankle. There is no  obvious fracture or dislocation. Small plantar calcaneal spur noted. Impression IMPRESSION: Positioning issues, no acute abnormality identified. No bone  destruction or soft tissue air       [id number] Results (most recent):  Results from Hospital Encounter encounter on 01/02/20   US ABD LTD    Narrative EXAMINATION: Ultrasound abdomen limited, right upper quadrant    INDICATION: Pancreatitis    COMPARISON: CT 1/3/2020    TECHNIQUE: Grayscale, color, spectral sonographic images of the right upper  quadrant obtained. FINDINGS:    Pancreas: Unremarkable. Tail not well visualized. IVC: Unremarkable. Liver: 15.2 cm length. Slightly coarsened echotexture. Portal vein normal  caliber with antegrade flow. Right kidney: 6.1 cm length. Increased echotexture. No hydronephrosis. No focal  abnormality.     Gallbladder: No calculi or wall thickening. Negative [place] sign. Biliary tree: Common bile duct 0.5 cm caliber. No intrahepatic duct dilatation. Impression IMPRESSION:    Coarsened hepatic echotexture suggests nonspecific hepatocellular disease. Atrophic echogenic right kidney consistent with chronic medical renal disease. No other significant findings. No evidence of cholelithiasis. EKG No results found for this or any previous visit. Duplex art Interpretation Summary     · Diffuse plaquing seen throughout the vessels interrogated in the bilateral lower extremities. · Monophasic flow also noted in these vessels. · Technically difficult study due to calcific plaquing. · Unable to take an ankle-brachial index due to the diminished nature of the waveforms and patient's inability to withstand pressures. Lower Extremity Arterial Findings     Right Lower Arterial     Monophasic Doppler waveforms in the right distal common femoral, profunda femoris, proximal superficial femoral, middle superficial femoral, distal superficial femoral, proximal popliteal, distal popliteal, anterior tibial, posterior tibial and dorsalis pedis artery. Unable to image the distal posterior tibial artery. Left Lower Arterial     Monophasic Doppler waveforms in the left distal common femoral, profunda femoris, proximal superficial femoral, middle superficial femoral, distal superficial femoral, proximal popliteal, distal popliteal, anterior tibial, posterior tibial and dorsalis pedis artery.

## 2020-01-12 NOTE — PROGRESS NOTES
Pt sleeping comfortably.  Did not wake for exam.     Possible right leg angiogram/possible intervention early this week if remains medically stable     Will follow up with scheduling arrangements/orders in AM if medically cleared

## 2020-01-12 NOTE — PROGRESS NOTES
RENAL DAILY PROGRESS NOTE             [de-identified] F with DM, HTN, PAD, admitted with acute pancreatitis, seen for renal failure  Subjective:     Complaint:   Feels okay     IMPRESSION:   Acute kidney injury , pre renal, dehydration from severe pancreatitis   Metabolic acidosis , better   Hypernatremia   Hyperkalemia, mild  Anemia  Severe PAD, right foot cynosis    PLAN:   · Improving her potassium , stable renal function, discussed dietary restriction for low K diet. Adjust medication for current renal function status. Current Facility-Administered Medications   Medication Dose Route Frequency    dextrose (D25W) 25% injection 10 mL  2.5 g IntraVENous PRN    0.9% sodium chloride infusion  100 mL/hr IntraVENous CONTINUOUS    insulin lispro (HUMALOG) injection   SubCUTAneous AC&HS    famotidine (PEPCID) tablet 20 mg  20 mg Oral DAILY    insulin glargine (LANTUS) injection 28 Units  28 Units SubCUTAneous DAILY    bisacodyl (DULCOLAX) suppository 10 mg  10 mg Rectal DAILY PRN    polyethylene glycol (MIRALAX) packet 17 g  17 g Oral DAILY    aspirin chewable tablet 81 mg  81 mg Oral DAILY    oxyCODONE-acetaminophen (PERCOCET) 5-325 mg per tablet 1-2 Tab  1-2 Tab Oral Q6H PRN    morphine injection 2 mg  2 mg IntraVENous Q4H PRN    dextrose 10% infusion 125-250 mL  125-250 mL IntraVENous PRN    heparin (porcine) injection 5,000 Units  5,000 Units SubCUTAneous Q8H    glucose chewable tablet 16 g  4 Tab Oral PRN    glucagon (GLUCAGEN) injection 1 mg  1 mg IntraMUSCular PRN       Review of Symptoms: comprehensive ROS negative except above.    Objective:     Patient Vitals for the past 24 hrs:   Temp Pulse Resp BP SpO2   01/12/20 1111 98.6 °F (37 °C) 65 15 148/78 98 %   01/12/20 0727     97 %   01/12/20 0724 98.7 °F (37.1 °C) 68 15 136/74 97 %   01/12/20 0426 98.5 °F (36.9 °C) 84 16 136/67 99 %   01/12/20 0000 98.4 °F (36.9 °C) 86 18 146/70    01/11/20 2008 98.2 °F (36.8 °C) 90 18 152/69 100 % 01/11/20 1608 98.8 °F (37.1 °C) 83 18 101/63 100 %        Weight change:      01/10 1901 - 01/12 0700  In: 1680 [P.O.:480; I.V.:1200]  Out: -     Intake/Output Summary (Last 24 hours) at 1/12/2020 1231  Last data filed at 1/12/2020 9343  Gross per 24 hour   Intake 1440 ml   Output    Net 1440 ml     Physical Exam:   General: comfortable, no acute distress   HEENT sclera anicteric, supple neck, no thyromegaly  CVS: S1S2 heard,  no rub  RS: + air entry b/l,   Abd: Soft, Non tender, Not distended,  Neuro: non focal, awake, alert , CN II-XII are grossly intact  Extrm: right foot cynosis , cool on touch  Musculoskeletal: No gross joints or bone deformities         Data Review:     LABS:   Hematology:   Recent Labs     01/10/20  0535   WBC 7.5   HGB 9.1*   HCT 28.2*     Chemistry:   Recent Labs     01/12/20  0321 01/11/20  0350 01/10/20  0535   BUN 23* 19* 24*   CREA 1.69* 1.63* 1.66*   CA 8.7 8.7 9.2   K 5.0 5.8* 5.7*    139 141    110 112*   CO2 24 28 23   GLU 69* 366* 177*            Procedures/imaging: see electronic medical records for all procedures, Xrays and details which were not copied into this note but were reviewed prior to creation of Plan          Assessment & Plan:     As above         Ange Castle MD  1/12/2020  1:02 PM

## 2020-01-12 NOTE — ROUTINE PROCESS
Bedside and Verbal shift change report given to 24[address] Sebastien Sanders RN (oncoming nurse) by Toshia Martino RN (offgoing nurse). Report included the following information SBAR, Kardex and MAR.

## 2020-01-13 LAB
ANION GAP SERPL CALC-SCNC: 5 MMOL/L (ref 3–18)
BUN SERPL-MCNC: 17 MG/DL (ref 7–18)
BUN/CREAT SERPL: 11 (ref 12–20)
CALCIUM SERPL-MCNC: 8.6 MG/DL (ref 8.5–10.1)
CALCIUM SERPL-MCNC: 8.7 MG/DL (ref 8.5–10.1)
CHLORIDE SERPL-SCNC: 112 MMOL/L (ref 100–111)
CO2 SERPL-SCNC: 22 MMOL/L (ref 21–32)
CREAT SERPL-MCNC: 1.55 MG/DL (ref 0.6–1.3)
ERYTHROCYTE [DISTWIDTH] IN BLOOD BY AUTOMATED COUNT: 14.4 % (ref 11.6–14.5)
GLUCOSE BLD STRIP.AUTO-MCNC: 110 MG/DL (ref 70–110)
GLUCOSE BLD STRIP.AUTO-MCNC: 148 MG/DL (ref 70–110)
GLUCOSE BLD STRIP.AUTO-MCNC: 187 MG/DL (ref 70–110)
GLUCOSE BLD STRIP.AUTO-MCNC: 229 MG/DL (ref 70–110)
GLUCOSE SERPL-MCNC: 185 MG/DL (ref 74–99)
HCT VFR BLD AUTO: 27.5 % (ref 35–45)
HGB BLD-MCNC: 8.8 G/DL (ref 12–16)
MCH RBC QN AUTO: 32.1 PG (ref 24–34)
MCHC RBC AUTO-ENTMCNC: 32 G/DL (ref 31–37)
MCV RBC AUTO: 100.4 FL (ref 74–97)
PHOSPHATE SERPL-MCNC: 3.3 MG/DL (ref 2.5–4.9)
PLATELET # BLD AUTO: 329 K/UL (ref 135–420)
PMV BLD AUTO: 10.5 FL (ref 9.2–11.8)
POTASSIUM SERPL-SCNC: 5.4 MMOL/L (ref 3.5–5.5)
PTH-INTACT SERPL-MCNC: 180 PG/ML (ref 18.4–88)
RBC # BLD AUTO: 2.74 M/UL (ref 4.2–5.3)
SODIUM SERPL-SCNC: 139 MMOL/L (ref 136–145)
WBC # BLD AUTO: 9 K/UL (ref 4.6–13.2)

## 2020-01-13 PROCEDURE — 77030013744: Performed by: SURGERY

## 2020-01-13 PROCEDURE — 80048 BASIC METABOLIC PNL TOTAL CA: CPT

## 2020-01-13 PROCEDURE — 74011250636 HC RX REV CODE- 250/636: Performed by: PHYSICIAN ASSISTANT

## 2020-01-13 PROCEDURE — 74011000250 HC RX REV CODE- 250: Performed by: SURGERY

## 2020-01-13 PROCEDURE — 74011000258 HC RX REV CODE- 258: Performed by: INTERNAL MEDICINE

## 2020-01-13 PROCEDURE — 74011250637 HC RX REV CODE- 250/637: Performed by: EMERGENCY MEDICINE

## 2020-01-13 PROCEDURE — 74011636637 HC RX REV CODE- 636/637: Performed by: INTERNAL MEDICINE

## 2020-01-13 PROCEDURE — 75625 CONTRAST EXAM ABDOMINL AORTA: CPT | Performed by: SURGERY

## 2020-01-13 PROCEDURE — 77030038269 HC DRN EXT URIN PURWCK BARD -A

## 2020-01-13 PROCEDURE — 84100 ASSAY OF PHOSPHORUS: CPT

## 2020-01-13 PROCEDURE — 74011250637 HC RX REV CODE- 250/637: Performed by: HOSPITALIST

## 2020-01-13 PROCEDURE — 83970 ASSAY OF PARATHORMONE: CPT

## 2020-01-13 PROCEDURE — 85027 COMPLETE CBC AUTOMATED: CPT

## 2020-01-13 PROCEDURE — 75716 ARTERY X-RAYS ARMS/LEGS: CPT | Performed by: SURGERY

## 2020-01-13 PROCEDURE — 74011250636 HC RX REV CODE- 250/636

## 2020-01-13 PROCEDURE — 74011250636 HC RX REV CODE- 250/636: Performed by: SURGERY

## 2020-01-13 PROCEDURE — 77030004530 HC CATH ANGI DX IMGR BSC -A: Performed by: SURGERY

## 2020-01-13 PROCEDURE — 36415 COLL VENOUS BLD VENIPUNCTURE: CPT

## 2020-01-13 PROCEDURE — 76937 US GUIDE VASCULAR ACCESS: CPT | Performed by: SURGERY

## 2020-01-13 PROCEDURE — C1725 CATH, TRANSLUMIN NON-LASER: HCPCS | Performed by: SURGERY

## 2020-01-13 PROCEDURE — C1769 GUIDE WIRE: HCPCS | Performed by: SURGERY

## 2020-01-13 PROCEDURE — C1760 CLOSURE DEV, VASC: HCPCS | Performed by: SURGERY

## 2020-01-13 PROCEDURE — 37221 HC PLC STNT ILIAC +/- PTA INIT: CPT | Performed by: SURGERY

## 2020-01-13 PROCEDURE — 82962 GLUCOSE BLOOD TEST: CPT

## 2020-01-13 PROCEDURE — C1894 INTRO/SHEATH, NON-LASER: HCPCS | Performed by: SURGERY

## 2020-01-13 PROCEDURE — 74011636320 HC RX REV CODE- 636/320: Performed by: SURGERY

## 2020-01-13 PROCEDURE — 74011250636 HC RX REV CODE- 250/636: Performed by: HOSPITALIST

## 2020-01-13 PROCEDURE — 65660000000 HC RM CCU STEPDOWN

## 2020-01-13 PROCEDURE — C1874 STENT, COATED/COV W/DEL SYS: HCPCS | Performed by: SURGERY

## 2020-01-13 DEVICE — VIABAHN BX BALLOON EXP ENDO 7MMX39MM 7FR 80CMCATH HEPARIN
Type: IMPLANTABLE DEVICE | Status: FUNCTIONAL
Brand: GORE VIABAHN VBX BALLOON EXPANDABLE ENDO

## 2020-01-13 RX ORDER — DIPHENHYDRAMINE HYDROCHLORIDE 50 MG/ML
INJECTION, SOLUTION INTRAMUSCULAR; INTRAVENOUS AS NEEDED
Status: DISCONTINUED | OUTPATIENT
Start: 2020-01-13 | End: 2020-01-13 | Stop reason: HOSPADM

## 2020-01-13 RX ORDER — HYDRALAZINE HYDROCHLORIDE 20 MG/ML
10 INJECTION INTRAMUSCULAR; INTRAVENOUS ONCE
Status: COMPLETED | OUTPATIENT
Start: 2020-01-13 | End: 2020-01-13

## 2020-01-13 RX ORDER — IODIXANOL 320 MG/ML
INJECTION, SOLUTION INTRAVASCULAR AS NEEDED
Status: DISCONTINUED | OUTPATIENT
Start: 2020-01-13 | End: 2020-01-13 | Stop reason: HOSPADM

## 2020-01-13 RX ORDER — MORPHINE SULFATE 10 MG/ML
INJECTION, SOLUTION INTRAMUSCULAR; INTRAVENOUS AS NEEDED
Status: DISCONTINUED | OUTPATIENT
Start: 2020-01-13 | End: 2020-01-13 | Stop reason: HOSPADM

## 2020-01-13 RX ORDER — HEPARIN SODIUM 1000 [USP'U]/ML
INJECTION, SOLUTION INTRAVENOUS; SUBCUTANEOUS AS NEEDED
Status: DISCONTINUED | OUTPATIENT
Start: 2020-01-13 | End: 2020-01-13 | Stop reason: HOSPADM

## 2020-01-13 RX ORDER — HYDRALAZINE HYDROCHLORIDE 20 MG/ML
INJECTION INTRAMUSCULAR; INTRAVENOUS AS NEEDED
Status: DISCONTINUED | OUTPATIENT
Start: 2020-01-13 | End: 2020-01-13 | Stop reason: HOSPADM

## 2020-01-13 RX ORDER — HYDRALAZINE HYDROCHLORIDE 20 MG/ML
INJECTION INTRAMUSCULAR; INTRAVENOUS
Status: COMPLETED
Start: 2020-01-13 | End: 2020-01-13

## 2020-01-13 RX ORDER — LIDOCAINE HYDROCHLORIDE 10 MG/ML
INJECTION, SOLUTION EPIDURAL; INFILTRATION; INTRACAUDAL; PERINEURAL AS NEEDED
Status: DISCONTINUED | OUTPATIENT
Start: 2020-01-13 | End: 2020-01-13 | Stop reason: HOSPADM

## 2020-01-13 RX ADMIN — INSULIN LISPRO 4 UNITS: 100 INJECTION, SOLUTION INTRAVENOUS; SUBCUTANEOUS at 09:57

## 2020-01-13 RX ADMIN — MORPHINE SULFATE 2 MG: 2 INJECTION, SOLUTION INTRAMUSCULAR; INTRAVENOUS at 10:00

## 2020-01-13 RX ADMIN — HYDRALAZINE HYDROCHLORIDE: 20 INJECTION INTRAMUSCULAR; INTRAVENOUS at 16:28

## 2020-01-13 RX ADMIN — MORPHINE SULFATE 2 MG: 2 INJECTION, SOLUTION INTRAMUSCULAR; INTRAVENOUS at 17:57

## 2020-01-13 RX ADMIN — INSULIN GLARGINE 28 UNITS: 100 INJECTION, SOLUTION SUBCUTANEOUS at 09:54

## 2020-01-13 RX ADMIN — HEPARIN SODIUM 5000 UNITS: 5000 INJECTION INTRAVENOUS; SUBCUTANEOUS at 20:41

## 2020-01-13 RX ADMIN — HYDRALAZINE HYDROCHLORIDE 10 MG: 20 INJECTION INTRAMUSCULAR; INTRAVENOUS at 16:25

## 2020-01-13 RX ADMIN — OXYCODONE HYDROCHLORIDE AND ACETAMINOPHEN 2 TABLET: 5; 325 TABLET ORAL at 05:12

## 2020-01-13 RX ADMIN — FAMOTIDINE 20 MG: 20 TABLET, FILM COATED ORAL at 09:53

## 2020-01-13 RX ADMIN — Medication 81 MG: at 17:56

## 2020-01-13 RX ADMIN — MORPHINE SULFATE 2 MG: 2 INJECTION, SOLUTION INTRAMUSCULAR; INTRAVENOUS at 13:18

## 2020-01-13 RX ADMIN — DEXTROSE MONOHYDRATE AND SODIUM CHLORIDE 100 ML/HR: 5; .9 INJECTION, SOLUTION INTRAVENOUS at 10:07

## 2020-01-13 NOTE — PROGRESS NOTES
Left FAS aspirated and pulled @ 1625, by JT. Pressure held for 20 min. Sterile hemostatic dressing applied. No bleeding or swelling. Safety instructions reviewed with the patient.

## 2020-01-13 NOTE — PROGRESS NOTES
TRANSFER - IN REPORT:    Verbal report received from Thea Johnson RN(name) on [facility]  being received from (unit) for routine progression of care      Report consisted of patients Situation, Background, Assessment and   Recommendations(SBAR). Information from the following report(s) SBAR, Kardex and MAR was reviewed with the receiving nurse. Opportunity for questions and clarification was provided. Assessment completed upon patients arrival to unit and care assumed.

## 2020-01-13 NOTE — DIABETES MGMT
Glycemic Control Plan of Care    Reattempted to speak to patient this afternoon but she remain sleepy. T2DM with current A1c of 14.2% (02/02/2020). See separate notes, 01/06/2020, for assessment of home diabetes management and education. Patient stayed awake, participated in discussion and answered the questions appropriately. Patient stated, \"I didn't take my insulin because I got tired of it. I only took the glipizide and metformin. \"  Home diabetes medications: As reported by patient on 01/06/2020:  Glipizide but she cannot remember dose and frequency. Metformin but she cannot remember dose and frequency. Lantus insulin daily but she cannot remember the dose and stated, \"it's been years since the last time I took it. I stopped it on my own because I got tired taking injections. She didn't tell her doctor. Patient stated that she didn't have problem drawing and injecting insulin when she was taking it.    01/10/2020: F/U discussion with patient about about the impt of diabetes med compliance and she responded by saying that she will resume taking insulin from now. POC BG range on 01/12/2020: 124-191. POC BG report on 01/13/2020 at time of review: 229, 187    Seen patient this morning with visitors at bedside. She's NPO status for angio later today. Recommendation(s):  1.) Consider increasing daily basal lantus insulin dose from 28 to 32 unis if f/u BG remain above target range. Assessment:  Patient is [de-identified]year old with past medical history including diabetes mellitus, hypertension and hypercholesterolemia - was admitted to ED on 01/03/2020 with report of high blood sugar and high lipase. Noted:  HHS. T2DM. Acute pancreatitis. Acute on chronic renal failure stage 3. Most recent blood glucose values:    Results for Joy Bobby (MRN [id number]) as of 1/13/2020 13:10   Ref.  Range 1/12/2020 07:27 1/12/2020 11:10 1/12/2020 15:20 1/12/2020 22:20   GLUCOSE,FAST - POC Latest Ref Range: 70 - 110 mg/dL 191 (H) 124 (H) 157 (H) 172 (H)       Results for Hari Toney (MRN [id number]) as of 1/13/2020 13:10   Ref. Range 1/13/2020 07:55 1/13/2020 11:41   GLUCOSE,FAST - POC Latest Ref Range: 70 - 110 mg/dL 229 (H) 187 (H)     Current A1C: 14.2% (02/02/2020) which is equivalent to estimated average blood glucose of 361 mg/dL during the past 2-3 months. Current hospital diabetes medications:  Basal lantus insulin 28 units daily. Correctional lispro insulin every 6 hours. Very resistant dose.     Total daily dose insulin requirement previous day: 01/12/2020  Lantus: 28 units  Lispro: 6 units  TDD insulin: 34 units    Diet: Diabetic consistent carb regular; nutr suppl: glucerna shake    Goals:  Blood glucose will be within target range of  mg/dL by 01/16/2020    Education:    _X__  Refer to Diabetes Education Record: 01/06/2020  ___  Education not indicated at this time    Chaz Herndon RN [facility]  Pager: [phone]

## 2020-01-13 NOTE — PROGRESS NOTES
TRANSFER - OUT REPORT:    Verbal report given to Gio Schuler RN(name) on Noe Jenkins  being transferred to (unit) for routine progression of care       Report consisted of patients Situation, Background, Assessment and   Recommendations(SBAR). Information from the following report(s) SBAR, Kardex and MAR was reviewed with the receiving nurse. Lines:   Peripheral IV 01/08/20 Right Arm (Active)   Site Assessment Clean, dry, & intact 1/12/2020  7:06 PM   Phlebitis Assessment 0 1/12/2020  7:06 PM   Infiltration Assessment 0 1/12/2020  7:06 PM   Dressing Status Clean, dry, & intact 1/12/2020  7:06 PM   Dressing Type Tape;Transparent 1/12/2020  7:06 PM   Hub Color/Line Status Pink; Infusing 1/12/2020  7:06 PM   Action Taken Open ports on tubing capped 1/12/2020  7:06 PM   Alcohol Cap Used Yes 1/12/2020  7:06 PM        Opportunity for questions and clarification was provided.       Patient transported with:   Registered Nurse

## 2020-01-13 NOTE — PROGRESS NOTES
conducted a Follow up consultation and Spiritual Assessment for Kayleigh Novoa, who is a [de-identified] y.o.,female. The  provided the following Interventions:  Continued the relationship of care and support. Listened empathically. Offered prayer and assurance of continued prayer on patients behalf. Chart reviewed. The following outcomes were achieved:  Patient expressed gratitude for 's visit. Assessment:  There are no further spiritual or [employer] issues which require Spiritual Care Services interventions at this time. Plan:  Chaplains will continue to follow and will provide pastoral care on an as needed/requested basis.  recommends bedside caregivers page  on duty if patient shows signs of acute spiritual or emotional distress.        [id number] [facility]   [phone]

## 2020-01-13 NOTE — PROGRESS NOTES
Spoke with Brandi Donaldson. Fox Noel in unit about pt's pain, she instructed to give her morphine early.

## 2020-01-13 NOTE — PROGRESS NOTES
Angio done  stented right iliac artery  SFA occluded  Asking Dr Taco Espinosa to see her  If enough viable foot left, consider fem-pop and TMA  If not, better served with an AKA

## 2020-01-13 NOTE — PROGRESS NOTES
RENAL DAILY PROGRESS NOTE    Subjective:   Admitted for hyperglycemia, seen for EFRAIN/CKD stage 3    Complaint: denies any SOB/CP. NPO waiting for vasc procedure, niece/POA at bedside.      Current Facility-Administered Medications   Medication Dose Route Frequency    dextrose 5% and 0.9% NaCl infusion  100 mL/hr IntraVENous CONTINUOUS    dextrose (D25W) 25% injection 10 mL  2.5 g IntraVENous PRN    insulin lispro (HUMALOG) injection   SubCUTAneous AC&HS    famotidine (PEPCID) tablet 20 mg  20 mg Oral DAILY    insulin glargine (LANTUS) injection 28 Units  28 Units SubCUTAneous DAILY    bisacodyl (DULCOLAX) suppository 10 mg  10 mg Rectal DAILY PRN    polyethylene glycol (MIRALAX) packet 17 g  17 g Oral DAILY    aspirin chewable tablet 81 mg  81 mg Oral DAILY    oxyCODONE-acetaminophen (PERCOCET) 5-325 mg per tablet 1-2 Tab  1-2 Tab Oral Q6H PRN    morphine injection 2 mg  2 mg IntraVENous Q4H PRN    dextrose 10% infusion 125-250 mL  125-250 mL IntraVENous PRN    heparin (porcine) injection 5,000 Units  5,000 Units SubCUTAneous Q8H    glucose chewable tablet 16 g  4 Tab Oral PRN    glucagon (GLUCAGEN) injection 1 mg  1 mg IntraMUSCular PRN           Objective:     Patient Vitals for the past 24 hrs:   Temp Pulse Resp BP SpO2   01/13/20 1139 98 °F (36.7 °C) 69 20 189/67 97 %   01/13/20 0756 98.2 °F (36.8 °C) 63 18 153/64 98 %   01/13/20 0423 99 °F (37.2 °C) 61 16 136/81 100 %   01/13/20 0016 98.8 °F (37.1 °C) 62 16 121/76 100 %   01/12/20 2016 99 °F (37.2 °C) 64 16 122/75 100 %   01/12/20 1520 98.8 °F (37.1 °C) 66 15 128/74 98 %        Weight change:      01/11 1901 - 01/13 0700  In: 1760 [P.O.:560; I.V.:1200]  Out: 600 [Urine:600]    Intake/Output Summary (Last 24 hours) at 1/13/2020 1325  Last data filed at 1/13/2020 1139  Gross per 24 hour   Intake 320 ml   Output 2150 ml   Net -1830 ml     Physical Exam: awake, not in any resp distress, afebrile    HEENT: non icteric  Neck: regular no rub  Cardiovascular: regular, no rub  C/L: clear ant/lat  Abdomen: soft + BS  Ext: Right foot, black shriveled toes and plantar surface, swollen and blistered dorsum, cool to touch, no pain or redness    Data Review:     LABS:   Hematology:   Recent Labs     01/13/20  0531   WBC 9.0   HGB 8.8*   HCT 27.5*   Pl 329K  Chemistry:   Recent Labs     01/13/20  0531 01/12/20  0321 01/11/20  0350   BUN 17 23* 19*   CREA 1.55* 1.69* 1.63*   CA 8.6 8.7 8.7   K 5.4 5.0 5.8*    142 139   * 111 110   CO2 22 24 28   * 69* 366*    Phos 2.4  Lipase 1160    IMAGES: CTA abd with runoff Bilateral renal A patent Kidneys are malrotated no hydro    CULTURE: Blood c/s neg    IMPRESSION AND PLAN:   EFRAIN/CKD stage 3, improving, tolerated IV contrast studies so far. Cont to trend, Good urine output, cont with IVF hydration. Avoid nephrotoxic drugs and adjust meds to brittany fxn. 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 Get baseline UPCR.  Eval for SHPT  HTN cont to trend BP goal <130/80 May need to resume CCB  Anemia from chronic illness, infection,  Check fe   DM 2 poor control   Dry gangrene right foot         Ciarra Landon MD  1/13/2020

## 2020-01-13 NOTE — CONSULTS
Palliative team called to patient's room by patient's niece, Elvie Kemp. [facility] wished to obtain a new, voided copy of a DDNR form to review with patient and her primary. Patient feeling well, denies concerns. Patient wishes to remain a Full code with full interventions at this time, but will give more thought into the benefits and burdens of CPR discussed and make any changes in her decision when she is ready. Please reconsult should patient decide on DNR status of if continued goals of care discussions are needed.      Thank you,   Rosio Christie, NP

## 2020-01-13 NOTE — ROUTINE PROCESS
Bedside and Verbal shift change report given to Durga (oncoming nurse) by Ballard Goodell RN (offgoing nurse). Report included the following information SBAR, Kardex, Intake/Output, MAR and Recent Results.

## 2020-01-13 NOTE — PROGRESS NOTES
Hospitalist Progress Note    Patient: Kady Varela Age: [de-identified] y.o. : 1940 MR#: [id number] SSN: [id number]  Date/Time: 2020 10:05 AM    DOA: 2020  PCP: Funmilayo Moreland MD    Subjective:   Patient seen and examined at bedside. Niece is at bedside. Nursing at bedside. She is awake and very talkative. She states she continues to have right lower extremity pain. Assessment/Plan:     1)  Right lower extremity ischemia associate with uncontrolled DM2: On exam toes are black and cold. -  LORENA indicates severe to critical arterial insuff at illeo-fem level, fem-pop level. -  arterial duplex result noted to have monophasic flow      -  CTA with multifocal stenosis of right lower extremity  2)  Hyperglycemia with Type 2 diabetes, stable       Hyperosmolar non-ketotic state in type 2 diabetes mellitus   3)  Acute on chronic renal failure Stage 3: likely mostly prerenal.  4)  Metabolic acidosis due to renal issues-resolved  5)  Acute metabolic encephalopathy  6)  Acute pancreatitis, resolved, no pain with food intake   7)  Hypertension   8)  Hypophosphatemia  9)  Normocytic anemia of chronic disease      Awaiting angio this am- f/u plans pending outcomes of angio    Monitor renal function closely. Avoid nephrotoxicity, gutierrez cath removed. Hold IV fluid  ISS, Lantus continues. Resumes statin, aspirin   Alcohol cessation advised.        GI signed off, pt needs repeat CT pancreas in 8-12weeks     Full Code     Additional Notes:    Time spent >20 minutes    Case discussed with:  [x]Patient  []Family  [x]Nursing  [x]Case Management  DVT Prophylaxis:  []Lovenox  [x]Hep SQ  []SCDs  []Coumadin   []On Heparin gtt    Signed By: Almaz Perez MD     2020 12:02 PM              Objective:   VS:   Visit Vitals  /64 (BP 1 Location: Right arm, BP Patient Position: At rest)   Pulse 63   Temp 98.2 °F (36.8 °C)   Resp 18   Ht 5' 5\" (1.651 m)   Wt 74.7 kg (164 lb 9.6 oz)   SpO2 98% Breastfeeding No   BMI 27.39 kg/m²      Tmax/24hrs: Temp (24hrs), Av.7 °F (37.1 °C), Min:98.2 °F (36.8 °C), Max:99 °F (37.2 °C)      Intake/Output Summary (Last 24 hours) at 2020 1059  Last data filed at 2020 1018  Gross per 24 hour   Intake 320 ml   Output 1350 ml   Net -1030 ml       General:  Cooperative, Not in acute distress, speaks in full sentence while in bed  HEENT: PERRL, EOMI, supple neck, no JVD, dry oral mucosa  Cardiovascular: S1S2 regular, no rub/gallop   Pulmonary: Clear air entry bilaterally, no wheezing, no crackle  GI:  Soft, non tender, non distended, +bs, no guarding   Extremities:  No pedal edema, +distal pulses appreciated;    Right foot is progressively black and cold. The third third and fifth toes are shriveled; blister on the dorsal portion of the foot at the base of the third through fifth toes has extended slightly since. The proximal area is warm whereas the distal half it is blackened and cold  Neuro: AOx3, moving all extremities, no gross deficit.      Additional:       Current Facility-Administered Medications   Medication Dose Route Frequency    dextrose 5% and 0.9% NaCl infusion  100 mL/hr IntraVENous CONTINUOUS    dextrose (D25W) 25% injection 10 mL  2.5 g IntraVENous PRN    insulin lispro (HUMALOG) injection   SubCUTAneous AC&HS    famotidine (PEPCID) tablet 20 mg  20 mg Oral DAILY    insulin glargine (LANTUS) injection 28 Units  28 Units SubCUTAneous DAILY    bisacodyl (DULCOLAX) suppository 10 mg  10 mg Rectal DAILY PRN    polyethylene glycol (MIRALAX) packet 17 g  17 g Oral DAILY    aspirin chewable tablet 81 mg  81 mg Oral DAILY    oxyCODONE-acetaminophen (PERCOCET) 5-325 mg per tablet 1-2 Tab  1-2 Tab Oral Q6H PRN    morphine injection 2 mg  2 mg IntraVENous Q4H PRN    dextrose 10% infusion 125-250 mL  125-250 mL IntraVENous PRN    heparin (porcine) injection 5,000 Units  5,000 Units SubCUTAneous Q8H    glucose chewable tablet 16 g  4 Tab Oral PRN    glucagon (GLUCAGEN) injection 1 mg  1 mg IntraMUSCular PRN            Lab/Data Review:  Labs: Results:       Chemistry Recent Labs     01/13/20  0531 01/12/20  0321 01/11/20  0350   * 69* 366*    142 139   K 5.4 5.0 5.8*   * 111 110   CO2 22 24 28   BUN 17 23* 19*   CREA 1.55* 1.69* 1.63*   BUCR 11* 14 12   AGAP 5 7 1*   CA 8.6 8.7 8.7     No results for input(s): TBIL, ALT, SGOT, ALKP, TP, ALB, GLOB, AGRAT in the last 72 hours. CBC w/Diff Recent Labs     01/13/20  0531   WBC 9.0   RBC 2.74*   HGB 8.8*   HCT 27.5*   .4*   MCH 32.1   MCHC 32.0   RDW 14.4         Coagulation No results for input(s): PTP, INR, APTT, INREXT, INREXT in the last 72 hours. Iron/Ferritin Lab Results   Component Value Date/Time    Iron 92 05/31/2019 12:07 PM    TIBC 256 05/31/2019 12:07 PM    Iron % saturation 36 05/31/2019 12:07 PM       BNP    Cardiac Enzymes Lab Results   Component Value Date/Time    Troponin-I, QT <0.02 01/02/2020 11:25 PM        Lactic Acid    Thyroid Studies          All Micro Results     Procedure Component Value Units Date/Time    CULTURE, BLOOD [[id number]] Collected:  01/02/20 2206    Order Status:  Completed Specimen:  Blood Updated:  01/08/20 0642     Special Requests: NO SPECIAL REQUESTS        Culture result: NO GROWTH 6 DAYS       CULTURE, BLOOD [[id number]] Collected:  01/02/20 2206    Order Status:  Completed Specimen:  Blood Updated:  01/08/20 1369     Special Requests: NO SPECIAL REQUESTS        Culture result: NO GROWTH 6 DAYS               Images:    CT (Most Recent). CT Results (most recent):  Results from Hospital Encounter encounter on 01/02/20   CT ABD PELV WO CONT    Narrative CT Abdomen And Pelvis with Intravenous Contrast    INDICATION: Generalized abdominal pain. Elevated lipase. .    TECHNIQUE: 5 mm collimation axial images obtained from the diaphragm to the  level of the pubic symphysis following the uneventful administration of  100 cc  of low osmolar, nonionic intravenous contrast.    Dose reduction techniques used: Automated exposure control, adjustment of the  mAs and/or kVp according to patient size, standardized low-dose protocol, and/or  iterative reconstruction technique. COMPARISON: July 18, 2017. ABDOMEN FINDINGS:    Lung Bases: There is bibasilar atelectasis. Atelectasis is most conspicuous in  the lingula. The heart is top normal in size. .    Liver: Normal attenuation. No evidence for mass. Gallbladder: Present and appears normal. No biliary ductal dilatation. Pancreas: There is mild peripancreatic edema. .    Spleen: Normal in size. No evidence of mass. .    Adrenal Glands: No evidence for mass. Kidneys:     Right: Atrophic kidneys. No cortical mass. No hydronephrosis. Left:  Atrophic kidneys. No cortical mass. No hydronephrosis. Lymph Nodes: No lymphadenopathy. Aorta: Atherosclerosis. PELVIS FINDINGS:     Bowel: Small Bowel: Normal in caliber with normal wall thickness. Large Bowel: There are scattered colonic diverticula. Lillian Edmund Appendix: No secondary signs of acute appendicitis. Bladder: Underdistended. Extensive bilateral pelvic masses with calcification, likely fibroid uterus. No  definite suspicious adnexal mass. No ascites. Bones: Degenerative changes of the spine. Osteopenia. Impression Impression:    Mild uncomplicated pancreatitis. Fibroid uterus. Additional incidentals as above. CT Results (most recent):  Results from Hospital Encounter encounter on 01/02/20   CTA ABD ART W RUNOFF W WO CONT    Narrative PROCEDURE: CTA of abdomen and pelvis and bilateral lower extremity runoff with  intravenous contrast administration. HISTORY: PAD, ischemia of the right foot. TECHNIQUE: Multidetector helical CT angiography was carried out from low chest  through the feet following dynamic 70 cc Isovue-300 intravenous contrast  administration .   Scan timing was performed using automated bolus tracking/SMART  Prep. 3-D and MPR angiographic image reconstruction was performed on  independent workstation and separately reviewed. Parenchymal imaging is limited  by the arterial phase of contrast administration. All CT scans at this facility  are performed using dose optimization techniques as appropriate to a performed  exam, to include automated exposure control, adjustment of the mA and/or kV  according to patient's size (including appropriate matching for site specific  examinations), or use of iterative reconstruction technique. COMPARISON: CT abdomen/pelvis 1/3/2020. CTA abdomen/pelvis with extremity runoff  7/18/2017. FINDINGS:    VASCULAR FINDINGS:    Right lower extremity:  Multifocal stenosis throughout the right posterior tibialis artery due to  atherosclerotic calcifications as well as the peroneal artery.  -The right posterior tibialis artery is not opacified beyond the mid right lower  leg.  -The anterior tibial artery is not opacified beyond the right lower leg just  above the tibial plafond.  -The peroneal artery is opacified beyond the level of the tibial plafond. Proximally, there is multifocal stenosis of the right femoral artery with loss  of opacification at the mid to distal thigh. Reconstitution of opacification at  the level of the posterior tibialis artery likely due to collaterals from  geniculate arteries and the deep femoral artery. 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  -Left lower leg perfusion is contributed by collaterals from the deep femoral  artery and geniculate arteries. Multifocal stenosis/occlusion of the left  posterior tibialis artery and left peroneal artery. No opacification of the  peroneal artery or posterior tibialis artery is seen to be on the mid to  proximal third of the left lower leg.  The left dorsal pedis is opacified, and  likely contributes to some additional opacified vessel seen in the left foot. Abdominal aorta:  -Severe atherosclerosis with moderate compromise of the lumen, similar to prior  exam of 7/2017.  -No evidence for abdominal aortic aneurysm. -Severe stenosis at the proximal aspect of the right common iliac artery, well  opacified distally, similar to prior exam. Multifocal moderate stenosis at the  left common iliac artery similar to prior exam.  -Multifocal stenosis at the internal iliac arteries, with good opacification  distally.  -Celiac artery, SMA, left renal artery, and right renal artery are grossly  patent. The right renal artery again shown to originate from the descending  thoracic aorta. ALFREDA is not well visualized. ABDOMEN/PELVIS FINDINGS:  Lower chest: Small right pleural effusion with overlying atelectasis. Liver: Stable subcentimeter hypodense lesions liver, too small to characterize  but stability suggests benignity    Biliary: Gallbladder is mildly distended but otherwise unremarkable. Spleen: Negative    Pancreas: There is some peripancreatic edema adjacent to the pancreatic tail. Adrenal glands: Diffuse thickening of the adrenal glands, without discrete mass,  similar to prior exam.    Kidneys: Malrotated kidneys again noted, without evidence for hydronephrosis. GI tract: The bowel appears nonobstructed. Question of mild mural thickening at  the distal esophagus. Questionable mild mural thickening at the greater  curvature of the stomach adjacent to the edema along the pancreatic tail. Colonic diverticulosis, without evidence for diverticulitis. Normal appendix. Peritoneum: No free air    Lymph nodes: No lymphadenopathy. Pelvis: Urinary bladder is unremarkable. Fibroid uterus again noted. Body wall/soft tissues: Small fat-containing umbilical hernia. Small right lower  spigelian hernia containing a small amount of fluid measuring 2.5 x 1.2 cm  (image 203), previously measured 2.9 x 1.6 cm.  Mild edema along the right flank.    Bones:  -Bilateral moderate knee osteoarthrosis. Small right knee joint effusion and  trace left knee joint effusion.  -Diffuse soft tissue swelling at the feet, right greater than left. Trace  subcutaneous emphysema adjacent to the right fifth MTP joint. Left distal tibial  and talar hardware noted. -Multilevel degenerative spondylosis and disc disease noted, not well evaluated  on current exam.  -Moderate degenerative change at the hips      Impression IMPRESSION:  1. Right lower extremity:  Multifocal stenosis throughout the right posterior tibialis artery due to  atherosclerotic calcifications as well as the peroneal artery.  -The right posterior tibialis artery is not opacified beyond the mid right lower  leg.  -The anterior tibial artery is not opacified beyond the right lower leg just  above the tibial plafond.  -The peroneal artery is opacified beyond the level of the tibial plafond. Proximally, there is multifocal stenosis of the right femoral artery with loss  of opacification at the mid to distal thigh. Reconstitution of opacification at  the level of the posterior tibialis artery likely due to collaterals from  geniculate arteries and the deep femoral artery. 2. Diffuse soft tissue swelling at bilateral feet, right greater than left. -Suggestion of trace air along the right fifth MTP joint, could be degenerative  but infectious process not excluded. Consider dedicated right foot x-ray for  further evaluation. 3. Chronic multilevel stenosis of the aorta, iliac arteries, and left lower  extremity as described. 4. Peripancreatic edema along the pancreatic tail is increased since 1/3/2020.  -Associated adjacent mild presumed reactive mural thickening of the greater  curvature of the stomach. 5. Small right pleural effusion. 6. Suggestion of mild mural thickening of the distal esophagus, may indicate  nonspecific esophagitis.     7. Bilateral knee osteoarthrosis with small right knee joint effusion and trace  left knee joint effusion. 8. Fibroid uterus. 9. Colonic diverticulosis, without evidence for diverticulitis. 10. Additional nonacute findings are as described. XR Results (most recent):  Results from Hospital Encounter encounter on 01/02/20   XR FOOT RT MIN 3 V    Narrative EXAM:  XR FOOT RT MIN 3 V    INDICATION:   report of air in right fifth MTP join    COMPARISON:  None. FINDINGS:  3 views of the right foot demonstrate limited study, the patient has  cooperation issues with positioning the foot. Considerable hammertoe deformity  of all the toes noted. The foot is held in extension at the ankle. There is no  obvious fracture or dislocation. Small plantar calcaneal spur noted. Impression IMPRESSION: Positioning issues, no acute abnormality identified. No bone  destruction or soft tissue air       [id number]  Results from Hospital Encounter encounter on 01/02/20   US ABD LTD    Narrative EXAMINATION: Ultrasound abdomen limited, right upper quadrant    INDICATION: Pancreatitis    COMPARISON: CT 1/3/2020    TECHNIQUE: Grayscale, color, spectral sonographic images of the right upper  quadrant obtained. FINDINGS:    Pancreas: Unremarkable. Tail not well visualized. IVC: Unremarkable. Liver: 15.2 cm length. Slightly coarsened echotexture. Portal vein normal  caliber with antegrade flow. Right kidney: 6.1 cm length. Increased echotexture. No hydronephrosis. No focal  abnormality. Gallbladder: No calculi or wall thickening. Negative Baker sign. Biliary tree: Common bile duct 0.5 cm caliber. No intrahepatic duct dilatation. Impression IMPRESSION:    Coarsened hepatic echotexture suggests nonspecific hepatocellular disease. Atrophic echogenic right kidney consistent with chronic medical renal disease. No other significant findings. No evidence of cholelithiasis.           EKG No results found for this or any previous visit. Duplex art Interpretation Summary     · Diffuse plaquing seen throughout the vessels interrogated in the bilateral lower extremities. · Monophasic flow also noted in these vessels. · Technically difficult study due to calcific plaquing. · Unable to take an ankle-brachial index due to the diminished nature of the waveforms and patient's inability to withstand pressures. Lower Extremity Arterial Findings     Right Lower Arterial     Monophasic Doppler waveforms in the right distal common femoral, profunda femoris, proximal superficial femoral, middle superficial femoral, distal superficial femoral, proximal popliteal, distal popliteal, anterior tibial, posterior tibial and dorsalis pedis artery. Unable to image the distal posterior tibial artery. Left Lower Arterial     Monophasic Doppler waveforms in the left distal common femoral, profunda femoris, proximal superficial femoral, middle superficial femoral, distal superficial femoral, proximal popliteal, distal popliteal, anterior tibial, posterior tibial and dorsalis pedis artery.

## 2020-01-14 ENCOUNTER — APPOINTMENT (OUTPATIENT)
Dept: VASCULAR SURGERY | Age: 80
DRG: 616 | End: 2020-01-14
Attending: SURGERY
Payer: MEDICARE

## 2020-01-14 LAB
ANION GAP SERPL CALC-SCNC: 4 MMOL/L (ref 3–18)
BUN SERPL-MCNC: 13 MG/DL (ref 7–18)
BUN/CREAT SERPL: 9 (ref 12–20)
CALCIUM SERPL-MCNC: 8.5 MG/DL (ref 8.5–10.1)
CHLORIDE SERPL-SCNC: 116 MMOL/L (ref 100–111)
CO2 SERPL-SCNC: 24 MMOL/L (ref 21–32)
CREAT SERPL-MCNC: 1.41 MG/DL (ref 0.6–1.3)
ERYTHROCYTE [DISTWIDTH] IN BLOOD BY AUTOMATED COUNT: 14.4 % (ref 11.6–14.5)
GLUCOSE BLD STRIP.AUTO-MCNC: 153 MG/DL (ref 70–110)
GLUCOSE BLD STRIP.AUTO-MCNC: 284 MG/DL (ref 70–110)
GLUCOSE BLD STRIP.AUTO-MCNC: 320 MG/DL (ref 70–110)
GLUCOSE BLD STRIP.AUTO-MCNC: 86 MG/DL (ref 70–110)
GLUCOSE SERPL-MCNC: 139 MG/DL (ref 74–99)
HCT VFR BLD AUTO: 24.7 % (ref 35–45)
HGB BLD-MCNC: 7.8 G/DL (ref 12–16)
MCH RBC QN AUTO: 32 PG (ref 24–34)
MCHC RBC AUTO-ENTMCNC: 31.6 G/DL (ref 31–37)
MCV RBC AUTO: 101.2 FL (ref 74–97)
PLATELET # BLD AUTO: 340 K/UL (ref 135–420)
PMV BLD AUTO: 9.8 FL (ref 9.2–11.8)
POTASSIUM SERPL-SCNC: 5 MMOL/L (ref 3.5–5.5)
RBC # BLD AUTO: 2.44 M/UL (ref 4.2–5.3)
RIGHT GSV ANKLE DIAM: 0.2 CM
RIGHT GSV AT KNEE DIAM: 0.31 CM
RIGHT GSV BK DIST DIAM: 0.18 CM
RIGHT GSV BK MID DIAM: 0.2 CM
RIGHT GSV BK PROX DIAM: 0.23 CM
RIGHT GSV JUNC DIAM: 0.31 CM
RIGHT GSV THIGH DIST DIAM: 0.22 CM
RIGHT GSV THIGH MID DIAM: 0.2 CM
RIGHT GSV THIGH PROX DIAM: 0.24 CM
RIGHT SSV DIST DIAM: 0.14 CM
RIGHT SSV JUNCTION DIAM: 0.28 CM
RIGHT SSV MID DIAM: 0.18 CM
RIGHT SSV PROX DIAM: 0.2 CM
SODIUM SERPL-SCNC: 144 MMOL/L (ref 136–145)
WBC # BLD AUTO: 8.8 K/UL (ref 4.6–13.2)

## 2020-01-14 PROCEDURE — 74011250636 HC RX REV CODE- 250/636: Performed by: HOSPITALIST

## 2020-01-14 PROCEDURE — 74011250636 HC RX REV CODE- 250/636: Performed by: INTERNAL MEDICINE

## 2020-01-14 PROCEDURE — 74011250637 HC RX REV CODE- 250/637: Performed by: EMERGENCY MEDICINE

## 2020-01-14 PROCEDURE — 74011000258 HC RX REV CODE- 258: Performed by: INTERNAL MEDICINE

## 2020-01-14 PROCEDURE — 74011250637 HC RX REV CODE- 250/637: Performed by: HOSPITALIST

## 2020-01-14 PROCEDURE — 74011636637 HC RX REV CODE- 636/637: Performed by: INTERNAL MEDICINE

## 2020-01-14 PROCEDURE — 93971 EXTREMITY STUDY: CPT

## 2020-01-14 PROCEDURE — 047C3DZ DILATION OF RIGHT COMMON ILIAC ARTERY WITH INTRALUMINAL DEVICE, PERCUTANEOUS APPROACH: ICD-10-PCS | Performed by: SURGERY

## 2020-01-14 PROCEDURE — B41F1ZZ FLUOROSCOPY OF RIGHT LOWER EXTREMITY ARTERIES USING LOW OSMOLAR CONTRAST: ICD-10-PCS | Performed by: SURGERY

## 2020-01-14 PROCEDURE — 94762 N-INVAS EAR/PLS OXIMTRY CONT: CPT

## 2020-01-14 PROCEDURE — 80048 BASIC METABOLIC PNL TOTAL CA: CPT

## 2020-01-14 PROCEDURE — 85027 COMPLETE CBC AUTOMATED: CPT

## 2020-01-14 PROCEDURE — 74011250636 HC RX REV CODE- 250/636: Performed by: PHYSICIAN ASSISTANT

## 2020-01-14 PROCEDURE — 65660000000 HC RM CCU STEPDOWN

## 2020-01-14 PROCEDURE — 36415 COLL VENOUS BLD VENIPUNCTURE: CPT

## 2020-01-14 PROCEDURE — 74011250637 HC RX REV CODE- 250/637: Performed by: NURSE PRACTITIONER

## 2020-01-14 PROCEDURE — 82962 GLUCOSE BLOOD TEST: CPT

## 2020-01-14 RX ORDER — AMLODIPINE BESYLATE 5 MG/1
5 TABLET ORAL DAILY
Status: DISCONTINUED | OUTPATIENT
Start: 2020-01-15 | End: 2020-01-20

## 2020-01-14 RX ORDER — SODIUM CHLORIDE 9 MG/ML
100 INJECTION, SOLUTION INTRAVENOUS CONTINUOUS
Status: DISCONTINUED | OUTPATIENT
Start: 2020-01-14 | End: 2020-01-15

## 2020-01-14 RX ADMIN — INSULIN LISPRO 2 UNITS: 100 INJECTION, SOLUTION INTRAVENOUS; SUBCUTANEOUS at 22:32

## 2020-01-14 RX ADMIN — HEPARIN SODIUM 5000 UNITS: 5000 INJECTION INTRAVENOUS; SUBCUTANEOUS at 19:56

## 2020-01-14 RX ADMIN — OXYCODONE HYDROCHLORIDE AND ACETAMINOPHEN 1 TABLET: 5; 325 TABLET ORAL at 05:17

## 2020-01-14 RX ADMIN — DEXTROSE MONOHYDRATE AND SODIUM CHLORIDE 100 ML/HR: 5; .9 INJECTION, SOLUTION INTRAVENOUS at 02:32

## 2020-01-14 RX ADMIN — INSULIN LISPRO 8 UNITS: 100 INJECTION, SOLUTION INTRAVENOUS; SUBCUTANEOUS at 08:00

## 2020-01-14 RX ADMIN — MORPHINE SULFATE 2 MG: 2 INJECTION, SOLUTION INTRAMUSCULAR; INTRAVENOUS at 17:38

## 2020-01-14 RX ADMIN — MORPHINE SULFATE 2 MG: 2 INJECTION, SOLUTION INTRAMUSCULAR; INTRAVENOUS at 10:39

## 2020-01-14 RX ADMIN — OXYCODONE HYDROCHLORIDE AND ACETAMINOPHEN 1 TABLET: 5; 325 TABLET ORAL at 13:16

## 2020-01-14 RX ADMIN — INSULIN LISPRO 6 UNITS: 100 INJECTION, SOLUTION INTRAVENOUS; SUBCUTANEOUS at 13:04

## 2020-01-14 RX ADMIN — DEXTROSE MONOHYDRATE AND SODIUM CHLORIDE 100 ML/HR: 5; .9 INJECTION, SOLUTION INTRAVENOUS at 10:43

## 2020-01-14 RX ADMIN — INSULIN GLARGINE 28 UNITS: 100 INJECTION, SOLUTION SUBCUTANEOUS at 10:39

## 2020-01-14 RX ADMIN — Medication 81 MG: at 10:38

## 2020-01-14 RX ADMIN — POLYETHYLENE GLYCOL 3350 17 G: 17 POWDER, FOR SOLUTION ORAL at 10:44

## 2020-01-14 RX ADMIN — SODIUM CHLORIDE 100 ML/HR: 900 INJECTION, SOLUTION INTRAVENOUS at 22:54

## 2020-01-14 RX ADMIN — HEPARIN SODIUM 5000 UNITS: 5000 INJECTION INTRAVENOUS; SUBCUTANEOUS at 05:05

## 2020-01-14 RX ADMIN — FAMOTIDINE 20 MG: 20 TABLET, FILM COATED ORAL at 10:38

## 2020-01-14 RX ADMIN — HEPARIN SODIUM 5000 UNITS: 5000 INJECTION INTRAVENOUS; SUBCUTANEOUS at 13:04

## 2020-01-14 RX ADMIN — SODIUM CHLORIDE 100 ML/HR: 900 INJECTION, SOLUTION INTRAVENOUS at 13:10

## 2020-01-14 NOTE — PROGRESS NOTES
Vascular Surgery Progress Note    Admit Date: 2020  POD 1 Day Post-Op    Procedure:  Procedure(s):  ANGIOGRAPHY LOWER EXT RIGHT  Insert Stent Common Iliac Artery  Aortagram Abdominal      Subjective:     Patient sitting in bed with no new complaints. Pain well controlled today. Objective:     Blood pressure 135/65, pulse 72, temperature 98.8 °F (37.1 °C), resp. rate 15, height 5' 5\" (1.651 m), weight 164 lb 9.6 oz (74.7 kg), SpO2 98 %, not currently breastfeeding. Temp (24hrs), Av.9 °F (37.2 °C), Min:98 °F (36.7 °C), Max:99.9 °F (37.7 °C)      Physical Exam:  GENERAL: alert, cooperative, no distress, appears stated age, THROAT & NECK:  neck supple and symmetrical.  trachea midline, LUNG:  no resp distress, ABDOMEN:  soft, ND, left groin site soft, no signs of hematoma. and EXTREMITIES:  no BLE edema. right foot gangrene. Labs: Results:       Chemistry Recent Labs     20  19420  0531 20  0321   *  --  185* 69*     --  139 142   K 5.0  --  5.4 5.0   *  --  112* 111   CO2 24  --  22 24   BUN 13  --  17 23*   CREA 1.41*  --  1.55* 1.69*   CA 8.5 8.7 8.6 8.7   AGAP 4  --  5 7   BUCR 9*  --  11* 14      CBC w/Diff Recent Labs     20  0531   WBC 8.8 9.0   RBC 2.44* 2.74*   HGB 7.8* 8.8*   HCT 24.7* 27.5*    329      Microbiology No results for input(s): CULT in the last 72 hours. Coagulation No results for input(s): PTP, INR, APTT, INREXT in the last 72 hours. Data Review: images and reports reviewed    Assessment:     Active Problems:    Pancreatitis (1/3/2020)      Hyperosmolar hyperglycemic coma due to diabetes mellitus without ketoacidosis ([address] (1/3/2020)      Hyperosmolar non-ketotic state in patient with type 2 diabetes mellitus [facility] (1/3/2020)        Plan/Recommendations/Medical Decision Making:     Continue present treatment   I discussed angiogram findings and intervention with patient.    I again discussed options of amputation which would be AKA vs fem-pop bypass with TMA vs pain control and comfort measures. Patient expresses understanding to all this. Vein mapping for bypass ordered. Pt states needs to think on options a little more. Will f/u tomorrow for further discussion and finalize surgical plan. Will reach out to family for discussion as well.        Greer Valentine

## 2020-01-14 NOTE — ROUTINE PROCESS
Bedside and Verbal shift change report given to Steffanie Peralta RN (oncoming nurse) by Armond Hunt RN (offgoing nurse). Report included the following information SBAR, Kardex, MAR and Recent Results.

## 2020-01-14 NOTE — PROGRESS NOTES
Problem: Diabetes Self-Management  Goal: *Disease process and treatment process  Description  Define diabetes and identify own type of diabetes; list 3 options for treating diabetes. Outcome: Progressing Towards Goal     Problem: Falls - Risk of  Goal: *Absence of Falls  Description  Document Branden Hood Fall Risk and appropriate interventions in the flowsheet. Outcome: Progressing Towards Goal  Note: Fall Risk Interventions:  Mobility Interventions: Patient to call before getting OOB    Mentation Interventions: Adequate sleep, hydration, pain control, Reorient patient    Medication Interventions: Bed/chair exit alarm    Elimination Interventions: Bed/chair exit alarm, Call light in reach              Problem: Pressure Injury - Risk of  Goal: *Prevention of pressure injury  Description  Document Marc Scale and appropriate interventions in the flowsheet.   Outcome: Progressing Towards Goal  Note: Pressure Injury Interventions:  Sensory Interventions: Assess changes in LOC    Moisture Interventions: Absorbent underpads    Activity Interventions: Pressure redistribution bed/mattress(bed type)    Mobility Interventions: Pressure redistribution bed/mattress (bed type)    Nutrition Interventions: Document food/fluid/supplement intake    Friction and Shear Interventions: HOB 30 degrees or less                Problem: Pain  Goal: *Control of Pain  Outcome: Progressing Towards Goal     Problem: Nutrition Deficit  Goal: *Optimize nutritional status  Outcome: Progressing Towards Goal

## 2020-01-14 NOTE — PROGRESS NOTES
RENAL DAILY PROGRESS NOTE    Subjective:   Admitted for hyperglycemia, seen for EFRAIN/CKD stage 3    Complaint: denies any SOB/CP.  C/O burning right foot    Current Facility-Administered Medications   Medication Dose Route Frequency    0.9% sodium chloride infusion  100 mL/hr IntraVENous CONTINUOUS    dextrose (D25W) 25% injection 10 mL  2.5 g IntraVENous PRN    insulin lispro (HUMALOG) injection   SubCUTAneous AC&HS    famotidine (PEPCID) tablet 20 mg  20 mg Oral DAILY    insulin glargine (LANTUS) injection 28 Units  28 Units SubCUTAneous DAILY    bisacodyl (DULCOLAX) suppository 10 mg  10 mg Rectal DAILY PRN    polyethylene glycol (MIRALAX) packet 17 g  17 g Oral DAILY    aspirin chewable tablet 81 mg  81 mg Oral DAILY    oxyCODONE-acetaminophen (PERCOCET) 5-325 mg per tablet 1-2 Tab  1-2 Tab Oral Q6H PRN    morphine injection 2 mg  2 mg IntraVENous Q4H PRN    dextrose 10% infusion 125-250 mL  125-250 mL IntraVENous PRN    heparin (porcine) injection 5,000 Units  5,000 Units SubCUTAneous Q8H    glucose chewable tablet 16 g  4 Tab Oral PRN    glucagon (GLUCAGEN) injection 1 mg  1 mg IntraMUSCular PRN           Objective:     Patient Vitals for the past 24 hrs:   Temp Pulse Resp BP SpO2   01/14/20 1545 98.2 °F (36.8 °C) 76 20 145/63 98 %   01/14/20 0747 98.8 °F (37.1 °C) 72 15 135/65 98 %   01/14/20 0438 98.5 °F (36.9 °C) (!) 107 19 159/69 100 %   01/14/20 0005 99.4 °F (37.4 °C) 98 20 142/69 96 %   01/13/20 1750 99.9 °F (37.7 °C) 100 21 148/61 96 %        Weight change:      01/12 1901 - 01/14 0700  In: 1401.7 [I.V.:1401.7]  Out: 3350 [Urine:3350]    Intake/Output Summary (Last 24 hours) at 1/14/2020 1645  Last data filed at 1/14/2020 1416  Gross per 24 hour   Intake 811.67 ml   Output 300 ml   Net 511.67 ml     Physical Exam: awake, not in any resp distress, afebrile    HEENT: non icteric  Neck: regular no rub  Cardiovascular: regular, no rub  C/L: clear ant/lat  Abdomen: soft + BS  Ext: Right foot, black shriveled toes and plantar surface, swollen and blistered dorsum, cool to touch, no pain or redness    Data Review:     LABS:   Hematology:   Recent Labs     01/14/20  0408 01/13/20  0531   WBC 8.8 9.0   HGB 7.8* 8.8*   HCT 24.7* 27.5*   Pl 340K  Chemistry:   Recent Labs     01/14/20  0408 01/13/20  1940 01/13/20  0531 01/12/20  0321   BUN 13  --  17 23*   CREA 1.41*  --  1.55* 1.69*   CA 8.5 8.7 8.6 8.7   K 5.0  --  5.4 5.0     --  139 142   *  --  112* 111   CO2 24  --  22 24   PHOS  --   --  3.3  --    *  --  185* 69*   Phos 2.4  Lipase 1160  IPTH 180  UMACR pending     IMAGES: CTA abd with runoff Bilateral renal A patent Kidneys are malrotated no hydro    CULTURE: Blood c/s neg    IMPRESSION AND PLAN:   EFRAIN/CKD stage 3, improving, tolerated IV contrast studies so far. Cont to trend, Good urine output, cont with IVF hydration. Avoid nephrotoxic drugs and adjust meds to renal fxn. Trend K.  CKD 3 most likely from DN/HTN off ARB for now, cont with aggressive BS and BP control. Get baseline UPCR. SHPT from CKD, eval for Vit D def  HTN cont to trend BP goal <130/80.  Resume CCB  Anemia from chronic illness, infection,  Check fe   DM 2 poor control  Defer to primary team  Gangrene right foot defer to vascular  Dry gangrene right foot revascularization procedure sched  In a few days followed by TMA   Discussed with Dr Owen Nino MD  1/14/2020

## 2020-01-14 NOTE — OP NOTES
[facility]  OPERATIVE REPORT    Name:  Dave Alston  MR#:   [id number]  :  1940  ACCOUNT #:  [de-identified]  DATE OF SERVICE:  2020    PREOPERATIVE DIAGNOSIS:  Peripheral artery disease with gangrene, right foot. POSTOPERATIVE DIAGNOSIS:  Peripheral artery disease with gangrene, right foot. PROCEDURE PERFORMED:  Ultrasound of bilateral femoral arteries with interpretation, bilateral percutaneous femoral artery access, placement of catheter to aorta, aortogram with radiographic interpretation, right leg angiogram with interpretation, balloon angioplasty and stent of right common iliac artery with associated Doppler interpretation. SURGEON:  DO Spencer    ASSISTANT:  None. ANESTHESIA:  Local.    COMPLICATIONS:  Zero. SPECIMENS REMOVED:  None. IMPLANTS:  7 x 39 VBX stent. ESTIMATED BLOOD LOSS:  Zero. CONDITION OF THE PATIENT:  Stable. DETAILS OF PROCEDURE:  The patient was brought to the cath lab, placed on the table in supine position. I had given her Benadryl and 1 mg of morphine. The groin was prepped and draped in the usual standard fashion followed by [facility][note 3] compliant guidelines. I did an ultrasound first on the left. The vessel was small but identifiable. I took a picture for PACS. It was pulsatile. I did a direct puncture to it without difficulty and placed the wire into the aorta. A 4-Spanish sheath was placed. I changed the wire for a catheter to do an aortogram.  I could not get up and over access because of the iliac near occlusion, so I got a second access on the right ultrasound here again, had good visualization of the common femoral.  I stored a picture for PACS as well. I got wire access with percutaneous access under ultrasound visualization, placed the wire in the aorta. Placed the 7-Spanish sheath on this side without difficulty.   Now I did a focused angiogram of the right iliac artery with a Magic Torque wire in place and then brought a 7 x 39 VBX stent, deployed it just at the origin of the common iliac, covering the entire lesion giving a 100% resolution. There was no residual stenosis. I did balloon the stent up to an 8 mm diameter with an 8 x 40 balloon. I was happy with this result. The right leg angiogram was done via the sheath. I did AngioSeal on the right femoral artery. The 4-Pashto was left in place to be pulled in recovery area. FINDINGS:  Aortogram revealed a small aorta, atherosclerotic, patent. I did not see right renal artery or kidney and the left renal artery small but patent. The bifurcation was patent, but the left common iliac artery, external iliac artery were densely atherosclerotic but patent. The right common iliac artery was nearly occluded, 2.5 cm from its origin but it diseased for about 3 cm segment and then appeared normal.  The external was patent and normal appearing. The internal was patent here. Right leg common femoral was patent. The profunda was patent. The superficial femoral artery was 100% occluded with later reconstitution at the above-knee popliteal.  The popliteal artery was quite small with robust collateralization. The popliteal runs behind the knee and to anterior tibial.  The posterior tibial and peroneal were occluded. RECOMMENDATIONS:  Today,  successful ample of procedures were done regarding the iliac stent. We discussed with family value of a fem-pop bypass. We will certainly need at least a partial foot amputation, may really just need a leg amputation. We will see whether she is suitable for. If there is enough foot left for reconstruction of TMA, we will consider the bypass.       Azael Almodovar DO [id number]  D:  01/13/2020 18:06  T:  01/14/2020 3:01  JOB #:  [id number]

## 2020-01-14 NOTE — CONSULTS
Podiatry Consult    Subjective:         Date of Consultation: January 13, 2020     Referring Physician: Dr. Jhonny Galvez    Patient is a [de-identified] y.o. female with PMHx noted below who is being seen for right foot gangrene. Patient has severe PAD with dry gangrene of forefoot. Patient is consulted to evaluate for limb salvage. Patient reports ischemic pain to right foot. Patient denies N/V/C/F. Patient head angio today and had stent placed in right iliac artery. Patient Active Problem List    Diagnosis Date Noted    Pancreatitis 01/03/2020    Hyperosmolar hyperglycemic coma due to diabetes mellitus without ketoacidosis ([facility] [address] 01/03/2020    Hyperosmolar non-ketotic state in patient with type 2 diabetes mellitus ([facility] [address] 01/03/2020    Hyperglycemia 12/25/2018    Type 2 diabetes mellitus with hyperosmolarity ([facility] [address] 12/25/2018    Acute UTI 12/25/2018    Dehydration 12/25/2018    Acute renal failure (ARF) ([facility] [address] 12/25/2018    Noncompliance 12/25/2018     Past Medical History:   Diagnosis Date    Diabetes ([facility] [address]     Hypercholesterolemia     Hypertension       History reviewed. No pertinent surgical history. History reviewed. No pertinent family history.    Social History     Tobacco Use    Smoking status: Former Smoker    Smokeless tobacco: Never Used   Substance Use Topics    Alcohol use: Not on file     Current Facility-Administered Medications   Medication Dose Route Frequency Provider Last Rate Last Dose    dextrose 5% and 0.9% NaCl infusion  100 mL/hr IntraVENous CONTINUOUS Marcus Langston  mL/hr at 01/13/20 1801 100 mL/hr at 01/13/20 1801    dextrose (D25W) 25% injection 10 mL  2.5 g IntraVENous PRN Lauren Fallon MD        insulin lispro (HUMALOG) injection   SubCUTAneous AC&HS Sara Enriquez MD   Stopped at 01/13/20 1332    famotidine (PEPCID) tablet 20 mg  20 mg Oral DAILY Adeline Friedman DO   20 mg at 01/13/20 0953    insulin glargine (LANTUS) injection 28 Units  28 Units SubCUTAneous DAILY Tu Herrera MD   28 Units at 20 0954    bisacodyl (DULCOLAX) suppository 10 mg  10 mg Rectal DAILY PRN Wendy Wagner NP        polyethylene glycol (MIRALAX) packet 17 g  17 g Oral DAILY Wendy Wagner NP   Stopped at 20 0900    aspirin chewable tablet 81 mg  81 mg Oral DAILY Jeremiah Moctezuma MD   81 mg at 20 1756    oxyCODONE-acetaminophen (PERCOCET) 5-325 mg per tablet 1-2 Tab  1-2 Tab Oral Q6H PRN Jeremiah Moctezuma MD   2 Tab at 20 1038    morphine injection 2 mg  2 mg IntraVENous Q4H PRN Jeremiah Moctezuma MD   2 mg at 20 1757    dextrose 10% infusion 125-250 mL  125-250 mL IntraVENous PRN Maritza Reyes MD        heparin (porcine) injection 5,000 Units  5,000 Units SubCUTAneous Q8H Beverley Chauhan PA-C   5,000 Units at 20 204    glucose chewable tablet 16 g  4 Tab Oral PRN Maritza Reyes MD        glucagon (GLUCAGEN) injection 1 mg  1 mg IntraMUSCular PRN Maritza Reyes MD          No Known Allergies     Review of Systems:  A comprehensive review of systems was negative except for that written in the History of Present Illness. Objective:     Patient Vitals for the past 8 hrs:   BP Temp Pulse Resp SpO2   20 1750 148/61 99.9 °F (37.7 °C) 100 21 96 %   20 1642 131/76  (!) 104 (!) 31 96 %   20 1627 168/55  90 25 97 %   20 1625 169/74  93 26    20 1602   88 26 94 %   20 1600 169/74       20 1429 151/58  74 17 100 %   20 1415 189/66  78 13 97 %   20 1414 189/66         Temp (24hrs), Av.8 °F (37.1 °C), Min:98 °F (36.7 °C), Max:99.9 °F (37.7 °C)      Physical Exam:    Bilateral DALY: non palpable pedal pulses bilateral, right foot dry gangrene with stable eschar to level of proximal metatarsal level, dorsal blisteration noted, no active drainage or open wound noted. Ischemic pain noted to right foot.  Paresthesia symptoms present to both lower extremities.  Manual muscle strength 5/5 bilateral.    Lab Review:   Recent Results (from the past 24 hour(s))   GLUCOSE, POC    Collection Time: 01/12/20 10:20 PM   Result Value Ref Range    Glucose (POC) 172 (H) 70 - 517 mg/dL   METABOLIC PANEL, BASIC    Collection Time: 01/13/20  5:31 AM   Result Value Ref Range    Sodium 139 136 - 145 mmol/L    Potassium 5.4 3.5 - 5.5 mmol/L    Chloride 112 (H) 100 - 111 mmol/L    CO2 22 21 - 32 mmol/L    Anion gap 5 3.0 - 18 mmol/L    Glucose 185 (H) 74 - 99 mg/dL    BUN 17 7.0 - 18 MG/DL    Creatinine 1.55 (H) 0.6 - 1.3 MG/DL    BUN/Creatinine ratio 11 (L) 12 - 20      GFR est AA 39 (L) >60 ml/min/1.73m2    GFR est non-AA 32 (L) >60 ml/min/1.73m2    Calcium 8.6 8.5 - 10.1 MG/DL   CBC W/O DIFF    Collection Time: 01/13/20  5:31 AM   Result Value Ref Range    WBC 9.0 4.6 - 13.2 K/uL    RBC 2.74 (L) 4.20 - 5.30 M/uL    HGB 8.8 (L) 12.0 - 16.0 g/dL    HCT 27.5 (L) 35.0 - 45.0 %    .4 (H) 74.0 - 97.0 FL    MCH 32.1 24.0 - 34.0 PG    MCHC 32.0 31.0 - 37.0 g/dL    RDW 14.4 11.6 - 14.5 %    PLATELET 289 520 - 631 K/uL    MPV 10.5 9.2 - 11.8 FL   PHOSPHORUS    Collection Time: 01/13/20  5:31 AM   Result Value Ref Range    Phosphorus 3.3 2.5 - 4.9 MG/DL   GLUCOSE, POC    Collection Time: 01/13/20  7:55 AM   Result Value Ref Range    Glucose (POC) 229 (H) 70 - 110 mg/dL   GLUCOSE, POC    Collection Time: 01/13/20 11:41 AM   Result Value Ref Range    Glucose (POC) 187 (H) 70 - 110 mg/dL   GLUCOSE, POC    Collection Time: 01/13/20  5:44 PM   Result Value Ref Range    Glucose (POC) 110 70 - 110 mg/dL   PTH INTACT    Collection Time: 01/13/20  7:40 PM   Result Value Ref Range    Calcium 8.7 8.5 - 10.1 MG/DL    PTH, Intact 180.0 (H) 18.4 - 88.0 pg/mL   GLUCOSE, POC    Collection Time: 01/13/20  9:18 PM   Result Value Ref Range    Glucose (POC) 148 (H) 70 - 110 mg/dL       Impression:     DM with severe PAD, dry gangrene        Recommendation:     -x-ray of right foot reviewed. No acute bony pathology noted. No cortical changes noted. No soft tissue gas noted. - Discussed with patient that she is at risk of losing right limb. Discussed treatment options with patient. Patient would like to attempt at limb salvage. Would consider proximal TMA after Fem-pop bypass. Proximal foot appears viable. - Discussed that she can still end up with proximal amputation. However, we can try at limb salvage. - will continue to follow. Thank you for allowing me to participate in Ms. Thompson's care.

## 2020-01-14 NOTE — ROUTINE PROCESS
Bedside shift change report given to Mary Ellen RN (oncoming nurse) by Daniel Elise RN (offgoing nurse). Report included the following information SBAR, Kardex, Procedure Summary, Intake/Output, Recent Results and Cardiac Rhythm NSR.

## 2020-01-14 NOTE — DIABETES MGMT
GLYCEMIC CONTROL AND NUTRITION    Assessment/Recommendations:  Lab glucose this am 139 mg/dl  Continue basal and corrective insulin coverage as ordered  Will continue inpatient monitoring. Most recent blood glucose values:  Results for Suzanne Pierce (MRN [id number]) as of 1/14/2020 10:50   Ref. Range 1/13/2020 07:55 1/13/2020 11:41 1/13/2020 17:44 1/13/2020 21:18 1/14/2020 07:49   GLUCOSE,FAST - POC Latest Ref Range: 70 - 110 mg/dL 229 (H) 187 (H) 110 148 (H) 320 (H)     Current A1C of 14.2 % is equivalent to average blood glucose of 361 mg/dl over the past 2-3 months. Current hospital diabetes medications:   lantus 28 units daily  Lispro corrective insulin coverage AC&HS  Previous day's insulin requirements:   Lantus 28 units  Lispro 4 units corrective insulin coverage  Home diabetes medications: per RN Diabetes noted from 01/06/19  Patient reported list of prescribed diabetes medications:  Lantus insulin (vial). She cannot remember the dose because - \"it has been a very long since I last took it. I got tired taking insulin and only took my diabetes pills. \"  Glipizide once daily. She cannot remember the dose. Metformin 500 mg daily. She cannot remember the dose.   Diet:    Diabetic consistent carb With supplements  Education:  _x__Refer to Diabetes Education Record             ____Education not indicated at this time      Brandan Nino, [facility] CDE  Ext [phone]

## 2020-01-14 NOTE — PROGRESS NOTES
Hospitalist Progress Note    Patient: Maria Ines Lance Age: 79 y.o. : 1940 MR#: [id number] SSN: [id number]  Date/Time: 2020 10:05 AM    DOA: 2020  PCP: Adam Cheek MD    Subjective:   Patient seen and examined at bedside. Family is at bedside. Nursing at bedside. She is awake and very talkative. She states she continues to have right lower extremity pain. Has been thinking about her future surgical options. Assessment/Plan:     1)  Right lower extremity ischemia associate with uncontrolled DM2: On exam toes are black and cold. -  LORENA indicates severe to critical arterial insuff at [place]-fem level, fem-pop level. -  arterial duplex result noted to have monophasic flow      -  CTA with multifocal stenosis of right lower extremity   - s/p angio with balloon angioplasty and stent orf right common iliac artery 2020  2)  Hyperglycemia with Type 2 diabetes, stable        - admitted with Hyperosmolar non-ketotic state in type 2 diabetes mellitus   3)  Acute on chronic renal failure Stage 3: likely mostly prerenal.  4)  Metabolic acidosis due to renal issues-resolved  5)  Acute metabolic encephalopathy  6)  Acute pancreatitis, resolved, no pain with food intake   7)  Hypertension   8)  Hypophosphatemia  9)  Normocytic anemia of chronic disease      Vein mapping  Plans for fem-pop bypass with TMA in the next 2-3 days    Monitor renal function closely. Avoid nephrotoxicity, gutierrez cath removed. Hold IV fluid  ISS, Lantus continues. Optimize glucose control  Resumes statin, aspirin   Alcohol cessation advised. Discussed with family a length.      GI signed off, pt needs repeat CT pancreas in 8-12weeks     Full Code     Case discussed with:  [x]Patient  []Family  [x]Nursing  [x]Case Management  DVT Prophylaxis:  []Lovenox  [x]Hep SQ  []SCDs  []Coumadin   []On Heparin gtt    Signed By: Stephannie Lundborg, MD     2020 12:02 PM              Objective:   VS:   Visit Vitals  /65 (BP 1 Location: Right arm, BP Patient Position: At rest)   Pulse 72   Temp 98.8 °F (37.1 °C)   Resp 15   Ht 5' 5\" (1.651 m)   Wt 74.7 kg (164 lb 9.6 oz)   SpO2 98%   Breastfeeding No   BMI 27.39 kg/m²      Tmax/24hrs: Temp (24hrs), Av.2 °F (37.3 °C), Min:98.5 °F (36.9 °C), Max:99.9 °F (37.7 °C)      Intake/Output Summary (Last 24 hours) at 2020 1431  Last data filed at 2020 1416  Gross per 24 hour   Intake 811.67 ml   Output 300 ml   Net 511.67 ml       General:  Cooperative, Not in acute distress, speaks in full sentence while in bed  HEENT: PERRL, EOMI, supple neck, no JVD, dry oral mucosa  Cardiovascular: S1S2 regular, no rub/gallop   Pulmonary: Clear air entry bilaterally, no wheezing, no crackle  GI:  Soft, non tender, non distended, +bs, no guarding   Extremities:  No pedal edema, +distal pulses appreciated;    Right foot is black and cold. The third third and fifth toes are shriveled; blister on the dorsal portion of the foot at the base of the third through fifth toes The proximal area is warm whereas the distal half it is blackened and cold  Neuro: AOx3, moving all extremities, no gross deficit.      Additional:       Current Facility-Administered Medications   Medication Dose Route Frequency    0.9% sodium chloride infusion  100 mL/hr IntraVENous CONTINUOUS    dextrose (D25W) 25% injection 10 mL  2.5 g IntraVENous PRN    insulin lispro (HUMALOG) injection   SubCUTAneous AC&HS    famotidine (PEPCID) tablet 20 mg  20 mg Oral DAILY    insulin glargine (LANTUS) injection 28 Units  28 Units SubCUTAneous DAILY    bisacodyl (DULCOLAX) suppository 10 mg  10 mg Rectal DAILY PRN    polyethylene glycol (MIRALAX) packet 17 g  17 g Oral DAILY    aspirin chewable tablet 81 mg  81 mg Oral DAILY    oxyCODONE-acetaminophen (PERCOCET) 5-325 mg per tablet 1-2 Tab  1-2 Tab Oral Q6H PRN    morphine injection 2 mg  2 mg IntraVENous Q4H PRN    dextrose 10% infusion 125-250 mL  125-250 mL IntraVENous PRN    heparin (porcine) injection 5,000 Units  5,000 Units SubCUTAneous Q8H    glucose chewable tablet 16 g  4 Tab Oral PRN    glucagon (GLUCAGEN) injection 1 mg  1 mg IntraMUSCular PRN            Lab/Data Review:  Labs: Results:       Chemistry Recent Labs     01/14/20 0408 01/13/20  1940 01/13/20  0531 01/12/20  0321   *  --  185* 69*     --  139 142   K 5.0  --  5.4 5.0   *  --  112* 111   CO2 24  --  22 24   BUN 13  --  17 23*   CREA 1.41*  --  1.55* 1.69*   BUCR 9*  --  11* 14   AGAP 4  --  5 7   CA 8.5 8.7 8.6 8.7   PHOS  --   --  3.3  --      No results for input(s): TBIL, ALT, SGOT, ALKP, TP, ALB, GLOB, AGRAT in the last 72 hours. CBC w/Diff Recent Labs     01/14/20 0408 01/13/20  0531   WBC 8.8 9.0   RBC 2.44* 2.74*   HGB 7.8* 8.8*   HCT 24.7* 27.5*   .2* 100.4*   MCH 32.0 32.1   MCHC 31.6 32.0   RDW 14.4 14.4    329      Coagulation No results for input(s): PTP, INR, APTT, INREXT, INREXT in the last 72 hours. Iron/Ferritin Lab Results   Component Value Date/Time    Iron 92 05/31/2019 12:07 PM    TIBC 256 05/31/2019 12:07 PM    Iron % saturation 36 05/31/2019 12:07 PM       BNP    Cardiac Enzymes Lab Results   Component Value Date/Time    Troponin-I, QT <0.02 01/02/2020 11:25 PM        Lactic Acid    Thyroid Studies          All Micro Results     Procedure Component Value Units Date/Time    CULTURE, BLOOD [[id number]] Collected:  01/02/20 2206    Order Status:  Completed Specimen:  Blood Updated:  01/08/20 0642     Special Requests: NO SPECIAL REQUESTS        Culture result: NO GROWTH 6 DAYS       CULTURE, BLOOD [[id number]] Collected:  01/02/20 2206    Order Status:  Completed Specimen:  Blood Updated:  01/08/20 0897     Special Requests: NO SPECIAL REQUESTS        Culture result: NO GROWTH 6 DAYS               Images:    CT (Most Recent).  CT Results (most recent):  Results from Hospital Encounter encounter on 01/02/20   CT ABD PELV WO CONT Narrative CT Abdomen And Pelvis with Intravenous Contrast    INDICATION: Generalized abdominal pain. Elevated lipase. .    TECHNIQUE: 5 mm collimation axial images obtained from the diaphragm to the  level of the pubic symphysis following the uneventful administration of  100 cc  of low osmolar, nonionic intravenous contrast.    Dose reduction techniques used: Automated exposure control, adjustment of the  mAs and/or kVp according to patient size, standardized low-dose protocol, and/or  iterative reconstruction technique. COMPARISON: July 18, 2017. ABDOMEN FINDINGS:    Lung Bases: There is bibasilar atelectasis. Atelectasis is most conspicuous in  the lingula. The heart is top normal in size. .    Liver: Normal attenuation. No evidence for mass. Gallbladder: Present and appears normal. No biliary ductal dilatation. Pancreas: There is mild peripancreatic edema. .    Spleen: Normal in size. No evidence of mass. .    Adrenal Glands: No evidence for mass. Kidneys:     Right: Atrophic kidneys. No cortical mass. No hydronephrosis. Left:  Atrophic kidneys. No cortical mass. No hydronephrosis. Lymph Nodes: No lymphadenopathy. Aorta: Atherosclerosis. PELVIS FINDINGS:     Bowel: Small Bowel: Normal in caliber with normal wall thickness. Large Bowel: There are scattered colonic diverticula. Rosemarie Gearing Appendix: No secondary signs of acute appendicitis. Bladder: Underdistended. Extensive bilateral pelvic masses with calcification, likely fibroid uterus. No  definite suspicious adnexal mass. No ascites. Bones: Degenerative changes of the spine. Osteopenia. Impression Impression:    Mild uncomplicated pancreatitis. Fibroid uterus. Additional incidentals as above.      CT Results (most recent):  Results from Hospital Encounter encounter on 01/02/20   CTA ABD ART W RUNOFF W WO CONT    Narrative PROCEDURE: CTA of abdomen and pelvis and bilateral lower extremity runoff with  intravenous contrast administration. HISTORY: PAD, ischemia of the right foot. TECHNIQUE: Multidetector helical CT angiography was carried out from low chest  through the feet following dynamic 70 cc Isovue-300 intravenous contrast  administration . Scan timing was performed using automated bolus tracking/SMART  Prep. 3-D and MPR angiographic image reconstruction was performed on  independent workstation and separately reviewed. Parenchymal imaging is limited  by the arterial phase of contrast administration. All CT scans at this facility  are performed using dose optimization techniques as appropriate to a performed  exam, to include automated exposure control, adjustment of the mA and/or kV  according to patient's size (including appropriate matching for site specific  examinations), or use of iterative reconstruction technique. COMPARISON: CT abdomen/pelvis 1/3/2020. CTA abdomen/pelvis with extremity runoff  7/18/2017. FINDINGS:    VASCULAR FINDINGS:    Right lower extremity:  Multifocal stenosis throughout the right posterior tibialis artery due to  atherosclerotic calcifications as well as the peroneal artery.  -The right posterior tibialis artery is not opacified beyond the mid right lower  leg.  -The anterior tibial artery is not opacified beyond the right lower leg just  above the tibial plafond.  -The peroneal artery is opacified beyond the level of the tibial plafond. Proximally, there is multifocal stenosis of the right femoral artery with loss  of opacification at the mid to distal thigh. Reconstitution of opacification at  the level of the posterior tibialis artery likely due to collaterals from  geniculate arteries and the deep femoral artery. 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  -Left lower leg perfusion is contributed by collaterals from the deep femoral  artery and geniculate arteries.  Multifocal stenosis/occlusion of the left  posterior tibialis artery and left peroneal artery. No opacification of the  peroneal artery or posterior tibialis artery is seen to be on the mid to  proximal third of the left lower leg. The left dorsal pedis is opacified, and  likely contributes to some additional opacified vessel seen in the left foot. Abdominal aorta:  -Severe atherosclerosis with moderate compromise of the lumen, similar to prior  exam of 7/2017.  -No evidence for abdominal aortic aneurysm. -Severe stenosis at the proximal aspect of the right common iliac artery, well  opacified distally, similar to prior exam. Multifocal moderate stenosis at the  left common iliac artery similar to prior exam.  -Multifocal stenosis at the internal iliac arteries, with good opacification  distally.  -Celiac artery, SMA, left renal artery, and right renal artery are grossly  patent. The right renal artery again shown to originate from the descending  thoracic aorta. ALFREDA is not well visualized. ABDOMEN/PELVIS FINDINGS:  Lower chest: Small right pleural effusion with overlying atelectasis. Liver: Stable subcentimeter hypodense lesions liver, too small to characterize  but stability suggests benignity    Biliary: Gallbladder is mildly distended but otherwise unremarkable. Spleen: Negative    Pancreas: There is some peripancreatic edema adjacent to the pancreatic tail. Adrenal glands: Diffuse thickening of the adrenal glands, without discrete mass,  similar to prior exam.    Kidneys: Malrotated kidneys again noted, without evidence for hydronephrosis. GI tract: The bowel appears nonobstructed. Question of mild mural thickening at  the distal esophagus. Questionable mild mural thickening at the greater  curvature of the stomach adjacent to the edema along the pancreatic tail. Colonic diverticulosis, without evidence for diverticulitis. Normal appendix. Peritoneum: No free air    Lymph nodes: No lymphadenopathy.     Pelvis: Urinary bladder is unremarkable. Fibroid uterus again noted. Body wall/soft tissues: Small fat-containing umbilical hernia. Small right lower  spigelian hernia containing a small amount of fluid measuring 2.5 x 1.2 cm  (image 203), previously measured 2.9 x 1.6 cm. Mild edema along the right flank. Bones:  -Bilateral moderate knee osteoarthrosis. Small right knee joint effusion and  trace left knee joint effusion.  -Diffuse soft tissue swelling at the feet, right greater than left. Trace  subcutaneous emphysema adjacent to the right fifth MTP joint. Left distal tibial  and talar hardware noted. -Multilevel degenerative spondylosis and disc disease noted, not well evaluated  on current exam.  -Moderate degenerative change at the hips      Impression IMPRESSION:  1. Right lower extremity:  Multifocal stenosis throughout the right posterior tibialis artery due to  atherosclerotic calcifications as well as the peroneal artery.  -The right posterior tibialis artery is not opacified beyond the mid right lower  leg.  -The anterior tibial artery is not opacified beyond the right lower leg just  above the tibial plafond.  -The peroneal artery is opacified beyond the level of the tibial plafond. Proximally, there is multifocal stenosis of the right femoral artery with loss  of opacification at the mid to distal thigh. Reconstitution of opacification at  the level of the posterior tibialis artery likely due to collaterals from  geniculate arteries and the deep femoral artery. 2. Diffuse soft tissue swelling at bilateral feet, right greater than left. -Suggestion of trace air along the right fifth MTP joint, could be degenerative  but infectious process not excluded. Consider dedicated right foot x-ray for  further evaluation. 3. Chronic multilevel stenosis of the aorta, iliac arteries, and left lower  extremity as described.     4. Peripancreatic edema along the pancreatic tail is increased since 1/3/2020.  -Associated adjacent mild presumed reactive mural thickening of the greater  curvature of the stomach. 5. Small right pleural effusion. 6. Suggestion of mild mural thickening of the distal esophagus, may indicate  nonspecific esophagitis. 7. Bilateral knee osteoarthrosis with small right knee joint effusion and trace  left knee joint effusion. 8. Fibroid uterus. 9. Colonic diverticulosis, without evidence for diverticulitis. 10. Additional nonacute findings are as described. XR Results (most recent):  Results from Hospital Encounter encounter on 01/02/20   XR FOOT RT MIN 3 V    Narrative EXAM:  XR FOOT RT MIN 3 V    INDICATION:   report of air in right fifth MTP join    COMPARISON:  None. FINDINGS:  3 views of the right foot demonstrate limited study, the patient has  cooperation issues with positioning the foot. Considerable hammertoe deformity  of all the toes noted. The foot is held in extension at the ankle. There is no  obvious fracture or dislocation. Small plantar calcaneal spur noted. Impression IMPRESSION: Positioning issues, no acute abnormality identified. No bone  destruction or soft tissue air       [id number]  Results from Hospital Encounter encounter on 01/02/20    ABD LTD    Narrative EXAMINATION: Ultrasound abdomen limited, right upper quadrant    INDICATION: Pancreatitis    COMPARISON: CT 1/3/2020    TECHNIQUE: Grayscale, color, spectral sonographic images of the right upper  quadrant obtained. FINDINGS:    Pancreas: Unremarkable. Tail not well visualized. IVC: Unremarkable. Liver: 15.2 cm length. Slightly coarsened echotexture. Portal vein normal  caliber with antegrade flow. Right kidney: 6.1 cm length. Increased echotexture. No hydronephrosis. No focal  abnormality. Gallbladder: No calculi or wall thickening. Negative [place] sign. Biliary tree: Common bile duct 0.5 cm caliber. No intrahepatic duct dilatation.       Impression IMPRESSION:    Coarsened hepatic echotexture suggests nonspecific hepatocellular disease. Atrophic echogenic right kidney consistent with chronic medical renal disease. No other significant findings. No evidence of cholelithiasis. EKG No results found for this or any previous visit. Duplex art Interpretation Summary     · Diffuse plaquing seen throughout the vessels interrogated in the bilateral lower extremities. · Monophasic flow also noted in these vessels. · Technically difficult study due to calcific plaquing. · Unable to take an ankle-brachial index due to the diminished nature of the waveforms and patient's inability to withstand pressures. Lower Extremity Arterial Findings     Right Lower Arterial     Monophasic Doppler waveforms in the right distal common femoral, profunda femoris, proximal superficial femoral, middle superficial femoral, distal superficial femoral, proximal popliteal, distal popliteal, anterior tibial, posterior tibial and dorsalis pedis artery. Unable to image the distal posterior tibial artery. Left Lower Arterial     Monophasic Doppler waveforms in the left distal common femoral, profunda femoris, proximal superficial femoral, middle superficial femoral, distal superficial femoral, proximal popliteal, distal popliteal, anterior tibial, posterior tibial and dorsalis pedis artery.

## 2020-01-15 ENCOUNTER — ANESTHESIA EVENT (OUTPATIENT)
Dept: CARDIOTHORACIC SURGERY | Age: 80
DRG: 616 | End: 2020-01-15
Payer: MEDICARE

## 2020-01-15 LAB
25(OH)D3 SERPL-MCNC: 71.9 NG/ML (ref 30–100)
ANION GAP SERPL CALC-SCNC: 6 MMOL/L (ref 3–18)
BUN SERPL-MCNC: 17 MG/DL (ref 7–18)
BUN/CREAT SERPL: 12 (ref 12–20)
CALCIUM SERPL-MCNC: 8.6 MG/DL (ref 8.5–10.1)
CHLORIDE SERPL-SCNC: 112 MMOL/L (ref 100–111)
CO2 SERPL-SCNC: 22 MMOL/L (ref 21–32)
CREAT SERPL-MCNC: 1.38 MG/DL (ref 0.6–1.3)
CREAT UR-MCNC: 47 MG/DL (ref 30–125)
ERYTHROCYTE [DISTWIDTH] IN BLOOD BY AUTOMATED COUNT: 14.4 % (ref 11.6–14.5)
FERRITIN SERPL-MCNC: 100 NG/ML (ref 8–388)
GLUCOSE BLD STRIP.AUTO-MCNC: 100 MG/DL (ref 70–110)
GLUCOSE BLD STRIP.AUTO-MCNC: 157 MG/DL (ref 70–110)
GLUCOSE BLD STRIP.AUTO-MCNC: 204 MG/DL (ref 70–110)
GLUCOSE BLD STRIP.AUTO-MCNC: 76 MG/DL (ref 70–110)
GLUCOSE SERPL-MCNC: 60 MG/DL (ref 74–99)
HCT VFR BLD AUTO: 24.9 % (ref 35–45)
HGB BLD-MCNC: 8 G/DL (ref 12–16)
IRON SATN MFR SERPL: 19 %
IRON SERPL-MCNC: 33 UG/DL (ref 50–175)
MCH RBC QN AUTO: 32 PG (ref 24–34)
MCHC RBC AUTO-ENTMCNC: 32.1 G/DL (ref 31–37)
MCV RBC AUTO: 99.6 FL (ref 74–97)
MICROALBUMIN UR-MCNC: 12.3 MG/DL (ref 0–3)
MICROALBUMIN/CREAT UR-RTO: 262 MG/G (ref 0–30)
PLATELET # BLD AUTO: 375 K/UL (ref 135–420)
PMV BLD AUTO: 10.2 FL (ref 9.2–11.8)
POTASSIUM SERPL-SCNC: 5.4 MMOL/L (ref 3.5–5.5)
RBC # BLD AUTO: 2.5 M/UL (ref 4.2–5.3)
SODIUM SERPL-SCNC: 140 MMOL/L (ref 136–145)
TIBC SERPL-MCNC: 173 UG/DL (ref 250–450)
WBC # BLD AUTO: 8.9 K/UL (ref 4.6–13.2)

## 2020-01-15 PROCEDURE — 82043 UR ALBUMIN QUANTITATIVE: CPT

## 2020-01-15 PROCEDURE — 77030038269 HC DRN EXT URIN PURWCK BARD -A

## 2020-01-15 PROCEDURE — 82728 ASSAY OF FERRITIN: CPT

## 2020-01-15 PROCEDURE — 74011000258 HC RX REV CODE- 258: Performed by: INTERNAL MEDICINE

## 2020-01-15 PROCEDURE — 74011250637 HC RX REV CODE- 250/637: Performed by: INTERNAL MEDICINE

## 2020-01-15 PROCEDURE — 74011250637 HC RX REV CODE- 250/637: Performed by: EMERGENCY MEDICINE

## 2020-01-15 PROCEDURE — 74011250637 HC RX REV CODE- 250/637: Performed by: NURSE PRACTITIONER

## 2020-01-15 PROCEDURE — 74011250636 HC RX REV CODE- 250/636: Performed by: PHYSICIAN ASSISTANT

## 2020-01-15 PROCEDURE — 74011636637 HC RX REV CODE- 636/637: Performed by: INTERNAL MEDICINE

## 2020-01-15 PROCEDURE — 82962 GLUCOSE BLOOD TEST: CPT

## 2020-01-15 PROCEDURE — 80048 BASIC METABOLIC PNL TOTAL CA: CPT

## 2020-01-15 PROCEDURE — 65660000000 HC RM CCU STEPDOWN

## 2020-01-15 PROCEDURE — 85027 COMPLETE CBC AUTOMATED: CPT

## 2020-01-15 PROCEDURE — 82306 VITAMIN D 25 HYDROXY: CPT

## 2020-01-15 PROCEDURE — 83540 ASSAY OF IRON: CPT

## 2020-01-15 PROCEDURE — 36415 COLL VENOUS BLD VENIPUNCTURE: CPT

## 2020-01-15 PROCEDURE — 74011250637 HC RX REV CODE- 250/637: Performed by: HOSPITALIST

## 2020-01-15 RX ORDER — LANOLIN ALCOHOL/MO/W.PET/CERES
1 CREAM (GRAM) TOPICAL
Status: DISCONTINUED | OUTPATIENT
Start: 2020-01-15 | End: 2020-01-19

## 2020-01-15 RX ORDER — SODIUM CHLORIDE 450 MG/100ML
75 INJECTION, SOLUTION INTRAVENOUS CONTINUOUS
Status: DISPENSED | OUTPATIENT
Start: 2020-01-15 | End: 2020-01-16

## 2020-01-15 RX ADMIN — SODIUM CHLORIDE 75 ML/HR: 450 INJECTION, SOLUTION INTRAVENOUS at 18:26

## 2020-01-15 RX ADMIN — INSULIN LISPRO 4 UNITS: 100 INJECTION, SOLUTION INTRAVENOUS; SUBCUTANEOUS at 22:16

## 2020-01-15 RX ADMIN — AMLODIPINE BESYLATE 5 MG: 5 TABLET ORAL at 09:02

## 2020-01-15 RX ADMIN — HEPARIN SODIUM 5000 UNITS: 5000 INJECTION INTRAVENOUS; SUBCUTANEOUS at 05:10

## 2020-01-15 RX ADMIN — INSULIN GLARGINE 28 UNITS: 100 INJECTION, SOLUTION SUBCUTANEOUS at 09:00

## 2020-01-15 RX ADMIN — INSULIN LISPRO 2 UNITS: 100 INJECTION, SOLUTION INTRAVENOUS; SUBCUTANEOUS at 17:30

## 2020-01-15 RX ADMIN — OXYCODONE HYDROCHLORIDE AND ACETAMINOPHEN 2 TABLET: 5; 325 TABLET ORAL at 18:25

## 2020-01-15 RX ADMIN — FERROUS SULFATE TAB 325 MG (65 MG ELEMENTAL FE) 325 MG: 325 (65 FE) TAB at 09:07

## 2020-01-15 RX ADMIN — Medication 81 MG: at 09:02

## 2020-01-15 RX ADMIN — POLYETHYLENE GLYCOL 3350 17 G: 17 POWDER, FOR SOLUTION ORAL at 09:02

## 2020-01-15 RX ADMIN — FAMOTIDINE 20 MG: 20 TABLET, FILM COATED ORAL at 09:02

## 2020-01-15 RX ADMIN — HEPARIN SODIUM 5000 UNITS: 5000 INJECTION INTRAVENOUS; SUBCUTANEOUS at 20:44

## 2020-01-15 RX ADMIN — OXYCODONE HYDROCHLORIDE AND ACETAMINOPHEN 2 TABLET: 5; 325 TABLET ORAL at 09:29

## 2020-01-15 NOTE — PROGRESS NOTES
Problem: Falls - Risk of  Goal: *Absence of Falls  Description  Document Jose F Gutierres Fall Risk and appropriate interventions in the flowsheet. Outcome: Progressing Towards Goal  Note: Fall Risk Interventions:  Mobility Interventions: Bed/chair exit alarm    Mentation Interventions: Bed/chair exit alarm, Adequate sleep, hydration, pain control    Medication Interventions: Bed/chair exit alarm    Elimination Interventions: Bed/chair exit alarm, Call light in reach              Problem: Pressure Injury - Risk of  Goal: *Prevention of pressure injury  Description  Document Marc Scale and appropriate interventions in the flowsheet.   Outcome: Progressing Towards Goal  Note: Pressure Injury Interventions:  Sensory Interventions: Assess changes in LOC    Moisture Interventions: Absorbent underpads, Internal/External urinary devices    Activity Interventions: Pressure redistribution bed/mattress(bed type)    Mobility Interventions: Pressure redistribution bed/mattress (bed type)    Nutrition Interventions: Document food/fluid/supplement intake    Friction and Shear Interventions: HOB 30 degrees or less                Problem: Pain  Goal: *Control of Pain  Outcome: Progressing Towards Goal     Problem: Nutrition Deficit  Goal: *Optimize nutritional status  Outcome: Progressing Towards Goal     Problem: Nutrition Deficit  Goal: *Optimize nutritional status  Outcome: Progressing Towards Goal     Problem: Infection - Risk of, Urinary Catheter-Associated Urinary Tract Infection  Goal: *Absence of infection signs and symptoms  Outcome: Progressing Towards Goal

## 2020-01-15 NOTE — PROGRESS NOTES
RENAL DAILY PROGRESS NOTE    Subjective:   Admitted for hyperglycemia, seen for EFRAIN/CKD stage 3    Complaint: denies any SOB/CP. Upset about waiting for her leg procedure.     Current Facility-Administered Medications   Medication Dose Route Frequency    ferrous sulfate tablet 325 mg  1 Tab Oral DAILY WITH BREAKFAST    0.9% sodium chloride infusion  100 mL/hr IntraVENous CONTINUOUS    amLODIPine (NORVASC) tablet 5 mg  5 mg Oral DAILY    dextrose (D25W) 25% injection 10 mL  2.5 g IntraVENous PRN    insulin lispro (HUMALOG) injection   SubCUTAneous AC&HS    famotidine (PEPCID) tablet 20 mg  20 mg Oral DAILY    insulin glargine (LANTUS) injection 28 Units  28 Units SubCUTAneous DAILY    bisacodyl (DULCOLAX) suppository 10 mg  10 mg Rectal DAILY PRN    polyethylene glycol (MIRALAX) packet 17 g  17 g Oral DAILY    aspirin chewable tablet 81 mg  81 mg Oral DAILY    oxyCODONE-acetaminophen (PERCOCET) 5-325 mg per tablet 1-2 Tab  1-2 Tab Oral Q6H PRN    morphine injection 2 mg  2 mg IntraVENous Q4H PRN    dextrose 10% infusion 125-250 mL  125-250 mL IntraVENous PRN    heparin (porcine) injection 5,000 Units  5,000 Units SubCUTAneous Q8H    glucose chewable tablet 16 g  4 Tab Oral PRN    glucagon (GLUCAGEN) injection 1 mg  1 mg IntraMUSCular PRN           Objective:     Patient Vitals for the past 24 hrs:   Temp Pulse Resp BP SpO2   01/15/20 1506 98.6 °F (37 °C) 90 15 136/78 98 %   01/15/20 1051 98.8 °F (37.1 °C) 74 18 143/63 98 %   01/15/20 0723 98.9 °F (37.2 °C) 84 15 148/69 96 %   01/15/20 0509 99.2 °F (37.3 °C) 88 16 150/59 95 %   01/14/20 2358 99.5 °F (37.5 °C) 83 16 147/67 96 %   01/14/20 2000 98.4 °F (36.9 °C) 99 18 129/69 96 %        Weight change:      01/13 1901 - 01/15 0700  In: 470 [P.O.:470]  Out: 600 [Urine:600]    Intake/Output Summary (Last 24 hours) at 1/15/2020 1621  Last data filed at 1/15/2020 1329  Gross per 24 hour   Intake 500 ml   Output 1100 ml   Net -600 ml     Physical Exam: awake, not in any resp distress, afebrile    HEENT: non icteric  Neck: regular no rub  Cardiovascular: regular, no rub  C/L: clear ant/lat  Abdomen: soft + BS  Ext: Right foot, black shriveled toes and plantar surface, swollen and blistered dorsum, cool to touch, no pain or redness    Data Review:     LABS:   Hematology:   Recent Labs     01/15/20  0333 01/14/20  0408 01/13/20  0531   WBC 8.9 8.8 9.0   HGB 8.0* 7.8* 8.8*   HCT 24.9* 24.7* 27.5*   Pl 375K  Chemistry:   Recent Labs     01/15/20  0333 01/14/20  0408 01/13/20  1940 01/13/20  0531   BUN 17 13  --  17   CREA 1.38* 1.41*  --  1.55*   CA 8.6 8.5 8.7 8.6   K 5.4 5.0  --  5.4    144  --  139   * 116*  --  112*   CO2 22 24  --  22   PHOS  --   --   --  3.3   GLU 60* 139*  --  185*   Phos 2.4  Lipase 1160  IPTH 180 Vit D 25 OH 71.9  UMACR pending   Fe 33 Sat 19% ivy 100    IMAGES: CTA abd with runoff Bilateral renal A patent Kidneys are malrotated no hydro    CULTURE: Blood c/s neg    IMPRESSION AND PLAN:   EFRAIN/CKD stage 3, crea much better than baseline. Cont to trend, Good urine output, adjust IVF. Avoid nephrotoxic drugs and adjust meds to renal fxn. Trend K.  CKD 3 most likely from DN/HTN off ARB for now, cont with aggressive BS and BP control. Get baseline UPCR. SHPT from CKD, negative for Vit D def  HTN cont to trend BP goal <130/80.  Cont CCB  Anemia from chronic illness/ infection/fe def cont fe supp  DM 2 poor control  Defer to primary team  Dry gangrene right foot revascularization procedure sched  In a few days followed by Boubacar Cobb MD  1/15/2020

## 2020-01-15 NOTE — PROGRESS NOTES
Pt continues on unit 4N. Per vascular note dated 1/14/20 patient has PAD with gangrene on R foot. Patient is deciding between fem-pop bypass procedure verses leg amputation. Pt will discuss with family today but is leaning toward bypass per 1/15/20 vascular note.     MARINE Nieto, RN    Pager: [phone]

## 2020-01-15 NOTE — ROUTINE PROCESS
Bedside and Verbal shift change report given to Lupe Mackenzie RN (oncoming nurse) by Luna Rodriguez RN (offgoing nurse). Report included the following information SBAR, Kardex, MAR and Recent Results.

## 2020-01-15 NOTE — ROUTINE PROCESS
Bedside shift change report given to Mary Ellen RN (oncoming nurse) by Selene Aguiar RN (offgoing nurse). Report included the following information SBAR, Kardex, Intake/Output, MAR and Recent Results.

## 2020-01-15 NOTE — PROGRESS NOTES
Was alerted family was here  Came to room but they had left  Called 3 numbers but no answer  Scheduled for fem pop tomorrow if family agreeable

## 2020-01-15 NOTE — PROGRESS NOTES
Pt states she is leaning towards right leg bypass    She does not wish to discuss leg amputation and states she does want measures done, not to simply do comfort care either    Did explain probable tma with podiatry but she believes that won't be necessary    Pt has family coming later so will need to address plan with them

## 2020-01-15 NOTE — PROGRESS NOTES
Family at bedside to see vascular team. Dr. Geo Perez was called but in surgery. Patient's   POA is Barbara Linton and can be reached at [phone] about plan of care.

## 2020-01-15 NOTE — PROGRESS NOTES
Hospitalist Progress Note    Patient: Jaron Yao Age: 79 y.o. : 1940 MR#: [id number] SSN: [id number]  Date/Time: 1/15/2020 10:05 AM    DOA: 2020  PCP: Jan Fry MD    Subjective:   Patient seen and examined at bedside. Nursing present during rounds. No family was present at the time of my visit today. The patient seems to understand that she is having surgery tomorrow. She continues to have pain in her right foot. Assessment/Plan:     1)  Right lower extremity ischemia associate with uncontrolled DM2: On exam toes are black and cold. -  LORENA indicates severe to critical arterial insuff at [place]-fem level, fem-pop level. -  arterial duplex result noted to have monophasic flow      -  CTA with multifocal stenosis of right lower extremity   - s/p angio with balloon angioplasty and stent orf right common iliac artery 2020  2)  Hyperglycemia with Type 2 diabetes, stable        - admitted with Hyperosmolar non-ketotic state in type 2 diabetes mellitus   3)  Acute on chronic renal failure Stage 3: likely mostly prerenal.  4)  Metabolic acidosis due to renal issues-resolved  5)  Acute metabolic encephalopathy  6)  Acute pancreatitis, resolved, no pain with food intake   7)  Hypertension   8)  Hypophosphatemia  9)  Normocytic anemia of chronic disease      Discussed with Dr. Apolonia Reyes as well as vascular surgery PA this morning  Plans for fem-pop bypass with TMA tomorrow    Monitor renal function closely. Avoid nephrotoxicity, gutierrez cath removed. Hold IV fluid  ISS, Lantus continues. Optimize glucose control  Resumes statin, aspirin   Alcohol cessation advised.      GI signed off, pt needs repeat CT pancreas in 8-12weeks     Full Code     Case discussed with:  [x]Patient  []Family  [x]Nursing  [x]Case Management  DVT Prophylaxis:  []Lovenox  [x]Hep SQ  []SCDs  []Coumadin   []On Heparin gtt    Signed By: Vandana Mitchell MD     January 15, 2020 12:02 PM              Objective: VS:   Visit Vitals  /78 (BP 1 Location: Right arm, BP Patient Position: At rest)   Pulse 90   Temp 98.6 °F (37 °C)   Resp 15   Ht 5' 5\" (1.651 m)   Wt 74.7 kg (164 lb 9.6 oz)   SpO2 98%   Breastfeeding No   BMI 27.39 kg/m²      Tmax/24hrs: Temp (24hrs), Av.8 °F (37.1 °C), Min:98.2 °F (36.8 °C), Max:99.5 °F (37.5 °C)      Intake/Output Summary (Last 24 hours) at 1/15/2020 1526  Last data filed at 1/15/2020 1329  Gross per 24 hour   Intake 500 ml   Output 1100 ml   Net -600 ml       General:  Cooperative, Not in acute distress, speaks in full sentence while in bed  HEENT: PERRL, EOMI, supple neck, no JVD, dry oral mucosa  Cardiovascular: S1S2 regular, no rub/gallop   Pulmonary: Clear air entry bilaterally, no wheezing, no crackle  GI:  Soft, non tender, non distended, +bs, no guarding   Extremities:  No pedal edema, +distal pulses appreciated;    Right foot is black and cold. The third third and fifth toes are shriveled; blister on the dorsal portion of the foot at the base of the third through fifth toes The proximal area is warm whereas the distal half it is blackened and cold  Neuro: AOx3, moving all extremities, no gross deficit.      Additional:       Current Facility-Administered Medications   Medication Dose Route Frequency    ferrous sulfate tablet 325 mg  1 Tab Oral DAILY WITH BREAKFAST    0.9% sodium chloride infusion  100 mL/hr IntraVENous CONTINUOUS    amLODIPine (NORVASC) tablet 5 mg  5 mg Oral DAILY    dextrose (D25W) 25% injection 10 mL  2.5 g IntraVENous PRN    insulin lispro (HUMALOG) injection   SubCUTAneous AC&HS    famotidine (PEPCID) tablet 20 mg  20 mg Oral DAILY    insulin glargine (LANTUS) injection 28 Units  28 Units SubCUTAneous DAILY    bisacodyl (DULCOLAX) suppository 10 mg  10 mg Rectal DAILY PRN    polyethylene glycol (MIRALAX) packet 17 g  17 g Oral DAILY    aspirin chewable tablet 81 mg  81 mg Oral DAILY    oxyCODONE-acetaminophen (PERCOCET) 5-325 mg per tablet 1-2 Tab  1-2 Tab Oral Q6H PRN    morphine injection 2 mg  2 mg IntraVENous Q4H PRN    dextrose 10% infusion 125-250 mL  125-250 mL IntraVENous PRN    heparin (porcine) injection 5,000 Units  5,000 Units SubCUTAneous Q8H    glucose chewable tablet 16 g  4 Tab Oral PRN    glucagon (GLUCAGEN) injection 1 mg  1 mg IntraMUSCular PRN            Lab/Data Review:  Labs: Results:       Chemistry Recent Labs     01/15/20  0333 01/14/20  0408 01/13/20  1940 01/13/20  0531   GLU 60* 139*  --  185*    144  --  139   K 5.4 5.0  --  5.4   * 116*  --  112*   CO2 22 24  --  22   BUN 17 13  --  17   CREA 1.38* 1.41*  --  1.55*   BUCR 12 9*  --  11*   AGAP 6 4  --  5   CA 8.6 8.5 8.7 8.6   PHOS  --   --   --  3.3     No results for input(s): TBIL, ALT, SGOT, ALKP, TP, ALB, GLOB, AGRAT in the last 72 hours. CBC w/Diff Recent Labs     01/15/20  0333 01/14/20  0408 01/13/20  0531   WBC 8.9 8.8 9.0   RBC 2.50* 2.44* 2.74*   HGB 8.0* 7.8* 8.8*   HCT 24.9* 24.7* 27.5*   MCV 99.6* 101.2* 100.4*   MCH 32.0 32.0 32.1   MCHC 32.1 31.6 32.0   RDW 14.4 14.4 14.4    340 329      Coagulation No results for input(s): PTP, INR, APTT, INREXT, INREXT in the last 72 hours.     Iron/Ferritin Lab Results   Component Value Date/Time    Iron 33 (L) 01/15/2020 03:33 AM    TIBC 173 (L) 01/15/2020 03:33 AM    Iron % saturation 19 01/15/2020 03:33 AM    Ferritin 100 01/15/2020 03:33 AM       BNP    Cardiac Enzymes Lab Results   Component Value Date/Time    Troponin-I, QT <0.02 01/02/2020 11:25 PM        Lactic Acid    Thyroid Studies          All Micro Results     Procedure Component Value Units Date/Time    CULTURE, BLOOD [[id number]] Collected:  01/02/20 2206    Order Status:  Completed Specimen:  Blood Updated:  01/08/20 0642     Special Requests: NO SPECIAL REQUESTS        Culture result: NO GROWTH 6 DAYS       CULTURE, BLOOD [[id number]] Collected:  01/02/20 2206    Order Status:  Completed Specimen:  Blood Updated:  01/08/20 5670 Special Requests: NO SPECIAL REQUESTS        Culture result: NO GROWTH 6 DAYS               Images:    CT (Most Recent). CT Results (most recent):  Results from Hospital Encounter encounter on 01/02/20   CT ABD PELV WO CONT    Narrative CT Abdomen And Pelvis with Intravenous Contrast    INDICATION: Generalized abdominal pain. Elevated lipase. .    TECHNIQUE: 5 mm collimation axial images obtained from the diaphragm to the  level of the pubic symphysis following the uneventful administration of  100 cc  of low osmolar, nonionic intravenous contrast.    Dose reduction techniques used: Automated exposure control, adjustment of the  mAs and/or kVp according to patient size, standardized low-dose protocol, and/or  iterative reconstruction technique. COMPARISON: July 18, 2017. ABDOMEN FINDINGS:    Lung Bases: There is bibasilar atelectasis. Atelectasis is most conspicuous in  the lingula. The heart is top normal in size. .    Liver: Normal attenuation. No evidence for mass. Gallbladder: Present and appears normal. No biliary ductal dilatation. Pancreas: There is mild peripancreatic edema. .    Spleen: Normal in size. No evidence of mass. .    Adrenal Glands: No evidence for mass. Kidneys:     Right: Atrophic kidneys. No cortical mass. No hydronephrosis. Left:  Atrophic kidneys. No cortical mass. No hydronephrosis. Lymph Nodes: No lymphadenopathy. Aorta: Atherosclerosis. PELVIS FINDINGS:     Bowel: Small Bowel: Normal in caliber with normal wall thickness. Large Bowel: There are scattered colonic diverticula. Casanova Junk Appendix: No secondary signs of acute appendicitis. Bladder: Underdistended. Extensive bilateral pelvic masses with calcification, likely fibroid uterus. No  definite suspicious adnexal mass. No ascites. Bones: Degenerative changes of the spine. Osteopenia. Impression Impression:    Mild uncomplicated pancreatitis. Fibroid uterus.     Additional incidentals as above.     CT Results (most recent):  Results from Hospital Encounter encounter on 01/02/20   CTA ABD ART W RUNOFF W WO CONT    Narrative PROCEDURE: CTA of abdomen and pelvis and bilateral lower extremity runoff with  intravenous contrast administration. HISTORY: PAD, ischemia of the right foot. TECHNIQUE: Multidetector helical CT angiography was carried out from low chest  through the feet following dynamic 70 cc Isovue-300 intravenous contrast  administration . Scan timing was performed using automated bolus tracking/SMART  Prep. 3-D and MPR angiographic image reconstruction was performed on  independent workstation and separately reviewed. Parenchymal imaging is limited  by the arterial phase of contrast administration. All CT scans at this facility  are performed using dose optimization techniques as appropriate to a performed  exam, to include automated exposure control, adjustment of the mA and/or kV  according to patient's size (including appropriate matching for site specific  examinations), or use of iterative reconstruction technique. COMPARISON: CT abdomen/pelvis 1/3/2020. CTA abdomen/pelvis with extremity runoff  7/18/2017. FINDINGS:    VASCULAR FINDINGS:    Right lower extremity:  Multifocal stenosis throughout the right posterior tibialis artery due to  atherosclerotic calcifications as well as the peroneal artery.  -The right posterior tibialis artery is not opacified beyond the mid right lower  leg.  -The anterior tibial artery is not opacified beyond the right lower leg just  above the tibial plafond.  -The peroneal artery is opacified beyond the level of the tibial plafond. Proximally, there is multifocal stenosis of the right femoral artery with loss  of opacification at the mid to distal thigh. Reconstitution of opacification at  the level of the posterior tibialis artery likely due to collaterals from  geniculate arteries and the deep femoral artery.     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  -Left lower leg perfusion is contributed by collaterals from the deep femoral  artery and geniculate arteries. Multifocal stenosis/occlusion of the left  posterior tibialis artery and left peroneal artery. No opacification of the  peroneal artery or posterior tibialis artery is seen to be on the mid to  proximal third of the left lower leg. The left dorsal pedis is opacified, and  likely contributes to some additional opacified vessel seen in the left foot. Abdominal aorta:  -Severe atherosclerosis with moderate compromise of the lumen, similar to prior  exam of 7/2017.  -No evidence for abdominal aortic aneurysm. -Severe stenosis at the proximal aspect of the right common iliac artery, well  opacified distally, similar to prior exam. Multifocal moderate stenosis at the  left common iliac artery similar to prior exam.  -Multifocal stenosis at the internal iliac arteries, with good opacification  distally.  -Celiac artery, SMA, left renal artery, and right renal artery are grossly  patent. The right renal artery again shown to originate from the descending  thoracic aorta. ALFREDA is not well visualized. ABDOMEN/PELVIS FINDINGS:  Lower chest: Small right pleural effusion with overlying atelectasis. Liver: Stable subcentimeter hypodense lesions liver, too small to characterize  but stability suggests benignity    Biliary: Gallbladder is mildly distended but otherwise unremarkable. Spleen: Negative    Pancreas: There is some peripancreatic edema adjacent to the pancreatic tail. Adrenal glands: Diffuse thickening of the adrenal glands, without discrete mass,  similar to prior exam.    Kidneys: Malrotated kidneys again noted, without evidence for hydronephrosis. GI tract: The bowel appears nonobstructed. Question of mild mural thickening at  the distal esophagus.  Questionable mild mural thickening at the greater  curvature of the stomach adjacent to the edema along the pancreatic tail. Colonic diverticulosis, without evidence for diverticulitis. Normal appendix. Peritoneum: No free air    Lymph nodes: No lymphadenopathy. Pelvis: Urinary bladder is unremarkable. Fibroid uterus again noted. Body wall/soft tissues: Small fat-containing umbilical hernia. Small right lower  spigelian hernia containing a small amount of fluid measuring 2.5 x 1.2 cm  (image 203), previously measured 2.9 x 1.6 cm. Mild edema along the right flank. Bones:  -Bilateral moderate knee osteoarthrosis. Small right knee joint effusion and  trace left knee joint effusion.  -Diffuse soft tissue swelling at the feet, right greater than left. Trace  subcutaneous emphysema adjacent to the right fifth MTP joint. Left distal tibial  and talar hardware noted. -Multilevel degenerative spondylosis and disc disease noted, not well evaluated  on current exam.  -Moderate degenerative change at the hips      Impression IMPRESSION:  1. Right lower extremity:  Multifocal stenosis throughout the right posterior tibialis artery due to  atherosclerotic calcifications as well as the peroneal artery.  -The right posterior tibialis artery is not opacified beyond the mid right lower  leg.  -The anterior tibial artery is not opacified beyond the right lower leg just  above the tibial plafond.  -The peroneal artery is opacified beyond the level of the tibial plafond. Proximally, there is multifocal stenosis of the right femoral artery with loss  of opacification at the mid to distal thigh. Reconstitution of opacification at  the level of the posterior tibialis artery likely due to collaterals from  geniculate arteries and the deep femoral artery. 2. Diffuse soft tissue swelling at bilateral feet, right greater than left. -Suggestion of trace air along the right fifth MTP joint, could be degenerative  but infectious process not excluded.  Consider dedicated right foot x-ray for  further evaluation. 3. Chronic multilevel stenosis of the aorta, iliac arteries, and left lower  extremity as described. 4. Peripancreatic edema along the pancreatic tail is increased since 1/3/2020.  -Associated adjacent mild presumed reactive mural thickening of the greater  curvature of the stomach. 5. Small right pleural effusion. 6. Suggestion of mild mural thickening of the distal esophagus, may indicate  nonspecific esophagitis. 7. Bilateral knee osteoarthrosis with small right knee joint effusion and trace  left knee joint effusion. 8. Fibroid uterus. 9. Colonic diverticulosis, without evidence for diverticulitis. 10. Additional nonacute findings are as described. XR Results (most recent):  Results from Hospital Encounter encounter on 01/02/20   XR FOOT RT MIN 3 V    Narrative EXAM:  XR FOOT RT MIN 3 V    INDICATION:   report of air in right fifth MTP join    COMPARISON:  None. FINDINGS:  3 views of the right foot demonstrate limited study, the patient has  cooperation issues with positioning the foot. Considerable hammertoe deformity  of all the toes noted. The foot is held in extension at the ankle. There is no  obvious fracture or dislocation. Small plantar calcaneal spur noted. Impression IMPRESSION: Positioning issues, no acute abnormality identified. No bone  destruction or soft tissue air       [id number]  Results from Hospital Encounter encounter on 01/02/20   US ABD LTD    Narrative EXAMINATION: Ultrasound abdomen limited, right upper quadrant    INDICATION: Pancreatitis    COMPARISON: CT 1/3/2020    TECHNIQUE: Grayscale, color, spectral sonographic images of the right upper  quadrant obtained. FINDINGS:    Pancreas: Unremarkable. Tail not well visualized. IVC: Unremarkable. Liver: 15.2 cm length. Slightly coarsened echotexture. Portal vein normal  caliber with antegrade flow. Right kidney: 6.1 cm length.  Increased echotexture. No hydronephrosis. No focal  abnormality. Gallbladder: No calculi or wall thickening. Negative [place] sign. Biliary tree: Common bile duct 0.5 cm caliber. No intrahepatic duct dilatation. Impression IMPRESSION:    Coarsened hepatic echotexture suggests nonspecific hepatocellular disease. Atrophic echogenic right kidney consistent with chronic medical renal disease. No other significant findings. No evidence of cholelithiasis. EKG No results found for this or any previous visit. Duplex art Interpretation Summary     · Diffuse plaquing seen throughout the vessels interrogated in the bilateral lower extremities. · Monophasic flow also noted in these vessels. · Technically difficult study due to calcific plaquing. · Unable to take an ankle-brachial index due to the diminished nature of the waveforms and patient's inability to withstand pressures. Lower Extremity Arterial Findings     Right Lower Arterial     Monophasic Doppler waveforms in the right distal common femoral, profunda femoris, proximal superficial femoral, middle superficial femoral, distal superficial femoral, proximal popliteal, distal popliteal, anterior tibial, posterior tibial and dorsalis pedis artery. Unable to image the distal posterior tibial artery. Left Lower Arterial     Monophasic Doppler waveforms in the left distal common femoral, profunda femoris, proximal superficial femoral, middle superficial femoral, distal superficial femoral, proximal popliteal, distal popliteal, anterior tibial, posterior tibial and dorsalis pedis artery.

## 2020-01-15 NOTE — PROGRESS NOTES
Progress Note    Patient: Bere Mccoy MRN: [id number]  SSN: [id number]    YOB: 1940  Age: 79 y.o. Sex: female      Admit Date: 1/2/2020  2 Days Post-Op     Procedure:   Procedure(s):  ANGIOGRAPHY LOWER EXT RIGHT  Insert Stent Common Iliac Artery  Aortagram Abdominal    Subjective:     Patient seen at bedside. No acute distress. Patient has dry eschar with gangrene of right foot. Patient is going for fem-pop bypass tomorrow. Patient's family is not available at bedside. Objective:     Visit Vitals  /78 (BP 1 Location: Right arm, BP Patient Position: At rest)   Pulse 90   Temp 98.6 °F (37 °C)   Resp 15   Ht 5' 5\" (1.651 m)   Wt 74.7 kg (164 lb 9.6 oz)   SpO2 98%   Breastfeeding No   BMI 27.39 kg/m²        Physical Exam:  Bilateral DALY: non palpable pedal pulses bilateral, right foot dry gangrene with stable eschar to level of proximal metatarsal level dorsally, plantarly eschar has progressed up to heel now. Dorsal blisteration noted, no active drainage or open wound noted. Ischemic pain noted to right foot. Paresthesia symptoms present to both lower extremities. Manual muscle strength 5/5 bilateral.      Assessment:     Hospital Problems  Date Reviewed: 7/10/2017          Codes Class Noted POA    Pancreatitis ICD-10-CM: K85.90  ICD-9-CM: 143.7  1/3/2020 Unknown        Hyperosmolar hyperglycemic coma due to diabetes mellitus without ketoacidosis (HCC) ICD-10-CM: E11.01  ICD-9-CM: 250.20, 250.30  1/3/2020 Unknown        Hyperosmolar non-ketotic state in patient with type 2 diabetes mellitus ([facility] [address] ICD-10-CM: E11.00  ICD-9-CM: 250.20  1/3/2020 Unknown              Plan/Recommendations/Medical Decision Making:     - Right foot eschar is progressed plantarly. Would have to closely monitor it after fem-pop bypass. - Discussed with patient and her family members, including her step son, Mr. Sanjay Lares, over phone about different treatment options.    - They want to attempt at limb salvage with minimal amputation. They are aware that TMA is pending on fem-pop bypass outcome. Also they are aware about possible future proximal amputation of right lower extremity if TMA amputation flap fails.  - Remain NWB to right foot. - Will follow after fem-pop bypass.

## 2020-01-16 ENCOUNTER — ANESTHESIA (OUTPATIENT)
Dept: CARDIOTHORACIC SURGERY | Age: 80
DRG: 616 | End: 2020-01-16
Payer: MEDICARE

## 2020-01-16 LAB
BUN BLD-MCNC: 13 MG/DL (ref 7–18)
CHLORIDE BLD-SCNC: 109 MMOL/L (ref 100–108)
GLUCOSE BLD STRIP.AUTO-MCNC: 106 MG/DL (ref 70–110)
GLUCOSE BLD STRIP.AUTO-MCNC: 109 MG/DL (ref 70–110)
GLUCOSE BLD STRIP.AUTO-MCNC: 58 MG/DL (ref 70–110)
GLUCOSE BLD STRIP.AUTO-MCNC: 64 MG/DL (ref 70–110)
GLUCOSE BLD STRIP.AUTO-MCNC: 73 MG/DL (ref 70–110)
GLUCOSE BLD STRIP.AUTO-MCNC: 75 MG/DL (ref 70–110)
GLUCOSE BLD STRIP.AUTO-MCNC: 92 MG/DL (ref 70–110)
GLUCOSE BLD STRIP.AUTO-MCNC: 92 MG/DL (ref 74–106)
GLUCOSE BLD STRIP.AUTO-MCNC: 93 MG/DL (ref 70–110)
HCT VFR BLD CALC: 22 % (ref 36–49)
HGB BLD-MCNC: 7.5 G/DL (ref 12–16)
POTASSIUM BLD-SCNC: 5.2 MMOL/L (ref 3.5–5.5)
SODIUM BLD-SCNC: 139 MMOL/L (ref 136–145)

## 2020-01-16 PROCEDURE — 77030034850: Performed by: SURGERY

## 2020-01-16 PROCEDURE — 77030008683 HC TU ET CUF COVD -A: Performed by: ANESTHESIOLOGY

## 2020-01-16 PROCEDURE — 77030018836 HC SOL IRR NACL ICUM -A: Performed by: SURGERY

## 2020-01-16 PROCEDURE — 74011250636 HC RX REV CODE- 250/636: Performed by: SURGERY

## 2020-01-16 PROCEDURE — 74011250636 HC RX REV CODE- 250/636: Performed by: NURSE ANESTHETIST, CERTIFIED REGISTERED

## 2020-01-16 PROCEDURE — 77030018390 HC SPNG HEMSTAT2 J&J -B: Performed by: SURGERY

## 2020-01-16 PROCEDURE — 74011250636 HC RX REV CODE- 250/636: Performed by: PHYSICIAN ASSISTANT

## 2020-01-16 PROCEDURE — 041K4JL BYPASS RIGHT FEMORAL ARTERY TO POPLITEAL ARTERY WITH SYNTHETIC SUBSTITUTE, PERCUTANEOUS ENDOSCOPIC APPROACH: ICD-10-PCS | Performed by: SURGERY

## 2020-01-16 PROCEDURE — 77030002933 HC SUT MCRYL J&J -A: Performed by: SURGERY

## 2020-01-16 PROCEDURE — 77030011265 HC ELECTRD BLD HEX COVD -A: Performed by: SURGERY

## 2020-01-16 PROCEDURE — 86923 COMPATIBILITY TEST ELECTRIC: CPT

## 2020-01-16 PROCEDURE — 74011250636 HC RX REV CODE- 250/636: Performed by: HOSPITALIST

## 2020-01-16 PROCEDURE — 74011000272 HC RX REV CODE- 272: Performed by: SURGERY

## 2020-01-16 PROCEDURE — 03HB33Z INSERTION OF INFUSION DEVICE INTO RIGHT RADIAL ARTERY, PERCUTANEOUS APPROACH: ICD-10-PCS | Performed by: ANESTHESIOLOGY

## 2020-01-16 PROCEDURE — 74011250637 HC RX REV CODE- 250/637: Performed by: NURSE ANESTHETIST, CERTIFIED REGISTERED

## 2020-01-16 PROCEDURE — 74011000258 HC RX REV CODE- 258: Performed by: SURGERY

## 2020-01-16 PROCEDURE — 77030010512 HC APPL CLP LIG J&J -C: Performed by: SURGERY

## 2020-01-16 PROCEDURE — 86900 BLOOD TYPING SEROLOGIC ABO: CPT

## 2020-01-16 PROCEDURE — 77030002924 HC SUT GORTX WLGO -B: Performed by: SURGERY

## 2020-01-16 PROCEDURE — 77030002986 HC SUT PROL J&J -A: Performed by: SURGERY

## 2020-01-16 PROCEDURE — 74011000258 HC RX REV CODE- 258: Performed by: STUDENT IN AN ORGANIZED HEALTH CARE EDUCATION/TRAINING PROGRAM

## 2020-01-16 PROCEDURE — C1768 GRAFT, VASCULAR: HCPCS | Performed by: SURGERY

## 2020-01-16 PROCEDURE — 77030025869: Performed by: SURGERY

## 2020-01-16 PROCEDURE — 74011000250 HC RX REV CODE- 250: Performed by: SURGERY

## 2020-01-16 PROCEDURE — 76060000035 HC ANESTHESIA 2 TO 2.5 HR: Performed by: SURGERY

## 2020-01-16 PROCEDURE — 65620000000 HC RM CCU GENERAL

## 2020-01-16 PROCEDURE — 36415 COLL VENOUS BLD VENIPUNCTURE: CPT

## 2020-01-16 PROCEDURE — 74011000258 HC RX REV CODE- 258: Performed by: NURSE ANESTHETIST, CERTIFIED REGISTERED

## 2020-01-16 PROCEDURE — 77030005401 HC CATH RAD ARRO -A: Performed by: ANESTHESIOLOGY

## 2020-01-16 PROCEDURE — 76010000108 HC CV SURG 2 TO 2.5 HR: Performed by: SURGERY

## 2020-01-16 PROCEDURE — 77030026438 HC STYL ET INTUB CARD -A: Performed by: ANESTHESIOLOGY

## 2020-01-16 PROCEDURE — 82962 GLUCOSE BLOOD TEST: CPT

## 2020-01-16 PROCEDURE — 82947 ASSAY GLUCOSE BLOOD QUANT: CPT

## 2020-01-16 PROCEDURE — 74011000250 HC RX REV CODE- 250: Performed by: NURSE ANESTHETIST, CERTIFIED REGISTERED

## 2020-01-16 DEVICE — GRAFT VASC L50CM DIA6MM PTFE CBAS HEP SURF THN WALLED REM: Type: IMPLANTABLE DEVICE | Site: GROIN | Status: FUNCTIONAL

## 2020-01-16 RX ORDER — INSULIN LISPRO 100 [IU]/ML
INJECTION, SOLUTION INTRAVENOUS; SUBCUTANEOUS ONCE
Status: DISCONTINUED | OUTPATIENT
Start: 2020-01-16 | End: 2020-01-16 | Stop reason: HOSPADM

## 2020-01-16 RX ORDER — DEXTROSE MONOHYDRATE AND SODIUM CHLORIDE 5; .45 G/100ML; G/100ML
75 INJECTION, SOLUTION INTRAVENOUS CONTINUOUS
Status: DISCONTINUED | OUTPATIENT
Start: 2020-01-16 | End: 2020-01-20

## 2020-01-16 RX ORDER — PROPOFOL 10 MG/ML
INJECTION, EMULSION INTRAVENOUS AS NEEDED
Status: DISCONTINUED | OUTPATIENT
Start: 2020-01-16 | End: 2020-01-16 | Stop reason: HOSPADM

## 2020-01-16 RX ORDER — METOPROLOL TARTRATE 5 MG/5ML
INJECTION INTRAVENOUS AS NEEDED
Status: DISCONTINUED | OUTPATIENT
Start: 2020-01-16 | End: 2020-01-16 | Stop reason: HOSPADM

## 2020-01-16 RX ORDER — NEOMYCIN AND POLYMYXIN B SULFATES 40; 200000 MG/ML; [USP'U]/ML
SOLUTION IRRIGATION
Status: DISPENSED
Start: 2020-01-16 | End: 2020-01-17

## 2020-01-16 RX ORDER — HEPARIN SODIUM 200 [USP'U]/100ML
INJECTION, SOLUTION INTRAVENOUS
Status: DISPENSED
Start: 2020-01-16 | End: 2020-01-17

## 2020-01-16 RX ORDER — NALBUPHINE HYDROCHLORIDE 10 MG/ML
5 INJECTION, SOLUTION INTRAMUSCULAR; INTRAVENOUS; SUBCUTANEOUS
Status: DISCONTINUED | OUTPATIENT
Start: 2020-01-16 | End: 2020-01-17 | Stop reason: HOSPADM

## 2020-01-16 RX ORDER — LIDOCAINE HYDROCHLORIDE 20 MG/ML
INJECTION, SOLUTION EPIDURAL; INFILTRATION; INTRACAUDAL; PERINEURAL AS NEEDED
Status: DISCONTINUED | OUTPATIENT
Start: 2020-01-16 | End: 2020-01-16 | Stop reason: HOSPADM

## 2020-01-16 RX ORDER — DEXTROSE MONOHYDRATE AND SODIUM CHLORIDE 5; .45 G/100ML; G/100ML
25 INJECTION, SOLUTION INTRAVENOUS CONTINUOUS
Status: DISCONTINUED | OUTPATIENT
Start: 2020-01-16 | End: 2020-01-16 | Stop reason: HOSPADM

## 2020-01-16 RX ORDER — MAGNESIUM SULFATE 100 %
4 CRYSTALS MISCELLANEOUS AS NEEDED
Status: DISCONTINUED | OUTPATIENT
Start: 2020-01-16 | End: 2020-01-17 | Stop reason: HOSPADM

## 2020-01-16 RX ORDER — FENTANYL CITRATE 50 UG/ML
25 INJECTION, SOLUTION INTRAMUSCULAR; INTRAVENOUS AS NEEDED
Status: DISCONTINUED | OUTPATIENT
Start: 2020-01-16 | End: 2020-01-17 | Stop reason: HOSPADM

## 2020-01-16 RX ORDER — DEXTROSE 50 % IN WATER (D50W) INTRAVENOUS SYRINGE
25-50 AS NEEDED
Status: DISCONTINUED | OUTPATIENT
Start: 2020-01-16 | End: 2020-01-17

## 2020-01-16 RX ORDER — NITROGLYCERIN 40 MG/100ML
0-20 INJECTION INTRAVENOUS
Status: DISCONTINUED | OUTPATIENT
Start: 2020-01-16 | End: 2020-01-20

## 2020-01-16 RX ORDER — SODIUM CHLORIDE 0.9 % (FLUSH) 0.9 %
5-40 SYRINGE (ML) INJECTION AS NEEDED
Status: DISCONTINUED | OUTPATIENT
Start: 2020-01-16 | End: 2020-01-17 | Stop reason: HOSPADM

## 2020-01-16 RX ORDER — ONDANSETRON 2 MG/ML
4 INJECTION INTRAMUSCULAR; INTRAVENOUS ONCE
Status: ACTIVE | OUTPATIENT
Start: 2020-01-16 | End: 2020-01-17

## 2020-01-16 RX ORDER — FAMOTIDINE 10 MG/ML
INJECTION INTRAVENOUS
Status: DISPENSED
Start: 2020-01-16 | End: 2020-01-17

## 2020-01-16 RX ORDER — INSULIN LISPRO 100 [IU]/ML
INJECTION, SOLUTION INTRAVENOUS; SUBCUTANEOUS ONCE
Status: ACTIVE | OUTPATIENT
Start: 2020-01-16 | End: 2020-01-17

## 2020-01-16 RX ORDER — ONDANSETRON 2 MG/ML
INJECTION INTRAMUSCULAR; INTRAVENOUS AS NEEDED
Status: DISCONTINUED | OUTPATIENT
Start: 2020-01-16 | End: 2020-01-16 | Stop reason: HOSPADM

## 2020-01-16 RX ORDER — ROCURONIUM BROMIDE 10 MG/ML
INJECTION, SOLUTION INTRAVENOUS AS NEEDED
Status: DISCONTINUED | OUTPATIENT
Start: 2020-01-16 | End: 2020-01-16 | Stop reason: HOSPADM

## 2020-01-16 RX ORDER — FENTANYL CITRATE 50 UG/ML
INJECTION, SOLUTION INTRAMUSCULAR; INTRAVENOUS AS NEEDED
Status: DISCONTINUED | OUTPATIENT
Start: 2020-01-16 | End: 2020-01-16 | Stop reason: HOSPADM

## 2020-01-16 RX ORDER — FAMOTIDINE 20 MG/1
20 TABLET, FILM COATED ORAL ONCE
Status: COMPLETED | OUTPATIENT
Start: 2020-01-16 | End: 2020-01-16

## 2020-01-16 RX ORDER — LIDOCAINE HYDROCHLORIDE 10 MG/ML
INJECTION, SOLUTION EPIDURAL; INFILTRATION; INTRACAUDAL; PERINEURAL AS NEEDED
Status: DISCONTINUED | OUTPATIENT
Start: 2020-01-16 | End: 2020-01-16 | Stop reason: HOSPADM

## 2020-01-16 RX ORDER — PHENYLEPHRINE HCL IN 0.9% NACL 1 MG/10 ML
SYRINGE (ML) INTRAVENOUS AS NEEDED
Status: DISCONTINUED | OUTPATIENT
Start: 2020-01-16 | End: 2020-01-16 | Stop reason: HOSPADM

## 2020-01-16 RX ORDER — IODIXANOL 320 MG/ML
INJECTION, SOLUTION INTRAVASCULAR
Status: DISCONTINUED
Start: 2020-01-16 | End: 2020-01-16 | Stop reason: WASHOUT

## 2020-01-16 RX ORDER — HYDROMORPHONE HYDROCHLORIDE 1 MG/ML
0.5 INJECTION, SOLUTION INTRAMUSCULAR; INTRAVENOUS; SUBCUTANEOUS
Status: DISCONTINUED | OUTPATIENT
Start: 2020-01-16 | End: 2020-01-23

## 2020-01-16 RX ORDER — CEFAZOLIN SODIUM 2 G/50ML
2 SOLUTION INTRAVENOUS ONCE
Status: COMPLETED | OUTPATIENT
Start: 2020-01-16 | End: 2020-01-16

## 2020-01-16 RX ORDER — HYDRALAZINE HYDROCHLORIDE 20 MG/ML
10 INJECTION INTRAMUSCULAR; INTRAVENOUS EVERY 6 HOURS
Status: DISCONTINUED | OUTPATIENT
Start: 2020-01-16 | End: 2020-01-19

## 2020-01-16 RX ORDER — NITROGLYCERIN 40 MG/100ML
INJECTION INTRAVENOUS AS NEEDED
Status: DISCONTINUED | OUTPATIENT
Start: 2020-01-16 | End: 2020-01-16 | Stop reason: HOSPADM

## 2020-01-16 RX ORDER — SODIUM CHLORIDE, SODIUM LACTATE, POTASSIUM CHLORIDE, CALCIUM CHLORIDE 600; 310; 30; 20 MG/100ML; MG/100ML; MG/100ML; MG/100ML
75 INJECTION, SOLUTION INTRAVENOUS CONTINUOUS
Status: DISCONTINUED | OUTPATIENT
Start: 2020-01-16 | End: 2020-01-17 | Stop reason: HOSPADM

## 2020-01-16 RX ORDER — HEPARIN SODIUM 1000 [USP'U]/ML
INJECTION, SOLUTION INTRAVENOUS; SUBCUTANEOUS AS NEEDED
Status: DISCONTINUED | OUTPATIENT
Start: 2020-01-16 | End: 2020-01-16 | Stop reason: HOSPADM

## 2020-01-16 RX ORDER — DIPHENHYDRAMINE HYDROCHLORIDE 50 MG/ML
12.5 INJECTION, SOLUTION INTRAMUSCULAR; INTRAVENOUS
Status: DISCONTINUED | OUTPATIENT
Start: 2020-01-16 | End: 2020-01-17 | Stop reason: HOSPADM

## 2020-01-16 RX ORDER — SODIUM CHLORIDE 0.9 % (FLUSH) 0.9 %
5-40 SYRINGE (ML) INJECTION EVERY 8 HOURS
Status: DISCONTINUED | OUTPATIENT
Start: 2020-01-16 | End: 2020-01-17 | Stop reason: HOSPADM

## 2020-01-16 RX ORDER — HEPARIN SODIUM 200 [USP'U]/100ML
INJECTION, SOLUTION INTRAVENOUS
Status: COMPLETED | OUTPATIENT
Start: 2020-01-16 | End: 2020-01-16

## 2020-01-16 RX ORDER — CEFAZOLIN SODIUM 2 G/50ML
SOLUTION INTRAVENOUS
Status: COMPLETED
Start: 2020-01-16 | End: 2020-01-16

## 2020-01-16 RX ADMIN — NITROGLYCERIN 40 MCG: 40 INJECTION INTRAVENOUS at 15:43

## 2020-01-16 RX ADMIN — LIDOCAINE HYDROCHLORIDE 20 MG: 20 INJECTION, SOLUTION EPIDURAL; INFILTRATION; INTRACAUDAL; PERINEURAL at 15:40

## 2020-01-16 RX ADMIN — DEXTROSE MONOHYDRATE AND SODIUM CHLORIDE 75 ML/HR: 5; .45 INJECTION, SOLUTION INTRAVENOUS at 19:00

## 2020-01-16 RX ADMIN — FENTANYL CITRATE 50 MCG: 50 INJECTION, SOLUTION INTRAMUSCULAR; INTRAVENOUS at 16:28

## 2020-01-16 RX ADMIN — ROCURONIUM BROMIDE 30 MG: 50 INJECTION INTRAVENOUS at 15:40

## 2020-01-16 RX ADMIN — MORPHINE SULFATE 2 MG: 2 INJECTION, SOLUTION INTRAMUSCULAR; INTRAVENOUS at 20:05

## 2020-01-16 RX ADMIN — DEXTROSE MONOHYDRATE 125 ML: 10 INJECTION, SOLUTION INTRAVENOUS at 07:05

## 2020-01-16 RX ADMIN — FENTANYL CITRATE 50 MCG: 50 INJECTION, SOLUTION INTRAMUSCULAR; INTRAVENOUS at 17:12

## 2020-01-16 RX ADMIN — METOPROLOL TARTRATE 5 MG: 5 INJECTION, SOLUTION INTRAVENOUS at 15:47

## 2020-01-16 RX ADMIN — Medication 100 MCG: at 16:25

## 2020-01-16 RX ADMIN — Medication 10 ML: at 20:07

## 2020-01-16 RX ADMIN — FAMOTIDINE 20 MG: 20 TABLET, FILM COATED ORAL at 13:21

## 2020-01-16 RX ADMIN — DEXTROSE MONOHYDRATE AND SODIUM CHLORIDE 25 ML/HR: 5; .45 INJECTION, SOLUTION INTRAVENOUS at 00:31

## 2020-01-16 RX ADMIN — HYDRALAZINE HYDROCHLORIDE 10 MG: 20 INJECTION, SOLUTION INTRAMUSCULAR; INTRAVENOUS at 23:10

## 2020-01-16 RX ADMIN — PROPOFOL 100 MG: 10 INJECTION, EMULSION INTRAVENOUS at 15:40

## 2020-01-16 RX ADMIN — PHENYLEPHRINE HYDROCHLORIDE 10 MCG/MIN: 10 INJECTION INTRAVENOUS at 16:26

## 2020-01-16 RX ADMIN — FENTANYL CITRATE 50 MCG: 50 INJECTION, SOLUTION INTRAMUSCULAR; INTRAVENOUS at 17:13

## 2020-01-16 RX ADMIN — CEFAZOLIN 2 G: 10 INJECTION, POWDER, FOR SOLUTION INTRAVENOUS at 15:35

## 2020-01-16 RX ADMIN — FENTANYL CITRATE 100 MCG: 50 INJECTION, SOLUTION INTRAMUSCULAR; INTRAVENOUS at 15:38

## 2020-01-16 RX ADMIN — HYDRALAZINE HYDROCHLORIDE 10 MG: 20 INJECTION, SOLUTION INTRAMUSCULAR; INTRAVENOUS at 18:29

## 2020-01-16 RX ADMIN — HEPARIN SODIUM 5000 UNITS: 5000 INJECTION INTRAVENOUS; SUBCUTANEOUS at 04:37

## 2020-01-16 RX ADMIN — Medication 100 MCG: at 16:15

## 2020-01-16 RX ADMIN — ONDANSETRON 4 MG: 2 INJECTION INTRAMUSCULAR; INTRAVENOUS at 15:40

## 2020-01-16 RX ADMIN — HEPARIN SODIUM 5000 UNITS: 5000 INJECTION INTRAVENOUS; SUBCUTANEOUS at 20:05

## 2020-01-16 RX ADMIN — HEPARIN SODIUM 5000 UNITS: 1000 INJECTION, SOLUTION INTRAVENOUS; SUBCUTANEOUS at 16:24

## 2020-01-16 NOTE — PROGRESS NOTES
Hospitalist Progress Note    Patient: Faith Caballero Age: 79 y.o. : 1940 MR#: [id number] SSN: [id number]  Date/Time: 2020 10:05 AM    DOA: 2020  PCP: Paige Mederos MD    Subjective:   Patient seen and examined at bedside. Nursing present during rounds. Multiple family members at bedside. Have multiple questions regarding today's procedure and possible subsequent surgeries. The patient has no complaints at this time. Assessment/Plan:     1)  Right lower extremity ischemia associate with uncontrolled DM2: On exam toes are black and cold. -  LORENA indicates severe to critical arterial insuff at [place]-fem level, fem-pop level. -  arterial duplex result noted to have monophasic flow      -  CTA with multifocal stenosis of right lower extremity   - s/p angio with balloon angioplasty and stent orf right common iliac artery 2020      - left Fem-Pop bypass planned for 2020  2)  Hyperglycemia with Type 2 diabetes, stable        - admitted with Hyperosmolar non-ketotic state in type 2 diabetes mellitus   3)  Acute on chronic renal failure Stage 3: likely mostly prerenal.  4)  Metabolic acidosis due to renal issues-resolved  5)  Acute metabolic encephalopathy  6)  Acute pancreatitis, resolved, no pain with food intake   7)  Hypertension   8)  Hypophosphatemia  9)  Normocytic anemia of chronic disease      Plans for fem-pop bypass today with possible TMA tomorrow   - patient and family are aware that she may ultimately require AKA    Monitor renal function closely. Avoid nephrotoxicity, gutierrez cath removed. Hold IV fluid  ISS, Lantus continues. Optimize glucose control  Resumes statin, aspirin   Alcohol cessation advised.      GI signed off, pt needs repeat CT pancreas in 8-12weeks     Full Code     Case discussed with:  [x]Patient  []Family  [x]Nursing  [x]Case Management  DVT Prophylaxis:  []Lovenox  [x]Hep SQ  []SCDs  []Coumadin   []On Heparin gtt    Signed By: Gonzalo Matson MD     2020 12:02 PM              Objective:   VS:   Visit Vitals  /64 (BP 1 Location: Right arm, BP Patient Position: At rest)   Pulse 80   Temp 99.3 °F (37.4 °C)   Resp 20   Ht 5' 5\" (1.651 m)   Wt 74.7 kg (164 lb 9.6 oz)   SpO2 99%   Breastfeeding No   BMI 27.39 kg/m²      Tmax/24hrs: Temp (24hrs), Av.3 °F (36.8 °C), Min:97.2 °F (36.2 °C), Max:99.3 °F (37.4 °C)      Intake/Output Summary (Last 24 hours) at 2020 1427  Last data filed at 2020 1129  Gross per 24 hour   Intake 250 ml   Output 1431 ml   Net -1181 ml       General:  Cooperative, Not in acute distress, speaks in full sentence while in bed  HEENT: PERRL, EOMI, supple neck, no JVD, dry oral mucosa  Cardiovascular: S1S2 regular, no rub/gallop   Pulmonary: Clear air entry bilaterally, no wheezing, no crackle  GI:  Soft, non tender, non distended, +bs, no guarding   Extremities:  No pedal edema, +distal pulses appreciated;    Right foot is black and cold. The third third and fifth toes are shriveled; blister on the dorsal portion of the foot at the base of the third through fifth toes The proximal area is warm whereas the distal half it is blackened and cold  Neuro: AOx3, moving all extremities, no gross deficit.      Additional:       Current Facility-Administered Medications   Medication Dose Route Frequency    insulin lispro (HUMALOG) injection   SubCUTAneous ONCE    dextrose 5 % - 0.45% NaCl infusion  25 mL/hr IntraVENous CONTINUOUS    ceFAZolin (ANCEF) 2g IVPB in 50 mL D5W  2 g IntraVENous ONCE    ceFAZolin in dextrose (iso-os) 2 gram/50 mL IVPB pgbk        ferrous sulfate tablet 325 mg  1 Tab Oral DAILY WITH BREAKFAST    0.45% sodium chloride infusion  75 mL/hr IntraVENous CONTINUOUS    amLODIPine (NORVASC) tablet 5 mg  5 mg Oral DAILY    dextrose (D25W) 25% injection 10 mL  2.5 g IntraVENous PRN    insulin lispro (HUMALOG) injection   SubCUTAneous AC&HS    famotidine (PEPCID) tablet 20 mg  20 mg Oral DAILY    insulin glargine (LANTUS) injection 28 Units  28 Units SubCUTAneous DAILY    bisacodyl (DULCOLAX) suppository 10 mg  10 mg Rectal DAILY PRN    polyethylene glycol (MIRALAX) packet 17 g  17 g Oral DAILY    aspirin chewable tablet 81 mg  81 mg Oral DAILY    oxyCODONE-acetaminophen (PERCOCET) 5-325 mg per tablet 1-2 Tab  1-2 Tab Oral Q6H PRN    morphine injection 2 mg  2 mg IntraVENous Q4H PRN    dextrose 10% infusion 125-250 mL  125-250 mL IntraVENous PRN    heparin (porcine) injection 5,000 Units  5,000 Units SubCUTAneous Q8H    glucose chewable tablet 16 g  4 Tab Oral PRN    glucagon (GLUCAGEN) injection 1 mg  1 mg IntraMUSCular PRN            Lab/Data Review:  Labs: Results:       Chemistry Recent Labs     01/15/20  0333 01/14/20  0408 01/13/20  1940   GLU 60* 139*  --     144  --    K 5.4 5.0  --    * 116*  --    CO2 22 24  --    BUN 17 13  --    CREA 1.38* 1.41*  --    BUCR 12 9*  --    AGAP 6 4  --    CA 8.6 8.5 8.7     No results for input(s): TBIL, ALT, SGOT, ALKP, TP, ALB, GLOB, AGRAT in the last 72 hours. CBC w/Diff Recent Labs     01/15/20  0333 01/14/20  0408   WBC 8.9 8.8   RBC 2.50* 2.44*   HGB 8.0* 7.8*   HCT 24.9* 24.7*   MCV 99.6* 101.2*   MCH 32.0 32.0   MCHC 32.1 31.6   RDW 14.4 14.4    340      Coagulation No results for input(s): PTP, INR, APTT, INREXT, INREXT in the last 72 hours.     Iron/Ferritin Lab Results   Component Value Date/Time    Iron 33 (L) 01/15/2020 03:33 AM    TIBC 173 (L) 01/15/2020 03:33 AM    Iron % saturation 19 01/15/2020 03:33 AM    Ferritin 100 01/15/2020 03:33 AM       BNP    Cardiac Enzymes Lab Results   Component Value Date/Time    Troponin-I, QT <0.02 01/02/2020 11:25 PM        Lactic Acid    Thyroid Studies          All Micro Results     Procedure Component Value Units Date/Time    CULTURE, BLOOD [[id number]] Collected:  01/02/20 2206    Order Status:  Completed Specimen:  Blood Updated:  01/08/20 0642     Special Requests: NO SPECIAL REQUESTS        Culture result: NO GROWTH 6 DAYS       CULTURE, BLOOD [[id number]] Collected:  01/02/20 2206    Order Status:  Completed Specimen:  Blood Updated:  01/08/20 1009     Special Requests: NO SPECIAL REQUESTS        Culture result: NO GROWTH 6 DAYS               Images:    CT (Most Recent). CT Results (most recent):  Results from Hospital Encounter encounter on 01/02/20   CT ABD PELV WO CONT    Narrative CT Abdomen And Pelvis with Intravenous Contrast    INDICATION: Generalized abdominal pain. Elevated lipase. .    TECHNIQUE: 5 mm collimation axial images obtained from the diaphragm to the  level of the pubic symphysis following the uneventful administration of  100 cc  of low osmolar, nonionic intravenous contrast.    Dose reduction techniques used: Automated exposure control, adjustment of the  mAs and/or kVp according to patient size, standardized low-dose protocol, and/or  iterative reconstruction technique. COMPARISON: July 18, 2017. ABDOMEN FINDINGS:    Lung Bases: There is bibasilar atelectasis. Atelectasis is most conspicuous in  the lingula. The heart is top normal in size. .    Liver: Normal attenuation. No evidence for mass. Gallbladder: Present and appears normal. No biliary ductal dilatation. Pancreas: There is mild peripancreatic edema. .    Spleen: Normal in size. No evidence of mass. .    Adrenal Glands: No evidence for mass. Kidneys:     Right: Atrophic kidneys. No cortical mass. No hydronephrosis. Left:  Atrophic kidneys. No cortical mass. No hydronephrosis. Lymph Nodes: No lymphadenopathy. Aorta: Atherosclerosis. PELVIS FINDINGS:     Bowel: Small Bowel: Normal in caliber with normal wall thickness. Large Bowel: There are scattered colonic diverticula. [facility] Appendix: No secondary signs of acute appendicitis. Bladder: Underdistended. Extensive bilateral pelvic masses with calcification, likely fibroid uterus.  No  definite suspicious adnexal mass.    No ascites. Bones: Degenerative changes of the spine. Osteopenia. Impression Impression:    Mild uncomplicated pancreatitis. Fibroid uterus. Additional incidentals as above. CT Results (most recent):  Results from Hospital Encounter encounter on 01/02/20   CTA ABD ART W RUNOFF W WO CONT    Narrative PROCEDURE: CTA of abdomen and pelvis and bilateral lower extremity runoff with  intravenous contrast administration. HISTORY: PAD, ischemia of the right foot. TECHNIQUE: Multidetector helical CT angiography was carried out from low chest  through the feet following dynamic 70 cc Isovue-300 intravenous contrast  administration . Scan timing was performed using automated bolus tracking/SMART  Prep. 3-D and MPR angiographic image reconstruction was performed on  independent workstation and separately reviewed. Parenchymal imaging is limited  by the arterial phase of contrast administration. All CT scans at this facility  are performed using dose optimization techniques as appropriate to a performed  exam, to include automated exposure control, adjustment of the mA and/or kV  according to patient's size (including appropriate matching for site specific  examinations), or use of iterative reconstruction technique. COMPARISON: CT abdomen/pelvis 1/3/2020. CTA abdomen/pelvis with extremity runoff  7/18/2017. FINDINGS:    VASCULAR FINDINGS:    Right lower extremity:  Multifocal stenosis throughout the right posterior tibialis artery due to  atherosclerotic calcifications as well as the peroneal artery.  -The right posterior tibialis artery is not opacified beyond the mid right lower  leg.  -The anterior tibial artery is not opacified beyond the right lower leg just  above the tibial plafond.  -The peroneal artery is opacified beyond the level of the tibial plafond.     Proximally, there is multifocal stenosis of the right femoral artery with loss  of opacification at the mid to distal thigh. Reconstitution of opacification at  the level of the posterior tibialis artery likely due to collaterals from  geniculate arteries and the deep femoral artery. 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  -Left lower leg perfusion is contributed by collaterals from the deep femoral  artery and geniculate arteries. Multifocal stenosis/occlusion of the left  posterior tibialis artery and left peroneal artery. No opacification of the  peroneal artery or posterior tibialis artery is seen to be on the mid to  proximal third of the left lower leg. The left dorsal pedis is opacified, and  likely contributes to some additional opacified vessel seen in the left foot. Abdominal aorta:  -Severe atherosclerosis with moderate compromise of the lumen, similar to prior  exam of 7/2017.  -No evidence for abdominal aortic aneurysm. -Severe stenosis at the proximal aspect of the right common iliac artery, well  opacified distally, similar to prior exam. Multifocal moderate stenosis at the  left common iliac artery similar to prior exam.  -Multifocal stenosis at the internal iliac arteries, with good opacification  distally.  -Celiac artery, SMA, left renal artery, and right renal artery are grossly  patent. The right renal artery again shown to originate from the descending  thoracic aorta. ALFREDA is not well visualized. ABDOMEN/PELVIS FINDINGS:  Lower chest: Small right pleural effusion with overlying atelectasis. Liver: Stable subcentimeter hypodense lesions liver, too small to characterize  but stability suggests benignity    Biliary: Gallbladder is mildly distended but otherwise unremarkable. Spleen: Negative    Pancreas: There is some peripancreatic edema adjacent to the pancreatic tail.     Adrenal glands: Diffuse thickening of the adrenal glands, without discrete mass,  similar to prior exam.    Kidneys: Malrotated kidneys again noted, without evidence for hydronephrosis. GI tract: The bowel appears nonobstructed. Question of mild mural thickening at  the distal esophagus. Questionable mild mural thickening at the greater  curvature of the stomach adjacent to the edema along the pancreatic tail. Colonic diverticulosis, without evidence for diverticulitis. Normal appendix. Peritoneum: No free air    Lymph nodes: No lymphadenopathy. Pelvis: Urinary bladder is unremarkable. Fibroid uterus again noted. Body wall/soft tissues: Small fat-containing umbilical hernia. Small right lower  spigelian hernia containing a small amount of fluid measuring 2.5 x 1.2 cm  (image 203), previously measured 2.9 x 1.6 cm. Mild edema along the right flank. Bones:  -Bilateral moderate knee osteoarthrosis. Small right knee joint effusion and  trace left knee joint effusion.  -Diffuse soft tissue swelling at the feet, right greater than left. Trace  subcutaneous emphysema adjacent to the right fifth MTP joint. Left distal tibial  and talar hardware noted. -Multilevel degenerative spondylosis and disc disease noted, not well evaluated  on current exam.  -Moderate degenerative change at the hips      Impression IMPRESSION:  1. Right lower extremity:  Multifocal stenosis throughout the right posterior tibialis artery due to  atherosclerotic calcifications as well as the peroneal artery.  -The right posterior tibialis artery is not opacified beyond the mid right lower  leg.  -The anterior tibial artery is not opacified beyond the right lower leg just  above the tibial plafond.  -The peroneal artery is opacified beyond the level of the tibial plafond. Proximally, there is multifocal stenosis of the right femoral artery with loss  of opacification at the mid to distal thigh. Reconstitution of opacification at  the level of the posterior tibialis artery likely due to collaterals from  geniculate arteries and the deep femoral artery.     2. Diffuse soft tissue swelling at bilateral feet, right greater than left. -Suggestion of trace air along the right fifth MTP joint, could be degenerative  but infectious process not excluded. Consider dedicated right foot x-ray for  further evaluation. 3. Chronic multilevel stenosis of the aorta, iliac arteries, and left lower  extremity as described. 4. Peripancreatic edema along the pancreatic tail is increased since 1/3/2020.  -Associated adjacent mild presumed reactive mural thickening of the greater  curvature of the stomach. 5. Small right pleural effusion. 6. Suggestion of mild mural thickening of the distal esophagus, may indicate  nonspecific esophagitis. 7. Bilateral knee osteoarthrosis with small right knee joint effusion and trace  left knee joint effusion. 8. Fibroid uterus. 9. Colonic diverticulosis, without evidence for diverticulitis. 10. Additional nonacute findings are as described. XR Results (most recent):  Results from Hospital Encounter encounter on 01/02/20   XR FOOT RT MIN 3 V    Narrative EXAM:  XR FOOT RT MIN 3 V    INDICATION:   report of air in right fifth MTP join    COMPARISON:  None. FINDINGS:  3 views of the right foot demonstrate limited study, the patient has  cooperation issues with positioning the foot. Considerable hammertoe deformity  of all the toes noted. The foot is held in extension at the ankle. There is no  obvious fracture or dislocation. Small plantar calcaneal spur noted. Impression IMPRESSION: Positioning issues, no acute abnormality identified. No bone  destruction or soft tissue air       [facility] Results (most recent):  Results from Hospital Encounter encounter on 01/02/20    ABD LTD    Narrative EXAMINATION: Ultrasound abdomen limited, right upper quadrant    INDICATION: Pancreatitis    COMPARISON: CT 1/3/2020    TECHNIQUE: Grayscale, color, spectral sonographic images of the right upper  quadrant obtained. FINDINGS:    Pancreas: Unremarkable.  Tail not well visualized. IVC: Unremarkable. Liver: 15.2 cm length. Slightly coarsened echotexture. Portal vein normal  caliber with antegrade flow. Right kidney: 6.1 cm length. Increased echotexture. No hydronephrosis. No focal  abnormality. Gallbladder: No calculi or wall thickening. Negative [place] sign. Biliary tree: Common bile duct 0.5 cm caliber. No intrahepatic duct dilatation. Impression IMPRESSION:    Coarsened hepatic echotexture suggests nonspecific hepatocellular disease. Atrophic echogenic right kidney consistent with chronic medical renal disease. No other significant findings. No evidence of cholelithiasis. EKG No results found for this or any previous visit. Duplex art Interpretation Summary     · Diffuse plaquing seen throughout the vessels interrogated in the bilateral lower extremities. · Monophasic flow also noted in these vessels. · Technically difficult study due to calcific plaquing. · Unable to take an ankle-brachial index due to the diminished nature of the waveforms and patient's inability to withstand pressures. Lower Extremity Arterial Findings     Right Lower Arterial     Monophasic Doppler waveforms in the right distal common femoral, profunda femoris, proximal superficial femoral, middle superficial femoral, distal superficial femoral, proximal popliteal, distal popliteal, anterior tibial, posterior tibial and dorsalis pedis artery. Unable to image the distal posterior tibial artery. Left Lower Arterial     Monophasic Doppler waveforms in the left distal common femoral, profunda femoris, proximal superficial femoral, middle superficial femoral, distal superficial femoral, proximal popliteal, distal popliteal, anterior tibial, posterior tibial and dorsalis pedis artery.

## 2020-01-16 NOTE — ROUTINE PROCESS
TRANSFER - OUT REPORT: 
 
Verbal report given to Phill Marin on Nuris Kin  being transferred to CVT ICU for routine post - op Report consisted of patients Situation, Background, Assessment and  
Recommendations(SBAR). Information from the following report(s) SBAR, Kardex, OR Summary, Procedure Summary and Recent Results was reviewed with the receiving nurse. Lines:  
Peripheral IV 01/15/20 Left;Posterior Hand (Active) Site Assessment Clean, dry, & intact 1/16/2020  8:37 AM  
Phlebitis Assessment 0 1/16/2020  8:37 AM  
Infiltration Assessment 0 1/16/2020  8:37 AM  
Dressing Status Intact 1/16/2020  8:37 AM  
Dressing Type Transparent 1/16/2020  8:37 AM  
Hub Color/Line Status Blue;End cap changed; Flushed 1/16/2020  8:37 AM  
Action Taken Open ports on tubing capped 1/15/2020  8:45 PM  
Alcohol Cap Used Yes 1/16/2020  8:37 AM  
   
Peripheral IV 01/16/20 Right Arm (Active) Arterial Line 01/16/20 Right Radial artery (Active) Opportunity for questions and clarification was provided. Patient transported with: 
 Monitor Registered Nurse

## 2020-01-16 NOTE — ANESTHESIA PREPROCEDURE EVALUATION
Relevant Problems   No relevant active problems       Anesthetic History   No history of anesthetic complications            Review of Systems / Medical History  Patient summary reviewed and nursing notes reviewed    Pulmonary  Within defined limits                 Neuro/Psych             Comments: Able to answer questions in preop. Knows she is going for surgery. Power of  is providing consent  Non compliance with meds Cardiovascular    Hypertension          PAD         GI/Hepatic/Renal         Renal disease (acute on chronic )      Comments: pancreatitis Endo/Other    Diabetes (last bs 95 today. admitted with [facility] 75. with bs >600): poorly controlled, using insulin         Other Findings              Physical Exam    Airway  Mallampati: I  TM Distance: 4 - 6 cm  Neck ROM: normal range of motion   Mouth opening: Normal     Cardiovascular               Dental  No notable dental hx       Pulmonary                 Abdominal         Other Findings            Anesthetic Plan    ASA: 3  Anesthesia type: general    Monitoring Plan: Arterial line      Induction: Intravenous  Anesthetic plan and risks discussed with: Patient and Healthcare power of       Spoke to her stepson and niece who are her power of . Damion signed the consent form  istat K 5.2. Na 139.  HCT 22 and HB 7.5

## 2020-01-16 NOTE — ANESTHESIA PROCEDURE NOTES
Arterial Line Placement    Start time: 1/16/2020 2:35 PM  End time: 1/16/2020 2:40 PM  Performed by: Bob Montgomery MD  Authorized by: Bob Montgomery MD     Pre-Procedure  Indications:  Arterial pressure monitoring and blood sampling  Preanesthetic Checklist: patient identified, risks and benefits discussed, anesthesia consent, site marked, patient being monitored, timeout performed and patient being monitored    Timeout Time: 14:35        Procedure:   Prep:  Alcohol  Seldinger Technique?: Yes    Orientation:  Right  Location:  Radial artery  Catheter size:  20 G  Number of attempts:  1  Cont Cardiac Output Sensor: No      Assessment:   Post-procedure:  Line secured and sterile dressing applied  Patient Tolerance:  Patient tolerated the procedure well with no immediate complications

## 2020-01-16 NOTE — PROGRESS NOTES
Discussed with pt today  Plan is for fem pop for attempt at limb salvage  Best case scenario is fem pop with TMA  If not able to vitalize the lower leg, will need AKA

## 2020-01-16 NOTE — PROGRESS NOTES
POC glucose = 58. Patient is alert, asymptomatic.    0709 -  Dextrose 10%, 125 ml given as bolus for hypoglycemia. Will repeat accu-check in 15 minutes. 1070- Repeat POC = 109. Will monitor patient. Report given to Lupe Mackenzie RN, incoming nurse.

## 2020-01-16 NOTE — ROUTINE PROCESS
Bedside and Verbal shift change report given to Lisa Lyles RN (oncoming nurse) by Solitario Chao RN (offgoing nurse). Report included the following information SBAR, Kardex, MAR and Recent Results.

## 2020-01-16 NOTE — PROGRESS NOTES
1720: TRANSFER - IN REPORT:    Verbal report received from Mariama Klein RN (name) on Francesco Ramachandran  being received from CVT ICU (unit) for routine post - op      Report consisted of patients Situation, Background, Assessment and   Recommendations(SBAR). Information from the following report(s) OR Summary was reviewed with the receiving nurse. Opportunity for questions and clarification was provided. Assessment completed upon patients arrival to unit and care assumed. 1746: Received patient from OR on bed, patient is awake but not following commands, pulling face mask and monitors off. Bilateral soft wrist restraints applied. R groin incision with prevena dressing and R inner knee incision C/D/I.   1800: Called Dr Theresa Mcleod regarding patient's elevated BP, telephone orders received. 1825: Dr Cardenas January at bedside to see patient, aware of BG, verbal orders received. 1835: Family at bedside, aware of the need to apply bilateral wrist restraints to patient. 1915: Bedside and Verbal shift change report given to Jayne Lake RN (oncoming nurse) by Jenni Moya RN (offgoing nurse).  Report included the following information SBAR, Kardex, OR Summary, Intake/Output, MAR, Recent Results and Cardiac Rhythm SR-ST.

## 2020-01-16 NOTE — DIABETES MGMT
GLYCEMIC CONTROL AND NUTRITION    Assessment/Recommendations:  Blood glucose this am 58 mg/dl. Treated per protocol to 109 mg/dl  Recommend decreasing lantus dose to 25 units daily  Continue corrective insulin coverage as needed  Will continue inpatient monitoring. Most recent blood glucose values:  Results for Apurva Dos Santos (MRN [id number]) as of 1/16/2020 14:08   Ref. Range 1/15/2020 21:17 1/16/2020 06:57 1/16/2020 07:29 1/16/2020 11:54 1/16/2020 13:26   GLUCOSE,FAST - POC Latest Ref Range: 70 - 110 mg/dL 204 (H) 58 (L) 109 92 93     Current A1C of 14.2 % is equivalent to average blood glucose of 361 mg/dl over the past 2-3 months. Current hospital diabetes medications:   lantus 28 units daily  Lispro corrective insulin coverage AC&HS  Previous day's insulin requirements:   Lantus 28 units  Lispro 6 units corrective insulin coverage  Home diabetes medications: per RN Diabetes noted from 01/06/19  Patient reported list of prescribed diabetes medications:  Lantus insulin (vial). She cannot remember the dose because - \"it has been a very long since I last took it. I got tired taking insulin and only took my diabetes pills. \"  Glipizide once daily. She cannot remember the dose. Metformin 500 mg daily. She cannot remember the dose.   Diet:    Currently NPO  Education:  _x__Refer to Diabetes Education Record             ____Education not indicated at this time      Singh Dose, [facility] CDE  Ext [phone]

## 2020-01-16 NOTE — PROGRESS NOTES
Problem: Diabetes Self-Management  Goal: *Disease process and treatment process  Description  Define diabetes and identify own type of diabetes; list 3 options for treating diabetes. Outcome: Progressing Towards Goal     Problem: Falls - Risk of  Goal: *Absence of Falls  Description  Document Sabiha Morley Fall Risk and appropriate interventions in the flowsheet. Outcome: Progressing Towards Goal  Note: Fall Risk Interventions:  Mobility Interventions: Bed/chair exit alarm    Mentation Interventions: Adequate sleep, hydration, pain control, Bed/chair exit alarm    Medication Interventions: Bed/chair exit alarm    Elimination Interventions: Call light in reach              Problem: Pressure Injury - Risk of  Goal: *Prevention of pressure injury  Description  Document Marc Scale and appropriate interventions in the flowsheet.   Outcome: Progressing Towards Goal  Note: Pressure Injury Interventions:  Sensory Interventions: Assess changes in LOC    Moisture Interventions: Internal/External urinary devices    Activity Interventions: Pressure redistribution bed/mattress(bed type)    Mobility Interventions: Pressure redistribution bed/mattress (bed type)    Nutrition Interventions: Document food/fluid/supplement intake    Friction and Shear Interventions: HOB 30 degrees or less                Problem: Pain  Goal: *Control of Pain  Outcome: Progressing Towards Goal     Problem: Nutrition Deficit  Goal: *Optimize nutritional status  Outcome: Progressing Towards Goal     Problem: Infection - Risk of, Urinary Catheter-Associated Urinary Tract Infection  Goal: *Absence of infection signs and symptoms  Outcome: Progressing Towards Goal

## 2020-01-16 NOTE — ANESTHESIA POSTPROCEDURE EVALUATION
Procedure(s):  RIGHT FEMORAL-POPLITEAL BYPASS. general    Anesthesia Post Evaluation      Multimodal analgesia: multimodal analgesia used between 6 hours prior to anesthesia start to PACU discharge  Patient location during evaluation: ICU  Patient participation: complete - patient participated  Level of consciousness: awake  Pain score: 0  Pain management: adequate  Airway patency: patent  Cardiovascular status: acceptable  Respiratory status: acceptable  Hydration status: acceptable  Post anesthesia nausea and vomiting:  none and controlled      Vitals Value Taken Time   /66 1/16/2020  5:51 PM   Temp 36.7 °C (98 °F) 1/16/2020  5:51 PM   Pulse 104 1/16/2020  5:51 PM   Resp 25 1/16/2020  5:51 PM   SpO2 100 % 1/16/2020  5:51 PM   Vitals shown include unvalidated device data.

## 2020-01-16 NOTE — PROGRESS NOTES
RENAL DAILY PROGRESS NOTE    Subjective:   Admitted for hyperglycemia, seen for EFRAIN/CKD stage 3    Complaint: denies any SOB/CP. Awaiting surgery today.  Family at bedside    Current Facility-Administered Medications   Medication Dose Route Frequency    insulin lispro (HUMALOG) injection   SubCUTAneous ONCE    dextrose 5 % - 0.45% NaCl infusion  25 mL/hr IntraVENous CONTINUOUS    ceFAZolin (ANCEF) 2g IVPB in 50 mL D5W  2 g IntraVENous ONCE    ceFAZolin in dextrose (iso-os) 2 gram/50 mL IVPB pgbk        ferrous sulfate tablet 325 mg  1 Tab Oral DAILY WITH BREAKFAST    0.45% sodium chloride infusion  75 mL/hr IntraVENous CONTINUOUS    amLODIPine (NORVASC) tablet 5 mg  5 mg Oral DAILY    dextrose (D25W) 25% injection 10 mL  2.5 g IntraVENous PRN    insulin lispro (HUMALOG) injection   SubCUTAneous AC&HS    famotidine (PEPCID) tablet 20 mg  20 mg Oral DAILY    insulin glargine (LANTUS) injection 28 Units  28 Units SubCUTAneous DAILY    bisacodyl (DULCOLAX) suppository 10 mg  10 mg Rectal DAILY PRN    polyethylene glycol (MIRALAX) packet 17 g  17 g Oral DAILY    aspirin chewable tablet 81 mg  81 mg Oral DAILY    oxyCODONE-acetaminophen (PERCOCET) 5-325 mg per tablet 1-2 Tab  1-2 Tab Oral Q6H PRN    morphine injection 2 mg  2 mg IntraVENous Q4H PRN    dextrose 10% infusion 125-250 mL  125-250 mL IntraVENous PRN    heparin (porcine) injection 5,000 Units  5,000 Units SubCUTAneous Q8H    glucose chewable tablet 16 g  4 Tab Oral PRN    glucagon (GLUCAGEN) injection 1 mg  1 mg IntraMUSCular PRN           Objective:     Patient Vitals for the past 24 hrs:   Temp Pulse Resp BP SpO2   01/16/20 1317 99.3 °F (37.4 °C) 80 20 174/64 99 %   01/16/20 1152 98.5 °F (36.9 °C) 100 20 147/75 98 %   01/16/20 0741 98.1 °F (36.7 °C) 80 14 155/70 96 %   01/16/20 0432 98.1 °F (36.7 °C) 88 16 132/71 98 %   01/16/20 0000 98.5 °F (36.9 °C) 82 16 138/69 96 %   01/15/20 2018 97.2 °F (36.2 °C) 100 17 154/72 97 %   01/15/20 1502 98.6 °F (37 °C) 90 15 136/78 98 %        Weight change:      01/14 1901 - 01/16 0700  In: 750 [P.O.:750]  Out: 2231 [Urine:2230]    Intake/Output Summary (Last 24 hours) at 1/16/2020 1422  Last data filed at 1/16/2020 1129  Gross per 24 hour   Intake 250 ml   Output 1431 ml   Net -1181 ml     Physical Exam: awake, not in any resp distress, afebrile    HEENT: non icteric  Neck: regular no rub  Cardiovascular: regular, no rub  C/L: clear ant/lat  Abdomen: soft + BS  Ext: Right foot, black shriveled toes and plantar surface, swollen and blistered dorsum, cool to touch, no pain or redness    Data Review:     LABS:   Hematology:   Recent Labs     01/15/20  0333 01/14/20  0408   WBC 8.9 8.8   HGB 8.0* 7.8*   HCT 24.9* 24.7*   Pl 375K  Chemistry:   Recent Labs     01/15/20  0333 01/14/20  0408 01/13/20  1940   BUN 17 13  --    CREA 1.38* 1.41*  --    CA 8.6 8.5 8.7   K 5.4 5.0  --     144  --    * 116*  --    CO2 22 24  --    GLU 60* 139*  --    Phos 2.4  Lipase 1160  IPTH 180 Vit D 25 OH 71.9  UMACR 262  Fe 33 Sat 19% ivy 100    IMAGES: CTA abd with runoff Bilateral renal A patent Kidneys are malrotated no hydro    CULTURE: Blood c/s neg    IMPRESSION AND PLAN:   EFRAIN/CKD stage 3, crea much better than baseline. Cont to trend, Good urine output, adjust IVF. Avoid nephrotoxic drugs and adjust meds to renal fxn. 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 SHPT from CKD, negative for Vit D def  HTN cont to trend BP goal <130/80. Cont CCB  Anemia from chronic illness/ infection/fe def cont fe supp, Trend Hgb post op. DM 2 with proteinuria. Will benefit from ACEI or ARB use once renal function is stable and acute issues are resolved.   Dry gangrene right foot revascularization procedure ( fem Pop) sched today     Discussed with Dr Dillan Morin MD  1/16/2020

## 2020-01-16 NOTE — PROGRESS NOTES
Bedside shift change report given to Mary Ellen RN (oncoming nurse) by Michelle Koroma RN (offgoing nurse). Report included the following information SBAR, Kardex and MAR.

## 2020-01-17 LAB
ANION GAP SERPL CALC-SCNC: 6 MMOL/L (ref 3–18)
BUN SERPL-MCNC: 15 MG/DL (ref 7–18)
BUN/CREAT SERPL: 11 (ref 12–20)
CALCIUM SERPL-MCNC: 8.5 MG/DL (ref 8.5–10.1)
CHLORIDE SERPL-SCNC: 111 MMOL/L (ref 100–111)
CO2 SERPL-SCNC: 21 MMOL/L (ref 21–32)
CREAT SERPL-MCNC: 1.32 MG/DL (ref 0.6–1.3)
ERYTHROCYTE [DISTWIDTH] IN BLOOD BY AUTOMATED COUNT: 14.3 % (ref 11.6–14.5)
GLUCOSE BLD STRIP.AUTO-MCNC: 179 MG/DL (ref 70–110)
GLUCOSE BLD STRIP.AUTO-MCNC: 188 MG/DL (ref 70–110)
GLUCOSE BLD STRIP.AUTO-MCNC: 224 MG/DL (ref 70–110)
GLUCOSE BLD STRIP.AUTO-MCNC: 244 MG/DL (ref 70–110)
GLUCOSE SERPL-MCNC: 215 MG/DL (ref 74–99)
HCT VFR BLD AUTO: 22.4 % (ref 35–45)
HGB BLD-MCNC: 7.2 G/DL (ref 12–16)
MCH RBC QN AUTO: 31.9 PG (ref 24–34)
MCHC RBC AUTO-ENTMCNC: 32.1 G/DL (ref 31–37)
MCV RBC AUTO: 99.1 FL (ref 74–97)
PLATELET # BLD AUTO: 365 K/UL (ref 135–420)
PMV BLD AUTO: 10.5 FL (ref 9.2–11.8)
POTASSIUM SERPL-SCNC: 5.4 MMOL/L (ref 3.5–5.5)
RBC # BLD AUTO: 2.26 M/UL (ref 4.2–5.3)
SODIUM SERPL-SCNC: 138 MMOL/L (ref 136–145)
WBC # BLD AUTO: 10 K/UL (ref 4.6–13.2)

## 2020-01-17 PROCEDURE — 74011250637 HC RX REV CODE- 250/637: Performed by: NURSE PRACTITIONER

## 2020-01-17 PROCEDURE — 74011250637 HC RX REV CODE- 250/637: Performed by: INTERNAL MEDICINE

## 2020-01-17 PROCEDURE — 74011250636 HC RX REV CODE- 250/636: Performed by: PHYSICIAN ASSISTANT

## 2020-01-17 PROCEDURE — 74011250637 HC RX REV CODE- 250/637: Performed by: EMERGENCY MEDICINE

## 2020-01-17 PROCEDURE — 74011250637 HC RX REV CODE- 250/637: Performed by: HOSPITALIST

## 2020-01-17 PROCEDURE — 94762 N-INVAS EAR/PLS OXIMTRY CONT: CPT

## 2020-01-17 PROCEDURE — 74011250636 HC RX REV CODE- 250/636: Performed by: SURGERY

## 2020-01-17 PROCEDURE — 85027 COMPLETE CBC AUTOMATED: CPT

## 2020-01-17 PROCEDURE — 74011250636 HC RX REV CODE- 250/636: Performed by: HOSPITALIST

## 2020-01-17 PROCEDURE — 80048 BASIC METABOLIC PNL TOTAL CA: CPT

## 2020-01-17 PROCEDURE — 82962 GLUCOSE BLOOD TEST: CPT

## 2020-01-17 PROCEDURE — 77030005513 HC CATH URETH FOL11 MDII -B

## 2020-01-17 PROCEDURE — 74011636637 HC RX REV CODE- 636/637: Performed by: INTERNAL MEDICINE

## 2020-01-17 PROCEDURE — 74011000258 HC RX REV CODE- 258: Performed by: SURGERY

## 2020-01-17 PROCEDURE — 65660000000 HC RM CCU STEPDOWN

## 2020-01-17 RX ORDER — TAMSULOSIN HYDROCHLORIDE 0.4 MG/1
0.4 CAPSULE ORAL DAILY
Status: DISCONTINUED | OUTPATIENT
Start: 2020-01-17 | End: 2020-02-04 | Stop reason: HOSPADM

## 2020-01-17 RX ADMIN — INSULIN LISPRO 2 UNITS: 100 INJECTION, SOLUTION INTRAVENOUS; SUBCUTANEOUS at 17:02

## 2020-01-17 RX ADMIN — HEPARIN SODIUM 5000 UNITS: 5000 INJECTION INTRAVENOUS; SUBCUTANEOUS at 21:40

## 2020-01-17 RX ADMIN — OXYCODONE HYDROCHLORIDE AND ACETAMINOPHEN 1 TABLET: 5; 325 TABLET ORAL at 21:41

## 2020-01-17 RX ADMIN — FERROUS SULFATE TAB 325 MG (65 MG ELEMENTAL FE) 325 MG: 325 (65 FE) TAB at 08:12

## 2020-01-17 RX ADMIN — DEXTROSE MONOHYDRATE AND SODIUM CHLORIDE 75 ML/HR: 5; .45 INJECTION, SOLUTION INTRAVENOUS at 06:04

## 2020-01-17 RX ADMIN — FAMOTIDINE 20 MG: 20 TABLET, FILM COATED ORAL at 08:11

## 2020-01-17 RX ADMIN — MORPHINE SULFATE 2 MG: 2 INJECTION, SOLUTION INTRAMUSCULAR; INTRAVENOUS at 06:45

## 2020-01-17 RX ADMIN — HYDRALAZINE HYDROCHLORIDE 10 MG: 20 INJECTION, SOLUTION INTRAMUSCULAR; INTRAVENOUS at 06:01

## 2020-01-17 RX ADMIN — HYDRALAZINE HYDROCHLORIDE 10 MG: 20 INJECTION, SOLUTION INTRAMUSCULAR; INTRAVENOUS at 12:27

## 2020-01-17 RX ADMIN — POLYETHYLENE GLYCOL 3350 17 G: 17 POWDER, FOR SOLUTION ORAL at 08:15

## 2020-01-17 RX ADMIN — HYDROMORPHONE HYDROCHLORIDE 0.5 MG: 1 INJECTION, SOLUTION INTRAMUSCULAR; INTRAVENOUS; SUBCUTANEOUS at 14:35

## 2020-01-17 RX ADMIN — INSULIN LISPRO 4 UNITS: 100 INJECTION, SOLUTION INTRAVENOUS; SUBCUTANEOUS at 08:12

## 2020-01-17 RX ADMIN — INSULIN GLARGINE 28 UNITS: 100 INJECTION, SOLUTION SUBCUTANEOUS at 08:12

## 2020-01-17 RX ADMIN — Medication 81 MG: at 08:12

## 2020-01-17 RX ADMIN — HYDROMORPHONE HYDROCHLORIDE 0.5 MG: 1 INJECTION, SOLUTION INTRAMUSCULAR; INTRAVENOUS; SUBCUTANEOUS at 09:31

## 2020-01-17 RX ADMIN — HEPARIN SODIUM 5000 UNITS: 5000 INJECTION INTRAVENOUS; SUBCUTANEOUS at 12:27

## 2020-01-17 RX ADMIN — INSULIN LISPRO 4 UNITS: 100 INJECTION, SOLUTION INTRAVENOUS; SUBCUTANEOUS at 12:26

## 2020-01-17 RX ADMIN — AMLODIPINE BESYLATE 5 MG: 5 TABLET ORAL at 08:12

## 2020-01-17 RX ADMIN — INSULIN LISPRO 2 UNITS: 100 INJECTION, SOLUTION INTRAVENOUS; SUBCUTANEOUS at 21:41

## 2020-01-17 RX ADMIN — HYDRALAZINE HYDROCHLORIDE 10 MG: 20 INJECTION, SOLUTION INTRAMUSCULAR; INTRAVENOUS at 19:10

## 2020-01-17 RX ADMIN — TAMSULOSIN HYDROCHLORIDE 0.4 MG: 0.4 CAPSULE ORAL at 15:27

## 2020-01-17 RX ADMIN — HEPARIN SODIUM 5000 UNITS: 5000 INJECTION INTRAVENOUS; SUBCUTANEOUS at 03:53

## 2020-01-17 RX ADMIN — Medication 10 ML: at 06:02

## 2020-01-17 RX ADMIN — MORPHINE SULFATE 2 MG: 2 INJECTION, SOLUTION INTRAMUSCULAR; INTRAVENOUS at 00:07

## 2020-01-17 NOTE — PROGRESS NOTES
[facility] Hospitalist Group  Progress Note    Patient: Kiran Perez Age: 79 y.o. : 1940 MR#: [id number] SSN: [id number]  Date/Time: 2020     Subjective:     Review of systems    No CP   NO NVD  No SOB  NO Cough     Patient appears very comfortable    Assessment/Plan:   40-year-old -American female past medical history of severe PVD now right lower limb ischemia and dry gangrene, uncontrolled diabetes mellitus type 2    1. PVD  -  LORENA indicates severe to critical arterial insuff at [place]-fem level, fem-pop level.  -Followed by vascular surgery  -On aspirin    2 uncontrolled diabetes mellitus type 2-hyperglycemia  -Continue Lantus and SSI    3 urinary retention  -Straight cath, additional Flomax, monitor, if no improvement in urology consultation    4 hypertension continue Norvasc and PRN hydralazine    5 anemia of chronic illness        Full code          Case discussed with:  [x]Patient  [] Family ( In room with patient )    [x]Nursing  []Case Management  DVT Prophylaxis:  []Lovenox  []Hep SQ  []SCDs  []Coumadin   []On Heparin gtt          Objective:   VS:   Visit Vitals  /57   Pulse (!) 108   Temp 97.9 °F (36.6 °C)   Resp 17   Ht 5' 5\" (1.651 m)   Wt 72.5 kg (159 lb 14.4 oz)   SpO2 98%   Breastfeeding No   BMI 26.61 kg/m²      Tmax/24hrs: Temp (24hrs), Av °F (36.7 °C), Min:97.9 °F (36.6 °C), Max:98.1 °F (36.7 °C)  IOBRIEF    Intake/Output Summary (Last 24 hours) at 2020 1426  Last data filed at 2020 1000  Gross per 24 hour   Intake 3286.67 ml   Output 1240 ml   Net 2046.67 ml       General:  Alert, cooperative, no acute distress   Cardiovascular: S1S2 - regular , No Murmur   Pulmonary: Equal expansion , No Use of accessory muscles , No Rales No Rhonchi    GI:  +BS in all four quadrants, soft, non-tender  Extremities: Right foot dry gangrene with developing demarcation line  Neuro: Alert and oriented X 2.        Medications:   Current Facility-Administered Medications   Medication Dose Route Frequency    tamsulosin (FLOMAX) capsule 0.4 mg  0.4 mg Oral DAILY    HYDROmorphone (DILAUDID) syringe 0.5 mg  0.5 mg IntraVENous Q2H PRN    hydrALAZINE (APRESOLINE) 20 mg/mL injection 10 mg  10 mg IntraVENous Q6H    nitroglycerin (TRIDIL) 400 mcg/ml infusion  0-20 mcg/min IntraVENous TITRATE    dextrose 5 % - 0.45% NaCl infusion  75 mL/hr IntraVENous CONTINUOUS    ferrous sulfate tablet 325 mg  1 Tab Oral DAILY WITH BREAKFAST    amLODIPine (NORVASC) tablet 5 mg  5 mg Oral DAILY    dextrose (D25W) 25% injection 10 mL  2.5 g IntraVENous PRN    insulin lispro (HUMALOG) injection   SubCUTAneous AC&HS    famotidine (PEPCID) tablet 20 mg  20 mg Oral DAILY    insulin glargine (LANTUS) injection 28 Units  28 Units SubCUTAneous DAILY    bisacodyl (DULCOLAX) suppository 10 mg  10 mg Rectal DAILY PRN    polyethylene glycol (MIRALAX) packet 17 g  17 g Oral DAILY    aspirin chewable tablet 81 mg  81 mg Oral DAILY    oxyCODONE-acetaminophen (PERCOCET) 5-325 mg per tablet 1-2 Tab  1-2 Tab Oral Q6H PRN    morphine injection 2 mg  2 mg IntraVENous Q4H PRN    dextrose 10% infusion 125-250 mL  125-250 mL IntraVENous PRN    heparin (porcine) injection 5,000 Units  5,000 Units SubCUTAneous Q8H    glucose chewable tablet 16 g  4 Tab Oral PRN    glucagon (GLUCAGEN) injection 1 mg  1 mg IntraMUSCular PRN       Labs:    Recent Labs     01/17/20  0505 01/15/20  0333   WBC 10.0 8.9   HGB 7.2* 8.0*   HCT 22.4* 24.9*    375     Recent Labs     01/17/20  0505 01/15/20  0333    140   K 5.4 5.4    112*   CO2 21 22   * 60*   BUN 15 17   CREA 1.32* 1.38*   CA 8.5 8.6         Signed By: Demetrios Landau, MD     January 17, 2020

## 2020-01-17 NOTE — DIABETES MGMT
Glycemic Control Plan of Care    Reattempted to speak to patient this afternoon but she remain sleepy. T2DM with current A1c of 14.2% (02/02/2020). See separate notes, 01/06/2020, for assessment of home diabetes management and education. Patient stayed awake, participated in discussion and answered the questions appropriately. Patient stated, \"I didn't take my insulin because I got tired of it. I only took the glipizide and metformin. \"  Home diabetes medications: As reported by patient on 01/06/2020:  Glipizide but she cannot remember dose and frequency. Metformin but she cannot remember dose and frequency. Lantus insulin daily but she cannot remember the dose and stated, \"it's been years since the last time I took it. I stopped it on my own because I got tired taking injections. She didn't tell her doctor. Patient stated that she didn't have problem drawing and injecting insulin when she was taking it.    01/10/2020: F/U discussion with patient about about the impt of diabetes med compliance and she responded by saying that she will resume taking insulin from now. POC BG range on 01/16/2020: . Patient was treated with D10 for hypolgycemia, no insulin given and placed on IVF of D5 1/2NS cont infusion at 75 ml/hr. POC BG report on 01/17/2020 at time of review: 244, 224. Resumed lantus insulin 28 units daily and sliding scale lispro. Patient cont on IVF of D5 1/2NS cont infusion at 75 ml/hr. Seen patient this afternoon. Received report from nursing that patient having very little po intake at this time. Recommendation(s):  1.) Cont inpatient glycemic monitoring and intervention. 2.) Monitor po intake. 3.) Discontinue dextrose in IVF when medically appropriate.     Assessment:  Patient is [de-identified]year old with past medical history including diabetes mellitus, hypertension and hypercholesterolemia - was admitted to ED on 01/03/2020 with report of high blood sugar and high lipase. Noted:  T2DM. Acute pancreatitis. Acute on chronic renal failure stage 3. Gangrene, right foot; right femoral artery occlusion. Status post right femoral to above knee popliteal bypass on 01/16/2020. Most recent blood glucose values:    Results for Mally Zamudio (MRN [id number]) as of 1/17/2020 13:40   Ref. Range 1/16/2020 06:57 1/16/2020 07:29 1/16/2020 11:54 1/16/2020 13:26 1/16/2020 14:46 1/16/2020 18:19 1/16/2020 18:20 1/16/2020 18:46 1/16/2020 21:41   GLUCOSE,FAST - POC Latest Ref Range: 70 - 110 mg/dL 58 (L) 109 92 93 92 64 (L) 73 75 106     Results for Mally Zamudio (MRN [id number]) as of 1/17/2020 13:40   Ref. Range 1/17/2020 07:59 1/17/2020 12:26   GLUCOSE,FAST - POC Latest Ref Range: 70 - 110 mg/dL 244 (H) 224 (H)     Current A1C: 14.2% (02/02/2020) which is equivalent to estimated average blood glucose of 361 mg/dL during the past 2-3 months. Current hospital diabetes medications:  Basal lantus insulin 28 units daily. Correctional lispro insulin every 6 hours. Very resistant dose. Total daily dose insulin requirement previous day: 01/16/2020  None.     Diet: Diabetic consistent carb regular; nutr suppl: glucerna shake    Goals:  Blood glucose will be within target range of  mg/dL by 01/20/2020    Education:    _X__  Refer to Diabetes Education Record: 01/06/2020  ___  Education not indicated at this time    Avni Sanders RN [facility]  Pager: [phone]

## 2020-01-17 NOTE — PROGRESS NOTES
OT order received and chart reviewed. Patient currently has an active bedrest order. Orders checked at [phone] and 1402. Please discontinue bedrest order for full participation in skilled OT evaluation/treatment. Will follow up tomorrow as patient schedule allows.     Thank you for the referral.    Bertrand Page MS, OTR/L

## 2020-01-17 NOTE — PROGRESS NOTES
Currently the discharge plan is home. Waiting on PT/Ot recommendations. Will continue to follow and arrange appropriate  Transition plans.  Lisandra Garza, [phone]

## 2020-01-17 NOTE — PROGRESS NOTES
Admit Date: 2020    POD 1 Day Post-Op    Procedure:  Procedure(s):  RIGHT FEMORAL-POPLITEAL BYPASS    Subjective:     Patient has no new complaints. Objective:     Blood pressure 150/46, pulse (!) 102, temperature 97.9 °F (36.6 °C), resp. rate 18, height 5' 5\" (1.651 m), weight 159 lb 14.4 oz (72.5 kg), SpO2 97 %, not currently breastfeeding. Temp (24hrs), Av.2 °F (36.8 °C), Min:97.9 °F (36.6 °C), Max:99.3 °F (37.4 °C)      Physical Exam:    Pt awake and alert  NAD  Right groin incision with prevena dressing  Lower leg incision c/d/i  Weak doppler signals distally but present.  Good bypass signal  Dry gangrene of toes right foot    Labs:   Recent Results (from the past 24 hour(s))   GLUCOSE, POC    Collection Time: 20  7:29 AM   Result Value Ref Range    Glucose (POC) 109 70 - 110 mg/dL   TYPE & SCREEN    Collection Time: 20 10:17 AM   Result Value Ref Range    Crossmatch Expiration 2020     ABO/Rh(D) A POSITIVE     Antibody screen NEG    GLUCOSE, POC    Collection Time: 20 11:54 AM   Result Value Ref Range    Glucose (POC) 92 70 - 110 mg/dL   GLUCOSE, POC    Collection Time: 20  1:26 PM   Result Value Ref Range    Glucose (POC) 93 70 - 110 mg/dL   POC 6 PLUS    Collection Time: 20  2:46 PM   Result Value Ref Range    Sodium,  136 - 145 MMOL/L    Potassium, POC 5.2 3.5 - 5.5 MMOL/L    Chloride,  (H) 100 - 108 MMOL/L    BUN, POC 13 7 - 18 MG/DL    Glucose, POC 92 74 - 106 MG/DL    Hematocrit, POC 22 (L) 36 - 49 %    Hemoglobin, POC 7.5 (L) 12 - 16 G/DL   GLUCOSE, POC    Collection Time: 20  6:19 PM   Result Value Ref Range    Glucose (POC) 64 (L) 70 - 110 mg/dL   GLUCOSE, POC    Collection Time: 20  6:20 PM   Result Value Ref Range    Glucose (POC) 73 70 - 110 mg/dL   GLUCOSE, POC    Collection Time: 20  6:46 PM   Result Value Ref Range    Glucose (POC) 75 70 - 110 mg/dL   GLUCOSE, POC    Collection Time: 20  9:41 PM   Result Value Ref Range    Glucose (POC) 106 70 - 110 mg/dL   CBC W/O DIFF    Collection Time: 01/17/20  5:05 AM   Result Value Ref Range    WBC 10.0 4.6 - 13.2 K/uL    RBC 2.26 (L) 4.20 - 5.30 M/uL    HGB 7.2 (L) 12.0 - 16.0 g/dL    HCT 22.4 (L) 35.0 - 45.0 %    MCV 99.1 (H) 74.0 - 97.0 FL    MCH 31.9 24.0 - 34.0 PG    MCHC 32.1 31.0 - 37.0 g/dL    RDW 14.3 11.6 - 14.5 %    PLATELET 867 534 - 241 K/uL    MPV 10.5 9.2 - 63.3 FL   METABOLIC PANEL, BASIC    Collection Time: 01/17/20  5:05 AM   Result Value Ref Range    Sodium 138 136 - 145 mmol/L    Potassium 5.4 3.5 - 5.5 mmol/L    Chloride 111 100 - 111 mmol/L    CO2 21 21 - 32 mmol/L    Anion gap 6 3.0 - 18 mmol/L    Glucose 215 (H) 74 - 99 mg/dL    BUN 15 7.0 - 18 MG/DL    Creatinine 1.32 (H) 0.6 - 1.3 MG/DL    BUN/Creatinine ratio 11 (L) 12 - 20      GFR est AA 47 (L) >60 ml/min/1.73m2    GFR est non-AA 39 (L) >60 ml/min/1.73m2    Calcium 8.5 8.5 - 10.1 MG/DL         Assessment:     Active Problems:    Pancreatitis (1/3/2020)      Hyperosmolar hyperglycemic coma due to diabetes mellitus without ketoacidosis ([facility] [address] (1/3/2020)      Hyperosmolar non-ketotic state in patient with type 2 diabetes mellitus ([facility] [address] (1/3/2020)        Plan/Recommendations/Medical Decision Making:      Follow up podiatry recs - probable TMA  Otherwise plan transfer to tele  Pt/ot miryam, okrystal

## 2020-01-17 NOTE — PROGRESS NOTES
RENAL DAILY PROGRESS NOTE    Subjective:   Admitted for hyperglycemia, seen for EFRAIN/CKD stage 3    Complaint: denies any SOB/CP.  Post fem pop right, denies any leg/foot pain    Current Facility-Administered Medications   Medication Dose Route Frequency    HYDROmorphone (DILAUDID) syringe 0.5 mg  0.5 mg IntraVENous Q2H PRN    hydrALAZINE (APRESOLINE) 20 mg/mL injection 10 mg  10 mg IntraVENous Q6H    nitroglycerin (TRIDIL) 400 mcg/ml infusion  0-20 mcg/min IntraVENous TITRATE    dextrose 5 % - 0.45% NaCl infusion  75 mL/hr IntraVENous CONTINUOUS    ferrous sulfate tablet 325 mg  1 Tab Oral DAILY WITH BREAKFAST    amLODIPine (NORVASC) tablet 5 mg  5 mg Oral DAILY    dextrose (D25W) 25% injection 10 mL  2.5 g IntraVENous PRN    insulin lispro (HUMALOG) injection   SubCUTAneous AC&HS    famotidine (PEPCID) tablet 20 mg  20 mg Oral DAILY    insulin glargine (LANTUS) injection 28 Units  28 Units SubCUTAneous DAILY    bisacodyl (DULCOLAX) suppository 10 mg  10 mg Rectal DAILY PRN    polyethylene glycol (MIRALAX) packet 17 g  17 g Oral DAILY    aspirin chewable tablet 81 mg  81 mg Oral DAILY    oxyCODONE-acetaminophen (PERCOCET) 5-325 mg per tablet 1-2 Tab  1-2 Tab Oral Q6H PRN    morphine injection 2 mg  2 mg IntraVENous Q4H PRN    dextrose 10% infusion 125-250 mL  125-250 mL IntraVENous PRN    heparin (porcine) injection 5,000 Units  5,000 Units SubCUTAneous Q8H    glucose chewable tablet 16 g  4 Tab Oral PRN    glucagon (GLUCAGEN) injection 1 mg  1 mg IntraMUSCular PRN           Objective:     Patient Vitals for the past 24 hrs:   Temp Pulse Resp BP SpO2   01/17/20 1000  (!) 108 17 141/57 98 %   01/17/20 0900  (!) 111 15 147/57    01/17/20 0800  (!) 110 21 131/68 99 %   01/17/20 0700  (!) 115 15 136/45 99 %   01/17/20 0600  (!) 102 18 150/46 97 %   01/17/20 0500  88 17 147/42 96 %   01/17/20 0400 97.9 °F (36.6 °C) (!) 102 15 148/52 99 %   01/17/20 0300  92 17 148/42 97 %   01/17/20 022  95 19 157/58 99 %   01/17/20 0200  (!) 104 20 175/60 99 %   01/17/20 0100  (!) 103 12 163/51 100 %   01/17/20 0000 98.1 °F (36.7 °C) (!) 113 22 156/70 100 %   01/16/20 2359  (!) 113 (!) 32  97 %   01/16/20 2329  (!) 102 16 139/47 99 %   01/16/20 2300  86 19 173/58    01/16/20 2200  92 20 150/46    01/16/20 2115  86 17 142/47    01/16/20 2100  87 21 146/48    01/16/20 2045  79 17 155/47    01/16/20 2030  88 14 158/46    01/16/20 2015  94 18 142/58 92 %   01/16/20 2000 98.1 °F (36.7 °C) 89 15 142/53 99 %   01/16/20 1945  91 14 140/52 100 %   01/16/20 1930  90 17 142/50 100 %   01/16/20 1915  92 18 116/66 98 %   01/16/20 1900  99 21 121/86    01/16/20 1830  89 17 (!) 166/104    01/16/20 1829    174/80    01/16/20 1815  91 20 174/80 99 %   01/16/20 1805  99 25 (!) 178/103 93 %   01/16/20 1800 97.9 °F (36.6 °C) (!) 102 19 (!) 154/143 95 %   01/16/20 1751 98 °F (36.7 °C) 100 20 140/66 100 %   01/16/20 1749  (!) 103 23  100 %   01/16/20 1747  (!) 104 21  100 %        Weight change:      01/15 1901 - 01/17 0700  In: 2881.7 [I.V.:2881.7]  Out: 5595 [Urine:1740]    Intake/Output Summary (Last 24 hours) at 1/17/2020 1337  Last data filed at 1/17/2020 1000  Gross per 24 hour   Intake 3286.67 ml   Output 1240 ml   Net 2046.67 ml     Physical Exam: awake, not in any resp distress, afebrile    HEENT: non icteric  Neck: regular no rub  Cardiovascular: regular, no rub  C/L: clear ant/lat  Abdomen: soft + BS  Ext: Right foot, black shriveled toes and plantar surface, swollen and blistered dorsum, cool to touch, no pain or redness    Data Review:     LABS:   Hematology:   Recent Labs     01/17/20  0505 01/15/20  0333   WBC 10.0 8.9   HGB 7.2* 8.0*   HCT 22.4* 24.9*   Pl 365K  Chemistry:   Recent Labs     01/17/20  0505 01/15/20  0333   BUN 15 17   CREA 1.32* 1.38*   CA 8.5 8.6   K 5.4 5.4    140    112*   CO2 21 22   * 60*   Phos 2.4  Lipase 1160  IPTH 180 Vit D 25 OH 71.9  UMACR 262  Fe 33 Sat 19% ivy 100    IMAGES: CTA abd with runoff Bilateral renal A patent Kidneys are malrotated no hydro    CULTURE: Blood c/s neg    IMPRESSION AND PLAN:   EFRAIN/CKD stage 3, crea stable post op. Cont to trend, Good urine output. Avoid nephrotoxic drugs and adjust meds to renal fxn. 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 SHPT from CKD, negative for Vit D def  HTN cont to trend BP goal <130/80. Cont CCB  Anemia from chronic illness/ infection/fe def cont fe supp, drop Hgb post op. DM 2 with proteinuria. Will benefit from ACEI or ARB use once renal function is stable and acute issues are resolved.   Dry gangrene right foot  Post revascularization procedure ( fem Pop)          Josy Johnson MD  1/17/2020

## 2020-01-17 NOTE — PROGRESS NOTES
NUTRITION    Nursing Referral: [facility]  Nutrition Consult: Diet Education, Other     RECOMMENDATIONS / PLAN:     - Continue current nutrition interventions. - Encourage meal intake. - Continue RD inpatient monitoring and evaluation. NUTRITION INTERVENTIONS & DIAGNOSIS:     - Meals/snacks: modified composition  - Medical food supplement therapy: Nepro once daily  - Nutrition Education: low potassium diet education provided 1/10/2020    Nutrition Diagnosis: Predicted inadequate energy intake related to appetite as evidenced by pt with overall fair meal intake since admission, variable    ASSESSMENT:     1/17: S/p left AKA 1/16. Good intake prior to surgery, now with decreased intake. Pt reports poor appetite. Encouraged meal intake, pt receptive. Pt reports consuming some of the Nepro prior to surgery, agreeable to continuing. 1/10: Poor to fair meal intake, denies nausea/vomiting or pain. Dislikes Glucerna and noted hyperkalemia (kayexalate given). EFRAIN 2/2 dehydration from severe pancreatitis. Nutritional needs modified. 1/8: Pt on po diet. Tolerating most meals; stated some foods are \"too heavy\" feeling, preferences discussed. Sometimes has fair meal intake. Agreeable to nutrition supplement. 1/3: Pancreatitis, hyperglycemia and acute on chronic renal failure. Per MD appears to be non-compliant with insulin, transitioned off insulin drip to lantus. Remains NPO on maintenance IVF with altered mentation and drowsy per chart review.     Nutritional intake adequate to meet patients estimated nutritional needs:  Unable to determine at this time    Diet: DIET NUTRITIONAL SUPPLEMENTS Dinner; NEPRO  DIET DIABETIC CONSISTENT CARB Regular      Food Allergies: NKFA  Current Appetite: Poor  Appetite/meal intake prior to admission: Unable to determine at this time  Feeding Limitations:  [] Swallowing difficulty    [] Chewing difficulty    [] Other:  Current Meal Intake:   Patient Vitals for the past 100 hrs:   % Diet Eaten   01/15/20 1826 75 %   01/15/20 1329 75 %   01/15/20 0903 50 %   01/14/20 1416 75 %   01/14/20 1024 75 %   01/13/20 1338 0 %       BM: 1/15  Skin Integrity: incision to right groin with wound vac and leg  Edema:   [] No     [x] Yes   Pertinent Medications: Reviewed: pepcid, ferrous sulfate, bowel regimen, lantus 28 units, SSI    Recent Labs     01/17/20  0505 01/15/20  0333    140   K 5.4 5.4    112*   CO2 21 22   * 60*   BUN 15 17   CREA 1.32* 1.38*   CA 8.5 8.6       Intake/Output Summary (Last 24 hours) at 1/17/2020 1404  Last data filed at 1/17/2020 1000  Gross per 24 hour   Intake 3286.67 ml   Output 1240 ml   Net 2046.67 ml       Anthropometrics:  Ht Readings from Last 1 Encounters:   01/05/20 5' 5\" (1.651 m)     Last 3 Recorded Weights in this Encounter    01/02/20 2149 01/05/20 1617 01/17/20 0605   Weight: 68.9 kg (151 lb 14.4 oz) 74.7 kg (164 lb 9.6 oz) 72.5 kg (159 lb 14.4 oz)     Body mass index is 26.61 kg/m².        Weight History:     Weight Metrics 1/17/2020 12/26/2018 7/10/2017   Weight 159 lb 14.4 oz 160 lb 9.6 oz 115 lb   BMI 26.61 kg/m2 26.73 kg/m2 19.14 kg/m2        Admitting Diagnosis: Pancreatitis [K85.90]  Hyperosmolar hyperglycemic coma due to diabetes mellitus without ketoacidosis (HCC) [E11.01]  Hyperosmolar non-ketotic state in patient with type 2 diabetes mellitus ([facility] [address] [E11.00]  Pertinent PMHx: DM, HTN, hypercholesterolemia    Education Needs:        [] None identified  [] Identified - Not appropriate at this time  [x]  Identified and addressed - refer to education log  Learning Limitations:   [x] None identified  [] Identified:   Cultural, Church & ethnic food preferences:  [x] None identified    [] Identified and addressed     ESTIMATED NUTRITION NEEDS:     Calories: 3688-3970 kcal (20-30 kcal/kg) based on  [x] Actual BW: 69 kg      [] IBW   Protein: 83-90 gm (1.2-1.3 gm/kg) based on  [x] Actual BW      [] IBW   Fluid: 1 mL/kcal     MONITORING & EVALUATION: Nutrition Goal(s): goals modified  - PO nutrition intake will meet >75% of patient estimated nutritional needs within the next 7 days. Outcome: Progressing towards goal    - Patient will increase knowledge of appropriate food choices on a low potassium diet prior to discharge. Outcome: Met/Resolved     Monitoring:   [x] Food and nutrient intake   [x] Food and nutrient administration  [x] Comparative standards   [x] Nutrition-focused physical findings   [x] Anthropometric Measurements   [x] Treatment/therapy   [x] Biochemical data, medical tests, and procedures        Previous Recommendations (for follow-up assessments only): Implemented      Discharge Planning: Nutritional discharge needs unknown at this time. Participated in care planning, discharge planning, & interdisciplinary rounds as appropriate.       Mary Jo Ibanez RD, [address]   Pager: [phone]

## 2020-01-17 NOTE — PROGRESS NOTES
PT orders received and chart reviewed. Pt currently on complete bedrest. Please remove order for full participation in PT evaluation. Will continue to follow.  Thank you for this referral.     Alex Keyes PT DPT

## 2020-01-17 NOTE — OP NOTES
[address] [place] [address]   OPERATIVE REPORT    Name:  Marcie Hartmann  MR#:   [id number]  :  1940  ACCOUNT #:  [de-identified]  DATE OF SERVICE:  2020    PREOPERATIVE DIAGNOSES:  Gangrene, right foot; right femoral artery occlusion. POSTOPERATIVE DIAGNOSES:  Gangrene, right foot; right femoral artery occlusion. PROCEDURE PERFORMED:  Right femoral to above-knee popliteal bypass. SURGEON:  DO Cynthia    ASSISTANT:  None. ANESTHESIA:  General.    COMPLICATIONS:  Zero. SPECIMENS REMOVED:  None. IMPLANTS:  6-mm ringed Propaten PTFE graft. ESTIMATED BLOOD LOSS:  Zero. CONDITION OF THE PATIENT:  Stable. DETAILS OF THE PROCEDURE:  The patient came into the hospital with complications of diabetes, but was found to have the PAD and a gangrenous right foot. We talked about an amputation as the primary therapy, she was very against this. She has a lot of tissue loss. I had Podiatry see her, just there maybe a chance for a TMA at the best, but she wants to try this, so I was agreeable. We did an angiogram.  I placed a stent in her nearly occluded common iliac artery a couple of days ago and now doing the fem-pop bypass. She had preop antibiotics course. She was transferred to the room where she had a general anesthesia and Ortega catheter. Her right leg was shaved, prepped and draped in usual standard fashion followed by [facility] compliant guidelines. I placed a bag over the foot to exclude it from the surgical field. First, I made an incision on the groin, I found a small, but patent and soft common femoral artery, I was happy with this. I made an incision above the knee, exposed the popliteal artery, a small but patent and usable popliteal artery. I made a subsartorial tunnel and placed a 6-mm ringed Propaten graft here, anticoagulated her.   After the appropriate waiting period, I placed controls on the common femoral, made an arteriotomy and sewed the graft end-to-side to the common femoral artery with CV-6 sutures. Once this was complete, I clamped the graft, took control off the common femoral and there was a good pulse. Attention was turned to the popliteal artery, I used vessel loops to control this. I made an arteriotomy. I had clear visualization of the lumen. Cut the graft to the appropriate length, removed rings at this site and sewed it, cut it on a bevel, and sewed it end-to-side to the artery using CV-6 Meridian-Benedict suture encircled in a standard fashion. Prior to completion of this, I burped the graft to remove air, backbled the sites, completed the anastomosis and then released all the controls. There was a wonderful pulse in the popliteal artery, I did hand-held Dopplers, multiphasic, I was pleased with this. I irrigated and I did a SurgiFoam at both sites. Closed the fascia with 2-0 Monocryl interrupted, 3-0 Monocryl for subcu, 4-0 Monocryl and Dermabond glue for the skin. She was transferred to Recovery in stable condition.       Jair King DO [id number]  D:  01/16/2020 17:41  T:  01/17/2020 1:54  JOB #:  [id number]

## 2020-01-18 LAB
ANION GAP SERPL CALC-SCNC: 4 MMOL/L (ref 3–18)
BUN SERPL-MCNC: 15 MG/DL (ref 7–18)
BUN/CREAT SERPL: 11 (ref 12–20)
CALCIUM SERPL-MCNC: 8.6 MG/DL (ref 8.5–10.1)
CHLORIDE SERPL-SCNC: 113 MMOL/L (ref 100–111)
CO2 SERPL-SCNC: 24 MMOL/L (ref 21–32)
CREAT SERPL-MCNC: 1.38 MG/DL (ref 0.6–1.3)
ERYTHROCYTE [DISTWIDTH] IN BLOOD BY AUTOMATED COUNT: 14.5 % (ref 11.6–14.5)
GLUCOSE BLD STRIP.AUTO-MCNC: 131 MG/DL (ref 70–110)
GLUCOSE BLD STRIP.AUTO-MCNC: 165 MG/DL (ref 70–110)
GLUCOSE BLD STRIP.AUTO-MCNC: 206 MG/DL (ref 70–110)
GLUCOSE BLD STRIP.AUTO-MCNC: 90 MG/DL (ref 70–110)
GLUCOSE SERPL-MCNC: 119 MG/DL (ref 74–99)
HCT VFR BLD AUTO: 20.3 % (ref 35–45)
HGB BLD-MCNC: 6.5 G/DL (ref 12–16)
MCH RBC QN AUTO: 32 PG (ref 24–34)
MCHC RBC AUTO-ENTMCNC: 32 G/DL (ref 31–37)
MCV RBC AUTO: 100 FL (ref 74–97)
PLATELET # BLD AUTO: 297 K/UL (ref 135–420)
PMV BLD AUTO: 9.8 FL (ref 9.2–11.8)
POTASSIUM SERPL-SCNC: 5.2 MMOL/L (ref 3.5–5.5)
RBC # BLD AUTO: 2.03 M/UL (ref 4.2–5.3)
SODIUM SERPL-SCNC: 141 MMOL/L (ref 136–145)
WBC # BLD AUTO: 8.2 K/UL (ref 4.6–13.2)

## 2020-01-18 PROCEDURE — 74011250637 HC RX REV CODE- 250/637: Performed by: INTERNAL MEDICINE

## 2020-01-18 PROCEDURE — 65660000000 HC RM CCU STEPDOWN

## 2020-01-18 PROCEDURE — 74011636637 HC RX REV CODE- 636/637: Performed by: INTERNAL MEDICINE

## 2020-01-18 PROCEDURE — 80048 BASIC METABOLIC PNL TOTAL CA: CPT

## 2020-01-18 PROCEDURE — 97163 PT EVAL HIGH COMPLEX 45 MIN: CPT

## 2020-01-18 PROCEDURE — 97110 THERAPEUTIC EXERCISES: CPT

## 2020-01-18 PROCEDURE — 74011250636 HC RX REV CODE- 250/636: Performed by: PHYSICIAN ASSISTANT

## 2020-01-18 PROCEDURE — 82962 GLUCOSE BLOOD TEST: CPT

## 2020-01-18 PROCEDURE — 74011250637 HC RX REV CODE- 250/637: Performed by: EMERGENCY MEDICINE

## 2020-01-18 PROCEDURE — 74011000258 HC RX REV CODE- 258: Performed by: SURGERY

## 2020-01-18 PROCEDURE — 74011250636 HC RX REV CODE- 250/636: Performed by: SURGERY

## 2020-01-18 PROCEDURE — 97165 OT EVAL LOW COMPLEX 30 MIN: CPT

## 2020-01-18 PROCEDURE — 77030037878 HC DRSG MEPILEX >48IN BORD MOLN -B

## 2020-01-18 PROCEDURE — 74011250637 HC RX REV CODE- 250/637: Performed by: NURSE PRACTITIONER

## 2020-01-18 PROCEDURE — 85027 COMPLETE CBC AUTOMATED: CPT

## 2020-01-18 PROCEDURE — 36415 COLL VENOUS BLD VENIPUNCTURE: CPT

## 2020-01-18 PROCEDURE — 97535 SELF CARE MNGMENT TRAINING: CPT

## 2020-01-18 PROCEDURE — 74011250637 HC RX REV CODE- 250/637: Performed by: HOSPITALIST

## 2020-01-18 RX ORDER — SODIUM CHLORIDE 9 MG/ML
250 INJECTION, SOLUTION INTRAVENOUS AS NEEDED
Status: DISCONTINUED | OUTPATIENT
Start: 2020-01-18 | End: 2020-01-29

## 2020-01-18 RX ADMIN — DEXTROSE MONOHYDRATE AND SODIUM CHLORIDE 75 ML/HR: 5; .45 INJECTION, SOLUTION INTRAVENOUS at 00:32

## 2020-01-18 RX ADMIN — OXYCODONE HYDROCHLORIDE AND ACETAMINOPHEN 2 TABLET: 5; 325 TABLET ORAL at 08:05

## 2020-01-18 RX ADMIN — OXYCODONE HYDROCHLORIDE AND ACETAMINOPHEN 2 TABLET: 5; 325 TABLET ORAL at 16:26

## 2020-01-18 RX ADMIN — INSULIN GLARGINE 28 UNITS: 100 INJECTION, SOLUTION SUBCUTANEOUS at 08:15

## 2020-01-18 RX ADMIN — FERROUS SULFATE TAB 325 MG (65 MG ELEMENTAL FE) 325 MG: 325 (65 FE) TAB at 08:14

## 2020-01-18 RX ADMIN — HEPARIN SODIUM 5000 UNITS: 5000 INJECTION INTRAVENOUS; SUBCUTANEOUS at 21:24

## 2020-01-18 RX ADMIN — INSULIN LISPRO 2 UNITS: 100 INJECTION, SOLUTION INTRAVENOUS; SUBCUTANEOUS at 08:15

## 2020-01-18 RX ADMIN — INSULIN LISPRO 4 UNITS: 100 INJECTION, SOLUTION INTRAVENOUS; SUBCUTANEOUS at 15:59

## 2020-01-18 RX ADMIN — FAMOTIDINE 20 MG: 20 TABLET, FILM COATED ORAL at 08:15

## 2020-01-18 RX ADMIN — Medication 81 MG: at 08:15

## 2020-01-18 RX ADMIN — AMLODIPINE BESYLATE 5 MG: 5 TABLET ORAL at 08:14

## 2020-01-18 RX ADMIN — POLYETHYLENE GLYCOL 3350 17 G: 17 POWDER, FOR SOLUTION ORAL at 08:15

## 2020-01-18 RX ADMIN — TAMSULOSIN HYDROCHLORIDE 0.4 MG: 0.4 CAPSULE ORAL at 08:14

## 2020-01-18 RX ADMIN — HEPARIN SODIUM 5000 UNITS: 5000 INJECTION INTRAVENOUS; SUBCUTANEOUS at 12:00

## 2020-01-18 RX ADMIN — HEPARIN SODIUM 5000 UNITS: 5000 INJECTION INTRAVENOUS; SUBCUTANEOUS at 05:17

## 2020-01-18 RX ADMIN — EPOETIN ALFA-EPBX 21000 UNITS: 10000 INJECTION, SOLUTION INTRAVENOUS; SUBCUTANEOUS at 11:00

## 2020-01-18 NOTE — PROGRESS NOTES
Progress Note    Patient: Fawn Billings MRN: [id number]  SSN: [id number]    YOB: 1940  Age: 79 y.o. Sex: female      Admit Date: 1/2/2020  2 Days Post-Op     Procedure:   Procedure(s):  RIGHT FEMORAL-POPLITEAL BYPASS    Subjective:     Patient seen resting quiet and comfortably and no apparent distress. Patient is alert but confused. Patient doesn't know date, time. Patient had fem pop bypass on right LE. Objective:     Visit Vitals  /80   Pulse (!) 116   Temp 98.8 °F (37.1 °C)   Resp 17   Ht 5' 5\" (1.651 m)   Wt 71.7 kg (158 lb)   SpO2 100%   Breastfeeding No   BMI 26.29 kg/m²        Physical Exam:  Right DALY: non palpable pedal pulses bilateral, dopplerable monophasic PT and anterior ankle, no pulse audible on doppler on foot, right foot dry gangrene with stable eschar demarkation to level of TMT level dorsally, plantarly eschar demarkation has progressed up to heel now. Ischemic pain noted to right foot. Paresthesia symptoms present to both lower extremities. Manual muscle strength 5/5 bilateral.        Assessment:     Hospital Problems  Date Reviewed: 7/10/2017          Codes Class Noted POA    Pancreatitis ICD-10-CM: K85.90  ICD-9-CM: 055.1  1/3/2020 Unknown        Hyperosmolar hyperglycemic coma due to diabetes mellitus without ketoacidosis (HCC) ICD-10-CM: E11.01  ICD-9-CM: 250.20, 250.30  1/3/2020 Unknown        Hyperosmolar non-ketotic state in patient with type 2 diabetes mellitus [facility]) ICD-10-CM: E11.00  ICD-9-CM: 250.20  1/3/2020 Unknown              Plan/Recommendations/Medical Decision Making:     - Discussed with patient of treatment options. Patient, however, seems unable to comprehend. - Will attempt at [address] [place] [address] Drive but it very likely patient will need proximal amputation in future. Family would like to attempt at [address] [place] [address] [employer] for limb salvage. - Will discuss with vascular surgery and proceed with surgery. - Meantime remain NWB to right LE.

## 2020-01-18 NOTE — PROGRESS NOTES
0700-Assumed care of pt.  0800-Pt assessment done. Pt has purewick and states she does not feel like she has to void at this time. 0812-Scheduled meds given. 0931-Pt requesting pain med for 10/10 pain (see MAR). 1227-SS insulin given with scheduled meds. Pt denies need to void, nothing seen in canister from [address]. 1400-Bladder scan done,, ranges between 400-460ml. Dr. Gwendolyn Clements notified and gave telephone order to straight cath pt.   1435-Pt in pain due to attempts at placing gutierrez. Pain med given (see MAR). 1500-Dr. Gwendolyn Clements notified that 3 nurses have tried to place gutierrez cath and was unsuccessful, requesting urology consult. Dr. Gwendolyn Clements states they may have gone home for the day and to try to place smaller gutierrez if pt will allow. 1545-14F gutierrez placed, 425ml urine output. Bedside shift change report given to Adrianna Martinez RN (oncoming nurse) by Ifrah Niño RN (offgoing nurse).  Report included the following information SBAR, Kardex, Intake/Output, MAR and Cardiac Rhythm NSR to .

## 2020-01-18 NOTE — PROGRESS NOTES
PT orders received, chart reviewed. Pt unable to participate with PT due to pt on continuous bedrest orders. Please remove to participate in therapy. Pt also has low H/H 6.5/20.3. Will f/u later as patient's schedule allows.  Thank you for this referral.  David Epperson, PT, DPT

## 2020-01-18 NOTE — PROGRESS NOTES
Problem: Mobility Impaired (Adult and Pediatric)  Goal: *Acute Goals and Plan of Care (Insert Text)  Description  Physical Therapy Goals  Initiated 1/18/2020 and to be accomplished within 7 day(s)  1. Patient will move from supine to sit and sit to supine , scoot up and down and roll side to side in bed with minimal assistance/contact guard assist.     2.  Patient will transfer from bed to chair and chair to bed with maximal assistance using the least restrictive device. 3.  Patient will perform sit to stand with maximal assistance. 4.  Patient will improve sitting balance static good and dynamic fair to increase safety with transfers. 5.  Patient will perform WC mobility minimal assistance 50 feet for increased functional independence. Prior Level of Function:   Patient was independence for all mobility including gait using no AD. Patient lives alone in a single story home no steps to enter. Unsure how pt was ambulatory prior to hospital with the noticeable gangrene of R foot but pt states she has no equipment at home and was ambulatory. Outcome: Progressing Towards Goal   PHYSICAL THERAPY EVALUATION    Patient: Nidia Walker (89 y.o. female)  Date: 1/18/2020  Primary Diagnosis: Pancreatitis [K85.90]  Hyperosmolar hyperglycemic coma due to diabetes mellitus without ketoacidosis ([address] [E11.01]  Hyperosmolar non-ketotic state in patient with type 2 diabetes mellitus ([address] [E11.00]  Procedure(s) (LRB):  RIGHT FEMORAL-POPLITEAL BYPASS (Right) 2 Days Post-Op   Precautions:   Fall, NWB    ASSESSMENT :  PT orders received and patient cleared by nursing to participate with therapy. Patient is a 79 y.o. female admitted to the hospital due to lethargy and hyperglycemia. Pt has history of noncompliance and gangrene R foot. Pt is now s/p fem pop 1/16/20 for possible TMA with podiatry otherwise vascular is recommending an AKA. Patient consents to PT evaluation and treatment.      Pt supine upon arrival. She has a lower H/H but not planning for a blood transfusion per nursing. Pt does have some dizziness with therapy but BP WFL throughout session as well as HR. Therex provided throughout session as functional mobility to increase strength and endurance. Pt required moderate assistance for supine to sit. Pt unable to perform standing with NWB status R LE. Pt unable to lift bottom from bed with maximum assistance using B UE and L LE. Recommend 2 person assist. Attempts for scooting laterally along edge of bed with pt unable requiring maximum assistance/total assistance. Sit to supine maximum assistance. Scooting up in bed total assistance x2. Pt ended therapy supine in bed with R LE elevated and all needs met. Educated on calling nursing for assistance, NWB R LE, and elevation R LE. Patient will benefit from skilled intervention to address the above impairments. Patient's rehabilitation potential is considered to be Fair  Factors which may influence rehabilitation potential include:    []         None noted  []         Mental ability/status  [x]         Medical condition  []         Home/family situation and support systems  []         Safety awareness  []         Pain tolerance/management  []         Other:      PLAN :  Recommendations and Planned Interventions:    [x]           Bed Mobility Training             [x]    Neuromuscular Re-Education  [x]           Transfer Training                   []    Orthotic/Prosthetic Training  []           Gait Training                          [x]    Modalities  [x]           Therapeutic Exercises           [x]    Edema Management/Control  [x]           Therapeutic Activities            [x]    Family Training/Education  [x]           Patient Education  []           Other (comment):    Frequency/Duration: Patient will be followed by physical therapy 1-2 times per day/4-7 days per week to address goals.   Discharge Recommendations: Charles Nagel Equipment Recommendations for Discharge: N/A     SUBJECTIVE:   Patient stated I can't (stand).     OBJECTIVE DATA SUMMARY:     Past Medical History:   Diagnosis Date    Diabetes ([address]     Hypercholesterolemia     Hypertension    History reviewed. No pertinent surgical history. Barriers to Learning/Limitations: yes;  altered mental status (i.e.Sedation, Confusion)  Compensate with: Visual Cues, Verbal Cues, and Tactile Cues  Home Situation:  Home Situation  Home Environment: Apartment(prior admission , no family at the bedside)  # Steps to Enter: 0  One/Two Story Residence: Other (Comment)  Living Alone: Yes  Support Systems: Family member(s)  Patient Expects to be Discharged to[de-identified] Apartment  Current DME Used/Available at Home: None  Critical Behavior:  Neurologic State: Alert  Orientation Level: Oriented to person;Oriented to place  Cognition: Follows commands;Decreased attention/concentration  Safety/Judgement: Fall prevention  Psychosocial  Patient Behaviors: Calm; Cooperative  Needs Expressed: Educational;Nutritional  Purposeful Interaction: Yes  Pt Identified Daily Priority: Clinical issues (comment)  Caritas Process: Nurture loving kindness; Teaching/learning; Attend basic human needs;Create healing environment  Caring Interventions: Reassure; Therapeutic modalities  Reassure: Therapeutic listening; Informing; Acceptance;Caring rounds  Therapeutic Modalities: Intentional therapeutic touch  Other Caring Modalities: hourly rounds  Skin Condition/Temp: Warm;Dry     Skin Integrity: Intact; Blister  Skin Integumentary  Skin Color: Appropriate for ethnicity  Skin Condition/Temp: Warm;Dry  Skin Integrity: Intact; Blister  Turgor: Non-tenting  Hair Growth: Sparce  Varicosities: Absent     B LE Strength:    Strength: (R LE ankle 0/5 knee/hip 3-/5; L LE at least 4/5)              B LE Tone & Sensation:   Tone: Normal          Sensation: Impaired           B LE Range Of Motion:  AROM: (R LE no ankle movement and decreased hip/knee; L WFL)                 Posture:  Posture (WDL): Exceptions to WDL  Posture Assessment: Forward head;Kyphosis  Functional Mobility:  Bed Mobility:     Supine to Sit: Moderate assistance  Sit to Supine: Maximum assistance  Scooting: Total assistance;Assist x2  Transfers:  Sit to Stand: (uanble with max A)                           Balance:   Sitting: Impaired; With support  Sitting - Static: Fair (occasional)  Sitting - Dynamic: Poor (constant support)      Therapeutic Exercises:   Reviewed and performed ankle pumps to increase blood flow and circulation. Therex provided throughout session as functional mobility to increase strength and endurance. Pain:  Pain level pre-treatment: 0/10    With movement 9-10/10 R LE  Pain level post-treatment: 0/10    Pain Intervention(s) : Medication (see MAR); Rest, Repositioning  Response to intervention: Nurse notified, See doc flow    Activity Tolerance:   Fair-  Please refer to the flowsheet for vital signs taken during this treatment. After treatment:   []         Patient left in no apparent distress sitting up in chair  [x]         Patient left in no apparent distress in bed  [x]         Call bell left within reach  [x]   Personal items in reach   [x]         Nursing notified Larry العلي  []         Caregiver present  []         Bed/chair alarm activated  []         SCDs applied     COMMUNICATION/EDUCATION:   [x]         Role of Physical Therapy in the acute care setting. [x]         Fall prevention education was provided and the patient/caregiver indicated understanding. [x]         Patient/family have participated as able in goal setting and plan of care. [x]         Patient/family agree to work toward stated goals and plan of care. []         Patient understands intent and goals of therapy, but is neutral about his/her participation.   []         Patient is unable to participate in goal setting/plan of care: ongoing with therapy staff.  []         Out of bed with nursing assistance 3-5 times a day. []         Other:     Thank you for this referral.  Mikel Ramirez, PT, DPT   Time Calculation: 32 mins      Eval Complexity: History: HIGH Complexity :3+ comorbidities / personal factors will impact the outcome/ POC Exam:HIGH Complexity : 4+ Standardized tests and measures addressing body structure, function, activity limitation and / or participation in recreation  Presentation: HIGH Complexity : Unstable and unpredictable characteristics  Clinical Decision Making:High Complexity    Overall Complexity:HIGH

## 2020-01-18 NOTE — ROUTINE PROCESS
Bedside shift change report given to JUNE Prado (oncoming nurse) by Lukas Huston (offgoing nurse). Report included the following information. Sbar, mar, kardex and recent results.

## 2020-01-18 NOTE — PROGRESS NOTES
Resting comfortably  bp systolic 96  Hem 6.5  Right leg with no hematoma  Bypass patent  Foot further demarcating  Will need further surgery  Unfortunately, does not want blood transfusion  Will give retacrit 300/kg x 7 days  Fluids, watch closely  Discussed with jonny romero on the phone

## 2020-01-18 NOTE — PROGRESS NOTES
Problem: Diabetes Self-Management  Goal: *Disease process and treatment process  Description  Define diabetes and identify own type of diabetes; list 3 options for treating diabetes. Outcome: Progressing Towards Goal  Goal: *Incorporating nutritional management into lifestyle  Description  Describe effect of type, amount and timing of food on blood glucose; list 3 methods for planning meals. Outcome: Progressing Towards Goal  Goal: *Incorporating physical activity into lifestyle  Description  State effect of exercise on blood glucose levels. Outcome: Progressing Towards Goal  Goal: *Developing strategies to promote health/change behavior  Description  Define the ABC's of diabetes; identify appropriate screenings, schedule and personal plan for screenings. Outcome: Progressing Towards Goal  Goal: *Using medications safely  Description  State effect of diabetes medications on diabetes; name diabetes medication taking, action and side effects. Outcome: Progressing Towards Goal  Goal: *Monitoring blood glucose, interpreting and using results  Description  Identify recommended blood glucose targets  and personal targets. Outcome: Progressing Towards Goal  Goal: *Prevention, detection, treatment of acute complications  Description  List symptoms of hyper- and hypoglycemia; describe how to treat low blood sugar and actions for lowering  high blood glucose level. Outcome: Progressing Towards Goal  Goal: *Prevention, detection and treatment of chronic complications  Description  Define the natural course of diabetes and describe the relationship of blood glucose levels to long term complications of diabetes.   Outcome: Progressing Towards Goal  Goal: *Developing strategies to address psychosocial issues  Description  Describe feelings about living with diabetes; identify support needed and support network  Outcome: Progressing Towards Goal  Goal: *Insulin pump training  Outcome: Progressing Towards Goal  Goal: *Sick day guidelines  Outcome: Progressing Towards Goal  Goal: *Patient Specific Goal (EDIT GOAL, INSERT TEXT)  Outcome: Progressing Towards Goal     Problem: Patient Education: Go to Patient Education Activity  Goal: Patient/Family Education  Outcome: Progressing Towards Goal     Problem: Falls - Risk of  Goal: *Absence of Falls  Description  Document Thalia Lux Fall Risk and appropriate interventions in the flowsheet. Outcome: Progressing Towards Goal  Note: Fall Risk Interventions:  Mobility Interventions: Bed/chair exit alarm    Mentation Interventions: Bed/chair exit alarm    Medication Interventions: Bed/chair exit alarm    Elimination Interventions: Call light in reach              Problem: Patient Education: Go to Patient Education Activity  Goal: Patient/Family Education  Outcome: Progressing Towards Goal     Problem: Pressure Injury - Risk of  Goal: *Prevention of pressure injury  Description  Document Marc Scale and appropriate interventions in the flowsheet. Outcome: Progressing Towards Goal  Note: Pressure Injury Interventions:  Sensory Interventions: Assess changes in LOC, Turn and reposition approx. every two hours (pillows and wedges if needed), Pressure redistribution bed/mattress (bed type), Minimize linen layers    Moisture Interventions: Absorbent underpads, Maintain skin hydration (lotion/cream)    Activity Interventions: Pressure redistribution bed/mattress(bed type)    Mobility Interventions: HOB 30 degrees or less, Pressure redistribution bed/mattress (bed type), Turn and reposition approx.  every two hours(pillow and wedges)    Nutrition Interventions: Document food/fluid/supplement intake, Discuss nutritional consult with provider    Friction and Shear Interventions: Foam dressings/transparent film/skin sealants                Problem: Patient Education: Go to Patient Education Activity  Goal: Patient/Family Education  Outcome: Progressing Towards Goal     Problem: Pain  Goal: *Control of Pain  Outcome: Progressing Towards Goal  Goal: *PALLIATIVE CARE:  Alleviation of Pain  Outcome: Progressing Towards Goal     Problem: Patient Education: Go to Patient Education Activity  Goal: Patient/Family Education  Outcome: Progressing Towards Goal     Problem: Injury - Risk of, Adverse Drug Event  Goal: *Absence of adverse drug events  Outcome: Progressing Towards Goal  Goal: *Absence of medication errors  Outcome: Progressing Towards Goal  Goal: *Knowledge of prescribed medications  Outcome: Progressing Towards Goal     Problem: Patient Education: Go to Patient Education Activity  Goal: Patient/Family Education  Outcome: Progressing Towards Goal     Problem: Altered Thought Process (Adult/Pediatric)  Goal: *STG: Participates in treatment plan  Outcome: Progressing Towards Goal  Goal: *STG: Remains safe in hospital  Outcome: Progressing Towards Goal  Goal: *STG: Seeks staff when feelings of anxiety and fear arise  Outcome: Progressing Towards Goal  Goal: *STG: Complies with medication therapy  Outcome: Progressing Towards Goal  Goal: *STG: Attends activities and groups  Outcome: Progressing Towards Goal  Goal: *STG: Decreased delusional thinking  Outcome: Progressing Towards Goal  Goal: *STG: Decreased hallucinations  Outcome: Progressing Towards Goal  Goal: *STG: Absence of lethality  Outcome: Progressing Towards Goal  Goal: *STG: Demonstrates ability to understand and use improved judgment in daily activities and relationships  Outcome: Progressing Towards Goal  Goal: *LTG: Returns to baseline functioning  Outcome: Progressing Towards Goal  Goal: Interventions  Outcome: Progressing Towards Goal     Problem: Patient Education: Go to Patient Education Activity  Goal: Patient/Family Education  Outcome: Progressing Towards Goal     Problem: Nutrition Deficit  Goal: *Optimize nutritional status  Outcome: Progressing Towards Goal     Problem: Infection - Risk of, Urinary Catheter-Associated Urinary Tract Infection  Goal: *Absence of infection signs and symptoms  Outcome: Progressing Towards Goal     Problem: Patient Education: Go to Patient Education Activity  Goal: Patient/Family Education  Outcome: Progressing Towards Goal     Problem: Non-Violent Restraints  Goal: *Removal from restraints as soon as assessed to be safe  Outcome: Progressing Towards Goal  Goal: *No harm/injury to patient while restraints in use  Outcome: Progressing Towards Goal  Goal: *Patient's dignity will be maintained  Outcome: Progressing Towards Goal  Goal: *Patient Specific Goal (EDIT GOAL, INSERT TEXT)  Outcome: Progressing Towards Goal  Goal: Non-violent Restaints:Standard Interventions  Outcome: Progressing Towards Goal  Goal: Non-violent Restraints:Patient Interventions  Outcome: Progressing Towards Goal  Goal: Patient/Family Education  Outcome: Progressing Towards Goal

## 2020-01-18 NOTE — PROGRESS NOTES
RENAL DAILY PROGRESS NOTE    Subjective:   Admitted for hyperglycemia, seen for EFRAIN/CKD stage 3    Complaint: denies any SOB/CP.  Post fem pop right, denies any leg/foot pain    Current Facility-Administered Medications   Medication Dose Route Frequency    tamsulosin (FLOMAX) capsule 0.4 mg  0.4 mg Oral DAILY    HYDROmorphone (DILAUDID) syringe 0.5 mg  0.5 mg IntraVENous Q2H PRN    hydrALAZINE (APRESOLINE) 20 mg/mL injection 10 mg  10 mg IntraVENous Q6H    nitroglycerin (TRIDIL) 400 mcg/ml infusion  0-20 mcg/min IntraVENous TITRATE    dextrose 5 % - 0.45% NaCl infusion  75 mL/hr IntraVENous CONTINUOUS    ferrous sulfate tablet 325 mg  1 Tab Oral DAILY WITH BREAKFAST    amLODIPine (NORVASC) tablet 5 mg  5 mg Oral DAILY    dextrose (D25W) 25% injection 10 mL  2.5 g IntraVENous PRN    insulin lispro (HUMALOG) injection   SubCUTAneous AC&HS    famotidine (PEPCID) tablet 20 mg  20 mg Oral DAILY    insulin glargine (LANTUS) injection 28 Units  28 Units SubCUTAneous DAILY    bisacodyl (DULCOLAX) suppository 10 mg  10 mg Rectal DAILY PRN    polyethylene glycol (MIRALAX) packet 17 g  17 g Oral DAILY    aspirin chewable tablet 81 mg  81 mg Oral DAILY    oxyCODONE-acetaminophen (PERCOCET) 5-325 mg per tablet 1-2 Tab  1-2 Tab Oral Q6H PRN    morphine injection 2 mg  2 mg IntraVENous Q4H PRN    dextrose 10% infusion 125-250 mL  125-250 mL IntraVENous PRN    heparin (porcine) injection 5,000 Units  5,000 Units SubCUTAneous Q8H    glucose chewable tablet 16 g  4 Tab Oral PRN    glucagon (GLUCAGEN) injection 1 mg  1 mg IntraMUSCular PRN           Objective:     Patient Vitals for the past 24 hrs:   Temp Pulse Resp BP SpO2   01/18/20 0900  91 18 104/50 96 %   01/18/20 0800 98.5 °F (36.9 °C) (!) 116 17 154/80 100 %   01/18/20 0700  (!) 113 20 151/63 95 %   01/18/20 0500  (!) 119 27 (!) 76/34 100 %   01/18/20 0400  (!) 105  138/59 99 %   01/18/20 0300  (!) 108 (!) 41 133/64 99 %   01/18/20 0200  88  105/66 98 %   01/18/20 0100  (!) 110  119/68 99 %   01/18/20 0000 98.8 °F (37.1 °C)       01/17/20 2300  (!) 117 19 111/53 99 %   01/17/20 2200  99 21 129/45 98 %   01/17/20 2100  (!) 104 15 123/58 98 %   01/17/20 2000  (!) 118 15 100/54 98 %   01/17/20 1910  (!) 117 13 151/57 100 %   01/17/20 1700  91 25 126/52 99 %   01/17/20 1600 98.9 °F (37.2 °C) 95 23 124/54 98 %   01/17/20 1500  (!) 107 19 109/81 96 %   01/17/20 1400  (!) 122 21 129/63 96 %   01/17/20 1340  (!) 101 29 112/55    01/17/20 1200 98.7 °F (37.1 °C) 95 16 119/44 97 %   01/17/20 1100  97 17 118/60 99 %   01/17/20 1000  (!) 108 17 141/57 98 %        Weight change: -0.862 kg (-1 lb 14.4 oz)     01/16 1901 - 01/18 0700  In: 2930 [P.O.:180; I.V.:2750]  Out: 1705 [Urine:1705]    Intake/Output Summary (Last 24 hours) at 1/18/2020 0952  Last data filed at 1/18/2020 0829  Gross per 24 hour   Intake 1650 ml   Output 1190 ml   Net 460 ml     Physical Exam: awake, not in any resp distress, afebrile    HEENT: non icteric  Neck: regular no rub  Cardiovascular: regular, no rub  C/L: clear ant/lat  Abdomen: soft + BS  Ext: Right foot, black shriveled toes, dark skin up to mid foot    Data Review:     LABS:   Hematology:   Recent Labs     01/18/20  0524 01/17/20  0505   WBC 8.2 10.0   HGB 6.5* 7.2*   HCT 20.3* 22.4*   Pl 297 K  Chemistry:   Recent Labs     01/18/20  0524 01/17/20  0505   BUN 15 15   CREA 1.38* 1.32*   CA 8.6 8.5   K 5.2 5.4    138   * 111   CO2 24 21   * 215*   Phos 2.4  Lipase 1160  IPTH 180 Vit D 25 OH 71.9  UMACR 262  Fe 33 Sat 19% ivy 100    IMAGES: CTA abd with runoff Bilateral renal A patent Kidneys are malrotated no hydro    CULTURE: Blood c/s neg    IMPRESSION AND PLAN:   EFRAIN/CKD stage 3, crea stable post op. Cont to trend, Good urine output. Avoid nephrotoxic drugs and adjust meds to renal fxn. 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    SHPT from CKD, negative for Vit D def  HTN cont to trend BP goal <130/80. Cont CCB  Anemia from chronic illness/ infection/fe def cont fe supp, drop Hgb post op. BT as needed  DM 2 with proteinuria. Will benefit from ACEI or ARB use once renal function is stable and acute issues are resolved.   Dry gangrene right foot  Post revascularization procedure ( fem Pop)          Ciarra Landon MD  1/18/2020

## 2020-01-18 NOTE — PROGRESS NOTES
Problem: Self Care Deficits Care Plan (Adult)  Goal: *Acute Goals and Plan of Care (Insert Text)  Description  Occupational Therapy Goals  Initiated 1/18/2020 within 7 day(s). 1.  Patient will perform upper body dressing with minimal assistance/contact guard assist   2. Patient will perform lower body dressing with moderate assistance . 3. Patient will perform grooming with minimal assistance/contact guard assist sitting EOB. 4.  Patient will perform all aspects of toileting with moderate assistance . 5. Patient will participate in upper extremity therapeutic exercise/activities with supervision/set-up for 5 minutes. Prior Level of Function: Patient reported she lived alone PTA and was independent in self-care with no AE. Outcome: Progressing Towards Goal   OCCUPATIONAL THERAPY EVALUATION    Patient: Sarai Valdovinos (12 y.o. female)  Date: 1/18/2020  Primary Diagnosis: Pancreatitis [K85.90]  Hyperosmolar hyperglycemic coma due to diabetes mellitus without ketoacidosis ([address] [E11.01]  Hyperosmolar non-ketotic state in patient with type 2 diabetes mellitus ([address] [E11.00]  Procedure(s) (LRB):  RIGHT FEMORAL-POPLITEAL BYPASS (Right) 2 Days Post-Op   Precautions: Fall, NWB  ASSESSMENT :  Based on the objective data described below, the patient presents with decreased BUE strength, decreased trunk mobility, decreased endurance, and decreased sitting balance currently limiting participation in self-care tasks. Patient sat EOB with Min A for LE management and additional time. Patient simulated grooming tasks sitting EOB with constant support to maintain dynamic sitting balance. Patient simulated sock donning with Min A 2/2 decreased trunk ROM. Patient c/o fatigue after 3 minutes sitting EOB. Returned to supine with Min A for LE management. Patient would benefit from occupational therapy to improve strength, balance, ROM, and endurance for improved safety and independence in ADLs.      Patient will benefit from skilled intervention to address the above impairments. Patient's rehabilitation potential is considered to be Good  Factors which may influence rehabilitation potential include:   []             None noted  []             Mental ability/status  [x]             Medical condition  []             Home/family situation and support systems  []             Safety awareness  []             Pain tolerance/management  []             Other:      PLAN :  Recommendations and Planned Interventions:  [x]               Self Care Training                  [x]      Therapeutic Activities  [x]               Functional Mobility Training   []      Cognitive Retraining  [x]               Therapeutic Exercises           [x]      Endurance Activities  [x]               Balance Training                    []      Neuromuscular Re-Education  []               Visual/Perceptual Training     [x]      Home Safety Training  [x]               Patient Education                   []      Family Training/Education  []               Other (comment):    Frequency/Duration: Patient will be followed by occupational therapy 1-2 times per day/4-7 days per week to address goals. Discharge Recommendations: [address] with 24/7 supervision pending progress  Further Equipment Recommendations for Discharge:shower chair     SUBJECTIVE:   Patient stated Its moving, it hurts    OBJECTIVE DATA SUMMARY:     Past Medical History:   Diagnosis Date    Diabetes ([facility] [address]     Hypercholesterolemia     Hypertension    History reviewed. No pertinent surgical history.   Barriers to Learning/Limitations: None    Home Situation:   Home Situation  Home Environment: Private residence  # Steps to Enter: 0  One/Two Story Residence: One story  Living Alone: Yes  Support Systems: Friends \ neighbors, [facility] / [facility] community  Patient Expects to be Discharged to[de-identified] Private residence  Current DME Used/Available at Home: Crutches(niece also reported cane; patient could not remember cane)  Tub or Shower Type: Tub/Shower combination  [x]  Right hand dominant   []  Left hand dominant    Cognitive/Behavioral Status:  Neurologic State: Alert  Orientation Level: Oriented X4  Cognition: Follows commands  Safety/Judgement: Decreased insight into deficits; Fall prevention    Skin: Intact in BUEs  Edema: No edema noted in BUEs    Vision/Perceptual:    WFL    Coordination: BUE  Coordination: Generally decreased, functional    Balance:  Sitting: Impaired  Sitting - Static: Fair (occasional)  Sitting - Dynamic: Poor (constant support)    Strength: BUE  Strength: Generally decreased, functional    Tone & Sensation: BUE  Tone: Normal  Sensation: Intact    Range of Motion: BUE  AROM: Generally decreased, functional    Functional Mobility and Transfers for ADLs:  Bed Mobility:   Supine to Sit: Minimum assistance  Sit to Supine: Minimum assistance  Scooting: Maximum assistance;Assist x2  Transfers:  Not tested at this time      ADL Assessment:   Feeding: Setup;Stand-by assistance(in bed)    Oral Facial Hygiene/Grooming: Setup;Stand-by assistance(in bed)    Bathing: Maximum assistance    Upper Body Dressing: Moderate assistance    Lower Body Dressing: Maximum assistance    Toileting: Maximum assistance    ADL Intervention:  Educated patient on role of OT and plan of care  Simulated sock donning Min A  Simulated EOB grooming with constant support for sitting balance    Cognitive Retraining  Safety/Judgement: Decreased insight into deficits; Fall prevention      Pain:  Pain level pre-treatment: 0/10 Pain when moving not given a number but describes as \"tight\". Pain level post-treatment: 0/10   Pain Intervention(s): None    Activity Tolerance:   Fair/Good  Please refer to the flowsheet for vital signs taken during this treatment.   After treatment:   [] Patient left in no apparent distress sitting up in chair  [x] Patient left in no apparent distress in bed  [x] Call bell left within reach  [x] Nursing notified  [x] Caregiver present  [] Bed alarm activated    COMMUNICATION/EDUCATION:   [x] Role of Occupational Therapy in the acute care setting  [x] Home safety education was provided and the patient/caregiver indicated understanding. [] Patient/family have participated as able in goal setting and plan of care. [x] Patient/family agree to work toward stated goals and plan of care. [] Patient understands intent and goals of therapy, but is neutral about his/her participation. [] Patient is unable to participate in goal setting and plan of care. Thank you for this referral.  Sadie Esqueda, OTR/L  Time Calculation: 23 mins    Eval Complexity: History: LOW Complexity : Brief history review ; Examination: LOW Complexity : 1-3 performance deficits relating to physical, cognitive , or psychosocial skils that result in activity limitations and / or participation restrictions ;    Decision Making:LOW Complexity : No comorbidities that affect functional and no verbal or physical assistance needed to complete eval tasks

## 2020-01-19 LAB
ANION GAP SERPL CALC-SCNC: 5 MMOL/L (ref 3–18)
BUN SERPL-MCNC: 17 MG/DL (ref 7–18)
BUN/CREAT SERPL: 12 (ref 12–20)
CALCIUM SERPL-MCNC: 8.5 MG/DL (ref 8.5–10.1)
CHLORIDE SERPL-SCNC: 112 MMOL/L (ref 100–111)
CO2 SERPL-SCNC: 22 MMOL/L (ref 21–32)
CREAT SERPL-MCNC: 1.37 MG/DL (ref 0.6–1.3)
ERYTHROCYTE [DISTWIDTH] IN BLOOD BY AUTOMATED COUNT: 14.5 % (ref 11.6–14.5)
FOLATE SERPL-MCNC: >20 NG/ML (ref 3.1–17.5)
GLUCOSE BLD STRIP.AUTO-MCNC: 117 MG/DL (ref 70–110)
GLUCOSE BLD STRIP.AUTO-MCNC: 126 MG/DL (ref 70–110)
GLUCOSE BLD STRIP.AUTO-MCNC: 182 MG/DL (ref 70–110)
GLUCOSE BLD STRIP.AUTO-MCNC: 250 MG/DL (ref 70–110)
GLUCOSE SERPL-MCNC: 120 MG/DL (ref 74–99)
HCT VFR BLD AUTO: 20.2 % (ref 35–45)
HGB BLD-MCNC: 6.4 G/DL (ref 12–16)
IRON SATN MFR SERPL: 19 %
IRON SERPL-MCNC: 22 UG/DL (ref 50–175)
MAGNESIUM SERPL-MCNC: 2.3 MG/DL (ref 1.6–2.6)
MCH RBC QN AUTO: 32 PG (ref 24–34)
MCHC RBC AUTO-ENTMCNC: 31.7 G/DL (ref 31–37)
MCV RBC AUTO: 101 FL (ref 74–97)
PLATELET # BLD AUTO: 300 K/UL (ref 135–420)
PMV BLD AUTO: 9.8 FL (ref 9.2–11.8)
POTASSIUM SERPL-SCNC: 5.2 MMOL/L (ref 3.5–5.5)
RBC # BLD AUTO: 2 M/UL (ref 4.2–5.3)
SODIUM SERPL-SCNC: 139 MMOL/L (ref 136–145)
TIBC SERPL-MCNC: 116 UG/DL (ref 250–450)
VIT B12 SERPL-MCNC: 897 PG/ML (ref 211–911)
WBC # BLD AUTO: 6.9 K/UL (ref 4.6–13.2)

## 2020-01-19 PROCEDURE — 83735 ASSAY OF MAGNESIUM: CPT

## 2020-01-19 PROCEDURE — 74011250637 HC RX REV CODE- 250/637: Performed by: INTERNAL MEDICINE

## 2020-01-19 PROCEDURE — 74011250636 HC RX REV CODE- 250/636: Performed by: NURSE PRACTITIONER

## 2020-01-19 PROCEDURE — 65660000000 HC RM CCU STEPDOWN

## 2020-01-19 PROCEDURE — 74011250636 HC RX REV CODE- 250/636: Performed by: PHYSICIAN ASSISTANT

## 2020-01-19 PROCEDURE — 80048 BASIC METABOLIC PNL TOTAL CA: CPT

## 2020-01-19 PROCEDURE — 74011250637 HC RX REV CODE- 250/637: Performed by: EMERGENCY MEDICINE

## 2020-01-19 PROCEDURE — 36415 COLL VENOUS BLD VENIPUNCTURE: CPT

## 2020-01-19 PROCEDURE — 82272 OCCULT BLD FECES 1-3 TESTS: CPT

## 2020-01-19 PROCEDURE — 74011636637 HC RX REV CODE- 636/637: Performed by: INTERNAL MEDICINE

## 2020-01-19 PROCEDURE — 83540 ASSAY OF IRON: CPT

## 2020-01-19 PROCEDURE — 74011250636 HC RX REV CODE- 250/636: Performed by: SURGERY

## 2020-01-19 PROCEDURE — 82962 GLUCOSE BLOOD TEST: CPT

## 2020-01-19 PROCEDURE — 74011250637 HC RX REV CODE- 250/637: Performed by: NURSE PRACTITIONER

## 2020-01-19 PROCEDURE — 85027 COMPLETE CBC AUTOMATED: CPT

## 2020-01-19 PROCEDURE — 74011250637 HC RX REV CODE- 250/637: Performed by: HOSPITALIST

## 2020-01-19 PROCEDURE — 82607 VITAMIN B-12: CPT

## 2020-01-19 PROCEDURE — 74011000258 HC RX REV CODE- 258: Performed by: SURGERY

## 2020-01-19 RX ORDER — BISACODYL 5 MG
10 TABLET, DELAYED RELEASE (ENTERIC COATED) ORAL DAILY PRN
Status: DISCONTINUED | OUTPATIENT
Start: 2020-01-19 | End: 2020-02-04 | Stop reason: HOSPADM

## 2020-01-19 RX ORDER — BISACODYL 5 MG
10 TABLET, DELAYED RELEASE (ENTERIC COATED) ORAL EVERY OTHER DAY
Status: DISCONTINUED | OUTPATIENT
Start: 2020-01-19 | End: 2020-01-19

## 2020-01-19 RX ORDER — DOCUSATE SODIUM 100 MG/1
100 CAPSULE, LIQUID FILLED ORAL 2 TIMES DAILY
Status: DISCONTINUED | OUTPATIENT
Start: 2020-01-19 | End: 2020-02-04 | Stop reason: HOSPADM

## 2020-01-19 RX ORDER — HYDRALAZINE HYDROCHLORIDE 20 MG/ML
10 INJECTION INTRAMUSCULAR; INTRAVENOUS
Status: DISCONTINUED | OUTPATIENT
Start: 2020-01-19 | End: 2020-02-04 | Stop reason: HOSPADM

## 2020-01-19 RX ADMIN — HEPARIN SODIUM 5000 UNITS: 5000 INJECTION INTRAVENOUS; SUBCUTANEOUS at 05:01

## 2020-01-19 RX ADMIN — INSULIN GLARGINE 28 UNITS: 100 INJECTION, SOLUTION SUBCUTANEOUS at 09:09

## 2020-01-19 RX ADMIN — INSULIN LISPRO 6 UNITS: 100 INJECTION, SOLUTION INTRAVENOUS; SUBCUTANEOUS at 12:05

## 2020-01-19 RX ADMIN — OXYCODONE HYDROCHLORIDE AND ACETAMINOPHEN 2 TABLET: 5; 325 TABLET ORAL at 01:25

## 2020-01-19 RX ADMIN — BISACODYL 10 MG: 5 TABLET, COATED ORAL at 15:30

## 2020-01-19 RX ADMIN — SODIUM CHLORIDE 300 MG: 900 INJECTION, SOLUTION INTRAVENOUS at 11:58

## 2020-01-19 RX ADMIN — HYDROMORPHONE HYDROCHLORIDE 0.5 MG: 1 INJECTION, SOLUTION INTRAMUSCULAR; INTRAVENOUS; SUBCUTANEOUS at 18:13

## 2020-01-19 RX ADMIN — HYDROMORPHONE HYDROCHLORIDE 0.5 MG: 1 INJECTION, SOLUTION INTRAMUSCULAR; INTRAVENOUS; SUBCUTANEOUS at 20:00

## 2020-01-19 RX ADMIN — OXYCODONE HYDROCHLORIDE AND ACETAMINOPHEN 1 TABLET: 5; 325 TABLET ORAL at 16:46

## 2020-01-19 RX ADMIN — INSULIN LISPRO 2 UNITS: 100 INJECTION, SOLUTION INTRAVENOUS; SUBCUTANEOUS at 16:41

## 2020-01-19 RX ADMIN — FERROUS SULFATE TAB 325 MG (65 MG ELEMENTAL FE) 325 MG: 325 (65 FE) TAB at 09:06

## 2020-01-19 RX ADMIN — OXYCODONE HYDROCHLORIDE AND ACETAMINOPHEN 1 TABLET: 5; 325 TABLET ORAL at 09:35

## 2020-01-19 RX ADMIN — POLYETHYLENE GLYCOL 3350 17 G: 17 POWDER, FOR SOLUTION ORAL at 09:05

## 2020-01-19 RX ADMIN — HEPARIN SODIUM 5000 UNITS: 5000 INJECTION INTRAVENOUS; SUBCUTANEOUS at 19:56

## 2020-01-19 RX ADMIN — DEXTROSE MONOHYDRATE AND SODIUM CHLORIDE 75 ML/HR: 5; .45 INJECTION, SOLUTION INTRAVENOUS at 05:00

## 2020-01-19 RX ADMIN — TAMSULOSIN HYDROCHLORIDE 0.4 MG: 0.4 CAPSULE ORAL at 09:06

## 2020-01-19 RX ADMIN — Medication 81 MG: at 09:06

## 2020-01-19 RX ADMIN — AMLODIPINE BESYLATE 5 MG: 5 TABLET ORAL at 09:06

## 2020-01-19 RX ADMIN — EPOETIN ALFA-EPBX 21000 UNITS: 10000 INJECTION, SOLUTION INTRAVENOUS; SUBCUTANEOUS at 20:01

## 2020-01-19 RX ADMIN — FAMOTIDINE 20 MG: 20 TABLET, FILM COATED ORAL at 09:06

## 2020-01-19 RX ADMIN — HEPARIN SODIUM 5000 UNITS: 5000 INJECTION INTRAVENOUS; SUBCUTANEOUS at 11:58

## 2020-01-19 RX ADMIN — OXYCODONE HYDROCHLORIDE AND ACETAMINOPHEN 1 TABLET: 5; 325 TABLET ORAL at 23:19

## 2020-01-19 NOTE — PROGRESS NOTES
PT orders received and chart reviewed. PT currently on PT caseload at this time, evaluated yesterday. Will acknowledge new orders and continue to follow.      Thank you for this referral     Glenys Zhang PT DPT

## 2020-01-19 NOTE — PROGRESS NOTES
[facility]  Progress Note    Patient: Abigail Aparicio Age: 79 y.o. : 1940 MR#: [id number] SSN: [id number]  Date/Time: 2020     Subjective:     Patient sitting in bed , awake, alert, follows commands, responds appropriately    Assessment/Plan:   74-year-old -American female past medical history of severe PVD now right lower limb ischemia and dry gangrene, uncontrolled diabetes mellitus type 2    1.  PVD  S/p R Fem-pop bypass    2 uncontrolled diabetes mellitus type 2-hyperglycemia    3 urinary retention    4 hypertension    5 anemia of chronic illness    6-Patient declines blood transfusion- is Jehovahs witness    7- Dry Gangrene of R Foot    PLAN  On asa, as per vascular  Podiatry following  SSI, lantus  On flomax, voiding trial today  On amlodipine, hydralazine  Patient declines Blood product transfusions  Hematology input noted  D/w RN, bowel regimen  Full code      Case discussed with:  [x]Patient  [] Family ( In room with patient )    [x]Nursing  []Case Management  DVT Prophylaxis:  []Lovenox  [x]Hep SQ  []SCDs  []Coumadin   []On Heparin gtt          Objective:   VS:   Visit Vitals  /51   Pulse 89   Temp 98.2 °F (36.8 °C)   Resp 16   Ht 5' 5\" (1.651 m)   Wt 73.8 kg (162 lb 11.2 oz)   SpO2 100%   Breastfeeding No   BMI 27.07 kg/m²      Tmax/24hrs: Temp (24hrs), Av.2 °F (36.8 °C), Min:98 °F (36.7 °C), Max:98.3 °F (36.8 °C)  IOBRIEF    Intake/Output Summary (Last 24 hours) at 2020 1447  Last data filed at 2020 1437  Gross per 24 hour   Intake 1685 ml   Output 1525 ml   Net 160 ml       General:  Awake, alert,   Cardiovascular:  S1S2+, RRR  Pulmonary:  CTA b/l  GI:  Soft, BS+, NT, ND  Extremities:  R Foot dry gangrene          Medications:   Current Facility-Administered Medications   Medication Dose Route Frequency    iron sucrose (VENOFER) 300 mg in 0.9% sodium chloride 250 mL IVPB  300 mg IntraVENous Q24H    hydrALAZINE (APRESOLINE) 20 mg/mL injection 10 mg  10 mg IntraVENous Q6H PRN    bisacodyL (DULCOLAX) tablet 10 mg  10 mg Oral DAILY PRN    docusate sodium (COLACE) capsule 100 mg  100 mg Oral BID    0.9% sodium chloride infusion 250 mL  250 mL IntraVENous PRN    epoetin bipin-epbx (RETACRIT) 21,000 Units  21,000 Units SubCUTAneous QHS    tamsulosin (FLOMAX) capsule 0.4 mg  0.4 mg Oral DAILY    HYDROmorphone (DILAUDID) syringe 0.5 mg  0.5 mg IntraVENous Q2H PRN    nitroglycerin (TRIDIL) 400 mcg/ml infusion  0-20 mcg/min IntraVENous TITRATE    dextrose 5 % - 0.45% NaCl infusion  75 mL/hr IntraVENous CONTINUOUS    amLODIPine (NORVASC) tablet 5 mg  5 mg Oral DAILY    dextrose (D25W) 25% injection 10 mL  2.5 g IntraVENous PRN    insulin lispro (HUMALOG) injection   SubCUTAneous AC&HS    famotidine (PEPCID) tablet 20 mg  20 mg Oral DAILY    insulin glargine (LANTUS) injection 28 Units  28 Units SubCUTAneous DAILY    bisacodyl (DULCOLAX) suppository 10 mg  10 mg Rectal DAILY PRN    polyethylene glycol (MIRALAX) packet 17 g  17 g Oral DAILY    aspirin chewable tablet 81 mg  81 mg Oral DAILY    oxyCODONE-acetaminophen (PERCOCET) 5-325 mg per tablet 1-2 Tab  1-2 Tab Oral Q6H PRN    dextrose 10% infusion 125-250 mL  125-250 mL IntraVENous PRN    heparin (porcine) injection 5,000 Units  5,000 Units SubCUTAneous Q8H    glucose chewable tablet 16 g  4 Tab Oral PRN    glucagon (GLUCAGEN) injection 1 mg  1 mg IntraMUSCular PRN       Labs:    Recent Labs     01/19/20  0405 01/18/20  0524 01/17/20  0505   WBC 6.9 8.2 10.0   HGB 6.4* 6.5* 7.2*   HCT 20.2* 20.3* 22.4*    297 365     Recent Labs     01/19/20  0405 01/18/20  0524 01/17/20  0505    141 138   K 5.2 5.2 5.4   * 113* 111   CO2 22 24 21   * 119* 215*   BUN 17 15 15   CREA 1.37* 1.38* 1.32*   CA 8.5 8.6 8.5   MG 2.3  --   --          Signed By: Nicolle Smith MD     January 19, 2020

## 2020-01-19 NOTE — PROGRESS NOTES
RENAL DAILY PROGRESS NOTE    Subjective:   Admitted for hyperglycemia, seen for EFRAIN/CKD stage 3    Complaint: denies any SOB/CP.  Post fem pop right, c/o leg/foot pain    Current Facility-Administered Medications   Medication Dose Route Frequency    iron sucrose (VENOFER) 300 mg in 0.9% sodium chloride 250 mL IVPB  300 mg IntraVENous Q24H    0.9% sodium chloride infusion 250 mL  250 mL IntraVENous PRN    epoetin bipin-epbx (RETACRIT) 21,000 Units  21,000 Units SubCUTAneous QHS    tamsulosin (FLOMAX) capsule 0.4 mg  0.4 mg Oral DAILY    HYDROmorphone (DILAUDID) syringe 0.5 mg  0.5 mg IntraVENous Q2H PRN    hydrALAZINE (APRESOLINE) 20 mg/mL injection 10 mg  10 mg IntraVENous Q6H    nitroglycerin (TRIDIL) 400 mcg/ml infusion  0-20 mcg/min IntraVENous TITRATE    dextrose 5 % - 0.45% NaCl infusion  75 mL/hr IntraVENous CONTINUOUS    amLODIPine (NORVASC) tablet 5 mg  5 mg Oral DAILY    dextrose (D25W) 25% injection 10 mL  2.5 g IntraVENous PRN    insulin lispro (HUMALOG) injection   SubCUTAneous AC&HS    famotidine (PEPCID) tablet 20 mg  20 mg Oral DAILY    insulin glargine (LANTUS) injection 28 Units  28 Units SubCUTAneous DAILY    bisacodyl (DULCOLAX) suppository 10 mg  10 mg Rectal DAILY PRN    polyethylene glycol (MIRALAX) packet 17 g  17 g Oral DAILY    aspirin chewable tablet 81 mg  81 mg Oral DAILY    oxyCODONE-acetaminophen (PERCOCET) 5-325 mg per tablet 1-2 Tab  1-2 Tab Oral Q6H PRN    dextrose 10% infusion 125-250 mL  125-250 mL IntraVENous PRN    heparin (porcine) injection 5,000 Units  5,000 Units SubCUTAneous Q8H    glucose chewable tablet 16 g  4 Tab Oral PRN    glucagon (GLUCAGEN) injection 1 mg  1 mg IntraMUSCular PRN           Objective:     Patient Vitals for the past 24 hrs:   Temp Pulse Resp BP SpO2   01/19/20 0800  78 18 131/56 99 %   01/19/20 0500  78 16  100 %   01/19/20 0400 98.3 °F (36.8 °C) 74 25 98/43 98 %   01/19/20 0300  75 16  97 %   01/19/20 0200  87 21 108/44 97 %   01/19/20 0100  (!) 108 19  100 %   01/19/20 0002 98.3 °F (36.8 °C) (!) 105 18 138/59 93 %   01/18/20 2300  75 27  97 %   01/18/20 2200  100 14 132/54 98 %   01/18/20 2100  77 15  97 %   01/18/20 2000 98.1 °F (36.7 °C) 95 18 140/54 97 %   01/18/20 1900  78 16 121/46    01/18/20 1800  81 18 103/43    01/18/20 1700  (!) 119 24 129/56    01/18/20 1600 98 °F (36.7 °C) (!) 104 28 108/59 92 %   01/18/20 1500  89 16 108/48 91 %   01/18/20 1400  93 17 117/49 99 %   01/18/20 1314  (!) 114 21 (!) 117/99    01/18/20 1212 98.3 °F (36.8 °C) (!) 102 15 (!) 122/93    01/18/20 1200  98 16 112/88 98 %   01/18/20 1100  86 14 121/54 99 %        Weight change: 2.132 kg (4 lb 11.2 oz)     01/17 1901 - 01/19 0700  In: 3116.3 [P.O.:180; I.V.:2936.3]  Out: 1615 [Urine:1615]    Intake/Output Summary (Last 24 hours) at 1/19/2020 1025  Last data filed at 1/19/2020 0800  Gross per 24 hour   Intake 1930 ml   Output 1250 ml   Net 680 ml     Physical Exam: awake, not in any resp distress, afebrile    HEENT: non icteric  Neck: regular no rub  Cardiovascular: regular, no rub  C/L: clear ant/lat  Abdomen: soft + BS  Ext: Right foot, black shriveled toes, dark skin up to mid foot    Data Review:     LABS:   Hematology:   Recent Labs     01/19/20  0405 01/18/20  0524 01/17/20  0505   WBC 6.9 8.2 10.0   HGB 6.4* 6.5* 7.2*   HCT 20.2* 20.3* 22.4*   Pl 297 K  Chemistry:   Recent Labs     01/19/20  0405 01/18/20  0524 01/17/20  0505   BUN 17 15 15   CREA 1.37* 1.38* 1.32*   CA 8.5 8.6 8.5   K 5.2 5.2 5.4    141 138   * 113* 111   CO2 22 24 21   * 119* 215*   Phos 2.4  Lipase 1160  IPTH 180 Vit D 25 OH 71.9  UMACR 262  Fe 33 Sat 19% ivy 100    IMAGES: CTA abd with runoff Bilateral renal A patent Kidneys are malrotated no hydro    CULTURE: Blood c/s neg    IMPRESSION AND PLAN:   EFRAIN/CKD stage 3, crea stable post op. Cont to trend, Good urine output. Avoid nephrotoxic drugs and adjust meds to renal fxn.  Trend K.  CKD 3 most likely from DN/HTN off ARB for now, cont with aggressive BS and BP control. SHPT from CKD, negative for Vit D def. HTN cont to trend BP goal <130/80. Cont CCB  Anemia from chronic illness/ infection/fe def cont fe supp, drop Hgb post op. BT as needed. DM 2 with proteinuria. Will benefit from ACEI or ARB use once renal function is stable and acute issues are resolved.   Dry gangrene right foot  Post revascularization procedure ( fem Pop)          Brandon Muñiz MD  1/19/2020

## 2020-01-19 NOTE — PROGRESS NOTES
Occupational Therapy Note:  Orders received, chart reviewed and this patient has been evaluated by OT and is currently on OT caseload. Will continue to follow.  Ajay Zhao OTR/L

## 2020-01-19 NOTE — PROGRESS NOTES
Progress Note    Patient: Moo Parks MRN: [id number]  SSN: [id number]    YOB: 1940  Age: 79 y.o. Sex: female      Admit Date: 1/2/2020  3 Days Post-Op     Procedure:   Procedure(s):  RIGHT FEMORAL-POPLITEAL BYPASS    Subjective:     Patient seen resting quiet and comfortably and no apparent distress. Her Hem is 6.5 and currently on retacrit per hematology recommendation. Her foot is demarcating further proximally. Objective:     Visit Vitals  /51   Pulse 87   Temp 98.2 °F (36.8 °C)   Resp 16   Ht 5' 5\" (1.651 m)   Wt 73.8 kg (162 lb 11.2 oz)   SpO2 99%   Breastfeeding No   BMI 27.07 kg/m²        Physical Exam:  Right DALY: non palpable pedal pulses bilateral, dopplerable monophasic PT and anterior ankle, no pulse audible on doppler on foot, right foot dry gangrene with stable eschar demarcation to level of TMT level dorsally, plantarly eschar demarcation is up to heel. Ischemic pain noted to right foot. Paresthesia symptoms present to both lower extremities. Manual muscle strength 5/5 bilateral.      Assessment:     Hospital Problems  Date Reviewed: 7/10/2017          Codes Class Noted POA    Pancreatitis ICD-10-CM: K85.90  ICD-9-CM: 692.5  1/3/2020 Unknown        Hyperosmolar hyperglycemic coma due to diabetes mellitus without ketoacidosis (HCC) ICD-10-CM: E11.01  ICD-9-CM: 250.20, 250.30  1/3/2020 Unknown        Hyperosmolar non-ketotic state in patient with type 2 diabetes mellitus [facility]) ICD-10-CM: E11.00  ICD-9-CM: 250.20  1/3/2020 Unknown              Plan/Recommendations/Medical Decision Making:     - Patient will need at minimum Chopart or Syme amputation since eschar demarcation advancing proximally to heel now. Discussed with Dr. Yadira Palacios from vascular surgery. Vascular surgery will proceed with amputation surgery whenever patient is medically stable. - I'm signing off at this time. Please let me know if have any questions.      Thank you for opportunity to participate in Ms. Thompson's care.

## 2020-01-19 NOTE — CONSULTS
Hematology / Oncology Consult Note      Reason for Consultation:  Moises Garcia is a 79 y.o. female who I've been asked to consult for assistance with management of the patient's anemia of chronic illness in the setting of PVD status post right femoral pop bypass. Patient is Congregational and refuses blood transfusion. Subjective:     HPI:  58-year-old -American female past medical history of severe PVD, chronic anemia,uncontrolled diabetes mellitus type 2 now right lower limb ischemia and dry gangrene. S/p R Fem-pop bypass. In addition patient is Congregational and refuses blood transfusion. Presently receiving Retacrit 21,000 units X 7 days per vascular surgery will need further surgery. On admission white blood count 6.3, hemoglobin 10.8, hematocrit 31.6 platelet count 446,800, sodium 151, potassium 4.2, chloride 120, creatinine 4.21, calcium 8.8. Review of Systems:   Constitutional: Positive for fatigue. Negative for fever. HENT: Negative for nosebleeds, congestion, facial swelling, mouth sores, trouble swallowing, neck pain, neck stiffness, voice change and postnasal drip. Eyes: Negative for photophobia, pain, discharge and itching. Respiratory: Negative for apnea, cough, choking, chest tightness, wheezing and stridor. Cardiovascular: Negative for chest pain, palpitations and leg swelling. Gastrointestinal: Negative for abdominal pain. Negative for nausea, diarrhea, constipation, blood in stool and rectal pain. Genitourinary: Negative for dysuria, urgency, hematuria, flank pain and difficulty urinating. Musculoskeletal:Positive for right foot pain. Skin: Positive for right foot gangrene. Neurological:  Negative for dizziness, facial asymmetry, speech difficulty, light-headedness and headaches. Hematological: Negative for adenopathy. Does not bruise/bleed easily.    Psychiatric/Behavioral: Negative for hallucinations, disturbed wake/sleep cycle and agitation. Physical Assessment:     Visit Vitals  BP 98/43   Pulse 78   Temp 98.3 °F (36.8 °C)   Resp 16   Ht 5' 5\" (1.651 m)   Wt 73.8 kg (162 lb 11.2 oz)   SpO2 100%   Breastfeeding No   BMI 27.07 kg/m²       Temp (24hrs), Av.3 °F (36.8 °C), Min:98 °F (36.7 °C), Max:98.5 °F (36.9 °C)      Physical Exam:    General: Appears comfortable, NAD. Eating breakfast.  HEENT:  Anicteric sclerae; pink palpebral conjunctivae; mucosa moist  Resp:  Symmetrical chest expansion, no accessory muscle use; good airway entry; no rales/ wheezing/ rhonchi noted  CV:  S1, S2 present; regular rate and rhythm  GI:  Abdomen soft, non-tender; (+) active bowel sounds  Extremities:  S/p R Fem-pop bypass: R foot dry gangrene. Skin:  Warm; no rashes/ lesions noted  Neurologic:  Non-focal          Assessment:   · Anemia of chronic illness  · PVD  · Diabetes type 2  · Acute renal failure      Plan:   · H/H is 6.4/20.2 today. I have reiterated risk versus benefits of PRBC transfusion. Patient refuses PRBC transfusion due to Pentec[employer]al preference, we will oblige with patient request to not receive blood transfusion. Reviewed creatinine 1.37. Continue Retacrit 21,000 units daily X 7 days. Iron low at 22,TIBC 116, iron % sat 19, ferritin 100. B12 897, folate >20.0. I will hold ferrous sulfate 325 mg p.o. for now and give Venofer 300mg daily X 3 doses. Recommend discontinuing Miralax if diarrhea and starting Colace. · Recommend limiting lab draws. · I have discussed with aforementioned plan with the patient and she agrees to have supplemental iron as needed. Past Medical History:   Diagnosis Date    Diabetes ([address]     Hypercholesterolemia     Hypertension      History reviewed. No pertinent surgical history. History reviewed. No pertinent family history.   No Known Allergies    Home Medications:   Home Meds reviewed with patient    Hospital Medications:   Current hospital medications reviewed    Labs:  Recent Labs 01/19/20  0405 01/18/20  0524 01/17/20  0505   WBC 6.9 8.2 10.0   RBC 2.00* 2.03* 2.26*   HCT 20.2* 20.3* 22.4*   .0* 100.0* 99.1*   MCH 32.0 32.0 31.9   MCHC 31.7 32.0 32.1   RDW 14.5 14.5 14.3       Recent Labs     01/19/20  0405 01/18/20  0524 01/17/20  0505   CO2 22 24 21   BUN 17 15 15       No results for input(s): ALBUMIN in the last 72 hours. No lab exists for component: [facility], [facility]    Thank you for requesting us to consult on your patient. We appreciate the opportunity to participate in your patient's care. Please call if you have questions.       Kayla Ballesteros NP

## 2020-01-19 NOTE — PROGRESS NOTES
[facility] Hospitalist Group  Progress Note    Patient: Timoteo Enriquez Age: 79 y.o. : 1940 MR#: [id number] SSN: [id number]  Date/Time: 2020     Subjective:     Patient lying in bed in NAD, awake, follows commands    Assessment/Plan:   79-year-old -American female past medical history of severe PVD now right lower limb ischemia and dry gangrene, uncontrolled diabetes mellitus type 2    1. PVD  S/p R Fem-pop bypass    2 uncontrolled diabetes mellitus type 2-hyperglycemia    3 urinary retention    4 hypertension    5 anemia of chronic illness    6-Patient declines blood transfusion- is Jehovahs witness    7- Dry Gangrene of R Foot    PLAN  As per vascular  On asa  Podiatry following  On lantus, ssi  On flomax, voiding trial soon  On amlodipine, hydralazine  D/w patient regarding risks versus benefits of PRBC transfusion- Patient declines blood product transfusion.   Hematology consulted  Monitor renal function  D/w RN  Full code          Case discussed with:  [x]Patient  [] Family ( In room with patient )    [x]Nursing  []Case Management  DVT Prophylaxis:  []Lovenox  [x]Hep SQ  []SCDs  []Coumadin   []On Heparin gtt          Objective:   VS:   Visit Vitals  /43   Pulse 81   Temp 98 °F (36.7 °C)   Resp 18   Ht 5' 5\" (1.651 m)   Wt 71.7 kg (158 lb)   SpO2 92%   Breastfeeding No   BMI 26.29 kg/m²      Tmax/24hrs: Temp (24hrs), Av.4 °F (36.9 °C), Min:98 °F (36.7 °C), Max:98.8 °F (37.1 °C)  IOBRIEF    Intake/Output Summary (Last 24 hours) at 2020  Last data filed at 2020 1800  Gross per 24 hour   Intake 1956.25 ml   Output 1240 ml   Net 716.25 ml       General:  Awake, follows commands, responds appropriately  Cardiovascular:  S1S2+, RRR  Pulmonary:  CTA b/l  GI:  Soft, BS+, NT, ND  Extremities:  R Foot dry gangrene        Medications:   Current Facility-Administered Medications   Medication Dose Route Frequency    0.9% sodium chloride infusion 250 mL 250 mL IntraVENous PRN    epoetin bipin-epbx (RETACRIT) 21,000 Units  21,000 Units SubCUTAneous QHS    tamsulosin (FLOMAX) capsule 0.4 mg  0.4 mg Oral DAILY    HYDROmorphone (DILAUDID) syringe 0.5 mg  0.5 mg IntraVENous Q2H PRN    hydrALAZINE (APRESOLINE) 20 mg/mL injection 10 mg  10 mg IntraVENous Q6H    nitroglycerin (TRIDIL) 400 mcg/ml infusion  0-20 mcg/min IntraVENous TITRATE    dextrose 5 % - 0.45% NaCl infusion  75 mL/hr IntraVENous CONTINUOUS    ferrous sulfate tablet 325 mg  1 Tab Oral DAILY WITH BREAKFAST    amLODIPine (NORVASC) tablet 5 mg  5 mg Oral DAILY    dextrose (D25W) 25% injection 10 mL  2.5 g IntraVENous PRN    insulin lispro (HUMALOG) injection   SubCUTAneous AC&HS    famotidine (PEPCID) tablet 20 mg  20 mg Oral DAILY    insulin glargine (LANTUS) injection 28 Units  28 Units SubCUTAneous DAILY    bisacodyl (DULCOLAX) suppository 10 mg  10 mg Rectal DAILY PRN    polyethylene glycol (MIRALAX) packet 17 g  17 g Oral DAILY    aspirin chewable tablet 81 mg  81 mg Oral DAILY    oxyCODONE-acetaminophen (PERCOCET) 5-325 mg per tablet 1-2 Tab  1-2 Tab Oral Q6H PRN    dextrose 10% infusion 125-250 mL  125-250 mL IntraVENous PRN    heparin (porcine) injection 5,000 Units  5,000 Units SubCUTAneous Q8H    glucose chewable tablet 16 g  4 Tab Oral PRN    glucagon (GLUCAGEN) injection 1 mg  1 mg IntraMUSCular PRN       Labs:    Recent Labs     01/18/20  0524 01/17/20  0505   WBC 8.2 10.0   HGB 6.5* 7.2*   HCT 20.3* 22.4*    365     Recent Labs     01/18/20  0524 01/17/20  0505    138   K 5.2 5.4   * 111   CO2 24 21   * 215*   BUN 15 15   CREA 1.38* 1.32*   CA 8.6 8.5         Signed By: Israel Aschoff, MD     January 18, 2020

## 2020-01-19 NOTE — PROGRESS NOTES
Patient awake, no complaints  Hem 6.5 but hematology has seen and given recommendations regarding use or retacrit since pt refuses transfusions due to her Gnosticist beliefs  Right leg with no significant edema.  prevena to right groin and distal incision c/d/i  Bypass patent  Foot further demarcating  Will need further surgery - per podiatry

## 2020-01-19 NOTE — PROGRESS NOTES
Shift Summary    Pt was A/O x4 at beginning of shift, very drowsy and still a lot of stimulus to get her to converse with me and answer questions. She slept most of the night, she woke up startled shortly after midnight not knowing where she was and talking about \"these kids need to settle down\". Reoriented and pt drifted off to sleep. Pt woke up yelling out around 0115 and said to stop squeezing her foot, removed blanket off foot and provided pain medication, which allowed her to fall back asleep. Pt SR to ST, with normal BP hydralzyne held. Clear of RA, gutierrez, no BM. Right leg with doppler pulses, wound vac to groin and incision to inner knee area. Bath given this AM, pt more alert to surrounding and answering questions correctly again. No other needs.

## 2020-01-20 PROBLEM — I99.8 ISCHEMIA OF FOOT: Status: ACTIVE | Noted: 2020-01-20

## 2020-01-20 LAB
ABO + RH BLD: NORMAL
BLD PROD TYP BPU: NORMAL
BLOOD GROUP ANTIBODIES SERPL: NORMAL
BPU ID: NORMAL
CALLED TO:,BCALL1: NORMAL
CROSSMATCH RESULT,%XM: NORMAL
GLUCOSE BLD STRIP.AUTO-MCNC: 124 MG/DL (ref 70–110)
GLUCOSE BLD STRIP.AUTO-MCNC: 174 MG/DL (ref 70–110)
GLUCOSE BLD STRIP.AUTO-MCNC: 81 MG/DL (ref 70–110)
GLUCOSE BLD STRIP.AUTO-MCNC: 98 MG/DL (ref 70–110)
HCT VFR BLD AUTO: 24.5 % (ref 35–45)
HEMOCCULT STL QL: NEGATIVE
HGB BLD-MCNC: 7.7 G/DL (ref 12–16)
SPECIMEN EXP DATE BLD: NORMAL
STATUS OF UNIT,%ST: NORMAL
UNIT DIVISION, %UDIV: 0

## 2020-01-20 PROCEDURE — 74011250637 HC RX REV CODE- 250/637: Performed by: NURSE PRACTITIONER

## 2020-01-20 PROCEDURE — 74011250637 HC RX REV CODE- 250/637: Performed by: INTERNAL MEDICINE

## 2020-01-20 PROCEDURE — 74011250637 HC RX REV CODE- 250/637: Performed by: HOSPITALIST

## 2020-01-20 PROCEDURE — 65660000000 HC RM CCU STEPDOWN

## 2020-01-20 PROCEDURE — 82962 GLUCOSE BLOOD TEST: CPT

## 2020-01-20 PROCEDURE — 74011250636 HC RX REV CODE- 250/636: Performed by: PHYSICIAN ASSISTANT

## 2020-01-20 PROCEDURE — 74011636637 HC RX REV CODE- 636/637: Performed by: INTERNAL MEDICINE

## 2020-01-20 PROCEDURE — 36415 COLL VENOUS BLD VENIPUNCTURE: CPT

## 2020-01-20 PROCEDURE — 85018 HEMOGLOBIN: CPT

## 2020-01-20 PROCEDURE — 74011250637 HC RX REV CODE- 250/637: Performed by: EMERGENCY MEDICINE

## 2020-01-20 PROCEDURE — 77030038269 HC DRN EXT URIN PURWCK BARD -A

## 2020-01-20 PROCEDURE — 74011250636 HC RX REV CODE- 250/636: Performed by: SURGERY

## 2020-01-20 RX ORDER — AMLODIPINE BESYLATE 10 MG/1
10 TABLET ORAL DAILY
Status: DISCONTINUED | OUTPATIENT
Start: 2020-01-21 | End: 2020-02-01

## 2020-01-20 RX ORDER — METOPROLOL TARTRATE 25 MG/1
12.5 TABLET, FILM COATED ORAL EVERY 12 HOURS
Status: DISCONTINUED | OUTPATIENT
Start: 2020-01-20 | End: 2020-02-04 | Stop reason: HOSPADM

## 2020-01-20 RX ORDER — LANOLIN ALCOHOL/MO/W.PET/CERES
1 CREAM (GRAM) TOPICAL
Status: DISCONTINUED | OUTPATIENT
Start: 2020-01-21 | End: 2020-01-24

## 2020-01-20 RX ADMIN — POLYETHYLENE GLYCOL 3350 17 G: 17 POWDER, FOR SOLUTION ORAL at 08:46

## 2020-01-20 RX ADMIN — AMLODIPINE BESYLATE 5 MG: 5 TABLET ORAL at 08:43

## 2020-01-20 RX ADMIN — INSULIN GLARGINE 28 UNITS: 100 INJECTION, SOLUTION SUBCUTANEOUS at 08:51

## 2020-01-20 RX ADMIN — INSULIN LISPRO 2 UNITS: 100 INJECTION, SOLUTION INTRAVENOUS; SUBCUTANEOUS at 22:05

## 2020-01-20 RX ADMIN — DOCUSATE SODIUM 100 MG: 100 CAPSULE, LIQUID FILLED ORAL at 17:51

## 2020-01-20 RX ADMIN — Medication 81 MG: at 08:52

## 2020-01-20 RX ADMIN — FAMOTIDINE 20 MG: 20 TABLET, FILM COATED ORAL at 08:43

## 2020-01-20 RX ADMIN — METOPROLOL TARTRATE 12.5 MG: 25 TABLET ORAL at 22:01

## 2020-01-20 RX ADMIN — TAMSULOSIN HYDROCHLORIDE 0.4 MG: 0.4 CAPSULE ORAL at 08:43

## 2020-01-20 RX ADMIN — OXYCODONE HYDROCHLORIDE AND ACETAMINOPHEN 2 TABLET: 5; 325 TABLET ORAL at 08:43

## 2020-01-20 RX ADMIN — HEPARIN SODIUM 5000 UNITS: 5000 INJECTION INTRAVENOUS; SUBCUTANEOUS at 12:29

## 2020-01-20 RX ADMIN — DOCUSATE SODIUM 100 MG: 100 CAPSULE, LIQUID FILLED ORAL at 09:00

## 2020-01-20 RX ADMIN — EPOETIN ALFA-EPBX 21000 UNITS: 10000 INJECTION, SOLUTION INTRAVENOUS; SUBCUTANEOUS at 22:01

## 2020-01-20 RX ADMIN — METOPROLOL TARTRATE 12.5 MG: 25 TABLET ORAL at 12:29

## 2020-01-20 RX ADMIN — HEPARIN SODIUM 5000 UNITS: 5000 INJECTION INTRAVENOUS; SUBCUTANEOUS at 22:01

## 2020-01-20 NOTE — ROUTINE PROCESS
Verbal bedside shift report given to Michaela Flores on coming nurse by Donovan Granados RN off going nurse including Amanda Arora, [facility] and [facility]

## 2020-01-20 NOTE — PROGRESS NOTES
RENAL DAILY PROGRESS NOTE    Patient: Francesco Ramachandran               Sex: female          DOA: 1/2/2020  7:37 PM        YOB: 1940      Age:  79 y.o.        LOS:  LOS: 17 days     Subjective:     Francesco Ramachandran is a 79 y.o.  who presents with Pancreatitis [K85.90]  Hyperosmolar hyperglycemic coma due to diabetes mellitus without ketoacidosis ([facility] [address] [E11.01]  Hyperosmolar non-ketotic state in patient with type 2 diabetes mellitus ([facility] [address] [E11.00]. Asked to evaluate for acute/crf. admitted with hyperglycemia  Chief complains: Patient denies nausea, vomiting, chest pain, dizziness, shortness of breath or headache.  - Reviewed last 24 hrs events     Current Facility-Administered Medications   Medication Dose Route Frequency    [START ON 1/21/2020] amLODIPine (NORVASC) tablet 10 mg  10 mg Oral DAILY    metoprolol tartrate (LOPRESSOR) tablet 12.5 mg  12.5 mg Oral Q12H    iron sucrose (VENOFER) 300 mg in 0.9% sodium chloride 250 mL IVPB  300 mg IntraVENous Q24H    hydrALAZINE (APRESOLINE) 20 mg/mL injection 10 mg  10 mg IntraVENous Q6H PRN    bisacodyL (DULCOLAX) tablet 10 mg  10 mg Oral DAILY PRN    docusate sodium (COLACE) capsule 100 mg  100 mg Oral BID    0.9% sodium chloride infusion 250 mL  250 mL IntraVENous PRN    epoetin bipin-epbx (RETACRIT) 21,000 Units  21,000 Units SubCUTAneous QHS    tamsulosin (FLOMAX) capsule 0.4 mg  0.4 mg Oral DAILY    HYDROmorphone (DILAUDID) syringe 0.5 mg  0.5 mg IntraVENous Q2H PRN    dextrose (D25W) 25% injection 10 mL  2.5 g IntraVENous PRN    insulin lispro (HUMALOG) injection   SubCUTAneous AC&HS    famotidine (PEPCID) tablet 20 mg  20 mg Oral DAILY    insulin glargine (LANTUS) injection 28 Units  28 Units SubCUTAneous DAILY    bisacodyl (DULCOLAX) suppository 10 mg  10 mg Rectal DAILY PRN    polyethylene glycol (MIRALAX) packet 17 g  17 g Oral DAILY    aspirin chewable tablet 81 mg  81 mg Oral DAILY    oxyCODONE-acetaminophen (PERCOCET) 5-325 mg per tablet 1-2 Tab  1-2 Tab Oral Q6H PRN    dextrose 10% infusion 125-250 mL  125-250 mL IntraVENous PRN    heparin (porcine) injection 5,000 Units  5,000 Units SubCUTAneous Q8H    glucose chewable tablet 16 g  4 Tab Oral PRN    glucagon (GLUCAGEN) injection 1 mg  1 mg IntraMUSCular PRN       Objective:     Visit Vitals  /77 (BP 1 Location: Left arm, BP Patient Position: At rest)   Pulse 74   Temp 98.9 °F (37.2 °C)   Resp 20   Ht 5' 5\" (1.651 m)   Wt 76.3 kg (168 lb 4.8 oz)   SpO2 97%   Breastfeeding No   BMI 28.01 kg/m²       Intake/Output Summary (Last 24 hours) at 1/20/2020 1345  Last data filed at 1/20/2020 1303  Gross per 24 hour   Intake 727.5 ml   Output 876 ml   Net -148.5 ml       Physical Examination:     RS: Chest is bilateral equal, no wheezing / rales / crackles  CVS: S1-S2 heard, RRR, No S3 / murmur  Abdomen: Soft, Non tender, Not distended, Positive bowel sounds, no organomegaly, no CVA / supra pubic tenderness  Extremities gangrene r foot  CNS: Awake   HEENT: Head is atraumatic, PERRLA, conjunctiva pink & non icteric. No JVD or carotid bruit        Data Review:      Labs:     Hematology:   Recent Labs     01/20/20  0529 01/19/20  0405 01/18/20  0524   WBC  --  6.9 8.2   HGB 7.7* 6.4* 6.5*   HCT 24.5* 20.2* 20.3*     Chemistry:   Recent Labs     01/19/20  0405 01/18/20  0524   BUN 17 15   CREA 1.37* 1.38*   CA 8.5 8.6   K 5.2 5.2    141   * 113*   CO2 22 24   * 119*        Images:    XR (Most Recent). CXR reviewed by me and compared with previous CXR Results from Hospital Encounter encounter on 01/02/20   XR FOOT RT MIN 3 V    Narrative EXAM:  XR FOOT RT MIN 3 V    INDICATION:   report of air in right fifth MTP join    COMPARISON:  None. FINDINGS:  3 views of the right foot demonstrate limited study, the patient has  cooperation issues with positioning the foot. Considerable hammertoe deformity  of all the toes noted.  The foot is held in extension at the ankle. There is no  obvious fracture or dislocation. Small plantar calcaneal spur noted. Impression IMPRESSION: Positioning issues, no acute abnormality identified. No bone  destruction or soft tissue air        CT (Most Recent) Results from Hospital Encounter encounter on 01/02/20   CTA ABD ART W RUNOFF W WO CONT    Narrative PROCEDURE: CTA of abdomen and pelvis and bilateral lower extremity runoff with  intravenous contrast administration. HISTORY: PAD, ischemia of the right foot. TECHNIQUE: Multidetector helical CT angiography was carried out from low chest  through the feet following dynamic 70 cc Isovue-300 intravenous contrast  administration . Scan timing was performed using automated bolus tracking/SMART  Prep. 3-D and MPR angiographic image reconstruction was performed on  independent workstation and separately reviewed. Parenchymal imaging is limited  by the arterial phase of contrast administration. All CT scans at this facility  are performed using dose optimization techniques as appropriate to a performed  exam, to include automated exposure control, adjustment of the mA and/or kV  according to patient's size (including appropriate matching for site specific  examinations), or use of iterative reconstruction technique. COMPARISON: CT abdomen/pelvis 1/3/2020. CTA abdomen/pelvis with extremity runoff  7/18/2017. FINDINGS:    VASCULAR FINDINGS:    Right lower extremity:  Multifocal stenosis throughout the right posterior tibialis artery due to  atherosclerotic calcifications as well as the peroneal artery.  -The right posterior tibialis artery is not opacified beyond the mid right lower  leg.  -The anterior tibial artery is not opacified beyond the right lower leg just  above the tibial plafond.  -The peroneal artery is opacified beyond the level of the tibial plafond.     Proximally, there is multifocal stenosis of the right femoral artery with loss  of opacification at the mid to distal thigh. Reconstitution of opacification at  the level of the posterior tibialis artery likely due to collaterals from  geniculate arteries and the deep femoral artery. 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  -Left lower leg perfusion is contributed by collaterals from the deep femoral  artery and geniculate arteries. Multifocal stenosis/occlusion of the left  posterior tibialis artery and left peroneal artery. No opacification of the  peroneal artery or posterior tibialis artery is seen to be on the mid to  proximal third of the left lower leg. The left dorsal pedis is opacified, and  likely contributes to some additional opacified vessel seen in the left foot. Abdominal aorta:  -Severe atherosclerosis with moderate compromise of the lumen, similar to prior  exam of 7/2017.  -No evidence for abdominal aortic aneurysm. -Severe stenosis at the proximal aspect of the right common iliac artery, well  opacified distally, similar to prior exam. Multifocal moderate stenosis at the  left common iliac artery similar to prior exam.  -Multifocal stenosis at the internal iliac arteries, with good opacification  distally.  -Celiac artery, SMA, left renal artery, and right renal artery are grossly  patent. The right renal artery again shown to originate from the descending  thoracic aorta. ALFREDA is not well visualized. ABDOMEN/PELVIS FINDINGS:  Lower chest: Small right pleural effusion with overlying atelectasis. Liver: Stable subcentimeter hypodense lesions liver, too small to characterize  but stability suggests benignity    Biliary: Gallbladder is mildly distended but otherwise unremarkable. Spleen: Negative    Pancreas: There is some peripancreatic edema adjacent to the pancreatic tail.     Adrenal glands: Diffuse thickening of the adrenal glands, without discrete mass,  similar to prior exam.    Kidneys: Malrotated kidneys again noted, without evidence for hydronephrosis. GI tract: The bowel appears nonobstructed. Question of mild mural thickening at  the distal esophagus. Questionable mild mural thickening at the greater  curvature of the stomach adjacent to the edema along the pancreatic tail. Colonic diverticulosis, without evidence for diverticulitis. Normal appendix. Peritoneum: No free air    Lymph nodes: No lymphadenopathy. Pelvis: Urinary bladder is unremarkable. Fibroid uterus again noted. Body wall/soft tissues: Small fat-containing umbilical hernia. Small right lower  spigelian hernia containing a small amount of fluid measuring 2.5 x 1.2 cm  (image 203), previously measured 2.9 x 1.6 cm. Mild edema along the right flank. Bones:  -Bilateral moderate knee osteoarthrosis. Small right knee joint effusion and  trace left knee joint effusion.  -Diffuse soft tissue swelling at the feet, right greater than left. Trace  subcutaneous emphysema adjacent to the right fifth MTP joint. Left distal tibial  and talar hardware noted. -Multilevel degenerative spondylosis and disc disease noted, not well evaluated  on current exam.  -Moderate degenerative change at the hips      Impression IMPRESSION:  1. Right lower extremity:  Multifocal stenosis throughout the right posterior tibialis artery due to  atherosclerotic calcifications as well as the peroneal artery.  -The right posterior tibialis artery is not opacified beyond the mid right lower  leg.  -The anterior tibial artery is not opacified beyond the right lower leg just  above the tibial plafond.  -The peroneal artery is opacified beyond the level of the tibial plafond. Proximally, there is multifocal stenosis of the right femoral artery with loss  of opacification at the mid to distal thigh. Reconstitution of opacification at  the level of the posterior tibialis artery likely due to collaterals from  geniculate arteries and the deep femoral artery.     2. Diffuse soft tissue swelling at bilateral feet, right greater than left. -Suggestion of trace air along the right fifth MTP joint, could be degenerative  but infectious process not excluded. Consider dedicated right foot x-ray for  further evaluation. 3. Chronic multilevel stenosis of the aorta, iliac arteries, and left lower  extremity as described. 4. Peripancreatic edema along the pancreatic tail is increased since 1/3/2020.  -Associated adjacent mild presumed reactive mural thickening of the greater  curvature of the stomach. 5. Small right pleural effusion. 6. Suggestion of mild mural thickening of the distal esophagus, may indicate  nonspecific esophagitis. 7. Bilateral knee osteoarthrosis with small right knee joint effusion and trace  left knee joint effusion. 8. Fibroid uterus. 9. Colonic diverticulosis, without evidence for diverticulitis. 10. Additional nonacute findings are as described. EKG No results found for this or any previous visit. I have personally reviewed the old medical records and patient's labs    Plan / Recommendation:      1.  Acute/crf stage 3.back to baseline  2.pvd,gangren r foot,for surgery tomorrow  3.anemia,on epo    D/w Dr. Vivi Soto MD  Nephrology  1/20/2020

## 2020-01-20 NOTE — CONSULTS
Consult    Patient: Minor Willard MRN: [id number]  SSN: [id number]    YOB: 1940  Age: 79 y.o. Sex: female          Fadi Hancock is a 79 y.o. female who is being seen for : Ischemia right foot. Andrea Willard has severe PAD. She is s/p  Right femoral popliteal,  however there is significant progression ischemia now towards her plantar heel. This plantar heel soft tissue compromise precludes any operative procedure such as a Syme's Ankle disarticulation procedure or an more distal transverse tarsal or Chopart's amputation. I spoke with her nephew Carmelo Book)   [phone]   and step son Cathie Bones ). Each will speak with Dr Jose Eldridge re performing this more proximal limb amputation ([facility]KA [facility]). Past Medical History:   Diagnosis Date    Diabetes ([facility] [address]     Hypercholesterolemia     Hypertension      History reviewed. No pertinent surgical history. History reviewed. No pertinent family history.   Social History     Tobacco Use    Smoking status: Former Smoker    Smokeless tobacco: Never Used   Substance Use Topics    Alcohol use: Not on file      Current Facility-Administered Medications   Medication Dose Route Frequency Provider Last Rate Last Dose    [START ON 1/21/2020] amLODIPine (NORVASC) tablet 10 mg  10 mg Oral DAILY Chacha Taylor MD        metoprolol tartrate (LOPRESSOR) tablet 12.5 mg  12.5 mg Oral Q12H Chacha Taylor MD        iron sucrose (VENOFER) 300 mg in 0.9% sodium chloride 250 mL IVPB  300 mg IntraVENous Q24H Argenis Walter NP   Stopped at 01/19/20 1330    hydrALAZINE (APRESOLINE) 20 mg/mL injection 10 mg  10 mg IntraVENous Q6H PRN PATRICE Ambriz        bisacodyL (DULCOLAX) tablet 10 mg  10 mg Oral DAILY PRN Reanna Hudson MD   10 mg at 01/19/20 1530    docusate sodium (COLACE) capsule 100 mg  100 mg Oral BID Reanna Hudson MD   100 mg at 01/20/20 0900    0.9% sodium chloride infusion 250 mL  250 mL IntraVENous PRN Robert Nayak MD        epoetin bipin-epbx (RETACRIT) 21,000 Units  21,000 Units SubCUTAneous QHS Stephen Wood MD   21,000 Units at 01/19/20 2001    tamsulosin (FLOMAX) capsule 0.4 mg  0.4 mg Oral DAILY Kyleigh Vogel MD   0.4 mg at 01/20/20 0843    HYDROmorphone (DILAUDID) syringe 0.5 mg  0.5 mg IntraVENous Q2H PRN Celi Fitzpatrick MD   0.5 mg at 01/19/20 2000    dextrose (D25W) 25% injection 10 mL  2.5 g IntraVENous PRN Anais Sanches MD        insulin lispro (HUMALOG) injection   SubCUTAneous AC&HS Severiano Rivera MD   Stopped at 01/19/20 2217    famotidine (PEPCID) tablet 20 mg  20 mg Oral DAILY Adeline Friedman DO   20 mg at 01/20/20 0843    insulin glargine (LANTUS) injection 28 Units  28 Units SubCUTAneous DAILY Troy Lindsey MD   28 Units at 01/20/20 0851    bisacodyl (DULCOLAX) suppository 10 mg  10 mg Rectal DAILY PRN Bryn Keith NP        polyethylene glycol (MIRALAX) packet 17 g  17 g Oral DAILY Beermason Keith NP   17 g at 01/20/20 5668    aspirin chewable tablet 81 mg  81 mg Oral DAILY Britney Alfaro MD   81 mg at 01/20/20 7187    oxyCODONE-acetaminophen (PERCOCET) 5-325 mg per tablet 1-2 Tab  1-2 Tab Oral Q6H PRN Britney Alfaro MD   2 Tab at 01/20/20 0843    dextrose 10% infusion 125-250 mL  125-250 mL IntraVENous PRN Naya JOHNSON MD   125 mL at 01/16/20 0705    heparin (porcine) injection 5,000 Units  5,000 Units SubCUTAneous Q8H Beverley Chauhan PA-C   5,000 Units at 01/19/20 1956    glucose chewable tablet 16 g  4 Tab Oral PRN Naya Chester MD        glucagon (GLUCAGEN) injection 1 mg  1 mg IntraMUSCular PRN Naya Chester MD            No Known Allergies    Review of Systems:        I am not able to complete the review of systems because:      The patient is intubated and sedated   ^^^^  The patient has altered mental status due to his acute medical problems     The patient has baseline aphasia from prior stroke(s)   ^^^^  The patient has baseline dementia and is not reliable historian     The patient is in acute medical distress and unable to provide information                OBJECTIVE DATA     Vitals:    01/20/20 0000 01/20/20 0148 01/20/20 0505 01/20/20 0751   BP:  143/56  158/66   Pulse: 87 87  (!) 106   Resp: 19 20 20   Temp:  98.1 °F (36.7 °C)  97.6 °F (36.4 °C)   SpO2:  100%  99%   Weight:   168 lb 4.8 oz (76.3 kg)    Height:              Visit Vitals  /77 (BP 1 Location: Left arm, BP Patient Position: At rest)   Pulse 74   Temp 98.9 °F (37.2 °C)   Resp 20   Ht 5' 5\" (1.651 m)   Wt 168 lb 4.8 oz (76.3 kg)   SpO2 97%   Breastfeeding No   BMI 28.01 kg/m²       Appearance: Alert, she is confused, but follows commands when I saw her at 6:45 PM 1/20/2020   Psychiatric: Affect and mood are appropriate. H EENT (2): Head normocephalic & atraumatic. Both pupils are round, non icteric sclera     Eye: EOM are intact    Neck: ROM WNL    Respiratory: Breathing is unlabored without accessory chest muscle use    Right ANKLE and FOOT       Gait: nonambulatory  Tenderness: moderate  To right foot from plantar heel to her toes. Cutaneous: Necrotic toes. Progression of srinivas ischemia to her 80% of her plantar heel region. Joint Motion: ROM Ankle:Decreased , Hindfoot: (ST,TN,CC Decreased}, Forefoot toes:Decreased  Neurologic Exam: Neuro: Motor:  Difficult to assess due to ischemic right foot and Sensory : reduced light touch sensation, location: entire   Right foot. Contractures:   Achilles Contractures marked  Joint / Tendon Stability: No Ankle or Subtalar instability or joint laxity. No peroneal sublux ability or dislocation  Alignment: plantar flexion of the right foot at the ankle moderate    Vascular: Poorly palpable Pulses    No calf swelling, no tenderness to calf muscles.        LABORATORY DATA        CMP: No results found for: NA, K, CL, CO2, AGAP, GLU, BUN, CREA, GFRAA, GFRNA, CA, MG, PHOS, ALB, TBIL, TP, ALB, GLOB, AGRAT, SGOT, ALT, GPT  CBC:   Lab Results   Component Value Date/Time    HGB 7.7 (L) 01/20/2020 05:29 AM    HCT 24.5 (L) 01/20/2020 05:29 AM     COAGS: No results found for: APTT, PTP, INR, INREXT, INREXT                 Lab Results   Component Value Date/Time    Culture result: NO GROWTH 6 DAYS 01/02/2020 10:06 PM    Culture result: NO GROWTH 6 DAYS 01/02/2020 10:06 PM    Culture result:  01/09/2013 04:30 PM     66272 COLONIES/mL PROTEUS MIRABILIS  72718 COLONIES/mL ENTEROCOCCUS FAECALIS GROUP D    Culture result:  04/24/2012 04:51 PM     MODERATE MORGANELLA MORGANII  FEW STAPHYLOCOCCUS SPECIES, COAGULASE NEGATIVE            IMAGING       XR Results (most recent):  Results from Hospital Encounter encounter on 01/02/20   XR FOOT RT MIN 3 V    Narrative EXAM:  XR FOOT RT MIN 3 V    INDICATION:   report of air in right fifth MTP join    COMPARISON:  None. FINDINGS:  3 views of the right foot demonstrate limited study, the patient has  cooperation issues with positioning the foot. Considerable hammertoe deformity  of all the toes noted. The foot is held in extension at the ankle. There is no  obvious fracture or dislocation. Small plantar calcaneal spur noted. Impression IMPRESSION: Positioning issues, no acute abnormality identified. No bone  destruction or soft tissue air            Result Information     Status: Final result (Exam End: 1/8/2020 09:17) Provider Status: Open   Study Result     PROCEDURE: CTA of abdomen and pelvis and bilateral lower extremity runoff with  intravenous contrast administration.     HISTORY: PAD, ischemia of the right foot.            IMPRESSION  IMPRESSION:    1.  Right lower extremity:  Multifocal stenosis throughout the right posterior tibialis artery due to  atherosclerotic calcifications as well as the peroneal artery.  -The right posterior tibialis artery is not opacified beyond the mid right lower  leg.  -The anterior tibial artery is not opacified beyond the right lower leg just  above the tibial plafond.  -The peroneal artery is opacified beyond the level of the tibial plafond.     Proximally, there is multifocal stenosis of the right femoral artery with loss  of opacification at the mid to distal thigh. Reconstitution of opacification at  the level of the posterior tibialis artery likely due to collaterals from  geniculate arteries and the deep femoral artery.     2. Diffuse soft tissue swelling at bilateral feet, right greater than left. -Suggestion of trace air along the right fifth MTP joint, could be degenerative  but infectious process not excluded. Consider dedicated right foot x-ray for  further evaluation.     3. Chronic multilevel stenosis of the aorta, iliac arteries, and left lower  extremity as described.     4. Peripancreatic edema along the pancreatic tail is increased since 1/3/2020.  -Associated adjacent mild presumed reactive mural thickening of the greater  curvature of the stomach.     5. Small right pleural effusion.     6. Suggestion of mild mural thickening of the distal esophagus, may indicate  nonspecific esophagitis.     7. Bilateral knee osteoarthrosis with small right knee joint effusion and trace  left knee joint effusion.     8. Fibroid uterus.     9. Colonic diverticulosis, without evidence for diverticulitis.     10. Additional non acute findings are as described. IMPRESSION/ASSESSMENT     Hospital Problems  Date Reviewed: 7/10/2017          Codes Class Noted POA    Pancreatitis ICD-10-CM: K85.90  ICD-9-CM: 990.2  1/3/2020 Unknown        Hyperosmolar hyperglycemic coma due to diabetes mellitus without ketoacidosis (HCC) ICD-10-CM: E11.01  ICD-9-CM: 250.20, 250.30  1/3/2020 Unknown        Hyperosmolar non-ketotic state in patient with type 2 diabetes mellitus ([facility] [address] ICD-10-CM: E11.00  ICD-9-CM: 250.20  1/3/2020 Unknown              ICD-10-CM ICD-9-CM    1.  Acute pancreatitis, unspecified complication status, unspecified pancreatitis type K85.90 577.0    2. Hyperglycemic hyperosmolar nonketotic coma ([facility] [address] E11.01 250.20      250.30    3. Acute renal failure, unspecified acute renal failure type ([facility] N17.9 584.9    4. Goals of care, counseling/discussion Z71.89 V65.49    5. Type 2 diabetes mellitus with hyperosmolarity without coma, unspecified whether long term insulin use (HCC) E11.00 250.20    6. Ischemia of right lower extremity I99.8 459.9    7. Atherosclerosis of native artery of right lower extremity with intermittent claudication ([facility] I70.211 440.21 INVASIVE VASCULAR PROCEDURE      INVASIVE VASCULAR PROCEDURE         PLAN     Moises Garcia has severe right PAD:  This plantar heel soft tissue compromise precludes any operative procedure such as a Syme's Ankle disarticulation procedure or an more distal transverse tarsal or Chopart's amputation. I spoke with her nephew Moustapha Cierra) [phone]   and step son Gerhard Carrel ). Each will speak with Dr Deangelo Aaron re performing this more proximal limb amputation (BKA [facility] AKA).         Blake Santoro MD  1/20/2020  10:30 AM

## 2020-01-20 NOTE — PROGRESS NOTES
Problem: Self Care Deficits Care Plan (Adult)  Goal: *Acute Goals and Plan of Care (Insert Text)  Description  Occupational Therapy Goals  Initiated 1/18/2020 within 7 day(s). 1.  Patient will perform upper body dressing with minimal assistance/contact guard assist   2. Patient will perform lower body dressing with moderate assistance . 3. Patient will perform grooming with minimal assistance/contact guard assist sitting EOB. 4.  Patient will perform all aspects of toileting with moderate assistance . 5. Patient will participate in upper extremity therapeutic exercise/activities with supervision/set-up for 5 minutes. Prior Level of Function: Patient reported she lived alone PTA and was independent in self-care with no AE. Outcome: Progressing Towards Goal   OCCUPATIONAL THERAPY TREATMENT    Patient: Timoteo Enriquez (68 y.o. female)  Date: 1/20/2020  Diagnosis: Pancreatitis [K85.90]  Hyperosmolar hyperglycemic coma due to diabetes mellitus without ketoacidosis ([facility] [address] [E11.01]  Hyperosmolar non-ketotic state in patient with type 2 diabetes mellitus ([facility] [address] [E11.00]   <principal problem not specified>  Procedure(s) (LRB):  RIGHT FEMORAL-POPLITEAL BYPASS (Right) 4 Days Post-Op  Precautions: Fall, NWB    Chart, occupational therapy assessment, plan of care, and goals were reviewed. ASSESSMENT:  Pt supine in bed with gown off on left and inappropriately donned on right UE. Assisted pt with donning gown correctly given Min A with moderate cues for opening eyes and tending to task. Pt oriented x3, unable to state situation despite education prior to assisting. Pt in/out of sleep with eyes opening spontaneously to verbal stimulus. Pt inappropriate for EOB/OOB activity and further skilled OT services. Assisted pt with optimal positioning in bed Moderate Assist using bed rails. Placed pt in position for comfort and encouraged to keep gown on as she was attempting to take it off again.    Educated pt on use of call bell for assistance; pt again disoriented and perseverative on \"red button\". Bed in lowest position. Per chart review, pt may be pending AKA if appropriate. Progression toward goals:  []          Improving appropriately and progressing toward goals  [x]          Improving slowly and progressing toward goals  []          Not making progress toward goals and plan of care will be adjusted     PLAN:  Patient continues to benefit from skilled intervention to address the above impairments. Continue treatment per established plan of care. Discharge Recommendations:  Skilled Nursing Facility  Further Equipment Recommendations for Discharge:  shower chair      SUBJECTIVE:   Patient stated I guess I can take it off now.     OBJECTIVE DATA SUMMARY:   Cognitive/Behavioral Status:  Neurologic State: Alert, Confused, Eyes open to voice, Irritable  Orientation Level: Oriented to time, Oriented to place, Oriented to person, Disoriented to situation  Cognition: Decreased command following, Decreased attention/concentration, Impaired decision making  Safety/Judgement: Decreased insight into deficits, Fall prevention    Functional Mobility and Transfers for ADLs:  Bed Mobility:  Rolling: Moderate assistance    ADL Intervention:  Upper Body Dressing Assistance  Dressing Assistance: Minimum assistance  Hospital Gown: Minimum  assistance    Pain:  Pt unable to assess pain     Activity Tolerance:    Poor   Please refer to the flowsheet for vital signs taken during this treatment. After treatment:   []  Patient left in no apparent distress sitting up in chair  [x]  Patient left in no apparent distress in bed  [x]  Call bell left within reach  []  Nursing notified  []  Caregiver present  []  Bed alarm activated    COMMUNICATION/EDUCATION:   [x] Role of Occupational Therapy in the acute care setting  [x] Home safety education was provided and the patient/caregiver indicated understanding.   [x] Patient/family have participated as able in working towards goals and plan of care. [] Patient/family agree to work toward stated goals and plan of care. [] Patient understands intent and goals of therapy, but is neutral about his/her participation. [] Patient is unable to participate in goal setting and plan of care.       Thank you for this referral.  JOSEP Bruno   Time Calculation: 8 mins

## 2020-01-20 NOTE — CONSULTS
Consult Note    Patient: Susan Ray               Sex: female          DOA: 1/2/2020       YOB: 1940      Age:  79 y.o.        LOS:  LOS: 17 days              Referring Provider: Bernabe Shields   ASSESSMENT:   1. Urinary retention, gutierrez removed 1/19   2. S/p vascular surgery   3. Poorly controlled DM2     On flomax  Voided volumes not documented  PVR's not document  Likely multifactorial, immobile, diabetes  Looks like large fibroid uterus       PLAN:    1. Nursning to document voided volume and PVR  2. If ok, no further intervention. Allison Jacques MD  [phone] Office  [phone]  Pager    Chief Complaint   Patient presents with    High Blood Sugar       HISTORY OF PRESENT ILLNESS:  Susan Ray is a 79 y.o. female who is seen in consultation as referred by Dr. Schuyler Matias for urinary retention. No previous urinary issues. No hematuria, infections or stones. Good flow, complete emptying. Had gutierrez removed yesterday and started flomax. Has urine in collection from pur-wic, but not sure if she is voiding. Location Bladder  Onset: Yesterday  Duration: 1 day  Associated symptoms: NOne      No flowsheet data found. Past Medical History:   Diagnosis Date    Diabetes ([facility] [address]     Hypercholesterolemia     Hypertension        History reviewed. No pertinent surgical history. Social History     Tobacco Use    Smoking status: Former Smoker    Smokeless tobacco: Never Used   Substance Use Topics    Alcohol use: Not on file    Drug use: Not on file       No Known Allergies    History reviewed. No pertinent family history.     Current Facility-Administered Medications   Medication Dose Route Frequency Provider Last Rate Last Dose    [START ON 1/21/2020] amLODIPine (NORVASC) tablet 10 mg  10 mg Oral DAILY Radha Laureano MD        metoprolol tartrate (LOPRESSOR) tablet 12.5 mg  12.5 mg Oral Q12H Radha Laureano MD        iron sucrose (VENOFER) 300 mg in 0.9% sodium chloride 250 mL IVPB  300 mg IntraVENous Q24H Yaneth Dobbs NP   Stopped at 01/19/20 1330    hydrALAZINE (APRESOLINE) 20 mg/mL injection 10 mg  10 mg IntraVENous Q6H PRN PATRICE Beaulieu        bisacodyL (DULCOLAX) tablet 10 mg  10 mg Oral DAILY PRN Catrachita Jade MD   10 mg at 01/19/20 1530    docusate sodium (COLACE) capsule 100 mg  100 mg Oral BID Catrachita Jade MD   100 mg at 01/20/20 0900    0.9% sodium chloride infusion 250 mL  250 mL IntraVENous PRN Catrachita Jade MD        epoetin bipin-epbx (RETACRIT) 21,000 Units  21,000 Units SubCUTAneous QHS Michael Reynoso MD   21,000 Units at 01/19/20 2001    tamsulosin (FLOMAX) capsule 0.4 mg  0.4 mg Oral DAILY Shama Rodriguez MD   0.4 mg at 01/20/20 9983    HYDROmorphone (DILAUDID) syringe 0.5 mg  0.5 mg IntraVENous Q2H PRN Celi Fitzpatrick MD   0.5 mg at 01/19/20 2000    dextrose (D25W) 25% injection 10 mL  2.5 g IntraVENous PRN Corbett Ganser, MD        insulin lispro (HUMALOG) injection   SubCUTAneous AC&HS Omari Oh MD   Stopped at 01/19/20 2217    famotidine (PEPCID) tablet 20 mg  20 mg Oral DAILY Adeline Friedman DO   20 mg at 01/20/20 0843    insulin glargine (LANTUS) injection 28 Units  28 Units SubCUTAneous DAILY Shruthi Hill MD   28 Units at 01/20/20 0851    bisacodyl (DULCOLAX) suppository 10 mg  10 mg Rectal DAILY PRN Flory Martinez NP        polyethylene glycol (MIRALAX) packet 17 g  17 g Oral DAILY Flory Martinez NP   17 g at 01/20/20 0846    aspirin chewable tablet 81 mg  81 mg Oral DAILY Maylin Vieira MD   81 mg at 01/20/20 0852    oxyCODONE-acetaminophen (PERCOCET) 5-325 mg per tablet 1-2 Tab  1-2 Tab Oral Q6H PRN Maylin Vieira MD   2 Tab at 01/20/20 0843    dextrose 10% infusion 125-250 mL  125-250 mL IntraVENous PRN Sera JOHNSON MD   125 mL at 01/16/20 0705    heparin (porcine) injection 5,000 Units  5,000 Units SubCUTAneous Q8H Phu Romero PA-C   5,000 Units at 01/19/20 1956    glucose chewable tablet 16 g  4 Tab Oral PRN Filiberto Ramirez MD        glucagon (GLUCAGEN) injection 1 mg  1 mg IntraMUSCular PRN Filiberto Ramirez MD           Review of Systems  Constitutional: No fever, chills, or weight loss  Respiratory: No dyspnea  Cardiovascular: No chest pain  Gastrointestinal: No vomiting or abdominal pain. Genitourinary: Denies frequency, urgency, dysuria, hematuria. Neurological: No focal motor changes. PHYSICAL EXAMINATION:   Visit Vitals  /66 (BP 1 Location: Left arm, BP Patient Position: At rest)   Pulse (!) 106   Temp 97.6 °F (36.4 °C)   Resp 20   Ht 5' 5\" (1.651 m)   Wt 168 lb 4.8 oz (76.3 kg)   SpO2 99%   Breastfeeding No   BMI 28.01 kg/m²     Constitutional: Well developed, well nourished female. No acute distress. HEENT: Normocephalic, Atraumatic, EOM's intact   CV:  Normal radial pulse. Respiratory: No respiratory distress or difficulties breathing   Abdomen:  Nontender, nondistended.  Female:  No CVA tenderness  Skin: No evidence of jaundice. Normal color  Neuro/Psych:  Alert and oriented. Affect appropriate. Lymphatic:   No enlarged inguinal lymph nodes. REVIEW OF LABS AND IMAGING:      Labs: Results:   Chemistry Recent Labs     01/19/20  0405 01/18/20  0524   * 119*    141   K 5.2 5.2   * 113*   CO2 22 24   BUN 17 15   CREA 1.37* 1.38*   CA 8.5 8.6   AGAP 5 4   BUCR 12 11*      CBC w/Diff Recent Labs     01/20/20  0529 01/19/20  0405 01/18/20  0524   WBC  --  6.9 8.2   RBC  --  2.00* 2.03*   HGB 7.7* 6.4* 6.5*   HCT 24.5* 20.2* 20.3*   PLT  --  300 297      Cultures No results for input(s): CULT in the last 72 hours.   All Micro Results     Procedure Component Value Units Date/Time    CULTURE, BLOOD [[id number]] Collected:  01/02/20 2206    Order Status:  Completed Specimen:  Blood Updated:  01/08/20 1340     Special Requests: NO SPECIAL REQUESTS Culture result: NO GROWTH 6 DAYS       CULTURE, BLOOD [[id number]] Collected:  01/02/20 2206    Order Status:  Completed Specimen:  Blood Updated:  01/08/20 0642     Special Requests: NO SPECIAL REQUESTS        Culture result: NO GROWTH 6 DAYS               Urinalysis Color   Date Value Ref Range Status   01/03/2020 YELLOW   Final     Appearance   Date Value Ref Range Status   01/03/2020 CLOUDY   Final     Specific gravity   Date Value Ref Range Status   01/03/2020 1.024 1.005 - 1.030   Final     pH (UA)   Date Value Ref Range Status   01/03/2020 5.0 5.0 - 8.0   Final     Protein   Date Value Ref Range Status   01/03/2020 TRACE (A) NEG mg/dL Final     Ketone   Date Value Ref Range Status   01/03/2020 NEGATIVE  NEG mg/dL Final     Bilirubin   Date Value Ref Range Status   01/03/2020 NEGATIVE  NEG   Final     Blood   Date Value Ref Range Status   01/03/2020 NEGATIVE  NEG   Final     Urobilinogen   Date Value Ref Range Status   01/03/2020 0.2 0.2 - 1.0 EU/dL Final     Nitrites   Date Value Ref Range Status   01/03/2020 NEGATIVE  NEG   Final     Leukocyte Esterase   Date Value Ref Range Status   01/03/2020 NEGATIVE  NEG   Final     Potassium   Date Value Ref Range Status   01/19/2020 5.2 3.5 - 5.5 mmol/L Final     Creatinine   Date Value Ref Range Status   01/19/2020 1.37 (H) 0.6 - 1.3 MG/DL Final     BUN   Date Value Ref Range Status   01/19/2020 17 7.0 - 18 MG/DL Final      PSA No results for input(s): PSA in the last 72 hours.    Coagulation Lab Results   Component Value Date/Time    Prothrombin time 12.9 12/24/2018 10:14 PM    Prothrombin time 12.7 12/18/2018 05:50 PM    INR 1.0 12/24/2018 10:14 PM    INR 1.0 12/18/2018 05:50 PM    aPTT 30.6 12/24/2018 10:14 PM           US Results (most recent):  [facility]   US ABD LTD    Narrative EXAMINATION: Ultrasound abdomen limited, right upper quadrant    INDICATION: Pancreatitis    COMPARISON: CT 1/3/2020    TECHNIQUE: Grayscale, color, spectral sonographic images of the right upper  quadrant obtained. FINDINGS:    Pancreas: Unremarkable. Tail not well visualized. IVC: Unremarkable. Liver: 15.2 cm length. Slightly coarsened echotexture. Portal vein normal  caliber with antegrade flow. Right kidney: 6.1 cm length. Increased echotexture. No hydronephrosis. No focal  abnormality. Gallbladder: No calculi or wall thickening. Negative [place] sign. Biliary tree: Common bile duct 0.5 cm caliber. No intrahepatic duct dilatation. Impression IMPRESSION:    Coarsened hepatic echotexture suggests nonspecific hepatocellular disease. Atrophic echogenic right kidney consistent with chronic medical renal disease. No other significant findings. No evidence of cholelithiasis. chest    CT Results (most recent):   Results from Hospital Encounter encounter on 01/02/20   CTA ABD ART W RUNOFF W WO CONT    Narrative PROCEDURE: CTA of abdomen and pelvis and bilateral lower extremity runoff with  intravenous contrast administration. HISTORY: PAD, ischemia of the right foot. TECHNIQUE: Multidetector helical CT angiography was carried out from low chest  through the feet following dynamic 70 cc Isovue-300 intravenous contrast  administration . Scan timing was performed using automated bolus tracking/SMART  Prep. 3-D and MPR angiographic image reconstruction was performed on  independent workstation and separately reviewed. Parenchymal imaging is limited  by the arterial phase of contrast administration. All CT scans at this facility  are performed using dose optimization techniques as appropriate to a performed  exam, to include automated exposure control, adjustment of the mA and/or kV  according to patient's size (including appropriate matching for site specific  examinations), or use of iterative reconstruction technique. COMPARISON: CT abdomen/pelvis 1/3/2020.  CTA abdomen/pelvis with extremity runoff  7/18/2017. FINDINGS:    VASCULAR FINDINGS:    Right lower extremity:  Multifocal stenosis throughout the right posterior tibialis artery due to  atherosclerotic calcifications as well as the peroneal artery.  -The right posterior tibialis artery is not opacified beyond the mid right lower  leg.  -The anterior tibial artery is not opacified beyond the right lower leg just  above the tibial plafond.  -The peroneal artery is opacified beyond the level of the tibial plafond. Proximally, there is multifocal stenosis of the right femoral artery with loss  of opacification at the mid to distal thigh. Reconstitution of opacification at  the level of the posterior tibialis artery likely due to collaterals from  geniculate arteries and the deep femoral artery. 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  -Left lower leg perfusion is contributed by collaterals from the deep femoral  artery and geniculate arteries. Multifocal stenosis/occlusion of the left  posterior tibialis artery and left peroneal artery. No opacification of the  peroneal artery or posterior tibialis artery is seen to be on the mid to  proximal third of the left lower leg. The left dorsal pedis is opacified, and  likely contributes to some additional opacified vessel seen in the left foot. Abdominal aorta:  -Severe atherosclerosis with moderate compromise of the lumen, similar to prior  exam of 7/2017.  -No evidence for abdominal aortic aneurysm. -Severe stenosis at the proximal aspect of the right common iliac artery, well  opacified distally, similar to prior exam. Multifocal moderate stenosis at the  left common iliac artery similar to prior exam.  -Multifocal stenosis at the internal iliac arteries, with good opacification  distally.  -Celiac artery, SMA, left renal artery, and right renal artery are grossly  patent.  The right renal artery again shown to originate from the descending  thoracic aorta. ALFREDA is not well visualized. ABDOMEN/PELVIS FINDINGS:  Lower chest: Small right pleural effusion with overlying atelectasis. Liver: Stable subcentimeter hypodense lesions liver, too small to characterize  but stability suggests benignity    Biliary: Gallbladder is mildly distended but otherwise unremarkable. Spleen: Negative    Pancreas: There is some peripancreatic edema adjacent to the pancreatic tail. Adrenal glands: Diffuse thickening of the adrenal glands, without discrete mass,  similar to prior exam.    Kidneys: Malrotated kidneys again noted, without evidence for hydronephrosis. GI tract: The bowel appears nonobstructed. Question of mild mural thickening at  the distal esophagus. Questionable mild mural thickening at the greater  curvature of the stomach adjacent to the edema along the pancreatic tail. Colonic diverticulosis, without evidence for diverticulitis. Normal appendix. Peritoneum: No free air    Lymph nodes: No lymphadenopathy. Pelvis: Urinary bladder is unremarkable. Fibroid uterus again noted. Body wall/soft tissues: Small fat-containing umbilical hernia. Small right lower  spigelian hernia containing a small amount of fluid measuring 2.5 x 1.2 cm  (image 203), previously measured 2.9 x 1.6 cm. Mild edema along the right flank. Bones:  -Bilateral moderate knee osteoarthrosis. Small right knee joint effusion and  trace left knee joint effusion.  -Diffuse soft tissue swelling at the feet, right greater than left. Trace  subcutaneous emphysema adjacent to the right fifth MTP joint. Left distal tibial  and talar hardware noted. -Multilevel degenerative spondylosis and disc disease noted, not well evaluated  on current exam.  -Moderate degenerative change at the hips      Impression IMPRESSION:  1.  Right lower extremity:  Multifocal stenosis throughout the right posterior tibialis artery due to  atherosclerotic calcifications as well as the peroneal artery.  -The right posterior tibialis artery is not opacified beyond the mid right lower  leg.  -The anterior tibial artery is not opacified beyond the right lower leg just  above the tibial plafond.  -The peroneal artery is opacified beyond the level of the tibial plafond. Proximally, there is multifocal stenosis of the right femoral artery with loss  of opacification at the mid to distal thigh. Reconstitution of opacification at  the level of the posterior tibialis artery likely due to collaterals from  geniculate arteries and the deep femoral artery. 2. Diffuse soft tissue swelling at bilateral feet, right greater than left. -Suggestion of trace air along the right fifth MTP joint, could be degenerative  but infectious process not excluded. Consider dedicated right foot x-ray for  further evaluation. 3. Chronic multilevel stenosis of the aorta, iliac arteries, and left lower  extremity as described. 4. Peripancreatic edema along the pancreatic tail is increased since 1/3/2020.  -Associated adjacent mild presumed reactive mural thickening of the greater  curvature of the stomach. 5. Small right pleural effusion. 6. Suggestion of mild mural thickening of the distal esophagus, may indicate  nonspecific esophagitis. 7. Bilateral knee osteoarthrosis with small right knee joint effusion and trace  left knee joint effusion. 8. Fibroid uterus. 9. Colonic diverticulosis, without evidence for diverticulitis. 10. Additional nonacute findings are as described. ABD      MRI Results (most recent): No procedure found. 1. Acute pancreatitis, unspecified complication status, unspecified pancreatitis type    2. Hyperglycemic hyperosmolar nonketotic coma ([address]    3. Acute renal failure, unspecified acute renal failure type ([address]    4. Goals of care, counseling/discussion    5.  Type 2 diabetes mellitus with hyperosmolarity without coma, unspecified whether long term insulin use ([facility] [address]    6. Ischemia of right lower extremity    7.  Atherosclerosis of native artery of right lower extremity with intermittent claudication ([facility] [address]

## 2020-01-20 NOTE — PROGRESS NOTES
Hospitalist Progress Note    Patient: Timoteo Enriquez Age: 79 y.o. : 1940 MR#: [id number] SSN: [id number]  Date/Time: 2020 3:46 PM    DOA: 2020  PCP: Adrian Dawn MD    Subjective:     Patient was seen and examined this AM. Still pain in left lower leg/foot. Has Percocet this AM  She is unclear if she want surgery. Podiatry signed off, vascular follows. Dr. Alvy Blizzard consulted for further surgical care. Hgb/Hct 7.7/24.5 today with IV iron and retacrit. No blood transfusion per [facility]  Family at bedside wants to know at what Hgb/hct would she be safe for surgical intervention since she changed her mind today. No fever. No leukocytosis. BP has remains stable. Urinary retention with gutierrez removed 2020. Urology follows. Interval Hospital Course:  79 y.o female with prolonged hospitalization since her admission  for metabolic encephalopathy related to HHS, acute pancreatitis, EFRAIN. She has recovered but developed worsened ischemia of her right foot due to right lower extremity PAD with gangrene, CTA with multifocal stenosis of right lower extremity. She underwent angio with balloon angioplasty and stent placement at origin of common iliac (2020). She underwent right femoral to above-knee popliteal bypass (2020). Podiatry consulted and followed.      ROS: no fever/chills, no headache, no dizziness, no facial pain, no sinus congestion,   No swallowing pain, No chest pain, no palpitation, no shortness of breath, no abd pain,  No diarrhea, no urinary complaint, +right leg pain or swelling      Assessment/Plan:     1)  Right leg PAD with gangrene, associate with uncontrolled DM2      - CTA with multifocal stenosis of right lower extremity      - s/p angio with balloon angioplasty and stent orf right common iliac artery 2020      - right Fem-Pop bypass 2020  2)  Hyperglycemia with Type 2 diabetes, stable              - admitted with Hyperosmolar non-ketotic state in type 2 diabetes mellitus   3)  Acute on chronic renal failure Stage 3  4)  Metabolic acidosis due to renal issues-resolved  5)  Acute metabolic encephalopathy, resolved   6)  Acute pancreatitis, resolved, GI recommended CT abd in 8 weeks   7)  Hypertension, elevated recently  8)  Hypophosphatemia, replaced   9)  Normocytic anemia of chronic disease, recently decreased. Not amenable for transfusion. 10)  Urinary retention, gutierrez out. Patient need surgical intervention, vascular has consulted Dr. Aneudy Morris, unsure what Hgb/hct level will be permissible for her planned surgical intervention. Podiatry sign off. Patient continues on Retacrit and Iron supplement. Negative stool occult blood. No antibiotic need now. Monitor for infection . ON lantus and ISS   Cont ASA, flomax, increase amlodipine, can added metoprolol to help with BP today. Urology follows, no urinary retention now. Family updated.    Full code     Additional Notes:    Time spent >30 minutes     Case discussed with:  [x]Patient  [x]Family  [x]Nursing  [x]Case Management  DVT Prophylaxis:  []Lovenox  []Hep SQ  [x]SCDs  []Coumadin   []On Heparin gtt    Signed By: Jefferson Florez MD     2020 3:46 PM              Objective:   VS:   Visit Vitals  /77 (BP 1 Location: Left arm, BP Patient Position: At rest)   Pulse 74   Temp 98.9 °F (37.2 °C)   Resp 20   Ht 5' 5\" (1.651 m)   Wt 76.3 kg (168 lb 4.8 oz)   SpO2 97%   Breastfeeding No   BMI 28.01 kg/m²      Tmax/24hrs: Temp (24hrs), Av.2 °F (36.8 °C), Min:97.6 °F (36.4 °C), Max:98.9 °F (37.2 °C)      Intake/Output Summary (Last 24 hours) at 2020 1546  Last data filed at 2020 1303  Gross per 24 hour   Intake 615 ml   Output 751 ml   Net -136 ml       General:  Cooperative, Not in acute distress, speaks in full sentence while in bed  HEENT: PERRL, EOMI, supple neck, no JVD, dry oral mucosa  Cardiovascular: S1S2 regular, no rub/gallop   Pulmonary: Clear air entry bilaterally, no wheezing, no crackle  GI:  Soft, non tender, non distended, +bs, no guarding   Extremities:  +right pedal edema, +distal pulses appreciated   Right foot necrosis/gangrene   Neuro: AOx3, moving all extremities, no gross deficit.      Additional:       Current Facility-Administered Medications   Medication Dose Route Frequency    [START ON 1/21/2020] amLODIPine (NORVASC) tablet 10 mg  10 mg Oral DAILY    metoprolol tartrate (LOPRESSOR) tablet 12.5 mg  12.5 mg Oral Q12H    [START ON 1/21/2020] ferrous sulfate tablet 325 mg  1 Tab Oral DAILY WITH BREAKFAST    hydrALAZINE (APRESOLINE) 20 mg/mL injection 10 mg  10 mg IntraVENous Q6H PRN    bisacodyL (DULCOLAX) tablet 10 mg  10 mg Oral DAILY PRN    docusate sodium (COLACE) capsule 100 mg  100 mg Oral BID    0.9% sodium chloride infusion 250 mL  250 mL IntraVENous PRN    epoetin bipin-epbx (RETACRIT) 21,000 Units  21,000 Units SubCUTAneous QHS    tamsulosin (FLOMAX) capsule 0.4 mg  0.4 mg Oral DAILY    HYDROmorphone (DILAUDID) syringe 0.5 mg  0.5 mg IntraVENous Q2H PRN    dextrose (D25W) 25% injection 10 mL  2.5 g IntraVENous PRN    insulin lispro (HUMALOG) injection   SubCUTAneous AC&HS    famotidine (PEPCID) tablet 20 mg  20 mg Oral DAILY    insulin glargine (LANTUS) injection 28 Units  28 Units SubCUTAneous DAILY    bisacodyl (DULCOLAX) suppository 10 mg  10 mg Rectal DAILY PRN    polyethylene glycol (MIRALAX) packet 17 g  17 g Oral DAILY    aspirin chewable tablet 81 mg  81 mg Oral DAILY    oxyCODONE-acetaminophen (PERCOCET) 5-325 mg per tablet 1-2 Tab  1-2 Tab Oral Q6H PRN    dextrose 10% infusion 125-250 mL  125-250 mL IntraVENous PRN    heparin (porcine) injection 5,000 Units  5,000 Units SubCUTAneous Q8H    glucose chewable tablet 16 g  4 Tab Oral PRN    glucagon (GLUCAGEN) injection 1 mg  1 mg IntraMUSCular PRN            Lab/Data Review:  Labs: Results:       Chemistry Recent Labs     01/19/20  0405 01/18/20  3639 * 119*    141   K 5.2 5.2   * 113*   CO2 22 24   BUN 17 15   CREA 1.37* 1.38*   BUCR 12 11*   AGAP 5 4   CA 8.5 8.6     No results for input(s): TBIL, ALT, SGOT, ALKP, TP, ALB, GLOB, AGRAT in the last 72 hours. CBC w/Diff Recent Labs     01/20/20  0529 01/19/20  0405 01/18/20  0524   WBC  --  6.9 8.2   RBC  --  2.00* 2.03*   HGB 7.7* 6.4* 6.5*   HCT 24.5* 20.2* 20.3*   MCV  --  101.0* 100.0*   MCH  --  32.0 32.0   MCHC  --  31.7 32.0   RDW  --  14.5 14.5   PLT  --  300 297      Coagulation No results for input(s): PTP, INR, APTT, INREXT, INREXT in the last 72 hours. Iron/Ferritin Lab Results   Component Value Date/Time    Iron 22 (L) 01/19/2020 04:05 AM    TIBC 116 (L) 01/19/2020 04:05 AM    Iron % saturation 19 01/19/2020 04:05 AM    Ferritin 100 01/15/2020 03:33 AM       BNP    Cardiac Enzymes Lab Results   Component Value Date/Time    Troponin-I, QT <0.02 01/02/2020 11:25 PM        Lactic Acid    Thyroid Studies          All Micro Results     Procedure Component Value Units Date/Time    CULTURE, BLOOD [[id number]] Collected:  01/02/20 2206    Order Status:  Completed Specimen:  Blood Updated:  01/08/20 0642     Special Requests: NO SPECIAL REQUESTS        Culture result: NO GROWTH 6 DAYS       CULTURE, BLOOD [[id number]] Collected:  01/02/20 2206    Order Status:  Completed Specimen:  Blood Updated:  01/08/20 2900     Special Requests: NO SPECIAL REQUESTS        Culture result: NO GROWTH 6 DAYS               Images:    CT (Most Recent). CT Results (most recent):  Results from Hospital Encounter encounter on 01/02/20   CTA ABD ART W RUNOFF W WO CONT    Narrative PROCEDURE: CTA of abdomen and pelvis and bilateral lower extremity runoff with  intravenous contrast administration. HISTORY: PAD, ischemia of the right foot.     TECHNIQUE: Multidetector helical CT angiography was carried out from low chest  through the feet following dynamic 70 cc Isovue-300 intravenous contrast  administration .  Scan timing was performed using automated bolus tracking/SMART  Prep. 3-D and MPR angiographic image reconstruction was performed on  independent workstation and separately reviewed. Parenchymal imaging is limited  by the arterial phase of contrast administration. All CT scans at this facility  are performed using dose optimization techniques as appropriate to a performed  exam, to include automated exposure control, adjustment of the mA and/or kV  according to patient's size (including appropriate matching for site specific  examinations), or use of iterative reconstruction technique. COMPARISON: CT abdomen/pelvis 1/3/2020. CTA abdomen/pelvis with extremity runoff  7/18/2017. FINDINGS:    VASCULAR FINDINGS:    Right lower extremity:  Multifocal stenosis throughout the right posterior tibialis artery due to  atherosclerotic calcifications as well as the peroneal artery.  -The right posterior tibialis artery is not opacified beyond the mid right lower  leg.  -The anterior tibial artery is not opacified beyond the right lower leg just  above the tibial plafond.  -The peroneal artery is opacified beyond the level of the tibial plafond. Proximally, there is multifocal stenosis of the right femoral artery with loss  of opacification at the mid to distal thigh. Reconstitution of opacification at  the level of the posterior tibialis artery likely due to collaterals from  geniculate arteries and the deep femoral artery. 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  -Left lower leg perfusion is contributed by collaterals from the deep femoral  artery and geniculate arteries. Multifocal stenosis/occlusion of the left  posterior tibialis artery and left peroneal artery. No opacification of the  peroneal artery or posterior tibialis artery is seen to be on the mid to  proximal third of the left lower leg.  The left dorsal pedis is opacified, and  likely contributes to some additional opacified vessel seen in the left foot. Abdominal aorta:  -Severe atherosclerosis with moderate compromise of the lumen, similar to prior  exam of 7/2017.  -No evidence for abdominal aortic aneurysm. -Severe stenosis at the proximal aspect of the right common iliac artery, well  opacified distally, similar to prior exam. Multifocal moderate stenosis at the  left common iliac artery similar to prior exam.  -Multifocal stenosis at the internal iliac arteries, with good opacification  distally.  -Celiac artery, SMA, left renal artery, and right renal artery are grossly  patent. The right renal artery again shown to originate from the descending  thoracic aorta. ALFREDA is not well visualized. ABDOMEN/PELVIS FINDINGS:  Lower chest: Small right pleural effusion with overlying atelectasis. Liver: Stable subcentimeter hypodense lesions liver, too small to characterize  but stability suggests benignity    Biliary: Gallbladder is mildly distended but otherwise unremarkable. Spleen: Negative    Pancreas: There is some peripancreatic edema adjacent to the pancreatic tail. Adrenal glands: Diffuse thickening of the adrenal glands, without discrete mass,  similar to prior exam.    Kidneys: Malrotated kidneys again noted, without evidence for hydronephrosis. GI tract: The bowel appears nonobstructed. Question of mild mural thickening at  the distal esophagus. Questionable mild mural thickening at the greater  curvature of the stomach adjacent to the edema along the pancreatic tail. Colonic diverticulosis, without evidence for diverticulitis. Normal appendix. Peritoneum: No free air    Lymph nodes: No lymphadenopathy. Pelvis: Urinary bladder is unremarkable. Fibroid uterus again noted. Body wall/soft tissues: Small fat-containing umbilical hernia. Small right lower  spigelian hernia containing a small amount of fluid measuring 2.5 x 1.2 cm  (image 203), previously measured 2.9 x 1.6 cm.  Mild edema along the right flank. Bones:  -Bilateral moderate knee osteoarthrosis. Small right knee joint effusion and  trace left knee joint effusion.  -Diffuse soft tissue swelling at the feet, right greater than left. Trace  subcutaneous emphysema adjacent to the right fifth MTP joint. Left distal tibial  and talar hardware noted. -Multilevel degenerative spondylosis and disc disease noted, not well evaluated  on current exam.  -Moderate degenerative change at the hips      Impression IMPRESSION:  1. Right lower extremity:  Multifocal stenosis throughout the right posterior tibialis artery due to  atherosclerotic calcifications as well as the peroneal artery.  -The right posterior tibialis artery is not opacified beyond the mid right lower  leg.  -The anterior tibial artery is not opacified beyond the right lower leg just  above the tibial plafond.  -The peroneal artery is opacified beyond the level of the tibial plafond. Proximally, there is multifocal stenosis of the right femoral artery with loss  of opacification at the mid to distal thigh. Reconstitution of opacification at  the level of the posterior tibialis artery likely due to collaterals from  geniculate arteries and the deep femoral artery. 2. Diffuse soft tissue swelling at bilateral feet, right greater than left. -Suggestion of trace air along the right fifth MTP joint, could be degenerative  but infectious process not excluded. Consider dedicated right foot x-ray for  further evaluation. 3. Chronic multilevel stenosis of the aorta, iliac arteries, and left lower  extremity as described. 4. Peripancreatic edema along the pancreatic tail is increased since 1/3/2020.  -Associated adjacent mild presumed reactive mural thickening of the greater  curvature of the stomach. 5. Small right pleural effusion. 6. Suggestion of mild mural thickening of the distal esophagus, may indicate  nonspecific esophagitis.     7. Bilateral knee osteoarthrosis with small right knee joint effusion and trace  left knee joint effusion. 8. Fibroid uterus. 9. Colonic diverticulosis, without evidence for diverticulitis. 10. Additional nonacute findings are as described. XRAY (Most Recent)      EKG No results found for this or any previous visit.      2D ECHO

## 2020-01-20 NOTE — PROGRESS NOTES
2100 - The PIV in JAKE placed by ultrasound is red, puffy and bruised, this writer tried several times for new IV. Notified supervisor who advised will come to unit. 2145 Tawanna Engel, [address] placed PIV in 20 [facility], good blood return noted. 2230 - Pump beeping and IV site is infilirated, unable to flush and arm is swollen. PIV removed. Unable to obtain new IV. Spoke with Dr. Latonia Coburn to explain situation. Unfortunately there will no IV access overnight, suggest to call attending in the AM to get another US guided IV or Midline placed. Will pass along in report. 2235 - Called to give report to 2S, room is not ready will call back once room cleaned. 2300 - Left message for IRINA Leon to call unit, not urgent to advise of room transfer, awaiting call back  0015 - Assisted pt on bedpan.  Moments of confusion, will answer questions correctly but then says things that do not make sense   0130 - Transfer pt to room 214 via bed

## 2020-01-20 NOTE — PROGRESS NOTES
Aquilino Vargas RN attempted x3 to insert IV. I attempted x2. No success. Hospitalist aware.  Will need ultrasound guided or midline IV placed in am.

## 2020-01-20 NOTE — ROUTINE PROCESS
Bedside shift change report given to JUNE Davison. Report included SBAR, Kardex, MAR, Recent Results, and Plan of Care. Pt in bed sleeping with call light in reach.

## 2020-01-20 NOTE — PROGRESS NOTES
Pt sleeping comfortably. Would not wake for exam.   NAD. No resp difficulty. Right foot demarcating - plantarly eschar demarcation up to heel. Left foot warm and perfused, no skin changes/ulcer  Podiatry recs noted- no TMA, recommend Chopart or Syme amputation   Dr Chucho Fung consulted and to see. Await recs. Unable to transfuse due to pt Restorationist belief, H&H 7.7/24.5 today. Receiving epo  No family at bedside.

## 2020-01-20 NOTE — PROGRESS NOTES
Hematology/Medical Oncology Progress Note             Name: Kayleigh Novoa   : 1940   MRN: [id number]   Date: 2020 8:13 AM     [x]I have reviewed the flowsheet and previous days notes. Events overnight reviewed and discussed with nursing staff. Vital signs and records reviewed. 48-year-old -American female past medical history of severe PVD, chronic anemia,uncontrolled diabetes mellitus type 2 now right lower limb ischemia and dry gangrene. S/p R Fem-pop bypass. In addition patient is Rastafari and refuses blood transfusion. Presently receiving Retacrit 21,000 units X 7 days per vascular surgery will need further surgery. Pt voices no new c/o today. ROS:  Constitutional: Positive for fatigue. Negative for fever. HENT: Negative for nosebleeds, congestion, facial swelling, mouth sores, trouble swallowing, neck pain, neck stiffness, voice change and postnasal drip. Eyes: Negative for photophobia, pain, discharge and itching. Respiratory: Negative for apnea, cough, choking, chest tightness, wheezing and stridor. Cardiovascular: Negative for chest pain, palpitations and leg swelling. Gastrointestinal: Negative for abdominal pain. Negative for nausea, diarrhea, constipation, blood in stool and rectal pain. Genitourinary: Negative for dysuria, urgency, hematuria, flank pain and difficulty urinating. Musculoskeletal:Positive for right foot pain. Skin: Positive for right foot gangrene. Neurological:  Negative for dizziness, facial asymmetry, speech difficulty, light-headedness and headaches. Hematological: Negative for adenopathy. Does not bruise/bleed easily. Psychiatric/Behavioral: Negative for hallucinations,confusion.     Vital Signs:    Visit Vitals  /66 (BP 1 Location: Left arm, BP Patient Position: At rest)   Pulse (!) 106   Temp 97.6 °F (36.4 °C)   Resp 20   Ht 5' 5\" (1.651 m)   Wt 76.3 kg (168 lb 4.8 oz)   SpO2 99%   Breastfeeding No   BMI 28.01 kg/m²       O2 Device: Nasal cannula   O2 Flow Rate (L/min): 2 l/min   Temp (24hrs), Av.1 °F (36.7 °C), Min:97.6 °F (36.4 °C), Max:98.3 °F (36.8 °C)       Intake/Output:   Last shift:      No intake/output data recorded. Last 3 shifts:  1901 -  0700  In: 2277.5 [P.O.:240; I.V.:2037.5]  Out:  [Urine:1975]    Intake/Output Summary (Last 24 hours) at 2020 0813  Last data filed at 2020 0014  Gross per 24 hour   Intake 1182.5 ml   Output 1201 ml   Net -18.5 ml       Physical Exam:  General: Appears comfortable, NAD. HEENT:  Anicteric sclerae; pink palpebral conjunctivae; mucosa moist  Resp:  Symmetrical chest expansion, no accessory muscle use; good airway entry; no rales/ wheezing/ rhonchi noted  CV:  S1, S2 present; regular rate and rhythm  GI:  Abdomen soft, non-tender; (+) active bowel sounds  Extremities:  S/p R Fem-pop bypass: R foot dry gangrene.   Skin:  Warm; no rashes/ lesions noted  Neurologic:  Non-focal         DATA:   Current Facility-Administered Medications   Medication Dose Route Frequency    iron sucrose (VENOFER) 300 mg in 0.9% sodium chloride 250 mL IVPB  300 mg IntraVENous Q24H    hydrALAZINE (APRESOLINE) 20 mg/mL injection 10 mg  10 mg IntraVENous Q6H PRN    bisacodyL (DULCOLAX) tablet 10 mg  10 mg Oral DAILY PRN    docusate sodium (COLACE) capsule 100 mg  100 mg Oral BID    0.9% sodium chloride infusion 250 mL  250 mL IntraVENous PRN    epoetin bipin-epbx (RETACRIT) 21,000 Units  21,000 Units SubCUTAneous QHS    tamsulosin (FLOMAX) capsule 0.4 mg  0.4 mg Oral DAILY    HYDROmorphone (DILAUDID) syringe 0.5 mg  0.5 mg IntraVENous Q2H PRN    nitroglycerin (TRIDIL) 400 mcg/ml infusion  0-20 mcg/min IntraVENous TITRATE    dextrose 5 % - 0.45% NaCl infusion  75 mL/hr IntraVENous CONTINUOUS    amLODIPine (NORVASC) tablet 5 mg  5 mg Oral DAILY    dextrose (D25W) 25% injection 10 mL  2.5 g IntraVENous PRN    insulin lispro (HUMALOG) injection   SubCUTAneous AC&HS    famotidine (PEPCID) tablet 20 mg  20 mg Oral DAILY    insulin glargine (LANTUS) injection 28 Units  28 Units SubCUTAneous DAILY    bisacodyl (DULCOLAX) suppository 10 mg  10 mg Rectal DAILY PRN    polyethylene glycol (MIRALAX) packet 17 g  17 g Oral DAILY    aspirin chewable tablet 81 mg  81 mg Oral DAILY    oxyCODONE-acetaminophen (PERCOCET) 5-325 mg per tablet 1-2 Tab  1-2 Tab Oral Q6H PRN    dextrose 10% infusion 125-250 mL  125-250 mL IntraVENous PRN    heparin (porcine) injection 5,000 Units  5,000 Units SubCUTAneous Q8H    glucose chewable tablet 16 g  4 Tab Oral PRN    glucagon (GLUCAGEN) injection 1 mg  1 mg IntraMUSCular PRN                    Labs:  Recent Labs     01/20/20  0529 01/19/20  0405 01/18/20  0524   WBC  --  6.9 8.2   HGB 7.7* 6.4* 6.5*   HCT 24.5* 20.2* 20.3*   PLT  --  300 297     Recent Labs     01/19/20  0405 01/18/20  0524    141   K 5.2 5.2   * 113*   CO2 22 24   * 119*   BUN 17 15   CREA 1.37* 1.38*   CA 8.5 8.6   MG 2.3  --      No results for input(s): PH, PCO2, PO2, HCO3, FIO2 in the last 72 hours. IMPRESSION:   · Anemia of chronic illness  · PVD  · Diabetes type 2  · Acute renal failure        · PLAN:  · H/H is 7.7/24.5 : Pt is Jehovahs witness-no PRBC transfusion. .  Reviewed creatinine 1.37. Continue Retacrit 21,000 units daily X 7 days. Iron low at 22,TIBC 116, iron % sat 19, ferritin 100. B12 897, folate >20.0. I will hold ferrous sulfate 325 mg p.o. for now while recieving Venofer 300 mg daily X 3 doses. · Recommend limiting lab draws. · Tentative plan :Vascular surgery will proceed with amputation surgery whenever patient is medically stable.       ·  ·        The patient is: [x] acutely ill Risk of deterioration: [] moderate    [] critically ill  [] high         My assessment/plan was discussed with:  [x]nursing []PT/OT    []respiratory therapy [x]Dr.Lloyd Ena Runner, MD      []family []       Cathryne Grief, NP

## 2020-01-20 NOTE — ROUTINE PROCESS
Dr. Vadim Arguello notified patient still with out IV unable to give IV Iron. Per ok to give iron po and leave iv out at current time

## 2020-01-20 NOTE — PROGRESS NOTES
Freedom of choice signed for SNF. Choices of #1 [facility], #2 [facility]hab and #3 [facility]. [place] [address] accordingly.     Ryan Bledsoe, RN BSN  Care Manager  [phone]

## 2020-01-21 LAB
BASOPHILS # BLD: 0 K/UL (ref 0–0.1)
BASOPHILS NFR BLD: 0 % (ref 0–2)
DIFFERENTIAL METHOD BLD: ABNORMAL
EOSINOPHIL # BLD: 0.2 K/UL (ref 0–0.4)
EOSINOPHIL NFR BLD: 2 % (ref 0–5)
ERYTHROCYTE [DISTWIDTH] IN BLOOD BY AUTOMATED COUNT: 14.1 % (ref 11.6–14.5)
GLUCOSE BLD STRIP.AUTO-MCNC: 109 MG/DL (ref 70–110)
GLUCOSE BLD STRIP.AUTO-MCNC: 139 MG/DL (ref 70–110)
GLUCOSE BLD STRIP.AUTO-MCNC: 151 MG/DL (ref 70–110)
GLUCOSE BLD STRIP.AUTO-MCNC: 166 MG/DL (ref 70–110)
GLUCOSE BLD STRIP.AUTO-MCNC: 52 MG/DL (ref 70–110)
GLUCOSE BLD STRIP.AUTO-MCNC: 55 MG/DL (ref 70–110)
GLUCOSE BLD STRIP.AUTO-MCNC: 59 MG/DL (ref 70–110)
HCT VFR BLD AUTO: 22.2 % (ref 35–45)
HGB BLD-MCNC: 7.1 G/DL (ref 12–16)
LYMPHOCYTES # BLD: 1.1 K/UL (ref 0.9–3.6)
LYMPHOCYTES NFR BLD: 14 % (ref 21–52)
MCH RBC QN AUTO: 32.3 PG (ref 24–34)
MCHC RBC AUTO-ENTMCNC: 32 G/DL (ref 31–37)
MCV RBC AUTO: 100.9 FL (ref 74–97)
MONOCYTES # BLD: 0.6 K/UL (ref 0.05–1.2)
MONOCYTES NFR BLD: 8 % (ref 3–10)
NEUTS SEG # BLD: 5.7 K/UL (ref 1.8–8)
NEUTS SEG NFR BLD: 76 % (ref 40–73)
PLATELET # BLD AUTO: 353 K/UL (ref 135–420)
PLATELET COMMENTS,PCOM: ABNORMAL
PMV BLD AUTO: 9.5 FL (ref 9.2–11.8)
RBC # BLD AUTO: 2.2 M/UL (ref 4.2–5.3)
RBC MORPH BLD: ABNORMAL
WBC # BLD AUTO: 7.6 K/UL (ref 4.6–13.2)

## 2020-01-21 PROCEDURE — 77030038269 HC DRN EXT URIN PURWCK BARD -A

## 2020-01-21 PROCEDURE — 65660000000 HC RM CCU STEPDOWN

## 2020-01-21 PROCEDURE — 36415 COLL VENOUS BLD VENIPUNCTURE: CPT

## 2020-01-21 PROCEDURE — 74011636637 HC RX REV CODE- 636/637: Performed by: INTERNAL MEDICINE

## 2020-01-21 PROCEDURE — 82962 GLUCOSE BLOOD TEST: CPT

## 2020-01-21 PROCEDURE — 74011250637 HC RX REV CODE- 250/637: Performed by: HOSPITALIST

## 2020-01-21 PROCEDURE — 74011250637 HC RX REV CODE- 250/637: Performed by: INTERNAL MEDICINE

## 2020-01-21 PROCEDURE — 85025 COMPLETE CBC W/AUTO DIFF WBC: CPT

## 2020-01-21 PROCEDURE — 74011250637 HC RX REV CODE- 250/637: Performed by: EMERGENCY MEDICINE

## 2020-01-21 PROCEDURE — 74011250636 HC RX REV CODE- 250/636: Performed by: SURGERY

## 2020-01-21 PROCEDURE — 74011250636 HC RX REV CODE- 250/636: Performed by: PHYSICIAN ASSISTANT

## 2020-01-21 RX ADMIN — Medication 81 MG: at 09:55

## 2020-01-21 RX ADMIN — INSULIN LISPRO 2 UNITS: 100 INJECTION, SOLUTION INTRAVENOUS; SUBCUTANEOUS at 21:49

## 2020-01-21 RX ADMIN — OXYCODONE HYDROCHLORIDE AND ACETAMINOPHEN 2 TABLET: 5; 325 TABLET ORAL at 04:19

## 2020-01-21 RX ADMIN — METOPROLOL TARTRATE 12.5 MG: 25 TABLET ORAL at 09:55

## 2020-01-21 RX ADMIN — Medication 325 MG: at 09:57

## 2020-01-21 RX ADMIN — HEPARIN SODIUM 5000 UNITS: 5000 INJECTION INTRAVENOUS; SUBCUTANEOUS at 20:33

## 2020-01-21 RX ADMIN — FAMOTIDINE 20 MG: 20 TABLET, FILM COATED ORAL at 09:54

## 2020-01-21 RX ADMIN — AMLODIPINE BESYLATE 10 MG: 10 TABLET ORAL at 09:54

## 2020-01-21 RX ADMIN — OXYCODONE HYDROCHLORIDE AND ACETAMINOPHEN 1 TABLET: 5; 325 TABLET ORAL at 20:33

## 2020-01-21 RX ADMIN — HEPARIN SODIUM 5000 UNITS: 5000 INJECTION INTRAVENOUS; SUBCUTANEOUS at 04:15

## 2020-01-21 RX ADMIN — TAMSULOSIN HYDROCHLORIDE 0.4 MG: 0.4 CAPSULE ORAL at 09:55

## 2020-01-21 RX ADMIN — DOCUSATE SODIUM 100 MG: 100 CAPSULE, LIQUID FILLED ORAL at 09:55

## 2020-01-21 RX ADMIN — HEPARIN SODIUM 5000 UNITS: 5000 INJECTION INTRAVENOUS; SUBCUTANEOUS at 12:50

## 2020-01-21 RX ADMIN — METOPROLOL TARTRATE 12.5 MG: 25 TABLET ORAL at 20:34

## 2020-01-21 RX ADMIN — EPOETIN ALFA-EPBX 21000 UNITS: 10000 INJECTION, SOLUTION INTRAVENOUS; SUBCUTANEOUS at 20:32

## 2020-01-21 NOTE — PROGRESS NOTES
Hospitalist Progress Note    Patient: Kayleigh Novoa Age: 79 y.o. : 1940 MR#: [id number] SSN: [id number]  Date/Time: 2020 8:56 AM    DOA: 2020  PCP: Joe Olivera MD    Subjective:     Patient has intermittent confusion, has been on narcotic for pain in her right foot pain. Family ok with surgery   Vascular to follow up for AKA, though her anemia needs to be a little more improved. Hgb/Hct 7.1/22.2 today with IV iron and retacrit. No blood transfusion per [employer]  No fever. No leukocytosis. BP has remains stable. Urinary retention with gutierrez removed 2020. Resolved       Interval Hospital Course:  79 y.o female with prolonged hospitalization since her admission  for metabolic encephalopathy related to HHS, acute pancreatitis, EFRAIN. She has recovered but developed worsened ischemia of her right foot due to right lower extremity PAD with gangrene, CTA with multifocal stenosis of right lower extremity. She underwent angio with balloon angioplasty and stent placement at origin of common iliac (2020). She underwent right femoral to above-knee popliteal bypass (2020). Podiatry consulted and followed.      ROS: no fever/chills, no headache, no dizziness, no facial pain, no sinus congestion,   No swallowing pain, No chest pain, no palpitation, no shortness of breath, no abd pain,  No diarrhea, no urinary complaint, +right leg pain or swelling      Assessment/Plan:     1)  Right leg PAD with gangrene, associate with uncontrolled DM2      - CTA with multifocal stenosis of right lower extremity      - s/p angio with balloon angioplasty and stent orf right common iliac artery 2020      - right Fem-Pop bypass 2020       VASCULAR TO FOLLOW UP FOR AKA right   2)  Hyperglycemia with Type 2 diabetes, stable              - admitted with Hyperosmolar non-ketotic state in type 2 diabetes mellitus   3)  Acute on chronic renal failure Stage 3  4)  Metabolic acidosis due to renal issues-resolved  5)  Acute metabolic encephalopathy, resolved   6)  Acute pancreatitis, resolved, GI recommended CT abd in 8 weeks   7)  Hypertension, elevated recently  8)  Hypophosphatemia, replaced   9)  Normocytic anemia of chronic disease, recently decreased. Not amenable for transfusion. 10)  Urinary retention, gutierrez out. Pending hgb/hct level for her AKA on Friday if family agree   She continues on Retacrit and Iron supplement. Negative stool occult blood. No antibiotic need now. Monitor for infection . ON lantus and ISS   Cont ASA, flomax, increase amlodipine, tolerated metoprolol to help with BP today. Urology signed off, no urinary retention now. Family updated.    Full code     Additional Notes:    Time spent >30 minutes     Case discussed with:  [x]Patient  [x]Family  [x]Nursing  [x]Case Management  DVT Prophylaxis:  []Lovenox  []Hep SQ  [x]SCDs  []Coumadin   []On Heparin gtt  Signed By: Mitchell Jerez MD     2020 8:56 AM              Objective:   VS:   Visit Vitals  /68 (BP 1 Location: Left arm, BP Patient Position: At rest)   Pulse 83   Temp 98.1 °F (36.7 °C)   Resp 20   Ht 5' 5\" (1.651 m)   Wt 75.9 kg (167 lb 6.4 oz)   SpO2 100%   Breastfeeding No   BMI 27.86 kg/m²      Tmax/24hrs: Temp (24hrs), Av.6 °F (37 °C), Min:98.1 °F (36.7 °C), Max:99.3 °F (37.4 °C)      Intake/Output Summary (Last 24 hours) at 2020 0857  Last data filed at 2020 0553  Gross per 24 hour   Intake 480 ml   Output 1000 ml   Net -520 ml       General:  Cooperative, Not in acute distress, speaks in full sentence while in bed  HEENT: PERRL, EOMI, supple neck, no JVD, dry oral mucosa  Cardiovascular: S1S2 regular, no rub/gallop   Pulmonary: Clear air entry bilaterally, no wheezing, no crackle  GI:  Soft, non tender, non distended, +bs, no guarding   Extremities:  +right pedal edema, +distal pulses appreciated   Right foot necrosis/gangrene   Neuro: AOx3, moving all extremities, no gross deficit. Additional:       Current Facility-Administered Medications   Medication Dose Route Frequency    amLODIPine (NORVASC) tablet 10 mg  10 mg Oral DAILY    metoprolol tartrate (LOPRESSOR) tablet 12.5 mg  12.5 mg Oral Q12H    ferrous sulfate tablet 325 mg  1 Tab Oral DAILY WITH BREAKFAST    hydrALAZINE (APRESOLINE) 20 mg/mL injection 10 mg  10 mg IntraVENous Q6H PRN    bisacodyL (DULCOLAX) tablet 10 mg  10 mg Oral DAILY PRN    docusate sodium (COLACE) capsule 100 mg  100 mg Oral BID    0.9% sodium chloride infusion 250 mL  250 mL IntraVENous PRN    epoetin bipin-epbx (RETACRIT) 21,000 Units  21,000 Units SubCUTAneous QHS    tamsulosin (FLOMAX) capsule 0.4 mg  0.4 mg Oral DAILY    HYDROmorphone (DILAUDID) syringe 0.5 mg  0.5 mg IntraVENous Q2H PRN    dextrose (D25W) 25% injection 10 mL  2.5 g IntraVENous PRN    insulin lispro (HUMALOG) injection   SubCUTAneous AC&HS    famotidine (PEPCID) tablet 20 mg  20 mg Oral DAILY    insulin glargine (LANTUS) injection 28 Units  28 Units SubCUTAneous DAILY    bisacodyl (DULCOLAX) suppository 10 mg  10 mg Rectal DAILY PRN    polyethylene glycol (MIRALAX) packet 17 g  17 g Oral DAILY    aspirin chewable tablet 81 mg  81 mg Oral DAILY    oxyCODONE-acetaminophen (PERCOCET) 5-325 mg per tablet 1-2 Tab  1-2 Tab Oral Q6H PRN    dextrose 10% infusion 125-250 mL  125-250 mL IntraVENous PRN    heparin (porcine) injection 5,000 Units  5,000 Units SubCUTAneous Q8H    glucose chewable tablet 16 g  4 Tab Oral PRN    glucagon (GLUCAGEN) injection 1 mg  1 mg IntraMUSCular PRN            Lab/Data Review:  Labs: Results:       Chemistry Recent Labs     01/19/20  0405   *      K 5.2   *   CO2 22   BUN 17   CREA 1.37*   BUCR 12   AGAP 5   CA 8.5     No results for input(s): TBIL, ALT, SGOT, ALKP, TP, ALB, GLOB, AGRAT in the last 72 hours.    CBC w/Diff Recent Labs     01/20/20  0529 01/19/20  0405   WBC  --  6.9   RBC  --  2.00*   HGB 7.7* 6.4*   HCT 24.5* 20.2*   MCV  --  101.0*   MCH  --  32.0   MCHC  --  31.7   RDW  --  14.5   PLT  --  300      Coagulation No results for input(s): PTP, INR, APTT, INREXT, INREXT in the last 72 hours. Iron/Ferritin Lab Results   Component Value Date/Time    Iron 22 (L) 01/19/2020 04:05 AM    TIBC 116 (L) 01/19/2020 04:05 AM    Iron % saturation 19 01/19/2020 04:05 AM    Ferritin 100 01/15/2020 03:33 AM       BNP    Cardiac Enzymes Lab Results   Component Value Date/Time    Troponin-I, QT <0.02 01/02/2020 11:25 PM        Lactic Acid    Thyroid Studies          All Micro Results     Procedure Component Value Units Date/Time    CULTURE, BLOOD [[id number]] Collected:  01/02/20 2206    Order Status:  Completed Specimen:  Blood Updated:  01/08/20 0642     Special Requests: NO SPECIAL REQUESTS        Culture result: NO GROWTH 6 DAYS       CULTURE, BLOOD [[id number]] Collected:  01/02/20 2206    Order Status:  Completed Specimen:  Blood Updated:  01/08/20 2439     Special Requests: NO SPECIAL REQUESTS        Culture result: NO GROWTH 6 DAYS               Images:    CT (Most Recent). CT Results (most recent):  Results from Hospital Encounter encounter on 01/02/20   CTA ABD ART W RUNOFF W WO CONT    Narrative PROCEDURE: CTA of abdomen and pelvis and bilateral lower extremity runoff with  intravenous contrast administration. HISTORY: PAD, ischemia of the right foot. TECHNIQUE: Multidetector helical CT angiography was carried out from low chest  through the feet following dynamic 70 cc Isovue-300 intravenous contrast  administration . Scan timing was performed using automated bolus tracking/SMART  Prep. 3-D and MPR angiographic image reconstruction was performed on  independent workstation and separately reviewed. Parenchymal imaging is limited  by the arterial phase of contrast administration.  All CT scans at this facility  are performed using dose optimization techniques as appropriate to a performed  exam, to include automated exposure control, adjustment of the mA and/or kV  according to patient's size (including appropriate matching for site specific  examinations), or use of iterative reconstruction technique. COMPARISON: CT abdomen/pelvis 1/3/2020. CTA abdomen/pelvis with extremity runoff  7/18/2017. FINDINGS:    VASCULAR FINDINGS:    Right lower extremity:  Multifocal stenosis throughout the right posterior tibialis artery due to  atherosclerotic calcifications as well as the peroneal artery.  -The right posterior tibialis artery is not opacified beyond the mid right lower  leg.  -The anterior tibial artery is not opacified beyond the right lower leg just  above the tibial plafond.  -The peroneal artery is opacified beyond the level of the tibial plafond. Proximally, there is multifocal stenosis of the right femoral artery with loss  of opacification at the mid to distal thigh. Reconstitution of opacification at  the level of the posterior tibialis artery likely due to collaterals from  geniculate arteries and the deep femoral artery. 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  -Left lower leg perfusion is contributed by collaterals from the deep femoral  artery and geniculate arteries. Multifocal stenosis/occlusion of the left  posterior tibialis artery and left peroneal artery. No opacification of the  peroneal artery or posterior tibialis artery is seen to be on the mid to  proximal third of the left lower leg. The left dorsal pedis is opacified, and  likely contributes to some additional opacified vessel seen in the left foot. Abdominal aorta:  -Severe atherosclerosis with moderate compromise of the lumen, similar to prior  exam of 7/2017.  -No evidence for abdominal aortic aneurysm.   -Severe stenosis at the proximal aspect of the right common iliac artery, well  opacified distally, similar to prior exam. Multifocal moderate stenosis at the  left common iliac artery similar to prior exam.  -Multifocal stenosis at the internal iliac arteries, with good opacification  distally.  -Celiac artery, SMA, left renal artery, and right renal artery are grossly  patent. The right renal artery again shown to originate from the descending  thoracic aorta. ALFREDA is not well visualized. ABDOMEN/PELVIS FINDINGS:  Lower chest: Small right pleural effusion with overlying atelectasis. Liver: Stable subcentimeter hypodense lesions liver, too small to characterize  but stability suggests benignity    Biliary: Gallbladder is mildly distended but otherwise unremarkable. Spleen: Negative    Pancreas: There is some peripancreatic edema adjacent to the pancreatic tail. Adrenal glands: Diffuse thickening of the adrenal glands, without discrete mass,  similar to prior exam.    Kidneys: Malrotated kidneys again noted, without evidence for hydronephrosis. GI tract: The bowel appears nonobstructed. Question of mild mural thickening at  the distal esophagus. Questionable mild mural thickening at the greater  curvature of the stomach adjacent to the edema along the pancreatic tail. Colonic diverticulosis, without evidence for diverticulitis. Normal appendix. Peritoneum: No free air    Lymph nodes: No lymphadenopathy. Pelvis: Urinary bladder is unremarkable. Fibroid uterus again noted. Body wall/soft tissues: Small fat-containing umbilical hernia. Small right lower  spigelian hernia containing a small amount of fluid measuring 2.5 x 1.2 cm  (image 203), previously measured 2.9 x 1.6 cm. Mild edema along the right flank. Bones:  -Bilateral moderate knee osteoarthrosis. Small right knee joint effusion and  trace left knee joint effusion.  -Diffuse soft tissue swelling at the feet, right greater than left. Trace  subcutaneous emphysema adjacent to the right fifth MTP joint. Left distal tibial  and talar hardware noted.   -Multilevel degenerative spondylosis and disc disease noted, not well evaluated  on current exam.  -Moderate degenerative change at the hips      Impression IMPRESSION:  1. Right lower extremity:  Multifocal stenosis throughout the right posterior tibialis artery due to  atherosclerotic calcifications as well as the peroneal artery.  -The right posterior tibialis artery is not opacified beyond the mid right lower  leg.  -The anterior tibial artery is not opacified beyond the right lower leg just  above the tibial plafond.  -The peroneal artery is opacified beyond the level of the tibial plafond. Proximally, there is multifocal stenosis of the right femoral artery with loss  of opacification at the mid to distal thigh. Reconstitution of opacification at  the level of the posterior tibialis artery likely due to collaterals from  geniculate arteries and the deep femoral artery. 2. Diffuse soft tissue swelling at bilateral feet, right greater than left. -Suggestion of trace air along the right fifth MTP joint, could be degenerative  but infectious process not excluded. Consider dedicated right foot x-ray for  further evaluation. 3. Chronic multilevel stenosis of the aorta, iliac arteries, and left lower  extremity as described. 4. Peripancreatic edema along the pancreatic tail is increased since 1/3/2020.  -Associated adjacent mild presumed reactive mural thickening of the greater  curvature of the stomach. 5. Small right pleural effusion. 6. Suggestion of mild mural thickening of the distal esophagus, may indicate  nonspecific esophagitis. 7. Bilateral knee osteoarthrosis with small right knee joint effusion and trace  left knee joint effusion. 8. Fibroid uterus. 9. Colonic diverticulosis, without evidence for diverticulitis. 10. Additional nonacute findings are as described. XRAY (Most Recent)      EKG No results found for this or any previous visit.      2D ECHO

## 2020-01-21 NOTE — PROGRESS NOTES
Problem: Diabetes Self-Management  Goal: *Disease process and treatment process  Description  Define diabetes and identify own type of diabetes; list 3 options for treating diabetes. Outcome: Progressing Towards Goal  Goal: *Incorporating nutritional management into lifestyle  Description  Describe effect of type, amount and timing of food on blood glucose; list 3 methods for planning meals. Outcome: Progressing Towards Goal  Goal: *Incorporating physical activity into lifestyle  Description  State effect of exercise on blood glucose levels. Outcome: Progressing Towards Goal  Goal: *Developing strategies to promote health/change behavior  Description  Define the ABC's of diabetes; identify appropriate screenings, schedule and personal plan for screenings. Outcome: Progressing Towards Goal  Goal: *Using medications safely  Description  State effect of diabetes medications on diabetes; name diabetes medication taking, action and side effects. Outcome: Progressing Towards Goal  Goal: *Monitoring blood glucose, interpreting and using results  Description  Identify recommended blood glucose targets  and personal targets. Outcome: Progressing Towards Goal  Goal: *Prevention, detection and treatment of chronic complications  Description  Define the natural course of diabetes and describe the relationship of blood glucose levels to long term complications of diabetes.   Outcome: Progressing Towards Goal  Goal: *Developing strategies to address psychosocial issues  Description  Describe feelings about living with diabetes; identify support needed and support network  Outcome: Progressing Towards Goal  Goal: *Insulin pump training  Outcome: Progressing Towards Goal  Goal: *Sick day guidelines  Outcome: Progressing Towards Goal  Goal: *Patient Specific Goal (EDIT GOAL, INSERT TEXT)  Outcome: Progressing Towards Goal     Problem: Falls - Risk of  Goal: *Absence of Falls  Description  Document Vlad Fall Risk and appropriate interventions in the flowsheet. Outcome: Progressing Towards Goal  Note: Fall Risk Interventions:  Mobility Interventions: Assess mobility with egress test, Communicate number of staff needed for ambulation/transfer, Bed/chair exit alarm, OT consult for ADLs, Patient to call before getting OOB, PT Consult for mobility concerns, PT Consult for assist device competence    Mentation Interventions: Adequate sleep, hydration, pain control, Bed/chair exit alarm, Door open when patient unattended, More frequent rounding, Reorient patient, Room close to nurse's station, Toileting rounds, Update white board    Medication Interventions: Teach patient to arise slowly, Patient to call before getting OOB, Evaluate medications/consider consulting pharmacy, Bed/chair exit alarm    Elimination Interventions: Bed/chair exit alarm, Call light in reach, Patient to call for help with toileting needs, Stay With Me (per policy), Toilet paper/wipes in reach, Toileting schedule/hourly rounds    History of Falls Interventions: Bed/chair exit alarm, Consult care management for discharge planning, Evaluate medications/consider consulting pharmacy, Assess for delayed presentation/identification of injury for 48 hrs (comment for end date)         Problem: Pressure Injury - Risk of  Goal: *Prevention of pressure injury  Description  Document Marc Scale and appropriate interventions in the flowsheet. Outcome: Progressing Towards Goal  Note: Pressure Injury Interventions:  Sensory Interventions: Assess changes in LOC, Avoid rigorous massage over bony prominences, Assess need for specialty bed, Check visual cues for pain, Float heels, Keep linens dry and wrinkle-free, Maintain/enhance activity level, Minimize linen layers, Monitor skin under medical devices, Pressure redistribution bed/mattress (bed type), Turn and reposition approx.  every two hours (pillows and wedges if needed)    Moisture Interventions: Absorbent underpads, Apply protective barrier, creams and emollients, Assess need for specialty bed, Check for incontinence Q2 hours and as needed, Internal/External urinary devices, Limit adult briefs, Maintain skin hydration (lotion/cream), Minimize layers, Moisture barrier    Activity Interventions: Assess need for specialty bed, PT/OT evaluation, Pressure redistribution bed/mattress(bed type)    Mobility Interventions: Assess need for specialty bed, Float heels, HOB 30 degrees or less, Pressure redistribution bed/mattress (bed type), Turn and reposition approx.  every two hours(pillow and wedges), PT/OT evaluation    Nutrition Interventions: Document food/fluid/supplement intake, Discuss nutritional consult with provider, Offer support with meals,snacks and hydration    Friction and Shear Interventions: Apply protective barrier, creams and emollients, Feet elevated on foot rest, Foam dressings/transparent film/skin sealants, HOB 30 degrees or less, Lift team/patient mobility team, Minimize layers                Problem: Pain  Goal: *Control of Pain  Outcome: Progressing Towards Goal

## 2020-01-21 NOTE — PROGRESS NOTES
[address] [place] [address] [address]  in with Medicaid application that Maximino Mcwilliams did with patient. ASHIA called Beba Sher nephthanh about the need to sign application. [employer] completed on 1-7-20 printed and in an envelope with Medicaid application for nephew to . Will continue to follow.     Deja Boudreaux, RN BSN  Care Manager  [phone]

## 2020-01-21 NOTE — PROGRESS NOTES
NUTRITION    Nursing Referral: [facility]  Nutrition Consult: Diet Education, Other     RECOMMENDATIONS / PLAN:     - Increase frequency of supplements to BID.  - Encourage meal intake. - Continue RD inpatient monitoring and evaluation. NUTRITION INTERVENTIONS & DIAGNOSIS:     - Meals/snacks: modified composition  - Medical food supplement therapy: Nepro once daily- modify  - Nutrition Education: low potassium diet education provided 1/10/2020    Nutrition Diagnosis: Predicted inadequate energy intake related to appetite as evidenced by pt with overall fair meal intake since admission, variable    ASSESSMENT:     1/21: Per vascular pt needs right AKA. Niece at bedside reports pt with variable intake since admission, better when encouraged by family members. Pt confused. 1/17: S/p right femoral-popliteal bypass 1/16. Good intake prior to surgery, now with decreased intake. Pt reports poor appetite. Encouraged meal intake, pt receptive. Pt reports consuming some of the Nepro prior to surgery, agreeable to continuing. 1/10: Poor to fair meal intake, denies nausea/vomiting or pain. Dislikes Glucerna and noted hyperkalemia (kayexalate given). EFRAIN 2/2 dehydration from severe pancreatitis. Nutritional needs modified. 1/8: Pt on po diet. Tolerating most meals; stated some foods are \"too heavy\" feeling, preferences discussed. Sometimes has fair meal intake. Agreeable to nutrition supplement. 1/3: Pancreatitis, hyperglycemia and acute on chronic renal failure. Per MD appears to be non-compliant with insulin, transitioned off insulin drip to lantus. Remains NPO on maintenance IVF with altered mentation and drowsy per chart review.     Nutritional intake adequate to meet patients estimated nutritional needs:  Unable to determine at this time    Diet: DIET NUTRITIONAL SUPPLEMENTS Dinner; NEPRO  DIET DIABETIC CONSISTENT CARB Regular      Food Allergies: NKFA  Current Appetite: Fair  Appetite/meal intake prior to admission: Unable to determine at this time  Feeding Limitations:  [] Swallowing difficulty    [] Chewing difficulty    [] Other:  Current Meal Intake:   Patient Vitals for the past 100 hrs:   % Diet Eaten   01/21/20 1418 65 %   01/20/20 1819 50 %   01/20/20 1303 0 %   01/20/20 0923 0 %   01/19/20 1200 25 %   01/19/20 0800 25 %   01/18/20 1800 0 %   01/18/20 1238 25 %   01/18/20 0900 0 %       BM: 1/19-formed  Skin Integrity: incision to right leg & right groin with wound vac, gangrene to right foot  Edema:   [] No     [x] Yes   Pertinent Medications: Reviewed: pepcid, ferrous sulfate, bowel regimen, lantus 28 units, SSI    Recent Labs     01/19/20  0405      K 5.2   *   CO2 22   *   BUN 17   CREA 1.37*   CA 8.5   MG 2.3       Intake/Output Summary (Last 24 hours) at 1/21/2020 1541  Last data filed at 1/21/2020 1418  Gross per 24 hour   Intake 480 ml   Output 1000 ml   Net -520 ml       Anthropometrics:  Ht Readings from Last 1 Encounters:   01/05/20 5' 5\" (1.651 m)     Last 3 Recorded Weights in this Encounter    01/19/20 0501 01/20/20 0505 01/21/20 0553   Weight: 73.8 kg (162 lb 11.2 oz) 76.3 kg (168 lb 4.8 oz) 75.9 kg (167 lb 6.4 oz)     Body mass index is 27.86 kg/m².        Weight History:     Weight Metrics 1/21/2020 12/26/2018 7/10/2017   Weight 167 lb 6.4 oz 160 lb 9.6 oz 115 lb   BMI 27.86 kg/m2 26.73 kg/m2 19.14 kg/m2        Admitting Diagnosis: Pancreatitis [K85.90]  Hyperosmolar hyperglycemic coma due to diabetes mellitus without ketoacidosis (HCC) [E11.01]  Hyperosmolar non-ketotic state in patient with type 2 diabetes mellitus ([facility] [address] [E11.00]  Pertinent PMHx: DM, HTN, hypercholesterolemia    Education Needs:        [] None identified  [] Identified - Not appropriate at this time  [x]  Identified and addressed - refer to education log  Learning Limitations:   [] None identified  [x] Identified: confusion   Cultural, Mormonism & ethnic food preferences:  [x] None identified    [] Identified and addressed     ESTIMATED NUTRITION NEEDS:     Calories: 6816-2994 kcal (20-30 kcal/kg) based on  [x] Actual BW: 69 kg      [] IBW   Protein: 83-90 gm (1.2-1.3 gm/kg) based on  [x] Actual BW      [] IBW   Fluid: 1 mL/kcal     MONITORING & EVALUATION:     Nutrition Goal(s): goals modified  - PO nutrition intake will meet >75% of patient estimated nutritional needs within the next 7 days. Outcome: Progressing towards goal    - Patient will increase knowledge of appropriate food choices on a low potassium diet prior to discharge. Outcome: Met/Resolved     Monitoring:   [x] Food and nutrient intake   [x] Food and nutrient administration  [x] Comparative standards   [x] Nutrition-focused physical findings   [x] Anthropometric Measurements   [x] Treatment/therapy   [x] Biochemical data, medical tests, and procedures        Previous Recommendations (for follow-up assessments only): Implemented      Discharge Planning: Nutritional discharge needs unknown at this time. Participated in care planning, discharge planning, & interdisciplinary rounds as appropriate.       Crystal Wilder RD  Pager: [phone]

## 2020-01-21 NOTE — PROGRESS NOTES
Pt sleeping comfortably. Did not wake for exam.   Niece at bedside. Appreciate Podiatry/Ortho recs  Pt will need higher amputation with AKA. Niece understanding and wants time to have family in to see pt prior to surgery. Discussed with Renal, will continue Epo and monitor H&H  Could perform surgery as early as Friday once family consents.

## 2020-01-21 NOTE — DIABETES MGMT
GLYCEMIC CONTROL PLAN OF CARE     Assessment/Recommendations:  Pt having episodes of hypoglycemia, recommend decreasing Lantus insulin to 15 units daily. Continue inpatient monitoring and intervention     Most recent blood glucose values:  1/21/2020 08:02 1/21/2020 08:03 1/21/2020 08:22 1/21/2020 08:56 1/21/2020 11:53   55 (L) 52 (LL) 59 (L) 109 151 (H)     Current A1C: 14.2% (02/02/2020) which is equivalent to estimated average blood glucose of 361 mg/dL during the past 2-3 months. Current hospital diabetes medications:   Basal lantus insulin 28 units daily. Correctional lispro insulin every 6 hours. Very resistant dose.     Previous day's insulin requirements:   28 units of Lantus insulin   2 units of Lispro insulin     Home diabetes medications:  Glipizide, Metformin, and Lantus (pt unable to state dose and reports not taking consistently)    Diet: Diabetic consistent carbohydrate     Education:  ____Refer to Diabetes Education Record             __x__Education not indicated at this time      Joann Barrios RD, CDE

## 2020-01-21 NOTE — PROGRESS NOTES
RENAL DAILY PROGRESS NOTE    Patient: Luda Alvarado               Sex: female          DOA: 1/2/2020  7:37 PM        YOB: 1940      Age:  79 y.o.        LOS:  LOS: 18 days     Subjective:     Luda Alvarado is a 79 y.o.  who presents with Pancreatitis [K85.90]  Hyperosmolar hyperglycemic coma due to diabetes mellitus without ketoacidosis ([facility] [E11.01]  Hyperosmolar non-ketotic state in patient with type 2 diabetes mellitus ([facility] [E11.00]. Asked to evaluate for acute/crf. admitted with hyperglycemia  Chief complains: Patient denies nausea, vomiting, chest pain, dizziness, shortness of breath or headache.  - Reviewed last 24 hrs events     Current Facility-Administered Medications   Medication Dose Route Frequency    amLODIPine (NORVASC) tablet 10 mg  10 mg Oral DAILY    metoprolol tartrate (LOPRESSOR) tablet 12.5 mg  12.5 mg Oral Q12H    ferrous sulfate tablet 325 mg  1 Tab Oral DAILY WITH BREAKFAST    hydrALAZINE (APRESOLINE) 20 mg/mL injection 10 mg  10 mg IntraVENous Q6H PRN    bisacodyL (DULCOLAX) tablet 10 mg  10 mg Oral DAILY PRN    docusate sodium (COLACE) capsule 100 mg  100 mg Oral BID    0.9% sodium chloride infusion 250 mL  250 mL IntraVENous PRN    epoetin bipin-epbx (RETACRIT) 21,000 Units  21,000 Units SubCUTAneous QHS    tamsulosin (FLOMAX) capsule 0.4 mg  0.4 mg Oral DAILY    HYDROmorphone (DILAUDID) syringe 0.5 mg  0.5 mg IntraVENous Q2H PRN    dextrose (D25W) 25% injection 10 mL  2.5 g IntraVENous PRN    insulin lispro (HUMALOG) injection   SubCUTAneous AC&HS    famotidine (PEPCID) tablet 20 mg  20 mg Oral DAILY    insulin glargine (LANTUS) injection 28 Units  28 Units SubCUTAneous DAILY    bisacodyl (DULCOLAX) suppository 10 mg  10 mg Rectal DAILY PRN    polyethylene glycol (MIRALAX) packet 17 g  17 g Oral DAILY    aspirin chewable tablet 81 mg  81 mg Oral DAILY    oxyCODONE-acetaminophen (PERCOCET) 5-325 mg per tablet 1-2 Tab  1-2 Tab Oral Q6H PRN    dextrose 10% infusion 125-250 mL  125-250 mL IntraVENous PRN    heparin (porcine) injection 5,000 Units  5,000 Units SubCUTAneous Q8H    glucose chewable tablet 16 g  4 Tab Oral PRN    glucagon (GLUCAGEN) injection 1 mg  1 mg IntraMUSCular PRN       Objective:     Visit Vitals  /64 (BP 1 Location: Right arm, BP Patient Position: At rest)   Pulse 70   Temp 97 °F (36.1 °C)   Resp 19   Ht 5' 5\" (1.651 m)   Wt 75.9 kg (167 lb 6.4 oz)   SpO2 100%   Breastfeeding No   BMI 27.86 kg/m²       Intake/Output Summary (Last 24 hours) at 1/21/2020 1302  Last data filed at 1/21/2020 0553  Gross per 24 hour   Intake 240 ml   Output 1000 ml   Net -760 ml       Physical Examination:     RS: Chest is bilateral equal, no wheezing / rales / crackles  CVS: S1-S2 heard, RRR, No S3 / murmur  Abdomen: Soft, Non tender, Not distended, Positive bowel sounds, no organomegaly, no CVA / supra pubic tenderness  Extremities gangrene r foot  CNS: Awake   HEENT: Head is atraumatic, PERRLA, conjunctiva pink & non icteric. No JVD or carotid bruit        Data Review:      Labs:     Hematology:   Recent Labs     01/21/20  1126 01/20/20  0529 01/19/20  0405   WBC 7.6  --  6.9   HGB 7.1* 7.7* 6.4*   HCT 22.2* 24.5* 20.2*     Chemistry:   Recent Labs     01/19/20  0405   BUN 17   CREA 1.37*   CA 8.5   K 5.2      *   CO2 22   *        Images:    XR (Most Recent). CXR reviewed by me and compared with previous CXR Results from Hospital Encounter encounter on 01/02/20   XR FOOT RT MIN 3 V    Narrative EXAM:  XR FOOT RT MIN 3 V    INDICATION:   report of air in right fifth MTP join    COMPARISON:  None. FINDINGS:  3 views of the right foot demonstrate limited study, the patient has  cooperation issues with positioning the foot. Considerable hammertoe deformity  of all the toes noted. The foot is held in extension at the ankle. There is no  obvious fracture or dislocation. Small plantar calcaneal spur noted. Impression IMPRESSION: Positioning issues, no acute abnormality identified. No bone  destruction or soft tissue air        CT (Most Recent) Results from Hospital Encounter encounter on 01/02/20   CTA ABD ART W RUNOFF W WO CONT    Narrative PROCEDURE: CTA of abdomen and pelvis and bilateral lower extremity runoff with  intravenous contrast administration. HISTORY: PAD, ischemia of the right foot. TECHNIQUE: Multidetector helical CT angiography was carried out from low chest  through the feet following dynamic 70 cc Isovue-300 intravenous contrast  administration . Scan timing was performed using automated bolus tracking/SMART  Prep. 3-D and MPR angiographic image reconstruction was performed on  independent workstation and separately reviewed. Parenchymal imaging is limited  by the arterial phase of contrast administration. All CT scans at this facility  are performed using dose optimization techniques as appropriate to a performed  exam, to include automated exposure control, adjustment of the mA and/or kV  according to patient's size (including appropriate matching for site specific  examinations), or use of iterative reconstruction technique. COMPARISON: CT abdomen/pelvis 1/3/2020. CTA abdomen/pelvis with extremity runoff  7/18/2017. FINDINGS:    VASCULAR FINDINGS:    Right lower extremity:  Multifocal stenosis throughout the right posterior tibialis artery due to  atherosclerotic calcifications as well as the peroneal artery.  -The right posterior tibialis artery is not opacified beyond the mid right lower  leg.  -The anterior tibial artery is not opacified beyond the right lower leg just  above the tibial plafond.  -The peroneal artery is opacified beyond the level of the tibial plafond. Proximally, there is multifocal stenosis of the right femoral artery with loss  of opacification at the mid to distal thigh.  Reconstitution of opacification at  the level of the posterior tibialis artery likely due to collaterals from  geniculate arteries and the deep femoral artery. 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  -Left lower leg perfusion is contributed by collaterals from the deep femoral  artery and geniculate arteries. Multifocal stenosis/occlusion of the left  posterior tibialis artery and left peroneal artery. No opacification of the  peroneal artery or posterior tibialis artery is seen to be on the mid to  proximal third of the left lower leg. The left dorsal pedis is opacified, and  likely contributes to some additional opacified vessel seen in the left foot. Abdominal aorta:  -Severe atherosclerosis with moderate compromise of the lumen, similar to prior  exam of 7/2017.  -No evidence for abdominal aortic aneurysm. -Severe stenosis at the proximal aspect of the right common iliac artery, well  opacified distally, similar to prior exam. Multifocal moderate stenosis at the  left common iliac artery similar to prior exam.  -Multifocal stenosis at the internal iliac arteries, with good opacification  distally.  -Celiac artery, SMA, left renal artery, and right renal artery are grossly  patent. The right renal artery again shown to originate from the descending  thoracic aorta. ALFREDA is not well visualized. ABDOMEN/PELVIS FINDINGS:  Lower chest: Small right pleural effusion with overlying atelectasis. Liver: Stable subcentimeter hypodense lesions liver, too small to characterize  but stability suggests benignity    Biliary: Gallbladder is mildly distended but otherwise unremarkable. Spleen: Negative    Pancreas: There is some peripancreatic edema adjacent to the pancreatic tail. Adrenal glands: Diffuse thickening of the adrenal glands, without discrete mass,  similar to prior exam.    Kidneys: Malrotated kidneys again noted, without evidence for hydronephrosis. GI tract: The bowel appears nonobstructed.  Question of mild mural thickening at  the distal esophagus. Questionable mild mural thickening at the greater  curvature of the stomach adjacent to the edema along the pancreatic tail. Colonic diverticulosis, without evidence for diverticulitis. Normal appendix. Peritoneum: No free air    Lymph nodes: No lymphadenopathy. Pelvis: Urinary bladder is unremarkable. Fibroid uterus again noted. Body wall/soft tissues: Small fat-containing umbilical hernia. Small right lower  spigelian hernia containing a small amount of fluid measuring 2.5 x 1.2 cm  (image 203), previously measured 2.9 x 1.6 cm. Mild edema along the right flank. Bones:  -Bilateral moderate knee osteoarthrosis. Small right knee joint effusion and  trace left knee joint effusion.  -Diffuse soft tissue swelling at the feet, right greater than left. Trace  subcutaneous emphysema adjacent to the right fifth MTP joint. Left distal tibial  and talar hardware noted. -Multilevel degenerative spondylosis and disc disease noted, not well evaluated  on current exam.  -Moderate degenerative change at the hips      Impression IMPRESSION:  1. Right lower extremity:  Multifocal stenosis throughout the right posterior tibialis artery due to  atherosclerotic calcifications as well as the peroneal artery.  -The right posterior tibialis artery is not opacified beyond the mid right lower  leg.  -The anterior tibial artery is not opacified beyond the right lower leg just  above the tibial plafond.  -The peroneal artery is opacified beyond the level of the tibial plafond. Proximally, there is multifocal stenosis of the right femoral artery with loss  of opacification at the mid to distal thigh. Reconstitution of opacification at  the level of the posterior tibialis artery likely due to collaterals from  geniculate arteries and the deep femoral artery. 2. Diffuse soft tissue swelling at bilateral feet, right greater than left.   -Suggestion of trace air along the right fifth MTP joint, could be degenerative  but infectious process not excluded. Consider dedicated right foot x-ray for  further evaluation. 3. Chronic multilevel stenosis of the aorta, iliac arteries, and left lower  extremity as described. 4. Peripancreatic edema along the pancreatic tail is increased since 1/3/2020.  -Associated adjacent mild presumed reactive mural thickening of the greater  curvature of the stomach. 5. Small right pleural effusion. 6. Suggestion of mild mural thickening of the distal esophagus, may indicate  nonspecific esophagitis. 7. Bilateral knee osteoarthrosis with small right knee joint effusion and trace  left knee joint effusion. 8. Fibroid uterus. 9. Colonic diverticulosis, without evidence for diverticulitis. 10. Additional nonacute findings are as described. EKG No results found for this or any previous visit. I have personally reviewed the old medical records and patient's labs    Plan / Recommendation:      1. Acute/crf stage 3.back to baseline  2.pvd,gangren r foot,for aka soon  3.anemia,on epo. refused blood transfusion    D/w vascular pa     Preethi Juan MD  Nephrology  1/21/2020

## 2020-01-21 NOTE — ROUTINE PROCESS
3834 Assumed care of patient from off going nurse. Patient anxious and agitated. Call bell within reach, siderails up x 3, bed in lowest position, and patient instructed to use call bell for assistance. Incontinent care provided. Pure wick external catheter placed snugly against perineum, between labia and gluteus muscles. Connected  to suction at 60 mm Hg. Will continue to monitor for urine output. Bed alarm on. 
 
0730 Bedside and Verbal shift change report given to [employer] (oncoming nurse) by Francesco Ramachandran RN 
(offgoing nurse).  Report included the following information SBAR, Intake/Output, MAR, Recent Results and Cardiac Rhythm SR.

## 2020-01-21 NOTE — ROUTINE PROCESS
Bedside shift change report received from [address]. Report included SBAR, Kardex, MAR, Recent Results, and Plan of Care. Pt in bed sleeping with call light in reach.

## 2020-01-21 NOTE — PROGRESS NOTES
Progress Note      Patient: Noelle Bello MRN: [id number]  SSN: [id number]    YOB: 1940  Age: 79 y.o.   Sex: female      Admit Date: 1/2/2020    LOS: 18 days     Assessment:   #1 urinary retention, gutierrez removed 2 days ago    Notes she has been voiding well  No Suprapubic discomfort   Would tolerate any PVR < 500    Will sign off     Call with questions     Signed By: Akira Proctor MD     January 21, 2020      PAGER:  [phone]  OFFICE:  [facility]:     No events, doing well     Objective:     Vitals:    01/21/20 0326 01/21/20 0553 01/21/20 0812 01/21/20 1150   BP: 126/53  147/68 118/64   Pulse: 70  83 70   Resp: 19 20 19   Temp: 98.3 °F (36.8 °C)  98.1 °F (36.7 °C) 97 °F (36.1 °C)   SpO2: 98%  100% 100%   Weight:  167 lb 6.4 oz (75.9 kg)     Height:            Intake and Output:  Current Shift: 01/21 0701 - 01/21 1900  In: 240 [P.O.:240]  Out: -   Last three shifts: 01/19 1901 - 01/21 0700  In: 705 [P.O.:480; I.V.:225]  Out: 1501 [Urine:1500]    Current Facility-Administered Medications   Medication Dose Route Frequency    amLODIPine (NORVASC) tablet 10 mg  10 mg Oral DAILY    metoprolol tartrate (LOPRESSOR) tablet 12.5 mg  12.5 mg Oral Q12H    ferrous sulfate tablet 325 mg  1 Tab Oral DAILY WITH BREAKFAST    hydrALAZINE (APRESOLINE) 20 mg/mL injection 10 mg  10 mg IntraVENous Q6H PRN    bisacodyL (DULCOLAX) tablet 10 mg  10 mg Oral DAILY PRN    docusate sodium (COLACE) capsule 100 mg  100 mg Oral BID    0.9% sodium chloride infusion 250 mL  250 mL IntraVENous PRN    epoetin bipin-epbx (RETACRIT) 21,000 Units  21,000 Units SubCUTAneous QHS    tamsulosin (FLOMAX) capsule 0.4 mg  0.4 mg Oral DAILY    HYDROmorphone (DILAUDID) syringe 0.5 mg  0.5 mg IntraVENous Q2H PRN    dextrose (D25W) 25% injection 10 mL  2.5 g IntraVENous PRN    insulin lispro (HUMALOG) injection   SubCUTAneous AC&HS    famotidine (PEPCID) tablet 20 mg  20 mg Oral DAILY    insulin glargine (LANTUS) injection 28 Units  28 Units SubCUTAneous DAILY    bisacodyl (DULCOLAX) suppository 10 mg  10 mg Rectal DAILY PRN    polyethylene glycol (MIRALAX) packet 17 g  17 g Oral DAILY    aspirin chewable tablet 81 mg  81 mg Oral DAILY    oxyCODONE-acetaminophen (PERCOCET) 5-325 mg per tablet 1-2 Tab  1-2 Tab Oral Q6H PRN    dextrose 10% infusion 125-250 mL  125-250 mL IntraVENous PRN    heparin (porcine) injection 5,000 Units  5,000 Units SubCUTAneous Q8H    glucose chewable tablet 16 g  4 Tab Oral PRN    glucagon (GLUCAGEN) injection 1 mg  1 mg IntraMUSCular PRN         Physical Exam:   GENERAL: alert, cooperative, no distress, appears stated age  LUNG: unlabored breathing  ABDOMEN: soft, non-tender. No masses,  no organomegaly  EXTREMITIES:  extremities normal, atraumatic, no cyanosis or edema  SKIN: no jaundice  : no CVA tenderness    Lab/Data Review:  BMP: No results found for: NA, K, CL, CO2, AGAP, GLU, BUN, CREA, GFRAA, GFRNA  CBC:   Lab Results   Component Value Date/Time    WBC 7.6 01/21/2020 11:26 AM    HGB 7.1 (L) 01/21/2020 11:26 AM    HCT 22.2 (L) 01/21/2020 11:26 AM     01/21/2020 11:26 AM     COAGS: No results found for: APTT, PTP, INR, INREXT, INREXT       CT Results:  Results from Hospital Encounter encounter on 01/02/20   CTA ABD ART W RUNOFF W WO CONT    Narrative PROCEDURE: CTA of abdomen and pelvis and bilateral lower extremity runoff with  intravenous contrast administration. HISTORY: PAD, ischemia of the right foot. TECHNIQUE: Multidetector helical CT angiography was carried out from low chest  through the feet following dynamic 70 cc Isovue-300 intravenous contrast  administration . Scan timing was performed using automated bolus tracking/SMART  Prep. 3-D and MPR angiographic image reconstruction was performed on  independent workstation and separately reviewed.  Parenchymal imaging is limited  by the arterial phase of contrast administration. All CT scans at this facility  are performed using dose optimization techniques as appropriate to a performed  exam, to include automated exposure control, adjustment of the mA and/or kV  according to patient's size (including appropriate matching for site specific  examinations), or use of iterative reconstruction technique. COMPARISON: CT abdomen/pelvis 1/3/2020. CTA abdomen/pelvis with extremity runoff  7/18/2017. FINDINGS:    VASCULAR FINDINGS:    Right lower extremity:  Multifocal stenosis throughout the right posterior tibialis artery due to  atherosclerotic calcifications as well as the peroneal artery.  -The right posterior tibialis artery is not opacified beyond the mid right lower  leg.  -The anterior tibial artery is not opacified beyond the right lower leg just  above the tibial plafond.  -The peroneal artery is opacified beyond the level of the tibial plafond. Proximally, there is multifocal stenosis of the right femoral artery with loss  of opacification at the mid to distal thigh. Reconstitution of opacification at  the level of the posterior tibialis artery likely due to collaterals from  geniculate arteries and the deep femoral artery. 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  -Left lower leg perfusion is contributed by collaterals from the deep femoral  artery and geniculate arteries. Multifocal stenosis/occlusion of the left  posterior tibialis artery and left peroneal artery. No opacification of the  peroneal artery or posterior tibialis artery is seen to be on the mid to  proximal third of the left lower leg. The left dorsal pedis is opacified, and  likely contributes to some additional opacified vessel seen in the left foot. Abdominal aorta:  -Severe atherosclerosis with moderate compromise of the lumen, similar to prior  exam of 7/2017.  -No evidence for abdominal aortic aneurysm.   -Severe stenosis at the proximal aspect of the right common iliac artery, well  opacified distally, similar to prior exam. Multifocal moderate stenosis at the  left common iliac artery similar to prior exam.  -Multifocal stenosis at the internal iliac arteries, with good opacification  distally.  -Celiac artery, SMA, left renal artery, and right renal artery are grossly  patent. The right renal artery again shown to originate from the descending  thoracic aorta. ALFREDA is not well visualized. ABDOMEN/PELVIS FINDINGS:  Lower chest: Small right pleural effusion with overlying atelectasis. Liver: Stable subcentimeter hypodense lesions liver, too small to characterize  but stability suggests benignity    Biliary: Gallbladder is mildly distended but otherwise unremarkable. Spleen: Negative    Pancreas: There is some peripancreatic edema adjacent to the pancreatic tail. Adrenal glands: Diffuse thickening of the adrenal glands, without discrete mass,  similar to prior exam.    Kidneys: Malrotated kidneys again noted, without evidence for hydronephrosis. GI tract: The bowel appears nonobstructed. Question of mild mural thickening at  the distal esophagus. Questionable mild mural thickening at the greater  curvature of the stomach adjacent to the edema along the pancreatic tail. Colonic diverticulosis, without evidence for diverticulitis. Normal appendix. Peritoneum: No free air    Lymph nodes: No lymphadenopathy. Pelvis: Urinary bladder is unremarkable. Fibroid uterus again noted. Body wall/soft tissues: Small fat-containing umbilical hernia. Small right lower  spigelian hernia containing a small amount of fluid measuring 2.5 x 1.2 cm  (image 203), previously measured 2.9 x 1.6 cm. Mild edema along the right flank. Bones:  -Bilateral moderate knee osteoarthrosis. Small right knee joint effusion and  trace left knee joint effusion.  -Diffuse soft tissue swelling at the feet, right greater than left.  Trace  subcutaneous emphysema adjacent to the right fifth MTP joint. Left distal tibial  and talar hardware noted. -Multilevel degenerative spondylosis and disc disease noted, not well evaluated  on current exam.  -Moderate degenerative change at the hips      Impression IMPRESSION:  1. Right lower extremity:  Multifocal stenosis throughout the right posterior tibialis artery due to  atherosclerotic calcifications as well as the peroneal artery.  -The right posterior tibialis artery is not opacified beyond the mid right lower  leg.  -The anterior tibial artery is not opacified beyond the right lower leg just  above the tibial plafond.  -The peroneal artery is opacified beyond the level of the tibial plafond. Proximally, there is multifocal stenosis of the right femoral artery with loss  of opacification at the mid to distal thigh. Reconstitution of opacification at  the level of the posterior tibialis artery likely due to collaterals from  geniculate arteries and the deep femoral artery. 2. Diffuse soft tissue swelling at bilateral feet, right greater than left. -Suggestion of trace air along the right fifth MTP joint, could be degenerative  but infectious process not excluded. Consider dedicated right foot x-ray for  further evaluation. 3. Chronic multilevel stenosis of the aorta, iliac arteries, and left lower  extremity as described. 4. Peripancreatic edema along the pancreatic tail is increased since 1/3/2020.  -Associated adjacent mild presumed reactive mural thickening of the greater  curvature of the stomach. 5. Small right pleural effusion. 6. Suggestion of mild mural thickening of the distal esophagus, may indicate  nonspecific esophagitis. 7. Bilateral knee osteoarthrosis with small right knee joint effusion and trace  left knee joint effusion. 8. Fibroid uterus. 9. Colonic diverticulosis, without evidence for diverticulitis. 10. Additional nonacute findings are as described.        [place] Results:  [place] [facility] [place]    ABD LTD    Narrative EXAMINATION: Ultrasound abdomen limited, right upper quadrant    INDICATION: Pancreatitis    COMPARISON: CT 1/3/2020    TECHNIQUE: Grayscale, color, spectral sonographic images of the right upper  quadrant obtained. FINDINGS:    Pancreas: Unremarkable. Tail not well visualized. IVC: Unremarkable. Liver: 15.2 cm length. Slightly coarsened echotexture. Portal vein normal  caliber with antegrade flow. Right kidney: 6.1 cm length. Increased echotexture. No hydronephrosis. No focal  abnormality. Gallbladder: No calculi or wall thickening. Negative Austin sign. Biliary tree: Common bile duct 0.5 cm caliber. No intrahepatic duct dilatation. Impression IMPRESSION:    Coarsened hepatic echotexture suggests nonspecific hepatocellular disease. Atrophic echogenic right kidney consistent with chronic medical renal disease. No other significant findings. No evidence of cholelithiasis.                                  Active Problems:    Pancreatitis (1/3/2020)      Hyperosmolar hyperglycemic coma due to diabetes mellitus without ketoacidosis ([address] (1/3/2020)      Hyperosmolar non-ketotic state in patient with type 2 diabetes mellitus ([address] (1/3/2020)      Ischemia of foot (1/20/2020)

## 2020-01-21 NOTE — PROGRESS NOTES
Hematology/Medical Oncology Progress Note             Name: Susan Ray   : 1940   MRN: [id number]   Date: 2020 8:13 AM     [x]I have reviewed the flowsheet and previous days notes. Events overnight reviewed and discussed with nursing staff. Vital signs and records reviewed. 79-year-old -American female past medical history of severe PVD, chronic anemia,uncontrolled diabetes mellitus type 2 now right lower limb ischemia and dry gangrene. S/p R Fem-pop bypass. In addition patient is [employer] and refuses blood transfusion. Presently receiving Retacrit 21,000 units X 7 days per vascular surgery will need further surgery. Pt voices no new c/o today. ROS:  Constitutional: Positive for fatigue. Negative for fever. HENT: Negative for nosebleeds, congestion, facial swelling, mouth sores, trouble swallowing, neck pain, neck stiffness, voice change and postnasal drip. Eyes: Negative for photophobia, pain, discharge and itching. Respiratory: Negative for apnea, cough, choking, chest tightness, wheezing and stridor. Cardiovascular: Negative for chest pain, palpitations and leg swelling. Gastrointestinal: Negative for abdominal pain. Negative for nausea, diarrhea, constipation, blood in stool and rectal pain. Genitourinary: Negative for dysuria, urgency, hematuria, flank pain and difficulty urinating. Musculoskeletal:Positive for right foot pain. Skin: Positive for right foot gangrene. Neurological:  Negative for dizziness, facial asymmetry, speech difficulty, light-headedness and headaches. Hematological: Negative for adenopathy. Does not bruise/bleed easily. Psychiatric/Behavioral: Negative for hallucinations,confusion.     Vital Signs:    Visit Vitals  /68 (BP 1 Location: Left arm, BP Patient Position: At rest)   Pulse 83   Temp 98.1 °F (36.7 °C)   Resp 20   Ht 5' 5\" (1.651 m)   Wt 75.9 kg (167 lb 6.4 oz)   SpO2 100%   Breastfeeding No   BMI 27.86 kg/m²       O2 Device: Room air   O2 Flow Rate (L/min): 2 l/min   Temp (24hrs), Av.6 °F (37 °C), Min:98.1 °F (36.7 °C), Max:99.3 °F (37.4 °C)       Intake/Output:   Last shift:      No intake/output data recorded. Last 3 shifts:  1901 -  0700  In: 705 [P.O.:480; I.V.:225]  Out: 1501 [Urine:1500]    Intake/Output Summary (Last 24 hours) at 2020 1014  Last data filed at 2020 0553  Gross per 24 hour   Intake 240 ml   Output 1000 ml   Net -760 ml       Physical Exam:  General: Appears comfortable, NAD. HEENT:  Anicteric sclerae; pink palpebral conjunctivae; mucosa moist  Resp:  Symmetrical chest expansion, no accessory muscle use; good airway entry; no rales/ wheezing/ rhonchi noted  CV:  S1, S2 present; regular rate and rhythm  GI:  Abdomen soft, non-tender; (+) active bowel sounds  Extremities:  S/p R Fem-pop bypass: R foot dry gangrene.   Skin:  Warm, right foot gangrenous  Neurologic:  Non-focal         DATA:   Current Facility-Administered Medications   Medication Dose Route Frequency    amLODIPine (NORVASC) tablet 10 mg  10 mg Oral DAILY    metoprolol tartrate (LOPRESSOR) tablet 12.5 mg  12.5 mg Oral Q12H    ferrous sulfate tablet 325 mg  1 Tab Oral DAILY WITH BREAKFAST    hydrALAZINE (APRESOLINE) 20 mg/mL injection 10 mg  10 mg IntraVENous Q6H PRN    bisacodyL (DULCOLAX) tablet 10 mg  10 mg Oral DAILY PRN    docusate sodium (COLACE) capsule 100 mg  100 mg Oral BID    0.9% sodium chloride infusion 250 mL  250 mL IntraVENous PRN    epoetin bipin-epbx (RETACRIT) 21,000 Units  21,000 Units SubCUTAneous QHS    tamsulosin (FLOMAX) capsule 0.4 mg  0.4 mg Oral DAILY    HYDROmorphone (DILAUDID) syringe 0.5 mg  0.5 mg IntraVENous Q2H PRN    dextrose (D25W) 25% injection 10 mL  2.5 g IntraVENous PRN    insulin lispro (HUMALOG) injection   SubCUTAneous AC&HS    famotidine (PEPCID) tablet 20 mg  20 mg Oral DAILY    insulin glargine (LANTUS) injection 28 Units  28 Units SubCUTAneous DAILY    bisacodyl (DULCOLAX) suppository 10 mg  10 mg Rectal DAILY PRN    polyethylene glycol (MIRALAX) packet 17 g  17 g Oral DAILY    aspirin chewable tablet 81 mg  81 mg Oral DAILY    oxyCODONE-acetaminophen (PERCOCET) 5-325 mg per tablet 1-2 Tab  1-2 Tab Oral Q6H PRN    dextrose 10% infusion 125-250 mL  125-250 mL IntraVENous PRN    heparin (porcine) injection 5,000 Units  5,000 Units SubCUTAneous Q8H    glucose chewable tablet 16 g  4 Tab Oral PRN    glucagon (GLUCAGEN) injection 1 mg  1 mg IntraMUSCular PRN                    Labs:  Recent Labs     01/20/20  0529 01/19/20  0405   WBC  --  6.9   HGB 7.7* 6.4*   HCT 24.5* 20.2*   PLT  --  300     Recent Labs     01/19/20  0405      K 5.2   *   CO2 22   *   BUN 17   CREA 1.37*   CA 8.5   MG 2.3     No results for input(s): PH, PCO2, PO2, HCO3, FIO2 in the last 72 hours. IMPRESSION:   · Anemia of chronic illness  · PVD  · Diabetes type 2  · Acute renal failure        · PLAN:  · H/H is 7.7/24.5 on yesterday. Today's lab pending : Pt is Jehovahs witness-no PRBC transfusion. Reviewed creatinine 1.37. Continue Retacrit 21,000 units daily X 7 days. Apparently, the patient has lost IV site and has only received once dose of IV venofer. Recommend completion of Venofer X 7 doses. Ferrous sulfate 325 mg p.o has been restarted until such time. Iron low at 22,TIBC 116, iron % sat 19, ferritin 100. B12 897, folate >20.0. · Recommend limiting lab draws. · Tentative plan :Vascular surgery will proceed with amputation surgery whenever patient is medically stable.       ·  ·        The patient is: [x] acutely ill Risk of deterioration: [] moderate    [] critically ill  [] high         My assessment/plan was discussed with:  [x]nursing []PT/OT    []respiratory therapy [x]Dr.Lloyd Dorthey Riedel, MD      []family []       Kandyce Goldberg, NP

## 2020-01-22 LAB
BASOPHILS # BLD: 0 K/UL (ref 0–0.1)
BASOPHILS NFR BLD: 0 % (ref 0–2)
DIFFERENTIAL METHOD BLD: ABNORMAL
EOSINOPHIL # BLD: 0.3 K/UL (ref 0–0.4)
EOSINOPHIL NFR BLD: 3 % (ref 0–5)
ERYTHROCYTE [DISTWIDTH] IN BLOOD BY AUTOMATED COUNT: 14.1 % (ref 11.6–14.5)
GLUCOSE BLD STRIP.AUTO-MCNC: 228 MG/DL (ref 70–110)
GLUCOSE BLD STRIP.AUTO-MCNC: 251 MG/DL (ref 70–110)
GLUCOSE BLD STRIP.AUTO-MCNC: 84 MG/DL (ref 70–110)
GLUCOSE BLD STRIP.AUTO-MCNC: 88 MG/DL (ref 70–110)
HCT VFR BLD AUTO: 24.4 % (ref 35–45)
HGB BLD-MCNC: 7.8 G/DL (ref 12–16)
LYMPHOCYTES # BLD: 1.9 K/UL (ref 0.9–3.6)
LYMPHOCYTES NFR BLD: 21 % (ref 21–52)
MCH RBC QN AUTO: 32.1 PG (ref 24–34)
MCHC RBC AUTO-ENTMCNC: 32 G/DL (ref 31–37)
MCV RBC AUTO: 100.4 FL (ref 74–97)
MONOCYTES # BLD: 0.8 K/UL (ref 0.05–1.2)
MONOCYTES NFR BLD: 8 % (ref 3–10)
NEUTS SEG # BLD: 6.3 K/UL (ref 1.8–8)
NEUTS SEG NFR BLD: 68 % (ref 40–73)
PLATELET # BLD AUTO: 401 K/UL (ref 135–420)
PMV BLD AUTO: 9.5 FL (ref 9.2–11.8)
RBC # BLD AUTO: 2.43 M/UL (ref 4.2–5.3)
WBC # BLD AUTO: 9.3 K/UL (ref 4.6–13.2)

## 2020-01-22 PROCEDURE — 36415 COLL VENOUS BLD VENIPUNCTURE: CPT

## 2020-01-22 PROCEDURE — 74011636637 HC RX REV CODE- 636/637: Performed by: INTERNAL MEDICINE

## 2020-01-22 PROCEDURE — 74011250637 HC RX REV CODE- 250/637: Performed by: INTERNAL MEDICINE

## 2020-01-22 PROCEDURE — 74011250636 HC RX REV CODE- 250/636: Performed by: PHYSICIAN ASSISTANT

## 2020-01-22 PROCEDURE — 97535 SELF CARE MNGMENT TRAINING: CPT

## 2020-01-22 PROCEDURE — 74011250637 HC RX REV CODE- 250/637: Performed by: EMERGENCY MEDICINE

## 2020-01-22 PROCEDURE — 74011250637 HC RX REV CODE- 250/637: Performed by: HOSPITALIST

## 2020-01-22 PROCEDURE — 82962 GLUCOSE BLOOD TEST: CPT

## 2020-01-22 PROCEDURE — 65660000000 HC RM CCU STEPDOWN

## 2020-01-22 PROCEDURE — 77030038269 HC DRN EXT URIN PURWCK BARD -A

## 2020-01-22 PROCEDURE — 85025 COMPLETE CBC W/AUTO DIFF WBC: CPT

## 2020-01-22 PROCEDURE — 74011250636 HC RX REV CODE- 250/636: Performed by: SURGERY

## 2020-01-22 PROCEDURE — 97530 THERAPEUTIC ACTIVITIES: CPT

## 2020-01-22 RX ADMIN — METOPROLOL TARTRATE 12.5 MG: 25 TABLET ORAL at 09:47

## 2020-01-22 RX ADMIN — AMLODIPINE BESYLATE 10 MG: 10 TABLET ORAL at 09:47

## 2020-01-22 RX ADMIN — TAMSULOSIN HYDROCHLORIDE 0.4 MG: 0.4 CAPSULE ORAL at 09:47

## 2020-01-22 RX ADMIN — FAMOTIDINE 20 MG: 20 TABLET, FILM COATED ORAL at 09:47

## 2020-01-22 RX ADMIN — INSULIN GLARGINE 28 UNITS: 100 INJECTION, SOLUTION SUBCUTANEOUS at 09:47

## 2020-01-22 RX ADMIN — HEPARIN SODIUM 5000 UNITS: 5000 INJECTION INTRAVENOUS; SUBCUTANEOUS at 21:24

## 2020-01-22 RX ADMIN — EPOETIN ALFA-EPBX 21000 UNITS: 10000 INJECTION, SOLUTION INTRAVENOUS; SUBCUTANEOUS at 21:40

## 2020-01-22 RX ADMIN — DOCUSATE SODIUM 100 MG: 100 CAPSULE, LIQUID FILLED ORAL at 09:47

## 2020-01-22 RX ADMIN — OXYCODONE HYDROCHLORIDE AND ACETAMINOPHEN 1 TABLET: 5; 325 TABLET ORAL at 05:08

## 2020-01-22 RX ADMIN — INSULIN LISPRO 6 UNITS: 100 INJECTION, SOLUTION INTRAVENOUS; SUBCUTANEOUS at 12:47

## 2020-01-22 RX ADMIN — METOPROLOL TARTRATE 12.5 MG: 25 TABLET ORAL at 21:24

## 2020-01-22 RX ADMIN — Medication 325 MG: at 08:00

## 2020-01-22 RX ADMIN — HEPARIN SODIUM 5000 UNITS: 5000 INJECTION INTRAVENOUS; SUBCUTANEOUS at 05:09

## 2020-01-22 RX ADMIN — OXYCODONE HYDROCHLORIDE AND ACETAMINOPHEN 2 TABLET: 5; 325 TABLET ORAL at 12:49

## 2020-01-22 RX ADMIN — Medication 81 MG: at 09:47

## 2020-01-22 RX ADMIN — HEPARIN SODIUM 5000 UNITS: 5000 INJECTION INTRAVENOUS; SUBCUTANEOUS at 12:47

## 2020-01-22 RX ADMIN — INSULIN LISPRO 4 UNITS: 100 INJECTION, SOLUTION INTRAVENOUS; SUBCUTANEOUS at 21:25

## 2020-01-22 NOTE — ROUTINE PROCESS
Bedside shift change report received from [address]. Report included SBAR, Kardex, MAR, Recent Results, and Plan of Care. Pt in bed resting quietly with call light in reach and family member at bedside.

## 2020-01-22 NOTE — PROGRESS NOTES
RN informed patient bp lower than usual (systolic 29Y) when sitting at bedside and hypotensive (systolic 77)  lying on left side. Patient turned supine, and bp retaken to be 100/58. Patient advised to lie supine at this time. This is about an hour after receiving 10 mg percocet. Will give 5 mg next dose.

## 2020-01-22 NOTE — PROGRESS NOTES
Problem: Self Care Deficits Care Plan (Adult)  Goal: *Acute Goals and Plan of Care (Insert Text)  Description  Occupational Therapy Goals  Initiated 1/18/2020 within 7 day(s). 1.  Patient will perform upper body dressing with minimal assistance/contact guard assist   2. Patient will perform lower body dressing with moderate assistance . 3. Patient will perform grooming with minimal assistance/contact guard assist sitting EOB. 4.  Patient will perform all aspects of toileting with moderate assistance . 5. Patient will participate in upper extremity therapeutic exercise/activities with supervision/set-up for 5 minutes. Prior Level of Function: Patient reported she lived alone PTA and was independent in self-care with no AE. Outcome: Progressing Towards Goal   OCCUPATIONAL THERAPY TREATMENT    Patient: Jany Richard (2 y.o. female)  Date: 1/22/2020  Diagnosis: Pancreatitis [K85.90]  Hyperosmolar hyperglycemic coma due to diabetes mellitus without ketoacidosis ([facility] [E11.01]  Hyperosmolar non-ketotic state in patient with type 2 diabetes mellitus ([facility] [address] [E11.00]   <principal problem not specified>  Procedure(s) (LRB):  RIGHT FEMORAL-POPLITEAL BYPASS (Right) 6 Days Post-Op  Precautions: Fall, NWB    Chart, occupational therapy assessment, plan of care, and goals were reviewed. ASSESSMENT:  Pt sitting up in bed with niece present; pt A&Ox4 today. Implemented PT co-tx to increase pt safety due to pt requiring increase assist with necrotic right foot. Pt pleasant throughout session. Removed purewick in prep for EOB activity with noted soiled chux; pt able to complete hygiene Min A. She attempted to luis antonio left sock in long sitting, however reported she cannot reach. Pt maneuvered to EOB Min A overall and required CGA occasionally with vcs for sock donning (left sock only) to come to cross-legged position with Mod A to luis antonio over toes.    She tolerated approx 5 mins EOB prior to reporting increased dizziness; BP taken see below. Deemed pt inappropriate for attempted STS today due to decreased BP and assisted pt to supine. Pt completed bed mobility Min A for changing chux pads and thoroughness of bladder hygiene provided. Replaced purewick for pt. RN notified of pt with decreased BP and in safe position; RN tending to pt. All needs in reach with niece present. Progression toward goals:  []          Improving appropriately and progressing toward goals  [x]          Improving slowly and progressing toward goals  []          Not making progress toward goals and plan of care will be adjusted     PLAN:  Patient continues to benefit from skilled intervention to address the above impairments. Continue treatment per established plan of care. Discharge Recommendations:  [place] (pending AKA surgery)  Further Equipment Recommendations for Discharge:  3:1 commode when appropriate      SUBJECTIVE:   Patient stated Tianna Flores you for all you do.     OBJECTIVE DATA SUMMARY:   Patient Vitals for the past 4 hrs:   Temp Pulse Resp BP SpO2   01/22/20 1358 -- -- -- 100/58 (post session taken by RN)  --   01/22/20 1340 -- -- -- (!) 77/43  (sidelying left side; HOB elevated) RN notified  --   01/22/20 1335 -- -- -- Keegan Rosen 91/38   (sidelying left side)  --   01/22/20 1330 -- -- -- 99/61   (sitting EOB) --     Cognitive/Behavioral Status:  Neurologic State: Alert  Orientation Level: Oriented X4  Cognition: Follows commands  Safety/Judgement: Fall prevention    Functional Mobility and Transfers for ADLs:  Bed Mobility:  Rolling: Minimum assistance  Supine to Sit: Minimum assistance  Sit to Supine: Minimum assistance  Scooting: Minimum assistance    Balance:  Sitting: Intact; With support  Sitting - Static: Fair (occasional)  Sitting - Dynamic: Fair (occasional)(F- when challenged with sock donning )    ADL Intervention:  Grooming  Grooming Assistance: Set-up  Washing Hands: Set-up(hand wipe)    Toileting:    Toileting Assistance: Minimum Assistance   Bladder Assistance: Minimum Assistance   Clothing Management: Contact Guard Assistance     Lower Body Dressing Assistance  Dressing Assistance: Moderate assistance  Socks: Moderate assistance(left sock only )  Leg Crossed Method Used: Yes  Position Performed: Seated edge of bed  Cues: Verbal cues provided(for legs-crossed technique )    Cognitive Retraining  Safety/Judgement: Fall prevention    Pain:  Pain level pre-treatment: 0/10   Pain level post-treatment: 0/10  Pain Intervention(s): Medication (see MAR); Rest, Repositioning   Response to intervention: Nurse notified    Activity Tolerance:    Fair; pt with decreased BP EOB     Please refer to the flowsheet for vital signs taken during this treatment. After treatment:   []  Patient left in no apparent distress sitting up in chair  [x]  Patient left in no apparent distress in bed sidelying   [x]  Call bell left within reach  [x]  Nursing notified  [x]  Niece present  []  Bed alarm activated    COMMUNICATION/EDUCATION:   [x] Role of Occupational Therapy in the acute care setting  [x] Home safety education was provided and the patient/caregiver indicated understanding. [x] Patient/family have participated as able in working towards goals and plan of care. [] Patient/family agree to work toward stated goals and plan of care. [] Patient understands intent and goals of therapy, but is neutral about his/her participation. [] Patient is unable to participate in goal setting and plan of care.       Thank you for this referral.  JOSEP Limon   Time Calculation: 28 mins

## 2020-01-22 NOTE — PROGRESS NOTES
Occupational Therapy Note    Patient: Allen Walls (18 y.o. female)  Date: 1/22/2020  Diagnosis: Pancreatitis [K85.90]  Hyperosmolar hyperglycemic coma due to diabetes mellitus without ketoacidosis ([facility] [address] [E11.01]  Hyperosmolar non-ketotic state in patient with type 2 diabetes mellitus ([facility] [address] [facility] [E11.00] <principal problem not specified>  Procedure(s) (LRB):  RIGHT FEMORAL-POPLITEAL BYPASS (Right) 6 Days Post-Op  Precautions: Fall, NWB  Chart, occupational therapy assessment, plan of care, and goals were reviewed. Occupational Therapy treatment attempted. Patient is unable to participate due to:  []  Nausea/vomiting  []  Eating  []  Pain  []  Pt lethargic  []  Off Unit  [x] Other:  Attempt x1 @ 1102, pt and daughter in room waiting to discuss potential AKA with MD. Deferred therapy for now. Will f/u later as schedule allows. Thank you.     JOSEP Hand

## 2020-01-22 NOTE — PROGRESS NOTES
5 - Pt and daughter in room waiting to talk with MD. Pt appears frustrated and reports pain on her \"behind. \" Pt agreeable to follow up later in day.

## 2020-01-22 NOTE — PROGRESS NOTES
Hematology/Medical Oncology Progress Note             Name: Timoteo Enriquez   : 1940   MRN: [id number]   Date: 2020 8:13 AM     [x]I have reviewed the flowsheet and previous days notes. Events overnight reviewed and discussed with nursing staff. Vital signs and records reviewed. 79-year-old -American female past medical history of severe PVD, chronic anemia,uncontrolled diabetes mellitus type 2 now right lower limb ischemia and dry gangrene. S/p R Fem-pop bypass. In addition patient is Druze and refuses blood transfusion. Presently receiving Retacrit 21,000 units X 7 days per vascular surgery will need further surgery. Pt voices no new c/o today. ROS:  Constitutional: Positive for fatigue. Negative for fever. HENT: Negative for nosebleeds, congestion, facial swelling, mouth sores, trouble swallowing, neck pain, neck stiffness, voice change and postnasal drip. Eyes: Negative for photophobia, pain, discharge and itching. Respiratory: Negative for apnea, cough, choking, chest tightness, wheezing and stridor. Cardiovascular: Negative for chest pain, palpitations and leg swelling. Gastrointestinal: Negative for abdominal pain. Negative for nausea, diarrhea, constipation, blood in stool and rectal pain. Genitourinary: Negative for dysuria, urgency, hematuria, flank pain and difficulty urinating. Musculoskeletal:Positive for right foot/leg pain. Skin: Positive for right foot gangrene. Neurological:  Negative for dizziness, facial asymmetry, speech difficulty, light-headedness and headaches. Hematological: Negative for adenopathy. Does not bruise/bleed easily. Psychiatric/Behavioral: Negative for hallucinations,confusion.     Vital Signs:    Visit Vitals  /65 (BP 1 Location: Right arm, BP Patient Position: At rest)   Pulse 88   Temp 98.6 °F (37 °C)   Resp 18   Ht 5' 5\" (1.651 m)   Wt 73.1 kg (161 lb 1.6 oz)   SpO2 99%   Breastfeeding No   BMI 26.81 kg/m²       O2 Device: Room air   O2 Flow Rate (L/min): 2 l/min   Temp (24hrs), Av.1 °F (36.7 °C), Min:97 °F (36.1 °C), Max:98.8 °F (37.1 °C)       Intake/Output:   Last shift:      No intake/output data recorded. Last 3 shifts:  1901 -  0700  In: 840 [P.O.:840]  Out: 1900 [Urine:1900]    Intake/Output Summary (Last 24 hours) at 2020 1002  Last data filed at 2020 0649  Gross per 24 hour   Intake 720 ml   Output 900 ml   Net -180 ml       Physical Exam:  General: Appears comfortable, NAD. HEENT:  Anicteric sclerae; pink palpebral conjunctivae; mucosa moist  Resp:  Symmetrical chest expansion, no accessory muscle use; good airway entry; no rales/ wheezing/ rhonchi noted  CV:  S1, S2 present; regular rate and rhythm  GI:  Abdomen soft, non-tender; (+) active bowel sounds  Extremities:  S/p R Fem-pop bypass: R foot dry gangrene.   Skin:  Warm, right foot gangrenous  Neurologic:  Non-focal         DATA:   Current Facility-Administered Medications   Medication Dose Route Frequency    amLODIPine (NORVASC) tablet 10 mg  10 mg Oral DAILY    metoprolol tartrate (LOPRESSOR) tablet 12.5 mg  12.5 mg Oral Q12H    ferrous sulfate tablet 325 mg  1 Tab Oral DAILY WITH BREAKFAST    hydrALAZINE (APRESOLINE) 20 mg/mL injection 10 mg  10 mg IntraVENous Q6H PRN    bisacodyL (DULCOLAX) tablet 10 mg  10 mg Oral DAILY PRN    docusate sodium (COLACE) capsule 100 mg  100 mg Oral BID    0.9% sodium chloride infusion 250 mL  250 mL IntraVENous PRN    epoetin bipin-epbx (RETACRIT) 21,000 Units  21,000 Units SubCUTAneous QHS    tamsulosin (FLOMAX) capsule 0.4 mg  0.4 mg Oral DAILY    HYDROmorphone (DILAUDID) syringe 0.5 mg  0.5 mg IntraVENous Q2H PRN    dextrose (D25W) 25% injection 10 mL  2.5 g IntraVENous PRN    insulin lispro (HUMALOG) injection   SubCUTAneous AC&HS    famotidine (PEPCID) tablet 20 mg  20 mg Oral DAILY    insulin glargine (LANTUS) injection 28 Units  28 Units SubCUTAneous DAILY    bisacodyl (DULCOLAX) suppository 10 mg  10 mg Rectal DAILY PRN    polyethylene glycol (MIRALAX) packet 17 g  17 g Oral DAILY    aspirin chewable tablet 81 mg  81 mg Oral DAILY    oxyCODONE-acetaminophen (PERCOCET) 5-325 mg per tablet 1-2 Tab  1-2 Tab Oral Q6H PRN    dextrose 10% infusion 125-250 mL  125-250 mL IntraVENous PRN    heparin (porcine) injection 5,000 Units  5,000 Units SubCUTAneous Q8H    glucose chewable tablet 16 g  4 Tab Oral PRN    glucagon (GLUCAGEN) injection 1 mg  1 mg IntraMUSCular PRN                    Labs:  Recent Labs     01/22/20  0536 01/21/20  1126 01/20/20  0529   WBC 9.3 7.6  --    HGB 7.8* 7.1* 7.7*   HCT 24.4* 22.2* 24.5*    353  --      No results for input(s): NA, K, CL, CO2, GLU, BUN, CREA, CA, MG, PHOS, ALB, TBIL, SGOT, ALT, INR, INREXT, INREXT in the last 72 hours. No results for input(s): PH, PCO2, PO2, HCO3, FIO2 in the last 72 hours. IMPRESSION:   · Anemia of chronic illness  · PVD  · Diabetes type 2  · Acute renal failure        · PLAN:  · H/H is 7.8/24.4: Pt is Jehovahs witness-no PRBC transfusion. Reviewed creatinine 1.37. Continue Retacrit 21,000 units daily X 7 days. Apparently, the patient has lost IV site and has only received once dose of IV venofer. Recommend completion of Venofer X 7 doses after PICC line insertion. Ferrous sulfate 325 mg p.o has been restarted until such time. Iron low at 22,TIBC 116, iron % sat 19, ferritin 100. B12 897, folate >20.0. · Recommend limiting lab draws. · Tentative plan :Vascular surgery will proceed with amputation surgery whenever patient is medically stable.       ·  ·        The patient is: [x] acutely ill Risk of deterioration: [] moderate    [] critically ill  [] high         My assessment/plan was discussed with:  [x]nursing []PT/OT    []respiratory therapy [x]Dr.Lloyd Helga Novoa MD      []family []       Teresa Liu NP

## 2020-01-22 NOTE — PROGRESS NOTES
Hospitalist Progress Note    Patient: Nidia Walker Age: 79 y.o. : 1940 MR#: [id number] SSN: [id number]  Date/Time: 2020 3:32 PM    DOA: 2020  PCP: Ran David MD    Subjective:       Family at bedside wants to know when she can get her surgery   Also want to know where her Hgb is today. She has intermittent confusion with narcotic use for her pain in right foot. Vascular surgery is tentatively scheduled surgery Friday pending her Hgb >8. Hgb 7.8 today. No line  PICC order for IV iron supplement with her Retacrit. No blood transfusion per Shinto   No fever. No leukocytosis. BP has remains stable. Urinary retention with gutierrez removed 2020. Resolved, Urology has signed off      Interval Hospital Course:  79 y.o female with prolonged hospitalization since her admission  for metabolic encephalopathy related to HHS, acute pancreatitis, EFRAIN. She has recovered from [facility], pancreatitis/EFRAIN but developed worsened ischemia of her right foot due to right lower extremity PAD with gangrene, CTA with multifocal stenosis of right lower extremity. She underwent angio with balloon angioplasty and stent placement at origin of common iliac (2020). She underwent right femoral to above-knee popliteal bypass (2020). Podiatry consulted and followed but now recommended amputation. She has been waiting for her Hgb/Hct to improved to level where it will be safe for her to surgery without needing transfusion. Hematology has been consulted.      ROS: no fever/chills, no headache, no dizziness, no facial pain, no sinus congestion,   No swallowing pain, No chest pain, no palpitation, no shortness of breath, no abd pain,  No diarrhea, no urinary complaint, +right leg pain or swelling    Assessment/Plan:       1)  Right leg PAD with gangrene, associate with uncontrolled DM2      - CTA with multifocal stenosis of right lower extremity      - s/p angio with balloon angioplasty and stent orf right common iliac artery 1/13/2020      - right Fem-Pop bypass 1/16/2020       VASCULAR TO FOLLOW UP FOR right AKA when Hgb >8   2)  Hyperglycemia with Type 2 diabetes, stable       - admitted with Hyperosmolar non-ketotic state in type 2 diabetes mellitus, resolved    3)  Acute on chronic renal failure Stage 3, resolved   4)  Metabolic acidosis due to renal issues, resolved  5)  Acute metabolic encephalopathy,       She hs episodes of confusion, possible under diagnosed dementia vs narcotic effects from pain Rx  6)  Acute pancreatitis, resolved, GI recommended CT abd in 8 weeks   7)  Hypertension, tolerated bblocker   8)  Hypophosphatemia, replaced   9)  Normocytic anemia of chronic disease, recently decreased. Not amenable for transfusion due to Restorationist        On IV venofer and Retacrit per Hematology        It is limiting factor for her upcoming right AKA  10)  Urinary retention, gutierrez out. Resolved       Continue to treat to bring her Hgb up above 8 as request by Vascular surgery, though he would preferred around 10. PICC ordered today. Pending right AKA if Hgb level improved. She continues on Retacrit and Iron supplement. Negative stool occult blood. No antibiotic need now. Monitor for infection . ON lantus and ISS   Cont ASA, flomax, increase amlodipine, tolerated metoprolol to help with BP today. Urology signed off, no urinary retention now. Family updated.    Full code     Additional Notes:    Time spent >30 minutes     Case discussed with:  [x]Patient  [x]Family  [x]Nursing  [x]Case Management  DVT Prophylaxis:  []Lovenox  []Hep SQ  [x]SCDs  []Coumadin   []On Heparin gtt    Signed By: Mansi Duenas MD     January 22, 2020 3:32 PM              Objective:   VS:   Visit Vitals  /58 (BP 1 Location: Right arm, BP Patient Position: Supine)   Pulse 98   Temp 97 °F (36.1 °C)   Resp 19   Ht 5' 5\" (1.651 m)   Wt 73.1 kg (161 lb 1.6 oz)   SpO2 96%   Breastfeeding No   BMI 26.81 kg/m²      Tmax/24hrs: Temp (24hrs), Av.1 °F (36.7 °C), Min:97 °F (36.1 °C), Max:98.8 °F (37.1 °C)      Intake/Output Summary (Last 24 hours) at 2020 1532  Last data filed at 2020 1040  Gross per 24 hour   Intake 800 ml   Output 1025 ml   Net -225 ml       General:  Cooperative, Not in acute distress, speaks in full sentence while in bed  HEENT: PERRL, EOMI, supple neck, no JVD, dry oral mucosa  Cardiovascular: S1S2 regular, no rub/gallop   Pulmonary: Clear air entry bilaterally, no wheezing, no crackle  GI:  Soft, non tender, non distended, +bs, no guarding   Extremities:  +right pedal edema, +distal pulses appreciated   Right foot necrosis/gangrene   Neuro: AOx3, moving all extremities, no gross deficit.      Additional:       Current Facility-Administered Medications   Medication Dose Route Frequency    amLODIPine (NORVASC) tablet 10 mg  10 mg Oral DAILY    metoprolol tartrate (LOPRESSOR) tablet 12.5 mg  12.5 mg Oral Q12H    ferrous sulfate tablet 325 mg  1 Tab Oral DAILY WITH BREAKFAST    hydrALAZINE (APRESOLINE) 20 mg/mL injection 10 mg  10 mg IntraVENous Q6H PRN    bisacodyL (DULCOLAX) tablet 10 mg  10 mg Oral DAILY PRN    docusate sodium (COLACE) capsule 100 mg  100 mg Oral BID    0.9% sodium chloride infusion 250 mL  250 mL IntraVENous PRN    epoetin bipin-epbx (RETACRIT) 21,000 Units  21,000 Units SubCUTAneous QHS    tamsulosin (FLOMAX) capsule 0.4 mg  0.4 mg Oral DAILY    HYDROmorphone (DILAUDID) syringe 0.5 mg  0.5 mg IntraVENous Q2H PRN    dextrose (D25W) 25% injection 10 mL  2.5 g IntraVENous PRN    insulin lispro (HUMALOG) injection   SubCUTAneous AC&HS    famotidine (PEPCID) tablet 20 mg  20 mg Oral DAILY    insulin glargine (LANTUS) injection 28 Units  28 Units SubCUTAneous DAILY    bisacodyl (DULCOLAX) suppository 10 mg  10 mg Rectal DAILY PRN    polyethylene glycol (MIRALAX) packet 17 g  17 g Oral DAILY    aspirin chewable tablet 81 mg  81 mg Oral DAILY    oxyCODONE-acetaminophen (PERCOCET) 5-325 mg per tablet 1-2 Tab  1-2 Tab Oral Q6H PRN    dextrose 10% infusion 125-250 mL  125-250 mL IntraVENous PRN    heparin (porcine) injection 5,000 Units  5,000 Units SubCUTAneous Q8H    glucose chewable tablet 16 g  4 Tab Oral PRN    glucagon (GLUCAGEN) injection 1 mg  1 mg IntraMUSCular PRN            Lab/Data Review:  Labs: Results:       Chemistry No results for input(s): GLU, NA, K, CL, CO2, BUN, CREA, BUCR, AGAP, CA, PHOS in the last 72 hours. No results for input(s): TBIL, ALT, SGOT, ALKP, TP, ALB, GLOB, AGRAT in the last 72 hours. CBC w/Diff Recent Labs     01/22/20  0536 01/21/20  1126  01/20/20  0529   WBC 9.3 7.6  --   --    RBC 2.43* 2.20*  --   --    HGB 7.8* 7.1*  --  7.7*   HCT 24.4* 22.2*  --  24.5*   .4* 100.9*   < >  --    MCH 32.1 32.3   < >  --    MCHC 32.0 32.0   < >  --    RDW 14.1 14.1   < >  --     353  --   --    GRANS 68 76*  --   --    LYMPH 21 14*  --   --    EOS 3 2  --   --     < > = values in this interval not displayed. Coagulation No results for input(s): PTP, INR, APTT, INREXT, INREXT in the last 72 hours.     Iron/Ferritin Lab Results   Component Value Date/Time    Iron 22 (L) 01/19/2020 04:05 AM    TIBC 116 (L) 01/19/2020 04:05 AM    Iron % saturation 19 01/19/2020 04:05 AM    Ferritin 100 01/15/2020 03:33 AM       BNP    Cardiac Enzymes Lab Results   Component Value Date/Time    Troponin-I, QT <0.02 01/02/2020 11:25 PM        Lactic Acid    Thyroid Studies          All Micro Results     Procedure Component Value Units Date/Time    CULTURE, BLOOD [[id number]] Collected:  01/02/20 2206    Order Status:  Completed Specimen:  Blood Updated:  01/08/20 0642     Special Requests: NO SPECIAL REQUESTS        Culture result: NO GROWTH 6 DAYS       CULTURE, BLOOD [[id number]] Collected:  01/02/20 2206    Order Status:  Completed Specimen:  Blood Updated:  01/08/20 0642     Special Requests: NO SPECIAL REQUESTS        Culture result: NO GROWTH 6 DAYS               Images:    CT (Most Recent). CT Results (most recent):  Results from Hospital Encounter encounter on 01/02/20   CTA ABD ART W RUNOFF W WO CONT    Narrative PROCEDURE: CTA of abdomen and pelvis and bilateral lower extremity runoff with  intravenous contrast administration. HISTORY: PAD, ischemia of the right foot. TECHNIQUE: Multidetector helical CT angiography was carried out from low chest  through the feet following dynamic 70 cc Isovue-300 intravenous contrast  administration . Scan timing was performed using automated bolus tracking/SMART  Prep. 3-D and MPR angiographic image reconstruction was performed on  independent workstation and separately reviewed. Parenchymal imaging is limited  by the arterial phase of contrast administration. All CT scans at this facility  are performed using dose optimization techniques as appropriate to a performed  exam, to include automated exposure control, adjustment of the mA and/or kV  according to patient's size (including appropriate matching for site specific  examinations), or use of iterative reconstruction technique. COMPARISON: CT abdomen/pelvis 1/3/2020. CTA abdomen/pelvis with extremity runoff  7/18/2017. FINDINGS:    VASCULAR FINDINGS:    Right lower extremity:  Multifocal stenosis throughout the right posterior tibialis artery due to  atherosclerotic calcifications as well as the peroneal artery.  -The right posterior tibialis artery is not opacified beyond the mid right lower  leg.  -The anterior tibial artery is not opacified beyond the right lower leg just  above the tibial plafond.  -The peroneal artery is opacified beyond the level of the tibial plafond. Proximally, there is multifocal stenosis of the right femoral artery with loss  of opacification at the mid to distal thigh.  Reconstitution of opacification at  the level of the posterior tibialis artery likely due to collaterals from  geniculate arteries and the deep femoral artery. 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  -Left lower leg perfusion is contributed by collaterals from the deep femoral  artery and geniculate arteries. Multifocal stenosis/occlusion of the left  posterior tibialis artery and left peroneal artery. No opacification of the  peroneal artery or posterior tibialis artery is seen to be on the mid to  proximal third of the left lower leg. The left dorsal pedis is opacified, and  likely contributes to some additional opacified vessel seen in the left foot. Abdominal aorta:  -Severe atherosclerosis with moderate compromise of the lumen, similar to prior  exam of 7/2017.  -No evidence for abdominal aortic aneurysm. -Severe stenosis at the proximal aspect of the right common iliac artery, well  opacified distally, similar to prior exam. Multifocal moderate stenosis at the  left common iliac artery similar to prior exam.  -Multifocal stenosis at the internal iliac arteries, with good opacification  distally.  -Celiac artery, SMA, left renal artery, and right renal artery are grossly  patent. The right renal artery again shown to originate from the descending  thoracic aorta. ALFREDA is not well visualized. ABDOMEN/PELVIS FINDINGS:  Lower chest: Small right pleural effusion with overlying atelectasis. Liver: Stable subcentimeter hypodense lesions liver, too small to characterize  but stability suggests benignity    Biliary: Gallbladder is mildly distended but otherwise unremarkable. Spleen: Negative    Pancreas: There is some peripancreatic edema adjacent to the pancreatic tail. Adrenal glands: Diffuse thickening of the adrenal glands, without discrete mass,  similar to prior exam.    Kidneys: Malrotated kidneys again noted, without evidence for hydronephrosis. GI tract: The bowel appears nonobstructed. Question of mild mural thickening at  the distal esophagus.  Questionable mild mural thickening at the greater  curvature of the stomach adjacent to the edema along the pancreatic tail. Colonic diverticulosis, without evidence for diverticulitis. Normal appendix. Peritoneum: No free air    Lymph nodes: No lymphadenopathy. Pelvis: Urinary bladder is unremarkable. Fibroid uterus again noted. Body wall/soft tissues: Small fat-containing umbilical hernia. Small right lower  spigelian hernia containing a small amount of fluid measuring 2.5 x 1.2 cm  (image 203), previously measured 2.9 x 1.6 cm. Mild edema along the right flank. Bones:  -Bilateral moderate knee osteoarthrosis. Small right knee joint effusion and  trace left knee joint effusion.  -Diffuse soft tissue swelling at the feet, right greater than left. Trace  subcutaneous emphysema adjacent to the right fifth MTP joint. Left distal tibial  and talar hardware noted. -Multilevel degenerative spondylosis and disc disease noted, not well evaluated  on current exam.  -Moderate degenerative change at the hips      Impression IMPRESSION:  1. Right lower extremity:  Multifocal stenosis throughout the right posterior tibialis artery due to  atherosclerotic calcifications as well as the peroneal artery.  -The right posterior tibialis artery is not opacified beyond the mid right lower  leg.  -The anterior tibial artery is not opacified beyond the right lower leg just  above the tibial plafond.  -The peroneal artery is opacified beyond the level of the tibial plafond. Proximally, there is multifocal stenosis of the right femoral artery with loss  of opacification at the mid to distal thigh. Reconstitution of opacification at  the level of the posterior tibialis artery likely due to collaterals from  geniculate arteries and the deep femoral artery. 2. Diffuse soft tissue swelling at bilateral feet, right greater than left.   -Suggestion of trace air along the right fifth MTP joint, could be degenerative  but infectious process not excluded. Consider dedicated right foot x-ray for  further evaluation. 3. Chronic multilevel stenosis of the aorta, iliac arteries, and left lower  extremity as described. 4. Peripancreatic edema along the pancreatic tail is increased since 1/3/2020.  -Associated adjacent mild presumed reactive mural thickening of the greater  curvature of the stomach. 5. Small right pleural effusion. 6. Suggestion of mild mural thickening of the distal esophagus, may indicate  nonspecific esophagitis. 7. Bilateral knee osteoarthrosis with small right knee joint effusion and trace  left knee joint effusion. 8. Fibroid uterus. 9. Colonic diverticulosis, without evidence for diverticulitis. 10. Additional nonacute findings are as described. XRAY (Most Recent)      EKG No results found for this or any previous visit.      2D ECHO

## 2020-01-22 NOTE — PROGRESS NOTES
Problem: Mobility Impaired (Adult and Pediatric)  Goal: *Acute Goals and Plan of Care (Insert Text)  Description  Physical Therapy Goals  Initiated 1/18/2020 and to be accomplished within 7 day(s)  1. Patient will move from supine to sit and sit to supine , scoot up and down and roll side to side in bed with minimal assistance/contact guard assist.     2.  Patient will transfer from bed to chair and chair to bed with maximal assistance using the least restrictive device. 3.  Patient will perform sit to stand with maximal assistance. 4.  Patient will improve sitting balance static good and dynamic fair to increase safety with transfers. 5.  Patient will perform WC mobility minimal assistance 50 feet for increased functional independence. Prior Level of Function:   Patient was independence for all mobility including gait using no AD. Patient lives alone in a single story home no steps to enter. Unsure how pt was ambulatory prior to hospital with the noticeable gangrene of R foot but pt states she has no equipment at home and was ambulatory. Outcome: Progressing Towards Goal   PHYSICAL THERAPY TREATMENT    Patient: Beatrice Arcos (84 y.o. female)  Date: 1/22/2020  Diagnosis: Pancreatitis [K85.90]  Hyperosmolar hyperglycemic coma due to diabetes mellitus without ketoacidosis (HCC) [E11.01]  Hyperosmolar non-ketotic state in patient with type 2 diabetes mellitus ([facility] [address] [E11.00]   <principal problem not specified>  Procedure(s) (LRB):  RIGHT FEMORAL-POPLITEAL BYPASS (Right) 6 Days Post-Op  Precautions: Fall, NWB   Chart, physical therapy assessment, plan of care and goals were reviewed. OBJECTIVE/ ASSESSMENT:  Patient found supine willing to work with PT. Patient seen with OT to maximize safety of patient and staff. Pt sat at EOB this tx with LEs dangling. PT reports increased dizziness and requires cues for gaze stabilization.  Pt's BP at this time found to be 99/61/ Pt was returned to supine and rolled side to side for pad replacement. Pt was positioned comfortably in left sidelying with pillow between legs. Needs left in reach and nurse Epimenio Ormond notified. Progression toward goals:  [x]      Improving appropriately and progressing toward goals  []      Improving slowly and progressing toward goals  []      Not making progress toward goals and plan of care will be adjusted     PLAN:  Patient continues to benefit from skilled intervention to address the above impairments. Continue treatment per established plan of care. Discharge Recommendations:  Charles Cook  Further Equipment Recommendations for Discharge:  rolling walker     SUBJECTIVE:   Patient stated I'm in [facility].     OBJECTIVE DATA SUMMARY:   Critical Behavior:  Neurologic State: Alert  Orientation Level: Oriented X4  Cognition: Follows commands  Safety/Judgement: Fall prevention  Functional Mobility Training:  Bed Mobility:  Rolling: Minimum assistance  Supine to Sit: Minimum assistance  Sit to Supine: Minimum assistance  Scooting: Minimum assistance  Balance:  Sitting: Intact; With support  Sitting - Static: Fair (occasional)  Sitting - Dynamic: Fair (occasional)(F- when challenged with sock donning )    Therapeutic Exercises:   Encouraged long sitting in bed    Pain:  Pain level pre-treatment: Not rated  Pain level post-treatment: Not rated    Activity Tolerance:   Fair    Please refer to the flowsheet for vital signs taken during this treatment.   After treatment:   [] Patient left in no apparent distress sitting up in chair  [x] Patient left in no apparent distress in bed  [x] Call bell left within reach  [] Nursing notified  [] Caregiver present  [] Bed alarm activated  [] SCDs applied    COMMUNICATION/EDUCATION:   [x]         Role of Physical Therapy  []         Fall prevention  [x]         Bed mobility  []         Transfers  []         Ambulation / gait  []         Assistive device management  []         Stairs  [x] Body mechanics  [x]         Position change   [x]         Therapeutic exercise  []         Activity pacing / energy conservation  []         Other:      Justice Landa PTA   Time Calculation: 28 mins

## 2020-01-22 NOTE — PROGRESS NOTES
RENAL DAILY PROGRESS NOTE    Patient: Abigail Aparicio               Sex: female          DOA: 1/2/2020  7:37 PM        YOB: 1940      Age:  79 y.o.        LOS:  LOS: 19 days     Subjective:     Abigail Aparicio is a 79 y.o.  who presents with Pancreatitis [K85.90]  Hyperosmolar hyperglycemic coma due to diabetes mellitus without ketoacidosis ([facility] [address] [E11.01]  Hyperosmolar non-ketotic state in patient with type 2 diabetes mellitus ([facility] [address] [E11.00]. Asked to evaluate for acute/crf. admitted with hyperglycemia  Chief complains: Patient denies nausea, vomiting, chest pain, dizziness, shortness of breath or headache.  - Reviewed last 24 hrs events     Current Facility-Administered Medications   Medication Dose Route Frequency    amLODIPine (NORVASC) tablet 10 mg  10 mg Oral DAILY    metoprolol tartrate (LOPRESSOR) tablet 12.5 mg  12.5 mg Oral Q12H    ferrous sulfate tablet 325 mg  1 Tab Oral DAILY WITH BREAKFAST    hydrALAZINE (APRESOLINE) 20 mg/mL injection 10 mg  10 mg IntraVENous Q6H PRN    bisacodyL (DULCOLAX) tablet 10 mg  10 mg Oral DAILY PRN    docusate sodium (COLACE) capsule 100 mg  100 mg Oral BID    0.9% sodium chloride infusion 250 mL  250 mL IntraVENous PRN    epoetin bipin-epbx (RETACRIT) 21,000 Units  21,000 Units SubCUTAneous QHS    tamsulosin (FLOMAX) capsule 0.4 mg  0.4 mg Oral DAILY    HYDROmorphone (DILAUDID) syringe 0.5 mg  0.5 mg IntraVENous Q2H PRN    dextrose (D25W) 25% injection 10 mL  2.5 g IntraVENous PRN    insulin lispro (HUMALOG) injection   SubCUTAneous AC&HS    famotidine (PEPCID) tablet 20 mg  20 mg Oral DAILY    insulin glargine (LANTUS) injection 28 Units  28 Units SubCUTAneous DAILY    bisacodyl (DULCOLAX) suppository 10 mg  10 mg Rectal DAILY PRN    polyethylene glycol (MIRALAX) packet 17 g  17 g Oral DAILY    aspirin chewable tablet 81 mg  81 mg Oral DAILY    oxyCODONE-acetaminophen (PERCOCET) 5-325 mg per tablet 1-2 Tab  1-2 Tab Oral Q6H PRN    dextrose 10% infusion 125-250 mL  125-250 mL IntraVENous PRN    heparin (porcine) injection 5,000 Units  5,000 Units SubCUTAneous Q8H    glucose chewable tablet 16 g  4 Tab Oral PRN    glucagon (GLUCAGEN) injection 1 mg  1 mg IntraMUSCular PRN       Objective:     Visit Vitals  /65 (BP 1 Location: Right arm, BP Patient Position: At rest)   Pulse 88   Temp 98.6 °F (37 °C)   Resp 18   Ht 5' 5\" (1.651 m)   Wt 73.1 kg (161 lb 1.6 oz)   SpO2 99%   Breastfeeding No   BMI 26.81 kg/m²       Intake/Output Summary (Last 24 hours) at 1/22/2020 1055  Last data filed at 1/22/2020 1040  Gross per 24 hour   Intake 1040 ml   Output 1025 ml   Net 15 ml       Physical Examination:     RS: Chest is bilateral equal, no wheezing / rales / crackles  CVS: S1-S2 heard, RRR, No S3 / murmur  Abdomen: Soft, Non tender, Not distended, Positive bowel sounds, no organomegaly, no CVA / supra pubic tenderness  Extremities gangrene r foot  CNS: Awake   HEENT: Head is atraumatic, PERRLA, conjunctiva pink & non icteric. No JVD or carotid bruit        Data Review:      Labs:     Hematology:   Recent Labs     01/22/20  0536 01/21/20  1126 01/20/20  0529   WBC 9.3 7.6  --    HGB 7.8* 7.1* 7.7*   HCT 24.4* 22.2* 24.5*     Chemistry:   No results for input(s): BUN, CREA, CA, ALB, K, NA, CL, CO2, PHOS, GLU in the last 72 hours. Images:    XR (Most Recent). CXR reviewed by me and compared with previous CXR Results from Hospital Encounter encounter on 01/02/20   XR FOOT RT MIN 3 V    Narrative EXAM:  XR FOOT RT MIN 3 V    INDICATION:   report of air in right fifth MTP join    COMPARISON:  None. FINDINGS:  3 views of the right foot demonstrate limited study, the patient has  cooperation issues with positioning the foot. Considerable hammertoe deformity  of all the toes noted. The foot is held in extension at the ankle. There is no  obvious fracture or dislocation. Small plantar calcaneal spur noted.       Impression IMPRESSION: Positioning issues, no acute abnormality identified. No bone  destruction or soft tissue air        CT (Most Recent) Results from Hospital Encounter encounter on 01/02/20   CTA ABD ART W RUNOFF W WO CONT    Narrative PROCEDURE: CTA of abdomen and pelvis and bilateral lower extremity runoff with  intravenous contrast administration. HISTORY: PAD, ischemia of the right foot. TECHNIQUE: Multidetector helical CT angiography was carried out from low chest  through the feet following dynamic 70 cc Isovue-300 intravenous contrast  administration . Scan timing was performed using automated bolus tracking/SMART  Prep. 3-D and MPR angiographic image reconstruction was performed on  independent workstation and separately reviewed. Parenchymal imaging is limited  by the arterial phase of contrast administration. All CT scans at this facility  are performed using dose optimization techniques as appropriate to a performed  exam, to include automated exposure control, adjustment of the mA and/or kV  according to patient's size (including appropriate matching for site specific  examinations), or use of iterative reconstruction technique. COMPARISON: CT abdomen/pelvis 1/3/2020. CTA abdomen/pelvis with extremity runoff  7/18/2017. FINDINGS:    VASCULAR FINDINGS:    Right lower extremity:  Multifocal stenosis throughout the right posterior tibialis artery due to  atherosclerotic calcifications as well as the peroneal artery.  -The right posterior tibialis artery is not opacified beyond the mid right lower  leg.  -The anterior tibial artery is not opacified beyond the right lower leg just  above the tibial plafond.  -The peroneal artery is opacified beyond the level of the tibial plafond. Proximally, there is multifocal stenosis of the right femoral artery with loss  of opacification at the mid to distal thigh.  Reconstitution of opacification at  the level of the posterior tibialis artery likely due to collaterals from  geniculate arteries and the deep femoral artery. 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  -Left lower leg perfusion is contributed by collaterals from the deep femoral  artery and geniculate arteries. Multifocal stenosis/occlusion of the left  posterior tibialis artery and left peroneal artery. No opacification of the  peroneal artery or posterior tibialis artery is seen to be on the mid to  proximal third of the left lower leg. The left dorsal pedis is opacified, and  likely contributes to some additional opacified vessel seen in the left foot. Abdominal aorta:  -Severe atherosclerosis with moderate compromise of the lumen, similar to prior  exam of 7/2017.  -No evidence for abdominal aortic aneurysm. -Severe stenosis at the proximal aspect of the right common iliac artery, well  opacified distally, similar to prior exam. Multifocal moderate stenosis at the  left common iliac artery similar to prior exam.  -Multifocal stenosis at the internal iliac arteries, with good opacification  distally.  -Celiac artery, SMA, left renal artery, and right renal artery are grossly  patent. The right renal artery again shown to originate from the descending  thoracic aorta. ALFREDA is not well visualized. ABDOMEN/PELVIS FINDINGS:  Lower chest: Small right pleural effusion with overlying atelectasis. Liver: Stable subcentimeter hypodense lesions liver, too small to characterize  but stability suggests benignity    Biliary: Gallbladder is mildly distended but otherwise unremarkable. Spleen: Negative    Pancreas: There is some peripancreatic edema adjacent to the pancreatic tail. Adrenal glands: Diffuse thickening of the adrenal glands, without discrete mass,  similar to prior exam.    Kidneys: Malrotated kidneys again noted, without evidence for hydronephrosis. GI tract: The bowel appears nonobstructed. Question of mild mural thickening at  the distal esophagus. Questionable mild mural thickening at the greater  curvature of the stomach adjacent to the edema along the pancreatic tail. Colonic diverticulosis, without evidence for diverticulitis. Normal appendix. Peritoneum: No free air    Lymph nodes: No lymphadenopathy. Pelvis: Urinary bladder is unremarkable. Fibroid uterus again noted. Body wall/soft tissues: Small fat-containing umbilical hernia. Small right lower  spigelian hernia containing a small amount of fluid measuring 2.5 x 1.2 cm  (image 203), previously measured 2.9 x 1.6 cm. Mild edema along the right flank. Bones:  -Bilateral moderate knee osteoarthrosis. Small right knee joint effusion and  trace left knee joint effusion.  -Diffuse soft tissue swelling at the feet, right greater than left. Trace  subcutaneous emphysema adjacent to the right fifth MTP joint. Left distal tibial  and talar hardware noted. -Multilevel degenerative spondylosis and disc disease noted, not well evaluated  on current exam.  -Moderate degenerative change at the hips      Impression IMPRESSION:  1. Right lower extremity:  Multifocal stenosis throughout the right posterior tibialis artery due to  atherosclerotic calcifications as well as the peroneal artery.  -The right posterior tibialis artery is not opacified beyond the mid right lower  leg.  -The anterior tibial artery is not opacified beyond the right lower leg just  above the tibial plafond.  -The peroneal artery is opacified beyond the level of the tibial plafond. Proximally, there is multifocal stenosis of the right femoral artery with loss  of opacification at the mid to distal thigh. Reconstitution of opacification at  the level of the posterior tibialis artery likely due to collaterals from  geniculate arteries and the deep femoral artery. 2. Diffuse soft tissue swelling at bilateral feet, right greater than left.   -Suggestion of trace air along the right fifth MTP joint, could be degenerative  but infectious process not excluded. Consider dedicated right foot x-ray for  further evaluation. 3. Chronic multilevel stenosis of the aorta, iliac arteries, and left lower  extremity as described. 4. Peripancreatic edema along the pancreatic tail is increased since 1/3/2020.  -Associated adjacent mild presumed reactive mural thickening of the greater  curvature of the stomach. 5. Small right pleural effusion. 6. Suggestion of mild mural thickening of the distal esophagus, may indicate  nonspecific esophagitis. 7. Bilateral knee osteoarthrosis with small right knee joint effusion and trace  left knee joint effusion. 8. Fibroid uterus. 9. Colonic diverticulosis, without evidence for diverticulitis. 10. Additional nonacute findings are as described. EKG No results found for this or any previous visit. I have personally reviewed the old medical records and patient's labs    Plan / Recommendation:      1. Acute/crf stage 3.back to baseline  2.pvd,gangren r foot,for aka soon  3.anemia,on epo. refused blood transfusion    D/w vascular pa     Princess Rema MD  Nephrology  1/22/2020

## 2020-01-22 NOTE — ROUTINE PROCESS
Bedside shift change report given to Bess Lambert RN. Report included SBAR, Kardex, MAR, Recent Results, and Plan of Care. Pt in bed with call light in reach.

## 2020-01-23 ENCOUNTER — APPOINTMENT (OUTPATIENT)
Dept: INTERVENTIONAL RADIOLOGY/VASCULAR | Age: 80
DRG: 616 | End: 2020-01-23
Attending: INTERNAL MEDICINE
Payer: MEDICARE

## 2020-01-23 LAB
BASOPHILS # BLD: 0 K/UL (ref 0–0.1)
BASOPHILS NFR BLD: 0 % (ref 0–2)
DIFFERENTIAL METHOD BLD: ABNORMAL
EOSINOPHIL # BLD: 0.3 K/UL (ref 0–0.4)
EOSINOPHIL NFR BLD: 3 % (ref 0–5)
ERYTHROCYTE [DISTWIDTH] IN BLOOD BY AUTOMATED COUNT: 14.6 % (ref 11.6–14.5)
GLUCOSE BLD STRIP.AUTO-MCNC: 118 MG/DL (ref 70–110)
GLUCOSE BLD STRIP.AUTO-MCNC: 253 MG/DL (ref 70–110)
GLUCOSE BLD STRIP.AUTO-MCNC: 255 MG/DL (ref 70–110)
GLUCOSE BLD STRIP.AUTO-MCNC: 270 MG/DL (ref 70–110)
GLUCOSE BLD STRIP.AUTO-MCNC: 319 MG/DL (ref 70–110)
HCT VFR BLD AUTO: 25.6 % (ref 35–45)
HGB BLD-MCNC: 7.9 G/DL (ref 12–16)
LYMPHOCYTES # BLD: 1.7 K/UL (ref 0.9–3.6)
LYMPHOCYTES NFR BLD: 19 % (ref 21–52)
MCH RBC QN AUTO: 31.7 PG (ref 24–34)
MCHC RBC AUTO-ENTMCNC: 30.9 G/DL (ref 31–37)
MCV RBC AUTO: 102.8 FL (ref 74–97)
MONOCYTES # BLD: 0.9 K/UL (ref 0.05–1.2)
MONOCYTES NFR BLD: 10 % (ref 3–10)
NEUTS SEG # BLD: 5.8 K/UL (ref 1.8–8)
NEUTS SEG NFR BLD: 68 % (ref 40–73)
PLATELET # BLD AUTO: 408 K/UL (ref 135–420)
PMV BLD AUTO: 10.4 FL (ref 9.2–11.8)
RBC # BLD AUTO: 2.49 M/UL (ref 4.2–5.3)
WBC # BLD AUTO: 8.6 K/UL (ref 4.6–13.2)

## 2020-01-23 PROCEDURE — 74011250637 HC RX REV CODE- 250/637: Performed by: INTERNAL MEDICINE

## 2020-01-23 PROCEDURE — 74011250636 HC RX REV CODE- 250/636: Performed by: SURGERY

## 2020-01-23 PROCEDURE — 74011636637 HC RX REV CODE- 636/637: Performed by: INTERNAL MEDICINE

## 2020-01-23 PROCEDURE — 77001 FLUOROGUIDE FOR VEIN DEVICE: CPT

## 2020-01-23 PROCEDURE — 74011250636 HC RX REV CODE- 250/636: Performed by: INTERNAL MEDICINE

## 2020-01-23 PROCEDURE — 74011250637 HC RX REV CODE- 250/637: Performed by: EMERGENCY MEDICINE

## 2020-01-23 PROCEDURE — 85025 COMPLETE CBC W/AUTO DIFF WBC: CPT

## 2020-01-23 PROCEDURE — 36415 COLL VENOUS BLD VENIPUNCTURE: CPT

## 2020-01-23 PROCEDURE — 74011250636 HC RX REV CODE- 250/636: Performed by: PHYSICIAN ASSISTANT

## 2020-01-23 PROCEDURE — 77010033678 HC OXYGEN DAILY: Performed by: HOSPITALIST

## 2020-01-23 PROCEDURE — 82962 GLUCOSE BLOOD TEST: CPT

## 2020-01-23 PROCEDURE — 74011636637 HC RX REV CODE- 636/637: Performed by: HOSPITALIST

## 2020-01-23 PROCEDURE — 65660000000 HC RM CCU STEPDOWN

## 2020-01-23 PROCEDURE — 74011250637 HC RX REV CODE- 250/637: Performed by: HOSPITALIST

## 2020-01-23 RX ORDER — INSULIN GLARGINE 100 [IU]/ML
4 INJECTION, SOLUTION SUBCUTANEOUS
Status: COMPLETED | OUTPATIENT
Start: 2020-01-23 | End: 2020-01-23

## 2020-01-23 RX ORDER — INSULIN GLARGINE 100 [IU]/ML
32 INJECTION, SOLUTION SUBCUTANEOUS DAILY
Status: DISCONTINUED | OUTPATIENT
Start: 2020-01-24 | End: 2020-01-30

## 2020-01-23 RX ADMIN — Medication 325 MG: at 09:34

## 2020-01-23 RX ADMIN — AMLODIPINE BESYLATE 10 MG: 10 TABLET ORAL at 09:35

## 2020-01-23 RX ADMIN — FAMOTIDINE 20 MG: 20 TABLET, FILM COATED ORAL at 09:34

## 2020-01-23 RX ADMIN — TAMSULOSIN HYDROCHLORIDE 0.4 MG: 0.4 CAPSULE ORAL at 09:34

## 2020-01-23 RX ADMIN — INSULIN GLARGINE 4 UNITS: 100 INJECTION, SOLUTION SUBCUTANEOUS at 18:21

## 2020-01-23 RX ADMIN — INSULIN GLARGINE 28 UNITS: 100 INJECTION, SOLUTION SUBCUTANEOUS at 09:34

## 2020-01-23 RX ADMIN — INSULIN LISPRO 6 UNITS: 100 INJECTION, SOLUTION INTRAVENOUS; SUBCUTANEOUS at 21:24

## 2020-01-23 RX ADMIN — HEPARIN SODIUM 5000 UNITS: 5000 INJECTION INTRAVENOUS; SUBCUTANEOUS at 21:22

## 2020-01-23 RX ADMIN — INSULIN LISPRO 6 UNITS: 100 INJECTION, SOLUTION INTRAVENOUS; SUBCUTANEOUS at 18:21

## 2020-01-23 RX ADMIN — METOPROLOL TARTRATE 12.5 MG: 25 TABLET ORAL at 09:34

## 2020-01-23 RX ADMIN — DOCUSATE SODIUM 100 MG: 100 CAPSULE, LIQUID FILLED ORAL at 18:21

## 2020-01-23 RX ADMIN — HEPARIN SODIUM 5000 UNITS: 5000 INJECTION INTRAVENOUS; SUBCUTANEOUS at 13:49

## 2020-01-23 RX ADMIN — EPOETIN ALFA-EPBX 21000 UNITS: 10000 INJECTION, SOLUTION INTRAVENOUS; SUBCUTANEOUS at 21:32

## 2020-01-23 RX ADMIN — Medication 81 MG: at 09:34

## 2020-01-23 RX ADMIN — INSULIN LISPRO 6 UNITS: 100 INJECTION, SOLUTION INTRAVENOUS; SUBCUTANEOUS at 13:49

## 2020-01-23 RX ADMIN — DOCUSATE SODIUM 100 MG: 100 CAPSULE, LIQUID FILLED ORAL at 09:35

## 2020-01-23 RX ADMIN — METOPROLOL TARTRATE 12.5 MG: 25 TABLET ORAL at 21:20

## 2020-01-23 RX ADMIN — HEPARIN SODIUM 5000 UNITS: 5000 INJECTION INTRAVENOUS; SUBCUTANEOUS at 06:21

## 2020-01-23 RX ADMIN — SODIUM CHLORIDE 300 MG: 900 INJECTION, SOLUTION INTRAVENOUS at 21:04

## 2020-01-23 NOTE — PROGRESS NOTES
Vascular Surgery Progress Note    Admit Date: 2020  POD 7 Days Post-Op    Procedure:  Procedure(s):  RIGHT FEMORAL-POPLITEAL BYPASS      Subjective:     Patient has no new complaints. Objective:     Blood pressure 167/77, pulse 78, temperature 98.4 °F (36.9 °C), resp. rate 20, height 5' 5\" (1.651 m), weight 158 lb 9.6 oz (71.9 kg), SpO2 95 %, not currently breastfeeding. Temp (24hrs), Av.7 °F (36.5 °C), Min:97 °F (36.1 °C), Max:98.4 °F (36.9 °C)      Physical Exam:  LUNG:  clear to auscultation bilaterally, HEART:  S1, S2 normal, ABDOMEN:  no change and Stable dry gangrene of right foot encompassing the entire forefoot and much of the rear foot. Bypass graft is patent foot is warm at the ankle and above. Labs: Results:       Chemistry No results for input(s): GLU, NA, K, CL, CO2, BUN, CREA, CA, AGAP, BUCR, TBIL, GPT, AP, TP, ALB, GLOB, AGRAT in the last 72 hours. CBC w/Diff Recent Labs     20  0522 20  0536 20  1126   WBC 8.6 9.3 7.6   RBC 2.49* 2.43* 2.20*   HGB 7.9* 7.8* 7.1*   HCT 25.6* 24.4* 22.2*    401 353   GRANS 68 68 76*   LYMPH 19* 21 14*   EOS 3 3 2      Microbiology No results for input(s): CULT in the last 72 hours. Coagulation No results for input(s): PTP, INR, APTT, INREXT in the last 72 hours. Data Review: images and reports reviewed    Assessment:     Active Problems:    Pancreatitis (1/3/2020)      Hyperosmolar hyperglycemic coma due to diabetes mellitus without ketoacidosis ([facility] [address] (1/3/2020)      Hyperosmolar non-ketotic state in patient with type 2 diabetes mellitus ([facility] [address] (1/3/2020)      Ischemia of foot (2020)        Plan/Recommendations/Medical Decision Making:     Continue present treatment   Reviewed her labs, hemoglobin is 7.9. Would like to give more time for the reticulocyte crit and she is going to start an iron infusion as well.   Try and avoid a situation where there is blood loss in the operating room and were unable to transfuse because of her [employer] orth[employer] status.   They are very understanding of this we will let her eat today and plan for the surgery hopefully early next week with her continued rise in hemoglobin

## 2020-01-23 NOTE — PROGRESS NOTES
Problem: Falls - Risk of  Goal: *Absence of Falls  Description  Document Troy President Fall Risk and appropriate interventions in the flowsheet.   Outcome: Progressing Towards Goal  Note: Fall Risk Interventions:  Mobility Interventions: PT Consult for mobility concerns, OT consult for ADLs    Mentation Interventions: Bed/chair exit alarm, Adequate sleep, hydration, pain control, Door open when patient unattended, Room close to nurse's station    Medication Interventions: Teach patient to arise slowly, Patient to call before getting OOB    Elimination Interventions: Call light in reach, Patient to call for help with toileting needs    History of Falls Interventions: Bed/chair exit alarm, Room close to nurse's station, Investigate reason for fall

## 2020-01-23 NOTE — ROUTINE PROCESS
Bedside shift change report given to John Paul Jaquez (oncoming nurse) by Nikole Phillips (offgoing nurse). Report included the following information SBAR, Kardex and MAR.

## 2020-01-23 NOTE — PROGRESS NOTES
Chart reviewed and PT treatment attempted at [phone]. Pt sleeping soundly and requests to rest upon being awaken. Will continue to follow up as schedule allows. Thank you. Harika Hopkins

## 2020-01-23 NOTE — PROGRESS NOTES
Met with patient at bedside. Awaiting surgery early next week. Plan remains SNF after surgery.     Kris Morales RN BSN  Care Manager  [phone]

## 2020-01-23 NOTE — PROGRESS NOTES
Hematology/Medical Oncology Progress Note             Name: Faith Caballero   : 1940   MRN: [id number]   Date: 2020 8:13 AM     [x]I have reviewed the flowsheet and previous days notes. Events overnight reviewed and discussed with nursing staff. Vital signs and records reviewed. 18-year-old -American female past medical history of severe PVD, chronic anemia,uncontrolled diabetes mellitus type 2 now right lower limb ischemia and dry gangrene. S/p R Fem-pop bypass. In addition patient is Confucianist and refuses blood transfusion. Presently receiving Retacrit 21,000 units X 7 days per vascular surgery will need further surgery. Pt voices no new c/o today. ROS:  Constitutional: Positive for fatigue. Negative for fever. HENT: Negative for nosebleeds, congestion, facial swelling, mouth sores, trouble swallowing, neck pain, neck stiffness, voice change and postnasal drip. Eyes: Negative for photophobia, pain, discharge and itching. Respiratory: Negative for apnea, cough, choking, chest tightness, wheezing and stridor. Cardiovascular: Negative for chest pain, palpitations and leg swelling. Gastrointestinal: Negative for abdominal pain. Negative for nausea, diarrhea, constipation, blood in stool and rectal pain. Genitourinary: Negative for dysuria, urgency, hematuria, flank pain and difficulty urinating. Musculoskeletal:Positive for right foot/leg pain. Skin: Positive for right foot gangrene. Neurological:  Negative for dizziness, facial asymmetry, speech difficulty, light-headedness and headaches. Hematological: Negative for adenopathy. Does not bruise/bleed easily. Psychiatric/Behavioral: Negative for hallucinations,confusion.     Vital Signs:    Visit Vitals  /77 (BP 1 Location: Right arm, BP Patient Position: At rest)   Pulse 78   Temp 98.4 °F (36.9 °C)   Resp 20   Ht 5' 5\" (1.651 m)   Wt 71.9 kg (158 lb 9.6 oz)   SpO2 95%   Breastfeeding No   BMI 26.39 kg/m²       O2 Device: Nasal cannula   O2 Flow Rate (L/min): 2 l/min   Temp (24hrs), Av.7 °F (36.5 °C), Min:97 °F (36.1 °C), Max:98.4 °F (36.9 °C)       Intake/Output:   Last shift:      No intake/output data recorded. Last 3 shifts:  1901 -  0700  In: 1080 [P.O.:1080]  Out: 1025 [Urine:1025]    Intake/Output Summary (Last 24 hours) at 2020 0850  Last data filed at 2020 1822  Gross per 24 hour   Intake 600 ml   Output 125 ml   Net 475 ml       Physical Exam:  General: Appears comfortable, NAD. HEENT:  Anicteric sclerae; pink palpebral conjunctivae; mucosa moist  Resp:  Symmetrical chest expansion, no accessory muscle use; good airway entry; no rales/ wheezing/ rhonchi noted  CV:  S1, S2 present; regular rate and rhythm  GI:  Abdomen soft, non-tender; (+) active bowel sounds  Extremities:  S/p R Fem-pop bypass: R foot dry gangrene.   Skin:  Warm, right foot gangrenous  Neurologic:  Non-focal         DATA:   Current Facility-Administered Medications   Medication Dose Route Frequency    amLODIPine (NORVASC) tablet 10 mg  10 mg Oral DAILY    metoprolol tartrate (LOPRESSOR) tablet 12.5 mg  12.5 mg Oral Q12H    ferrous sulfate tablet 325 mg  1 Tab Oral DAILY WITH BREAKFAST    hydrALAZINE (APRESOLINE) 20 mg/mL injection 10 mg  10 mg IntraVENous Q6H PRN    bisacodyL (DULCOLAX) tablet 10 mg  10 mg Oral DAILY PRN    docusate sodium (COLACE) capsule 100 mg  100 mg Oral BID    0.9% sodium chloride infusion 250 mL  250 mL IntraVENous PRN    epoetin bipin-epbx (RETACRIT) 21,000 Units  21,000 Units SubCUTAneous QHS    tamsulosin (FLOMAX) capsule 0.4 mg  0.4 mg Oral DAILY    HYDROmorphone (DILAUDID) syringe 0.5 mg  0.5 mg IntraVENous Q2H PRN    dextrose (D25W) 25% injection 10 mL  2.5 g IntraVENous PRN    insulin lispro (HUMALOG) injection   SubCUTAneous AC&HS    famotidine (PEPCID) tablet 20 mg  20 mg Oral DAILY    insulin glargine (LANTUS) injection 28 Units  28 Units SubCUTAneous DAILY  bisacodyl (DULCOLAX) suppository 10 mg  10 mg Rectal DAILY PRN    polyethylene glycol (MIRALAX) packet 17 g  17 g Oral DAILY    aspirin chewable tablet 81 mg  81 mg Oral DAILY    oxyCODONE-acetaminophen (PERCOCET) 5-325 mg per tablet 1-2 Tab  1-2 Tab Oral Q6H PRN    dextrose 10% infusion 125-250 mL  125-250 mL IntraVENous PRN    heparin (porcine) injection 5,000 Units  5,000 Units SubCUTAneous Q8H    glucose chewable tablet 16 g  4 Tab Oral PRN    glucagon (GLUCAGEN) injection 1 mg  1 mg IntraMUSCular PRN                    Labs:  Recent Labs     01/23/20  0522 01/22/20  0536 01/21/20  1126   WBC 8.6 9.3 7.6   HGB 7.9* 7.8* 7.1*   HCT 25.6* 24.4* 22.2*    401 353     No results for input(s): NA, K, CL, CO2, GLU, BUN, CREA, CA, MG, PHOS, ALB, TBIL, SGOT, ALT, INR, INREXT, INREXT in the last 72 hours. No results for input(s): PH, PCO2, PO2, HCO3, FIO2 in the last 72 hours. IMPRESSION:   · Anemia of chronic illness  · PVD  · Diabetes type 2  · Acute renal failure        · PLAN:  · H/H is 7.9/25.6: Pt is Jehovahs witness-no PRBC transfusion. Reviewed creatinine 1.37. Continue Retacrit 21,000 units daily X 7 days. Apparently, the patient has lost IV site and has only received once dose of IV venofer. Pt now has  PICC line and we will continue her Venofer X 6 additional doses. We will hold Ferrous sulfate 325 mg p.o until venofer has been completed. Iron low at 22,TIBC 116, iron % sat 19, ferritin 100. B12 897, folate >20.0. · Recommend limiting lab draws. · Tentative plan :Vascular surgery will proceed with amputation surgery whenever patient is medically stable.   Tentative plan is for this Friday if hemoglobin is >8g/dl     ·  ·        The patient is: [x] acutely ill Risk of deterioration: [] moderate    [] critically ill  [] high         My assessment/plan was discussed with:  [x]nursing []PT/OT    []respiratory therapy [x]Dr.Lloyd Chantal Gallo MD      []family []       Americo Sifuentes, NP

## 2020-01-23 NOTE — PROGRESS NOTES
Hospitalist Progress Note    Patient: Abigail Aparicio Age: 79 y.o. : 1940 MR#: [id number] SSN: [id number]  Date/Time: 2020     DOA: 2020  PCP: Eleni Garcia MD    Subjective:       Patient feeling fine, denies any pain in her leg. She is very eager and wants to know when will be the amputation done. She denies any chest pain or shortness of breath or chest tightness. Interval Hospital Course:  79 y.o female with prolonged hospitalization since her admission 5/10/5716 for metabolic encephalopathy related to HHS, acute pancreatitis, EFRAIN. She has recovered from [facility], pancreatitis/EFRAIN but developed worsened ischemia of her right foot due to right lower extremity PAD with gangrene, CTA with multifocal stenosis of right lower extremity. She underwent angio with balloon angioplasty and stent placement at origin of common iliac (2020). She underwent right femoral to above-knee popliteal bypass (2020). Podiatry consulted and followed but now recommended amputation. She has been waiting for her Hgb/Hct to improved to level where it will be safe for her to surgery without needing transfusion. Hematology has been consulted.        Assessment/Plan:       1)  Right leg PAD with gangrene, associate with uncontrolled DM2      - CTA with multifocal stenosis of right lower extremity      - s/p angio with balloon angioplasty and stent orf right common iliac artery 2020      - right Fem-Pop bypass 2020       VASCULAR TO FOLLOW UP FOR right AKA when Hgb >8   2)  Hyperglycemia with Type 2 diabetes, stable       - admitted with Hyperosmolar non-ketotic state in type 2 diabetes mellitus, resolved    3)  Acute on chronic renal failure Stage 3, resolved   4)  Metabolic acidosis due to renal issues, resolved  5)  Acute metabolic encephalopathy, improved  6)  Acute pancreatitis, resolved, GI recommended CT abd in 8 weeks   7)  Hypertension, tolerated bblocker   8)  Hypophosphatemia, replaced   9)  Normocytic anemia of chronic disease, recently decreased. Not amenable for transfusion due to Anglican  10)  Urinary retention, gutierrez out. Resolved     Plan  Vascular surgery input noted, plan for amputation early next week. Surgery wants hemoglobin to be > 8 and close to 10. Given patient is a Voodoo, surgery is been delayed to next week. Will limit blood draws, continue Epogen and monitor H&H every other day. Pending right AKA if Hgb level improved. No signs of infection, monitor off antibiotics  Increase Lantus and ISS   Cont ASA, flomax, amlodipine and metoprolol. Full code     Discussed with the patient and son Saray Mcmahan over the phone [phone], explained details about the plan for surgery, anticipated blood loss during the surgery and given patient is Voodoo, surgery will be possible early next week once her hemoglobin more than 8. Son understood and agree with my plan.     Case discussed with:  [x]Patient  [x]Family  [x]Nursing  [x]Case Management  DVT Prophylaxis:  []Lovenox  []Hep SQ  [x]SCDs  []Coumadin   []On Heparin gtt    Signed By: Joe Campbell MD     2020               Objective:   VS:   Visit Vitals  /54 (BP 1 Location: Right arm, BP Patient Position: At rest)   Pulse 75   Temp 98.1 °F (36.7 °C)   Resp 18   Ht 5' 5\" (1.651 m)   Wt 71.9 kg (158 lb 9.6 oz)   SpO2 97%   Breastfeeding No   BMI 26.39 kg/m²      Tmax/24hrs: Temp (24hrs), Av.9 °F (36.6 °C), Min:97 °F (36.1 °C), Max:98.4 °F (36.9 °C)      Intake/Output Summary (Last 24 hours) at 2020 1538  Last data filed at 2020 1822  Gross per 24 hour   Intake 280 ml   Output    Net 280 ml       General:  Cooperative, Not in acute distress, speaks in full sentence while in bed  HEENT: PERRL, EOMI, supple neck, no JVD, dry oral mucosa  Cardiovascular: S1S2 regular, no rub/gallop   Pulmonary: Clear air entry bilaterally, no wheezing, no crackle  GI:  Soft, non tender, non distended, +bs, no guarding   Extremities:  +right pedal edema, +distal pulses appreciated   Right foot necrosis/gangrene   Neuro: AOx3, moving all extremities, no gross deficit.      Additional:       Current Facility-Administered Medications   Medication Dose Route Frequency    iron sucrose (VENOFER) 300 mg in 0.9% sodium chloride 250 mL IVPB  300 mg IntraVENous Q3DAYS    [START ON 1/24/2020] insulin glargine (LANTUS) injection 32 Units  32 Units SubCUTAneous DAILY    insulin glargine (LANTUS) injection 4 Units  4 Units SubCUTAneous NOW    amLODIPine (NORVASC) tablet 10 mg  10 mg Oral DAILY    metoprolol tartrate (LOPRESSOR) tablet 12.5 mg  12.5 mg Oral Q12H    ferrous sulfate tablet 325 mg  1 Tab Oral DAILY WITH BREAKFAST    hydrALAZINE (APRESOLINE) 20 mg/mL injection 10 mg  10 mg IntraVENous Q6H PRN    bisacodyL (DULCOLAX) tablet 10 mg  10 mg Oral DAILY PRN    docusate sodium (COLACE) capsule 100 mg  100 mg Oral BID    0.9% sodium chloride infusion 250 mL  250 mL IntraVENous PRN    epoetin bipin-epbx (RETACRIT) 21,000 Units  21,000 Units SubCUTAneous QHS    tamsulosin (FLOMAX) capsule 0.4 mg  0.4 mg Oral DAILY    dextrose (D25W) 25% injection 10 mL  2.5 g IntraVENous PRN    insulin lispro (HUMALOG) injection   SubCUTAneous AC&HS    famotidine (PEPCID) tablet 20 mg  20 mg Oral DAILY    bisacodyl (DULCOLAX) suppository 10 mg  10 mg Rectal DAILY PRN    polyethylene glycol (MIRALAX) packet 17 g  17 g Oral DAILY    aspirin chewable tablet 81 mg  81 mg Oral DAILY    oxyCODONE-acetaminophen (PERCOCET) 5-325 mg per tablet 1-2 Tab  1-2 Tab Oral Q6H PRN    dextrose 10% infusion 125-250 mL  125-250 mL IntraVENous PRN    heparin (porcine) injection 5,000 Units  5,000 Units SubCUTAneous Q8H    glucose chewable tablet 16 g  4 Tab Oral PRN    glucagon (GLUCAGEN) injection 1 mg  1 mg IntraMUSCular PRN            Lab/Data Review:  Labs: Results:       Chemistry No results for input(s): GLU, NA, K, CL, CO2, BUN, CREA, BUCR, AGAP, CA, PHOS in the last 72 hours. No results for input(s): TBIL, ALT, SGOT, ALKP, TP, ALB, GLOB, AGRAT in the last 72 hours. CBC w/Diff Recent Labs     01/23/20  0522 01/22/20  0536 01/21/20  1126   WBC 8.6 9.3 7.6   RBC 2.49* 2.43* 2.20*   HGB 7.9* 7.8* 7.1*   HCT 25.6* 24.4* 22.2*   .8* 100.4* 100.9*   MCH 31.7 32.1 32.3   MCHC 30.9* 32.0 32.0   RDW 14.6* 14.1 14.1    401 353   GRANS 68 68 76*   LYMPH 19* 21 14*   EOS 3 3 2      Coagulation No results for input(s): PTP, INR, APTT, INREXT, INREXT in the last 72 hours. Iron/Ferritin Lab Results   Component Value Date/Time    Iron 22 (L) 01/19/2020 04:05 AM    TIBC 116 (L) 01/19/2020 04:05 AM    Iron % saturation 19 01/19/2020 04:05 AM    Ferritin 100 01/15/2020 03:33 AM       BNP    Cardiac Enzymes Lab Results   Component Value Date/Time    Troponin-I, QT <0.02 01/02/2020 11:25 PM        Lactic Acid    Thyroid Studies          All Micro Results     Procedure Component Value Units Date/Time    CULTURE, BLOOD [[id number]] Collected:  01/02/20 2206    Order Status:  Completed Specimen:  Blood Updated:  01/08/20 0642     Special Requests: NO SPECIAL REQUESTS        Culture result: NO GROWTH 6 DAYS       CULTURE, BLOOD [[id number]] Collected:  01/02/20 2206    Order Status:  Completed Specimen:  Blood Updated:  01/08/20 8208     Special Requests: NO SPECIAL REQUESTS        Culture result: NO GROWTH 6 DAYS               Images:    CT (Most Recent). CT Results (most recent):  Results from Hospital Encounter encounter on 01/02/20   CTA ABD ART W RUNOFF W WO CONT    Narrative PROCEDURE: CTA of abdomen and pelvis and bilateral lower extremity runoff with  intravenous contrast administration. HISTORY: PAD, ischemia of the right foot. TECHNIQUE: Multidetector helical CT angiography was carried out from low chest  through the feet following dynamic 70 cc Isovue-300 intravenous contrast  administration .   Scan timing was performed using automated bolus tracking/SMART  Prep. 3-D and MPR angiographic image reconstruction was performed on  independent workstation and separately reviewed. Parenchymal imaging is limited  by the arterial phase of contrast administration. All CT scans at this facility  are performed using dose optimization techniques as appropriate to a performed  exam, to include automated exposure control, adjustment of the mA and/or kV  according to patient's size (including appropriate matching for site specific  examinations), or use of iterative reconstruction technique. COMPARISON: CT abdomen/pelvis 1/3/2020. CTA abdomen/pelvis with extremity runoff  7/18/2017. FINDINGS:    VASCULAR FINDINGS:    Right lower extremity:  Multifocal stenosis throughout the right posterior tibialis artery due to  atherosclerotic calcifications as well as the peroneal artery.  -The right posterior tibialis artery is not opacified beyond the mid right lower  leg.  -The anterior tibial artery is not opacified beyond the right lower leg just  above the tibial plafond.  -The peroneal artery is opacified beyond the level of the tibial plafond. Proximally, there is multifocal stenosis of the right femoral artery with loss  of opacification at the mid to distal thigh. Reconstitution of opacification at  the level of the posterior tibialis artery likely due to collaterals from  geniculate arteries and the deep femoral artery. 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  -Left lower leg perfusion is contributed by collaterals from the deep femoral  artery and geniculate arteries. Multifocal stenosis/occlusion of the left  posterior tibialis artery and left peroneal artery. No opacification of the  peroneal artery or posterior tibialis artery is seen to be on the mid to  proximal third of the left lower leg.  The left dorsal pedis is opacified, and  likely contributes to some additional opacified vessel seen in the left foot. Abdominal aorta:  -Severe atherosclerosis with moderate compromise of the lumen, similar to prior  exam of 7/2017.  -No evidence for abdominal aortic aneurysm. -Severe stenosis at the proximal aspect of the right common iliac artery, well  opacified distally, similar to prior exam. Multifocal moderate stenosis at the  left common iliac artery similar to prior exam.  -Multifocal stenosis at the internal iliac arteries, with good opacification  distally.  -Celiac artery, SMA, left renal artery, and right renal artery are grossly  patent. The right renal artery again shown to originate from the descending  thoracic aorta. ALFREDA is not well visualized. ABDOMEN/PELVIS FINDINGS:  Lower chest: Small right pleural effusion with overlying atelectasis. Liver: Stable subcentimeter hypodense lesions liver, too small to characterize  but stability suggests benignity    Biliary: Gallbladder is mildly distended but otherwise unremarkable. Spleen: Negative    Pancreas: There is some peripancreatic edema adjacent to the pancreatic tail. Adrenal glands: Diffuse thickening of the adrenal glands, without discrete mass,  similar to prior exam.    Kidneys: Malrotated kidneys again noted, without evidence for hydronephrosis. GI tract: The bowel appears nonobstructed. Question of mild mural thickening at  the distal esophagus. Questionable mild mural thickening at the greater  curvature of the stomach adjacent to the edema along the pancreatic tail. Colonic diverticulosis, without evidence for diverticulitis. Normal appendix. Peritoneum: No free air    Lymph nodes: No lymphadenopathy. Pelvis: Urinary bladder is unremarkable. Fibroid uterus again noted. Body wall/soft tissues: Small fat-containing umbilical hernia. Small right lower  spigelian hernia containing a small amount of fluid measuring 2.5 x 1.2 cm  (image 203), previously measured 2.9 x 1.6 cm.  Mild edema along the right flank.    Bones:  -Bilateral moderate knee osteoarthrosis. Small right knee joint effusion and  trace left knee joint effusion.  -Diffuse soft tissue swelling at the feet, right greater than left. Trace  subcutaneous emphysema adjacent to the right fifth MTP joint. Left distal tibial  and talar hardware noted. -Multilevel degenerative spondylosis and disc disease noted, not well evaluated  on current exam.  -Moderate degenerative change at the hips      Impression IMPRESSION:  1. Right lower extremity:  Multifocal stenosis throughout the right posterior tibialis artery due to  atherosclerotic calcifications as well as the peroneal artery.  -The right posterior tibialis artery is not opacified beyond the mid right lower  leg.  -The anterior tibial artery is not opacified beyond the right lower leg just  above the tibial plafond.  -The peroneal artery is opacified beyond the level of the tibial plafond. Proximally, there is multifocal stenosis of the right femoral artery with loss  of opacification at the mid to distal thigh. Reconstitution of opacification at  the level of the posterior tibialis artery likely due to collaterals from  geniculate arteries and the deep femoral artery. 2. Diffuse soft tissue swelling at bilateral feet, right greater than left. -Suggestion of trace air along the right fifth MTP joint, could be degenerative  but infectious process not excluded. Consider dedicated right foot x-ray for  further evaluation. 3. Chronic multilevel stenosis of the aorta, iliac arteries, and left lower  extremity as described. 4. Peripancreatic edema along the pancreatic tail is increased since 1/3/2020.  -Associated adjacent mild presumed reactive mural thickening of the greater  curvature of the stomach. 5. Small right pleural effusion. 6. Suggestion of mild mural thickening of the distal esophagus, may indicate  nonspecific esophagitis.     7. Bilateral knee osteoarthrosis with small right knee joint effusion and trace  left knee joint effusion. 8. Fibroid uterus. 9. Colonic diverticulosis, without evidence for diverticulitis. 10. Additional nonacute findings are as described. XRAY (Most Recent)      EKG No results found for this or any previous visit.      2D ECHO

## 2020-01-23 NOTE — PROGRESS NOTES
RENAL DAILY PROGRESS NOTE    Patient: Noelle Bello               Sex: female          DOA: 1/2/2020  7:37 PM        YOB: 1940      Age:  79 y.o.        LOS:  LOS: 20 days     Subjective:     Noelle Bello is a 79 y.o.  who presents with Pancreatitis [K85.90]  Hyperosmolar hyperglycemic coma due to diabetes mellitus without ketoacidosis ([facility] [E11.01]  Hyperosmolar non-ketotic state in patient with type 2 diabetes mellitus ([facility] [E11.00]. Asked to evaluate for acute/crf. admitted with hyperglycemia  Chief complains: Patient denies nausea, vomiting, chest pain, dizziness, shortness of breath or headache.  - Reviewed last 24 hrs events     Current Facility-Administered Medications   Medication Dose Route Frequency    iron sucrose (VENOFER) 300 mg in 0.9% sodium chloride 250 mL IVPB  300 mg IntraVENous Q24H    amLODIPine (NORVASC) tablet 10 mg  10 mg Oral DAILY    metoprolol tartrate (LOPRESSOR) tablet 12.5 mg  12.5 mg Oral Q12H    ferrous sulfate tablet 325 mg  1 Tab Oral DAILY WITH BREAKFAST    hydrALAZINE (APRESOLINE) 20 mg/mL injection 10 mg  10 mg IntraVENous Q6H PRN    bisacodyL (DULCOLAX) tablet 10 mg  10 mg Oral DAILY PRN    docusate sodium (COLACE) capsule 100 mg  100 mg Oral BID    0.9% sodium chloride infusion 250 mL  250 mL IntraVENous PRN    epoetin bipin-epbx (RETACRIT) 21,000 Units  21,000 Units SubCUTAneous QHS    tamsulosin (FLOMAX) capsule 0.4 mg  0.4 mg Oral DAILY    HYDROmorphone (DILAUDID) syringe 0.5 mg  0.5 mg IntraVENous Q2H PRN    dextrose (D25W) 25% injection 10 mL  2.5 g IntraVENous PRN    insulin lispro (HUMALOG) injection   SubCUTAneous AC&HS    famotidine (PEPCID) tablet 20 mg  20 mg Oral DAILY    insulin glargine (LANTUS) injection 28 Units  28 Units SubCUTAneous DAILY    bisacodyl (DULCOLAX) suppository 10 mg  10 mg Rectal DAILY PRN    polyethylene glycol (MIRALAX) packet 17 g  17 g Oral DAILY    aspirin chewable tablet 81 mg  81 mg Oral DAILY    oxyCODONE-acetaminophen (PERCOCET) 5-325 mg per tablet 1-2 Tab  1-2 Tab Oral Q6H PRN    dextrose 10% infusion 125-250 mL  125-250 mL IntraVENous PRN    heparin (porcine) injection 5,000 Units  5,000 Units SubCUTAneous Q8H    glucose chewable tablet 16 g  4 Tab Oral PRN    glucagon (GLUCAGEN) injection 1 mg  1 mg IntraMUSCular PRN       Objective:     Visit Vitals  /54 (BP 1 Location: Right arm, BP Patient Position: At rest)   Pulse 75   Temp 98.1 °F (36.7 °C)   Resp 18   Ht 5' 5\" (1.651 m)   Wt 71.9 kg (158 lb 9.6 oz)   SpO2 97%   Breastfeeding No   BMI 26.39 kg/m²       Intake/Output Summary (Last 24 hours) at 1/23/2020 1109  Last data filed at 1/22/2020 1822  Gross per 24 hour   Intake 280 ml   Output    Net 280 ml       Physical Examination:     RS: Chest is bilateral equal, no wheezing / rales / crackles  CVS: S1-S2 heard, RRR, No S3 / murmur  Abdomen: Soft, Non tender, Not distended, Positive bowel sounds, no organomegaly, no CVA / supra pubic tenderness  Extremities gangrene r foot  CNS: Awake   HEENT: Head is atraumatic, PERRLA, conjunctiva pink & non icteric. No JVD or carotid bruit        Data Review:      Labs:     Hematology:   Recent Labs     01/23/20  0522 01/22/20  0536 01/21/20  1126   WBC 8.6 9.3 7.6   HGB 7.9* 7.8* 7.1*   HCT 25.6* 24.4* 22.2*     Chemistry:   No results for input(s): BUN, CREA, CA, ALB, K, NA, CL, CO2, PHOS, GLU in the last 72 hours. Images:    XR (Most Recent). CXR reviewed by me and compared with previous CXR Results from Hospital Encounter encounter on 01/02/20   XR FOOT RT MIN 3 V    Narrative EXAM:  XR FOOT RT MIN 3 V    INDICATION:   report of air in right fifth MTP join    COMPARISON:  None. FINDINGS:  3 views of the right foot demonstrate limited study, the patient has  cooperation issues with positioning the foot. Considerable hammertoe deformity  of all the toes noted. The foot is held in extension at the ankle.  There is no  obvious fracture or dislocation. Small plantar calcaneal spur noted. Impression IMPRESSION: Positioning issues, no acute abnormality identified. No bone  destruction or soft tissue air        CT (Most Recent) Results from Hospital Encounter encounter on 01/02/20   CTA ABD ART W RUNOFF W WO CONT    Narrative PROCEDURE: CTA of abdomen and pelvis and bilateral lower extremity runoff with  intravenous contrast administration. HISTORY: PAD, ischemia of the right foot. TECHNIQUE: Multidetector helical CT angiography was carried out from low chest  through the feet following dynamic 70 cc Isovue-300 intravenous contrast  administration . Scan timing was performed using automated bolus tracking/SMART  Prep. 3-D and MPR angiographic image reconstruction was performed on  independent workstation and separately reviewed. Parenchymal imaging is limited  by the arterial phase of contrast administration. All CT scans at this facility  are performed using dose optimization techniques as appropriate to a performed  exam, to include automated exposure control, adjustment of the mA and/or kV  according to patient's size (including appropriate matching for site specific  examinations), or use of iterative reconstruction technique. COMPARISON: CT abdomen/pelvis 1/3/2020. CTA abdomen/pelvis with extremity runoff  7/18/2017. FINDINGS:    VASCULAR FINDINGS:    Right lower extremity:  Multifocal stenosis throughout the right posterior tibialis artery due to  atherosclerotic calcifications as well as the peroneal artery.  -The right posterior tibialis artery is not opacified beyond the mid right lower  leg.  -The anterior tibial artery is not opacified beyond the right lower leg just  above the tibial plafond.  -The peroneal artery is opacified beyond the level of the tibial plafond. Proximally, there is multifocal stenosis of the right femoral artery with loss  of opacification at the mid to distal thigh.  Reconstitution of opacification at  the level of the posterior tibialis artery likely due to collaterals from  geniculate arteries and the deep femoral artery. 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  -Left lower leg perfusion is contributed by collaterals from the deep femoral  artery and geniculate arteries. Multifocal stenosis/occlusion of the left  posterior tibialis artery and left peroneal artery. No opacification of the  peroneal artery or posterior tibialis artery is seen to be on the mid to  proximal third of the left lower leg. The left dorsal pedis is opacified, and  likely contributes to some additional opacified vessel seen in the left foot. Abdominal aorta:  -Severe atherosclerosis with moderate compromise of the lumen, similar to prior  exam of 7/2017.  -No evidence for abdominal aortic aneurysm. -Severe stenosis at the proximal aspect of the right common iliac artery, well  opacified distally, similar to prior exam. Multifocal moderate stenosis at the  left common iliac artery similar to prior exam.  -Multifocal stenosis at the internal iliac arteries, with good opacification  distally.  -Celiac artery, SMA, left renal artery, and right renal artery are grossly  patent. The right renal artery again shown to originate from the descending  thoracic aorta. ALFREDA is not well visualized. ABDOMEN/PELVIS FINDINGS:  Lower chest: Small right pleural effusion with overlying atelectasis. Liver: Stable subcentimeter hypodense lesions liver, too small to characterize  but stability suggests benignity    Biliary: Gallbladder is mildly distended but otherwise unremarkable. Spleen: Negative    Pancreas: There is some peripancreatic edema adjacent to the pancreatic tail. Adrenal glands: Diffuse thickening of the adrenal glands, without discrete mass,  similar to prior exam.    Kidneys: Malrotated kidneys again noted, without evidence for hydronephrosis. GI tract:  The bowel appears nonobstructed. Question of mild mural thickening at  the distal esophagus. Questionable mild mural thickening at the greater  curvature of the stomach adjacent to the edema along the pancreatic tail. Colonic diverticulosis, without evidence for diverticulitis. Normal appendix. Peritoneum: No free air    Lymph nodes: No lymphadenopathy. Pelvis: Urinary bladder is unremarkable. Fibroid uterus again noted. Body wall/soft tissues: Small fat-containing umbilical hernia. Small right lower  spigelian hernia containing a small amount of fluid measuring 2.5 x 1.2 cm  (image 203), previously measured 2.9 x 1.6 cm. Mild edema along the right flank. Bones:  -Bilateral moderate knee osteoarthrosis. Small right knee joint effusion and  trace left knee joint effusion.  -Diffuse soft tissue swelling at the feet, right greater than left. Trace  subcutaneous emphysema adjacent to the right fifth MTP joint. Left distal tibial  and talar hardware noted. -Multilevel degenerative spondylosis and disc disease noted, not well evaluated  on current exam.  -Moderate degenerative change at the hips      Impression IMPRESSION:  1. Right lower extremity:  Multifocal stenosis throughout the right posterior tibialis artery due to  atherosclerotic calcifications as well as the peroneal artery.  -The right posterior tibialis artery is not opacified beyond the mid right lower  leg.  -The anterior tibial artery is not opacified beyond the right lower leg just  above the tibial plafond.  -The peroneal artery is opacified beyond the level of the tibial plafond. Proximally, there is multifocal stenosis of the right femoral artery with loss  of opacification at the mid to distal thigh. Reconstitution of opacification at  the level of the posterior tibialis artery likely due to collaterals from  geniculate arteries and the deep femoral artery.     2. Diffuse soft tissue swelling at bilateral feet, right greater than left.  -Suggestion of trace air along the right fifth MTP joint, could be degenerative  but infectious process not excluded. Consider dedicated right foot x-ray for  further evaluation. 3. Chronic multilevel stenosis of the aorta, iliac arteries, and left lower  extremity as described. 4. Peripancreatic edema along the pancreatic tail is increased since 1/3/2020.  -Associated adjacent mild presumed reactive mural thickening of the greater  curvature of the stomach. 5. Small right pleural effusion. 6. Suggestion of mild mural thickening of the distal esophagus, may indicate  nonspecific esophagitis. 7. Bilateral knee osteoarthrosis with small right knee joint effusion and trace  left knee joint effusion. 8. Fibroid uterus. 9. Colonic diverticulosis, without evidence for diverticulitis. 10. Additional nonacute findings are as described. EKG No results found for this or any previous visit. I have personally reviewed the old medical records and patient's labs    Plan / Recommendation:      1. Acute/crf stage 3.back to baseline. recheck bmp  2. pvd,gangren r foot,for aka when hgb improves  3.anemia,on epo. refused blood transfusion. suggest to decrease venofer to every other day for total of 1 g    D/w dr Edmond Landau, MD  Nephrology  1/23/2020

## 2020-01-23 NOTE — ROUTINE PROCESS
Bedside and Verbal shift change report given to Eric Joshi RN (oncoming nurse) by Chris Junior RN 
 (offgoing nurse). Report included the following information SBAR, Kardex, Intake/Output and MAR.

## 2020-01-24 LAB
ANION GAP SERPL CALC-SCNC: 6 MMOL/L (ref 3–18)
BASOPHILS # BLD: 0 K/UL (ref 0–0.1)
BASOPHILS NFR BLD: 0 % (ref 0–2)
BUN SERPL-MCNC: 22 MG/DL (ref 7–18)
BUN/CREAT SERPL: 16 (ref 12–20)
CALCIUM SERPL-MCNC: 9.4 MG/DL (ref 8.5–10.1)
CHLORIDE SERPL-SCNC: 109 MMOL/L (ref 100–111)
CO2 SERPL-SCNC: 26 MMOL/L (ref 21–32)
CREAT SERPL-MCNC: 1.36 MG/DL (ref 0.6–1.3)
DIFFERENTIAL METHOD BLD: ABNORMAL
EOSINOPHIL # BLD: 0.2 K/UL (ref 0–0.4)
EOSINOPHIL NFR BLD: 2 % (ref 0–5)
ERYTHROCYTE [DISTWIDTH] IN BLOOD BY AUTOMATED COUNT: 14.9 % (ref 11.6–14.5)
GLUCOSE BLD STRIP.AUTO-MCNC: 161 MG/DL (ref 70–110)
GLUCOSE BLD STRIP.AUTO-MCNC: 272 MG/DL (ref 70–110)
GLUCOSE BLD STRIP.AUTO-MCNC: 90 MG/DL (ref 70–110)
GLUCOSE SERPL-MCNC: 77 MG/DL (ref 74–99)
HCT VFR BLD AUTO: 23 % (ref 35–45)
HGB BLD-MCNC: 7.3 G/DL (ref 12–16)
LYMPHOCYTES # BLD: 1.6 K/UL (ref 0.9–3.6)
LYMPHOCYTES NFR BLD: 19 % (ref 21–52)
MAGNESIUM SERPL-MCNC: 2 MG/DL (ref 1.6–2.6)
MCH RBC QN AUTO: 32.3 PG (ref 24–34)
MCHC RBC AUTO-ENTMCNC: 31.7 G/DL (ref 31–37)
MCV RBC AUTO: 101.8 FL (ref 74–97)
MONOCYTES # BLD: 0.9 K/UL (ref 0.05–1.2)
MONOCYTES NFR BLD: 11 % (ref 3–10)
NEUTS SEG # BLD: 5.6 K/UL (ref 1.8–8)
NEUTS SEG NFR BLD: 68 % (ref 40–73)
PHOSPHATE SERPL-MCNC: 2.1 MG/DL (ref 2.5–4.9)
PLATELET # BLD AUTO: 386 K/UL (ref 135–420)
PMV BLD AUTO: 10 FL (ref 9.2–11.8)
POTASSIUM SERPL-SCNC: 4.6 MMOL/L (ref 3.5–5.5)
RBC # BLD AUTO: 2.26 M/UL (ref 4.2–5.3)
SODIUM SERPL-SCNC: 141 MMOL/L (ref 136–145)
WBC # BLD AUTO: 8.3 K/UL (ref 4.6–13.2)

## 2020-01-24 PROCEDURE — 83735 ASSAY OF MAGNESIUM: CPT

## 2020-01-24 PROCEDURE — 74011250636 HC RX REV CODE- 250/636: Performed by: PHYSICIAN ASSISTANT

## 2020-01-24 PROCEDURE — 74011636637 HC RX REV CODE- 636/637: Performed by: HOSPITALIST

## 2020-01-24 PROCEDURE — 77010033678 HC OXYGEN DAILY: Performed by: HOSPITALIST

## 2020-01-24 PROCEDURE — 74011250636 HC RX REV CODE- 250/636: Performed by: HOSPITALIST

## 2020-01-24 PROCEDURE — 82962 GLUCOSE BLOOD TEST: CPT

## 2020-01-24 PROCEDURE — 80048 BASIC METABOLIC PNL TOTAL CA: CPT

## 2020-01-24 PROCEDURE — 74011250637 HC RX REV CODE- 250/637: Performed by: EMERGENCY MEDICINE

## 2020-01-24 PROCEDURE — 65660000000 HC RM CCU STEPDOWN

## 2020-01-24 PROCEDURE — 84100 ASSAY OF PHOSPHORUS: CPT

## 2020-01-24 PROCEDURE — 74011250636 HC RX REV CODE- 250/636: Performed by: SURGERY

## 2020-01-24 PROCEDURE — 85025 COMPLETE CBC W/AUTO DIFF WBC: CPT

## 2020-01-24 PROCEDURE — 74011636637 HC RX REV CODE- 636/637: Performed by: INTERNAL MEDICINE

## 2020-01-24 PROCEDURE — 74011250637 HC RX REV CODE- 250/637: Performed by: HOSPITALIST

## 2020-01-24 PROCEDURE — 74011250637 HC RX REV CODE- 250/637: Performed by: NURSE PRACTITIONER

## 2020-01-24 PROCEDURE — 77030038269 HC DRN EXT URIN PURWCK BARD -A

## 2020-01-24 PROCEDURE — 74011250637 HC RX REV CODE- 250/637: Performed by: INTERNAL MEDICINE

## 2020-01-24 RX ORDER — ACETAMINOPHEN 325 MG/1
650 TABLET ORAL
Status: DISCONTINUED | OUTPATIENT
Start: 2020-01-24 | End: 2020-02-04 | Stop reason: HOSPADM

## 2020-01-24 RX ORDER — ONDANSETRON 2 MG/ML
4 INJECTION INTRAMUSCULAR; INTRAVENOUS
Status: DISCONTINUED | OUTPATIENT
Start: 2020-01-24 | End: 2020-02-04 | Stop reason: HOSPADM

## 2020-01-24 RX ADMIN — OXYCODONE HYDROCHLORIDE AND ACETAMINOPHEN 2 TABLET: 5; 325 TABLET ORAL at 06:03

## 2020-01-24 RX ADMIN — TAMSULOSIN HYDROCHLORIDE 0.4 MG: 0.4 CAPSULE ORAL at 10:04

## 2020-01-24 RX ADMIN — OXYCODONE HYDROCHLORIDE AND ACETAMINOPHEN 1 TABLET: 5; 325 TABLET ORAL at 18:54

## 2020-01-24 RX ADMIN — HEPARIN SODIUM 5000 UNITS: 5000 INJECTION INTRAVENOUS; SUBCUTANEOUS at 22:03

## 2020-01-24 RX ADMIN — Medication 325 MG: at 10:04

## 2020-01-24 RX ADMIN — Medication 81 MG: at 10:04

## 2020-01-24 RX ADMIN — AMLODIPINE BESYLATE 10 MG: 10 TABLET ORAL at 10:04

## 2020-01-24 RX ADMIN — ONDANSETRON 4 MG: 2 INJECTION INTRAMUSCULAR; INTRAVENOUS at 18:23

## 2020-01-24 RX ADMIN — METOPROLOL TARTRATE 12.5 MG: 25 TABLET ORAL at 10:04

## 2020-01-24 RX ADMIN — FAMOTIDINE 20 MG: 20 TABLET, FILM COATED ORAL at 10:04

## 2020-01-24 RX ADMIN — INSULIN LISPRO 6 UNITS: 100 INJECTION, SOLUTION INTRAVENOUS; SUBCUTANEOUS at 22:03

## 2020-01-24 RX ADMIN — DOCUSATE SODIUM 100 MG: 100 CAPSULE, LIQUID FILLED ORAL at 10:04

## 2020-01-24 RX ADMIN — POLYETHYLENE GLYCOL 3350 17 G: 17 POWDER, FOR SOLUTION ORAL at 10:04

## 2020-01-24 RX ADMIN — DOCUSATE SODIUM 100 MG: 100 CAPSULE, LIQUID FILLED ORAL at 18:23

## 2020-01-24 RX ADMIN — INSULIN GLARGINE 32 UNITS: 100 INJECTION, SOLUTION SUBCUTANEOUS at 10:04

## 2020-01-24 RX ADMIN — METOPROLOL TARTRATE 12.5 MG: 25 TABLET ORAL at 22:02

## 2020-01-24 RX ADMIN — EPOETIN ALFA-EPBX 21000 UNITS: 10000 INJECTION, SOLUTION INTRAVENOUS; SUBCUTANEOUS at 23:16

## 2020-01-24 RX ADMIN — OXYCODONE HYDROCHLORIDE AND ACETAMINOPHEN 2 TABLET: 5; 325 TABLET ORAL at 00:48

## 2020-01-24 RX ADMIN — HEPARIN SODIUM 5000 UNITS: 5000 INJECTION INTRAVENOUS; SUBCUTANEOUS at 14:21

## 2020-01-24 NOTE — PROGRESS NOTES
RENAL DAILY PROGRESS NOTE    Patient: Abigail Aparicio               Sex: female          DOA: 1/2/2020  7:37 PM        YOB: 1940      Age:  79 y.o.        LOS:  LOS: 21 days     Subjective:     Abigail Aparicio is a 79 y.o.  who presents with Pancreatitis [K85.90]  Hyperosmolar hyperglycemic coma due to diabetes mellitus without ketoacidosis ([facility] [E11.01]  Hyperosmolar non-ketotic state in patient with type 2 diabetes mellitus ([facility] [E11.00]. Asked to evaluate for acute/crf. admitted with hyperglycemia  Chief complains: Patient denies nausea, vomiting, chest pain, dizziness, shortness of breath or headache.  - Reviewed last 24 hrs events     Current Facility-Administered Medications   Medication Dose Route Frequency    iron sucrose (VENOFER) 300 mg in 0.9% sodium chloride 250 mL IVPB  300 mg IntraVENous Q3DAYS    insulin glargine (LANTUS) injection 32 Units  32 Units SubCUTAneous DAILY    amLODIPine (NORVASC) tablet 10 mg  10 mg Oral DAILY    metoprolol tartrate (LOPRESSOR) tablet 12.5 mg  12.5 mg Oral Q12H    hydrALAZINE (APRESOLINE) 20 mg/mL injection 10 mg  10 mg IntraVENous Q6H PRN    bisacodyL (DULCOLAX) tablet 10 mg  10 mg Oral DAILY PRN    docusate sodium (COLACE) capsule 100 mg  100 mg Oral BID    0.9% sodium chloride infusion 250 mL  250 mL IntraVENous PRN    epoetin bipin-epbx (RETACRIT) 21,000 Units  21,000 Units SubCUTAneous QHS    tamsulosin (FLOMAX) capsule 0.4 mg  0.4 mg Oral DAILY    dextrose (D25W) 25% injection 10 mL  2.5 g IntraVENous PRN    insulin lispro (HUMALOG) injection   SubCUTAneous AC&HS    famotidine (PEPCID) tablet 20 mg  20 mg Oral DAILY    bisacodyl (DULCOLAX) suppository 10 mg  10 mg Rectal DAILY PRN    polyethylene glycol (MIRALAX) packet 17 g  17 g Oral DAILY    aspirin chewable tablet 81 mg  81 mg Oral DAILY    oxyCODONE-acetaminophen (PERCOCET) 5-325 mg per tablet 1-2 Tab  1-2 Tab Oral Q6H PRN    dextrose 10% infusion 125-250 mL  125-250 mL IntraVENous PRN    heparin (porcine) injection 5,000 Units  5,000 Units SubCUTAneous Q8H    glucose chewable tablet 16 g  4 Tab Oral PRN    glucagon (GLUCAGEN) injection 1 mg  1 mg IntraMUSCular PRN       Objective:     Visit Vitals  /52 (BP 1 Location: Left arm, BP Patient Position: At rest;Lying left side)   Pulse 64   Temp 99.5 °F (37.5 °C)   Resp 20   Ht 5' 5\" (1.651 m)   Wt 71.9 kg (158 lb 9.6 oz)   SpO2 100%   Breastfeeding No   BMI 26.39 kg/m²     No intake or output data in the 24 hours ending 01/24/20 1319    Physical Examination:     RS: Chest is bilateral equal, no wheezing / rales / crackles  CVS: S1-S2 heard, RRR, No S3 / murmur  Abdomen: Soft, Non tender, Not distended, Positive bowel sounds, no organomegaly, no CVA / supra pubic tenderness  Extremities gangrene r foot  CNS: Awake   HEENT: Head is atraumatic, PERRLA, conjunctiva pink & non icteric. No JVD or carotid bruit        Data Review:      Labs:     Hematology:   Recent Labs     01/24/20  0610 01/23/20  0522 01/22/20  0536   WBC 8.3 8.6 9.3   HGB 7.3* 7.9* 7.8*   HCT 23.0* 25.6* 24.4*     Chemistry:   Recent Labs     01/24/20  0610   BUN 22*   CREA 1.36*   CA 9.4   K 4.6         CO2 26   PHOS 2.1*   GLU 77        Images:    XR (Most Recent). CXR reviewed by me and compared with previous CXR Results from Hospital Encounter encounter on 01/02/20   XR FOOT RT MIN 3 V    Narrative EXAM:  XR FOOT RT MIN 3 V    INDICATION:   report of air in right fifth MTP join    COMPARISON:  None. FINDINGS:  3 views of the right foot demonstrate limited study, the patient has  cooperation issues with positioning the foot. Considerable hammertoe deformity  of all the toes noted. The foot is held in extension at the ankle. There is no  obvious fracture or dislocation. Small plantar calcaneal spur noted. Impression IMPRESSION: Positioning issues, no acute abnormality identified.  No bone  destruction or soft tissue air CT (Most Recent) Results from Hospital Encounter encounter on 01/02/20   CTA ABD ART W RUNOFF W WO CONT    Narrative PROCEDURE: CTA of abdomen and pelvis and bilateral lower extremity runoff with  intravenous contrast administration. HISTORY: PAD, ischemia of the right foot. TECHNIQUE: Multidetector helical CT angiography was carried out from low chest  through the feet following dynamic 70 cc Isovue-300 intravenous contrast  administration . Scan timing was performed using automated bolus tracking/SMART  Prep. 3-D and MPR angiographic image reconstruction was performed on  independent workstation and separately reviewed. Parenchymal imaging is limited  by the arterial phase of contrast administration. All CT scans at this facility  are performed using dose optimization techniques as appropriate to a performed  exam, to include automated exposure control, adjustment of the mA and/or kV  according to patient's size (including appropriate matching for site specific  examinations), or use of iterative reconstruction technique. COMPARISON: CT abdomen/pelvis 1/3/2020. CTA abdomen/pelvis with extremity runoff  7/18/2017. FINDINGS:    VASCULAR FINDINGS:    Right lower extremity:  Multifocal stenosis throughout the right posterior tibialis artery due to  atherosclerotic calcifications as well as the peroneal artery.  -The right posterior tibialis artery is not opacified beyond the mid right lower  leg.  -The anterior tibial artery is not opacified beyond the right lower leg just  above the tibial plafond.  -The peroneal artery is opacified beyond the level of the tibial plafond. Proximally, there is multifocal stenosis of the right femoral artery with loss  of opacification at the mid to distal thigh. Reconstitution of opacification at  the level of the posterior tibialis artery likely due to collaterals from  geniculate arteries and the deep femoral artery.     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  -Left lower leg perfusion is contributed by collaterals from the deep femoral  artery and geniculate arteries. Multifocal stenosis/occlusion of the left  posterior tibialis artery and left peroneal artery. No opacification of the  peroneal artery or posterior tibialis artery is seen to be on the mid to  proximal third of the left lower leg. The left dorsal pedis is opacified, and  likely contributes to some additional opacified vessel seen in the left foot. Abdominal aorta:  -Severe atherosclerosis with moderate compromise of the lumen, similar to prior  exam of 7/2017.  -No evidence for abdominal aortic aneurysm. -Severe stenosis at the proximal aspect of the right common iliac artery, well  opacified distally, similar to prior exam. Multifocal moderate stenosis at the  left common iliac artery similar to prior exam.  -Multifocal stenosis at the internal iliac arteries, with good opacification  distally.  -Celiac artery, SMA, left renal artery, and right renal artery are grossly  patent. The right renal artery again shown to originate from the descending  thoracic aorta. ALFREDA is not well visualized. ABDOMEN/PELVIS FINDINGS:  Lower chest: Small right pleural effusion with overlying atelectasis. Liver: Stable subcentimeter hypodense lesions liver, too small to characterize  but stability suggests benignity    Biliary: Gallbladder is mildly distended but otherwise unremarkable. Spleen: Negative    Pancreas: There is some peripancreatic edema adjacent to the pancreatic tail. Adrenal glands: Diffuse thickening of the adrenal glands, without discrete mass,  similar to prior exam.    Kidneys: Malrotated kidneys again noted, without evidence for hydronephrosis. GI tract: The bowel appears nonobstructed. Question of mild mural thickening at  the distal esophagus.  Questionable mild mural thickening at the greater  curvature of the stomach adjacent to the edema along the pancreatic tail. Colonic diverticulosis, without evidence for diverticulitis. Normal appendix. Peritoneum: No free air    Lymph nodes: No lymphadenopathy. Pelvis: Urinary bladder is unremarkable. Fibroid uterus again noted. Body wall/soft tissues: Small fat-containing umbilical hernia. Small right lower  spigelian hernia containing a small amount of fluid measuring 2.5 x 1.2 cm  (image 203), previously measured 2.9 x 1.6 cm. Mild edema along the right flank. Bones:  -Bilateral moderate knee osteoarthrosis. Small right knee joint effusion and  trace left knee joint effusion.  -Diffuse soft tissue swelling at the feet, right greater than left. Trace  subcutaneous emphysema adjacent to the right fifth MTP joint. Left distal tibial  and talar hardware noted. -Multilevel degenerative spondylosis and disc disease noted, not well evaluated  on current exam.  -Moderate degenerative change at the hips      Impression IMPRESSION:  1. Right lower extremity:  Multifocal stenosis throughout the right posterior tibialis artery due to  atherosclerotic calcifications as well as the peroneal artery.  -The right posterior tibialis artery is not opacified beyond the mid right lower  leg.  -The anterior tibial artery is not opacified beyond the right lower leg just  above the tibial plafond.  -The peroneal artery is opacified beyond the level of the tibial plafond. Proximally, there is multifocal stenosis of the right femoral artery with loss  of opacification at the mid to distal thigh. Reconstitution of opacification at  the level of the posterior tibialis artery likely due to collaterals from  geniculate arteries and the deep femoral artery. 2. Diffuse soft tissue swelling at bilateral feet, right greater than left. -Suggestion of trace air along the right fifth MTP joint, could be degenerative  but infectious process not excluded. Consider dedicated right foot x-ray for  further evaluation.     3. Chronic multilevel stenosis of the aorta, iliac arteries, and left lower  extremity as described. 4. Peripancreatic edema along the pancreatic tail is increased since 1/3/2020.  -Associated adjacent mild presumed reactive mural thickening of the greater  curvature of the stomach. 5. Small right pleural effusion. 6. Suggestion of mild mural thickening of the distal esophagus, may indicate  nonspecific esophagitis. 7. Bilateral knee osteoarthrosis with small right knee joint effusion and trace  left knee joint effusion. 8. Fibroid uterus. 9. Colonic diverticulosis, without evidence for diverticulitis. 10. Additional nonacute findings are as described. EKG No results found for this or any previous visit. I have personally reviewed the old medical records and patient's labs    Plan / Recommendation:      1. Acute/crf stage 3.back to baseline. 2.pvd,gangren r foot,for aka when hgb improves  3.anemia,on epo. refused blood transfusion. suggest to decrease venofer to every 2 days for total of 1 g    D/w dr Wade Conley MD  Nephrology  1/24/2020

## 2020-01-24 NOTE — PROGRESS NOTES
conducted a Follow up consultation and Spiritual Assessment for Sloan Mascorro, who is a 79 y.o.,female. The  provided the following Interventions:  Continued the relationship of care and support. Listened empathically. Offered prayer and assurance of continued prayer on patients behalf. Chart reviewed. The following outcomes were achieved:  Patient expressed gratitude for 's visit. Assessment:  There are no further spiritual or Sikhism issues which require Spiritual Care Services interventions at this time. Plan:  Chaplains will continue to follow and will provide pastoral care on an as needed/requested basis.  recommends bedside caregivers page  on duty if patient shows signs of acute spiritual or emotional distress.        [id number]   [phone]

## 2020-01-24 NOTE — PROGRESS NOTES
Hospitalist Progress Note    Patient: Liz Crowell Age: 79 y.o. : 1940 MR#: [id number] SSN: [id number]  Date/Time: 2020     DOA: 2020  PCP: Yahaira Stephen MD    Subjective:       Patient feeling fine, denies any pain in her leg. No new complaints  Per RN she had some nausea earlier today but no vomiting. Interval Hospital Course:  79 y.o female with prolonged hospitalization since her admission  for metabolic encephalopathy related to HHS, acute pancreatitis, EFRAIN. She has recovered from [facility], pancreatitis/EFRAIN but developed worsened ischemia of her right foot due to right lower extremity PAD with gangrene, CTA with multifocal stenosis of right lower extremity. She underwent angio with balloon angioplasty and stent placement at origin of common iliac (2020). She underwent right femoral to above-knee popliteal bypass (2020). Podiatry consulted and followed but now recommended amputation. She has been waiting for her Hgb/Hct to improved to level where it will be safe for her to surgery without needing transfusion. Hematology has been consulted.        Assessment/Plan:       1)  Right leg PAD with gangrene, associate with uncontrolled DM2      - CTA with multifocal stenosis of right lower extremity      - s/p angio with balloon angioplasty and stent orf right common iliac artery 2020      - right Fem-Pop bypass 2020       VASCULAR TO FOLLOW UP FOR right AKA when Hgb >8   2)  Hyperglycemia with Type 2 diabetes, stable       - admitted with Hyperosmolar non-ketotic state in type 2 diabetes mellitus, resolved    3)  Acute on chronic renal failure Stage 3, resolved   4)  Metabolic acidosis due to renal issues, resolved  5)  Acute metabolic encephalopathy, improved  6)  Acute pancreatitis, resolved, GI recommended CT abd in 8 weeks   7)  Hypertension, tolerated bblocker   8)  Hypophosphatemia, replaced   9)  Normocytic anemia of chronic disease, recently decreased. Not amenable for transfusion due to Yarsani  10)  Urinary retention, gutierrez out. Resolved     Plan   awaiting amputation early next week once hemoglobin more than 8. Surgery wants hemoglobin to be > 8 and close to 10. Given patient is a [employer], surgery is been delayed to next week. Will limit blood draws, continue Epogen and monitor H&H every other day. No signs of infection, monitor off antibiotics  Continue Lantus and ISS   Cont ASA, flomax, amlodipine and metoprolol. Full code     Discussed with the patient in detail and explained the reason for delaying the surgery. She understood and agreed with the plan. Kami Fry phone [phone]. Case discussed with:  [x]Patient  []Family  [x]Nursing  [x]Case Management  DVT Prophylaxis:  []Lovenox  []Hep SQ  [x]SCDs  []Coumadin   []On Heparin gtt    Signed By: Negin Corey MD     2020               Objective:   VS:   Visit Vitals  /52 (BP 1 Location: Left arm, BP Patient Position: At rest;Lying left side)   Pulse 64   Temp 99.5 °F (37.5 °C)   Resp 20   Ht 5' 5\" (1.651 m)   Wt 71.9 kg (158 lb 9.6 oz)   SpO2 100%   Breastfeeding No   BMI 26.39 kg/m²      Tmax/24hrs: Temp (24hrs), Av.5 °F (36.9 °C), Min:98 °F (36.7 °C), Max:99.5 °F (37.5 °C)    No intake or output data in the 24 hours ending 20 1635    General:   Alert awake, Not in acute distress  Cardiovascular: S1S2 regular, no rub/gallop   Pulmonary: Clear air entry bilaterally, no wheezing, no crackle  GI:  Soft, non tender, non distended, +bs, no guarding   Extremities: Right foot necrosis/gangrene, no redness erythema. Neuro: AOx3, moving all extremities, no gross deficit.      Additional:       Current Facility-Administered Medications   Medication Dose Route Frequency    ondansetron (ZOFRAN) injection 4 mg  4 mg IntraVENous Q6H PRN    iron sucrose (VENOFER) 300 mg in 0.9% sodium chloride 250 mL IVPB  300 mg IntraVENous Q3DAYS    insulin glargine (LANTUS) injection 32 Units  32 Units SubCUTAneous DAILY    amLODIPine (NORVASC) tablet 10 mg  10 mg Oral DAILY    metoprolol tartrate (LOPRESSOR) tablet 12.5 mg  12.5 mg Oral Q12H    hydrALAZINE (APRESOLINE) 20 mg/mL injection 10 mg  10 mg IntraVENous Q6H PRN    bisacodyL (DULCOLAX) tablet 10 mg  10 mg Oral DAILY PRN    docusate sodium (COLACE) capsule 100 mg  100 mg Oral BID    0.9% sodium chloride infusion 250 mL  250 mL IntraVENous PRN    epoetin bipin-epbx (RETACRIT) 21,000 Units  21,000 Units SubCUTAneous QHS    tamsulosin (FLOMAX) capsule 0.4 mg  0.4 mg Oral DAILY    dextrose (D25W) 25% injection 10 mL  2.5 g IntraVENous PRN    insulin lispro (HUMALOG) injection   SubCUTAneous AC&HS    famotidine (PEPCID) tablet 20 mg  20 mg Oral DAILY    bisacodyl (DULCOLAX) suppository 10 mg  10 mg Rectal DAILY PRN    polyethylene glycol (MIRALAX) packet 17 g  17 g Oral DAILY    aspirin chewable tablet 81 mg  81 mg Oral DAILY    oxyCODONE-acetaminophen (PERCOCET) 5-325 mg per tablet 1-2 Tab  1-2 Tab Oral Q6H PRN    dextrose 10% infusion 125-250 mL  125-250 mL IntraVENous PRN    heparin (porcine) injection 5,000 Units  5,000 Units SubCUTAneous Q8H    glucose chewable tablet 16 g  4 Tab Oral PRN    glucagon (GLUCAGEN) injection 1 mg  1 mg IntraMUSCular PRN            Lab/Data Review:  Labs: Results:       Chemistry Recent Labs     01/24/20  0610   GLU 77      K 4.6      CO2 26   BUN 22*   CREA 1.36*   BUCR 16   AGAP 6   CA 9.4   PHOS 2.1*     No results for input(s): TBIL, ALT, SGOT, ALKP, TP, ALB, GLOB, AGRAT in the last 72 hours.    CBC w/Diff Recent Labs     01/24/20  0610 01/23/20  0522 01/22/20  0536   WBC 8.3 8.6 9.3   RBC 2.26* 2.49* 2.43*   HGB 7.3* 7.9* 7.8*   HCT 23.0* 25.6* 24.4*   .8* 102.8* 100.4*   MCH 32.3 31.7 32.1   MCHC 31.7 30.9* 32.0   RDW 14.9* 14.6* 14.1    408 401   GRANS 68 68 68   LYMPH 19* 19* 21   EOS 2 3 3      Coagulation No results for input(s): PTP, INR, APTT, INREXT, INREXT in the last 72 hours. Iron/Ferritin Lab Results   Component Value Date/Time    Iron 22 (L) 01/19/2020 04:05 AM    TIBC 116 (L) 01/19/2020 04:05 AM    Iron % saturation 19 01/19/2020 04:05 AM    Ferritin 100 01/15/2020 03:33 AM       BNP    Cardiac Enzymes Lab Results   Component Value Date/Time    Troponin-I, QT <0.02 01/02/2020 11:25 PM        Lactic Acid    Thyroid Studies          All Micro Results     Procedure Component Value Units Date/Time    CULTURE, BLOOD [[id number]] Collected:  01/02/20 2206    Order Status:  Completed Specimen:  Blood Updated:  01/08/20 0642     Special Requests: NO SPECIAL REQUESTS        Culture result: NO GROWTH 6 DAYS       CULTURE, BLOOD [[id number]] Collected:  01/02/20 2206    Order Status:  Completed Specimen:  Blood Updated:  01/08/20 5361     Special Requests: NO SPECIAL REQUESTS        Culture result: NO GROWTH 6 DAYS               Images:    CT (Most Recent). CT Results (most recent):  Results from Hospital Encounter encounter on 01/02/20   CTA ABD ART W RUNOFF W WO CONT    Narrative PROCEDURE: CTA of abdomen and pelvis and bilateral lower extremity runoff with  intravenous contrast administration. HISTORY: PAD, ischemia of the right foot. TECHNIQUE: Multidetector helical CT angiography was carried out from low chest  through the feet following dynamic 70 cc Isovue-300 intravenous contrast  administration . Scan timing was performed using automated bolus tracking/SMART  Prep. 3-D and MPR angiographic image reconstruction was performed on  independent workstation and separately reviewed. Parenchymal imaging is limited  by the arterial phase of contrast administration.  All CT scans at this facility  are performed using dose optimization techniques as appropriate to a performed  exam, to include automated exposure control, adjustment of the mA and/or kV  according to patient's size (including appropriate matching for site specific  examinations), or use of iterative reconstruction technique. COMPARISON: CT abdomen/pelvis 1/3/2020. CTA abdomen/pelvis with extremity runoff  7/18/2017. FINDINGS:    VASCULAR FINDINGS:    Right lower extremity:  Multifocal stenosis throughout the right posterior tibialis artery due to  atherosclerotic calcifications as well as the peroneal artery.  -The right posterior tibialis artery is not opacified beyond the mid right lower  leg.  -The anterior tibial artery is not opacified beyond the right lower leg just  above the tibial plafond.  -The peroneal artery is opacified beyond the level of the tibial plafond. Proximally, there is multifocal stenosis of the right femoral artery with loss  of opacification at the mid to distal thigh. Reconstitution of opacification at  the level of the posterior tibialis artery likely due to collaterals from  geniculate arteries and the deep femoral artery. 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  -Left lower leg perfusion is contributed by collaterals from the deep femoral  artery and geniculate arteries. Multifocal stenosis/occlusion of the left  posterior tibialis artery and left peroneal artery. No opacification of the  peroneal artery or posterior tibialis artery is seen to be on the mid to  proximal third of the left lower leg. The left dorsal pedis is opacified, and  likely contributes to some additional opacified vessel seen in the left foot. Abdominal aorta:  -Severe atherosclerosis with moderate compromise of the lumen, similar to prior  exam of 7/2017.  -No evidence for abdominal aortic aneurysm.   -Severe stenosis at the proximal aspect of the right common iliac artery, well  opacified distally, similar to prior exam. Multifocal moderate stenosis at the  left common iliac artery similar to prior exam.  -Multifocal stenosis at the internal iliac arteries, with good opacification  distally.  -Celiac artery, SMA, left renal artery, and right renal artery are grossly  patent. The right renal artery again shown to originate from the descending  thoracic aorta. ALFREDA is not well visualized. ABDOMEN/PELVIS FINDINGS:  Lower chest: Small right pleural effusion with overlying atelectasis. Liver: Stable subcentimeter hypodense lesions liver, too small to characterize  but stability suggests benignity    Biliary: Gallbladder is mildly distended but otherwise unremarkable. Spleen: Negative    Pancreas: There is some peripancreatic edema adjacent to the pancreatic tail. Adrenal glands: Diffuse thickening of the adrenal glands, without discrete mass,  similar to prior exam.    Kidneys: Malrotated kidneys again noted, without evidence for hydronephrosis. GI tract: The bowel appears nonobstructed. Question of mild mural thickening at  the distal esophagus. Questionable mild mural thickening at the greater  curvature of the stomach adjacent to the edema along the pancreatic tail. Colonic diverticulosis, without evidence for diverticulitis. Normal appendix. Peritoneum: No free air    Lymph nodes: No lymphadenopathy. Pelvis: Urinary bladder is unremarkable. Fibroid uterus again noted. Body wall/soft tissues: Small fat-containing umbilical hernia. Small right lower  spigelian hernia containing a small amount of fluid measuring 2.5 x 1.2 cm  (image 203), previously measured 2.9 x 1.6 cm. Mild edema along the right flank. Bones:  -Bilateral moderate knee osteoarthrosis. Small right knee joint effusion and  trace left knee joint effusion.  -Diffuse soft tissue swelling at the feet, right greater than left. Trace  subcutaneous emphysema adjacent to the right fifth MTP joint. Left distal tibial  and talar hardware noted. -Multilevel degenerative spondylosis and disc disease noted, not well evaluated  on current exam.  -Moderate degenerative change at the hips      Impression IMPRESSION:  1.  Right lower extremity:  Multifocal stenosis throughout the right posterior tibialis artery due to  atherosclerotic calcifications as well as the peroneal artery.  -The right posterior tibialis artery is not opacified beyond the mid right lower  leg.  -The anterior tibial artery is not opacified beyond the right lower leg just  above the tibial plafond.  -The peroneal artery is opacified beyond the level of the tibial plafond. Proximally, there is multifocal stenosis of the right femoral artery with loss  of opacification at the mid to distal thigh. Reconstitution of opacification at  the level of the posterior tibialis artery likely due to collaterals from  geniculate arteries and the deep femoral artery. 2. Diffuse soft tissue swelling at bilateral feet, right greater than left. -Suggestion of trace air along the right fifth MTP joint, could be degenerative  but infectious process not excluded. Consider dedicated right foot x-ray for  further evaluation. 3. Chronic multilevel stenosis of the aorta, iliac arteries, and left lower  extremity as described. 4. Peripancreatic edema along the pancreatic tail is increased since 1/3/2020.  -Associated adjacent mild presumed reactive mural thickening of the greater  curvature of the stomach. 5. Small right pleural effusion. 6. Suggestion of mild mural thickening of the distal esophagus, may indicate  nonspecific esophagitis. 7. Bilateral knee osteoarthrosis with small right knee joint effusion and trace  left knee joint effusion. 8. Fibroid uterus. 9. Colonic diverticulosis, without evidence for diverticulitis. 10. Additional nonacute findings are as described. XRAY (Most Recent)      EKG No results found for this or any previous visit.      2D ECHO

## 2020-01-24 NOTE — PROGRESS NOTES
1232 - Pt eating lunch at this time. Will follow up later in day. 1312 - Pt sleeping at this time. Upon waking she asks to rest for at least 15-20 minutes. Will follow up per pt's schedule.

## 2020-01-24 NOTE — ROUTINE PROCESS
Bedside shift change report given to Katey Navarrete (oncoming nurse) by Walt Moyer (offgoing nurse). Report included the following information SBAR, Kardex and MAR.

## 2020-01-24 NOTE — ROUTINE PROCESS
Bedside and Verbal shift change report given to [facility], RN (oncoming nurse) by Rashawn Waddell RN 
 (offgoing nurse). Report included the following information SBAR, Kardex, Intake/Output and MAR.

## 2020-01-24 NOTE — PROGRESS NOTES
Hematology/Medical Oncology Progress Note             Name: Lucero Sanchez   : 1940   MRN: [id number]   Date: 2020 8:13 AM     [x]I have reviewed the flowsheet and previous days notes. Events overnight reviewed and discussed with nursing staff. Vital signs and records reviewed. 25-year-old -American female past medical history of severe PVD, chronic anemia,uncontrolled diabetes mellitus type 2 now right lower limb ischemia and dry gangrene. S/p R Fem-pop bypass. In addition patient is [employer]an and refuses blood transfusion. Presently receiving Retacrit 21,000 units X 7 days per vascular surgery today is day  will need further surgery. Pt voices no new c/o today. ROS:  Constitutional: Positive for fatigue. Negative for fever. HENT: Negative for nosebleeds, congestion, facial swelling, mouth sores, trouble swallowing, neck pain, neck stiffness, voice change and postnasal drip. Eyes: Negative for photophobia, pain, discharge and itching. Respiratory: Negative for apnea, cough, choking, chest tightness, wheezing and stridor. Cardiovascular: Negative for chest pain, palpitations and leg swelling. Gastrointestinal: Negative for abdominal pain. Negative for nausea, diarrhea, constipation, blood in stool and rectal pain. Genitourinary: Negative for dysuria, urgency, hematuria, flank pain and difficulty urinating. Musculoskeletal:Positive for right foot/leg pain. Skin: Positive for right foot gangrene. Neurological:  Negative for dizziness, facial asymmetry, speech difficulty, light-headedness and headaches. Hematological: Negative for adenopathy. Does not bruise/bleed easily. Psychiatric/Behavioral: Negative for hallucinations,confusion.     Vital Signs:    Visit Vitals  /55 (BP 1 Location: Left arm, BP Patient Position: At rest)   Pulse 82   Temp 98.5 °F (36.9 °C)   Resp 18   Ht 5' 5\" (1.651 m)   Wt 71.9 kg (158 lb 9.6 oz)   SpO2 99% Breastfeeding No   BMI 26.39 kg/m²       O2 Device: Nasal cannula   O2 Flow Rate (L/min): 2 l/min   Temp (24hrs), Av.3 °F (36.8 °C), Min:98 °F (36.7 °C), Max:98.5 °F (36.9 °C)       Intake/Output:   Last shift:      No intake/output data recorded. Last 3 shifts: No intake/output data recorded. No intake or output data in the 24 hours ending 20    Physical Exam:  General: Appears comfortable, NAD. HEENT:  Anicteric sclerae; pink palpebral conjunctivae; mucosa moist  Resp:  Symmetrical chest expansion, no accessory muscle use; good airway entry; no rales/ wheezing/ rhonchi noted  CV:  S1, S2 present; regular rate and rhythm  GI:  Abdomen soft, non-tender; (+) active bowel sounds  Extremities:  S/p R Fem-pop bypass: R foot dry gangrene.   Skin:  Warm, right foot gangrenous  Neurologic:  Non-focal         DATA:   Current Facility-Administered Medications   Medication Dose Route Frequency    iron sucrose (VENOFER) 300 mg in 0.9% sodium chloride 250 mL IVPB  300 mg IntraVENous Q3DAYS    insulin glargine (LANTUS) injection 32 Units  32 Units SubCUTAneous DAILY    amLODIPine (NORVASC) tablet 10 mg  10 mg Oral DAILY    metoprolol tartrate (LOPRESSOR) tablet 12.5 mg  12.5 mg Oral Q12H    ferrous sulfate tablet 325 mg  1 Tab Oral DAILY WITH BREAKFAST    hydrALAZINE (APRESOLINE) 20 mg/mL injection 10 mg  10 mg IntraVENous Q6H PRN    bisacodyL (DULCOLAX) tablet 10 mg  10 mg Oral DAILY PRN    docusate sodium (COLACE) capsule 100 mg  100 mg Oral BID    0.9% sodium chloride infusion 250 mL  250 mL IntraVENous PRN    epoetin bipin-epbx (RETACRIT) 21,000 Units  21,000 Units SubCUTAneous QHS    tamsulosin (FLOMAX) capsule 0.4 mg  0.4 mg Oral DAILY    dextrose (D25W) 25% injection 10 mL  2.5 g IntraVENous PRN    insulin lispro (HUMALOG) injection   SubCUTAneous AC&HS    famotidine (PEPCID) tablet 20 mg  20 mg Oral DAILY    bisacodyl (DULCOLAX) suppository 10 mg  10 mg Rectal DAILY PRN    polyethylene glycol (MIRALAX) packet 17 g  17 g Oral DAILY    aspirin chewable tablet 81 mg  81 mg Oral DAILY    oxyCODONE-acetaminophen (PERCOCET) 5-325 mg per tablet 1-2 Tab  1-2 Tab Oral Q6H PRN    dextrose 10% infusion 125-250 mL  125-250 mL IntraVENous PRN    heparin (porcine) injection 5,000 Units  5,000 Units SubCUTAneous Q8H    glucose chewable tablet 16 g  4 Tab Oral PRN    glucagon (GLUCAGEN) injection 1 mg  1 mg IntraMUSCular PRN                    Labs:  Recent Labs     01/24/20  0610 01/23/20  0522 01/22/20  0536   WBC 8.3 8.6 9.3   HGB 7.3* 7.9* 7.8*   HCT 23.0* 25.6* 24.4*    408 401     Recent Labs     01/24/20  0610      K 4.6      CO2 26   GLU 77   BUN 22*   CREA 1.36*   CA 9.4   MG 2.0   PHOS 2.1*     No results for input(s): PH, PCO2, PO2, HCO3, FIO2 in the last 72 hours. IMPRESSION:   · Anemia of chronic illness  · PVD  · Diabetes type 2  · Acute renal failure        · PLAN:  · H/H is 7.3/23.0: Pt is Jehovahs witness-no PRBC transfusion. Reviewed creatinine 1.37. Retacrit 21,000 units daily X 7/7 days. Apparently, the patient has lost IV site and has only received once dose of IV venofer. Pt now has PICC line and we have continued her Venofer on 1/23 for an additional 2 doses. We will hold Ferrous sulfate 325 mg p.o until venofer has been completed. Iron low at 22,TIBC 116, iron % sat 19, ferritin 100. B12 897, folate >20.0. · Recommend limiting lab draws. · Tentative plan :Vascular surgery will proceed with amputation surgery whenever patient is medically stable. Tentative plan is next week once her hemoglobin more than 8g/dl.      ·  ·        The patient is: [x] acutely ill Risk of deterioration: [] moderate    [] critically ill  [] high         My assessment/plan was discussed with:  [x]nursing []PT/OT    []respiratory therapy [x]Dr.Lloyd Marva Ponce MD      []family []       Emily Day NP

## 2020-01-24 NOTE — PROGRESS NOTES
NUTRITION    Nursing Referral: [facility]  Nutrition Consult: Diet Education, Other     RECOMMENDATIONS / PLAN:     - Continue nutritional supplements to optimize nutrition.  - Encourage meal intake. - Continue RD inpatient monitoring and evaluation. NUTRITION INTERVENTIONS & DIAGNOSIS:     - Meals/snacks: modified composition  - Medical food supplement therapy: Nepro BID  - Nutrition Education: low potassium diet education provided 1/10/2020    Nutrition Diagnosis: Predicted inadequate energy intake related to appetite as evidenced by pt with overall fair meal intake since admission, variable but improving    ASSESSMENT:     1/24: Ate 100% of lunch today and consumed supplements. Awaiting right AKA once Hgb>8.  1/21: Per vascular pt needs right AKA. Niece at bedside reports pt with variable intake since admission, better when encouraged by family members. Pt confused. 1/17: S/p right femoral-popliteal bypass 1/16. Good intake prior to surgery, now with decreased intake. Pt reports poor appetite. Encouraged meal intake, pt receptive. Pt reports consuming some of the Nepro prior to surgery, agreeable to continuing. 1/10: Poor to fair meal intake, denies nausea/vomiting or pain. Dislikes Glucerna and noted hyperkalemia (kayexalate given). EFRAIN 2/2 dehydration from severe pancreatitis. Nutritional needs modified. 1/8: Pt on po diet. Tolerating most meals; stated some foods are \"too heavy\" feeling, preferences discussed. Sometimes has fair meal intake. Agreeable to nutrition supplement. 1/3: Pancreatitis, hyperglycemia and acute on chronic renal failure. Per MD appears to be non-compliant with insulin, transitioned off insulin drip to lantus. Remains NPO on maintenance IVF with altered mentation and drowsy per chart review.     Nutritional intake adequate to meet patients estimated nutritional needs:  Unable to determine at this time    Diet: DIET DIABETIC CONSISTENT CARB Regular  DIET NUTRITIONAL SUPPLEMENTS Breakfast, Dinner; [place]imo      Food Allergies: NKFA  Current Appetite: Good; variable  Appetite/meal intake prior to admission: Unable to determine at this time  Feeding Limitations:  [] Swallowing difficulty    [] Chewing difficulty    [] Other:  Current Meal Intake:   Patient Vitals for the past 100 hrs:   % Diet Eaten   01/24/20 1506 100 %   01/22/20 1822 50 %   01/22/20 1040 75 %   01/21/20 1418 65 %   01/20/20 1819 50 %   01/20/20 1303 0 %       BM: 1/23-soft  Skin Integrity: incision to right leg & right groin, 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        Weight History:     Weight Metrics 1/23/2020 12/26/2018 7/10/2017   Weight 158 lb 9.6 oz 160 lb 9.6 oz 115 lb   BMI 26.39 kg/m2 26.73 kg/m2 19.14 kg/m2        Admitting Diagnosis: Pancreatitis [K85.90]  Hyperosmolar hyperglycemic coma due to diabetes mellitus without ketoacidosis (HCC) [E11.01]  Hyperosmolar non-ketotic state in patient with type 2 diabetes mellitus ([facility] [address] [E11.00]  Pertinent PMHx: DM, HTN, hypercholesterolemia    Education Needs:        [] None identified  [] Identified - Not appropriate at this time  [x]  Identified and addressed - refer to education log  Learning Limitations:   [] None identified  [x] Identified: confusion   Cultural, Mosque & ethnic food preferences:  [x] None identified    [] Identified and addressed     ESTIMATED NUTRITION NEEDS: Calories: 3062-4896 kcal (20-30 kcal/kg) based on  [x] Actual BW: 69 kg      [] IBW   Protein: 83-90 gm (1.2-1.3 gm/kg) based on  [x] Actual BW      [] IBW   Fluid: 1 mL/kcal     MONITORING & EVALUATION:     Nutrition Goal(s):  - PO nutrition intake will meet >75% of patient estimated nutritional needs within the next 7 days. Outcome: Progressing towards goal    - Patient will increase knowledge of appropriate food choices on a low potassium diet prior to discharge. Outcome: Met/Resolved     Monitoring:   [x] Food and nutrient intake   [x] Food and nutrient administration  [x] Comparative standards   [x] Nutrition-focused physical findings   [x] Anthropometric Measurements   [x] Treatment/therapy   [x] Biochemical data, medical tests, and procedures        Previous Recommendations (for follow-up assessments only): Implemented      Discharge Planning: Nutritional discharge needs unknown at this time. Participated in care planning, discharge planning, & interdisciplinary rounds as appropriate.       Christopher Goodson RD  Pager: [phone]

## 2020-01-25 LAB
GLUCOSE BLD STRIP.AUTO-MCNC: 100 MG/DL (ref 70–110)
GLUCOSE BLD STRIP.AUTO-MCNC: 120 MG/DL (ref 70–110)
GLUCOSE BLD STRIP.AUTO-MCNC: 239 MG/DL (ref 70–110)
GLUCOSE BLD STRIP.AUTO-MCNC: 265 MG/DL (ref 70–110)
GLUCOSE BLD STRIP.AUTO-MCNC: 267 MG/DL (ref 70–110)

## 2020-01-25 PROCEDURE — 97530 THERAPEUTIC ACTIVITIES: CPT

## 2020-01-25 PROCEDURE — 74011250637 HC RX REV CODE- 250/637: Performed by: HOSPITALIST

## 2020-01-25 PROCEDURE — 74011636637 HC RX REV CODE- 636/637: Performed by: HOSPITALIST

## 2020-01-25 PROCEDURE — 77030038269 HC DRN EXT URIN PURWCK BARD -A

## 2020-01-25 PROCEDURE — 74011636637 HC RX REV CODE- 636/637: Performed by: INTERNAL MEDICINE

## 2020-01-25 PROCEDURE — 74011250637 HC RX REV CODE- 250/637: Performed by: EMERGENCY MEDICINE

## 2020-01-25 PROCEDURE — 74011250637 HC RX REV CODE- 250/637: Performed by: NURSE PRACTITIONER

## 2020-01-25 PROCEDURE — 74011250637 HC RX REV CODE- 250/637: Performed by: INTERNAL MEDICINE

## 2020-01-25 PROCEDURE — 65660000000 HC RM CCU STEPDOWN

## 2020-01-25 PROCEDURE — 97535 SELF CARE MNGMENT TRAINING: CPT

## 2020-01-25 PROCEDURE — 74011250636 HC RX REV CODE- 250/636: Performed by: PHYSICIAN ASSISTANT

## 2020-01-25 PROCEDURE — 82962 GLUCOSE BLOOD TEST: CPT

## 2020-01-25 RX ADMIN — OXYCODONE HYDROCHLORIDE AND ACETAMINOPHEN 1 TABLET: 5; 325 TABLET ORAL at 14:46

## 2020-01-25 RX ADMIN — FAMOTIDINE 20 MG: 20 TABLET, FILM COATED ORAL at 09:24

## 2020-01-25 RX ADMIN — AMLODIPINE BESYLATE 10 MG: 10 TABLET ORAL at 09:25

## 2020-01-25 RX ADMIN — DOCUSATE SODIUM 100 MG: 100 CAPSULE, LIQUID FILLED ORAL at 17:59

## 2020-01-25 RX ADMIN — Medication 81 MG: at 09:25

## 2020-01-25 RX ADMIN — TAMSULOSIN HYDROCHLORIDE 0.4 MG: 0.4 CAPSULE ORAL at 09:24

## 2020-01-25 RX ADMIN — METOPROLOL TARTRATE 12.5 MG: 25 TABLET ORAL at 09:25

## 2020-01-25 RX ADMIN — DOCUSATE SODIUM 100 MG: 100 CAPSULE, LIQUID FILLED ORAL at 09:25

## 2020-01-25 RX ADMIN — HEPARIN SODIUM 5000 UNITS: 5000 INJECTION INTRAVENOUS; SUBCUTANEOUS at 12:37

## 2020-01-25 RX ADMIN — OXYCODONE HYDROCHLORIDE AND ACETAMINOPHEN 1 TABLET: 5; 325 TABLET ORAL at 07:56

## 2020-01-25 RX ADMIN — INSULIN GLARGINE 32 UNITS: 100 INJECTION, SOLUTION SUBCUTANEOUS at 09:23

## 2020-01-25 RX ADMIN — INSULIN LISPRO 4 UNITS: 100 INJECTION, SOLUTION INTRAVENOUS; SUBCUTANEOUS at 12:35

## 2020-01-25 RX ADMIN — HEPARIN SODIUM 5000 UNITS: 5000 INJECTION INTRAVENOUS; SUBCUTANEOUS at 05:40

## 2020-01-25 RX ADMIN — POLYETHYLENE GLYCOL 3350 17 G: 17 POWDER, FOR SOLUTION ORAL at 09:24

## 2020-01-25 NOTE — PROGRESS NOTES
Problem: Mobility Impaired (Adult and Pediatric)  Goal: *Acute Goals and Plan of Care (Insert Text)  Description  Physical Therapy Goals  Initiated 1/18/2020 and to be accomplished within 7 day(s)  1. Patient will move from supine to sit and sit to supine , scoot up and down and roll side to side in bed with minimal assistance/contact guard assist.     2.  Patient will transfer from bed to chair and chair to bed with maximal assistance using the least restrictive device. 3.  Patient will perform sit to stand with maximal assistance. 4.  Patient will improve sitting balance static good and dynamic fair to increase safety with transfers. 5.  Patient will perform WC mobility minimal assistance 50 feet for increased functional independence. Prior Level of Function:   Patient was independence for all mobility including gait using no AD. Patient lives alone in a single story home no steps to enter. Unsure how pt was ambulatory prior to hospital with the noticeable gangrene of R foot but pt states she has no equipment at home and was ambulatory. Outcome: Progressing Towards Goal     PHYSICAL THERAPY TREATMENT    Patient: Mal Willingham (36 y.o. female)  Date: 1/25/2020  Diagnosis: Pancreatitis [K85.90]  Hyperosmolar hyperglycemic coma due to diabetes mellitus without ketoacidosis (HCC) [E11.01]  Hyperosmolar non-ketotic state in patient with type 2 diabetes mellitus ([facility] [address] [E11.00]   <principal problem not specified>  Procedure(s) (LRB):  RIGHT FEMORAL-POPLITEAL BYPASS (Right) 9 Days Post-Op  Precautions: Fall, NWB   Chart, physical therapy assessment, plan of care and goals were reviewed. OBJECTIVE/ ASSESSMENT:  Patient found supine in bed willing to work with PT. Patient seen with OT to maximize safety of patient and staff. Pt is oriented, but still confused this tx. Pt sat at EOB and reports increased pain in right thigh.  Pt began to present with contradictory statements, stating both that she wanted to stand and that she didn't want to stand until next week. Despite encouragement, pt asked to return to supine which she did with LE assistance from this LPTA. Pt was positioned comfortably and left with needs in reach. Pt educated that she will still likely have pain post amputation and that she will need to participate in PT to maintain strength in prep for good outcomes post surgery. Progression toward goals:  []      Improving appropriately and progressing toward goals  [x]      Improving slowly and progressing toward goals  []      Not making progress toward goals and plan of care will be adjusted     PLAN:  Patient continues to benefit from skilled intervention to address the above impairments. Continue treatment per established plan of care. Discharge Recommendations:  Skilled Nursing Facility  Further Equipment Recommendations for Discharge:  rolling walker     SUBJECTIVE:   Patient stated I want to sit up for a second, then sit back down.     OBJECTIVE DATA SUMMARY:   Critical Behavior:  Neurologic State: Alert  Orientation Level: Oriented X4  Cognition: Follows commands  Safety/Judgement: Fall prevention  Functional Mobility Training:  Bed Mobility:  Supine to Sit: Contact guard assistance  Sit to Supine: Minimum assistance  Scooting: Moderate assistance;Assist x2  Balance:  Sitting: Impaired; With support  Sitting - Static: Fair (occasional)(plus)  Sitting - Dynamic: Fair (occasional)    Pain:  Pain level pre-treatment: Not rated  Pain level post-treatment: Not rated    Activity Tolerance:   Poor    Please refer to the flowsheet for vital signs taken during this treatment.   After treatment:   [] Patient left in no apparent distress sitting up in chair  [x] Patient left in no apparent distress in bed  [x] Call bell left within reach  [] Nursing notified  [] Caregiver present  [] Bed alarm activated  [] SCDs applied    COMMUNICATION/EDUCATION:   [x]         Role of Physical Therapy  [] Fall prevention  [x]         Bed mobility  []         Transfers  []         Ambulation / gait  []         Assistive device management  []         Stairs  [x]         Body mechanics  [x]         Position change   []         Therapeutic exercise  []         Activity pacing / energy conservation  []         Other:      Nelson Gilbert PTA   Time Calculation: 23 mins

## 2020-01-25 NOTE — PROGRESS NOTES
Problem: Patient Education: Go to Patient Education Activity  Goal: Patient/Family Education  Outcome: Progressing Towards Goal     Problem: Falls - Risk of  Goal: *Absence of Falls  Description  Document Rosibel Latin Fall Risk and appropriate interventions in the flowsheet.   Outcome: Progressing Towards Goal  Note: Fall Risk Interventions:  Mobility Interventions: Bed/chair exit alarm, OT consult for ADLs, PT Consult for mobility concerns    Mentation Interventions: Bed/chair exit alarm, Door open when patient unattended, More frequent rounding, Update white board    Medication Interventions: Bed/chair exit alarm, Evaluate medications/consider consulting pharmacy    Elimination Interventions: Bed/chair exit alarm, Call light in reach, Patient to call for help with toileting needs    History of Falls Interventions: Bed/chair exit alarm

## 2020-01-25 NOTE — PROGRESS NOTES
1 - Pt declined PT at this time, stating \"I'm waiting for them to bring them trays. Come after lunch. \" Will follow up later in day.

## 2020-01-25 NOTE — PROGRESS NOTES
Problem: Self Care Deficits Care Plan (Adult)  Goal: *Acute Goals and Plan of Care (Insert Text)  Description  Occupational Therapy Goals  Initiated 1/18/2020 within 7 day(s). 1.  Patient will perform upper body dressing with minimal assistance/contact guard assist   2. Patient will perform lower body dressing with moderate assistance . 3. Patient will perform grooming with minimal assistance/contact guard assist sitting EOB. 4.  Patient will perform all aspects of toileting with moderate assistance . 5. Patient will participate in upper extremity therapeutic exercise/activities with supervision/set-up for 5 minutes. Prior Level of Function: Patient reported she lived alone PTA and was independent in self-care with no AE. Outcome: Progressing Towards Goal  OCCUPATIONAL THERAPY TREATMENT    Patient: Jaron Yao (21 y.o. female)  Date: 1/25/2020  Diagnosis: Pancreatitis [K85.90]  Hyperosmolar hyperglycemic coma due to diabetes mellitus without ketoacidosis ([address] [E11.01]  Hyperosmolar non-ketotic state in patient with type 2 diabetes mellitus ([address] [E11.00]   <principal problem not specified>  Procedure(s) (LRB):  RIGHT FEMORAL-POPLITEAL BYPASS (Right) 9 Days Post-Op  Precautions: Fall, NWB    Chart, occupational therapy assessment, plan of care, and goals were reviewed. ASSESSMENT:  Pt is agreeable to participate in OT and reports she would like to try getting OOB. Pt is seen with PT to increase safety of the pt and staff during functional mobility and ADLs. Pt maneuvered to EOB w/CGA for RLE management. Upon sit EOB pt reports increased pain in RLE (surgical site). Pt is oriented, however, appears confused as reports contradictory statements regarding attempting to get up. Educated pt on NWB precautions, pt verbalized understanding. Pt performed grooming task seated EOB w/Set-up, requiring additional time.  Pt was not able to tolerate scooting seated EOB in preparation for SPT for txfrs to BSC, requiring assistance to return to bed and reposition for comfort as pt reports increasing pain. Pt's nurse notified of pt's request for pain medication. Progression toward goals:  [x]          Improving appropriately and progressing toward goals  []          Improving slowly and progressing toward goals  []          Not making progress toward goals and plan of care will be adjusted     PLAN:  Patient continues to benefit from skilled intervention to address the above impairments. Continue treatment per established plan of care. Discharge Recommendations:  Skilled Nursing Facility  Further Equipment Recommendations for Discharge:  N/A     SUBJECTIVE:   Patient stated Killian Barton will do it next week, but for now I will pray.     OBJECTIVE DATA SUMMARY:   Cognitive/Behavioral Status:  Neurologic State: Alert  Orientation Level: Oriented X4  Cognition: Follows commands  Safety/Judgement: Fall prevention    Functional Mobility and Transfers for ADLs:   Bed Mobility:     Supine to Sit: Contact guard assistance  Sit to Supine: Minimum assistance  Scooting: Moderate assistance;Assist x2   Balance:  Sitting: Impaired; With support  Sitting - Static: Fair (occasional)(plus)  Sitting - Dynamic: Fair (occasional)    ADL Intervention:   Grooming  Grooming Assistance: Set-up  Position Performed: Seated edge of bed  Washing Face: Set-up  Washing Hands: Set-up    Lower Body Dressing Assistance  Socks: Stand-by assistance(LLE)  Pain:  Pain level pre-treatment: not rated   Pain level post-treatment: not rated    Activity Tolerance:    Fair  Please refer to the flowsheet for vital signs taken during this treatment.   After treatment:   []  Patient left in no apparent distress sitting up in chair  [x]  Patient left in no apparent distress in bed  [x]  Call bell left within reach  [x]  Nursing notified  []  Caregiver present  []  Bed alarm activated    COMMUNICATION/EDUCATION:   [x] Role of Occupational Therapy in the acute care setting  [x] Home safety education was provided and the patient/caregiver indicated understanding. [x] Patient/family have participated as able in working towards goals and plan of care. [x] Patient/family agree to work toward stated goals and plan of care. [] Patient understands intent and goals of therapy, but is neutral about his/her participation. [] Patient is unable to participate in goal setting and plan of care.       Thank you for this referral.  JOSEP Nichols  Time Calculation: 23 mins

## 2020-01-25 NOTE — ROUTINE PROCESS
Bedside and Verbal shift change report given to Isai Montelongo RN (oncoming nurse) by Andre Cortez (offgoing nurse). Report included the following information Kardex and MAR.

## 2020-01-25 NOTE — PROGRESS NOTES
RENAL DAILY PROGRESS NOTE    Patient: Lucero Sanchez               Sex: female          DOA: 1/2/2020  7:37 PM        YOB: 1940      Age:  79 y.o.        LOS:  LOS: 22 days     Subjective:     Lucero Sanchez is a 79 y.o.  who presents with Pancreatitis [K85.90]  Hyperosmolar hyperglycemic coma due to diabetes mellitus without ketoacidosis ([facility] [address] [E11.01]  Hyperosmolar non-ketotic state in patient with type 2 diabetes mellitus ([facility] [address] [E11.00]. Asked to evaluate for acute/crf. admitted with hyperglycemia  Chief complains: Patient denies nausea, vomiting, chest pain, dizziness, shortness of breath or headache.  - Reviewed last 24 hrs events     Current Facility-Administered Medications   Medication Dose Route Frequency    ondansetron (ZOFRAN) injection 4 mg  4 mg IntraVENous Q6H PRN    acetaminophen (TYLENOL) tablet 650 mg  650 mg Oral Q4H PRN    iron sucrose (VENOFER) 300 mg in 0.9% sodium chloride 250 mL IVPB  300 mg IntraVENous Q3DAYS    insulin glargine (LANTUS) injection 32 Units  32 Units SubCUTAneous DAILY    amLODIPine (NORVASC) tablet 10 mg  10 mg Oral DAILY    metoprolol tartrate (LOPRESSOR) tablet 12.5 mg  12.5 mg Oral Q12H    hydrALAZINE (APRESOLINE) 20 mg/mL injection 10 mg  10 mg IntraVENous Q6H PRN    bisacodyL (DULCOLAX) tablet 10 mg  10 mg Oral DAILY PRN    docusate sodium (COLACE) capsule 100 mg  100 mg Oral BID    0.9% sodium chloride infusion 250 mL  250 mL IntraVENous PRN    tamsulosin (FLOMAX) capsule 0.4 mg  0.4 mg Oral DAILY    dextrose (D25W) 25% injection 10 mL  2.5 g IntraVENous PRN    insulin lispro (HUMALOG) injection   SubCUTAneous AC&HS    famotidine (PEPCID) tablet 20 mg  20 mg Oral DAILY    bisacodyl (DULCOLAX) suppository 10 mg  10 mg Rectal DAILY PRN    polyethylene glycol (MIRALAX) packet 17 g  17 g Oral DAILY    aspirin chewable tablet 81 mg  81 mg Oral DAILY    oxyCODONE-acetaminophen (PERCOCET) 5-325 mg per tablet 1-2 Tab  1-2 Tab Oral Q6H PRN    dextrose 10% infusion 125-250 mL  125-250 mL IntraVENous PRN    heparin (porcine) injection 5,000 Units  5,000 Units SubCUTAneous Q8H    glucose chewable tablet 16 g  4 Tab Oral PRN    glucagon (GLUCAGEN) injection 1 mg  1 mg IntraMUSCular PRN       Objective:     Visit Vitals  BP 95/57 (BP 1 Location: Left arm, BP Patient Position: At rest)   Pulse 81   Temp 99 °F (37.2 °C)   Resp 20   Ht 5' 5\" (1.651 m)   Wt 69.6 kg (153 lb 6.4 oz)   SpO2 99%   Breastfeeding No   BMI 25.53 kg/m²       Intake/Output Summary (Last 24 hours) at 1/25/2020 1439  Last data filed at 1/25/2020 1029  Gross per 24 hour   Intake 170 ml   Output 1100 ml   Net -930 ml       Physical Examination:     RS: Chest is bilateral equal, no wheezing / rales / crackles  CVS: S1-S2 heard, RRR, No S3 / murmur  Abdomen: Soft, Non tender, Not distended, Positive bowel sounds, no organomegaly, no CVA / supra pubic tenderness  Extremities gangrene r foot  CNS: Awake   HEENT: Head is atraumatic, PERRLA, conjunctiva pink & non icteric. No JVD or carotid bruit        Data Review:      Labs:     Hematology:   Recent Labs     01/24/20  0610 01/23/20  0522   WBC 8.3 8.6   HGB 7.3* 7.9*   HCT 23.0* 25.6*     Chemistry:   Recent Labs     01/24/20  0610   BUN 22*   CREA 1.36*   CA 9.4   K 4.6         CO2 26   PHOS 2.1*   GLU 77        Images:    XR (Most Recent). CXR reviewed by me and compared with previous CXR Results from Hospital Encounter encounter on 01/02/20   XR FOOT RT MIN 3 V    Narrative EXAM:  XR FOOT RT MIN 3 V    INDICATION:   report of air in right fifth MTP join    COMPARISON:  None. FINDINGS:  3 views of the right foot demonstrate limited study, the patient has  cooperation issues with positioning the foot. Considerable hammertoe deformity  of all the toes noted. The foot is held in extension at the ankle. There is no  obvious fracture or dislocation. Small plantar calcaneal spur noted.       Impression IMPRESSION: Positioning issues, no acute abnormality identified. No bone  destruction or soft tissue air        CT (Most Recent) Results from Hospital Encounter encounter on 01/02/20   CTA ABD ART W RUNOFF W WO CONT    Narrative PROCEDURE: CTA of abdomen and pelvis and bilateral lower extremity runoff with  intravenous contrast administration. HISTORY: PAD, ischemia of the right foot. TECHNIQUE: Multidetector helical CT angiography was carried out from low chest  through the feet following dynamic 70 cc Isovue-300 intravenous contrast  administration . Scan timing was performed using automated bolus tracking/SMART  Prep. 3-D and MPR angiographic image reconstruction was performed on  independent workstation and separately reviewed. Parenchymal imaging is limited  by the arterial phase of contrast administration. All CT scans at this facility  are performed using dose optimization techniques as appropriate to a performed  exam, to include automated exposure control, adjustment of the mA and/or kV  according to patient's size (including appropriate matching for site specific  examinations), or use of iterative reconstruction technique. COMPARISON: CT abdomen/pelvis 1/3/2020. CTA abdomen/pelvis with extremity runoff  7/18/2017. FINDINGS:    VASCULAR FINDINGS:    Right lower extremity:  Multifocal stenosis throughout the right posterior tibialis artery due to  atherosclerotic calcifications as well as the peroneal artery.  -The right posterior tibialis artery is not opacified beyond the mid right lower  leg.  -The anterior tibial artery is not opacified beyond the right lower leg just  above the tibial plafond.  -The peroneal artery is opacified beyond the level of the tibial plafond. Proximally, there is multifocal stenosis of the right femoral artery with loss  of opacification at the mid to distal thigh.  Reconstitution of opacification at  the level of the posterior tibialis artery likely due to collaterals from  geniculate arteries and the deep femoral artery. 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  -Left lower leg perfusion is contributed by collaterals from the deep femoral  artery and geniculate arteries. Multifocal stenosis/occlusion of the left  posterior tibialis artery and left peroneal artery. No opacification of the  peroneal artery or posterior tibialis artery is seen to be on the mid to  proximal third of the left lower leg. The left dorsal pedis is opacified, and  likely contributes to some additional opacified vessel seen in the left foot. Abdominal aorta:  -Severe atherosclerosis with moderate compromise of the lumen, similar to prior  exam of 7/2017.  -No evidence for abdominal aortic aneurysm. -Severe stenosis at the proximal aspect of the right common iliac artery, well  opacified distally, similar to prior exam. Multifocal moderate stenosis at the  left common iliac artery similar to prior exam.  -Multifocal stenosis at the internal iliac arteries, with good opacification  distally.  -Celiac artery, SMA, left renal artery, and right renal artery are grossly  patent. The right renal artery again shown to originate from the descending  thoracic aorta. ALFREDA is not well visualized. ABDOMEN/PELVIS FINDINGS:  Lower chest: Small right pleural effusion with overlying atelectasis. Liver: Stable subcentimeter hypodense lesions liver, too small to characterize  but stability suggests benignity    Biliary: Gallbladder is mildly distended but otherwise unremarkable. Spleen: Negative    Pancreas: There is some peripancreatic edema adjacent to the pancreatic tail. Adrenal glands: Diffuse thickening of the adrenal glands, without discrete mass,  similar to prior exam.    Kidneys: Malrotated kidneys again noted, without evidence for hydronephrosis. GI tract: The bowel appears nonobstructed.  Question of mild mural thickening at  the distal esophagus. Questionable mild mural thickening at the greater  curvature of the stomach adjacent to the edema along the pancreatic tail. Colonic diverticulosis, without evidence for diverticulitis. Normal appendix. Peritoneum: No free air    Lymph nodes: No lymphadenopathy. Pelvis: Urinary bladder is unremarkable. Fibroid uterus again noted. Body wall/soft tissues: Small fat-containing umbilical hernia. Small right lower  spigelian hernia containing a small amount of fluid measuring 2.5 x 1.2 cm  (image 203), previously measured 2.9 x 1.6 cm. Mild edema along the right flank. Bones:  -Bilateral moderate knee osteoarthrosis. Small right knee joint effusion and  trace left knee joint effusion.  -Diffuse soft tissue swelling at the feet, right greater than left. Trace  subcutaneous emphysema adjacent to the right fifth MTP joint. Left distal tibial  and talar hardware noted. -Multilevel degenerative spondylosis and disc disease noted, not well evaluated  on current exam.  -Moderate degenerative change at the hips      Impression IMPRESSION:  1. Right lower extremity:  Multifocal stenosis throughout the right posterior tibialis artery due to  atherosclerotic calcifications as well as the peroneal artery.  -The right posterior tibialis artery is not opacified beyond the mid right lower  leg.  -The anterior tibial artery is not opacified beyond the right lower leg just  above the tibial plafond.  -The peroneal artery is opacified beyond the level of the tibial plafond. Proximally, there is multifocal stenosis of the right femoral artery with loss  of opacification at the mid to distal thigh. Reconstitution of opacification at  the level of the posterior tibialis artery likely due to collaterals from  geniculate arteries and the deep femoral artery. 2. Diffuse soft tissue swelling at bilateral feet, right greater than left.   -Suggestion of trace air along the right fifth MTP joint, could be degenerative  but infectious process not excluded. Consider dedicated right foot x-ray for  further evaluation. 3. Chronic multilevel stenosis of the aorta, iliac arteries, and left lower  extremity as described. 4. Peripancreatic edema along the pancreatic tail is increased since 1/3/2020.  -Associated adjacent mild presumed reactive mural thickening of the greater  curvature of the stomach. 5. Small right pleural effusion. 6. Suggestion of mild mural thickening of the distal esophagus, may indicate  nonspecific esophagitis. 7. Bilateral knee osteoarthrosis with small right knee joint effusion and trace  left knee joint effusion. 8. Fibroid uterus. 9. Colonic diverticulosis, without evidence for diverticulitis. 10. Additional nonacute findings are as described. EKG No results found for this or any previous visit. I have personally reviewed the old medical records and patient's labs    Plan / Recommendation:      1. Acute/crf stage 3.back to baseline. 2.pvd,gangren r foot,for aka when hgb improves  3.anemia,on epo and venofer. refused blood transfusion. minimize blood draw    D/w dr Sal Vera MD  Nephrology  1/25/2020

## 2020-01-25 NOTE — ROUTINE PROCESS
Received patient in bed, awake, alert and oriented with periods of confusion. PICC intact. To right arm. Right foot wound open to air. . No complaints made, will continue to monitor. Report  Given by Rubén Bonilla. Bharathi Kwon

## 2020-01-25 NOTE — ROUTINE PROCESS
Patient awake but confused, yelling names or banging the table. Patient was cleaned after each use of bedpan. Not in acute distress.

## 2020-01-25 NOTE — ROUTINE PROCESS
TRANSFER - IN REPORT: 
 
Verbal report received from Delvis Lopez RN(name) on Mo[facility] Level  being received from HD unit(unit) for routine progression of care Report consisted of patients Situation, Background, Assessment and  
Recommendations(SBAR). Information from the following report(s) SBAR, Kardex and Procedure Summary was reviewed with the receiving nurse. Opportunity for questions and clarification was provided. Assessment completed upon patients arrival to unit and care assumed. \ 
 
 Patient being rolled in fromHD,awake, alert viabed. NGT intact ,tube feeding infusing.

## 2020-01-25 NOTE — ROUTINE PROCESS
Verbal bedside shift report given to Jonah Gilford, RN oncoming nurse by Jennifer Pond RN off going nurse including KARDEX, SBAR and [facility]

## 2020-01-25 NOTE — PROGRESS NOTES
Hospitalist Progress Note    Patient: Jany Richard Age: 79 y.o. : 1940 MR#: [id number] SSN: [id number]  Date/Time: 2020     DOA: 2020  PCP: Bradley Foss MD    Subjective:       Patient feeling fine, denies any pain in her leg. No new complaints   no changes, no events overnight below        Interval Hospital Course:  79 y.o female with prolonged hospitalization since her admission  for metabolic encephalopathy related to HHS, acute pancreatitis, EFRAIN. She has recovered from [facility], pancreatitis/EFRAIN but developed worsened ischemia of her right foot due to right lower extremity PAD with gangrene, CTA with multifocal stenosis of right lower extremity. She underwent angio with balloon angioplasty and stent placement at origin of common iliac (2020). She underwent right femoral to above-knee popliteal bypass (2020). Podiatry consulted and followed but now recommended amputation. She has been waiting for her Hgb/Hct to improved to level where it will be safe for her to surgery without needing transfusion. Hematology has been consulted. Assessment/Plan:       1)  Right leg PAD with gangrene, associate with uncontrolled DM2      - CTA with multifocal stenosis of right lower extremity      - s/p angio with balloon angioplasty and stent orf right common iliac artery 2020      - right Fem-Pop bypass 2020       VASCULAR TO FOLLOW UP FOR right AKA when Hgb >8   2)  Hyperglycemia with Type 2 diabetes, stable       - admitted with Hyperosmolar non-ketotic state in type 2 diabetes mellitus, resolved    3)  Acute on chronic renal failure Stage 3, resolved   4)  Metabolic acidosis due to renal issues, resolved  5)  Acute metabolic encephalopathy, improved  6)  Acute pancreatitis, resolved, GI recommended CT abd in 8 weeks   7)  Hypertension, tolerated bblocker   8)  Hypophosphatemia, replaced   9)  Normocytic anemia of chronic disease, recently decreased.  Not amenable for transfusion due to Cheondoism  10)  Urinary retention, gutierrez out. Resolved     Plan  Awaiting amputation early next week once hemoglobin more than 8. Surgery wants hemoglobin to be > 8 and close to 10. Given patient is a [employer], surgery is been delayed to next week. Will limit blood draws, continue Epogen and monitor H&H every other day. No signs of infection, monitor off antibiotics  Continue Lantus and ISS   Cont ASA, flomax, amlodipine and metoprolol. Full code   Isha Tang phone [phone]. Case discussed with:  [x]Patient  []Family  [x]Nursing  [x]Case Management  DVT Prophylaxis:  []Lovenox  []Hep SQ  [x]SCDs  []Coumadin   []On Heparin gtt    Signed By: Dianna Espinosa MD     2020               Objective:   VS:   Visit Vitals  BP 95/57 (BP 1 Location: Left arm, BP Patient Position: At rest)   Pulse 81   Temp 99 °F (37.2 °C)   Resp 20   Ht 5' 5\" (1.651 m)   Wt 69.6 kg (153 lb 6.4 oz)   SpO2 99%   Breastfeeding No   BMI 25.53 kg/m²      Tmax/24hrs: Temp (24hrs), Av.1 °F (36.7 °C), Min:97.5 °F (36.4 °C), Max:99 °F (37.2 °C)      Intake/Output Summary (Last 24 hours) at 2020 1353  Last data filed at 2020 1029  Gross per 24 hour   Intake 170 ml   Output 1100 ml   Net -930 ml       General:   Alert awake, Not in acute distress  Cardiovascular: S1S2 regular  Pulmonary: Clear air entry bilaterally, no wheezing, no crackle  GI:  Soft, non tender, non distended, +bs   Extremities: Right foot necrosis/gangrene, no redness erythema. Neuro: AOx2-3, moving all extremities, no gross deficit.      Additional:       Current Facility-Administered Medications   Medication Dose Route Frequency    ondansetron (ZOFRAN) injection 4 mg  4 mg IntraVENous Q6H PRN    acetaminophen (TYLENOL) tablet 650 mg  650 mg Oral Q4H PRN    iron sucrose (VENOFER) 300 mg in 0.9% sodium chloride 250 mL IVPB  300 mg IntraVENous Q3DAYS    insulin glargine (LANTUS) injection 32 Units  32 Units SubCUTAneous DAILY    amLODIPine (NORVASC) tablet 10 mg  10 mg Oral DAILY    metoprolol tartrate (LOPRESSOR) tablet 12.5 mg  12.5 mg Oral Q12H    hydrALAZINE (APRESOLINE) 20 mg/mL injection 10 mg  10 mg IntraVENous Q6H PRN    bisacodyL (DULCOLAX) tablet 10 mg  10 mg Oral DAILY PRN    docusate sodium (COLACE) capsule 100 mg  100 mg Oral BID    0.9% sodium chloride infusion 250 mL  250 mL IntraVENous PRN    tamsulosin (FLOMAX) capsule 0.4 mg  0.4 mg Oral DAILY    dextrose (D25W) 25% injection 10 mL  2.5 g IntraVENous PRN    insulin lispro (HUMALOG) injection   SubCUTAneous AC&HS    famotidine (PEPCID) tablet 20 mg  20 mg Oral DAILY    bisacodyl (DULCOLAX) suppository 10 mg  10 mg Rectal DAILY PRN    polyethylene glycol (MIRALAX) packet 17 g  17 g Oral DAILY    aspirin chewable tablet 81 mg  81 mg Oral DAILY    oxyCODONE-acetaminophen (PERCOCET) 5-325 mg per tablet 1-2 Tab  1-2 Tab Oral Q6H PRN    dextrose 10% infusion 125-250 mL  125-250 mL IntraVENous PRN    heparin (porcine) injection 5,000 Units  5,000 Units SubCUTAneous Q8H    glucose chewable tablet 16 g  4 Tab Oral PRN    glucagon (GLUCAGEN) injection 1 mg  1 mg IntraMUSCular PRN            Lab/Data Review:  Labs: Results:       Chemistry Recent Labs     01/24/20  0610   GLU 77      K 4.6      CO2 26   BUN 22*   CREA 1.36*   BUCR 16   AGAP 6   CA 9.4   PHOS 2.1*     No results for input(s): TBIL, ALT, SGOT, ALKP, TP, ALB, GLOB, AGRAT in the last 72 hours. CBC w/Diff Recent Labs     01/24/20  0610 01/23/20  0522   WBC 8.3 8.6   RBC 2.26* 2.49*   HGB 7.3* 7.9*   HCT 23.0* 25.6*   .8* 102.8*   MCH 32.3 31.7   MCHC 31.7 30.9*   RDW 14.9* 14.6*    408   GRANS 68 68   LYMPH 19* 19*   EOS 2 3      Coagulation No results for input(s): PTP, INR, APTT, INREXT, INREXT in the last 72 hours.     Iron/Ferritin Lab Results   Component Value Date/Time    Iron 22 (L) 01/19/2020 04:05 AM    TIBC 116 (L) 01/19/2020 04:05 AM    Iron % saturation 19 01/19/2020 04:05 AM    Ferritin 100 01/15/2020 03:33 AM       BNP    Cardiac Enzymes Lab Results   Component Value Date/Time    Troponin-I, QT <0.02 01/02/2020 11:25 PM        Lactic Acid    Thyroid Studies          All Micro Results     Procedure Component Value Units Date/Time    CULTURE, BLOOD [[id number]] Collected:  01/02/20 2206    Order Status:  Completed Specimen:  Blood Updated:  01/08/20 0642     Special Requests: NO SPECIAL REQUESTS        Culture result: NO GROWTH 6 DAYS       CULTURE, BLOOD [[id number]] Collected:  01/02/20 2206    Order Status:  Completed Specimen:  Blood Updated:  01/08/20 2606     Special Requests: NO SPECIAL REQUESTS        Culture result: NO GROWTH 6 DAYS               Images:    CT (Most Recent). CT Results (most recent):  Results from Hospital Encounter encounter on 01/02/20   CTA ABD ART W RUNOFF W WO CONT    Narrative PROCEDURE: CTA of abdomen and pelvis and bilateral lower extremity runoff with  intravenous contrast administration. HISTORY: PAD, ischemia of the right foot. TECHNIQUE: Multidetector helical CT angiography was carried out from low chest  through the feet following dynamic 70 cc Isovue-300 intravenous contrast  administration . Scan timing was performed using automated bolus tracking/SMART  Prep. 3-D and MPR angiographic image reconstruction was performed on  independent workstation and separately reviewed. Parenchymal imaging is limited  by the arterial phase of contrast administration. All CT scans at this facility  are performed using dose optimization techniques as appropriate to a performed  exam, to include automated exposure control, adjustment of the mA and/or kV  according to patient's size (including appropriate matching for site specific  examinations), or use of iterative reconstruction technique. COMPARISON: CT abdomen/pelvis 1/3/2020. CTA abdomen/pelvis with extremity runoff  7/18/2017.     FINDINGS:    VASCULAR FINDINGS:    Right lower extremity:  Multifocal stenosis throughout the right posterior tibialis artery due to  atherosclerotic calcifications as well as the peroneal artery.  -The right posterior tibialis artery is not opacified beyond the mid right lower  leg.  -The anterior tibial artery is not opacified beyond the right lower leg just  above the tibial plafond.  -The peroneal artery is opacified beyond the level of the tibial plafond. Proximally, there is multifocal stenosis of the right femoral artery with loss  of opacification at the mid to distal thigh. Reconstitution of opacification at  the level of the posterior tibialis artery likely due to collaterals from  geniculate arteries and the deep femoral artery. 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  -Left lower leg perfusion is contributed by collaterals from the deep femoral  artery and geniculate arteries. Multifocal stenosis/occlusion of the left  posterior tibialis artery and left peroneal artery. No opacification of the  peroneal artery or posterior tibialis artery is seen to be on the mid to  proximal third of the left lower leg. The left dorsal pedis is opacified, and  likely contributes to some additional opacified vessel seen in the left foot. Abdominal aorta:  -Severe atherosclerosis with moderate compromise of the lumen, similar to prior  exam of 7/2017.  -No evidence for abdominal aortic aneurysm. -Severe stenosis at the proximal aspect of the right common iliac artery, well  opacified distally, similar to prior exam. Multifocal moderate stenosis at the  left common iliac artery similar to prior exam.  -Multifocal stenosis at the internal iliac arteries, with good opacification  distally.  -Celiac artery, SMA, left renal artery, and right renal artery are grossly  patent. The right renal artery again shown to originate from the descending  thoracic aorta.  ALFREDA is not well visualized. ABDOMEN/PELVIS FINDINGS:  Lower chest: Small right pleural effusion with overlying atelectasis. Liver: Stable subcentimeter hypodense lesions liver, too small to characterize  but stability suggests benignity    Biliary: Gallbladder is mildly distended but otherwise unremarkable. Spleen: Negative    Pancreas: There is some peripancreatic edema adjacent to the pancreatic tail. Adrenal glands: Diffuse thickening of the adrenal glands, without discrete mass,  similar to prior exam.    Kidneys: Malrotated kidneys again noted, without evidence for hydronephrosis. GI tract: The bowel appears nonobstructed. Question of mild mural thickening at  the distal esophagus. Questionable mild mural thickening at the greater  curvature of the stomach adjacent to the edema along the pancreatic tail. Colonic diverticulosis, without evidence for diverticulitis. Normal appendix. Peritoneum: No free air    Lymph nodes: No lymphadenopathy. Pelvis: Urinary bladder is unremarkable. Fibroid uterus again noted. Body wall/soft tissues: Small fat-containing umbilical hernia. Small right lower  spigelian hernia containing a small amount of fluid measuring 2.5 x 1.2 cm  (image 203), previously measured 2.9 x 1.6 cm. Mild edema along the right flank. Bones:  -Bilateral moderate knee osteoarthrosis. Small right knee joint effusion and  trace left knee joint effusion.  -Diffuse soft tissue swelling at the feet, right greater than left. Trace  subcutaneous emphysema adjacent to the right fifth MTP joint. Left distal tibial  and talar hardware noted. -Multilevel degenerative spondylosis and disc disease noted, not well evaluated  on current exam.  -Moderate degenerative change at the hips      Impression IMPRESSION:  1.  Right lower extremity:  Multifocal stenosis throughout the right posterior tibialis artery due to  atherosclerotic calcifications as well as the peroneal artery.  -The right posterior tibialis artery is not opacified beyond the mid right lower  leg.  -The anterior tibial artery is not opacified beyond the right lower leg just  above the tibial plafond.  -The peroneal artery is opacified beyond the level of the tibial plafond. Proximally, there is multifocal stenosis of the right femoral artery with loss  of opacification at the mid to distal thigh. Reconstitution of opacification at  the level of the posterior tibialis artery likely due to collaterals from  geniculate arteries and the deep femoral artery. 2. Diffuse soft tissue swelling at bilateral feet, right greater than left. -Suggestion of trace air along the right fifth MTP joint, could be degenerative  but infectious process not excluded. Consider dedicated right foot x-ray for  further evaluation. 3. Chronic multilevel stenosis of the aorta, iliac arteries, and left lower  extremity as described. 4. Peripancreatic edema along the pancreatic tail is increased since 1/3/2020.  -Associated adjacent mild presumed reactive mural thickening of the greater  curvature of the stomach. 5. Small right pleural effusion. 6. Suggestion of mild mural thickening of the distal esophagus, may indicate  nonspecific esophagitis. 7. Bilateral knee osteoarthrosis with small right knee joint effusion and trace  left knee joint effusion. 8. Fibroid uterus. 9. Colonic diverticulosis, without evidence for diverticulitis. 10. Additional nonacute findings are as described. XRAY (Most Recent)      EKG No results found for this or any previous visit.      2D ECHO

## 2020-01-25 NOTE — PROGRESS NOTES
Problem: Falls - Risk of  Goal: *Absence of Falls  Description  Document Sly Macedo Fall Risk and appropriate interventions in the flowsheet.   1/24/2020 1926 by Melo Guidry RN  Outcome: Progressing Towards Goal  Note: Fall Risk Interventions:  Mobility Interventions: Bed/chair exit alarm, OT consult for ADLs, PT Consult for mobility concerns    Mentation Interventions: Bed/chair exit alarm, Door open when patient unattended, More frequent rounding, Update white board    Medication Interventions: Bed/chair exit alarm, Evaluate medications/consider consulting pharmacy    Elimination Interventions: Bed/chair exit alarm, Call light in reach, Patient to call for help with toileting needs    History of Falls Interventions: Bed/chair exit alarm      1/24/2020 1922 by Melo Guidry RN  Outcome: Progressing Towards Goal  Note: Fall Risk Interventions:  Mobility Interventions: Bed/chair exit alarm, OT consult for ADLs, PT Consult for mobility concerns    Mentation Interventions: Bed/chair exit alarm, Door open when patient unattended, More frequent rounding, Update white board    Medication Interventions: Bed/chair exit alarm, Evaluate medications/consider consulting pharmacy    Elimination Interventions: Bed/chair exit alarm, Call light in reach, Patient to call for help with toileting needs    History of Falls Interventions: Bed/chair exit alarm

## 2020-01-26 LAB
GLUCOSE BLD STRIP.AUTO-MCNC: 109 MG/DL (ref 70–110)
GLUCOSE BLD STRIP.AUTO-MCNC: 141 MG/DL (ref 70–110)
GLUCOSE BLD STRIP.AUTO-MCNC: 318 MG/DL (ref 70–110)
GLUCOSE BLD STRIP.AUTO-MCNC: 90 MG/DL (ref 70–110)
HCT VFR BLD AUTO: 24.4 % (ref 35–45)
HGB BLD-MCNC: 7.5 G/DL (ref 12–16)

## 2020-01-26 PROCEDURE — 74011250637 HC RX REV CODE- 250/637: Performed by: NURSE PRACTITIONER

## 2020-01-26 PROCEDURE — 74011636637 HC RX REV CODE- 636/637: Performed by: INTERNAL MEDICINE

## 2020-01-26 PROCEDURE — 74011250636 HC RX REV CODE- 250/636: Performed by: HOSPITALIST

## 2020-01-26 PROCEDURE — 74011250637 HC RX REV CODE- 250/637: Performed by: INTERNAL MEDICINE

## 2020-01-26 PROCEDURE — 74011636637 HC RX REV CODE- 636/637: Performed by: HOSPITALIST

## 2020-01-26 PROCEDURE — 85018 HEMOGLOBIN: CPT

## 2020-01-26 PROCEDURE — 74011250637 HC RX REV CODE- 250/637: Performed by: EMERGENCY MEDICINE

## 2020-01-26 PROCEDURE — 74011250636 HC RX REV CODE- 250/636: Performed by: INTERNAL MEDICINE

## 2020-01-26 PROCEDURE — 77030038269 HC DRN EXT URIN PURWCK BARD -A

## 2020-01-26 PROCEDURE — 74011250637 HC RX REV CODE- 250/637: Performed by: HOSPITALIST

## 2020-01-26 PROCEDURE — 65660000000 HC RM CCU STEPDOWN

## 2020-01-26 PROCEDURE — 74011250636 HC RX REV CODE- 250/636: Performed by: PHYSICIAN ASSISTANT

## 2020-01-26 PROCEDURE — 82962 GLUCOSE BLOOD TEST: CPT

## 2020-01-26 PROCEDURE — 74011000250 HC RX REV CODE- 250: Performed by: HOSPITALIST

## 2020-01-26 RX ORDER — INSULIN LISPRO 100 [IU]/ML
3 INJECTION, SOLUTION INTRAVENOUS; SUBCUTANEOUS
Status: DISCONTINUED | OUTPATIENT
Start: 2020-01-26 | End: 2020-02-01

## 2020-01-26 RX ADMIN — FAMOTIDINE 20 MG: 20 TABLET, FILM COATED ORAL at 08:16

## 2020-01-26 RX ADMIN — Medication 81 MG: at 08:16

## 2020-01-26 RX ADMIN — TAMSULOSIN HYDROCHLORIDE 0.4 MG: 0.4 CAPSULE ORAL at 08:19

## 2020-01-26 RX ADMIN — AMLODIPINE BESYLATE 10 MG: 10 TABLET ORAL at 08:16

## 2020-01-26 RX ADMIN — METOPROLOL TARTRATE 12.5 MG: 25 TABLET ORAL at 22:37

## 2020-01-26 RX ADMIN — INSULIN GLARGINE 32 UNITS: 100 INJECTION, SOLUTION SUBCUTANEOUS at 08:14

## 2020-01-26 RX ADMIN — INSULIN LISPRO 8 UNITS: 100 INJECTION, SOLUTION INTRAVENOUS; SUBCUTANEOUS at 11:55

## 2020-01-26 RX ADMIN — HEPARIN SODIUM 5000 UNITS: 5000 INJECTION INTRAVENOUS; SUBCUTANEOUS at 11:57

## 2020-01-26 RX ADMIN — DOCUSATE SODIUM 100 MG: 100 CAPSULE, LIQUID FILLED ORAL at 17:12

## 2020-01-26 RX ADMIN — SODIUM CHLORIDE 300 MG: 900 INJECTION, SOLUTION INTRAVENOUS at 15:41

## 2020-01-26 RX ADMIN — INSULIN LISPRO 3 UNITS: 100 INJECTION, SOLUTION INTRAVENOUS; SUBCUTANEOUS at 17:12

## 2020-01-26 RX ADMIN — ALTEPLASE 0.5 MG: 2.2 INJECTION, POWDER, LYOPHILIZED, FOR SOLUTION INTRAVENOUS at 18:15

## 2020-01-26 RX ADMIN — OXYCODONE HYDROCHLORIDE AND ACETAMINOPHEN 1 TABLET: 5; 325 TABLET ORAL at 00:31

## 2020-01-26 RX ADMIN — INSULIN LISPRO 6 UNITS: 100 INJECTION, SOLUTION INTRAVENOUS; SUBCUTANEOUS at 00:29

## 2020-01-26 RX ADMIN — METOPROLOL TARTRATE 12.5 MG: 25 TABLET ORAL at 00:31

## 2020-01-26 RX ADMIN — DOCUSATE SODIUM 100 MG: 100 CAPSULE, LIQUID FILLED ORAL at 08:16

## 2020-01-26 RX ADMIN — METOPROLOL TARTRATE 12.5 MG: 25 TABLET ORAL at 08:16

## 2020-01-26 RX ADMIN — POLYETHYLENE GLYCOL 3350 17 G: 17 POWDER, FOR SOLUTION ORAL at 08:13

## 2020-01-26 RX ADMIN — HEPARIN SODIUM 5000 UNITS: 5000 INJECTION INTRAVENOUS; SUBCUTANEOUS at 00:29

## 2020-01-26 RX ADMIN — HEPARIN SODIUM 5000 UNITS: 5000 INJECTION INTRAVENOUS; SUBCUTANEOUS at 22:35

## 2020-01-26 NOTE — PROGRESS NOTES
Chart reviewed. Pt remains anemic with Hg <8. Will make NPO for possible surgery if Hg improves tomorrow.

## 2020-01-26 NOTE — ROUTINE PROCESS
Verbal bedside shift report given to Niya Cano, RN oncoming nurse by Britany Waddell, RN off going nurse including [facility], [facility] and [facility]

## 2020-01-26 NOTE — PROGRESS NOTES
Hospitalist Progress Note    Patient: Noelle Bello Age: 79 y.o. : 1940 MR#: [id number] SSN: [id number]  Date/Time: 2020     DOA: 2020  PCP: Siobhan Courtney MD    Subjective:       Patient feeling fine, denies any pain in her leg. No new complaints  no changes, no events overnight below        Interval Hospital Course:  79 y.o female with prolonged hospitalization since her admission 3/07/7128 for metabolic encephalopathy related to HHS, acute pancreatitis, EFRAIN. She has recovered from [facility], pancreatitis/EFRAIN but developed worsened ischemia of her right foot due to right lower extremity PAD with gangrene, CTA with multifocal stenosis of right lower extremity. She underwent angio with balloon angioplasty and stent placement at origin of common iliac (2020). She underwent right femoral to above-knee popliteal bypass (2020). Podiatry consulted and followed but now recommended amputation. She has been waiting for her Hgb/Hct to improved to level where it will be safe for her to surgery without needing transfusion. Hematology has been consulted. Assessment/Plan:       1)  Right leg PAD with gangrene, associate with uncontrolled DM2      - CTA with multifocal stenosis of right lower extremity      - s/p angio with balloon angioplasty and stent orf right common iliac artery 2020      - right Fem-Pop bypass 2020       VASCULAR TO FOLLOW UP FOR right AKA when Hgb >8   2)  Hyperglycemia with Type 2 diabetes, stable       - admitted with Hyperosmolar non-ketotic state in type 2 diabetes mellitus, resolved    3)  Acute on chronic renal failure Stage 3, resolved   4)  Metabolic acidosis due to renal issues, resolved  5)  Acute metabolic encephalopathy, improved  6)  Acute pancreatitis, resolved, GI recommended CT abd in 8 weeks   7)  Hypertension, tolerated bblocker   8)  Hypophosphatemia, replaced   9)  Normocytic anemia of chronic disease, recently decreased.  Not amenable for transfusion due to Nondenominational  10)  Urinary retention, gutierrez out. Resolved     Plan  Awaiting amputation once hemoglobin more than 8. Possible amputation tomorrow per vascular surgery   given patient is a [employer], surgery is been delayed to next week. Will limit blood draws, continue Epogen and monitor H&H every other day. No signs of infection, monitor off antibiotics  Continue Lantus and ISS   Cont ASA, flomax, amlodipine and metoprolol. Full code   Roy Sandifer phone [phone]. Case discussed with:  [x]Patient  []Family  [x]Nursing  [x]Case Management  DVT Prophylaxis:  []Lovenox  []Hep SQ  [x]SCDs  []Coumadin   []On Heparin gtt    Signed By: Caitlin Herrera MD     2020               Objective:   VS:   Visit Vitals  /68 (BP 1 Location: Left arm, BP Patient Position: At rest)   Pulse 78   Temp 99.7 °F (37.6 °C)   Resp 16   Ht 5' 5\" (1.651 m)   Wt 71.7 kg (158 lb)   SpO2 96%   Breastfeeding No   BMI 26.29 kg/m²      Tmax/24hrs: Temp (24hrs), Av.8 °F (37.1 °C), Min:97.5 °F (36.4 °C), Max:99.7 °F (37.6 °C)      Intake/Output Summary (Last 24 hours) at 2020 1528  Last data filed at 2020 0505  Gross per 24 hour   Intake    Output 800 ml   Net -800 ml       General:   Alert awake, Not in acute distress  Cardiovascular: S1S2 regular  Pulmonary: Clear air entry bilaterally, no wheezing, no crackle  GI:  Soft, non tender, non distended, +bs   Extremities: Right foot necrosis/gangrene, no redness erythema. Neuro: AOx2-3, moving all extremities, no gross deficit.      Additional:       Current Facility-Administered Medications   Medication Dose Route Frequency    insulin lispro (HUMALOG) injection 3 Units  3 Units SubCUTAneous TIDAC    ondansetron (ZOFRAN) injection 4 mg  4 mg IntraVENous Q6H PRN    acetaminophen (TYLENOL) tablet 650 mg  650 mg Oral Q4H PRN    iron sucrose (VENOFER) 300 mg in 0.9% sodium chloride 250 mL IVPB  300 mg IntraVENous Q3DAYS    insulin glargine (LANTUS) injection 32 Units  32 Units SubCUTAneous DAILY    amLODIPine (NORVASC) tablet 10 mg  10 mg Oral DAILY    metoprolol tartrate (LOPRESSOR) tablet 12.5 mg  12.5 mg Oral Q12H    hydrALAZINE (APRESOLINE) 20 mg/mL injection 10 mg  10 mg IntraVENous Q6H PRN    bisacodyL (DULCOLAX) tablet 10 mg  10 mg Oral DAILY PRN    docusate sodium (COLACE) capsule 100 mg  100 mg Oral BID    0.9% sodium chloride infusion 250 mL  250 mL IntraVENous PRN    tamsulosin (FLOMAX) capsule 0.4 mg  0.4 mg Oral DAILY    dextrose (D25W) 25% injection 10 mL  2.5 g IntraVENous PRN    insulin lispro (HUMALOG) injection   SubCUTAneous AC&HS    famotidine (PEPCID) tablet 20 mg  20 mg Oral DAILY    bisacodyl (DULCOLAX) suppository 10 mg  10 mg Rectal DAILY PRN    polyethylene glycol (MIRALAX) packet 17 g  17 g Oral DAILY    aspirin chewable tablet 81 mg  81 mg Oral DAILY    oxyCODONE-acetaminophen (PERCOCET) 5-325 mg per tablet 1-2 Tab  1-2 Tab Oral Q6H PRN    dextrose 10% infusion 125-250 mL  125-250 mL IntraVENous PRN    heparin (porcine) injection 5,000 Units  5,000 Units SubCUTAneous Q8H    glucose chewable tablet 16 g  4 Tab Oral PRN    glucagon (GLUCAGEN) injection 1 mg  1 mg IntraMUSCular PRN            Lab/Data Review:  Labs: Results:       Chemistry Recent Labs     01/24/20  0610   GLU 77      K 4.6      CO2 26   BUN 22*   CREA 1.36*   BUCR 16   AGAP 6   CA 9.4   PHOS 2.1*     No results for input(s): TBIL, ALT, SGOT, ALKP, TP, ALB, GLOB, AGRAT in the last 72 hours. CBC w/Diff Recent Labs     01/26/20  0648 01/24/20  0610   WBC  --  8.3   RBC  --  2.26*   HGB 7.5* 7.3*   HCT 24.4* 23.0*   MCV  --  101.8*   MCH  --  32.3   MCHC  --  31.7   RDW  --  14.9*   PLT  --  386   GRANS  --  68   LYMPH  --  19*   EOS  --  2      Coagulation No results for input(s): PTP, INR, APTT, INREXT, INREXT in the last 72 hours.     Iron/Ferritin Lab Results   Component Value Date/Time    Iron 22 (L) 01/19/2020 04:05 AM    TIBC 116 (L) 01/19/2020 04:05 AM    Iron % saturation 19 01/19/2020 04:05 AM    Ferritin 100 01/15/2020 03:33 AM       BNP    Cardiac Enzymes Lab Results   Component Value Date/Time    Troponin-I, QT <0.02 01/02/2020 11:25 PM        Lactic Acid    Thyroid Studies          All Micro Results     Procedure Component Value Units Date/Time    CULTURE, BLOOD [[id number]] Collected:  01/02/20 2206    Order Status:  Completed Specimen:  Blood Updated:  01/08/20 0642     Special Requests: NO SPECIAL REQUESTS        Culture result: NO GROWTH 6 DAYS       CULTURE, BLOOD [[id number]] Collected:  01/02/20 2206    Order Status:  Completed Specimen:  Blood Updated:  01/08/20 9539     Special Requests: NO SPECIAL REQUESTS        Culture result: NO GROWTH 6 DAYS               Images:    CT (Most Recent). CT Results (most recent):  Results from Hospital Encounter encounter on 01/02/20   CTA ABD ART W RUNOFF W WO CONT    Narrative PROCEDURE: CTA of abdomen and pelvis and bilateral lower extremity runoff with  intravenous contrast administration. HISTORY: PAD, ischemia of the right foot. TECHNIQUE: Multidetector helical CT angiography was carried out from low chest  through the feet following dynamic 70 cc Isovue-300 intravenous contrast  administration . Scan timing was performed using automated bolus tracking/SMART  Prep. 3-D and MPR angiographic image reconstruction was performed on  independent workstation and separately reviewed. Parenchymal imaging is limited  by the arterial phase of contrast administration. All CT scans at this facility  are performed using dose optimization techniques as appropriate to a performed  exam, to include automated exposure control, adjustment of the mA and/or kV  according to patient's size (including appropriate matching for site specific  examinations), or use of iterative reconstruction technique. COMPARISON: CT abdomen/pelvis 1/3/2020.  CTA abdomen/pelvis with extremity runoff  7/18/2017. FINDINGS:    VASCULAR FINDINGS:    Right lower extremity:  Multifocal stenosis throughout the right posterior tibialis artery due to  atherosclerotic calcifications as well as the peroneal artery.  -The right posterior tibialis artery is not opacified beyond the mid right lower  leg.  -The anterior tibial artery is not opacified beyond the right lower leg just  above the tibial plafond.  -The peroneal artery is opacified beyond the level of the tibial plafond. Proximally, there is multifocal stenosis of the right femoral artery with loss  of opacification at the mid to distal thigh. Reconstitution of opacification at  the level of the posterior tibialis artery likely due to collaterals from  geniculate arteries and the deep femoral artery. 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  -Left lower leg perfusion is contributed by collaterals from the deep femoral  artery and geniculate arteries. Multifocal stenosis/occlusion of the left  posterior tibialis artery and left peroneal artery. No opacification of the  peroneal artery or posterior tibialis artery is seen to be on the mid to  proximal third of the left lower leg. The left dorsal pedis is opacified, and  likely contributes to some additional opacified vessel seen in the left foot. Abdominal aorta:  -Severe atherosclerosis with moderate compromise of the lumen, similar to prior  exam of 7/2017.  -No evidence for abdominal aortic aneurysm. -Severe stenosis at the proximal aspect of the right common iliac artery, well  opacified distally, similar to prior exam. Multifocal moderate stenosis at the  left common iliac artery similar to prior exam.  -Multifocal stenosis at the internal iliac arteries, with good opacification  distally.  -Celiac artery, SMA, left renal artery, and right renal artery are grossly  patent.  The right renal artery again shown to originate from the descending  thoracic aorta. ALFREDA is not well visualized. ABDOMEN/PELVIS FINDINGS:  Lower chest: Small right pleural effusion with overlying atelectasis. Liver: Stable subcentimeter hypodense lesions liver, too small to characterize  but stability suggests benignity    Biliary: Gallbladder is mildly distended but otherwise unremarkable. Spleen: Negative    Pancreas: There is some peripancreatic edema adjacent to the pancreatic tail. Adrenal glands: Diffuse thickening of the adrenal glands, without discrete mass,  similar to prior exam.    Kidneys: Malrotated kidneys again noted, without evidence for hydronephrosis. GI tract: The bowel appears nonobstructed. Question of mild mural thickening at  the distal esophagus. Questionable mild mural thickening at the greater  curvature of the stomach adjacent to the edema along the pancreatic tail. Colonic diverticulosis, without evidence for diverticulitis. Normal appendix. Peritoneum: No free air    Lymph nodes: No lymphadenopathy. Pelvis: Urinary bladder is unremarkable. Fibroid uterus again noted. Body wall/soft tissues: Small fat-containing umbilical hernia. Small right lower  spigelian hernia containing a small amount of fluid measuring 2.5 x 1.2 cm  (image 203), previously measured 2.9 x 1.6 cm. Mild edema along the right flank. Bones:  -Bilateral moderate knee osteoarthrosis. Small right knee joint effusion and  trace left knee joint effusion.  -Diffuse soft tissue swelling at the feet, right greater than left. Trace  subcutaneous emphysema adjacent to the right fifth MTP joint. Left distal tibial  and talar hardware noted. -Multilevel degenerative spondylosis and disc disease noted, not well evaluated  on current exam.  -Moderate degenerative change at the hips      Impression IMPRESSION:  1.  Right lower extremity:  Multifocal stenosis throughout the right posterior tibialis artery due to  atherosclerotic calcifications as well as the peroneal artery.  -The right posterior tibialis artery is not opacified beyond the mid right lower  leg.  -The anterior tibial artery is not opacified beyond the right lower leg just  above the tibial plafond.  -The peroneal artery is opacified beyond the level of the tibial plafond. Proximally, there is multifocal stenosis of the right femoral artery with loss  of opacification at the mid to distal thigh. Reconstitution of opacification at  the level of the posterior tibialis artery likely due to collaterals from  geniculate arteries and the deep femoral artery. 2. Diffuse soft tissue swelling at bilateral feet, right greater than left. -Suggestion of trace air along the right fifth MTP joint, could be degenerative  but infectious process not excluded. Consider dedicated right foot x-ray for  further evaluation. 3. Chronic multilevel stenosis of the aorta, iliac arteries, and left lower  extremity as described. 4. Peripancreatic edema along the pancreatic tail is increased since 1/3/2020.  -Associated adjacent mild presumed reactive mural thickening of the greater  curvature of the stomach. 5. Small right pleural effusion. 6. Suggestion of mild mural thickening of the distal esophagus, may indicate  nonspecific esophagitis. 7. Bilateral knee osteoarthrosis with small right knee joint effusion and trace  left knee joint effusion. 8. Fibroid uterus. 9. Colonic diverticulosis, without evidence for diverticulitis. 10. Additional nonacute findings are as described. XRAY (Most Recent)      EKG No results found for this or any previous visit.      2D ECHO

## 2020-01-27 LAB
ERYTHROCYTE [DISTWIDTH] IN BLOOD BY AUTOMATED COUNT: 17.6 % (ref 11.6–14.5)
GLUCOSE BLD STRIP.AUTO-MCNC: 138 MG/DL (ref 70–110)
GLUCOSE BLD STRIP.AUTO-MCNC: 158 MG/DL (ref 70–110)
GLUCOSE BLD STRIP.AUTO-MCNC: 85 MG/DL (ref 70–110)
GLUCOSE BLD STRIP.AUTO-MCNC: 87 MG/DL (ref 70–110)
HCT VFR BLD AUTO: 27 % (ref 35–45)
HGB BLD-MCNC: 8.2 G/DL (ref 12–16)
MCH RBC QN AUTO: 32.7 PG (ref 24–34)
MCHC RBC AUTO-ENTMCNC: 30.4 G/DL (ref 31–37)
MCV RBC AUTO: 107.6 FL (ref 74–97)
PLATELET # BLD AUTO: 319 K/UL (ref 135–420)
PMV BLD AUTO: 9.4 FL (ref 9.2–11.8)
RBC # BLD AUTO: 2.51 M/UL (ref 4.2–5.3)
WBC # BLD AUTO: 6.7 K/UL (ref 4.6–13.2)

## 2020-01-27 PROCEDURE — 74011250637 HC RX REV CODE- 250/637: Performed by: EMERGENCY MEDICINE

## 2020-01-27 PROCEDURE — 74011636637 HC RX REV CODE- 636/637: Performed by: INTERNAL MEDICINE

## 2020-01-27 PROCEDURE — 36415 COLL VENOUS BLD VENIPUNCTURE: CPT

## 2020-01-27 PROCEDURE — 82962 GLUCOSE BLOOD TEST: CPT

## 2020-01-27 PROCEDURE — 85027 COMPLETE CBC AUTOMATED: CPT

## 2020-01-27 PROCEDURE — 74011250637 HC RX REV CODE- 250/637: Performed by: HOSPITALIST

## 2020-01-27 PROCEDURE — 74011250636 HC RX REV CODE- 250/636: Performed by: NURSE PRACTITIONER

## 2020-01-27 PROCEDURE — 74011000258 HC RX REV CODE- 258: Performed by: HOSPITALIST

## 2020-01-27 PROCEDURE — 74011250636 HC RX REV CODE- 250/636: Performed by: PHYSICIAN ASSISTANT

## 2020-01-27 PROCEDURE — 74011636637 HC RX REV CODE- 636/637: Performed by: HOSPITALIST

## 2020-01-27 PROCEDURE — 65660000000 HC RM CCU STEPDOWN

## 2020-01-27 PROCEDURE — 74011250637 HC RX REV CODE- 250/637: Performed by: INTERNAL MEDICINE

## 2020-01-27 PROCEDURE — 77030038269 HC DRN EXT URIN PURWCK BARD -A

## 2020-01-27 RX ORDER — DEXTROSE MONOHYDRATE AND SODIUM CHLORIDE 5; .9 G/100ML; G/100ML
75 INJECTION, SOLUTION INTRAVENOUS CONTINUOUS
Status: DISCONTINUED | OUTPATIENT
Start: 2020-01-27 | End: 2020-01-27

## 2020-01-27 RX ADMIN — INSULIN GLARGINE 32 UNITS: 100 INJECTION, SOLUTION SUBCUTANEOUS at 09:20

## 2020-01-27 RX ADMIN — Medication 81 MG: at 09:21

## 2020-01-27 RX ADMIN — INSULIN LISPRO 2 UNITS: 100 INJECTION, SOLUTION INTRAVENOUS; SUBCUTANEOUS at 21:00

## 2020-01-27 RX ADMIN — EPOETIN ALFA-EPBX 20000 UNITS: 10000 INJECTION, SOLUTION INTRAVENOUS; SUBCUTANEOUS at 20:44

## 2020-01-27 RX ADMIN — DOCUSATE SODIUM 100 MG: 100 CAPSULE, LIQUID FILLED ORAL at 09:21

## 2020-01-27 RX ADMIN — FAMOTIDINE 20 MG: 20 TABLET, FILM COATED ORAL at 09:21

## 2020-01-27 RX ADMIN — TAMSULOSIN HYDROCHLORIDE 0.4 MG: 0.4 CAPSULE ORAL at 09:21

## 2020-01-27 RX ADMIN — OXYCODONE HYDROCHLORIDE AND ACETAMINOPHEN 1 TABLET: 5; 325 TABLET ORAL at 18:27

## 2020-01-27 RX ADMIN — METOPROLOL TARTRATE 12.5 MG: 25 TABLET ORAL at 09:21

## 2020-01-27 RX ADMIN — AMLODIPINE BESYLATE 10 MG: 10 TABLET ORAL at 09:21

## 2020-01-27 RX ADMIN — DEXTROSE MONOHYDRATE AND SODIUM CHLORIDE 75 ML/HR: 5; .9 INJECTION, SOLUTION INTRAVENOUS at 12:25

## 2020-01-27 RX ADMIN — DOCUSATE SODIUM 100 MG: 100 CAPSULE, LIQUID FILLED ORAL at 18:06

## 2020-01-27 RX ADMIN — HEPARIN SODIUM 5000 UNITS: 5000 INJECTION INTRAVENOUS; SUBCUTANEOUS at 20:44

## 2020-01-27 RX ADMIN — INSULIN LISPRO 3 UNITS: 100 INJECTION, SOLUTION INTRAVENOUS; SUBCUTANEOUS at 18:07

## 2020-01-27 NOTE — PROGRESS NOTES
RENAL DAILY PROGRESS NOTE             [de-identified] F with DM, HTN, PAD, admitted with acute pancreatitis, seen for renal failure  Subjective:     Complaint:   Appears confused, worried     IMPRESSION:   Acute kidney injury , pre renal, dehydration from severe pancreatitis , recovered. Metabolic acidosis , better   Hypernatremia   Hyperkalemia, mild  Anemia, Jehova's witness, on iron  Severe PAD, right foot gangren   PLAN:   ·  Her renal function is stable. Reviewed vascular colleague request to keep Hb > 8gm/dl. I will start her on epogen along with her iron. She will likely benefit from Hematology consult for her anemia management. Adjust medication for current renal function status. Discussed with Dr. Ling Daly.        Current Facility-Administered Medications   Medication Dose Route Frequency    dextrose 5% and 0.9% NaCl infusion  75 mL/hr IntraVENous CONTINUOUS    insulin lispro (HUMALOG) injection 3 Units  3 Units SubCUTAneous TIDAC    ondansetron (ZOFRAN) injection 4 mg  4 mg IntraVENous Q6H PRN    acetaminophen (TYLENOL) tablet 650 mg  650 mg Oral Q4H PRN    iron sucrose (VENOFER) 300 mg in 0.9% sodium chloride 250 mL IVPB  300 mg IntraVENous Q3DAYS    insulin glargine (LANTUS) injection 32 Units  32 Units SubCUTAneous DAILY    amLODIPine (NORVASC) tablet 10 mg  10 mg Oral DAILY    metoprolol tartrate (LOPRESSOR) tablet 12.5 mg  12.5 mg Oral Q12H    hydrALAZINE (APRESOLINE) 20 mg/mL injection 10 mg  10 mg IntraVENous Q6H PRN    bisacodyL (DULCOLAX) tablet 10 mg  10 mg Oral DAILY PRN    docusate sodium (COLACE) capsule 100 mg  100 mg Oral BID    0.9% sodium chloride infusion 250 mL  250 mL IntraVENous PRN    tamsulosin (FLOMAX) capsule 0.4 mg  0.4 mg Oral DAILY    dextrose (D25W) 25% injection 10 mL  2.5 g IntraVENous PRN    insulin lispro (HUMALOG) injection   SubCUTAneous AC&HS    famotidine (PEPCID) tablet 20 mg  20 mg Oral DAILY    bisacodyl (DULCOLAX) suppository 10 mg  10 mg Rectal DAILY PRN    polyethylene glycol (MIRALAX) packet 17 g  17 g Oral DAILY    aspirin chewable tablet 81 mg  81 mg Oral DAILY    oxyCODONE-acetaminophen (PERCOCET) 5-325 mg per tablet 1-2 Tab  1-2 Tab Oral Q6H PRN    dextrose 10% infusion 125-250 mL  125-250 mL IntraVENous PRN    heparin (porcine) injection 5,000 Units  5,000 Units SubCUTAneous Q8H    glucose chewable tablet 16 g  4 Tab Oral PRN    glucagon (GLUCAGEN) injection 1 mg  1 mg IntraMUSCular PRN       Review of Symptoms: comprehensive ROS negative except above. Objective:     Patient Vitals for the past 24 hrs:   Temp Pulse Resp BP SpO2   01/27/20 1220 97.6 °F (36.4 °C) 74 18 119/67 98 %   01/27/20 0805 98.2 °F (36.8 °C) 93 18 122/63 98 %   01/27/20 0424 98.2 °F (36.8 °C) 77 18 105/67 98 %   01/27/20 0016 98.5 °F (36.9 °C) 82 18 130/69 98 %   01/26/20 2038 98.9 °F (37.2 °C) 82 19 145/67 100 %   01/26/20 1455 99.7 °F (37.6 °C) 78 16 115/68 96 %        Weight change: 0 kg (0 lb)     01/25 1901 - 01/27 0700  In: -   Out: 800 [Urine:800]  No intake or output data in the 24 hours ending 01/27/20 1313  Physical Exam:   General: comfortable, no acute distress   HEENT sclera anicteric, supple neck, no thyromegaly  CVS: S1S2 heard,  no rub  RS: + air entry b/l,   Abd: Soft, Non tender, Not distended,  Neuro: non focal, awake, alert , CN II-XII are grossly intact  Extrm: right foot cynosis , cool on touch  Musculoskeletal: No gross joints or bone deformities         Data Review:     LABS:   Hematology:   Recent Labs     01/27/20  0543 01/26/20  0648   WBC 6.7  --    HGB 8.2* 7.5*   HCT 27.0* 24.4*     Chemistry:   No results for input(s): BUN, CREA, CA, ALB, K, NA, CL, CO2, PHOS, GLU in the last 72 hours.          Procedures/imaging: see electronic medical records for all procedures, Xrays and details which were not copied into this note but were reviewed prior to creation of Plan          Assessment & Plan:     As above         Placido Cushing, MD  1/27/2020  1:02 PM

## 2020-01-27 NOTE — PROGRESS NOTES
Hospitalist Progress Note    Patient: Faith Caballero Age: 79 y.o. : 1940 MR#: [id number] SSN: [id number]  Date/Time: 2020     DOA: 2020  PCP: Paige Mederos MD    Subjective:       Patient feeling fine, denies any pain in her leg. No new complaints        Interval Hospital Course:  79 y.o female with prolonged hospitalization since her admission 3/20/8863 for metabolic encephalopathy related to HHS, acute pancreatitis, EFRAIN. She has recovered from [facility], pancreatitis/EFRAIN but developed worsened ischemia of her right foot due to right lower extremity PAD with gangrene, CTA with multifocal stenosis of right lower extremity. She underwent angio with balloon angioplasty and stent placement at origin of common iliac (2020). She underwent right femoral to above-knee popliteal bypass (2020). Podiatry consulted and followed but now recommended amputation. She has been waiting for her Hgb/Hct to improved to level where it will be safe for her to surgery without needing transfusion. Hematology has been consulted. Assessment/Plan:       1)  Right leg PAD with gangrene, associate with uncontrolled DM2      - CTA with multifocal stenosis of right lower extremity      - s/p angio with balloon angioplasty and stent orf right common iliac artery 2020      - right Fem-Pop bypass 2020       VASCULAR TO FOLLOW UP FOR right AKA when Hgb >8   2)  Hyperglycemia with Type 2 diabetes, stable       - admitted with Hyperosmolar non-ketotic state in type 2 diabetes mellitus, resolved    3)  Acute on chronic renal failure Stage 3, resolved   4)  Metabolic acidosis due to renal issues, resolved  5)  Acute metabolic encephalopathy, improved  6)  Acute pancreatitis, resolved, GI recommended CT abd in 8 weeks   7)  Hypertension, tolerated bblocker   8)  Hypophosphatemia, replaced   9)  Normocytic anemia of chronic disease, recently decreased.  Not amenable for transfusion due to Caodaism  10) Urinary retention, gutierrez out. Resolved     Plan  Hemoglobin 8.2, OR cancel today and plan for amputation on Thursday per vascular surgery   Given patient is a [employer], surgery is been delayed to improve hemoglobin. Will limit blood draws, continue Epogen and monitor H&H every other day. Discussed with hematology about increasing Neupogen dose  No signs of infection, monitor off antibiotics  Continue Lantus and ISS   Cont ASA, flomax, amlodipine and metoprolol. Full code   Eppie Fee phone [phone]. Case discussed with:  [x]Patient  []Family  [x]Nursing  [x]Case Management  DVT Prophylaxis:  []Lovenox  []Hep SQ  [x]SCDs  []Coumadin   []On Heparin gtt    Signed By: Dimas Kerr MD     2020               Objective:   VS:   Visit Vitals  /63 (BP 1 Location: Left arm, BP Patient Position: At rest)   Pulse 93   Temp 98.2 °F (36.8 °C)   Resp 18   Ht 5' 5\" (1.651 m)   Wt 71.7 kg (158 lb)   SpO2 98%   Breastfeeding No   BMI 26.29 kg/m²      Tmax/24hrs: Temp (24hrs), Av.8 °F (37.1 °C), Min:98.2 °F (36.8 °C), Max:99.7 °F (37.6 °C)    No intake or output data in the 24 hours ending 20 1107    General:   Alert awake, Not in acute distress  Cardiovascular: S1S2 regular  Pulmonary: Clear air entry bilaterally, no wheezing, no crackle  GI:  Soft, non tender, non distended, +bs   Extremities: Right foot necrosis/gangrene, no redness erythema. Neuro: AOx2-3, moving all extremities, no gross deficit.      Additional:       Current Facility-Administered Medications   Medication Dose Route Frequency    dextrose 5% and 0.9% NaCl infusion  75 mL/hr IntraVENous CONTINUOUS    insulin lispro (HUMALOG) injection 3 Units  3 Units SubCUTAneous TIDAC    ondansetron (ZOFRAN) injection 4 mg  4 mg IntraVENous Q6H PRN    acetaminophen (TYLENOL) tablet 650 mg  650 mg Oral Q4H PRN    iron sucrose (VENOFER) 300 mg in 0.9% sodium chloride 250 mL IVPB  300 mg IntraVENous Q3DAYS    insulin glargine (LANTUS) injection 32 Units  32 Units SubCUTAneous DAILY    amLODIPine (NORVASC) tablet 10 mg  10 mg Oral DAILY    metoprolol tartrate (LOPRESSOR) tablet 12.5 mg  12.5 mg Oral Q12H    hydrALAZINE (APRESOLINE) 20 mg/mL injection 10 mg  10 mg IntraVENous Q6H PRN    bisacodyL (DULCOLAX) tablet 10 mg  10 mg Oral DAILY PRN    docusate sodium (COLACE) capsule 100 mg  100 mg Oral BID    0.9% sodium chloride infusion 250 mL  250 mL IntraVENous PRN    tamsulosin (FLOMAX) capsule 0.4 mg  0.4 mg Oral DAILY    dextrose (D25W) 25% injection 10 mL  2.5 g IntraVENous PRN    insulin lispro (HUMALOG) injection   SubCUTAneous AC&HS    famotidine (PEPCID) tablet 20 mg  20 mg Oral DAILY    bisacodyl (DULCOLAX) suppository 10 mg  10 mg Rectal DAILY PRN    polyethylene glycol (MIRALAX) packet 17 g  17 g Oral DAILY    aspirin chewable tablet 81 mg  81 mg Oral DAILY    oxyCODONE-acetaminophen (PERCOCET) 5-325 mg per tablet 1-2 Tab  1-2 Tab Oral Q6H PRN    dextrose 10% infusion 125-250 mL  125-250 mL IntraVENous PRN    heparin (porcine) injection 5,000 Units  5,000 Units SubCUTAneous Q8H    glucose chewable tablet 16 g  4 Tab Oral PRN    glucagon (GLUCAGEN) injection 1 mg  1 mg IntraMUSCular PRN            Lab/Data Review:  Labs: Results:       Chemistry No results for input(s): GLU, NA, K, CL, CO2, BUN, CREA, BUCR, AGAP, CA, PHOS in the last 72 hours. No results for input(s): TBIL, ALT, SGOT, ALKP, TP, ALB, GLOB, AGRAT in the last 72 hours. CBC w/Diff Recent Labs     01/27/20  0543 01/26/20  0648   WBC 6.7  --    RBC 2.51*  --    HGB 8.2* 7.5*   HCT 27.0* 24.4*   .6*  --    MCH 32.7  --    MCHC 30.4*  --    RDW 17.6*  --      --       Coagulation No results for input(s): PTP, INR, APTT, INREXT, INREXT in the last 72 hours.     Iron/Ferritin Lab Results   Component Value Date/Time    Iron 22 (L) 01/19/2020 04:05 AM    TIBC 116 (L) 01/19/2020 04:05 AM    Iron % saturation 19 01/19/2020 04:05 AM    Ferritin 100 01/15/2020 03:33 AM       BNP    Cardiac Enzymes Lab Results   Component Value Date/Time    Troponin-I, QT <0.02 01/02/2020 11:25 PM        Lactic Acid    Thyroid Studies          All Micro Results     Procedure Component Value Units Date/Time    CULTURE, BLOOD [[id number]] Collected:  01/02/20 2206    Order Status:  Completed Specimen:  Blood Updated:  01/08/20 0642     Special Requests: NO SPECIAL REQUESTS        Culture result: NO GROWTH 6 DAYS       CULTURE, BLOOD [[id number]] Collected:  01/02/20 2206    Order Status:  Completed Specimen:  Blood Updated:  01/08/20 9224     Special Requests: NO SPECIAL REQUESTS        Culture result: NO GROWTH 6 DAYS               Images:    CT (Most Recent). CT Results (most recent):  Results from Hospital Encounter encounter on 01/02/20   CTA ABD ART W RUNOFF W WO CONT    Narrative PROCEDURE: CTA of abdomen and pelvis and bilateral lower extremity runoff with  intravenous contrast administration. HISTORY: PAD, ischemia of the right foot. TECHNIQUE: Multidetector helical CT angiography was carried out from low chest  through the feet following dynamic 70 cc Isovue-300 intravenous contrast  administration . Scan timing was performed using automated bolus tracking/SMART  Prep. 3-D and MPR angiographic image reconstruction was performed on  independent workstation and separately reviewed. Parenchymal imaging is limited  by the arterial phase of contrast administration. All CT scans at this facility  are performed using dose optimization techniques as appropriate to a performed  exam, to include automated exposure control, adjustment of the mA and/or kV  according to patient's size (including appropriate matching for site specific  examinations), or use of iterative reconstruction technique. COMPARISON: CT abdomen/pelvis 1/3/2020. CTA abdomen/pelvis with extremity runoff  7/18/2017.     FINDINGS:    VASCULAR FINDINGS:    Right lower extremity:  Multifocal stenosis throughout the right posterior tibialis artery due to  atherosclerotic calcifications as well as the peroneal artery.  -The right posterior tibialis artery is not opacified beyond the mid right lower  leg.  -The anterior tibial artery is not opacified beyond the right lower leg just  above the tibial plafond.  -The peroneal artery is opacified beyond the level of the tibial plafond. Proximally, there is multifocal stenosis of the right femoral artery with loss  of opacification at the mid to distal thigh. Reconstitution of opacification at  the level of the posterior tibialis artery likely due to collaterals from  geniculate arteries and the deep femoral artery. 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  -Left lower leg perfusion is contributed by collaterals from the deep femoral  artery and geniculate arteries. Multifocal stenosis/occlusion of the left  posterior tibialis artery and left peroneal artery. No opacification of the  peroneal artery or posterior tibialis artery is seen to be on the mid to  proximal third of the left lower leg. The left dorsal pedis is opacified, and  likely contributes to some additional opacified vessel seen in the left foot. Abdominal aorta:  -Severe atherosclerosis with moderate compromise of the lumen, similar to prior  exam of 7/2017.  -No evidence for abdominal aortic aneurysm. -Severe stenosis at the proximal aspect of the right common iliac artery, well  opacified distally, similar to prior exam. Multifocal moderate stenosis at the  left common iliac artery similar to prior exam.  -Multifocal stenosis at the internal iliac arteries, with good opacification  distally.  -Celiac artery, SMA, left renal artery, and right renal artery are grossly  patent. The right renal artery again shown to originate from the descending  thoracic aorta. ALFREDA is not well visualized.     ABDOMEN/PELVIS FINDINGS:  Lower chest: Small right pleural effusion with overlying atelectasis. Liver: Stable subcentimeter hypodense lesions liver, too small to characterize  but stability suggests benignity    Biliary: Gallbladder is mildly distended but otherwise unremarkable. Spleen: Negative    Pancreas: There is some peripancreatic edema adjacent to the pancreatic tail. Adrenal glands: Diffuse thickening of the adrenal glands, without discrete mass,  similar to prior exam.    Kidneys: Malrotated kidneys again noted, without evidence for hydronephrosis. GI tract: The bowel appears nonobstructed. Question of mild mural thickening at  the distal esophagus. Questionable mild mural thickening at the greater  curvature of the stomach adjacent to the edema along the pancreatic tail. Colonic diverticulosis, without evidence for diverticulitis. Normal appendix. Peritoneum: No free air    Lymph nodes: No lymphadenopathy. Pelvis: Urinary bladder is unremarkable. Fibroid uterus again noted. Body wall/soft tissues: Small fat-containing umbilical hernia. Small right lower  spigelian hernia containing a small amount of fluid measuring 2.5 x 1.2 cm  (image 203), previously measured 2.9 x 1.6 cm. Mild edema along the right flank. Bones:  -Bilateral moderate knee osteoarthrosis. Small right knee joint effusion and  trace left knee joint effusion.  -Diffuse soft tissue swelling at the feet, right greater than left. Trace  subcutaneous emphysema adjacent to the right fifth MTP joint. Left distal tibial  and talar hardware noted. -Multilevel degenerative spondylosis and disc disease noted, not well evaluated  on current exam.  -Moderate degenerative change at the hips      Impression IMPRESSION:  1.  Right lower extremity:  Multifocal stenosis throughout the right posterior tibialis artery due to  atherosclerotic calcifications as well as the peroneal artery.  -The right posterior tibialis artery is not opacified beyond the mid right lower  leg.  -The anterior tibial artery is not opacified beyond the right lower leg just  above the tibial plafond.  -The peroneal artery is opacified beyond the level of the tibial plafond. Proximally, there is multifocal stenosis of the right femoral artery with loss  of opacification at the mid to distal thigh. Reconstitution of opacification at  the level of the posterior tibialis artery likely due to collaterals from  geniculate arteries and the deep femoral artery. 2. Diffuse soft tissue swelling at bilateral feet, right greater than left. -Suggestion of trace air along the right fifth MTP joint, could be degenerative  but infectious process not excluded. Consider dedicated right foot x-ray for  further evaluation. 3. Chronic multilevel stenosis of the aorta, iliac arteries, and left lower  extremity as described. 4. Peripancreatic edema along the pancreatic tail is increased since 1/3/2020.  -Associated adjacent mild presumed reactive mural thickening of the greater  curvature of the stomach. 5. Small right pleural effusion. 6. Suggestion of mild mural thickening of the distal esophagus, may indicate  nonspecific esophagitis. 7. Bilateral knee osteoarthrosis with small right knee joint effusion and trace  left knee joint effusion. 8. Fibroid uterus. 9. Colonic diverticulosis, without evidence for diverticulitis. 10. Additional nonacute findings are as described. XRAY (Most Recent)      EKG No results found for this or any previous visit.      2D ECHO

## 2020-01-27 NOTE — ROUTINE PROCESS
Verbal bedside shift report given to Brina Sanderson RN oncoming nurse by Isabel Harry RN off going nurse including KARDEX, SBAR and [facility]

## 2020-01-27 NOTE — PROGRESS NOTES
PT orders received, chart reviewed. Pt unable to participate with PT due to:  []  Nausea/vomiting  []  Eating  []  Pain  []  Pt lethargic  []  Off Unit  [x]  Pt refused. PT educated on importance of therapy and need to practice mobility especially prior to surgery. Pt is upset that she isn't having the BKA done today and that they are changing it to an AKA on Thursday. PT attempted multiple times and different ways to have pt participate in therapy with need to work on functional mobility pt refused multiple times. []  Other   Will f/u later as patient's schedule allows.  Thank you for this referral.  Melly Vasquez, PT, DPT

## 2020-01-27 NOTE — PROGRESS NOTES
No plan for OR today  Hbg improved on epogen. Monitor   Will plan for OR on Thursday for AKA  OK to feed today.

## 2020-01-28 LAB
GLUCOSE BLD STRIP.AUTO-MCNC: 111 MG/DL (ref 70–110)
GLUCOSE BLD STRIP.AUTO-MCNC: 176 MG/DL (ref 70–110)
GLUCOSE BLD STRIP.AUTO-MCNC: 177 MG/DL (ref 70–110)
GLUCOSE BLD STRIP.AUTO-MCNC: 292 MG/DL (ref 70–110)

## 2020-01-28 PROCEDURE — 74011636637 HC RX REV CODE- 636/637: Performed by: INTERNAL MEDICINE

## 2020-01-28 PROCEDURE — 74011250637 HC RX REV CODE- 250/637: Performed by: EMERGENCY MEDICINE

## 2020-01-28 PROCEDURE — 65660000000 HC RM CCU STEPDOWN

## 2020-01-28 PROCEDURE — 74011636637 HC RX REV CODE- 636/637: Performed by: HOSPITALIST

## 2020-01-28 PROCEDURE — 82962 GLUCOSE BLOOD TEST: CPT

## 2020-01-28 PROCEDURE — 74011250636 HC RX REV CODE- 250/636: Performed by: PHYSICIAN ASSISTANT

## 2020-01-28 PROCEDURE — 74011250637 HC RX REV CODE- 250/637: Performed by: HOSPITALIST

## 2020-01-28 PROCEDURE — 74011250637 HC RX REV CODE- 250/637: Performed by: NURSE PRACTITIONER

## 2020-01-28 PROCEDURE — 74011250637 HC RX REV CODE- 250/637: Performed by: INTERNAL MEDICINE

## 2020-01-28 PROCEDURE — 97168 OT RE-EVAL EST PLAN CARE: CPT

## 2020-01-28 PROCEDURE — 97530 THERAPEUTIC ACTIVITIES: CPT

## 2020-01-28 RX ADMIN — BISACODYL 10 MG: 5 TABLET, COATED ORAL at 13:50

## 2020-01-28 RX ADMIN — HEPARIN SODIUM 5000 UNITS: 5000 INJECTION INTRAVENOUS; SUBCUTANEOUS at 20:34

## 2020-01-28 RX ADMIN — INSULIN LISPRO 2 UNITS: 100 INJECTION, SOLUTION INTRAVENOUS; SUBCUTANEOUS at 09:20

## 2020-01-28 RX ADMIN — INSULIN GLARGINE 32 UNITS: 100 INJECTION, SOLUTION SUBCUTANEOUS at 09:27

## 2020-01-28 RX ADMIN — AMLODIPINE BESYLATE 10 MG: 10 TABLET ORAL at 09:19

## 2020-01-28 RX ADMIN — INSULIN LISPRO 2 UNITS: 100 INJECTION, SOLUTION INTRAVENOUS; SUBCUTANEOUS at 23:30

## 2020-01-28 RX ADMIN — INSULIN LISPRO 3 UNITS: 100 INJECTION, SOLUTION INTRAVENOUS; SUBCUTANEOUS at 17:36

## 2020-01-28 RX ADMIN — POLYETHYLENE GLYCOL 3350 17 G: 17 POWDER, FOR SOLUTION ORAL at 09:19

## 2020-01-28 RX ADMIN — INSULIN LISPRO 6 UNITS: 100 INJECTION, SOLUTION INTRAVENOUS; SUBCUTANEOUS at 12:36

## 2020-01-28 RX ADMIN — INSULIN LISPRO 3 UNITS: 100 INJECTION, SOLUTION INTRAVENOUS; SUBCUTANEOUS at 12:36

## 2020-01-28 RX ADMIN — HEPARIN SODIUM 5000 UNITS: 5000 INJECTION INTRAVENOUS; SUBCUTANEOUS at 12:37

## 2020-01-28 RX ADMIN — METOPROLOL TARTRATE 25 MG: 25 TABLET ORAL at 09:19

## 2020-01-28 RX ADMIN — OXYCODONE HYDROCHLORIDE AND ACETAMINOPHEN 1 TABLET: 5; 325 TABLET ORAL at 17:35

## 2020-01-28 RX ADMIN — DOCUSATE SODIUM 100 MG: 100 CAPSULE, LIQUID FILLED ORAL at 09:19

## 2020-01-28 RX ADMIN — FAMOTIDINE 20 MG: 20 TABLET, FILM COATED ORAL at 09:19

## 2020-01-28 RX ADMIN — Medication 81 MG: at 09:19

## 2020-01-28 RX ADMIN — OXYCODONE HYDROCHLORIDE AND ACETAMINOPHEN 2 TABLET: 5; 325 TABLET ORAL at 09:27

## 2020-01-28 RX ADMIN — DOCUSATE SODIUM 100 MG: 100 CAPSULE, LIQUID FILLED ORAL at 17:35

## 2020-01-28 RX ADMIN — HEPARIN SODIUM 5000 UNITS: 5000 INJECTION INTRAVENOUS; SUBCUTANEOUS at 05:11

## 2020-01-28 RX ADMIN — TAMSULOSIN HYDROCHLORIDE 0.4 MG: 0.4 CAPSULE ORAL at 09:19

## 2020-01-28 RX ADMIN — INSULIN LISPRO 3 UNITS: 100 INJECTION, SOLUTION INTRAVENOUS; SUBCUTANEOUS at 09:20

## 2020-01-28 NOTE — ROUTINE PROCESS
Bedside shift change report given to JUNE Davison. Report included SBAR, Kardex, MAR, Recent Results, and Plan of Care. Pt in bed with call light in reach.

## 2020-01-28 NOTE — ROUTINE PROCESS
Verbal bedside shift report given to Xi Gray, RN oncoming nurse by Dmitry Cassidy RN off going nurse including KARDEX, SBAR and [facility]

## 2020-01-28 NOTE — ROUTINE PROCESS
Bedside shift change report received from [address]. Report included SBAR, Kardex, MAR, Recent Results, and Plan of Care. Pt in bed with call light in reach

## 2020-01-28 NOTE — PROGRESS NOTES
RENAL DAILY PROGRESS NOTE             [de-identified] F with DM, HTN, PAD, admitted with acute pancreatitis, seen for renal failure  Subjective:     Complaint:   Plan for surgery noted  Denies for any complaint. IMPRESSION:   Acute kidney injury , pre renal, dehydration from severe pancreatitis , recovered. Metabolic acidosis , better   Hypernatremia   Hyperkalemia, mild  Anemia, Jehova's witness, on iron  Severe PAD, right foot gangren   PLAN:   · Her renal function is close to her baseline. Will be available if any question or concern. · Follow hematology colleague for anemia management. Adjust medication for current renal function status. Discussed with Dr. Stacey Jolly.        Current Facility-Administered Medications   Medication Dose Route Frequency    epoetin bipin-epbx (RETACRIT) injection 20,000 Units  20,000 Units SubCUTAneous Q MON, WED & FRI    insulin lispro (HUMALOG) injection 3 Units  3 Units SubCUTAneous TIDAC    ondansetron (ZOFRAN) injection 4 mg  4 mg IntraVENous Q6H PRN    acetaminophen (TYLENOL) tablet 650 mg  650 mg Oral Q4H PRN    iron sucrose (VENOFER) 300 mg in 0.9% sodium chloride 250 mL IVPB  300 mg IntraVENous Q3DAYS    insulin glargine (LANTUS) injection 32 Units  32 Units SubCUTAneous DAILY    amLODIPine (NORVASC) tablet 10 mg  10 mg Oral DAILY    metoprolol tartrate (LOPRESSOR) tablet 12.5 mg  12.5 mg Oral Q12H    hydrALAZINE (APRESOLINE) 20 mg/mL injection 10 mg  10 mg IntraVENous Q6H PRN    bisacodyL (DULCOLAX) tablet 10 mg  10 mg Oral DAILY PRN    docusate sodium (COLACE) capsule 100 mg  100 mg Oral BID    0.9% sodium chloride infusion 250 mL  250 mL IntraVENous PRN    tamsulosin (FLOMAX) capsule 0.4 mg  0.4 mg Oral DAILY    dextrose (D25W) 25% injection 10 mL  2.5 g IntraVENous PRN    insulin lispro (HUMALOG) injection   SubCUTAneous AC&HS    famotidine (PEPCID) tablet 20 mg  20 mg Oral DAILY    bisacodyl (DULCOLAX) suppository 10 mg  10 mg Rectal DAILY PRN    polyethylene glycol (MIRALAX) packet 17 g  17 g Oral DAILY    aspirin chewable tablet 81 mg  81 mg Oral DAILY    oxyCODONE-acetaminophen (PERCOCET) 5-325 mg per tablet 1-2 Tab  1-2 Tab Oral Q6H PRN    dextrose 10% infusion 125-250 mL  125-250 mL IntraVENous PRN    heparin (porcine) injection 5,000 Units  5,000 Units SubCUTAneous Q8H    glucose chewable tablet 16 g  4 Tab Oral PRN    glucagon (GLUCAGEN) injection 1 mg  1 mg IntraMUSCular PRN       Review of Symptoms: comprehensive ROS negative except above. Objective:     Patient Vitals for the past 24 hrs:   Temp Pulse Resp BP SpO2   01/28/20 1123 98.1 °F (36.7 °C) 74 18 98/43 99 %   01/28/20 0752 98.2 °F (36.8 °C) 100 18 125/71 98 %   01/28/20 0355 97.6 °F (36.4 °C) 83 18 99/64 100 %   01/27/20 2329 97.6 °F (36.4 °C) 90 18 110/72 99 %   01/27/20 2051  84  96/47    01/27/20 1930 97.6 °F (36.4 °C) 79 18  100 %   01/27/20 1648 98.2 °F (36.8 °C) 76 18 118/62 96 %   01/27/20 1220 97.6 °F (36.4 °C) 74 18 119/67 98 %        Weight change: -2.812 kg (-6 lb 3.2 oz)     01/26 1901 - 01/28 0700  In: 120 [P.O.:120]  Out: -     Intake/Output Summary (Last 24 hours) at 1/28/2020 1126  Last data filed at 1/28/2020 0900  Gross per 24 hour   Intake 360 ml   Output    Net 360 ml     Physical Exam:   General: comfortable, no acute distress   HEENT sclera anicteric, supple neck, no thyromegaly  CVS: S1S2 heard,  no rub  RS: + air entry b/l,   Abd: Soft, Non tender, Not distended,  Neuro: non focal, awake, alert , CN II-XII are grossly intact  Extrm: right foot cynosis , cool on touch  Musculoskeletal: No gross joints or bone deformities         Data Review:     LABS:   Hematology:   Recent Labs     01/27/20  0543 01/26/20  0648   WBC 6.7  --    HGB 8.2* 7.5*   HCT 27.0* 24.4*     Chemistry:   No results for input(s): BUN, CREA, CA, ALB, K, NA, CL, CO2, PHOS, GLU in the last 72 hours.          Procedures/imaging: see electronic medical records for all procedures, Xrays and details which were not copied into this note but were reviewed prior to creation of Plan          Assessment & Plan:     As above         Poppy Calixto MD  1/28/2020  1:02 PM

## 2020-01-28 NOTE — PROGRESS NOTES
Hematology/Medical Oncology Progress Note             Name: Kate Woodruff   : 1940   MRN: [id number]   Date: 2020 8:13 AM     [x]I have reviewed the flowsheet and previous days notes. Events overnight reviewed and discussed with nursing staff. Vital signs and records reviewed. 60-year-old -American female past medical history of severe PVD, chronic anemia,uncontrolled diabetes mellitus type 2 now right lower limb ischemia and dry gangrene. S/p R Fem-pop bypass. In addition patient is Buddhism and refuses blood transfusion. Initially patient received Retacrit 20,000 units X 7 days. Currently on Retacrit 20,000 units 3 X week.  will need further surgery. ROS:  Constitutional: Positive for fatigue. Negative for fever. HENT: Negative for nosebleeds, congestion, facial swelling, mouth sores, trouble swallowing, neck pain, neck stiffness, voice change and postnasal drip. Eyes: Negative for photophobia, pain, discharge and itching. Respiratory: Negative for apnea, cough, choking, chest tightness, wheezing and stridor. Cardiovascular: Negative for chest pain, palpitations and leg swelling. Gastrointestinal: Negative for abdominal pain. Negative for nausea, diarrhea, constipation, blood in stool and rectal pain. Genitourinary: Negative for dysuria, urgency, hematuria, flank pain and difficulty urinating. Musculoskeletal:Positive for right foot/leg pain. Skin: Positive for right foot gangrene. Neurological:  Negative for dizziness, facial asymmetry, speech difficulty, light-headedness and headaches. Hematological: Negative for adenopathy. Does not bruise/bleed easily. Psychiatric/Behavioral: Negative for hallucinations,confusion.     Vital Signs:    Visit Vitals  /71 (BP 1 Location: Left arm, BP Patient Position: At rest)   Pulse 100   Temp 98.2 °F (36.8 °C)   Resp 18   Ht 5' 5\" (1.651 m)   Wt 68.9 kg (151 lb 12.8 oz)   SpO2 98%   Breastfeeding No   BMI 25.26 kg/m²       O2 Device: Room air   O2 Flow Rate (L/min): 2 l/min   Temp (24hrs), Av.8 °F (36.6 °C), Min:97.6 °F (36.4 °C), Max:98.2 °F (36.8 °C)       Intake/Output:   Last shift:      No intake/output data recorded. Last 3 shifts:  1901 -  0700  In: 120 [P.O.:120]  Out: -     Intake/Output Summary (Last 24 hours) at 2020 0938  Last data filed at 2020 1816  Gross per 24 hour   Intake 120 ml   Output    Net 120 ml       Physical Exam:  General: Appears comfortable, NAD. HEENT:  Anicteric sclerae; pink palpebral conjunctivae; mucosa moist  Resp:  Symmetrical chest expansion, no accessory muscle use; good airway entry; no rales/ wheezing/ rhonchi noted  CV:  S1, S2 present; regular rate and rhythm  GI:  Abdomen soft, non-tender; (+) active bowel sounds  Extremities:  S/p R Fem-pop bypass: R foot dry gangrene.   Skin:  Warm, right foot gangrenous  Neurologic:  Non-focal         DATA:   Current Facility-Administered Medications   Medication Dose Route Frequency    epoetin bipni-epbx (RETACRIT) injection 20,000 Units  20,000 Units SubCUTAneous Q MON, WED & FRI    insulin lispro (HUMALOG) injection 3 Units  3 Units SubCUTAneous TIDAC    ondansetron (ZOFRAN) injection 4 mg  4 mg IntraVENous Q6H PRN    acetaminophen (TYLENOL) tablet 650 mg  650 mg Oral Q4H PRN    iron sucrose (VENOFER) 300 mg in 0.9% sodium chloride 250 mL IVPB  300 mg IntraVENous Q3DAYS    insulin glargine (LANTUS) injection 32 Units  32 Units SubCUTAneous DAILY    amLODIPine (NORVASC) tablet 10 mg  10 mg Oral DAILY    metoprolol tartrate (LOPRESSOR) tablet 12.5 mg  12.5 mg Oral Q12H    hydrALAZINE (APRESOLINE) 20 mg/mL injection 10 mg  10 mg IntraVENous Q6H PRN    bisacodyL (DULCOLAX) tablet 10 mg  10 mg Oral DAILY PRN    docusate sodium (COLACE) capsule 100 mg  100 mg Oral BID    0.9% sodium chloride infusion 250 mL  250 mL IntraVENous PRN    tamsulosin (FLOMAX) capsule 0.4 mg  0.4 mg Oral DAILY    dextrose (D25W) 25% injection 10 mL  2.5 g IntraVENous PRN    insulin lispro (HUMALOG) injection   SubCUTAneous AC&HS    famotidine (PEPCID) tablet 20 mg  20 mg Oral DAILY    bisacodyl (DULCOLAX) suppository 10 mg  10 mg Rectal DAILY PRN    polyethylene glycol (MIRALAX) packet 17 g  17 g Oral DAILY    aspirin chewable tablet 81 mg  81 mg Oral DAILY    oxyCODONE-acetaminophen (PERCOCET) 5-325 mg per tablet 1-2 Tab  1-2 Tab Oral Q6H PRN    dextrose 10% infusion 125-250 mL  125-250 mL IntraVENous PRN    heparin (porcine) injection 5,000 Units  5,000 Units SubCUTAneous Q8H    glucose chewable tablet 16 g  4 Tab Oral PRN    glucagon (GLUCAGEN) injection 1 mg  1 mg IntraMUSCular PRN                    Labs:  Recent Labs     01/27/20  0543 01/26/20  0648   WBC 6.7  --    HGB 8.2* 7.5*   HCT 27.0* 24.4*     --      No results for input(s): NA, K, CL, CO2, GLU, BUN, CREA, CA, MG, PHOS, ALB, TBIL, SGOT, ALT, INR, INREXT, INREXT in the last 72 hours. No results for input(s): PH, PCO2, PO2, HCO3, FIO2 in the last 72 hours. IMPRESSION:   · Anemia of chronic illness  · PVD  · Diabetes type 2  · Acute renal failure        · PLAN:  · H/H is 8.2/27.0: Pt is Jehovahs witness-no PRBC transfusion. Reviewed creatinine 1.37. We have increased Retacrit from 6000 units to 20,000 units 3 times a week  for H/H less than 10/30 while inpatient. Patient currently receiving Venofer last dose 3/3 tomorrow. Will restart  ferrous sulfate 325 mg p.o after tomorrow. Iron low at 22,TIBC 116, iron % sat 19, ferritin 100. B12 897, folate >20.0. · Recommend limiting lab draws. · Tentative plan : Vascular surgery will proceed with amputation surgery whenever patient is medically stable. Tentative plan is to have amputation once her hemoglobin more than 8g/dl closer to 10g/dl.  unfortunately, due to the fact that the patient refuses blood transfusion, it may take a while to reach hemoglobin 10g/dl goal. Tentative plan is for amputation on Thursday. ·  ·        The patient is: [x] acutely ill Risk of deterioration: [] moderate    [] critically ill  [] high         My assessment/plan was discussed with:  [x]nursing []PT/OT    []respiratory therapy [x]Dr.Lloyd Melvi Xie MD      []family []       [facility] [facility] dictation software was used for portions of this report. Efforts have been made to edit the dictations, but occasionally words are mis-transcribed.     Sabas Xie NP

## 2020-01-28 NOTE — PROGRESS NOTES
Pt sleeping comfortably. Did not wake for exam.  Pt has unsalvageable gangrenous right foot. S/p bypass, in need of AKA  Scheduled for surgery Thursday with Dr Kalie Arenas. Monitor H&H. On Epo. Unable to transfuse as pt Spiritism. Left foot warm and perfused. 2nd toe dusky in appearance. Monitor. Will likely need angio in future once right leg issues addressed. Please call with further questions or concerns.

## 2020-01-28 NOTE — PROGRESS NOTES
Attempted to see patient for re-eval, however she is refusing therapy today. Patient stating she is \"not ready for physical therapy. I'm holding on therapy like they're holding on my surgery. \" Educated patient on benefits of mobility and preparing for mobility s/p surgery. Despite max education and encouragement patient continues to refuse. Patient agreeable to attempt tomorrow and educated if she refuses for a third day, she will be D/C from PT caseload per protocol.  Thank you for this referral. Shannan Yu, PT, DPT

## 2020-01-28 NOTE — PROGRESS NOTES
Hospitalist Progress Note    Patient: Tia Nesbitt Age: 79 y.o. : 1940 MR#: [id number] SSN: [id number]  Date/Time: 2020     DOA: 2020  PCP: Blanka Sinclair MD    Subjective:       Patient feeling fine, denies any pain in her leg. No new complaints        Interval Hospital Course:  79 y.o female with prolonged hospitalization since her admission  for metabolic encephalopathy related to HHS, acute pancreatitis, EFRAIN. She has recovered from [facility], pancreatitis/EFRAIN but developed worsened ischemia of her right foot due to right lower extremity PAD with gangrene, CTA with multifocal stenosis of right lower extremity. She underwent angio with balloon angioplasty and stent placement at origin of common iliac (2020). She underwent right femoral to above-knee popliteal bypass (2020). Podiatry consulted and followed but now recommended amputation. She has been waiting for her Hgb/Hct to improved to level where it will be safe for her to surgery without needing transfusion. Hematology has been consulted. Assessment/Plan:       1)  Right leg PAD with gangrene, associate with uncontrolled DM2      - CTA with multifocal stenosis of right lower extremity      - s/p angio with balloon angioplasty and stent orf right common iliac artery 2020      - right Fem-Pop bypass 2020       VASCULAR TO FOLLOW UP FOR right AKA when Hgb >8   2)  Hyperglycemia with Type 2 diabetes, stable       - admitted with Hyperosmolar non-ketotic state in type 2 diabetes mellitus, resolved    3)  Acute on chronic renal failure Stage 3, resolved   4)  Metabolic acidosis due to renal issues, resolved  5)  Acute metabolic encephalopathy, improved  6)  Acute pancreatitis, resolved, GI recommended CT abd in 8 weeks   7)  Hypertension, tolerated bblocker   8)  Hypophosphatemia, replaced   9)  Normocytic anemia of chronic disease, recently decreased.  Not amenable for transfusion due to Anglican  10) Urinary retention, gutierrez out. Resolved     Plan  Hemoglobin improving, labs every other day  plan for amputation on Thursday per vascular surgery   Given patient is a Hoahaoism, surgery is been delayed to improve hemoglobin. Will limit blood draws, continue Epogen and monitor H&H every other day. Epogen dosing per hematology and nephrology  No signs of infection, monitor off antibiotics  Continue Lantus and ISS   Cont ASA, flomax, amlodipine and metoprolol. Full code   Dale Esquivel phone [phone]. Case discussed with:  [x]Patient  []Family  [x]Nursing  [x]Case Management  DVT Prophylaxis:  []Lovenox  []Hep SQ  [x]SCDs  []Coumadin   []On Heparin gtt    Signed By: Gabrielle Colvin MD     2020               Objective:   VS:   Visit Vitals  /71 (BP 1 Location: Left arm, BP Patient Position: At rest)   Pulse 100   Temp 98.2 °F (36.8 °C)   Resp 18   Ht 5' 5\" (1.651 m)   Wt 68.9 kg (151 lb 12.8 oz)   SpO2 98%   Breastfeeding No   BMI 25.26 kg/m²      Tmax/24hrs: Temp (24hrs), Av.8 °F (36.6 °C), Min:97.6 °F (36.4 °C), Max:98.2 °F (36.8 °C)      Intake/Output Summary (Last 24 hours) at 2020 1103  Last data filed at 2020 0900  Gross per 24 hour   Intake 360 ml   Output    Net 360 ml       General:   Alert awake, Not in acute distress  Cardiovascular: S1S2 regular  Pulmonary: Clear air entry bilaterally, no wheezing, no crackle  GI:  Soft, non tender, non distended, +bs   Extremities: Right foot necrosis/gangrene, no redness erythema. Neuro: AOx3, moving all extremities, no gross deficit.      Additional:       Current Facility-Administered Medications   Medication Dose Route Frequency    epoetin bipin-epbx (RETACRIT) injection 20,000 Units  20,000 Units SubCUTAneous Q MON, WED & FRI    insulin lispro (HUMALOG) injection 3 Units  3 Units SubCUTAneous TIDAC    ondansetron (ZOFRAN) injection 4 mg  4 mg IntraVENous Q6H PRN    acetaminophen (TYLENOL) tablet 650 mg  650 mg Oral Q4H PRN    iron sucrose (VENOFER) 300 mg in 0.9% sodium chloride 250 mL IVPB  300 mg IntraVENous Q3DAYS    insulin glargine (LANTUS) injection 32 Units  32 Units SubCUTAneous DAILY    amLODIPine (NORVASC) tablet 10 mg  10 mg Oral DAILY    metoprolol tartrate (LOPRESSOR) tablet 12.5 mg  12.5 mg Oral Q12H    hydrALAZINE (APRESOLINE) 20 mg/mL injection 10 mg  10 mg IntraVENous Q6H PRN    bisacodyL (DULCOLAX) tablet 10 mg  10 mg Oral DAILY PRN    docusate sodium (COLACE) capsule 100 mg  100 mg Oral BID    0.9% sodium chloride infusion 250 mL  250 mL IntraVENous PRN    tamsulosin (FLOMAX) capsule 0.4 mg  0.4 mg Oral DAILY    dextrose (D25W) 25% injection 10 mL  2.5 g IntraVENous PRN    insulin lispro (HUMALOG) injection   SubCUTAneous AC&HS    famotidine (PEPCID) tablet 20 mg  20 mg Oral DAILY    bisacodyl (DULCOLAX) suppository 10 mg  10 mg Rectal DAILY PRN    polyethylene glycol (MIRALAX) packet 17 g  17 g Oral DAILY    aspirin chewable tablet 81 mg  81 mg Oral DAILY    oxyCODONE-acetaminophen (PERCOCET) 5-325 mg per tablet 1-2 Tab  1-2 Tab Oral Q6H PRN    dextrose 10% infusion 125-250 mL  125-250 mL IntraVENous PRN    heparin (porcine) injection 5,000 Units  5,000 Units SubCUTAneous Q8H    glucose chewable tablet 16 g  4 Tab Oral PRN    glucagon (GLUCAGEN) injection 1 mg  1 mg IntraMUSCular PRN            Lab/Data Review:  Labs: Results:       Chemistry No results for input(s): GLU, NA, K, CL, CO2, BUN, CREA, BUCR, AGAP, CA, PHOS in the last 72 hours. No results for input(s): TBIL, ALT, SGOT, ALKP, TP, ALB, GLOB, AGRAT in the last 72 hours. CBC w/Diff Recent Labs     01/27/20  0543 01/26/20  0648   WBC 6.7  --    RBC 2.51*  --    HGB 8.2* 7.5*   HCT 27.0* 24.4*   .6*  --    MCH 32.7  --    MCHC 30.4*  --    RDW 17.6*  --      --       Coagulation No results for input(s): PTP, INR, APTT, INREXT, INREXT in the last 72 hours.     Iron/Ferritin Lab Results   Component Value Date/Time    Iron 22 (L) 01/19/2020 04:05 AM    TIBC 116 (L) 01/19/2020 04:05 AM    Iron % saturation 19 01/19/2020 04:05 AM    Ferritin 100 01/15/2020 03:33 AM       BNP    Cardiac Enzymes Lab Results   Component Value Date/Time    Troponin-I, QT <0.02 01/02/2020 11:25 PM        Lactic Acid    Thyroid Studies          All Micro Results     Procedure Component Value Units Date/Time    CULTURE, BLOOD [[id number]] Collected:  01/02/20 2206    Order Status:  Completed Specimen:  Blood Updated:  01/08/20 0642     Special Requests: NO SPECIAL REQUESTS        Culture result: NO GROWTH 6 DAYS       CULTURE, BLOOD [[id number]] Collected:  01/02/20 2206    Order Status:  Completed Specimen:  Blood Updated:  01/08/20 8065     Special Requests: NO SPECIAL REQUESTS        Culture result: NO GROWTH 6 DAYS               Images:    CT (Most Recent). CT Results (most recent):  Results from Hospital Encounter encounter on 01/02/20   CTA ABD ART W RUNOFF W WO CONT    Narrative PROCEDURE: CTA of abdomen and pelvis and bilateral lower extremity runoff with  intravenous contrast administration. HISTORY: PAD, ischemia of the right foot. TECHNIQUE: Multidetector helical CT angiography was carried out from low chest  through the feet following dynamic 70 cc Isovue-300 intravenous contrast  administration . Scan timing was performed using automated bolus tracking/SMART  Prep. 3-D and MPR angiographic image reconstruction was performed on  independent workstation and separately reviewed. Parenchymal imaging is limited  by the arterial phase of contrast administration. All CT scans at this facility  are performed using dose optimization techniques as appropriate to a performed  exam, to include automated exposure control, adjustment of the mA and/or kV  according to patient's size (including appropriate matching for site specific  examinations), or use of iterative reconstruction technique. COMPARISON: CT abdomen/pelvis 1/3/2020.  CTA abdomen/pelvis with extremity runoff  7/18/2017. FINDINGS:    VASCULAR FINDINGS:    Right lower extremity:  Multifocal stenosis throughout the right posterior tibialis artery due to  atherosclerotic calcifications as well as the peroneal artery.  -The right posterior tibialis artery is not opacified beyond the mid right lower  leg.  -The anterior tibial artery is not opacified beyond the right lower leg just  above the tibial plafond.  -The peroneal artery is opacified beyond the level of the tibial plafond. Proximally, there is multifocal stenosis of the right femoral artery with loss  of opacification at the mid to distal thigh. Reconstitution of opacification at  the level of the posterior tibialis artery likely due to collaterals from  geniculate arteries and the deep femoral artery. 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  -Left lower leg perfusion is contributed by collaterals from the deep femoral  artery and geniculate arteries. Multifocal stenosis/occlusion of the left  posterior tibialis artery and left peroneal artery. No opacification of the  peroneal artery or posterior tibialis artery is seen to be on the mid to  proximal third of the left lower leg. The left dorsal pedis is opacified, and  likely contributes to some additional opacified vessel seen in the left foot. Abdominal aorta:  -Severe atherosclerosis with moderate compromise of the lumen, similar to prior  exam of 7/2017.  -No evidence for abdominal aortic aneurysm. -Severe stenosis at the proximal aspect of the right common iliac artery, well  opacified distally, similar to prior exam. Multifocal moderate stenosis at the  left common iliac artery similar to prior exam.  -Multifocal stenosis at the internal iliac arteries, with good opacification  distally.  -Celiac artery, SMA, left renal artery, and right renal artery are grossly  patent.  The right renal artery again shown to originate from the descending  thoracic aorta. ALFREDA is not well visualized. ABDOMEN/PELVIS FINDINGS:  Lower chest: Small right pleural effusion with overlying atelectasis. Liver: Stable subcentimeter hypodense lesions liver, too small to characterize  but stability suggests benignity    Biliary: Gallbladder is mildly distended but otherwise unremarkable. Spleen: Negative    Pancreas: There is some peripancreatic edema adjacent to the pancreatic tail. Adrenal glands: Diffuse thickening of the adrenal glands, without discrete mass,  similar to prior exam.    Kidneys: Malrotated kidneys again noted, without evidence for hydronephrosis. GI tract: The bowel appears nonobstructed. Question of mild mural thickening at  the distal esophagus. Questionable mild mural thickening at the greater  curvature of the stomach adjacent to the edema along the pancreatic tail. Colonic diverticulosis, without evidence for diverticulitis. Normal appendix. Peritoneum: No free air    Lymph nodes: No lymphadenopathy. Pelvis: Urinary bladder is unremarkable. Fibroid uterus again noted. Body wall/soft tissues: Small fat-containing umbilical hernia. Small right lower  spigelian hernia containing a small amount of fluid measuring 2.5 x 1.2 cm  (image 203), previously measured 2.9 x 1.6 cm. Mild edema along the right flank. Bones:  -Bilateral moderate knee osteoarthrosis. Small right knee joint effusion and  trace left knee joint effusion.  -Diffuse soft tissue swelling at the feet, right greater than left. Trace  subcutaneous emphysema adjacent to the right fifth MTP joint. Left distal tibial  and talar hardware noted. -Multilevel degenerative spondylosis and disc disease noted, not well evaluated  on current exam.  -Moderate degenerative change at the hips      Impression IMPRESSION:  1.  Right lower extremity:  Multifocal stenosis throughout the right posterior tibialis artery due to  atherosclerotic calcifications as well as the peroneal artery.  -The right posterior tibialis artery is not opacified beyond the mid right lower  leg.  -The anterior tibial artery is not opacified beyond the right lower leg just  above the tibial plafond.  -The peroneal artery is opacified beyond the level of the tibial plafond. Proximally, there is multifocal stenosis of the right femoral artery with loss  of opacification at the mid to distal thigh. Reconstitution of opacification at  the level of the posterior tibialis artery likely due to collaterals from  geniculate arteries and the deep femoral artery. 2. Diffuse soft tissue swelling at bilateral feet, right greater than left. -Suggestion of trace air along the right fifth MTP joint, could be degenerative  but infectious process not excluded. Consider dedicated right foot x-ray for  further evaluation. 3. Chronic multilevel stenosis of the aorta, iliac arteries, and left lower  extremity as described. 4. Peripancreatic edema along the pancreatic tail is increased since 1/3/2020.  -Associated adjacent mild presumed reactive mural thickening of the greater  curvature of the stomach. 5. Small right pleural effusion. 6. Suggestion of mild mural thickening of the distal esophagus, may indicate  nonspecific esophagitis. 7. Bilateral knee osteoarthrosis with small right knee joint effusion and trace  left knee joint effusion. 8. Fibroid uterus. 9. Colonic diverticulosis, without evidence for diverticulitis. 10. Additional nonacute findings are as described. XRAY (Most Recent)      EKG No results found for this or any previous visit.      2D ECHO

## 2020-01-29 ENCOUNTER — ANESTHESIA EVENT (OUTPATIENT)
Dept: CARDIOTHORACIC SURGERY | Age: 80
DRG: 616 | End: 2020-01-29
Payer: MEDICARE

## 2020-01-29 LAB
ANION GAP SERPL CALC-SCNC: 4 MMOL/L (ref 3–18)
BASOPHILS # BLD: 0 K/UL (ref 0–0.1)
BASOPHILS NFR BLD: 0 % (ref 0–2)
BUN SERPL-MCNC: 34 MG/DL (ref 7–18)
BUN/CREAT SERPL: 20 (ref 12–20)
CALCIUM SERPL-MCNC: 9 MG/DL (ref 8.5–10.1)
CHLORIDE SERPL-SCNC: 109 MMOL/L (ref 100–111)
CO2 SERPL-SCNC: 27 MMOL/L (ref 21–32)
CREAT SERPL-MCNC: 1.7 MG/DL (ref 0.6–1.3)
DIFFERENTIAL METHOD BLD: ABNORMAL
EOSINOPHIL # BLD: 0.3 K/UL (ref 0–0.4)
EOSINOPHIL NFR BLD: 4 % (ref 0–5)
ERYTHROCYTE [DISTWIDTH] IN BLOOD BY AUTOMATED COUNT: 18.6 % (ref 11.6–14.5)
GLUCOSE BLD STRIP.AUTO-MCNC: 107 MG/DL (ref 70–110)
GLUCOSE BLD STRIP.AUTO-MCNC: 177 MG/DL (ref 70–110)
GLUCOSE BLD STRIP.AUTO-MCNC: 227 MG/DL (ref 70–110)
GLUCOSE BLD STRIP.AUTO-MCNC: 60 MG/DL (ref 70–110)
GLUCOSE BLD STRIP.AUTO-MCNC: 84 MG/DL (ref 70–110)
GLUCOSE SERPL-MCNC: 57 MG/DL (ref 74–99)
HCT VFR BLD AUTO: 26.1 % (ref 35–45)
HGB BLD-MCNC: 7.8 G/DL (ref 12–16)
LYMPHOCYTES # BLD: 1.7 K/UL (ref 0.9–3.6)
LYMPHOCYTES NFR BLD: 26 % (ref 21–52)
MCH RBC QN AUTO: 32.5 PG (ref 24–34)
MCHC RBC AUTO-ENTMCNC: 29.9 G/DL (ref 31–37)
MCV RBC AUTO: 108.8 FL (ref 74–97)
MONOCYTES # BLD: 0.7 K/UL (ref 0.05–1.2)
MONOCYTES NFR BLD: 10 % (ref 3–10)
NEUTS SEG # BLD: 3.8 K/UL (ref 1.8–8)
NEUTS SEG NFR BLD: 60 % (ref 40–73)
PLATELET # BLD AUTO: 318 K/UL (ref 135–420)
PLATELET COMMENTS,PCOM: ABNORMAL
PMV BLD AUTO: 9.7 FL (ref 9.2–11.8)
POTASSIUM SERPL-SCNC: 4.5 MMOL/L (ref 3.5–5.5)
RBC # BLD AUTO: 2.4 M/UL (ref 4.2–5.3)
RBC MORPH BLD: ABNORMAL
SODIUM SERPL-SCNC: 140 MMOL/L (ref 136–145)
WBC # BLD AUTO: 6.5 K/UL (ref 4.6–13.2)

## 2020-01-29 PROCEDURE — 74011250637 HC RX REV CODE- 250/637: Performed by: INTERNAL MEDICINE

## 2020-01-29 PROCEDURE — 74011250636 HC RX REV CODE- 250/636: Performed by: INTERNAL MEDICINE

## 2020-01-29 PROCEDURE — 74011250637 HC RX REV CODE- 250/637: Performed by: NURSE PRACTITIONER

## 2020-01-29 PROCEDURE — 74011250637 HC RX REV CODE- 250/637: Performed by: EMERGENCY MEDICINE

## 2020-01-29 PROCEDURE — 65660000000 HC RM CCU STEPDOWN

## 2020-01-29 PROCEDURE — 74011636637 HC RX REV CODE- 636/637: Performed by: INTERNAL MEDICINE

## 2020-01-29 PROCEDURE — 80048 BASIC METABOLIC PNL TOTAL CA: CPT

## 2020-01-29 PROCEDURE — 97164 PT RE-EVAL EST PLAN CARE: CPT

## 2020-01-29 PROCEDURE — 74011636637 HC RX REV CODE- 636/637: Performed by: HOSPITALIST

## 2020-01-29 PROCEDURE — 74011250636 HC RX REV CODE- 250/636: Performed by: NURSE PRACTITIONER

## 2020-01-29 PROCEDURE — 85025 COMPLETE CBC W/AUTO DIFF WBC: CPT

## 2020-01-29 PROCEDURE — 74011250636 HC RX REV CODE- 250/636: Performed by: PHYSICIAN ASSISTANT

## 2020-01-29 PROCEDURE — 82962 GLUCOSE BLOOD TEST: CPT

## 2020-01-29 PROCEDURE — 74011250637 HC RX REV CODE- 250/637: Performed by: HOSPITALIST

## 2020-01-29 RX ORDER — SODIUM CHLORIDE 9 MG/ML
75 INJECTION, SOLUTION INTRAVENOUS CONTINUOUS
Status: DISCONTINUED | OUTPATIENT
Start: 2020-01-29 | End: 2020-02-01

## 2020-01-29 RX ADMIN — DOCUSATE SODIUM 100 MG: 100 CAPSULE, LIQUID FILLED ORAL at 17:01

## 2020-01-29 RX ADMIN — INSULIN LISPRO 3 UNITS: 100 INJECTION, SOLUTION INTRAVENOUS; SUBCUTANEOUS at 18:21

## 2020-01-29 RX ADMIN — HEPARIN SODIUM 5000 UNITS: 5000 INJECTION INTRAVENOUS; SUBCUTANEOUS at 21:45

## 2020-01-29 RX ADMIN — METOPROLOL TARTRATE 12.5 MG: 25 TABLET ORAL at 08:21

## 2020-01-29 RX ADMIN — INSULIN LISPRO 2 UNITS: 100 INJECTION, SOLUTION INTRAVENOUS; SUBCUTANEOUS at 21:45

## 2020-01-29 RX ADMIN — TAMSULOSIN HYDROCHLORIDE 0.4 MG: 0.4 CAPSULE ORAL at 08:21

## 2020-01-29 RX ADMIN — DOCUSATE SODIUM 100 MG: 100 CAPSULE, LIQUID FILLED ORAL at 08:21

## 2020-01-29 RX ADMIN — SODIUM CHLORIDE 300 MG: 900 INJECTION, SOLUTION INTRAVENOUS at 17:00

## 2020-01-29 RX ADMIN — AMLODIPINE BESYLATE 10 MG: 10 TABLET ORAL at 08:21

## 2020-01-29 RX ADMIN — Medication 81 MG: at 08:21

## 2020-01-29 RX ADMIN — INSULIN GLARGINE 32 UNITS: 100 INJECTION, SOLUTION SUBCUTANEOUS at 08:31

## 2020-01-29 RX ADMIN — HEPARIN SODIUM 5000 UNITS: 5000 INJECTION INTRAVENOUS; SUBCUTANEOUS at 12:04

## 2020-01-29 RX ADMIN — METOPROLOL TARTRATE 12.5 MG: 25 TABLET ORAL at 21:50

## 2020-01-29 RX ADMIN — EPOETIN ALFA-EPBX 20000 UNITS: 10000 INJECTION, SOLUTION INTRAVENOUS; SUBCUTANEOUS at 21:45

## 2020-01-29 RX ADMIN — OXYCODONE HYDROCHLORIDE AND ACETAMINOPHEN 1 TABLET: 5; 325 TABLET ORAL at 02:08

## 2020-01-29 RX ADMIN — INSULIN LISPRO 4 UNITS: 100 INJECTION, SOLUTION INTRAVENOUS; SUBCUTANEOUS at 12:01

## 2020-01-29 RX ADMIN — HEPARIN SODIUM 5000 UNITS: 5000 INJECTION INTRAVENOUS; SUBCUTANEOUS at 04:21

## 2020-01-29 RX ADMIN — POLYETHYLENE GLYCOL 3350 17 G: 17 POWDER, FOR SOLUTION ORAL at 08:22

## 2020-01-29 RX ADMIN — ACETAMINOPHEN 650 MG: 325 TABLET ORAL at 17:09

## 2020-01-29 RX ADMIN — SODIUM CHLORIDE 75 ML/HR: 900 INJECTION, SOLUTION INTRAVENOUS at 17:00

## 2020-01-29 RX ADMIN — INSULIN LISPRO 3 UNITS: 100 INJECTION, SOLUTION INTRAVENOUS; SUBCUTANEOUS at 12:00

## 2020-01-29 RX ADMIN — FAMOTIDINE 20 MG: 20 TABLET, FILM COATED ORAL at 08:21

## 2020-01-29 NOTE — ROUTINE PROCESS
Bedside shift change report given to Leola Khan RN. Report included SBAR, Kardex, MAR, Recent Results, and Plan of Care. Pt in bed sleeping with call light in reach.

## 2020-01-29 NOTE — ADT AUTH CERT NOTES
Patient Demographics Patient Name Dano Jackson 
[id number] Sex Female  
1940 Address [address] [place] [address] Phone [phone] (Home) [phone] (Mobile) *Preferred*  
CSN:  
[id number] Admit Date: Admit Time Room Bed 2020  7:37  [48213] 01 [37411] Attending Providers Provider Pager From To Mert Escobar DO  20 Ryder Hannah MD  20 Camden Lobo MD  20 Dimitri Harris MD  20 Camden Lobo MD  20 Anna Damon MD  20 Vini Kauffman MD  20 Reema Chang MD  20 Ryder Hannah MD  20 Sb Euceda MD  20 Vini Kauffman MD  20 Camden Lobo MD  20 Ryder Hannah MD  20 Emergency Contact(s) Name Relation Home Work Mobile Particyesenia Schmitz (Niece by marriage) Other Relative [phone] Rachele Kelly Other Relative   [phone] Utilization Reviews  
 
   
Diabetes - Care Day  (2020) by Alla Thacker RN  
 
   
Review Entered Review Status 2020 10:42 Completed  
   
Criteria Review Care Day: 26 Care Date: 2020 Level of Care: Telemetry Guideline Day 2 Clinical Status   
(X) * Hypotension absent 2020 10:42:05 EST by Alla Thacker   
  98.2  100  18  98%  125/71   
(X) * Mental status at baseline   
(X) * Acidosis absent or improved   
(X) * Blood glucose under acceptable control or improved ( ) * Dehydration absent   
(X) * Electrolyte abnormalities absent or improved   
(X) * Renal function at baseline or improved Activity ( ) * Ambulatory Routes   
(X) * Oral hydration, and medications (X) [place] diet, advance as tolerated Interventions (X) Complete IV volume, replacement,   
(X) Serum glucose, serum ketones, chemistries, ABGs or venous pH measurements, urinalysis   
(X) Bedside glucose monitoring Medications   
(X) * IV insulin absent   
( ) * Outpatient diabetic medication regimen initiated (X) Subcutaneous insulin 1/28/2020 10:42:05 EST by Alla Thacker Subject: Additional Clinical Information Pt sleeping comfortably. Did not wake for exam.   
Pt has unsalvageable gangrenous right foot. S/p bypass, in need of AKA Scheduled for surgery Thursday with Dr Iraida Roberts. Monitor H&H. On Epo. 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 Left foot warm and perfused. 2nd toe dusky in appearance. Monitor. Will likely need angio in future once right leg issues addressed. Please call with further questions or concerns * Milestone  
   
Diabetes - Care Day 21 (1/23/2020) by Fazal Meza RN  
 
   
Review Entered Review Status 1/24/2020 15:08 Completed  
   
Criteria Review Care Day: 21 Care Date: 1/23/2020 Level of Care: Telemetry Guideline Day 2 Clinical Status   
(X) * Hypotension absent 1/24/2020 15:08:33 EST by Fazal Meza   
  /53. (X) * Mental status at baseline 1/24/2020 15:08:33 EST by Fazal Meza   
  Acute metabolic encephalopathy, improved. AAOX3.   
(X) * Acidosis absent or improved 1/24/2020 15:08:33 EST by Fazal Meza   
  Metabolic acidosis due to renal issues, resolved   
(X) * Blood glucose under acceptable control or improved 1/24/2020 15:08:33 EST by Fazal Meza   
  Hyperglycemia with Type 2 diabetes, stable. ( ) * Dehydration absent   
(X) * Electrolyte abnormalities absent or improved 1/24/2020 15:08:33 EST by Fazal Meza   
  IMPROVED.   
(X) * Renal function at baseline or improved 1/24/2020 15:08:33 EST by Fazal Meza   
  Acute on chronic renal failure Stage 3, resolved. CONTINUOUS BLADDER CHECKS. Activity ( ) * Ambulatory 1/24/2020 15:08:33 EST by Jenny Reed 45. OT AND PT EVAL AND TREAT. 
NWB RLE. Routes   
(X) * Oral hydration, and medications 1/24/2020 15:08:33 EST by Alfonso Smith   
  NORVASC 10MG PO QD. ASA 81MG PO QD. PEPCID 20MG PO QD. 
LOPRESSOR 12.5MG PO Q12H. FLOMAX 0.4MG PO QD. (X) [place] diet, advance as tolerated 1/24/2020 15:08:33 EST by Alfnoso Smith   
  DM DIET. NUTRITIONAL SUPPLEMENTS WITH BREAKFAST AND DINNER. ASPIRATION PRECAUTIONS. Interventions (X) Complete IV volume, replacement,   
1/24/2020 15:08:33 EST by Alfonso Smith   
  COMPLETE.   
(X) Serum glucose, serum ketones, chemistries, ABGs or venous pH measurements, urinalysis   
(X) Bedside glucose monitoring 1/24/2020 15:08:33 EST by Alfonso Smith   
  GLUCOSE MANAGEMENT PER PROTOCOL. Medications   
(X) * IV insulin absent 1/24/2020 15:08:33 EST by Derek [address] [place] [address].   
( ) * Outpatient diabetic medication regimen initiated (X) Subcutaneous insulin 1/24/2020 15:08:33 EST by Lawyer Sierra SSI SC QACHS. LANTUS 4 UNITS SC X 1. LANTUS 28 UNITS SC X 1.   
* Milestone Additional Notes 1/23/20 RBC 2.49. HGB 7.9. HCT 25.6. RDW 14.6. LYMPH 19. 18[facility][note 3] GLUCOSE [phone]. PICC INSERTION. P 74 RR 18 SPO2 99% 2LPM NC. 98.4 TEMP. RETACRIT 83743 UNITS SC QHS. HEPARIN 5000 UNITS SC Q8H.  
VENOFER 300MG IV Q3DAYS. FULL CODE. CONTINUOUS IS. CONTINUOUS VS. INTERNAL MED> 1)  Right leg PAD with gangrene, associate with uncontrolled DM2  
    - CTA with multifocal stenosis of right lower extremity  
    - s/p angio with balloon angioplasty and stent orf right common iliac artery 1/13/2020  
    - right Fem-Pop bypass 1/16/2020  
     VASCULAR TO FOLLOW UP FOR right AKA when Hgb >8   
2)  Hyperglycemia with Type 2 diabetes, stable  
     - admitted with Hyperosmolar non-ketotic state in type 2 diabetes mellitus, resolved    
3)  Acute on chronic renal failure Stage 3, resolved 4)  Metabolic acidosis due to renal issues, resolved 5)  Acute metabolic encephalopathy, improved 6)  Acute pancreatitis, resolved, GI recommended CT abd in 8 weeks 7)  Hypertension, tolerated bblocker 8)  Hypophosphatemia, replaced 9)  Normocytic anemia of chronic disease, recently decreased. Not amenable for transfusion due to Latter day 10)  Urinary retention, gutierrez out. Resolved   
   
Plan Vascular surgery input noted, plan for amputation early next week.  Surgery wants hemoglobin to be > 8 and close to 10. Given patient is a Hoahaoism, surgery is been delayed to next week. Will limit blood draws, continue Epogen and monitor H&H every other day. Pending right AKA if Hgb level improved. No signs of infection, monitor off antibiotics Increase Lantus and ISS Cont ASA, flomax, amlodipine and metoprolol. NEPHROLOGY> 1. Acute/crf stage 3.back to baseline. recheck bmp 2. pvd,gangren r foot,for aka when hgb improves 3.anemia,on epo. refused blood transfusion. suggest to decrease venofer to every other day for total of 1 g  
  
VASCULAR SURGERY> Pancreatitis (1/3/2020)    
  Hyperosmolar hyperglycemic coma due to diabetes mellitus without ketoacidosis ([facility] [address] (1/3/2020)    
  Hyperosmolar non-ketotic state in patient with type 2 diabetes mellitus ([facility] [address] (1/3/2020)    
  Ischemia of foot (1/20/2020)    
   
   
Plan/Recommendations/Medical Decision Making:  
   
Continue present treatment Reviewed her labs, hemoglobin is 7.9.  Would like to give more time for the reticulocyte crit and she is going to start an iron infusion as well. Kasia Shelby and avoid a situation where there is blood loss in the operating room and were unable to transfuse because of her Hoahaoism status. Marylee Every are very understanding of this we will let her eat today and plan for the surgery hopefully early next week with her continued rise in hemoglobin  
   
HEMATOLOGY> Physical Exam:  
General: Appears comfortable, NAD. HEENT:  Anicteric sclerae; pink palpebral conjunctivae; mucosa moist  
Resp:  Symmetrical chest expansion, no accessory muscle use; good airway entry; no rales/ wheezing/ rhonchi noted CV:  S1, S2 present; regular rate and rhythm GI:  Abdomen soft, non-tender; (+) active bowel sounds Extremities:  S/p R Fem-pop bypass: R foot dry gangrene. Skin:  Warm, right foot gangrenous Neurologic:  Non-focal  
  
IMPRESSION:  
· Anemia of chronic illness · PVD · Diabetes type 2  
· Acute renal failure  
   
  
· PLAN:  
· H/H is 7.9/25.6: Pt is Jehovahs witness-no PRBC transfusion.  Reviewed creatinine 1.37. Continue Retacrit 21,000 units daily X 7 days.  Apparently, the patient has lost IV site and has only received once dose of IV venofer. Pt now has [address] line and we will continue her Venofer X 6 additional doses. We will hold Ferrous sulfate 325 mg p.o until venofer has been completed.  Iron low at 22,TIBC 116, iron % sat 19, ferritin 100.  B12 897, folate >20.0.  · Recommend limiting lab draws. · Tentative plan :Vascular surgery will proceed with amputation surgery whenever patient is medically stable.  Tentative plan is for this Friday if hemoglobin is >8g/dl  
  
  
   
Diabetes - Care Day 8 (1/10/2020) by Alla Thacker RN  
 
   
Review Entered Review Status 1/10/2020 15:31 Completed  
   
Criteria Review Care Day: 8 Care Date: 1/10/2020 Level of Care: ICU Guideline Day 2 Clinical Status   
(X) * Hypotension absent 1/10/2020 15:31:51 EST by Alla Thacker   
  97.9  91  20  95%  152/70   
(X) * Mental status at baseline ( ) * Acidosis absent or improved ( ) * Blood glucose under acceptable control or improved ( ) * Dehydration absent   
( ) * Electrolyte abnormalities absent or improved ( ) * Renal function at baseline or improved Activity ( ) * Ambulatory Routes   
(X) * Oral hydration, and medications Interventions (X) Complete IV volume, replacement,   
(X) Serum glucose, serum ketones, chemistries, ABGs or venous pH measurements, urinalysis Medications ( ) * IV insulin absent   
( ) * Outpatient diabetic medication regimen initiated 1/10/2020 15:31:51 EST by Alla Thacker Subject: Additional Clinical Information IM:   
  
1)   Right lower extremity ischemia associate with uncontrolled DM2   
         LORENA indicates severe to critical arterial insuff at [place]-fem level, fem-pop level.   
         Arterial duplex result noted to have monophasic flow   
         CTA with multifocal stenosis of right lower extremity 2)   Hyperglycemia with Type 2 diabetes, stable   
         Hyperosmolar non-ketotic state in type 2 diabetes mellitus 3)   Acute on chronic renal failure Stage 3:  likely mostly prerenal.   
4)   Metabolic acidosis due to renal issues-resolved 5)   Acute metabolic encephalopathy 6)   Acute pancreatitis, resolved, no pain with food intake 7)   Hypertension 8)   Hypophosphatemia 9)   Normocytic anemia of chronic disease     
     
     
Vascular considers angioplasty with stent vs bypass graft this coming week Awaiting for Renal function to stabilize as it might not be prohibiting for further vascular procedure Currently, no evidence of infection Pain control. Close monitor as high risk     
Monitor renal function closely. Avoid nephrotoxicity, gutierrez cath removed. Hold IV fluid Advanced diet ISS, Lantus continues. Resumes statin, aspirin Alcohol cessation advised. Palliative care team follow up for goal of care. GI signed off, pt needs repeat CT pancreas in 8-12weeks 1/10/2020 15:31:51 EST by Alla Thacker Subject: Additional Clinical Information RBC 2.86, HGB 9.1, HCT 28.2, MCV 98.6, K 5.7, , GLUCOSE 177, BUN 24, CR 1.66   
  
1/10/2020 15:31:51 EST by Alla Thacker Subject: Additional Clinical Information VASCULAR:   
  
Reviewed plans for right leg angiogram/possible intervention with patient and that she has been cleared by medical team to be able to proceed   
     
Will follow up with scheduling arrangements/orders, perhaps be able to do early next week * Milestone  
   
Diabetes - Care Day 5 (1/7/2020) by Alla Thacker RN  
 
   
Review Entered Review Status 1/9/2020 14:50 Completed  
   
Criteria Review Care Day: 5 Care Date: 1/7/2020 Level of Care: ICU Guideline Day 2 Clinical Status   
(X) * Hypotension absent 1/9/2020 14:50:19 EST by Alla Thacker   
  97. 7  65  16  98%  135/70   
(X) * Mental status at baseline ( ) * Acidosis absent or improved ( ) * Blood glucose under acceptable control or improved ( ) * Dehydration absent   
( ) * Electrolyte abnormalities absent or improved ( ) * Renal function at baseline or improved Activity ( ) * Ambulatory Routes   
(X) * Oral hydration, and medications Interventions (X) Complete IV volume, replacement,   
(X) Serum glucose, serum ketones, chemistries, ABGs or venous pH measurements, urinalysis Medications ( ) * IV insulin absent   
( ) * Outpatient diabetic medication regimen initiated 1/9/2020 14:50:19 EST by Alla Thacker Subject: Additional Clinical Information , GLUOCSE 150, BUN 26, CR 1.47, CA 7.4   
  
1/9/2020 14:50:19 EST by Alla Thacker Subject: Additional Clinical Information MORPHINE 2 MG IV X 1, PEPCID 20 MG IV EVERY 48 HOURS   
  
1/9/2020 14:50:19 EST by Alla Thacker Subject: Additional Clinical Information GI: 1.  IVF, monitor BUN/HCT 2. Glucose control per primary team   
3. Regular diet 4. RUQ US to r/o gallstones- pending 5. Continue dulcolax and miralax for bowel regimen 6. Alcohol cessation 7 Medical management per primary team   
8. Will need outpatient CT pancreas in about 8-12 weeks to ensure no residual changes and no mass. 1/9/2020 14:50:19 EST by Alla Thacker Subject: Additional Clinical Information VASCULAR:   
  
Continue present treatment per primary team   
Patient discussed with Renal and Medicine. OK to proceed with vascular workup for ischemic right foot. CTA abd with run off ordered. Monitor renal function closely. Further surgical discussion pending results of CTA Patient and family express understanding 1/9/2020 14:50:19 EST by Alla Thacker Subject: Additional Clinical Information NEPHROLOGY:   
Her renal function continue to improve, likely close to her baseline. Discussed with vascular colleague. * DC IV fluid today. Monitor for urine retention. * DC gutierrez catheter and monitor for urine retention, discussed with nursing staff   
  
  
  
1/9/2020 14:50:19 EST by Prince Melara Subject: Additional Clinical Information IM:   
  
1)   Right lower extremity ischemia     
2)   Hyperglycemia with Type 2 diabetes âNow  Off insulin drip,   Lantus increased recently. 3)   Acute on chronic renal failure Stage 3:  likely mostly prerenal.   
4)   Acute pancreatitis, resolving 5)   Metabolic acidosis due to renal issues-resolved 6)   Acute metabolic encephalopathy: Improved 7)   Hypertension:   
8)   Hypophosphatemia 9)   Normocytic anemia of chronic disease   
     
Patient is stable with improve renal function, CTA abd with run off ordered by vascular. Pain control. Monitor renal function closely. Avoid nephrotoxicity, gutierrez cath removed. Hold IV fluid GI follows, RUQ US pending Advanced diet ISS, Lantus continues. Resume statin, aspirin Alcohol cessation advised. Palliative care team follow up for goal of care * Milestone

## 2020-01-29 NOTE — ROUTINE PROCESS
Bedside shift change report received from Sudhakar Roque, [address] [place] [address]. Report included SBAR, Kardex, MAR, Recent Results, and Plan of Care. Pt in bed with call light in reach.

## 2020-01-29 NOTE — DIABETES MGMT
GLYCEMIC CONTROL SCREENING:    Lab Results   Component Value Date/Time    Hemoglobin A1c 14.2 (H) 01/02/2020 09:14 PM      Lab Results   Component Value Date/Time    Glucose 57 (L) 01/29/2020 05:16 AM         [x]  Patient has had a hypoglycemic event, recommend decreasing Lantus insulin from 32 units to 20 units daily. Treat per hypoglycemia protocol.      Jayshree Mayers RD, CDE

## 2020-01-29 NOTE — PROGRESS NOTES
Acceptance [facility] with [facility]. Patient for pending  AKA on 1/30/19. Called [place] [facility] and start [facility] with [facility]. Patient is Alger Bernheim.     Haider Farmer, RN BSN  Care Manager  [phone]

## 2020-01-29 NOTE — PROGRESS NOTES
[facility]  Progress Note    Patient: Doris Conception Age: 79 y.o. : 1940 MR#: [id number] SSN: [id number]  Date/Time: 2020     Subjective:     Review of systems        Assessment/Plan:   Interval Hospital Course:  79 y.o female with prolonged hospitalization since her admission 3579 for metabolic encephalopathy related to HHS, acute pancreatitis, EFRAIN. She has recovered from [facility], pancreatitis/EFRAIN but developed worsened ischemia of her right foot due to right lower extremity PAD with gangrene, CTA with multifocal stenosis of right lower extremity. She underwent angio with balloon angioplasty and stent placement at origin of common iliac (2020). She underwent right femoral to above-knee popliteal bypass (2020). Podiatry consulted and followed but now recommended amputation. She has been waiting for her Hgb/Hct to improved to level where it will be safe for her to surgery without needing transfusion. Hematology has been consulted     1)  Right leg PAD with gangrene, associate with uncontrolled DM2      - CTA with multifocal stenosis of right lower extremity      - s/p angio with balloon angioplasty and stent orf right common iliac artery 2020      - right Fem-Pop bypass 2020       VASCULAR TO FOLLOW UP FOR right AKA when Hgb >8   2)  Hyperglycemia with Type 2 diabetes, stable       - admitted with Hyperosmolar non-ketotic state in type 2 diabetes mellitus, resolved    3)  Acute on chronic renal failure Stage 3, resolved   4)  Metabolic acidosis due to renal issues, resolved  5)  Acute metabolic encephalopathy, improved  6)  Acute pancreatitis, resolved, GI recommended CT abd in 8 weeks   7)  Hypertension, tolerated bblocker   8)  Hypophosphatemia, replaced   9)  Normocytic anemia of chronic disease, recently decreased. Not amenable for transfusion due to Gnosticism  10)  Urinary retention, gutierrez out.  Resolved     PLAN   -IV hydration, monitor renal function and CBC  -Possible right AKA -in next 1 to 2 days-follow-up with vascular        Case discussed with:  [x]Patient  [] Family ( In room with patient )    [x]Nursing  []Case Management  DVT Prophylaxis:  []Lovenox  []Hep SQ  []SCDs  []Coumadin   []On Heparin gtt          Objective:   VS:   Visit Vitals  BP 96/57 (BP 1 Location: Left arm, BP Patient Position: At rest)   Pulse 78   Temp 98.4 °F (36.9 °C)   Resp 18   Ht 5' 5\" (1.651 m)   Wt 68.8 kg (151 lb 9.6 oz)   SpO2 99%   Breastfeeding No   BMI 25.23 kg/m²      Tmax/24hrs: Temp (24hrs), Av.9 °F (36.6 °C), Min:97.4 °F (36.3 °C), Max:98.4 °F (36.9 °C)  IOBRIEF    Intake/Output Summary (Last 24 hours) at 2020 1430  Last data filed at 2020 1430  Gross per 24 hour   Intake 720 ml   Output    Net 720 ml       General:  Alert, cooperative, no acute distress   Cardiovascular: S1S2 - regular , No Murmur   Pulmonary: Equal expansion , No Use of accessory muscles , No Rales No Rhonchi    GI:  +BS in all four quadrants, soft, non-tender  Extremities:   Right foot dry gangrene  Neuro: Alert and oriented X 2.        Medications:   Current Facility-Administered Medications   Medication Dose Route Frequency    0.9% sodium chloride infusion  75 mL/hr IntraVENous CONTINUOUS    epoetin bipin-epbx (RETACRIT) injection 20,000 Units  20,000 Units SubCUTAneous Q MON, WED & FRI    insulin lispro (HUMALOG) injection 3 Units  3 Units SubCUTAneous TIDAC    ondansetron (ZOFRAN) injection 4 mg  4 mg IntraVENous Q6H PRN    acetaminophen (TYLENOL) tablet 650 mg  650 mg Oral Q4H PRN    iron sucrose (VENOFER) 300 mg in 0.9% sodium chloride 250 mL IVPB  300 mg IntraVENous Q3DAYS    insulin glargine (LANTUS) injection 32 Units  32 Units SubCUTAneous DAILY    amLODIPine (NORVASC) tablet 10 mg  10 mg Oral DAILY    metoprolol tartrate (LOPRESSOR) tablet 12.5 mg  12.5 mg Oral Q12H    hydrALAZINE (APRESOLINE) 20 mg/mL injection 10 mg  10 mg IntraVENous Q6H PRN  bisacodyL (DULCOLAX) tablet 10 mg  10 mg Oral DAILY PRN    docusate sodium (COLACE) capsule 100 mg  100 mg Oral BID    tamsulosin (FLOMAX) capsule 0.4 mg  0.4 mg Oral DAILY    dextrose (D25W) 25% injection 10 mL  2.5 g IntraVENous PRN    insulin lispro (HUMALOG) injection   SubCUTAneous AC&HS    famotidine (PEPCID) tablet 20 mg  20 mg Oral DAILY    bisacodyl (DULCOLAX) suppository 10 mg  10 mg Rectal DAILY PRN    polyethylene glycol (MIRALAX) packet 17 g  17 g Oral DAILY    aspirin chewable tablet 81 mg  81 mg Oral DAILY    oxyCODONE-acetaminophen (PERCOCET) 5-325 mg per tablet 1-2 Tab  1-2 Tab Oral Q6H PRN    dextrose 10% infusion 125-250 mL  125-250 mL IntraVENous PRN    heparin (porcine) injection 5,000 Units  5,000 Units SubCUTAneous Q8H    glucose chewable tablet 16 g  4 Tab Oral PRN    glucagon (GLUCAGEN) injection 1 mg  1 mg IntraMUSCular PRN       Labs:    Recent Labs     01/29/20  0516 01/27/20  0543   WBC 6.5 6.7   HGB 7.8* 8.2*   HCT 26.1* 27.0*    319     Recent Labs     01/29/20  0516      K 4.5      CO2 27   GLU 57*   BUN 34*   CREA 1.70*   CA 9.0         Signed By: Marvene Severe, MD     January 29, 2020

## 2020-01-29 NOTE — ROUTINE PROCESS
Verbal bedside shift report given to Julius Huang, RN oncoming nurse by Marcy Yen, RN off going nurse including KARDEX, SBAR and [facility]

## 2020-01-29 NOTE — PROGRESS NOTES
Problem: Self Care Deficits Care Plan (Adult)  Goal: *Acute Goals and Plan of Care (Insert Text)  Description  Occupational Therapy Goals  Initiated 1/18/2020, re-evaluated on 1/28/2020 within 7 day(s). Goals 1 and 3 met and revised. 1.  Patient will perform upper body dressing with modified independence. 2.  Patient will perform lower body dressing with moderate assistance . 3. Patient will perform grooming with setup/supervision while sitting EOB. 4.  Patient will perform all aspects of toileting with moderate assistance . 5. Patient will participate in upper extremity therapeutic exercise/activities with supervision/set-up for 5 minutes. Prior Level of Function: Patient reported she lived alone PTA and was independent in self-care with no AE. Outcome: Progressing Towards Goal   OCCUPATIONAL THERAPY RE-EVALUATION    Patient: Luda Alvarado (27 y.o. female)  Date: 1/28/2020  Primary Diagnosis: Pancreatitis [K85.90]  Hyperosmolar hyperglycemic coma due to diabetes mellitus without ketoacidosis ([facility] [address] [E11.01]  Hyperosmolar non-ketotic state in patient with type 2 diabetes mellitus ([facility] [address] [E11.00]  Procedure(s) (LRB):  RIGHT FEMORAL-POPLITEAL BYPASS (Right) 12 Days Post-Op   Precautions:   Fall, NWB(RLE)    ASSESSMENT :  Based on the objective data described below, the patient continues to present with decreased ADLs, decreased functional mobility and muscle weakness, complicated by NWB status on RLE. She was drowsy upon arrival to room but became more alert over time. Initial encouragement given to participate but patient motivated to attempt self care and OOB activities. Min assist given to sit on EOB from supine. She was able to don her left sock with min assist while seated. Min assist also given for dynamic sitting balance. Patient educated on non weight bearing status and she verbalized understanding.   Max assist required to stand with RW but only min assist to maintain standing with NWB on RLE. She was able to stand ~3 minutes in prep for functional task before sitting back on bed. Recommend continued therapy at rehab to maximize her functional independence with self care tasks and functional mobility. Patient will benefit from skilled intervention to address the above impairments. Patient's rehabilitation potential is considered to be Good  Factors which may influence rehabilitation potential include:   []             None noted  []             Mental ability/status  [x]             Medical condition  []             Home/family situation and support systems  []             Safety awareness  []             Pain tolerance/management  []             Other:      PLAN :  Recommendations and Planned Interventions:   [x]               Self Care Training                  [x]      Therapeutic Activities  [x]               Functional Mobility Training   []      Cognitive Retraining  [x]               Therapeutic Exercises           [x]      Endurance Activities  [x]               Balance Training                    []      Neuromuscular Re-Education  []               Visual/Perceptual Training     [x]      Home Safety Training  [x]               Patient Education                   [x]      Family Training/Education  []               Other (comment):    Frequency/Duration: Patient will be followed by occupational therapy 1-2 times per day/4-7 days per week to address goals. Discharge Recommendations: Rehab   Further Equipment Recommendations for Discharge: TBD at next level of care     SUBJECTIVE:   Patient stated Sammy Ortiz are you coming back?     OBJECTIVE DATA SUMMARY:   Hospital course since last seen and reason for reevaluation: Patient making slow progress toward goals and has surgery scheduled for RLE amputation this week. Past Medical History:   Diagnosis Date    Diabetes ([address]     Hypercholesterolemia     Hypertension    History reviewed. No pertinent surgical history.   Barriers to Learning/Limitations: None  Compensate with: visual, verbal, tactile, kinesthetic cues/model    Home Situation:   Home Situation  Home Environment: Private residence  # Steps to Enter: 0  One/Two Story Residence: One story  Living Alone: Yes  Support Systems: Friends \ neighbors, Bridget Holley / anitha community  Patient Expects to be Discharged to[de-identified] Private residence  Current DME Used/Available at Home: Crutches(niece also reported cane; patient could not remember cane)  Tub or Shower Type: Tub/Shower combination  [x]  Right hand dominant   []  Left hand dominant    Cognitive/Behavioral Status:  Neurologic State: Alert  Orientation Level: Oriented to person;Oriented to place;Oriented to situation  Cognition: Follows commands  Safety/Judgement: Fall prevention    Skin: Intact on UEs  Edema: None noted in UEs    Vision/Perceptual:    Acuity: Within Defined Limits      Coordination: BUE  Fine Motor Skills-Upper: Left Intact; Right Intact    Gross Motor Skills-Upper: Left Intact; Right Intact    Balance:  Sitting: Intact  Standing: Impaired    Strength: BUE  Strength: Generally decreased, functional(4/5 throughout)    Tone & Sensation: BUE  Tone: Normal  Sensation: Intact    Range of Motion: BUE  AROM: Within functional limits  PROM: Within functional limits    Functional Mobility and Transfers for ADLs:  Bed Mobility:  Supine to Sit: Minimum assistance  Sit to Supine: Minimum assistance    Transfers:  Sit to Stand: Maximum assistance  Stand to Sit: Moderate assistance   Toilet Transfer : Maximum assistance    ADL Assessment:   Feeding: Modified independent    Oral Facial Hygiene/Grooming: Setup;Supervision    Bathing:  Moderate assistance    Upper Body Dressing: Setup;Supervision    Lower Body Dressing: Maximum assistance(Min assist for donning sock seated EOB)    Toileting: Maximum assistance    Pain:  Pain level pre-treatment: 0/10   Pain level post-treatment: 0/10  Pain Intervention(s): NA  Response to intervention: NA    Activity Tolerance:   Good  Please refer to the flowsheet for vital signs taken during this treatment. After treatment:   [] Patient left in no apparent distress sitting up in chair  [x] Patient left in no apparent distress in bed  [x] Call bell left within reach  [x] Nursing notified  [] Caregiver present  [] Bed alarm activated    COMMUNICATION/EDUCATION:   [x] Role of Occupational Therapy in the acute care setting  [x] Home safety education was provided and the patient/caregiver indicated understanding. [x] Patient/family have participated as able in goal setting and plan of care. [x] Patient/family agree to work toward stated goals and plan of care. [] Patient understands intent and goals of therapy, but is neutral about his/her participation. [] Patient is unable to participate in goal setting and plan of care.     Thank you for this referral.  Daniela Tate MS OTR/L   Time Calculation: 25 mins

## 2020-01-29 NOTE — PROGRESS NOTES
Problem: Mobility Impaired (Adult and Pediatric)  Goal: *Acute Goals and Plan of Care (Insert Text)  Description  Physical Therapy Goals  Initiated 1/18/2020 reevaluated 1/29/2020 and to be accomplished within 7 day(s)  1. Patient will move from supine to sit and sit to supine , scoot up and down and roll side to side in bed with minimal assistance/contact guard assist.     2.  Patient will transfer from bed to chair and chair to bed with maximal assistance using the least restrictive device. 3.  Patient will perform sit to stand with moderate/maximal assistance. 4.  Patient will improve sitting balance static good and dynamic fair to increase safety with transfers. 5.  Patient will perform WC mobility minimal assistance 50 feet for increased functional independence. Prior Level of Function:   Patient was independence for all mobility including gait using no AD. Patient lives alone in a single story home no steps to enter. Unsure how pt was ambulatory prior to hospital with the noticeable gangrene of R foot but pt states she has no equipment at home and was ambulatory. Outcome: Progressing Towards Goal       PHYSICAL THERAPY RE-EVALUATION    Patient: Luisito Del Rio (70 y.o. female)  Date: 1/29/2020  Primary Diagnosis: Pancreatitis [K85.90]  Hyperosmolar hyperglycemic coma due to diabetes mellitus without ketoacidosis ([facility] [address] [E11.01]  Hyperosmolar non-ketotic state in patient with type 2 diabetes mellitus ([facility] [address] [E11.00]  Procedure(s) (LRB):  RIGHT FEMORAL-POPLITEAL BYPASS (Right) 13 Days Post-Op   Precautions:  Fall, NWB(RLE)      ASSESSMENT :  Patient slowly progressing towards goal limited by decreased motivation/participation in therapy. Educated on NWB right LE. Based on the objective data described below, the patient presents with decreased strength, NWB right LE, impaired balance, and decreased safety/ independence with mobility.  Patient is able to perform supine to sit transfer with min A to manage LE's. Mod verbal cues provided for scooting and hand placement for transfers. Pt needs max A for sit to stand transfer; she has poor standing tolerance < 30 seconds and poor balance. Pt requests to return to bed states her legs feel fatigued. Pt performs rolling with cg/min A uses bed rail to remove soiled chux pad. Pt left in bed with all needs met. Patient will continue to benefit from rehab following discharge in order to maximize mobility. Patient will benefit from skilled intervention to address the above impairments. Patient's rehabilitation potential is considered to be Good  Factors which may influence rehabilitation potential include:   []         None noted  [x]         Mental ability/status  [x]         Medical condition  [x]         Home/family situation and support systems  [x]         Safety awareness  [x]         Pain tolerance/management  []         Other:      PLAN :  Recommendations and Planned Interventions:   [x]           Bed Mobility Training             [x]    Neuromuscular Re-Education  [x]           Transfer Training                   []    Orthotic/Prosthetic Training  [x]           Gait Training                          []    Modalities  [x]           Therapeutic Exercises           []    Edema Management/Control  [x]           Therapeutic Activities            [x]    Family Training/Education  [x]           Patient Education  []           Other (comment):    Frequency/Duration: Patient will be followed by physical therapy 1-2 times per day/4-7 days per week to address goals. Discharge Recommendations: Skilled Nursing Facility  Further Equipment Recommendations for Discharge: TBD     SUBJECTIVE:   Patient stated  Were you the one that got me up yesterday? Humphrey Malone    OBJECTIVE DATA SUMMARY:   Hospital course since last seen and reason for re-evaluation: Patient is slowly progressing towards goals, limited by decreased motivation/participation.  Her tolerance to OOB mobility is improving. Pt will continue to benefit from skilled PT to increase independence with functional mobility. Past Medical History:   Diagnosis Date    Diabetes ([address]     Hypercholesterolemia     Hypertension    History reviewed. No pertinent surgical history. Barriers to Learning/Limitations: yes;  cognitive  Compensate with: Visual Cues, Verbal Cues, and Tactile Cues  Home Situation:   Home Situation  Home Environment: Private residence  # Steps to Enter: 0  One/Two Story Residence: One story  Living Alone: Yes  Support Systems: Friends \ neighbors, [place]  / anitha community  Patient Expects to be Discharged to[de-identified] Private residence  Current DME Used/Available at Home: Crutches(niece also reported cane; patient could not remember cane)  Tub or Shower Type: Tub/Shower combination  Critical Behavior:  Neurologic State: Alert  Orientation Level: Disoriented to situation;Disoriented to time  Cognition: Follows commands     Psychosocial  Patient Behaviors: Calm; Cooperative  Needs Expressed: Educational  Purposeful Interaction: Yes  Pt Identified Daily Priority: Clinical issues (comment)  Caritas Process: Nurture loving kindness;Establish trust;Teaching/learning;Create healing environment; Attend basic human needs  Caring Interventions: Therapeutic modalities; Reassure  Reassure:  Therapeutic listening;Caring rounds  Therapeutic Modalities: Intentional therapeutic touch  Skin Condition/Temp: Dry;Warm     Skin Integrity: Intact  Skin Integumentary  Skin Color: Black(r foot)  Skin Condition/Temp: Dry;Warm  Skin Integrity: Intact  Turgor: Non-tenting  Hair Growth: Present  Varicosities: Absent     Strength:    Strength: Generally decreased, functional    Tone & Sensation:   Tone: Normal  Sensation: Impaired(to LT right foot)    Functional Mobility:  Bed Mobility:  Rolling: Contact guard assistance;Minimum assistance  Supine to Sit: Minimum assistance  Sit to Supine: Minimum assistance     Transfers:  Sit to Stand: Maximum assistance  Stand to Sit: Maximum assistance    Balance:   Sitting: Intact  Standing: Impaired; With support  Standing - Static: Poor; Fair    Ambulation/Gait Training:  Right Side Weight Bearing: Non-weight bearing       Therapeutic Exercises:   Pt performed LE exercises sitting EOB: ankle pumps, LAQ, marching    Activity Tolerance:   Fair  Please refer to the flowsheet for vital signs taken during this treatment. After treatment:   []         Patient left in no apparent distress sitting up in chair  [x]         Patient left in no apparent distress in bed  [x]         Call bell left within reach  [x]         Nursing notified  []         Caregiver present  []         Bed alarm activated  []         SCDs applied    COMMUNICATION/EDUCATION:   [x]         Role of Physical Therapy in the acute care setting. [x]         Fall prevention education was provided and the patient/caregiver indicated understanding. [x]         Patient/family have participated as able in goal setting and plan of care. [x]         Patient/family agree to work toward stated goals and plan of care. []         Patient understands intent and goals of therapy, but is neutral about his/her participation. []         Patient is unable to participate in goal setting/plan of care: ongoing with therapy staff.  []         Other:     Thank you for this referral.  Audra Verma, PT   Time Calculation: 19 mins

## 2020-01-29 NOTE — PROGRESS NOTES
H/h noted today  Pt sleeping,did not arouse  Will still tentatively plan aka tomorrow pending tomorrow's labs

## 2020-01-29 NOTE — PROGRESS NOTES
Hematology/Medical Oncology Progress Note             Name: Timoteo Enriquez   : 1940   MRN: [id number]   Date: 2020 8:13 AM     [x]I have reviewed the flowsheet and previous days notes. Events overnight reviewed and discussed with nursing staff. Vital signs and records reviewed. 79-year-old -American female past medical history of severe PVD, chronic anemia,uncontrolled diabetes mellitus type 2 now right lower limb ischemia and dry gangrene. S/p R Fem-pop bypass. In addition patient is [employer] and refuses blood transfusion. Initially patient received Retacrit 20,000 units X 7 days. Currently on Retacrit 20,000 units 3 X week.  will need further surgery. No new c/o today. ROS:  Constitutional: Positive for fatigue. Negative for fever. HENT: Negative for nosebleeds, congestion, facial swelling, mouth sores, trouble swallowing, neck pain, neck stiffness, voice change and postnasal drip. Eyes: Negative for photophobia, pain, discharge and itching. Respiratory: Negative for apnea, cough, choking, chest tightness, wheezing and stridor. Cardiovascular: Negative for chest pain, palpitations and leg swelling. Gastrointestinal: Negative for abdominal pain. Negative for nausea, diarrhea, constipation, blood in stool and rectal pain. Genitourinary: Negative for dysuria, urgency, hematuria, flank pain and difficulty urinating. Musculoskeletal:Positive for right foot/leg pain. Skin: Positive for right foot gangrene. Neurological:  Negative for dizziness, facial asymmetry, speech difficulty, light-headedness and headaches. Hematological: Negative for adenopathy. Does not bruise/bleed easily. Psychiatric/Behavioral: Negative for hallucinations,confusion.     Vital Signs:    Visit Vitals  /66 (BP 1 Location: Left arm, BP Patient Position: At rest)   Pulse (!) 102 Comment: Reported to RN Radha    Temp 97.4 °F (36.3 °C)   Resp 18   Ht 5' 5\" (1.651 m) Wt 68.8 kg (151 lb 9.6 oz)   SpO2 99%   Breastfeeding No   BMI 25.23 kg/m²       O2 Device: Room air   O2 Flow Rate (L/min): 2 l/min   Temp (24hrs), Av.9 °F (36.6 °C), Min:97.4 °F (36.3 °C), Max:98.4 °F (36.9 °C)       Intake/Output:   Last shift:      No intake/output data recorded. Last 3 shifts:  1901 -  0700  In: 480 [P.O.:480]  Out: -     Intake/Output Summary (Last 24 hours) at 2020 0859  Last data filed at 2020 1348  Gross per 24 hour   Intake 480 ml   Output    Net 480 ml       Physical Exam:  General: Appears comfortable, NAD. HEENT:  Anicteric sclerae; pink palpebral conjunctivae; mucosa moist  Resp:  Symmetrical chest expansion, no accessory muscle use; good airway entry; no rales/ wheezing/ rhonchi noted  CV:  S1, S2 present; regular rate and rhythm  GI:  Abdomen soft, non-tender; (+) active bowel sounds  Extremities:  S/p R Fem-pop bypass: R foot dry gangrene.   Skin:  Warm, right foot gangrenous  Neurologic:  Non-focal         DATA:   Current Facility-Administered Medications   Medication Dose Route Frequency    epoetin bipin-epbx (RETACRIT) injection 20,000 Units  20,000 Units SubCUTAneous Q MON, WED & FRI    insulin lispro (HUMALOG) injection 3 Units  3 Units SubCUTAneous TIDAC    ondansetron (ZOFRAN) injection 4 mg  4 mg IntraVENous Q6H PRN    acetaminophen (TYLENOL) tablet 650 mg  650 mg Oral Q4H PRN    iron sucrose (VENOFER) 300 mg in 0.9% sodium chloride 250 mL IVPB  300 mg IntraVENous Q3DAYS    insulin glargine (LANTUS) injection 32 Units  32 Units SubCUTAneous DAILY    amLODIPine (NORVASC) tablet 10 mg  10 mg Oral DAILY    metoprolol tartrate (LOPRESSOR) tablet 12.5 mg  12.5 mg Oral Q12H    hydrALAZINE (APRESOLINE) 20 mg/mL injection 10 mg  10 mg IntraVENous Q6H PRN    bisacodyL (DULCOLAX) tablet 10 mg  10 mg Oral DAILY PRN    docusate sodium (COLACE) capsule 100 mg  100 mg Oral BID    0.9% sodium chloride infusion 250 mL  250 mL IntraVENous PRN    tamsulosin (FLOMAX) capsule 0.4 mg  0.4 mg Oral DAILY    dextrose (D25W) 25% injection 10 mL  2.5 g IntraVENous PRN    insulin lispro (HUMALOG) injection   SubCUTAneous AC&HS    famotidine (PEPCID) tablet 20 mg  20 mg Oral DAILY    bisacodyl (DULCOLAX) suppository 10 mg  10 mg Rectal DAILY PRN    polyethylene glycol (MIRALAX) packet 17 g  17 g Oral DAILY    aspirin chewable tablet 81 mg  81 mg Oral DAILY    oxyCODONE-acetaminophen (PERCOCET) 5-325 mg per tablet 1-2 Tab  1-2 Tab Oral Q6H PRN    dextrose 10% infusion 125-250 mL  125-250 mL IntraVENous PRN    heparin (porcine) injection 5,000 Units  5,000 Units SubCUTAneous Q8H    glucose chewable tablet 16 g  4 Tab Oral PRN    glucagon (GLUCAGEN) injection 1 mg  1 mg IntraMUSCular PRN                    Labs:  Recent Labs     01/29/20  0516 01/27/20  0543   WBC 6.5 6.7   HGB 7.8* 8.2*   HCT 26.1* 27.0*    319     Recent Labs     01/29/20  0516      K 4.5      CO2 27   GLU 57*   BUN 34*   CREA 1.70*   CA 9.0     No results for input(s): PH, PCO2, PO2, HCO3, FIO2 in the last 72 hours. IMPRESSION:   · Anemia of chronic illness  · PVD  · Diabetes type 2  · Acute renal failure        · PLAN:  · H/H is 7.8/26.1: Pt is Jehovahs witness-no PRBC transfusion. Reviewed creatinine 1.37. Continue Retacrit  20,000 units 3 times a week  for H/H less than 10/30 while inpatient. Patient currently receiving Venofer last dose 3/3 today. Will restart  ferrous sulfate 325 mg p.o tomorrow. Iron low at 22,TIBC 116, iron % sat 19, ferritin 100. B12 897, folate >20.0. · Recommend limiting lab draws. · Tentative plan : Vascular surgery will proceed with amputation surgery whenever patient is medically stable. Tentative plan is to have amputation once her hemoglobin more than 8g/dl closer to 10g/dl. Tentative plan is for amputation on Thursday.      ·  ·        The patient is: [x] acutely ill Risk of deterioration: [] moderate    [] critically ill [] high         My assessment/plan was discussed with:  [x]nursing []PT/OT    []respiratory therapy [x]Dr.Lloyd Skyler Cruz MD      []family []       [facility] dictation software was used for portions of this report. Efforts have been made today to edit the dictations, but occasionally words are is mistranscribed.       Binta Ridley, NP

## 2020-01-30 ENCOUNTER — ANESTHESIA (OUTPATIENT)
Dept: CARDIOTHORACIC SURGERY | Age: 80
DRG: 616 | End: 2020-01-30
Payer: MEDICARE

## 2020-01-30 LAB
ANION GAP SERPL CALC-SCNC: 5 MMOL/L (ref 3–18)
BASOPHILS # BLD: 0 K/UL (ref 0–0.1)
BASOPHILS NFR BLD: 0 % (ref 0–2)
BUN SERPL-MCNC: 29 MG/DL (ref 7–18)
BUN/CREAT SERPL: 19 (ref 12–20)
CALCIUM SERPL-MCNC: 8.6 MG/DL (ref 8.5–10.1)
CHLORIDE SERPL-SCNC: 110 MMOL/L (ref 100–111)
CO2 SERPL-SCNC: 26 MMOL/L (ref 21–32)
CREAT SERPL-MCNC: 1.52 MG/DL (ref 0.6–1.3)
DIFFERENTIAL METHOD BLD: ABNORMAL
EOSINOPHIL # BLD: 0.2 K/UL (ref 0–0.4)
EOSINOPHIL NFR BLD: 4 % (ref 0–5)
ERYTHROCYTE [DISTWIDTH] IN BLOOD BY AUTOMATED COUNT: 18.9 % (ref 11.6–14.5)
GLUCOSE BLD STRIP.AUTO-MCNC: 117 MG/DL (ref 70–110)
GLUCOSE BLD STRIP.AUTO-MCNC: 126 MG/DL (ref 70–110)
GLUCOSE BLD STRIP.AUTO-MCNC: 302 MG/DL (ref 70–110)
GLUCOSE BLD STRIP.AUTO-MCNC: 61 MG/DL (ref 70–110)
GLUCOSE BLD STRIP.AUTO-MCNC: 62 MG/DL (ref 70–110)
GLUCOSE BLD STRIP.AUTO-MCNC: 68 MG/DL (ref 70–110)
GLUCOSE BLD STRIP.AUTO-MCNC: 73 MG/DL (ref 70–110)
GLUCOSE SERPL-MCNC: 45 MG/DL (ref 74–99)
HCT VFR BLD AUTO: 28.3 % (ref 35–45)
HGB BLD-MCNC: 8.5 G/DL (ref 12–16)
LYMPHOCYTES # BLD: 1 K/UL (ref 0.9–3.6)
LYMPHOCYTES NFR BLD: 17 % (ref 21–52)
MCH RBC QN AUTO: 32.8 PG (ref 24–34)
MCHC RBC AUTO-ENTMCNC: 30 G/DL (ref 31–37)
MCV RBC AUTO: 109.3 FL (ref 74–97)
MONOCYTES # BLD: 0.6 K/UL (ref 0.05–1.2)
MONOCYTES NFR BLD: 10 % (ref 3–10)
NEUTS SEG # BLD: 3.9 K/UL (ref 1.8–8)
NEUTS SEG NFR BLD: 69 % (ref 40–73)
PLATELET # BLD AUTO: 280 K/UL (ref 135–420)
PMV BLD AUTO: 9.3 FL (ref 9.2–11.8)
POTASSIUM SERPL-SCNC: 4.4 MMOL/L (ref 3.5–5.5)
RBC # BLD AUTO: 2.59 M/UL (ref 4.2–5.3)
SODIUM SERPL-SCNC: 141 MMOL/L (ref 136–145)
WBC # BLD AUTO: 5.7 K/UL (ref 4.6–13.2)

## 2020-01-30 PROCEDURE — 76060000033 HC ANESTHESIA 1 TO 1.5 HR: Performed by: SURGERY

## 2020-01-30 PROCEDURE — 74011000258 HC RX REV CODE- 258: Performed by: ANESTHESIOLOGY

## 2020-01-30 PROCEDURE — 74011250637 HC RX REV CODE- 250/637: Performed by: INTERNAL MEDICINE

## 2020-01-30 PROCEDURE — 74011636637 HC RX REV CODE- 636/637: Performed by: HOSPITALIST

## 2020-01-30 PROCEDURE — 74011250636 HC RX REV CODE- 250/636: Performed by: NURSE ANESTHETIST, CERTIFIED REGISTERED

## 2020-01-30 PROCEDURE — 74011000258 HC RX REV CODE- 258: Performed by: STUDENT IN AN ORGANIZED HEALTH CARE EDUCATION/TRAINING PROGRAM

## 2020-01-30 PROCEDURE — 82962 GLUCOSE BLOOD TEST: CPT

## 2020-01-30 PROCEDURE — 74011250636 HC RX REV CODE- 250/636

## 2020-01-30 PROCEDURE — 74011250636 HC RX REV CODE- 250/636: Performed by: ANESTHESIOLOGY

## 2020-01-30 PROCEDURE — 74011250637 HC RX REV CODE- 250/637: Performed by: NURSE ANESTHETIST, CERTIFIED REGISTERED

## 2020-01-30 PROCEDURE — 77030010509 HC AIRWY LMA MSK TELE -A: Performed by: ANESTHESIOLOGY

## 2020-01-30 PROCEDURE — 85025 COMPLETE CBC W/AUTO DIFF WBC: CPT

## 2020-01-30 PROCEDURE — 77030013079 HC BLNKT BAIR HGGR 3M -A: Performed by: ANESTHESIOLOGY

## 2020-01-30 PROCEDURE — 77030006835 HC BLD SAW SAG STRY -B: Performed by: SURGERY

## 2020-01-30 PROCEDURE — 77030008462 HC STPLR SKN PROX J&J -A: Performed by: SURGERY

## 2020-01-30 PROCEDURE — 80048 BASIC METABOLIC PNL TOTAL CA: CPT

## 2020-01-30 PROCEDURE — 76010000113 HC CV SURG 1 TO 1.5 HR: Performed by: SURGERY

## 2020-01-30 PROCEDURE — 88311 DECALCIFY TISSUE: CPT

## 2020-01-30 PROCEDURE — 74011636637 HC RX REV CODE- 636/637: Performed by: INTERNAL MEDICINE

## 2020-01-30 PROCEDURE — 77030038269 HC DRN EXT URIN PURWCK BARD -A

## 2020-01-30 PROCEDURE — 74011250637 HC RX REV CODE- 250/637: Performed by: EMERGENCY MEDICINE

## 2020-01-30 PROCEDURE — 74011250636 HC RX REV CODE- 250/636: Performed by: SURGERY

## 2020-01-30 PROCEDURE — 74011250636 HC RX REV CODE- 250/636: Performed by: INTERNAL MEDICINE

## 2020-01-30 PROCEDURE — 0Y6C0Z3 DETACHMENT AT RIGHT UPPER LEG, LOW, OPEN APPROACH: ICD-10-PCS | Performed by: SURGERY

## 2020-01-30 PROCEDURE — 77030018836 HC SOL IRR NACL ICUM -A: Performed by: SURGERY

## 2020-01-30 PROCEDURE — 77030002996 HC SUT SLK J&J -A: Performed by: SURGERY

## 2020-01-30 PROCEDURE — 65660000000 HC RM CCU STEPDOWN

## 2020-01-30 PROCEDURE — 76210000006 HC OR PH I REC 0.5 TO 1 HR: Performed by: SURGERY

## 2020-01-30 PROCEDURE — 88307 TISSUE EXAM BY PATHOLOGIST: CPT

## 2020-01-30 PROCEDURE — 77030031139 HC SUT VCRL2 J&J -A: Performed by: SURGERY

## 2020-01-30 PROCEDURE — 74011250636 HC RX REV CODE- 250/636: Performed by: PHYSICIAN ASSISTANT

## 2020-01-30 RX ORDER — NALBUPHINE HYDROCHLORIDE 10 MG/ML
5 INJECTION, SOLUTION INTRAMUSCULAR; INTRAVENOUS; SUBCUTANEOUS
Status: DISCONTINUED | OUTPATIENT
Start: 2020-01-30 | End: 2020-01-31

## 2020-01-30 RX ORDER — MIDAZOLAM HYDROCHLORIDE 1 MG/ML
INJECTION, SOLUTION INTRAMUSCULAR; INTRAVENOUS AS NEEDED
Status: DISCONTINUED | OUTPATIENT
Start: 2020-01-30 | End: 2020-01-30 | Stop reason: HOSPADM

## 2020-01-30 RX ORDER — FAMOTIDINE 20 MG/1
20 TABLET, FILM COATED ORAL ONCE
Status: COMPLETED | OUTPATIENT
Start: 2020-01-30 | End: 2020-01-30

## 2020-01-30 RX ORDER — ONDANSETRON 2 MG/ML
4 INJECTION INTRAMUSCULAR; INTRAVENOUS ONCE
Status: DISCONTINUED | OUTPATIENT
Start: 2020-01-30 | End: 2020-01-30 | Stop reason: SDUPTHER

## 2020-01-30 RX ORDER — MAGNESIUM SULFATE 100 %
4 CRYSTALS MISCELLANEOUS AS NEEDED
Status: DISCONTINUED | OUTPATIENT
Start: 2020-01-30 | End: 2020-01-31

## 2020-01-30 RX ORDER — SODIUM CHLORIDE, SODIUM LACTATE, POTASSIUM CHLORIDE, CALCIUM CHLORIDE 600; 310; 30; 20 MG/100ML; MG/100ML; MG/100ML; MG/100ML
75 INJECTION, SOLUTION INTRAVENOUS CONTINUOUS
Status: DISCONTINUED | OUTPATIENT
Start: 2020-01-30 | End: 2020-01-31

## 2020-01-30 RX ORDER — ONDANSETRON 2 MG/ML
4 INJECTION INTRAMUSCULAR; INTRAVENOUS ONCE
Status: ACTIVE | OUTPATIENT
Start: 2020-01-30 | End: 2020-01-31

## 2020-01-30 RX ORDER — SODIUM CHLORIDE 0.9 % (FLUSH) 0.9 %
5-40 SYRINGE (ML) INJECTION EVERY 8 HOURS
Status: DISCONTINUED | OUTPATIENT
Start: 2020-01-30 | End: 2020-01-31

## 2020-01-30 RX ORDER — FENTANYL CITRATE 50 UG/ML
50 INJECTION, SOLUTION INTRAMUSCULAR; INTRAVENOUS
Status: DISCONTINUED | OUTPATIENT
Start: 2020-01-30 | End: 2020-01-31

## 2020-01-30 RX ORDER — SODIUM CHLORIDE 0.9 % (FLUSH) 0.9 %
5-40 SYRINGE (ML) INJECTION AS NEEDED
Status: DISCONTINUED | OUTPATIENT
Start: 2020-01-30 | End: 2020-01-31

## 2020-01-30 RX ORDER — FENTANYL CITRATE 50 UG/ML
INJECTION, SOLUTION INTRAMUSCULAR; INTRAVENOUS AS NEEDED
Status: DISCONTINUED | OUTPATIENT
Start: 2020-01-30 | End: 2020-01-30 | Stop reason: HOSPADM

## 2020-01-30 RX ORDER — INSULIN LISPRO 100 [IU]/ML
INJECTION, SOLUTION INTRAVENOUS; SUBCUTANEOUS ONCE
Status: ACTIVE | OUTPATIENT
Start: 2020-01-30 | End: 2020-01-31

## 2020-01-30 RX ORDER — PROPOFOL 10 MG/ML
INJECTION, EMULSION INTRAVENOUS AS NEEDED
Status: DISCONTINUED | OUTPATIENT
Start: 2020-01-30 | End: 2020-01-30 | Stop reason: HOSPADM

## 2020-01-30 RX ORDER — CEFAZOLIN SODIUM 2 G/50ML
SOLUTION INTRAVENOUS
Status: COMPLETED
Start: 2020-01-30 | End: 2020-01-30

## 2020-01-30 RX ORDER — DEXTROSE 50 % IN WATER (D50W) INTRAVENOUS SYRINGE
25-50 AS NEEDED
Status: DISCONTINUED | OUTPATIENT
Start: 2020-01-30 | End: 2020-01-31

## 2020-01-30 RX ORDER — HYDROMORPHONE HYDROCHLORIDE 2 MG/ML
INJECTION, SOLUTION INTRAMUSCULAR; INTRAVENOUS; SUBCUTANEOUS
Status: COMPLETED
Start: 2020-01-30 | End: 2020-01-30

## 2020-01-30 RX ORDER — SODIUM CHLORIDE 0.9 % (FLUSH) 0.9 %
5-40 SYRINGE (ML) INJECTION EVERY 8 HOURS
Status: DISCONTINUED | OUTPATIENT
Start: 2020-01-30 | End: 2020-01-30 | Stop reason: HOSPADM

## 2020-01-30 RX ORDER — SODIUM CHLORIDE 0.9 % (FLUSH) 0.9 %
5-40 SYRINGE (ML) INJECTION AS NEEDED
Status: DISCONTINUED | OUTPATIENT
Start: 2020-01-30 | End: 2020-01-30 | Stop reason: HOSPADM

## 2020-01-30 RX ORDER — HYDROMORPHONE HYDROCHLORIDE 2 MG/ML
0.5 INJECTION, SOLUTION INTRAMUSCULAR; INTRAVENOUS; SUBCUTANEOUS
Status: DISCONTINUED | OUTPATIENT
Start: 2020-01-30 | End: 2020-01-31

## 2020-01-30 RX ORDER — CEFAZOLIN SODIUM 2 G/50ML
2 SOLUTION INTRAVENOUS ONCE
Status: COMPLETED | OUTPATIENT
Start: 2020-01-30 | End: 2020-01-30

## 2020-01-30 RX ORDER — SODIUM CHLORIDE, SODIUM LACTATE, POTASSIUM CHLORIDE, CALCIUM CHLORIDE 600; 310; 30; 20 MG/100ML; MG/100ML; MG/100ML; MG/100ML
75 INJECTION, SOLUTION INTRAVENOUS CONTINUOUS
Status: DISCONTINUED | OUTPATIENT
Start: 2020-01-30 | End: 2020-01-30 | Stop reason: HOSPADM

## 2020-01-30 RX ORDER — LIDOCAINE HYDROCHLORIDE 10 MG/ML
0.1 INJECTION, SOLUTION EPIDURAL; INFILTRATION; INTRACAUDAL; PERINEURAL AS NEEDED
Status: DISCONTINUED | OUTPATIENT
Start: 2020-01-30 | End: 2020-01-30 | Stop reason: HOSPADM

## 2020-01-30 RX ORDER — LANOLIN ALCOHOL/MO/W.PET/CERES
1 CREAM (GRAM) TOPICAL
Status: DISCONTINUED | OUTPATIENT
Start: 2020-01-30 | End: 2020-02-01

## 2020-01-30 RX ORDER — INSULIN LISPRO 100 [IU]/ML
INJECTION, SOLUTION INTRAVENOUS; SUBCUTANEOUS ONCE
Status: DISCONTINUED | OUTPATIENT
Start: 2020-01-30 | End: 2020-01-30 | Stop reason: HOSPADM

## 2020-01-30 RX ORDER — HYDROMORPHONE HYDROCHLORIDE 1 MG/ML
.5-1 INJECTION, SOLUTION INTRAMUSCULAR; INTRAVENOUS; SUBCUTANEOUS
Status: DISCONTINUED | OUTPATIENT
Start: 2020-01-30 | End: 2020-01-31

## 2020-01-30 RX ORDER — FENTANYL CITRATE 50 UG/ML
25 INJECTION, SOLUTION INTRAMUSCULAR; INTRAVENOUS AS NEEDED
Status: DISCONTINUED | OUTPATIENT
Start: 2020-01-30 | End: 2020-01-31

## 2020-01-30 RX ORDER — DIPHENHYDRAMINE HYDROCHLORIDE 50 MG/ML
12.5 INJECTION, SOLUTION INTRAMUSCULAR; INTRAVENOUS
Status: DISCONTINUED | OUTPATIENT
Start: 2020-01-30 | End: 2020-01-31

## 2020-01-30 RX ADMIN — HYDROMORPHONE HYDROCHLORIDE 0.5 MG: 2 INJECTION, SOLUTION INTRAMUSCULAR; INTRAVENOUS; SUBCUTANEOUS at 12:15

## 2020-01-30 RX ADMIN — METOPROLOL TARTRATE 12.5 MG: 25 TABLET ORAL at 21:32

## 2020-01-30 RX ADMIN — DEXTROSE MONOHYDRATE 250 ML: 10 INJECTION, SOLUTION INTRAVENOUS at 07:28

## 2020-01-30 RX ADMIN — FENTANYL CITRATE 25 MCG: 50 INJECTION, SOLUTION INTRAMUSCULAR; INTRAVENOUS at 10:46

## 2020-01-30 RX ADMIN — FENTANYL CITRATE 50 MCG: 50 INJECTION, SOLUTION INTRAMUSCULAR; INTRAVENOUS at 11:28

## 2020-01-30 RX ADMIN — Medication 10 ML: at 17:24

## 2020-01-30 RX ADMIN — MIDAZOLAM HYDROCHLORIDE 1 MG: 2 INJECTION, SOLUTION INTRAMUSCULAR; INTRAVENOUS at 10:46

## 2020-01-30 RX ADMIN — PHENYLEPHRINE HYDROCHLORIDE 100 MCG: 10 INJECTION INTRAVENOUS at 11:13

## 2020-01-30 RX ADMIN — Medication 10 ML: at 21:33

## 2020-01-30 RX ADMIN — HYDROMORPHONE HYDROCHLORIDE 0.5 MG: 2 INJECTION, SOLUTION INTRAMUSCULAR; INTRAVENOUS; SUBCUTANEOUS at 12:25

## 2020-01-30 RX ADMIN — FENTANYL CITRATE 25 MCG: 50 INJECTION, SOLUTION INTRAMUSCULAR; INTRAVENOUS at 11:19

## 2020-01-30 RX ADMIN — HYDROMORPHONE HYDROCHLORIDE 0.5 MG: 2 INJECTION, SOLUTION INTRAMUSCULAR; INTRAVENOUS; SUBCUTANEOUS at 12:35

## 2020-01-30 RX ADMIN — INSULIN LISPRO 8 UNITS: 100 INJECTION, SOLUTION INTRAVENOUS; SUBCUTANEOUS at 21:36

## 2020-01-30 RX ADMIN — PHENYLEPHRINE HYDROCHLORIDE 100 MCG: 10 INJECTION INTRAVENOUS at 11:06

## 2020-01-30 RX ADMIN — HYDROMORPHONE HYDROCHLORIDE 1 MG: 1 INJECTION, SOLUTION INTRAMUSCULAR; INTRAVENOUS; SUBCUTANEOUS at 20:47

## 2020-01-30 RX ADMIN — PROPOFOL 100 MG: 10 INJECTION, EMULSION INTRAVENOUS at 10:46

## 2020-01-30 RX ADMIN — CEFAZOLIN 2 G: 10 INJECTION, POWDER, FOR SOLUTION INTRAVENOUS at 11:07

## 2020-01-30 RX ADMIN — Medication 10 ML: at 06:27

## 2020-01-30 RX ADMIN — FENTANYL CITRATE 50 MCG: 50 INJECTION, SOLUTION INTRAMUSCULAR; INTRAVENOUS at 11:26

## 2020-01-30 RX ADMIN — SODIUM CHLORIDE, SODIUM LACTATE, POTASSIUM CHLORIDE, AND CALCIUM CHLORIDE 75 ML/HR: 600; 310; 30; 20 INJECTION, SOLUTION INTRAVENOUS at 06:20

## 2020-01-30 RX ADMIN — FAMOTIDINE 20 MG: 20 TABLET, FILM COATED ORAL at 06:18

## 2020-01-30 RX ADMIN — MIDAZOLAM HYDROCHLORIDE 1 MG: 2 INJECTION, SOLUTION INTRAMUSCULAR; INTRAVENOUS at 10:42

## 2020-01-30 RX ADMIN — HYDROMORPHONE HYDROCHLORIDE 1 MG: 1 INJECTION, SOLUTION INTRAMUSCULAR; INTRAVENOUS; SUBCUTANEOUS at 17:11

## 2020-01-30 RX ADMIN — PHENYLEPHRINE HYDROCHLORIDE 100 MCG: 10 INJECTION INTRAVENOUS at 11:35

## 2020-01-30 RX ADMIN — PHENYLEPHRINE HYDROCHLORIDE 100 MCG: 10 INJECTION INTRAVENOUS at 11:41

## 2020-01-30 RX ADMIN — SODIUM CHLORIDE 75 ML/HR: 900 INJECTION, SOLUTION INTRAVENOUS at 19:37

## 2020-01-30 RX ADMIN — SODIUM CHLORIDE, SODIUM LACTATE, POTASSIUM CHLORIDE, AND CALCIUM CHLORIDE: 600; 310; 30; 20 INJECTION, SOLUTION INTRAVENOUS at 10:42

## 2020-01-30 RX ADMIN — FENTANYL CITRATE 50 MCG: 50 INJECTION, SOLUTION INTRAMUSCULAR; INTRAVENOUS at 11:15

## 2020-01-30 RX ADMIN — HEPARIN SODIUM 5000 UNITS: 5000 INJECTION INTRAVENOUS; SUBCUTANEOUS at 21:32

## 2020-01-30 RX ADMIN — INSULIN LISPRO 3 UNITS: 100 INJECTION, SOLUTION INTRAVENOUS; SUBCUTANEOUS at 17:22

## 2020-01-30 RX ADMIN — DOCUSATE SODIUM 100 MG: 100 CAPSULE, LIQUID FILLED ORAL at 17:23

## 2020-01-30 NOTE — ROUTINE PROCESS
Bedside shift change report received from Pina Lam, [facility]. Report included SBAR, Kardex, MAR, Recent Results, and Plan of Care. Pt in bed sleeping with call light in reach.

## 2020-01-30 NOTE — ANESTHESIA POSTPROCEDURE EVALUATION
Procedure(s):  RIGHT ABOVE KNEE AMPUTATION (AKA). general    Anesthesia Post Evaluation      Multimodal analgesia: multimodal analgesia used between 6 hours prior to anesthesia start to PACU discharge  Patient location during evaluation: PACU  Patient participation: complete - patient participated  Level of consciousness: awake and alert  Pain management: adequate  Airway patency: patent  Anesthetic complications: no  Cardiovascular status: acceptable and hemodynamically stable  Respiratory status: acceptable  Hydration status: acceptable  Post anesthesia nausea and vomiting:  controlled      Vitals Value Taken Time   /48 1/30/2020 12:32 PM   Temp 36.7 °C (98.1 °F) 1/30/2020 12:02 PM   Pulse 88 1/30/2020 12:32 PM   Resp 23 1/30/2020 12:32 PM   SpO2 96 % 1/30/2020 12:32 PM   Vitals shown include unvalidated device data.

## 2020-01-30 NOTE — PROGRESS NOTES
visited with the family of Anastacio Butt, who is a 79 y.o.,female. The  provided the following Interventions:  Initiated a relationship of care and support. Offered prayer and assurance of continued prayers on patients behalf. Plan:  Chaplains will continue to follow and will provide pastoral care on an as needed/requested basis.  recommends bedside caregivers page  on duty if patient shows signs of acute spiritual or emotional distress.     [id number]   [phone]

## 2020-01-30 NOTE — ANESTHESIA PREPROCEDURE EVALUATION
Relevant Problems   No relevant active problems       Anesthetic History   No history of anesthetic complications            Review of Systems / Medical History  Patient summary reviewed, nursing notes reviewed and pertinent labs reviewed    Pulmonary  Within defined limits                 Neuro/Psych   Within defined limits           Cardiovascular    Hypertension          PAD and hyperlipidemia         GI/Hepatic/Renal         Renal disease (acute on chronic ): CRI      Comments: pancreatitis Endo/Other    Diabetes: using insulin    Anemia     Other Findings            Physical Exam    Airway  Mallampati: II  TM Distance: 4 - 6 cm  Neck ROM: normal range of motion   Mouth opening: Normal     Cardiovascular    Rhythm: regular           Dental    Dentition: Poor dentition     Pulmonary      Decreased breath sounds: bibasilar           Abdominal  GI exam deferred       Other Findings            Anesthetic Plan    ASA: 3  Anesthesia type: general            Anesthetic plan and risks discussed with: Patient

## 2020-01-30 NOTE — PROGRESS NOTES
Hematology/Medical Oncology Progress Note             Name: Kayleigh Novoa   : 1940   MRN: [id number]   Date: 2020 8:13 AM     [x]I have reviewed the flowsheet and previous days notes. Events overnight reviewed and discussed with nursing staff. Vital signs and records reviewed. 53-year-old -American female past medical history of severe PVD, chronic anemia,uncontrolled diabetes mellitus type 2 now right lower limb ischemia and dry gangrene. S/p R Fem-pop bypass. 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 Initially patient received Retacrit 20,000 units X 7 days. Currently on Retacrit 20,000 units 3 X week.  will need further surgery. No new c/o today. ROS:  Constitutional: Positive for fatigue. Negative for fever. HENT: Negative for nosebleeds, congestion, facial swelling, mouth sores, trouble swallowing, neck pain, neck stiffness, voice change and postnasal drip. Eyes: Negative for photophobia, pain, discharge and itching. Respiratory: Negative for apnea, cough, choking, chest tightness, wheezing and stridor. Cardiovascular: Negative for chest pain, palpitations and leg swelling. Gastrointestinal: Negative for abdominal pain. Negative for nausea, diarrhea, constipation, blood in stool and rectal pain. Genitourinary: Negative for dysuria, urgency, hematuria, flank pain and difficulty urinating. Musculoskeletal:Positive for right foot/leg pain. Skin: Positive for right foot gangrene. Neurological:  Negative for dizziness, facial asymmetry, speech difficulty, light-headedness and headaches. Hematological: Negative for adenopathy. Does not bruise/bleed easily. Psychiatric/Behavioral: Negative for hallucinations,confusion.     Vital Signs:    Visit Vitals  /75 (BP 1 Location: Left arm, BP Patient Position: At rest)   Pulse 96   Temp 98.3 °F (36.8 °C)   Resp 18   Ht 5' 5\" (1.651 m)   Wt 68.8 kg (151 lb 9.6 oz)   SpO2 98% Breastfeeding No   BMI 25.23 kg/m²       O2 Device: Room air   O2 Flow Rate (L/min): 2 l/min   Temp (24hrs), Av.3 °F (36.8 °C), Min:97.5 °F (36.4 °C), Max:98.9 °F (37.2 °C)       Intake/Output:   Last shift:      No intake/output data recorded. Last 3 shifts:  1901 -  0700  In: 720 [P.O.:720]  Out: 700 [Urine:700]    Intake/Output Summary (Last 24 hours) at 2020 0844  Last data filed at 2020 0402  Gross per 24 hour   Intake 720 ml   Output 700 ml   Net 20 ml       Physical Exam:  General: Appears comfortable, NAD. Pt in CVT preop. HEENT:  Anicteric sclerae; pink palpebral conjunctivae; mucosa moist  Resp:  Symmetrical chest expansion, no accessory muscle use; good airway entry; no rales/ wheezing/ rhonchi noted  CV:  S1, S2 present; regular rate and rhythm  GI:  Abdomen soft, non-tender; (+) active bowel sounds  Extremities:  S/p R Fem-pop bypass: R foot dry gangrene.   Skin:  Warm, right foot gangrenous  Neurologic:  Non-focal         DATA:   Current Facility-Administered Medications   Medication Dose Route Frequency    lidocaine (PF) (XYLOCAINE) 10 mg/mL (1 %) injection 0.1 mL  0.1 mL SubCUTAneous PRN    lactated Ringers infusion  75 mL/hr IntraVENous CONTINUOUS    sodium chloride (NS) flush 5-40 mL  5-40 mL IntraVENous Q8H    sodium chloride (NS) flush 5-40 mL  5-40 mL IntraVENous PRN    insulin lispro (HUMALOG) injection   SubCUTAneous ONCE    0.9% sodium chloride infusion  75 mL/hr IntraVENous CONTINUOUS    epoetin bipin-epbx (RETACRIT) injection 20,000 Units  20,000 Units SubCUTAneous Q MON, WED & FRI    insulin lispro (HUMALOG) injection 3 Units  3 Units SubCUTAneous TIDAC    ondansetron (ZOFRAN) injection 4 mg  4 mg IntraVENous Q6H PRN    acetaminophen (TYLENOL) tablet 650 mg  650 mg Oral Q4H PRN    insulin glargine (LANTUS) injection 32 Units  32 Units SubCUTAneous DAILY    amLODIPine (NORVASC) tablet 10 mg  10 mg Oral DAILY    metoprolol tartrate (LOPRESSOR) tablet 12.5 mg  12.5 mg Oral Q12H    hydrALAZINE (APRESOLINE) 20 mg/mL injection 10 mg  10 mg IntraVENous Q6H PRN    bisacodyL (DULCOLAX) tablet 10 mg  10 mg Oral DAILY PRN    docusate sodium (COLACE) capsule 100 mg  100 mg Oral BID    tamsulosin (FLOMAX) capsule 0.4 mg  0.4 mg Oral DAILY    dextrose (D25W) 25% injection 10 mL  2.5 g IntraVENous PRN    insulin lispro (HUMALOG) injection   SubCUTAneous AC&HS    famotidine (PEPCID) tablet 20 mg  20 mg Oral DAILY    bisacodyl (DULCOLAX) suppository 10 mg  10 mg Rectal DAILY PRN    polyethylene glycol (MIRALAX) packet 17 g  17 g Oral DAILY    aspirin chewable tablet 81 mg  81 mg Oral DAILY    oxyCODONE-acetaminophen (PERCOCET) 5-325 mg per tablet 1-2 Tab  1-2 Tab Oral Q6H PRN    dextrose 10% infusion 125-250 mL  125-250 mL IntraVENous PRN    heparin (porcine) injection 5,000 Units  5,000 Units SubCUTAneous Q8H    glucose chewable tablet 16 g  4 Tab Oral PRN    glucagon (GLUCAGEN) injection 1 mg  1 mg IntraMUSCular PRN                    Labs:  Recent Labs     01/30/20  0713 01/29/20  0516   WBC 5.7 6.5   HGB 8.5* 7.8*   HCT 28.3* 26.1*    318     Recent Labs     01/30/20  0406 01/29/20  0516    140   K 4.4 4.5    109   CO2 26 27   GLU 45* 57*   BUN 29* 34*   CREA 1.52* 1.70*   CA 8.6 9.0     No results for input(s): PH, PCO2, PO2, HCO3, FIO2 in the last 72 hours. IMPRESSION:   · Anemia of chronic illness  · PVD  · Diabetes type 2  · Acute renal failure        · PLAN:  · H/H is 8.5/28.3: Pt is Jehovahs witness-no PRBC transfusion. Reviewed creatinine 1.52. Continue Retacrit  20,000 units 3 times a week  for H/H less than 10/30 while inpatient. Patient has completed Venofer 3/3. Will restart  ferrous sulfate 325 mg p.o. Iron low at 22,TIBC 116, iron % sat 19, ferritin 100. B12 897, folate >20.0. · Recommend limiting lab draws. · Tentative plan is for amputation on today scheduled at 1220.      ·  ·        The patient is: [x] acutely ill Risk of deterioration: [] moderate    [] critically ill  [] high         My assessment/plan was discussed with:  [x]nursing []PT/OT    []respiratory therapy [x]Dr.Lloyd Spring Garcia MD      []family []       [address] [facility] dictation software was used for portions of this report. Efforts have been made today to edit the dictations, but occasionally words are is mistranscribed.       Chetna Martinez NP

## 2020-01-30 NOTE — ROUTINE PROCESS
Bedside shift change report given to Heriberto Aponte RN. Report included SBAR, Kardex, MAR, Recent Results, and Plan of Care. Pt in bed sleeping with call light in reach.

## 2020-01-30 NOTE — PROGRESS NOTES
Lab called with Critical bs of 45. Pt BS 62 on POC glucose monitor. PRN IV dextrose 10% administered since pt NPO.  Pt asymptomatic

## 2020-01-30 NOTE — PROGRESS NOTES
Pt is s/p right AKA 1/30/2020. Attempted to see pt for re-evaluation at [phone], but pt declined and refused participation in mobility today. Will follow up as schedule permits.     Domi English PT, DPT

## 2020-01-30 NOTE — PERIOP NOTES
TRANSFER - IN REPORT:    Verbal report received from Radha Ku RN on Susan Ray  being received from Tomi España for ordered procedure      Report consisted of patients Situation, Background, Assessment and   Recommendations(SBAR). Information from the following report(s) SBAR, Recent Results, Pre Procedure Checklist and Procedure Verification was reviewed with the receiving nurse. Opportunity for questions and clarification was provided. Assessment completed upon patients arrival to unit and care assumed.

## 2020-01-30 NOTE — ROUTINE PROCESS
TRANSFER - OUT REPORT: 
 
Verbal report given to Donnie Soulier on [facility]  being transferred to [address] [place] [address] for routine progression of care Report consisted of patients Situation, Background, Assessment and  
Recommendations(SBAR). Information from the following report(s) SBAR, Kardex, Procedure Summary and MAR was reviewed with the receiving nurse. Lines: PICC Double Lumen 93/42/19 Right;Basilic (Active) Central Line Being Utilized Yes 1/30/2020 12:02 PM  
Criteria for Appropriate Use Limited/no vessel suitable for conventional peripheral access 1/30/2020 12:02 PM  
Site Assessment Clean, dry, & intact 1/30/2020 12:02 PM  
Phlebitis Assessment 0 1/30/2020 12:02 PM  
Infiltration Assessment 0 1/30/2020 12:02 PM  
Arm Circumference (cm) 30 cm 1/28/2020  4:00 PM  
Date of Last Dressing Change 01/29/20 1/30/2020 12:02 PM  
Dressing Status Clean, dry, & intact 1/30/2020 12:02 PM  
Action Taken Open ports on tubing capped 1/30/2020  7:54 AM  
External Catheter Length (cm) 1 centimeters 1/28/2020  4:00 PM  
Dressing Type Disk with Chlorhexadine gluconate (CHG); Transparent 1/30/2020  7:54 AM  
Hub Color/Line Status Infusing; Purple;Patent 1/30/2020  7:54 AM  
Positive Blood Return (Site #1) Yes 1/30/2020  7:54 AM  
Hub Color/Line Status Cap end changed; Flushed;Patent; Red 1/30/2020  7:54 AM  
Positive Blood Return (Site #2) Yes 1/30/2020  7:54 AM  
Alcohol Cap Used Yes 1/30/2020  7:54 AM  
  
 
Opportunity for questions and clarification was provided. Patient transported with: 
 [employer]

## 2020-01-30 NOTE — PROGRESS NOTES
Bedside shift change report given to Jaime Jacobo RN (oncoming nurse) by Pernell Michaels RN (offgoing nurse). Report included the following information SBAR, Kardex and MAR.

## 2020-01-30 NOTE — PROGRESS NOTES
[facility] Hospitalist Group  Progress Note    Patient: Mal Willingham Age: 79 y.o. : 1940 MR#: [id number] SSN: [id number]  Date/Time: 2020     Subjective:     Patient is lying in bed alert awake oriented minimal communication        Assessment/Plan:   Interval Hospital Course:  79 y.o female with prolonged hospitalization since her admission 5987 for metabolic encephalopathy related to HHS, acute pancreatitis, EFRAIN. She has recovered from [facility], pancreatitis/EFRAIN but developed worsened ischemia of her right foot due to right lower extremity PAD with gangrene, CTA with multifocal stenosis of right lower extremity. She underwent angio with balloon angioplasty and stent placement at origin of common iliac (2020). She underwent right femoral to above-knee popliteal bypass (2020). Podiatry consulted and followed but now recommended amputation. She has been waiting for her Hgb/Hct to improved to level where it will be safe for her to surgery without needing transfusion. Hematology has been consulted     1)  Right leg PAD with gangrene, associate with uncontrolled DM2      - CTA with multifocal stenosis of right lower extremity      - s/p angio with balloon angioplasty and stent orf right common iliac artery 2020      - right Fem-Pop bypass 2020       VASCULAR TO FOLLOW UP FOR right AKA when Hgb >8   2)  Hyperglycemia with Type 2 diabetes, stable       - admitted with Hyperosmolar non-ketotic state in type 2 diabetes mellitus, resolved    3)  Acute on chronic renal failure Stage 3, resolved   4)  Metabolic acidosis due to renal issues, resolved  5)  Acute metabolic encephalopathy, improved  6)  Acute pancreatitis, resolved, GI recommended CT abd in 8 weeks   7)  Hypertension, tolerated bblocker   8)  Hypophosphatemia, replaced   9)  Normocytic anemia of chronic disease, recently decreased.  Not amenable for transfusion due to Latter-day  10)  Urinary retention, gutierrez out. Resolved     PLAN   S/p right above-knee amputation  . Patient is doing well  Denies any chest pain shortness of breath cough expectoration        Case discussed with:  [x]Patient  [] Family ( In room with patient )    [x]Nursing  []Case Management  DVT Prophylaxis:  []Lovenox  []Hep SQ  []SCDs  []Coumadin   []On Heparin gtt          Objective:   VS:   Visit Vitals  /49   Pulse 78   Temp 97.5 °F (36.4 °C)   Resp 15   Ht 5' 5\" (1.651 m)   Wt 68.8 kg (151 lb 9.6 oz)   SpO2 97%   Breastfeeding No   BMI 25.23 kg/m²      Tmax/24hrs: Temp (24hrs), Av.2 °F (36.8 °C), Min:97.5 °F (36.4 °C), Max:98.9 °F (37.2 °C)  IOBRIEF    Intake/Output Summary (Last 24 hours) at 2020 1431  Last data filed at 2020 1154  Gross per 24 hour   Intake 700 ml   Output 750 ml   Net -50 ml       General:  Alert, cooperative, no acute distress   Cardiovascular: S1S2 - regular , No Murmur   Pulmonary: Equal expansion , No Use of accessory muscles , No Rales No Rhonchi    GI:  +BS in all four quadrants, soft, non-tender  Extremities:   Right foot dry gangrene  Neuro: Alert and oriented X 2.        Medications:   Current Facility-Administered Medications   Medication Dose Route Frequency    ferrous sulfate tablet 325 mg  1 Tab Oral DAILY WITH BREAKFAST    sodium chloride (NS) flush 5-40 mL  5-40 mL IntraVENous Q8H    sodium chloride (NS) flush 5-40 mL  5-40 mL IntraVENous PRN    fentaNYL citrate (PF) injection 25 mcg  25 mcg IntraVENous PRN    fentaNYL citrate (PF) injection 50 mcg  50 mcg IntraVENous Multiple    ondansetron (ZOFRAN) injection 4 mg  4 mg IntraVENous ONCE    sodium chloride (NS) flush 5-40 mL  5-40 mL IntraVENous Q8H    sodium chloride (NS) flush 5-40 mL  5-40 mL IntraVENous PRN    insulin lispro (HUMALOG) injection   SubCUTAneous ONCE    glucose chewable tablet 16 g  4 Tab Oral PRN    glucagon (GLUCAGEN) injection 1 mg  1 mg IntraMUSCular PRN    dextrose (D50W) injection syrg 12.5-25 g  25-50 mL IntraVENous PRN    lactated Ringers infusion  75 mL/hr IntraVENous CONTINUOUS    HYDROmorphone (DILAUDID) injection 0.5 mg  0.5 mg IntraVENous Q10MIN PRN    diphenhydrAMINE (BENADRYL) injection 12.5 mg  12.5 mg IntraVENous Multiple    nalbuphine (NUBAIN) injection 5 mg  5 mg IntraVENous Multiple    HYDROmorphone (DILAUDID) injection 0.5-1 mg  0.5-1 mg IntraVENous Q3H PRN    0.9% sodium chloride infusion  75 mL/hr IntraVENous CONTINUOUS    epoetin bipin-epbx (RETACRIT) injection 20,000 Units  20,000 Units SubCUTAneous Q MON, WED & FRI    insulin lispro (HUMALOG) injection 3 Units  3 Units SubCUTAneous TIDAC    ondansetron (ZOFRAN) injection 4 mg  4 mg IntraVENous Q6H PRN    acetaminophen (TYLENOL) tablet 650 mg  650 mg Oral Q4H PRN    amLODIPine (NORVASC) tablet 10 mg  10 mg Oral DAILY    metoprolol tartrate (LOPRESSOR) tablet 12.5 mg  12.5 mg Oral Q12H    hydrALAZINE (APRESOLINE) 20 mg/mL injection 10 mg  10 mg IntraVENous Q6H PRN    bisacodyL (DULCOLAX) tablet 10 mg  10 mg Oral DAILY PRN    docusate sodium (COLACE) capsule 100 mg  100 mg Oral BID    tamsulosin (FLOMAX) capsule 0.4 mg  0.4 mg Oral DAILY    dextrose (D25W) 25% injection 10 mL  2.5 g IntraVENous PRN    insulin lispro (HUMALOG) injection   SubCUTAneous AC&HS    famotidine (PEPCID) tablet 20 mg  20 mg Oral DAILY    bisacodyl (DULCOLAX) suppository 10 mg  10 mg Rectal DAILY PRN    polyethylene glycol (MIRALAX) packet 17 g  17 g Oral DAILY    aspirin chewable tablet 81 mg  81 mg Oral DAILY    oxyCODONE-acetaminophen (PERCOCET) 5-325 mg per tablet 1-2 Tab  1-2 Tab Oral Q6H PRN    dextrose 10% infusion 125-250 mL  125-250 mL IntraVENous PRN    heparin (porcine) injection 5,000 Units  5,000 Units SubCUTAneous Q8H    glucose chewable tablet 16 g  4 Tab Oral PRN    glucagon (GLUCAGEN) injection 1 mg  1 mg IntraMUSCular PRN       Labs:    Recent Labs     01/30/20  0713 01/29/20  0516   WBC 5.7 6.5   HGB 8.5* 7.8* HCT 28.3* 26.1*    318     Recent Labs     01/30/20  0406 01/29/20  0516    140   K 4.4 4.5    109   CO2 26 27   GLU 45* 57*   BUN 29* 34*   CREA 1.52* 1.70*   CA 8.6 9.0         Signed By: Benjamin Varner MD     January 30, 2020

## 2020-01-30 NOTE — PROGRESS NOTES
Pt returned from OR. Pt is sleeping, easily aroused. Dressing to RLE clean, dry, and intact. Pt placed back on telemetry monitor.  Will continue to monitor

## 2020-01-30 NOTE — OP NOTES
Preoperative diagnosis: Gangrene of right foot    Postoperative diagnosis: Same    Procedures performed:  #1  Right above-knee amputation      Cultures: None    Specimens: None    Drains: None    Estimated blood loss: Less than 50 mL    Assistants: None    Implants: Please see above    Complications: None    Anesthesia: General    Indications for the procedure:  Jaron Yao is a 79 y.o. gangrene of right foot without ability to reconstruct. Bypass intact. Nondenominational believes with inability to  transfuse. .  Patient was given the appropriate risk and benefits of the procedure including but not limited to bleeding, infection, damage to adjacent structures, MI, stroke, death, loss of lower extremity, need for further surgery. Patient was understanding of all the risks and underwent a procedure. Operative findings:   #1  Successful right above-knee imitation    Procedure:  Patient was correctly identified in the preop area and taken to the OR in stable condition. Patient had pre-incision timeout prior to any incision. Patient was prepped and draped in the normal sterile fashion according to CDC guidelines aseptic technique. Using a standard fishmouth style incision made a incision above the knee. Transected the anterior muscle flap using electrocautery to minimize blood loss. I raised the periosteum off the bone and transected the femur high in position. Double clamped the vascular pedicle transected and ligated that the suture ligation 0 silk. Clamp the bypass graft cut it in half checked and it was patent I ligated that high in position and transected as well. Identified the sciatic nerve ligated at high in position transected the nerve allowed it to retract into the muscle flap to prevent neuroma formation. Transected posterior flap off electrocautery. Did a irrigation and closed the flap anterior to posterior interrupted Vicryl sutures to O's. Closed the skin with staples.   Applied a medium compression dressing.

## 2020-01-31 LAB
GLUCOSE BLD STRIP.AUTO-MCNC: 125 MG/DL (ref 70–110)
GLUCOSE BLD STRIP.AUTO-MCNC: 164 MG/DL (ref 70–110)
GLUCOSE BLD STRIP.AUTO-MCNC: 211 MG/DL (ref 70–110)
GLUCOSE BLD STRIP.AUTO-MCNC: 253 MG/DL (ref 70–110)

## 2020-01-31 PROCEDURE — 74011250637 HC RX REV CODE- 250/637: Performed by: INTERNAL MEDICINE

## 2020-01-31 PROCEDURE — 97530 THERAPEUTIC ACTIVITIES: CPT

## 2020-01-31 PROCEDURE — 97164 PT RE-EVAL EST PLAN CARE: CPT

## 2020-01-31 PROCEDURE — 74011636637 HC RX REV CODE- 636/637: Performed by: INTERNAL MEDICINE

## 2020-01-31 PROCEDURE — 74011636637 HC RX REV CODE- 636/637: Performed by: HOSPITALIST

## 2020-01-31 PROCEDURE — 65660000000 HC RM CCU STEPDOWN

## 2020-01-31 PROCEDURE — 74011250637 HC RX REV CODE- 250/637: Performed by: HOSPITALIST

## 2020-01-31 PROCEDURE — 74011250637 HC RX REV CODE- 250/637: Performed by: NURSE PRACTITIONER

## 2020-01-31 PROCEDURE — 74011250636 HC RX REV CODE- 250/636: Performed by: INTERNAL MEDICINE

## 2020-01-31 PROCEDURE — 74011250636 HC RX REV CODE- 250/636: Performed by: PHYSICIAN ASSISTANT

## 2020-01-31 PROCEDURE — 74011250637 HC RX REV CODE- 250/637: Performed by: EMERGENCY MEDICINE

## 2020-01-31 PROCEDURE — 82962 GLUCOSE BLOOD TEST: CPT

## 2020-01-31 PROCEDURE — 74011250636 HC RX REV CODE- 250/636: Performed by: SURGERY

## 2020-01-31 RX ORDER — OLANZAPINE 2.5 MG/1
2.5 TABLET ORAL EVERY EVENING
Status: DISCONTINUED | OUTPATIENT
Start: 2020-01-31 | End: 2020-02-04 | Stop reason: HOSPADM

## 2020-01-31 RX ADMIN — INSULIN LISPRO 3 UNITS: 100 INJECTION, SOLUTION INTRAVENOUS; SUBCUTANEOUS at 12:45

## 2020-01-31 RX ADMIN — AMLODIPINE BESYLATE 10 MG: 10 TABLET ORAL at 10:04

## 2020-01-31 RX ADMIN — INSULIN LISPRO 3 UNITS: 100 INJECTION, SOLUTION INTRAVENOUS; SUBCUTANEOUS at 09:05

## 2020-01-31 RX ADMIN — OLANZAPINE 2.5 MG: 2.5 TABLET, FILM COATED ORAL at 18:49

## 2020-01-31 RX ADMIN — HYDROMORPHONE HYDROCHLORIDE 1 MG: 1 INJECTION, SOLUTION INTRAMUSCULAR; INTRAVENOUS; SUBCUTANEOUS at 07:58

## 2020-01-31 RX ADMIN — SODIUM CHLORIDE 75 ML/HR: 900 INJECTION, SOLUTION INTRAVENOUS at 07:58

## 2020-01-31 RX ADMIN — OXYCODONE HYDROCHLORIDE AND ACETAMINOPHEN 1 TABLET: 5; 325 TABLET ORAL at 18:50

## 2020-01-31 RX ADMIN — Medication 10 ML: at 08:00

## 2020-01-31 RX ADMIN — TAMSULOSIN HYDROCHLORIDE 0.4 MG: 0.4 CAPSULE ORAL at 10:03

## 2020-01-31 RX ADMIN — HYDROMORPHONE HYDROCHLORIDE 1 MG: 1 INJECTION, SOLUTION INTRAMUSCULAR; INTRAVENOUS; SUBCUTANEOUS at 13:28

## 2020-01-31 RX ADMIN — HEPARIN SODIUM 5000 UNITS: 5000 INJECTION INTRAVENOUS; SUBCUTANEOUS at 04:39

## 2020-01-31 RX ADMIN — FAMOTIDINE 20 MG: 20 TABLET, FILM COATED ORAL at 10:03

## 2020-01-31 RX ADMIN — FERROUS SULFATE TAB 325 MG (65 MG ELEMENTAL FE) 325 MG: 325 (65 FE) TAB at 10:03

## 2020-01-31 RX ADMIN — Medication 81 MG: at 10:04

## 2020-01-31 RX ADMIN — INSULIN LISPRO 2 UNITS: 100 INJECTION, SOLUTION INTRAVENOUS; SUBCUTANEOUS at 17:30

## 2020-01-31 RX ADMIN — POLYETHYLENE GLYCOL 3350 17 G: 17 POWDER, FOR SOLUTION ORAL at 10:04

## 2020-01-31 RX ADMIN — INSULIN LISPRO 3 UNITS: 100 INJECTION, SOLUTION INTRAVENOUS; SUBCUTANEOUS at 17:30

## 2020-01-31 RX ADMIN — METOPROLOL TARTRATE 12.5 MG: 25 TABLET ORAL at 10:04

## 2020-01-31 RX ADMIN — HYDROMORPHONE HYDROCHLORIDE 1 MG: 1 INJECTION, SOLUTION INTRAMUSCULAR; INTRAVENOUS; SUBCUTANEOUS at 02:04

## 2020-01-31 RX ADMIN — SODIUM CHLORIDE 75 ML/HR: 900 INJECTION, SOLUTION INTRAVENOUS at 21:33

## 2020-01-31 RX ADMIN — DOCUSATE SODIUM 100 MG: 100 CAPSULE, LIQUID FILLED ORAL at 18:49

## 2020-01-31 RX ADMIN — DOCUSATE SODIUM 100 MG: 100 CAPSULE, LIQUID FILLED ORAL at 10:03

## 2020-01-31 RX ADMIN — HEPARIN SODIUM 5000 UNITS: 5000 INJECTION INTRAVENOUS; SUBCUTANEOUS at 13:30

## 2020-01-31 NOTE — PROGRESS NOTES
Breann from [facility] and rehab called this writer to inform of [facility] authorization for SNF. Authorization good until 2/2/20. Breann willing to accept patient on Saturday or Sunday when medically stable.     Abigail Ceja, RN BSN  Care Manager  [phone]

## 2020-01-31 NOTE — PROGRESS NOTES
NUTRITION    Nursing Referral: [facility]  Nutrition Consult: Diet Education, Other     RECOMMENDATIONS / PLAN:     - Continue nutritional supplements to optimize nutrition.  - Encourage meal intake. - Continue RD inpatient monitoring and evaluation. NUTRITION INTERVENTIONS & DIAGNOSIS:     - Meals/snacks: modified composition  - Medical food supplement therapy: Nepro BID  - Nutrition Education: low potassium diet education provided 1/10/2020    Nutrition Diagnosis: Predicted inadequate energy intake related to appetite as evidenced by pt with overall fair meal intake since admission, variable but improving    ASSESSMENT:     1/31: S/p right AKA yesterday. Meal and supplement intake remains variable. Denies nausea/vomiting.    1/24: Ate 100% of lunch today and consumed supplements. Awaiting right AKA once Hgb>8.  1/21: Per vascular pt needs right AKA. Niece at bedside reports pt with variable intake since admission, better when encouraged by family members. Pt confused. 1/17: S/p right femoral-popliteal bypass 1/16. Good intake prior to surgery, now with decreased intake. Pt reports poor appetite. Encouraged meal intake, pt receptive. Pt reports consuming some of the Nepro prior to surgery, agreeable to continuing. 1/10: Poor to fair meal intake, denies nausea/vomiting or pain. Dislikes Glucerna and noted hyperkalemia (kayexalate given). EFRAIN 2/2 dehydration from severe pancreatitis. Nutritional needs modified. 1/8: Pt on po diet. Tolerating most meals; stated some foods are \"too heavy\" feeling, preferences discussed. Sometimes has fair meal intake. Agreeable to nutrition supplement. 1/3: Pancreatitis, hyperglycemia and acute on chronic renal failure. Per MD appears to be non-compliant with insulin, transitioned off insulin drip to lantus. Remains NPO on maintenance IVF with altered mentation and drowsy per chart review.     Nutritional intake adequate to meet patients estimated nutritional needs:  Unable to determine at this time    Diet: DIET NUTRITIONAL SUPPLEMENTS Breakfast, Dinner; NEPRO  DIET DIABETIC CONSISTENT CARB Regular      Food Allergies: NKFA  Current Appetite: Good; variable  Appetite/meal intake prior to admission: Unable to determine at this time  Feeding Limitations:  [] Swallowing difficulty    [] Chewing difficulty    [] Other:  Current Meal Intake:   Patient Vitals for the past 100 hrs:   % Diet Eaten   01/29/20 1430 10 %   01/29/20 0900 25 %   01/28/20 1348 100 %   01/28/20 0900 10 %   01/27/20 1816 20 %       BM: 1/23-soft  Skin Integrity: incision to right leg & right groin, s/p right AKA 1/30  Edema:   [] No     [x] Yes   Pertinent Medications: Reviewed: NS at 75 mL/hr, famotidine, bowel regimen, epoetin, ferrous sulfate, SSI, ondansetron    Recent Labs     01/30/20  0406 01/29/20  0516    140   K 4.4 4.5    109   CO2 26 27   GLU 45* 57*   BUN 29* 34*   CREA 1.52* 1.70*   CA 8.6 9.0       Intake/Output Summary (Last 24 hours) at 1/31/2020 1306  Last data filed at 1/31/2020 0659  Gross per 24 hour   Intake 1140 ml   Output 650 ml   Net 490 ml       Anthropometrics:  Ht Readings from Last 1 Encounters:   01/05/20 5' 5\" (1.651 m)     Last 3 Recorded Weights in this Encounter    01/27/20 0526 01/28/20 0439 01/29/20 0439   Weight: 71.7 kg (158 lb) 68.9 kg (151 lb 12.8 oz) 68.8 kg (151 lb 9.6 oz)     Body mass index is 25.23 kg/m².        Weight History:  Right AKA 1/30/20    Weight Metrics 1/29/2020 12/26/2018 7/10/2017   Weight 151 lb 9.6 oz 160 lb 9.6 oz 115 lb   BMI 25.23 kg/m2 26.73 kg/m2 19.14 kg/m2        Admitting Diagnosis: Pancreatitis [K85.90]  Hyperosmolar hyperglycemic coma due to diabetes mellitus without ketoacidosis ([facility] [address] [E11.01]  Hyperosmolar non-ketotic state in patient with type 2 diabetes mellitus ([facility] [address] [E11.00]  Pertinent PMHx: DM, HTN, hypercholesterolemia    Education Needs:        [] None identified  [] Identified - Not appropriate at this time  [x]  Identified and addressed - refer to education log  Learning Limitations:   [] None identified  [x] Identified: confusion   Cultural, Mormon & ethnic food preferences:  [x] None identified    [] Identified and addressed     ESTIMATED NUTRITION NEEDS:     Calories: 4201-1673 kcal (20-30 kcal/kg) based on  [x] Actual BW: 69 kg      [] IBW   Protein: 83-90 gm (1.2-1.3 gm/kg) based on  [x] Actual BW      [] IBW   Fluid: 1 mL/kcal     MONITORING & EVALUATION:     Nutrition Goal(s):  - PO nutrition intake will meet >75% of patient estimated nutritional needs within the next 7 days. Outcome: Progressing towards goal    - Patient will increase knowledge of appropriate food choices on a low potassium diet prior to discharge. Outcome: Met/Resolved     Monitoring:   [x] Food and nutrient intake   [x] Food and nutrient administration  [x] Comparative standards   [x] Nutrition-focused physical findings   [x] Anthropometric Measurements   [x] Treatment/therapy   [x] Biochemical data, medical tests, and procedures        Previous Recommendations (for follow-up assessments only): Implemented      Discharge Planning: Nutritional discharge needs unknown at this time. Participated in care planning, discharge planning, & interdisciplinary rounds as appropriate.       Elmer Acevedo RD  Pager: [phone]

## 2020-01-31 NOTE — PROGRESS NOTES
Pt has been agitated, pulled off her dressing  Had to be in restraints but off now  [address] incision site though is c/d/i  Discussed with nursing available dressings in the hospital we may be able to try to keep covered

## 2020-01-31 NOTE — ROUTINE PROCESS
Assumed patient care. Bedside shift report received from REGINALDO. Patient in bed, awake, alert and oriented to self. Denies pain or discomforts at this time. Call light placed within reach. Bed in low position. Family at bedside. Right stump dressing clean, dry and intact. 3:50 AM - Noted patient pulled dressing on her right AKA surgical incision  2x this shift. Patient seen picking and pulling on the staples. Confused and cannot be re-oriented, fighting and holding staff's arms. With 4 staff re-orienting patient and trying to calm her down. Called provider, received orders for soft restraints since patient cannot be oriented. 8:09 AM - Bedside shift change report given to CLARA Kruse (oncoming nurse) by Aayush Ferris RN (offgoing nurse). Report given with SBAR, Kardex, Intake/Output, MAR and Recent Results.

## 2020-01-31 NOTE — PROGRESS NOTES
[facility] Hospitalist Group  Progress Note    Patient: Kayleigh Novoa Age: 79 y.o. : 1940 MR#: [id number] SSN: [id number]  Date/Time: 2020     Subjective:     Patient is status post surgery  Alert awake oriented  Extremely verbal  She had episode of altered mental status probably coming out of anesthesia or medication related  Now very cooperative  No distress noted        Assessment/Plan:   Interval Hospital Course:  79 y.o female with prolonged hospitalization since her admission 1213 for metabolic encephalopathy related to HHS, acute pancreatitis, EFRAIN. She has recovered from [facility], pancreatitis/EFRAIN but developed worsened ischemia of her right foot due to right lower extremity PAD with gangrene, CTA with multifocal stenosis of right lower extremity. She underwent angio with balloon angioplasty and stent placement at origin of common iliac (2020). She underwent right femoral to above-knee popliteal bypass (2020). Podiatry consulted and followed but now recommended amputation. She has been waiting for her Hgb/Hct to improved to level where it will be safe for her to surgery without needing transfusion.   Hematology has been consulted     1)  Right leg PAD with gangrene, associate with uncontrolled DM2      - CTA with multifocal stenosis of right lower extremity      - s/p angio with balloon angioplasty and stent orf right common iliac artery 2020      - right Fem-Pop bypass 2020       VASCULAR TO FOLLOW UP FOR right AKA when Hgb >8   2)  Hyperglycemia with Type 2 diabetes, stable       - admitted with Hyperosmolar non-ketotic state in type 2 diabetes mellitus, resolved    3)  Acute on chronic renal failure Stage 3, resolved   4)  Metabolic acidosis due to renal issues, resolved  5)  Acute metabolic encephalopathy, improved  6)  Acute pancreatitis, resolved, GI recommended CT abd in 8 weeks   7)  Hypertension, tolerated bblocker   8)  Hypophosphatemia, replaced   9)  Normocytic anemia of chronic disease, recently decreased. Not amenable for transfusion due to Mosque  10)  Urinary retention, gutierrez out. Resolved     PLAN   -S/p right above-knee amputation  -Discontinue narcotics to avoid altered mental status  -Add Zyprexa p.o. nightly  -Monitor patient    Case discussed with:  [x]Patient  [] Family ( In room with patient )    [x]Nursing  []Case Management  DVT Prophylaxis:  []Lovenox  []Hep SQ  []SCDs  []Coumadin   []On Heparin gtt          Objective:   VS:   Visit Vitals  /63 (BP 1 Location: Left arm, BP Patient Position: At rest)   Pulse 78   Temp 99.7 °F (37.6 °C)   Resp 18   Ht 5' 5\" (1.651 m)   Wt 68.8 kg (151 lb 9.6 oz)   SpO2 98%   Breastfeeding No   BMI 25.23 kg/m²      Tmax/24hrs: Temp (24hrs), Av.8 °F (37.1 °C), Min:97.9 °F (36.6 °C), Max:99.8 °F (37.7 °C)  IOBRIEF    Intake/Output Summary (Last 24 hours) at 2020 1605  Last data filed at 2020 1315  Gross per 24 hour   Intake 1420 ml   Output 650 ml   Net 770 ml       General:  Alert, cooperative, no acute distress   Cardiovascular: S1S2 - regular , No Murmur   Pulmonary: Equal expansion , No Use of accessory muscles , No Rales No Rhonchi    GI:  +BS in all four quadrants, soft, non-tender  Extremities:   right Below knee amputation   Neuro: Alert and oriented X 2.        Medications:   Current Facility-Administered Medications   Medication Dose Route Frequency    OLANZapine (ZyPREXA) tablet 2.5 mg  2.5 mg Oral QPM    ferrous sulfate tablet 325 mg  1 Tab Oral DAILY WITH BREAKFAST    0.9% sodium chloride infusion  75 mL/hr IntraVENous CONTINUOUS    epoetin bipin-epbx (RETACRIT) injection 20,000 Units  20,000 Units SubCUTAneous Q MON, WED & FRI    insulin lispro (HUMALOG) injection 3 Units  3 Units SubCUTAneous TIDAC    ondansetron (ZOFRAN) injection 4 mg  4 mg IntraVENous Q6H PRN    acetaminophen (TYLENOL) tablet 650 mg  650 mg Oral Q4H PRN    amLODIPine (NORVASC) tablet 10 mg  10 mg Oral DAILY    metoprolol tartrate (LOPRESSOR) tablet 12.5 mg  12.5 mg Oral Q12H    hydrALAZINE (APRESOLINE) 20 mg/mL injection 10 mg  10 mg IntraVENous Q6H PRN    bisacodyL (DULCOLAX) tablet 10 mg  10 mg Oral DAILY PRN    docusate sodium (COLACE) capsule 100 mg  100 mg Oral BID    tamsulosin (FLOMAX) capsule 0.4 mg  0.4 mg Oral DAILY    dextrose (D25W) 25% injection 10 mL  2.5 g IntraVENous PRN    insulin lispro (HUMALOG) injection   SubCUTAneous AC&HS    famotidine (PEPCID) tablet 20 mg  20 mg Oral DAILY    bisacodyl (DULCOLAX) suppository 10 mg  10 mg Rectal DAILY PRN    polyethylene glycol (MIRALAX) packet 17 g  17 g Oral DAILY    aspirin chewable tablet 81 mg  81 mg Oral DAILY    oxyCODONE-acetaminophen (PERCOCET) 5-325 mg per tablet 1-2 Tab  1-2 Tab Oral Q6H PRN    dextrose 10% infusion 125-250 mL  125-250 mL IntraVENous PRN    heparin (porcine) injection 5,000 Units  5,000 Units SubCUTAneous Q8H    glucose chewable tablet 16 g  4 Tab Oral PRN    glucagon (GLUCAGEN) injection 1 mg  1 mg IntraMUSCular PRN       Labs:    Recent Labs     01/30/20  0713 01/29/20  0516   WBC 5.7 6.5   HGB 8.5* 7.8*   HCT 28.3* 26.1*    318     Recent Labs     01/30/20  0406 01/29/20  0516    140   K 4.4 4.5    109   CO2 26 27   GLU 45* 57*   BUN 29* 34*   CREA 1.52* 1.70*   CA 8.6 9.0         Signed By: Lev Grossman MD     January 31, 2020

## 2020-01-31 NOTE — PROGRESS NOTES
Hematology/Medical Oncology Progress Note             Name: Tyree Coy   : 1940   MRN: [id number]   Date: 2020 8:13 AM     [x]I have reviewed the flowsheet and previous days notes. Events overnight reviewed and discussed with nursing staff. Vital signs and records reviewed. 59-year-old -American female past medical history of severe PVD, chronic anemia,uncontrolled diabetes mellitus type 2 now right lower limb ischemia and dry gangrene. S/p R Fem-pop bypass. In addition patient is Orthodox and refuses blood transfusion. Initially patient received Retacrit 20,000 units X 7 days. Currently on Retacrit 20,000 units 3 X week.  will need further surgery. Pt had some confusion overnight requiring soft restraints, per RN. ROS:  Constitutional: Negative for fever. HENT: Negative for nosebleeds, congestion, facial swelling, mouth sores, trouble swallowing, neck pain, neck stiffness, voice change and postnasal drip. Eyes: Negative for photophobia, pain, discharge and itching. Respiratory: Negative for apnea, cough, choking, chest tightness, wheezing and stridor. Cardiovascular: Negative for chest pain, palpitations and leg swelling. Gastrointestinal: Negative for abdominal pain. Negative for nausea, diarrhea, constipation, blood in stool and rectal pain. Genitourinary: Negative for dysuria, urgency, hematuria, flank pain and difficulty urinating. Musculoskeletal:  Skin: Positive for Right AKA dressing,staples. Neurological:  Negative for dizziness, facial asymmetry, speech difficulty, light-headedness and headaches. Hematological: Negative for adenopathy. Does not bruise/bleed easily. Psychiatric/Behavioral: Positive for confusion,overnight.   Vital Signs:    Visit Vitals  /66   Pulse 85   Temp 99.8 °F (37.7 °C)   Resp 18   Ht 5' 5\" (1.651 m)   Wt 68.8 kg (151 lb 9.6 oz)   SpO2 97%   Breastfeeding No   BMI 25.23 kg/m²       O2 Device: Room air O2 Flow Rate (L/min): 6 l/min   Temp (24hrs), Av.2 °F (36.8 °C), Min:97.2 °F (36.2 °C), Max:99.8 °F (37.7 °C)       Intake/Output:   Last shift:      No intake/output data recorded. Last 3 shifts:  1901 -  0700  In: 36 [P.O.:120; I.V.:700]  Out: 800 [Urine:750]    Intake/Output Summary (Last 24 hours) at 2020 0811  Last data filed at 2020 0659  Gross per 24 hour   Intake 820 ml   Output 700 ml   Net 120 ml       Physical Exam:  General: Appears comfortable,somnolent, NAD. HEENT:  Anicteric sclerae; pink palpebral conjunctivae; mucosa moist  Resp:  Symmetrical chest expansion, no accessory muscle use; good airway entry; no rales/ wheezing/ rhonchi noted  CV:  S1, S2 present; regular rate and rhythm  GI:  Abdomen soft, non-tender; (+) active bowel sounds  Extremities:  R AKA. Skin:  Warm, dressing to AKA, intact-did not remove.   Neurologic:  Overnight confusion, alert.       DATA:   Current Facility-Administered Medications   Medication Dose Route Frequency    ferrous sulfate tablet 325 mg  1 Tab Oral DAILY WITH BREAKFAST    HYDROmorphone (DILAUDID) injection 0.5-1 mg  0.5-1 mg IntraVENous Q3H PRN    0.9% sodium chloride infusion  75 mL/hr IntraVENous CONTINUOUS    epoetin bipin-epbx (RETACRIT) injection 20,000 Units  20,000 Units SubCUTAneous Q MON, WED & FRI    insulin lispro (HUMALOG) injection 3 Units  3 Units SubCUTAneous TIDAC    ondansetron (ZOFRAN) injection 4 mg  4 mg IntraVENous Q6H PRN    acetaminophen (TYLENOL) tablet 650 mg  650 mg Oral Q4H PRN    amLODIPine (NORVASC) tablet 10 mg  10 mg Oral DAILY    metoprolol tartrate (LOPRESSOR) tablet 12.5 mg  12.5 mg Oral Q12H    hydrALAZINE (APRESOLINE) 20 mg/mL injection 10 mg  10 mg IntraVENous Q6H PRN    bisacodyL (DULCOLAX) tablet 10 mg  10 mg Oral DAILY PRN    docusate sodium (COLACE) capsule 100 mg  100 mg Oral BID    tamsulosin (FLOMAX) capsule 0.4 mg  0.4 mg Oral DAILY    dextrose (D25W) 25% injection 10 mL  2.5 g IntraVENous PRN    insulin lispro (HUMALOG) injection   SubCUTAneous AC&HS    famotidine (PEPCID) tablet 20 mg  20 mg Oral DAILY    bisacodyl (DULCOLAX) suppository 10 mg  10 mg Rectal DAILY PRN    polyethylene glycol (MIRALAX) packet 17 g  17 g Oral DAILY    aspirin chewable tablet 81 mg  81 mg Oral DAILY    oxyCODONE-acetaminophen (PERCOCET) 5-325 mg per tablet 1-2 Tab  1-2 Tab Oral Q6H PRN    dextrose 10% infusion 125-250 mL  125-250 mL IntraVENous PRN    heparin (porcine) injection 5,000 Units  5,000 Units SubCUTAneous Q8H    glucose chewable tablet 16 g  4 Tab Oral PRN    glucagon (GLUCAGEN) injection 1 mg  1 mg IntraMUSCular PRN                    Labs:  Recent Labs     01/30/20  0713 01/29/20  0516   WBC 5.7 6.5   HGB 8.5* 7.8*   HCT 28.3* 26.1*    318     Recent Labs     01/30/20  0406 01/29/20  0516    140   K 4.4 4.5    109   CO2 26 27   GLU 45* 57*   BUN 29* 34*   CREA 1.52* 1.70*   CA 8.6 9.0     No results for input(s): PH, PCO2, PO2, HCO3, FIO2 in the last 72 hours. IMPRESSION:   · Anemia of chronic illness  · PVD  · Diabetes type 2  · Acute renal failure        · PLAN:  · H/H on yesterday prior to sx was 8.5/28.3, results pending for today: Pt is Jehovahs witness-no PRBC transfusion. Reviewed creatinine 1.52. Continue Retacrit  20,000 units 3 times a week  for H/H less than 10/30 while inpatient. Patient has completed Venofer 3/3. Continue  ferrous sulfate 325 mg p.o. Iron low at 22,TIBC 116, iron % sat 19, ferritin 100. B12 897, folate >20.0. · Recommend limiting lab draws. 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 ·  ·        The patient is: [x] acutely ill Risk of deterioration: [] moderate    [] critically ill  [] high         My assessment/plan was discussed with:  [x]nursing []PT/OT    []respiratory therapy [x]Dr.Lloyd Tommie Brasher MD      []family []       [facility] [facility] dictation software was used for portions of this report.   Efforts have been made today to edit the dictations, but occasionally words are is mistranscribed.       Americo Sifuentes, NP

## 2020-01-31 NOTE — PROGRESS NOTES
OT orders received and patient's chart reviewed. Unable to complete OT evaluation at this time as patient is on active Bedrest. Will follow up when patient is appropriate for therapy.   Thank you,  Leonardo Aguayo OTR/L

## 2020-01-31 NOTE — PROGRESS NOTES
Bedside shift change report given to JUNE House (oncoming nurse) by Marisa Hall RN (offgoing nurse). Report included the following information SBAR, Kardex and MAR. Health Care Proxy (HCP)

## 2020-01-31 NOTE — PROGRESS NOTES
Problem: Diabetes Self-Management  Goal: *Disease process and treatment process  Description  Define diabetes and identify own type of diabetes; list 3 options for treating diabetes. Outcome: Progressing Towards Goal     Problem: Falls - Risk of  Goal: *Absence of Falls  Description  Document Bishop Rutherford Fall Risk and appropriate interventions in the flowsheet. Outcome: Progressing Towards Goal  Note: Fall Risk Interventions:  Mobility Interventions: Assess mobility with egress test, Bed/chair exit alarm, Communicate number of staff needed for ambulation/transfer    Mentation Interventions: Adequate sleep, hydration, pain control, Bed/chair exit alarm, Door open when patient unattended, Evaluate medications/consider consulting pharmacy    Medication Interventions: Bed/chair exit alarm, Evaluate medications/consider consulting pharmacy    Elimination Interventions: Bed/chair exit alarm, Call light in reach    History of Falls Interventions: Bed/chair exit alarm, Consult care management for discharge planning, Door open when patient unattended, Evaluate medications/consider consulting pharmacy         Problem: Pressure Injury - Risk of  Goal: *Prevention of pressure injury  Description  Document Marc Scale and appropriate interventions in the flowsheet. Outcome: Progressing Towards Goal  Note: Pressure Injury Interventions:  Sensory Interventions: Keep linens dry and wrinkle-free, Maintain/enhance activity level, Minimize linen layers, Monitor skin under medical devices, Pressure redistribution bed/mattress (bed type), Sit a 90-degree angle/use footstool if needed, Turn and reposition approx.  every two hours (pillows and wedges if needed), Use 30-degree side-lying position, Assess changes in LOC, Avoid rigorous massage over bony prominences, Discuss PT/OT consult with provider    Moisture Interventions: Absorbent underpads, Apply protective barrier, creams and emollients, Check for incontinence Q2 hours and as needed, Internal/External urinary devices, Maintain skin hydration (lotion/cream), Minimize layers, Moisture barrier    Activity Interventions: Pressure redistribution bed/mattress(bed type), PT/OT evaluation    Mobility Interventions: HOB 30 degrees or less, Pressure redistribution bed/mattress (bed type), PT/OT evaluation    Nutrition Interventions: Document food/fluid/supplement intake    Friction and Shear Interventions: HOB 30 degrees or less, Minimize layers                Problem: Injury - Risk of, Adverse Drug Event  Goal: *Absence of adverse drug events  Outcome: Progressing Towards Goal     Problem: Altered Thought Process (Adult/Pediatric)  Goal: *STG: Participates in treatment plan  Outcome: Progressing Towards Goal     Problem: Nutrition Deficit  Goal: *Optimize nutritional status  Outcome: Progressing Towards Goal     Problem: Surgical Pathway Day of Surgery  Goal: Off Pathway (Use only if patient is Off Pathway)  Outcome: Progressing Towards Goal

## 2020-02-01 LAB
BASOPHILS # BLD: 0 K/UL (ref 0–0.1)
BASOPHILS NFR BLD: 0 % (ref 0–2)
DIFFERENTIAL METHOD BLD: ABNORMAL
EOSINOPHIL # BLD: 0.2 K/UL (ref 0–0.4)
EOSINOPHIL NFR BLD: 3 % (ref 0–5)
ERYTHROCYTE [DISTWIDTH] IN BLOOD BY AUTOMATED COUNT: 19.1 % (ref 11.6–14.5)
GLUCOSE BLD STRIP.AUTO-MCNC: 113 MG/DL (ref 70–110)
GLUCOSE BLD STRIP.AUTO-MCNC: 236 MG/DL (ref 70–110)
GLUCOSE BLD STRIP.AUTO-MCNC: 304 MG/DL (ref 70–110)
GLUCOSE BLD STRIP.AUTO-MCNC: 353 MG/DL (ref 70–110)
HCT VFR BLD AUTO: 24.5 % (ref 35–45)
HGB BLD-MCNC: 7.3 G/DL (ref 12–16)
LYMPHOCYTES # BLD: 1.2 K/UL (ref 0.9–3.6)
LYMPHOCYTES NFR BLD: 17 % (ref 21–52)
MCH RBC QN AUTO: 33.2 PG (ref 24–34)
MCHC RBC AUTO-ENTMCNC: 29.8 G/DL (ref 31–37)
MCV RBC AUTO: 111.4 FL (ref 74–97)
MONOCYTES # BLD: 0.9 K/UL (ref 0.05–1.2)
MONOCYTES NFR BLD: 12 % (ref 3–10)
NEUTS SEG # BLD: 4.8 K/UL (ref 1.8–8)
NEUTS SEG NFR BLD: 68 % (ref 40–73)
PLATELET # BLD AUTO: 283 K/UL (ref 135–420)
PMV BLD AUTO: 9.4 FL (ref 9.2–11.8)
RBC # BLD AUTO: 2.2 M/UL (ref 4.2–5.3)
WBC # BLD AUTO: 7.1 K/UL (ref 4.6–13.2)

## 2020-02-01 PROCEDURE — 65660000000 HC RM CCU STEPDOWN

## 2020-02-01 PROCEDURE — 74011636637 HC RX REV CODE- 636/637: Performed by: HOSPITALIST

## 2020-02-01 PROCEDURE — 74011250636 HC RX REV CODE- 250/636: Performed by: PHYSICIAN ASSISTANT

## 2020-02-01 PROCEDURE — 74011250637 HC RX REV CODE- 250/637: Performed by: HOSPITALIST

## 2020-02-01 PROCEDURE — 82962 GLUCOSE BLOOD TEST: CPT

## 2020-02-01 PROCEDURE — 74011636637 HC RX REV CODE- 636/637: Performed by: INTERNAL MEDICINE

## 2020-02-01 PROCEDURE — 85025 COMPLETE CBC W/AUTO DIFF WBC: CPT

## 2020-02-01 PROCEDURE — 74011250637 HC RX REV CODE- 250/637: Performed by: EMERGENCY MEDICINE

## 2020-02-01 PROCEDURE — 74011250637 HC RX REV CODE- 250/637: Performed by: NURSE PRACTITIONER

## 2020-02-01 PROCEDURE — 74011250636 HC RX REV CODE- 250/636: Performed by: NURSE PRACTITIONER

## 2020-02-01 PROCEDURE — 74011250637 HC RX REV CODE- 250/637: Performed by: INTERNAL MEDICINE

## 2020-02-01 PROCEDURE — 74011250636 HC RX REV CODE- 250/636: Performed by: INTERNAL MEDICINE

## 2020-02-01 RX ORDER — INSULIN GLARGINE 100 [IU]/ML
10 INJECTION, SOLUTION SUBCUTANEOUS DAILY
Status: DISCONTINUED | OUTPATIENT
Start: 2020-02-01 | End: 2020-02-04 | Stop reason: HOSPADM

## 2020-02-01 RX ORDER — AMLODIPINE BESYLATE 2.5 MG/1
2.5 TABLET ORAL DAILY
Status: DISCONTINUED | OUTPATIENT
Start: 2020-02-02 | End: 2020-02-04 | Stop reason: HOSPADM

## 2020-02-01 RX ORDER — DEXTROSE 50 % IN WATER (D50W) INTRAVENOUS SYRINGE
25-50 AS NEEDED
Status: DISCONTINUED | OUTPATIENT
Start: 2020-02-01 | End: 2020-02-01

## 2020-02-01 RX ORDER — INSULIN LISPRO 100 [IU]/ML
INJECTION, SOLUTION INTRAVENOUS; SUBCUTANEOUS
Status: DISCONTINUED | OUTPATIENT
Start: 2020-02-01 | End: 2020-02-04 | Stop reason: HOSPADM

## 2020-02-01 RX ORDER — MAGNESIUM SULFATE 100 %
16 CRYSTALS MISCELLANEOUS AS NEEDED
Status: DISCONTINUED | OUTPATIENT
Start: 2020-02-01 | End: 2020-02-01

## 2020-02-01 RX ORDER — MAGNESIUM SULFATE 100 %
4 CRYSTALS MISCELLANEOUS AS NEEDED
Status: DISCONTINUED | OUTPATIENT
Start: 2020-02-01 | End: 2020-02-04 | Stop reason: HOSPADM

## 2020-02-01 RX ORDER — LANOLIN ALCOHOL/MO/W.PET/CERES
1 CREAM (GRAM) TOPICAL
Status: DISCONTINUED | OUTPATIENT
Start: 2020-02-02 | End: 2020-02-04 | Stop reason: HOSPADM

## 2020-02-01 RX ORDER — INSULIN LISPRO 100 [IU]/ML
INJECTION, SOLUTION INTRAVENOUS; SUBCUTANEOUS
Status: DISCONTINUED | OUTPATIENT
Start: 2020-02-01 | End: 2020-02-01

## 2020-02-01 RX ADMIN — SODIUM CHLORIDE 300 MG: 900 INJECTION, SOLUTION INTRAVENOUS at 10:09

## 2020-02-01 RX ADMIN — INSULIN LISPRO 3 UNITS: 100 INJECTION, SOLUTION INTRAVENOUS; SUBCUTANEOUS at 09:00

## 2020-02-01 RX ADMIN — FAMOTIDINE 20 MG: 20 TABLET, FILM COATED ORAL at 10:07

## 2020-02-01 RX ADMIN — OLANZAPINE 2.5 MG: 2.5 TABLET, FILM COATED ORAL at 18:23

## 2020-02-01 RX ADMIN — INSULIN GLARGINE 10 UNITS: 100 INJECTION, SOLUTION SUBCUTANEOUS at 14:29

## 2020-02-01 RX ADMIN — TAMSULOSIN HYDROCHLORIDE 0.4 MG: 0.4 CAPSULE ORAL at 10:07

## 2020-02-01 RX ADMIN — METOPROLOL TARTRATE 12.5 MG: 25 TABLET ORAL at 00:43

## 2020-02-01 RX ADMIN — OXYCODONE HYDROCHLORIDE AND ACETAMINOPHEN 1 TABLET: 5; 325 TABLET ORAL at 10:07

## 2020-02-01 RX ADMIN — INSULIN LISPRO 12 UNITS: 100 INJECTION, SOLUTION INTRAVENOUS; SUBCUTANEOUS at 23:52

## 2020-02-01 RX ADMIN — AMLODIPINE BESYLATE 10 MG: 10 TABLET ORAL at 10:07

## 2020-02-01 RX ADMIN — INSULIN LISPRO 4 UNITS: 100 INJECTION, SOLUTION INTRAVENOUS; SUBCUTANEOUS at 18:24

## 2020-02-01 RX ADMIN — DOCUSATE SODIUM 100 MG: 100 CAPSULE, LIQUID FILLED ORAL at 10:07

## 2020-02-01 RX ADMIN — Medication 81 MG: at 10:07

## 2020-02-01 RX ADMIN — HEPARIN SODIUM 5000 UNITS: 5000 INJECTION INTRAVENOUS; SUBCUTANEOUS at 14:00

## 2020-02-01 RX ADMIN — HEPARIN SODIUM 5000 UNITS: 5000 INJECTION INTRAVENOUS; SUBCUTANEOUS at 00:41

## 2020-02-01 RX ADMIN — INSULIN LISPRO 6 UNITS: 100 INJECTION, SOLUTION INTRAVENOUS; SUBCUTANEOUS at 00:42

## 2020-02-01 RX ADMIN — METOPROLOL TARTRATE 12.5 MG: 25 TABLET ORAL at 23:50

## 2020-02-01 RX ADMIN — SODIUM CHLORIDE 75 ML/HR: 900 INJECTION, SOLUTION INTRAVENOUS at 10:17

## 2020-02-01 RX ADMIN — EPOETIN ALFA-EPBX 20000 UNITS: 10000 INJECTION, SOLUTION INTRAVENOUS; SUBCUTANEOUS at 00:40

## 2020-02-01 RX ADMIN — METOPROLOL TARTRATE 12.5 MG: 25 TABLET ORAL at 10:08

## 2020-02-01 RX ADMIN — DOCUSATE SODIUM 100 MG: 100 CAPSULE, LIQUID FILLED ORAL at 18:23

## 2020-02-01 RX ADMIN — OXYCODONE HYDROCHLORIDE AND ACETAMINOPHEN 1 TABLET: 5; 325 TABLET ORAL at 23:50

## 2020-02-01 RX ADMIN — HEPARIN SODIUM 5000 UNITS: 5000 INJECTION INTRAVENOUS; SUBCUTANEOUS at 23:51

## 2020-02-01 RX ADMIN — POLYETHYLENE GLYCOL 3350 17 G: 17 POWDER, FOR SOLUTION ORAL at 10:07

## 2020-02-01 RX ADMIN — OXYCODONE HYDROCHLORIDE AND ACETAMINOPHEN 1 TABLET: 5; 325 TABLET ORAL at 00:43

## 2020-02-01 NOTE — PROGRESS NOTES
[facility] Hospitalist Group  Progress Note    Patient: Sloan Mascorro Age: 79 y.o. : 1940 MR#: [id number] SSN: [id number]  Date/Time: 2020     Subjective:     Alert awake and oriented   Eating well conversing well and completely normal     Assessment/Plan:   Interval Hospital Course:  79 y.o female with prolonged hospitalization since her admission  for metabolic encephalopathy related to HHS, acute pancreatitis, EFRAIN. She has recovered from [facility], pancreatitis/EFRAIN but developed worsened ischemia of her right foot due to right lower extremity PAD with gangrene, CTA with multifocal stenosis of right lower extremity. She underwent angio with balloon angioplasty and stent placement at origin of common iliac (2020). She underwent right femoral to above-knee popliteal bypass (2020). Podiatry consulted and followed but now recommended amputation. She has been waiting for her Hgb/Hct to improved to level where it will be safe for her to surgery without needing transfusion. Hematology has been consulted     1)  Right leg PAD with gangrene, associate with uncontrolled DM2 = S/p right above-knee amputation  2)  Hyperglycemia with Type 2 diabetes, stable       - admitted with Hyperosmolar non-ketotic state in type 2 diabetes mellitus, resolved    - Now some readings are with severe hypoglycemia - and some readings are with very high readings   - Change insulin to lantus and SSI .    - monitor glucose   - patient is eating well so discontinue Nepro   - Due to low glucose patient was given sweetened crackers - probably increasing her blood glucose    3)  Acute on chronic renal failure Stage 3, resolved   4)  Metabolic acidosis due to renal issues, resolved  5)  Acute metabolic encephalopathy, improved  6)  Acute pancreatitis, resolved, GI recommended CT abd in 8 weeks   7)  Hypertension, tolerated bblocker   8)  Hypophosphatemia, replaced   9)  Normocytic anemia of chronic disease, recently decreased. Not amenable for transfusion due to Bahai  10)  Urinary retention, gutierrez out. Resolved     PLAN    - follow with vascular   - follow blood glucose levels now   - mental status well , no evidence of agitation or confusions - continue Zyprexa           Case discussed with:  [x]Patient  [] Family ( In room with patient )    [x]Nursing  []Case Management  DVT Prophylaxis:  []Lovenox  []Hep SQ  []SCDs  []Coumadin   []On Heparin gtt          Objective:   VS:   Visit Vitals  BP 97/55 (BP 1 Location: Left arm, BP Patient Position: At rest)   Pulse 82   Temp 98.7 °F (37.1 °C)   Resp 18   Ht 5' 5\" (1.651 m)   Wt 68.8 kg (151 lb 9.6 oz)   SpO2 96%   Breastfeeding No   BMI 25.23 kg/m²      Tmax/24hrs: Temp (24hrs), Av.6 °F (37 °C), Min:97.5 °F (36.4 °C), Max:99.7 °F (37.6 °C)  IOBRIEF    Intake/Output Summary (Last 24 hours) at 2020 1245  Last data filed at 2020 203  Gross per 24 hour   Intake 280 ml   Output 800 ml   Net -520 ml       General:  Alert, cooperative, no acute distress   Cardiovascular: S1S2 - regular , No Murmur   Pulmonary: Equal expansion , No Use of accessory muscles , No Rales No Rhonchi    GI:  +BS in all four quadrants, soft, non-tender  Extremities:   right Below knee amputation   Neuro: Alert and oriented X 2.        Medications:   Current Facility-Administered Medications   Medication Dose Route Frequency    [START ON 2020] ferrous sulfate tablet 325 mg  1 Tab Oral DAILY WITH BREAKFAST    [START ON 2020] amLODIPine (NORVASC) tablet 2.5 mg  2.5 mg Oral DAILY    OLANZapine (ZyPREXA) tablet 2.5 mg  2.5 mg Oral QPM    epoetin bipin-epbx (RETACRIT) injection 20,000 Units  20,000 Units SubCUTAneous Q MON, WED & FRI    insulin lispro (HUMALOG) injection 3 Units  3 Units SubCUTAneous TIDAC    ondansetron (ZOFRAN) injection 4 mg  4 mg IntraVENous Q6H PRN    acetaminophen (TYLENOL) tablet 650 mg  650 mg Oral Q4H PRN    metoprolol tartrate (LOPRESSOR) tablet 12.5 mg  12.5 mg Oral Q12H    hydrALAZINE (APRESOLINE) 20 mg/mL injection 10 mg  10 mg IntraVENous Q6H PRN    bisacodyL (DULCOLAX) tablet 10 mg  10 mg Oral DAILY PRN    docusate sodium (COLACE) capsule 100 mg  100 mg Oral BID    tamsulosin (FLOMAX) capsule 0.4 mg  0.4 mg Oral DAILY    dextrose (D25W) 25% injection 10 mL  2.5 g IntraVENous PRN    insulin lispro (HUMALOG) injection   SubCUTAneous AC&HS    famotidine (PEPCID) tablet 20 mg  20 mg Oral DAILY    bisacodyl (DULCOLAX) suppository 10 mg  10 mg Rectal DAILY PRN    polyethylene glycol (MIRALAX) packet 17 g  17 g Oral DAILY    aspirin chewable tablet 81 mg  81 mg Oral DAILY    oxyCODONE-acetaminophen (PERCOCET) 5-325 mg per tablet 1-2 Tab  1-2 Tab Oral Q6H PRN    dextrose 10% infusion 125-250 mL  125-250 mL IntraVENous PRN    heparin (porcine) injection 5,000 Units  5,000 Units SubCUTAneous Q8H    glucose chewable tablet 16 g  4 Tab Oral PRN    glucagon (GLUCAGEN) injection 1 mg  1 mg IntraMUSCular PRN       Labs:    Recent Labs     02/01/20  0529 01/30/20  0713   WBC 7.1 5.7   HGB 7.3* 8.5*   HCT 24.5* 28.3*    280     Recent Labs     01/30/20  0406      K 4.4      CO2 26   GLU 45*   BUN 29*   CREA 1.52*   CA 8.6         Signed By: Piotr Davila MD     February 1, 2020

## 2020-02-01 NOTE — PROGRESS NOTES
Received report on pt.from off going RN. Resting quietly in bed on rounds. Denies c/o pain or SOB at this time. Oriented to person, place and situation. /No acute distress noted. Call bell at side. Bed alarm on. Will cont to monitor for any changes in status. 1700 sitting up in bed. Eating. Appetite much better. Denies need for pain medicine. Dressing remains intact to right stump. 1949 Bedside and Verbal shift change report given to Laney WATKINS (oncoming nurse) by Melodie Patrick RN (offgoing nurse). Report given with Izaiah TATE and MAR. Kenna Rm

## 2020-02-01 NOTE — PROGRESS NOTES
Occupational Therapy Note:  Orders received, chart reviewed and this patient is currently on active bedrest. Please update activity perimeters in order for this patient to be able to participate in and benefit from the most thorough OT evaluation and treatment. This is per departmental protocol.  Thank you for this referral. Duke Hammans, OTR/L

## 2020-02-01 NOTE — PROGRESS NOTES
Hematology/Medical Oncology Progress Note             Name: Doris Bernstein   : 1940   MRN: [id number]   Date: 2020 8:13 AM     [x]I have reviewed the flowsheet and previous days notes. Events overnight reviewed and discussed with nursing staff. Vital signs and records reviewed. 43-year-old -American female past medical history of severe PVD, chronic anemia,uncontrolled diabetes mellitus type 2 now right lower limb ischemia and dry gangrene. S/p R Fem-pop bypass. In addition patient is [employer] and refuses blood transfusion. Initially patient received Retacrit 20,000 units X 7 days. Currently on Retacrit 20,000 units 3 X week. Voices no new c/o today. ROS:  Constitutional: Negative for fever. HENT: Negative for nosebleeds, congestion, facial swelling, mouth sores, trouble swallowing, neck pain, neck stiffness, voice change and postnasal drip. Eyes: Negative for photophobia, pain, discharge and itching. Respiratory: Negative for apnea, cough, choking, chest tightness, wheezing and stridor. Cardiovascular: Negative for chest pain, palpitations and leg swelling. Gastrointestinal: Negative for abdominal pain. Negative for nausea, diarrhea, constipation, blood in stool and rectal pain. Genitourinary: Negative for dysuria, urgency, hematuria, flank pain and difficulty urinating. Musculoskeletal:  Skin: Positive for Right AKA dressing intact. Neurological:  Negative for dizziness, facial asymmetry, speech difficulty, light-headedness and headaches. Hematological: Negative for adenopathy. Does not bruise/bleed easily.          Vital Signs:    Visit Vitals  /64 (BP 1 Location: Left arm, BP Patient Position: At rest)   Pulse 70   Temp 97.5 °F (36.4 °C)   Resp 16   Ht 5' 5\" (1.651 m)   Wt 68.8 kg (151 lb 9.6 oz)   SpO2 100%   Breastfeeding No   BMI 25.23 kg/m²       O2 Device: Room air   O2 Flow Rate (L/min): 6 l/min   Temp (24hrs), Av.6 °F (37 °C), Min:97.5 °F (36.4 °C), Max:99.7 °F (37.6 °C)       Intake/Output:   Last shift:      No intake/output data recorded. Last 3 shifts: 01/30 1901 - 02/01 0700  In: 9472 [P.O.:520; I.V.:900]  Out: 1450 [Urine:1450]    Intake/Output Summary (Last 24 hours) at 2/1/2020 0809  Last data filed at 1/31/2020 2037  Gross per 24 hour   Intake 280 ml   Output 800 ml   Net -520 ml       Physical Exam:  General: Appears comfortable,somnolent, NAD. HEENT:  Anicteric sclerae; pink palpebral conjunctivae; mucosa moist  Resp:  Symmetrical chest expansion, no accessory muscle use; good airway entry; no rales/ wheezing/ rhonchi noted  CV:  S1, S2 present; regular rate and rhythm  GI:  Abdomen soft, non-tender; (+) active bowel sounds  Extremities:  R AKA. Skin:  Warm, dressing to AKA, intact-did not remove.   Neurologic: Alert and oriented X3      DATA:   Current Facility-Administered Medications   Medication Dose Route Frequency    OLANZapine (ZyPREXA) tablet 2.5 mg  2.5 mg Oral QPM    ferrous sulfate tablet 325 mg  1 Tab Oral DAILY WITH BREAKFAST    0.9% sodium chloride infusion  75 mL/hr IntraVENous CONTINUOUS    epoetin bipin-epbx (RETACRIT) injection 20,000 Units  20,000 Units SubCUTAneous Q MON, WED & FRI    insulin lispro (HUMALOG) injection 3 Units  3 Units SubCUTAneous TIDAC    ondansetron (ZOFRAN) injection 4 mg  4 mg IntraVENous Q6H PRN    acetaminophen (TYLENOL) tablet 650 mg  650 mg Oral Q4H PRN    amLODIPine (NORVASC) tablet 10 mg  10 mg Oral DAILY    metoprolol tartrate (LOPRESSOR) tablet 12.5 mg  12.5 mg Oral Q12H    hydrALAZINE (APRESOLINE) 20 mg/mL injection 10 mg  10 mg IntraVENous Q6H PRN    bisacodyL (DULCOLAX) tablet 10 mg  10 mg Oral DAILY PRN    docusate sodium (COLACE) capsule 100 mg  100 mg Oral BID    tamsulosin (FLOMAX) capsule 0.4 mg  0.4 mg Oral DAILY    dextrose (D25W) 25% injection 10 mL  2.5 g IntraVENous PRN    insulin lispro (HUMALOG) injection   SubCUTAneous AC&HS    famotidine (PEPCID) tablet 20 mg  20 mg Oral DAILY    bisacodyl (DULCOLAX) suppository 10 mg  10 mg Rectal DAILY PRN    polyethylene glycol (MIRALAX) packet 17 g  17 g Oral DAILY    aspirin chewable tablet 81 mg  81 mg Oral DAILY    oxyCODONE-acetaminophen (PERCOCET) 5-325 mg per tablet 1-2 Tab  1-2 Tab Oral Q6H PRN    dextrose 10% infusion 125-250 mL  125-250 mL IntraVENous PRN    heparin (porcine) injection 5,000 Units  5,000 Units SubCUTAneous Q8H    glucose chewable tablet 16 g  4 Tab Oral PRN    glucagon (GLUCAGEN) injection 1 mg  1 mg IntraMUSCular PRN                    Labs:  Recent Labs     02/01/20  0529 01/30/20  0713   WBC 7.1 5.7   HGB 7.3* 8.5*   HCT 24.5* 28.3*    280     Recent Labs     01/30/20  0406      K 4.4      CO2 26   GLU 45*   BUN 29*   CREA 1.52*   CA 8.6     No results for input(s): PH, PCO2, PO2, HCO3, FIO2 in the last 72 hours. IMPRESSION:   · Anemia of chronic illness  · PVD  · Diabetes type 2  · Acute renal failure        · PLAN:  · H/H 7.3, I will give an additional Dose of Venofer today post surgery in effort to prevent hgb from going below 7g/dl, continue IVF's : Pt is Jehovahs witness-no PRBC transfusion. Reviewed creatinine 1.52. Continue Retacrit  20,000 units 3 times a week  for H/H less than 10/30 while inpatient. Patient has completed Venofer 3/3. Hold  ferrous sulfate 325 mg today. Iron low at 22,TIBC 116, iron % sat 19, ferritin 100. B12 897, folate >20.0. · Recommend limiting lab draws. 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 ·  ·        The patient is: [x] acutely ill Risk of deterioration: [] moderate    [] critically ill  [] high         My assessment/plan was discussed with:  [x]nursing []PT/OT    []respiratory therapy [x]Dr.Lloyd Gabriella Thompson MD      []family []       [address] [facility] dictation software was used for portions of this report. Efforts have been made today to edit the dictations, but occasionally words are is mistranscribed.       Susie Gabriel Polly Saenz, NP

## 2020-02-02 LAB
ANION GAP SERPL CALC-SCNC: 4 MMOL/L (ref 3–18)
BASOPHILS # BLD: 0 K/UL (ref 0–0.1)
BASOPHILS NFR BLD: 0 % (ref 0–2)
BUN SERPL-MCNC: 23 MG/DL (ref 7–18)
BUN/CREAT SERPL: 18 (ref 12–20)
CALCIUM SERPL-MCNC: 8 MG/DL (ref 8.5–10.1)
CHLORIDE SERPL-SCNC: 113 MMOL/L (ref 100–111)
CO2 SERPL-SCNC: 23 MMOL/L (ref 21–32)
CREAT SERPL-MCNC: 1.28 MG/DL (ref 0.6–1.3)
DIFFERENTIAL METHOD BLD: ABNORMAL
EOSINOPHIL # BLD: 0.2 K/UL (ref 0–0.4)
EOSINOPHIL NFR BLD: 3 % (ref 0–5)
ERYTHROCYTE [DISTWIDTH] IN BLOOD BY AUTOMATED COUNT: 18 % (ref 11.6–14.5)
GLUCOSE BLD STRIP.AUTO-MCNC: 121 MG/DL (ref 70–110)
GLUCOSE BLD STRIP.AUTO-MCNC: 165 MG/DL (ref 70–110)
GLUCOSE BLD STRIP.AUTO-MCNC: 218 MG/DL (ref 70–110)
GLUCOSE BLD STRIP.AUTO-MCNC: 266 MG/DL (ref 70–110)
GLUCOSE SERPL-MCNC: 119 MG/DL (ref 74–99)
HCT VFR BLD AUTO: 26.5 % (ref 35–45)
HGB BLD-MCNC: 7.9 G/DL (ref 12–16)
LYMPHOCYTES # BLD: 1.4 K/UL (ref 0.9–3.6)
LYMPHOCYTES NFR BLD: 19 % (ref 21–52)
MCH RBC QN AUTO: 32.6 PG (ref 24–34)
MCHC RBC AUTO-ENTMCNC: 29.8 G/DL (ref 31–37)
MCV RBC AUTO: 109.5 FL (ref 74–97)
MONOCYTES # BLD: 0.6 K/UL (ref 0.05–1.2)
MONOCYTES NFR BLD: 8 % (ref 3–10)
NEUTS SEG # BLD: 5.2 K/UL (ref 1.8–8)
NEUTS SEG NFR BLD: 70 % (ref 40–73)
PLATELET # BLD AUTO: 294 K/UL (ref 135–420)
PMV BLD AUTO: 9.6 FL (ref 9.2–11.8)
POTASSIUM SERPL-SCNC: 4.3 MMOL/L (ref 3.5–5.5)
RBC # BLD AUTO: 2.42 M/UL (ref 4.2–5.3)
SODIUM SERPL-SCNC: 140 MMOL/L (ref 136–145)
WBC # BLD AUTO: 7.5 K/UL (ref 4.6–13.2)

## 2020-02-02 PROCEDURE — 85025 COMPLETE CBC W/AUTO DIFF WBC: CPT

## 2020-02-02 PROCEDURE — 74011636637 HC RX REV CODE- 636/637: Performed by: INTERNAL MEDICINE

## 2020-02-02 PROCEDURE — 74011250637 HC RX REV CODE- 250/637: Performed by: NURSE PRACTITIONER

## 2020-02-02 PROCEDURE — 74011250637 HC RX REV CODE- 250/637: Performed by: EMERGENCY MEDICINE

## 2020-02-02 PROCEDURE — 82962 GLUCOSE BLOOD TEST: CPT

## 2020-02-02 PROCEDURE — 65660000000 HC RM CCU STEPDOWN

## 2020-02-02 PROCEDURE — 74011250637 HC RX REV CODE- 250/637: Performed by: INTERNAL MEDICINE

## 2020-02-02 PROCEDURE — 74011250636 HC RX REV CODE- 250/636: Performed by: PHYSICIAN ASSISTANT

## 2020-02-02 PROCEDURE — 74011250637 HC RX REV CODE- 250/637: Performed by: HOSPITALIST

## 2020-02-02 PROCEDURE — 80048 BASIC METABOLIC PNL TOTAL CA: CPT

## 2020-02-02 RX ADMIN — FAMOTIDINE 20 MG: 20 TABLET, FILM COATED ORAL at 09:24

## 2020-02-02 RX ADMIN — OXYCODONE HYDROCHLORIDE AND ACETAMINOPHEN 1 TABLET: 5; 325 TABLET ORAL at 09:41

## 2020-02-02 RX ADMIN — HEPARIN SODIUM 5000 UNITS: 5000 INJECTION INTRAVENOUS; SUBCUTANEOUS at 15:23

## 2020-02-02 RX ADMIN — METOPROLOL TARTRATE 12.5 MG: 25 TABLET ORAL at 21:17

## 2020-02-02 RX ADMIN — OXYCODONE HYDROCHLORIDE AND ACETAMINOPHEN 1 TABLET: 5; 325 TABLET ORAL at 21:17

## 2020-02-02 RX ADMIN — INSULIN LISPRO 3 UNITS: 100 INJECTION, SOLUTION INTRAVENOUS; SUBCUTANEOUS at 09:25

## 2020-02-02 RX ADMIN — INSULIN GLARGINE 10 UNITS: 100 INJECTION, SOLUTION SUBCUTANEOUS at 09:25

## 2020-02-02 RX ADMIN — METOPROLOL TARTRATE 12.5 MG: 25 TABLET ORAL at 09:23

## 2020-02-02 RX ADMIN — INSULIN LISPRO 9 UNITS: 100 INJECTION, SOLUTION INTRAVENOUS; SUBCUTANEOUS at 17:54

## 2020-02-02 RX ADMIN — DOCUSATE SODIUM 100 MG: 100 CAPSULE, LIQUID FILLED ORAL at 17:54

## 2020-02-02 RX ADMIN — OLANZAPINE 2.5 MG: 2.5 TABLET, FILM COATED ORAL at 17:54

## 2020-02-02 RX ADMIN — Medication 325 MG: at 09:24

## 2020-02-02 RX ADMIN — AMLODIPINE BESYLATE 2.5 MG: 2.5 TABLET ORAL at 09:23

## 2020-02-02 RX ADMIN — INSULIN LISPRO 6 UNITS: 100 INJECTION, SOLUTION INTRAVENOUS; SUBCUTANEOUS at 12:42

## 2020-02-02 RX ADMIN — TAMSULOSIN HYDROCHLORIDE 0.4 MG: 0.4 CAPSULE ORAL at 09:24

## 2020-02-02 RX ADMIN — DOCUSATE SODIUM 100 MG: 100 CAPSULE, LIQUID FILLED ORAL at 09:24

## 2020-02-02 RX ADMIN — HEPARIN SODIUM 5000 UNITS: 5000 INJECTION INTRAVENOUS; SUBCUTANEOUS at 21:18

## 2020-02-02 RX ADMIN — Medication 81 MG: at 09:24

## 2020-02-02 RX ADMIN — POLYETHYLENE GLYCOL 3350 17 G: 17 POWDER, FOR SOLUTION ORAL at 09:25

## 2020-02-02 NOTE — PROGRESS NOTES
[facility] Hospitalist Group  Progress Note    Patient: Kate Woodruff Age: 79 y.o. : 1940 MR#: [id number] SSN: [id number]  Date: 2020     Subjective:   Patient seen independently by me. She was exercising UEs in bed. Endorses pain at site of RLE stump. Denies chest pain, SOB, abd pain. Calm and cooperative. No evidence of agitation. Assessment/Plan:   79 y.o female with prolonged hospitalization since her admission  for metabolic encephalopathy related to HHS, acute pancreatitis, EFRAIN. She has recovered from HHS, pancreatitis, EFRAIN but developed worsened ischemia of her right foot due to right lower extremity PAD with gangrene. CTA with multifocal stenosis of right lower extremity. She underwent angio with balloon angioplasty and stent placement at origin of common iliac (2020).  She underwent right femoral to above-knee popliteal bypass (2020). Podiatry consulted and recommended right AKA. Consults: Podiatry, Heme Onc, Vascular, ICU     1)  Right leg PAD with gangrene, associated with uncontrolled DM2. S/p right above-knee amputation, s/p right fem-pop bypass  2)  Hyperglycemia with Type 2 diabetes, stable  - admitted with Hyperosmolar non-ketotic state in type 2 diabetes mellitus, resolved    - with episodes of hypoglycemia  3)  Acute on chronic renal failure Stage 3, resolved   4)  Metabolic acidosis due to renal issues, resolved  5)  Acute metabolic encephalopathy, resolved   6)  Acute pancreatitis, resolved, GI recommended CT abd in 8 weeks   7)  Hypertension  8)  Hypophosphatemia, replaced   9)  Normocytic anemia of chronic disease, recently decreased- Not amenable for transfusion due to [place]  8)  Urinary retention, gutierrez out. Resolved     - Vascular following- will contact prosthetics Monday for shrinkers. - Heme Onc following- continue iron daily, retacrit 3x week. S/p venofer 4 doses.    - resume ISS and lantus 10 units daily   - resume norvasc, lopressor.   - standing bowel regimen while on pain control with prn percocet ; conservative with opioids to prevent AMS  - resume zyprexa. Full code  dvt ppx- heparin q8 hours   Gi ppx- pepcid. Diet- diabetic.   dispo- PT recs SNF - approved for [facility]     Additional Notes:  Sesar Duran- no blood transfusion. Case discussed with:  [x]Patient  []Family  []Nursing  []Case Management  DVT Prophylaxis:  []Lovenox  [x]Hep SQ  []SCDs  []Coumadin   []On Heparin gtt    Objective:   VS:   Visit Vitals  /72 (BP Patient Position: At rest)   Pulse 80   Temp 98 °F (36.7 °C)   Resp 18   Ht 5' 5\" (1.651 m)   Wt 68.6 kg (151 lb 3.2 oz)   SpO2 100%   Breastfeeding No   BMI 25.16 kg/m²      Tmax/24hrs: Temp (24hrs), Av.2 °F (36.8 °C), Min:98 °F (36.7 °C), Max:98.5 °F (36.9 °C)      Intake/Output Summary (Last 24 hours) at 2020 1259  Last data filed at 2020 4052  Gross per 24 hour   Intake 600 ml   Output 1000 ml   Net -400 ml       General:  AA female laying in bed, exercising her arms, ANAD  Cardiovascular:  RRR  Pulmonary: on room air,  LSC throughout; respiratory effort WNL  GI:  +BS in all four quadrants, soft, non-tender  Extremities:    + RLE AKA, dressing clean dry intact no blood. LLE no edema, warm  Neuro: awake, alert, ox3, moving arms and LLE. Follows commands.   Calm and cooperative         Labs:    Recent Results (from the past 24 hour(s))   GLUCOSE, POC    Collection Time: 20  4:19 PM   Result Value Ref Range    Glucose (POC) 236 (H) 70 - 110 mg/dL   GLUCOSE, POC    Collection Time: 20  8:37 PM   Result Value Ref Range    Glucose (POC) 304 (H) 70 - 911 mg/dL   METABOLIC PANEL, BASIC    Collection Time: 20  6:01 AM   Result Value Ref Range    Sodium 140 136 - 145 mmol/L    Potassium 4.3 3.5 - 5.5 mmol/L    Chloride 113 (H) 100 - 111 mmol/L    CO2 23 21 - 32 mmol/L    Anion gap 4 3.0 - 18 mmol/L    Glucose 119 (H) 74 - 99 mg/dL BUN 23 (H) 7.0 - 18 MG/DL    Creatinine 1.28 0.6 - 1.3 MG/DL    BUN/Creatinine ratio 18 12 - 20      GFR est AA 49 (L) >60 ml/min/1.73m2    GFR est non-AA 40 (L) >60 ml/min/1.73m2    Calcium 8.0 (L) 8.5 - 10.1 MG/DL   CBC WITH AUTOMATED DIFF    Collection Time: 02/02/20  6:01 AM   Result Value Ref Range    WBC 7.5 4.6 - 13.2 K/uL    RBC 2.42 (L) 4.20 - 5.30 M/uL    HGB 7.9 (L) 12.0 - 16.0 g/dL    HCT 26.5 (L) 35.0 - 45.0 %    .5 (H) 74.0 - 97.0 FL    MCH 32.6 24.0 - 34.0 PG    MCHC 29.8 (L) 31.0 - 37.0 g/dL    RDW 18.0 (H) 11.6 - 14.5 %    PLATELET 740 108 - 735 K/uL    MPV 9.6 9.2 - 11.8 FL    NEUTROPHILS 70 40 - 73 %    LYMPHOCYTES 19 (L) 21 - 52 %    MONOCYTES 8 3 - 10 %    EOSINOPHILS 3 0 - 5 %    BASOPHILS 0 0 - 2 %    ABS. NEUTROPHILS 5.2 1.8 - 8.0 K/UL    ABS. LYMPHOCYTES 1.4 0.9 - 3.6 K/UL    ABS. MONOCYTES 0.6 0.05 - 1.2 K/UL    ABS. EOSINOPHILS 0.2 0.0 - 0.4 K/UL    ABS.  BASOPHILS 0.0 0.0 - 0.1 K/UL    DF AUTOMATED     GLUCOSE, POC    Collection Time: 02/02/20  7:42 AM   Result Value Ref Range    Glucose (POC) 165 (H) 70 - 110 mg/dL   GLUCOSE, POC    Collection Time: 02/02/20 11:39 AM   Result Value Ref Range    Glucose (POC) 218 (H) 70 - 110 mg/dL       Signed By: PATRICE Byrnes     February 2, 2020

## 2020-02-02 NOTE — PROGRESS NOTES
Pt doing well. Denies pain currently  Right AKA site intact, dressing with minimal blood staining. No active bleeding. Left foot warm and perfused. H&H stable. Hematology following  Continue current treatment.    Will contact prosthetics tomorrow for shrinkers, etc.

## 2020-02-02 NOTE — PROGRESS NOTES
Hematology/Medical Oncology Progress Note             Name: Bere Mccoy   : 1940   MRN: [id number]   Date: 2020 8:13 AM     [x]I have reviewed the flowsheet and previous days notes. Events overnight reviewed and discussed with nursing staff. Vital signs and records reviewed. 70-year-old -American female past medical history of severe PVD, chronic anemia,uncontrolled diabetes mellitus type 2 now right lower limb ischemia and dry gangrene. S/p R Fem-pop bypass. In addition patient is Lutheran and refuses blood transfusion. Initially patient received Retacrit 20,000 units X 7 days. Currently on Retacrit 20,000 units 3 X week. ROS:  Constitutional: Negative for fever. HENT: Negative for nosebleeds, congestion, facial swelling, mouth sores, trouble swallowing, neck pain, neck stiffness, voice change and postnasal drip. Eyes: Negative for photophobia, pain, discharge and itching. Respiratory: Negative for apnea, cough, choking, chest tightness, wheezing and stridor. Cardiovascular: Negative for chest pain, palpitations and leg swelling. Gastrointestinal: Negative for abdominal pain. Negative for nausea, diarrhea, constipation, blood in stool and rectal pain. Genitourinary: Negative for dysuria, urgency, hematuria, flank pain and difficulty urinating. Musculoskeletal:  Skin: Positive for Right AKA dressing intact. Neurological:  Negative for dizziness, facial asymmetry, speech difficulty, light-headedness and headaches. Hematological: Negative for adenopathy. Does not bruise/bleed easily.          Vital Signs:    Visit Vitals  /76 (BP 1 Location: Left arm, BP Patient Position: At rest)   Pulse 81   Temp 98.1 °F (36.7 °C)   Resp 18   Ht 5' 5\" (1.651 m)   Wt 68.6 kg (151 lb 3.2 oz)   SpO2 100%   Breastfeeding No   BMI 25.16 kg/m²       O2 Device: Room air   O2 Flow Rate (L/min): 6 l/min   Temp (24hrs), Av.3 °F (36.8 °C), Min:98 °F (36.7 °C), Max:98.7 °F (37.1 °C)       Intake/Output:   Last shift:      No intake/output data recorded. Last 3 shifts: 01/31 1901 - 02/02 0700  In: 600 [P.O.:600]  Out: 1000 [Urine:1000]    Intake/Output Summary (Last 24 hours) at 2/2/2020 0747  Last data filed at 2/1/2020 1829  Gross per 24 hour   Intake 600 ml   Output 700 ml   Net -100 ml       Physical Exam:  General: Appears comfortable,somnolent, NAD. HEENT:  Anicteric sclerae; pink palpebral conjunctivae; mucosa moist  Resp:  Symmetrical chest expansion, no accessory muscle use; good airway entry; no rales/ wheezing/ rhonchi noted  CV:  S1, S2 present; regular rate and rhythm  GI:  Abdomen soft, non-tender; (+) active bowel sounds  Extremities:  R AKA. Skin:  Warm, dressing to AKA, intact-did not remove.   Neurologic: Alert and oriented X3      DATA:   Current Facility-Administered Medications   Medication Dose Route Frequency    ferrous sulfate tablet 325 mg  1 Tab Oral DAILY WITH BREAKFAST    amLODIPine (NORVASC) tablet 2.5 mg  2.5 mg Oral DAILY    insulin glargine (LANTUS) injection 10 Units  10 Units SubCUTAneous DAILY    glucose chewable tablet 16 g  4 Tab Oral PRN    insulin lispro (HUMALOG) injection   SubCUTAneous AC&HS    glucagon (GLUCAGEN) injection 1 mg  1 mg IntraMUSCular PRN    OLANZapine (ZyPREXA) tablet 2.5 mg  2.5 mg Oral QPM    epoetin bipin-epbx (RETACRIT) injection 20,000 Units  20,000 Units SubCUTAneous Q MON, WED & FRI    ondansetron (ZOFRAN) injection 4 mg  4 mg IntraVENous Q6H PRN    acetaminophen (TYLENOL) tablet 650 mg  650 mg Oral Q4H PRN    metoprolol tartrate (LOPRESSOR) tablet 12.5 mg  12.5 mg Oral Q12H    hydrALAZINE (APRESOLINE) 20 mg/mL injection 10 mg  10 mg IntraVENous Q6H PRN    bisacodyL (DULCOLAX) tablet 10 mg  10 mg Oral DAILY PRN    docusate sodium (COLACE) capsule 100 mg  100 mg Oral BID    tamsulosin (FLOMAX) capsule 0.4 mg  0.4 mg Oral DAILY    famotidine (PEPCID) tablet 20 mg  20 mg Oral DAILY    bisacodyl (DULCOLAX) suppository 10 mg  10 mg Rectal DAILY PRN    polyethylene glycol (MIRALAX) packet 17 g  17 g Oral DAILY    aspirin chewable tablet 81 mg  81 mg Oral DAILY    oxyCODONE-acetaminophen (PERCOCET) 5-325 mg per tablet 1-2 Tab  1-2 Tab Oral Q6H PRN    dextrose 10% infusion 125-250 mL  125-250 mL IntraVENous PRN    heparin (porcine) injection 5,000 Units  5,000 Units SubCUTAneous Q8H                    Labs:  Recent Labs     02/02/20  0601 02/01/20  0529   WBC 7.5 7.1   HGB 7.9* 7.3*   HCT 26.5* 24.5*    283     Recent Labs     02/02/20  0601      K 4.3   *   CO2 23   *   BUN 23*   CREA 1.28   CA 8.0*     No results for input(s): PH, PCO2, PO2, HCO3, FIO2 in the last 72 hours. IMPRESSION:   · Anemia of chronic illness  · PVD  · Diabetes type 2  · Acute renal failure        · PLAN:  · H/H 7.9, continue Ferrous sulfate 325 mg po daily: Pt is Jehovahs witness-no PRBC transfusion. Reviewed creatinine 1.52. Continue Retacrit  20,000 units 3 times a week  for H/H less than 10/30 while inpatient. Patient has completed Venofer 4/4. Iron low at 22,TIBC 116, iron % sat 19, ferritin 100. B12 897, folate >20.0. · Recommend limiting lab draws. 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 ·  ·        The patient is: [x] acutely ill Risk of deterioration: [] moderate    [] critically ill  [] high         My assessment/plan was discussed with:  [x]nursing []PT/OT    []respiratory therapy [x]Dr.Lloyd Melvi Xie MD      []family []       [address] [facility] dictation software was used for portions of this report. Efforts have been made today to edit the dictations, but occasionally words are is mistranscribed.       Sabas Xie NP

## 2020-02-03 ENCOUNTER — DOCUMENTATION ONLY (OUTPATIENT)
Dept: VASCULAR SURGERY | Age: 80
End: 2020-02-03

## 2020-02-03 LAB
ANION GAP SERPL CALC-SCNC: 4 MMOL/L (ref 3–18)
BASOPHILS # BLD: 0 K/UL (ref 0–0.1)
BASOPHILS NFR BLD: 0 % (ref 0–2)
BUN SERPL-MCNC: 21 MG/DL (ref 7–18)
BUN/CREAT SERPL: 17 (ref 12–20)
CALCIUM SERPL-MCNC: 8.5 MG/DL (ref 8.5–10.1)
CHLORIDE SERPL-SCNC: 113 MMOL/L (ref 100–111)
CO2 SERPL-SCNC: 24 MMOL/L (ref 21–32)
CREAT SERPL-MCNC: 1.25 MG/DL (ref 0.6–1.3)
DIFFERENTIAL METHOD BLD: ABNORMAL
EOSINOPHIL # BLD: 0.2 K/UL (ref 0–0.4)
EOSINOPHIL NFR BLD: 4 % (ref 0–5)
ERYTHROCYTE [DISTWIDTH] IN BLOOD BY AUTOMATED COUNT: 18.2 % (ref 11.6–14.5)
GLUCOSE BLD STRIP.AUTO-MCNC: 113 MG/DL (ref 70–110)
GLUCOSE BLD STRIP.AUTO-MCNC: 151 MG/DL (ref 70–110)
GLUCOSE BLD STRIP.AUTO-MCNC: 223 MG/DL (ref 70–110)
GLUCOSE BLD STRIP.AUTO-MCNC: 378 MG/DL (ref 70–110)
GLUCOSE SERPL-MCNC: 147 MG/DL (ref 74–99)
HCT VFR BLD AUTO: 28.3 % (ref 35–45)
HGB BLD-MCNC: 8.4 G/DL (ref 12–16)
LYMPHOCYTES # BLD: 1.8 K/UL (ref 0.9–3.6)
LYMPHOCYTES NFR BLD: 29 % (ref 21–52)
MCH RBC QN AUTO: 32.6 PG (ref 24–34)
MCHC RBC AUTO-ENTMCNC: 29.7 G/DL (ref 31–37)
MCV RBC AUTO: 109.7 FL (ref 74–97)
MONOCYTES # BLD: 0.7 K/UL (ref 0.05–1.2)
MONOCYTES NFR BLD: 10 % (ref 3–10)
NEUTS SEG # BLD: 3.6 K/UL (ref 1.8–8)
NEUTS SEG NFR BLD: 57 % (ref 40–73)
PLATELET # BLD AUTO: 345 K/UL (ref 135–420)
PMV BLD AUTO: 9.8 FL (ref 9.2–11.8)
POTASSIUM SERPL-SCNC: 4.8 MMOL/L (ref 3.5–5.5)
RBC # BLD AUTO: 2.58 M/UL (ref 4.2–5.3)
SODIUM SERPL-SCNC: 141 MMOL/L (ref 136–145)
WBC # BLD AUTO: 6.3 K/UL (ref 4.6–13.2)

## 2020-02-03 PROCEDURE — 74011250637 HC RX REV CODE- 250/637: Performed by: EMERGENCY MEDICINE

## 2020-02-03 PROCEDURE — 97110 THERAPEUTIC EXERCISES: CPT

## 2020-02-03 PROCEDURE — 85025 COMPLETE CBC W/AUTO DIFF WBC: CPT

## 2020-02-03 PROCEDURE — 97168 OT RE-EVAL EST PLAN CARE: CPT

## 2020-02-03 PROCEDURE — 74011250637 HC RX REV CODE- 250/637: Performed by: INTERNAL MEDICINE

## 2020-02-03 PROCEDURE — 82962 GLUCOSE BLOOD TEST: CPT

## 2020-02-03 PROCEDURE — 74011250637 HC RX REV CODE- 250/637: Performed by: NURSE PRACTITIONER

## 2020-02-03 PROCEDURE — 97530 THERAPEUTIC ACTIVITIES: CPT

## 2020-02-03 PROCEDURE — 74011250636 HC RX REV CODE- 250/636: Performed by: PHYSICIAN ASSISTANT

## 2020-02-03 PROCEDURE — 97535 SELF CARE MNGMENT TRAINING: CPT

## 2020-02-03 PROCEDURE — 74011250637 HC RX REV CODE- 250/637: Performed by: HOSPITALIST

## 2020-02-03 PROCEDURE — 80048 BASIC METABOLIC PNL TOTAL CA: CPT

## 2020-02-03 PROCEDURE — 74011636637 HC RX REV CODE- 636/637: Performed by: INTERNAL MEDICINE

## 2020-02-03 PROCEDURE — 74011250636 HC RX REV CODE- 250/636: Performed by: NURSE PRACTITIONER

## 2020-02-03 PROCEDURE — 65660000000 HC RM CCU STEPDOWN

## 2020-02-03 RX ORDER — INSULIN GLARGINE 100 [IU]/ML
10 INJECTION, SOLUTION SUBCUTANEOUS
Qty: 1 VIAL | Refills: 0 | Status: SHIPPED | OUTPATIENT
Start: 2020-02-03 | End: 2020-03-04

## 2020-02-03 RX ORDER — LANOLIN ALCOHOL/MO/W.PET/CERES
325 CREAM (GRAM) TOPICAL
Qty: 14 TAB | Refills: 0 | Status: SHIPPED | OUTPATIENT
Start: 2020-02-04 | End: 2020-02-18

## 2020-02-03 RX ORDER — FAMOTIDINE 20 MG/1
20 TABLET, FILM COATED ORAL DAILY
Qty: 14 TAB | Refills: 0 | Status: SHIPPED | OUTPATIENT
Start: 2020-02-04 | End: 2020-02-18

## 2020-02-03 RX ORDER — GUAIFENESIN 100 MG/5ML
81 LIQUID (ML) ORAL DAILY
Qty: 30 TAB | Refills: 0 | Status: SHIPPED
Start: 2020-02-03

## 2020-02-03 RX ORDER — AMLODIPINE BESYLATE 5 MG/1
2.5 TABLET ORAL DAILY
Qty: 30 TAB | Refills: 0 | Status: SHIPPED
Start: 2020-02-03 | End: 2020-03-04

## 2020-02-03 RX ORDER — OXYCODONE AND ACETAMINOPHEN 5; 325 MG/1; MG/1
1 TABLET ORAL
Qty: 9 TAB | Refills: 0 | Status: SHIPPED | OUTPATIENT
Start: 2020-02-03 | End: 2020-02-06

## 2020-02-03 RX ORDER — DOCUSATE SODIUM 100 MG/1
100 CAPSULE, LIQUID FILLED ORAL 2 TIMES DAILY
Qty: 14 CAP | Refills: 0 | Status: SHIPPED | OUTPATIENT
Start: 2020-02-03 | End: 2020-02-10

## 2020-02-03 RX ORDER — METOPROLOL TARTRATE 25 MG/1
12.5 TABLET, FILM COATED ORAL EVERY 12 HOURS
Qty: 30 TAB | Refills: 0 | Status: SHIPPED | OUTPATIENT
Start: 2020-02-03 | End: 2020-03-04

## 2020-02-03 RX ORDER — POLYETHYLENE GLYCOL 3350 17 G/17G
17 POWDER, FOR SOLUTION ORAL
Qty: 14 PACKET | Refills: 0 | Status: SHIPPED | OUTPATIENT
Start: 2020-02-03 | End: 2020-02-17

## 2020-02-03 RX ORDER — OLANZAPINE 2.5 MG/1
2.5 TABLET ORAL EVERY EVENING
Qty: 14 TAB | Refills: 0 | Status: SHIPPED | OUTPATIENT
Start: 2020-02-03 | End: 2020-02-17

## 2020-02-03 RX ADMIN — Medication 325 MG: at 08:00

## 2020-02-03 RX ADMIN — METOPROLOL TARTRATE 12.5 MG: 25 TABLET ORAL at 09:00

## 2020-02-03 RX ADMIN — OXYCODONE HYDROCHLORIDE AND ACETAMINOPHEN 1 TABLET: 5; 325 TABLET ORAL at 21:02

## 2020-02-03 RX ADMIN — INSULIN LISPRO 3 UNITS: 100 INJECTION, SOLUTION INTRAVENOUS; SUBCUTANEOUS at 09:09

## 2020-02-03 RX ADMIN — METOPROLOL TARTRATE 12.5 MG: 25 TABLET ORAL at 21:01

## 2020-02-03 RX ADMIN — OLANZAPINE 2.5 MG: 2.5 TABLET, FILM COATED ORAL at 18:06

## 2020-02-03 RX ADMIN — INSULIN LISPRO 6 UNITS: 100 INJECTION, SOLUTION INTRAVENOUS; SUBCUTANEOUS at 22:51

## 2020-02-03 RX ADMIN — INSULIN LISPRO 15 UNITS: 100 INJECTION, SOLUTION INTRAVENOUS; SUBCUTANEOUS at 11:43

## 2020-02-03 RX ADMIN — EPOETIN ALFA-EPBX 20000 UNITS: 10000 INJECTION, SOLUTION INTRAVENOUS; SUBCUTANEOUS at 21:34

## 2020-02-03 RX ADMIN — DOCUSATE SODIUM 100 MG: 100 CAPSULE, LIQUID FILLED ORAL at 09:00

## 2020-02-03 RX ADMIN — TAMSULOSIN HYDROCHLORIDE 0.4 MG: 0.4 CAPSULE ORAL at 09:00

## 2020-02-03 RX ADMIN — DOCUSATE SODIUM 100 MG: 100 CAPSULE, LIQUID FILLED ORAL at 18:06

## 2020-02-03 RX ADMIN — FAMOTIDINE 20 MG: 20 TABLET, FILM COATED ORAL at 09:00

## 2020-02-03 RX ADMIN — OXYCODONE HYDROCHLORIDE AND ACETAMINOPHEN 1 TABLET: 5; 325 TABLET ORAL at 15:29

## 2020-02-03 RX ADMIN — HEPARIN SODIUM 5000 UNITS: 5000 INJECTION INTRAVENOUS; SUBCUTANEOUS at 13:26

## 2020-02-03 RX ADMIN — INSULIN GLARGINE 10 UNITS: 100 INJECTION, SOLUTION SUBCUTANEOUS at 09:05

## 2020-02-03 RX ADMIN — AMLODIPINE BESYLATE 2.5 MG: 2.5 TABLET ORAL at 09:00

## 2020-02-03 RX ADMIN — Medication 81 MG: at 09:00

## 2020-02-03 RX ADMIN — POLYETHYLENE GLYCOL 3350 17 G: 17 POWDER, FOR SOLUTION ORAL at 09:00

## 2020-02-03 RX ADMIN — HEPARIN SODIUM 5000 UNITS: 5000 INJECTION INTRAVENOUS; SUBCUTANEOUS at 21:01

## 2020-02-03 NOTE — DISCHARGE SUMMARY
Physician Discharge Summary       Patient: Kate Woodruff MRN: [id number]  SSN: [id number]    YOB: 1940  Age: 79 y.o. Sex: female    PCP: Ameena Cano MD    Allergies: Patient has no known allergies. Admit date: 1/2/2020  Admitting Provider: Nicole Crawford MD    Discharge date: 2/04/2020  Discharging Provider: Belem Holt MD    * Admission Diagnoses: Pancreatitis [K85.90]  Hyperosmolar hyperglycemic coma due to diabetes mellitus without ketoacidosis ([facility] [E11.01]  Hyperosmolar non-ketotic state in patient with type 2 diabetes mellitus ([facility] [E11.00]    * Discharge Diagnoses:    1)  Right leg PAD with gangrene, associated with uncontrolled DM2. S/p right above-knee amputation, s/p right fem-pop bypass  2)  Hyperglycemia with Type 2 diabetes, stable  - admitted with Hyperosmolar non-ketotic state in type 2 diabetes mellitus, resolved    3)  Acute on chronic renal failure Stage 3, resolved   4)  Metabolic acidosis due to renal issues, resolved  5)  Acute metabolic encephalopathy, resolved   6)  Acute pancreatitis, resolved  7)  Hypertension  8)  Hypophosphatemia, replaced   9)  Normocytic anemia of chronic disease- Not amenable for transfusion due to Alevism  10)  Urinary retention, Resolved     * Hospital Course: Ms. Carolina Coles is an 80 yo AA female with past medical hx of PAD, T2 DM, CKD, HTN, anemia who was hospitalized for 32 days at [facility] ED for metabolic encephalopathy related to HHS, acute pancreatitis and EFRAIN- which resolved. However, she developed worsened ischemia of her right foot due to Right lower extremity PAD with gangrene. CTA showed multifocal stenosis of RLE. She underwent angio with balloon angioplasty and stent placement at origin of common iliac on 1/13/2020. She underwent right femoral to above the knee popliteal bypass on 1/16/2020. Then she had right AKA on 1/30/2020.  Her course was complicated by anemia, which was recommended to continue iron, she received venofer 4 doses in hospital; no blood transfusion as patient is [facility]. Resumed ISS and lantus for DM. Resumed lopressor and norvasc for HTN. She was seen by PT OT- to discharge to [address] [place] [address]. Vascular contacted prosthetics, waiting for shrinkers. Can place ACE wrap vs Tubigrip for gentle compression while awaiting shrinkers  Follow up with Dr. Alissa Fontanez in 3-4 weeks for suture removal.   GI recommended CT abd in 8 weeks   Heme Onc follow up with Dr. Yahaira Barry  In one week for anemia mgmt     * Procedures:  Procedure(s):  RIGHT ABOVE KNEE AMPUTATION (AKA)  Cardiology and IR Orders (From admission to next 24h)     Start     Ordered    01/22/20 1145  IR [address]  [[id number]]  ONE TIME      01/22/20 1141            She underwent angio with balloon angioplasty and stent placement at origin of common iliac on 1/13/2020. She underwent right femoral to above the knee popliteal bypass on 1/16/2020. Then she had right AKA on 1/30/2020. Consults: Podiatry, HEME ONC, VASCULAR, ICU. Significant Diagnostic Studies:   ·   Iron low at 22,TIBC 116, iron % sat 19, ferritin 100.  B12 897, folate >20.0.     US Results (most recent):  Results from Hospital Encounter encounter on 01/02/20   US ABD LTD    Narrative EXAMINATION: Ultrasound abdomen limited, right upper quadrant    INDICATION: Pancreatitis    COMPARISON: CT 1/3/2020    TECHNIQUE: Grayscale, color, spectral sonographic images of the right upper  quadrant obtained. FINDINGS:    Pancreas: Unremarkable. Tail not well visualized. IVC: Unremarkable. Liver: 15.2 cm length. Slightly coarsened echotexture. Portal vein normal  caliber with antegrade flow. Right kidney: 6.1 cm length. Increased echotexture. No hydronephrosis. No focal  abnormality. Gallbladder: No calculi or wall thickening. Negative Wilson sign. Biliary tree: Common bile duct 0.5 cm caliber. No intrahepatic duct dilatation. Impression IMPRESSION:    Coarsened hepatic echotexture suggests nonspecific hepatocellular disease. Atrophic echogenic right kidney consistent with chronic medical renal disease. No other significant findings. No evidence of cholelithiasis. XR Results (most recent):  Results from Hospital Encounter encounter on 01/02/20   XR FOOT RT MIN 3 V    Narrative EXAM:  XR FOOT RT MIN 3 V    INDICATION:   report of air in right fifth MTP join    COMPARISON:  None. FINDINGS:  3 views of the right foot demonstrate limited study, the patient has  cooperation issues with positioning the foot. Considerable hammertoe deformity  of all the toes noted. The foot is held in extension at the ankle. There is no  obvious fracture or dislocation. Small plantar calcaneal spur noted. Impression IMPRESSION: Positioning issues, no acute abnormality identified. No bone  destruction or soft tissue air     CT Results (most recent):  Results from Hospital Encounter encounter on 01/02/20   CTA ABD ART W RUNOFF W WO CONT    Narrative PROCEDURE: CTA of abdomen and pelvis and bilateral lower extremity runoff with  intravenous contrast administration. HISTORY: PAD, ischemia of the right foot. TECHNIQUE: Multidetector helical CT angiography was carried out from low chest  through the feet following dynamic 70 cc Isovue-300 intravenous contrast  administration . Scan timing was performed using automated bolus tracking/SMART  Prep. 3-D and MPR angiographic image reconstruction was performed on  independent workstation and separately reviewed. Parenchymal imaging is limited  by the arterial phase of contrast administration.  All CT scans at this facility  are performed using dose optimization techniques as appropriate to a performed  exam, to include automated exposure control, adjustment of the mA and/or kV  according to patient's size (including appropriate matching for site specific  examinations), or use of iterative reconstruction technique. COMPARISON: CT abdomen/pelvis 1/3/2020. CTA abdomen/pelvis with extremity runoff  7/18/2017. FINDINGS:    VASCULAR FINDINGS:    Right lower extremity:  Multifocal stenosis throughout the right posterior tibialis artery due to  atherosclerotic calcifications as well as the peroneal artery.  -The right posterior tibialis artery is not opacified beyond the mid right lower  leg.  -The anterior tibial artery is not opacified beyond the right lower leg just  above the tibial plafond.  -The peroneal artery is opacified beyond the level of the tibial plafond. Proximally, there is multifocal stenosis of the right femoral artery with loss  of opacification at the mid to distal thigh. Reconstitution of opacification at  the level of the posterior tibialis artery likely due to collaterals from  geniculate arteries and the deep femoral artery. 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  -Left lower leg perfusion is contributed by collaterals from the deep femoral  artery and geniculate arteries. Multifocal stenosis/occlusion of the left  posterior tibialis artery and left peroneal artery. No opacification of the  peroneal artery or posterior tibialis artery is seen to be on the mid to  proximal third of the left lower leg. The left dorsal pedis is opacified, and  likely contributes to some additional opacified vessel seen in the left foot. Abdominal aorta:  -Severe atherosclerosis with moderate compromise of the lumen, similar to prior  exam of 7/2017.  -No evidence for abdominal aortic aneurysm.   -Severe stenosis at the proximal aspect of the right common iliac artery, well  opacified distally, similar to prior exam. Multifocal moderate stenosis at the  left common iliac artery similar to prior exam.  -Multifocal stenosis at the internal iliac arteries, with good opacification  distally.  -Celiac artery, SMA, left renal artery, and right renal artery are grossly  patent. The right renal artery again shown to originate from the descending  thoracic aorta. LAFREDA is not well visualized. ABDOMEN/PELVIS FINDINGS:  Lower chest: Small right pleural effusion with overlying atelectasis. Liver: Stable subcentimeter hypodense lesions liver, too small to characterize  but stability suggests benignity    Biliary: Gallbladder is mildly distended but otherwise unremarkable. Spleen: Negative    Pancreas: There is some peripancreatic edema adjacent to the pancreatic tail. Adrenal glands: Diffuse thickening of the adrenal glands, without discrete mass,  similar to prior exam.    Kidneys: Malrotated kidneys again noted, without evidence for hydronephrosis. GI tract: The bowel appears nonobstructed. Question of mild mural thickening at  the distal esophagus. Questionable mild mural thickening at the greater  curvature of the stomach adjacent to the edema along the pancreatic tail. Colonic diverticulosis, without evidence for diverticulitis. Normal appendix. Peritoneum: No free air    Lymph nodes: No lymphadenopathy. Pelvis: Urinary bladder is unremarkable. Fibroid uterus again noted. Body wall/soft tissues: Small fat-containing umbilical hernia. Small right lower  spigelian hernia containing a small amount of fluid measuring 2.5 x 1.2 cm  (image 203), previously measured 2.9 x 1.6 cm. Mild edema along the right flank. Bones:  -Bilateral moderate knee osteoarthrosis. Small right knee joint effusion and  trace left knee joint effusion.  -Diffuse soft tissue swelling at the feet, right greater than left. Trace  subcutaneous emphysema adjacent to the right fifth MTP joint. Left distal tibial  and talar hardware noted. -Multilevel degenerative spondylosis and disc disease noted, not well evaluated  on current exam.  -Moderate degenerative change at the hips      Impression IMPRESSION:  1.  Right lower extremity:  Multifocal stenosis throughout the right posterior tibialis artery due to  atherosclerotic calcifications as well as the peroneal artery.  -The right posterior tibialis artery is not opacified beyond the mid right lower  leg.  -The anterior tibial artery is not opacified beyond the right lower leg just  above the tibial plafond.  -The peroneal artery is opacified beyond the level of the tibial plafond. Proximally, there is multifocal stenosis of the right femoral artery with loss  of opacification at the mid to distal thigh. Reconstitution of opacification at  the level of the posterior tibialis artery likely due to collaterals from  geniculate arteries and the deep femoral artery. 2. Diffuse soft tissue swelling at bilateral feet, right greater than left. -Suggestion of trace air along the right fifth MTP joint, could be degenerative  but infectious process not excluded. Consider dedicated right foot x-ray for  further evaluation. 3. Chronic multilevel stenosis of the aorta, iliac arteries, and left lower  extremity as described. 4. Peripancreatic edema along the pancreatic tail is increased since 1/3/2020.  -Associated adjacent mild presumed reactive mural thickening of the greater  curvature of the stomach. 5. Small right pleural effusion. 6. Suggestion of mild mural thickening of the distal esophagus, may indicate  nonspecific esophagitis. 7. Bilateral knee osteoarthrosis with small right knee joint effusion and trace  left knee joint effusion. 8. Fibroid uterus. 9. Colonic diverticulosis, without evidence for diverticulitis. 10. Additional nonacute findings are as described. Discharge Exam:  General:  AA female laying in bed, NAD  Cardiovascular:  RRR  Pulmonary: on room air,  LSC throughout; respiratory effort WNL  GI:  +BS in all four quadrants, soft, non-tender  Extremities:    + RLE AKA, dressing scant blood, dry. LLE no edema, warm  Neuro: awake, alert, ox3, moving arms and LLE. Follows commands.   Calm and cooperative           * Discharge Condition: improved  * Disposition: [place] ([facility])    Discharge Medications:  Current Discharge Medication List      START taking these medications    Details   famotidine (PEPCID) 20 mg tablet Take 1 Tab by mouth daily for 14 days. Qty: 14 Tab, Refills: 0      ferrous sulfate 325 mg (65 mg iron) tablet Take 1 Tab by mouth daily (with breakfast) for 14 days. Qty: 14 Tab, Refills: 0      metoprolol tartrate (LOPRESSOR) 25 mg tablet Take 0.5 Tabs by mouth every twelve (12) hours for 30 days. Indications: high blood pressure  Qty: 30 Tab, Refills: 0      polyethylene glycol (MIRALAX) 17 gram packet Take 1 Packet by mouth daily as needed for Constipation for up to 14 days. Qty: 14 Packet, Refills: 0      OLANZapine (ZYPREXA) 2.5 mg tablet Take 1 Tab by mouth every evening for 14 days. Qty: 14 Tab, Refills: 0      docusate sodium (COLACE) 100 mg capsule Take 1 Cap by mouth two (2) times a day for 7 days. Qty: 14 Cap, Refills: 0         CONTINUE these medications which have CHANGED    Details   amLODIPine (NORVASC) 5 mg tablet Take 0.5 Tabs by mouth daily for 30 days. Qty: 30 Tab, Refills: 0      aspirin 81 mg chewable tablet Take 1 Tab by mouth daily. Qty: 30 Tab, Refills: 0      insulin glargine (LANTUS) 100 unit/mL injection 10 Units by SubCUTAneous route nightly for 30 days. Qty: 1 Vial, Refills: 0      oxyCODONE-acetaminophen (PERCOCET) 5-325 mg per tablet Take 1 Tab by mouth every eight (8) hours as needed for Pain for up to 3 days. Max Daily Amount: 3 Tabs. Qty: 9 Tab, Refills: 0    Associated Diagnoses: Ischemia of foot         CONTINUE these medications which have NOT CHANGED    Details   loratadine (CLARITIN) 10 mg tablet Take 10 mg by mouth daily. losartan (COZAAR) 100 mg tablet Take 100 mg by mouth daily. metFORMIN (GLUCOPHAGE) 500 mg tablet Take 500 mg by mouth daily (with breakfast).       insulin lispro (HUMALOG) 100 unit/mL injection Less than 150 =   0 units  150 -199 =   3 units  200 -249 =   6 units  250 -299 =   9 units  300 -349 =   12 units  350 and above =   15 units, CALL MD  Qty: 1 Vial, Refills: 0      hydroCHLOROthiazide (HYDRODIURIL) 25 mg tablet Take 25 mg by mouth daily. glipiZIDE (GLUCOTROL) 5 mg tablet Take 5 mg by mouth two (2) times a day. albuterol-ipratropium (DUO-NEB) 2.5 mg-0.5 mg/3 ml nebu 3 mL by Nebulization route every six (6) hours as needed (wheezing). Qty: 30 Nebule, Refills: 0      acetaminophen (TYLENOL) 325 mg tablet Take 2 Tabs by mouth every six (6) hours as needed. Qty: 30 Tab, Refills: 0      cholecalciferol, vitamin D3, (VITAMIN D3) 2,000 unit tab Take  by mouth. atorvastatin (LIPITOR) 20 mg tablet Take  by mouth daily. STOP taking these medications       OTHER Comments:   Reason for Stopping:               * Follow-up Care/Patient Instructions: Activity: PT/OT Eval and Treat  Diet: Cardiac Diet and Diabetic   Wound Care: Keep wound clean and dry, follow with Dr. Loistine Dakin vascular surgeon.      Follow-up Information     Follow up With Specialties Details Why [address] [facility] and [place] [phone]            Signed:  Marques Holcomb MD  2/4/2020  4:49 PM

## 2020-02-03 NOTE — ROUTINE PROCESS
Report received from [facility]. Patient is alert, in bed, no distress noted. Continue to monitor.

## 2020-02-03 NOTE — PROGRESS NOTES
Received call from Norberto from [facility]. Patients reauthorization has gwyn approved for SNF. Dr Nico Ruiz aware. Patient is accepted at [facility] and rehab and facility can accept when medically stable.     Cliff Lopez, RN BSN  Care Manager  [phone]

## 2020-02-03 NOTE — PROGRESS NOTES
Problem: Mobility Impaired (Adult and Pediatric)  Goal: *Acute Goals and Plan of Care (Insert Text)  Description  Physical Therapy Goals  Initiated 1/18/2020 reevaluated 1/29/2020, reevaluated 1/31/2020 after R AKA and to be accomplished within 7 day(s)  1. Patient will move from supine to sit and sit to supine , scoot up and down and roll side to side in bed with minimal assistance/contact guard assist.     2.  Patient will transfer from bed to chair and chair to bed with maximal assistance using the least restrictive device. 3.  Patient will perform sit to stand with moderate/maximal assistance. 4.  Patient will improve sitting balance static good and dynamic fair to increase safety with transfers. 5.  Patient will perform WC mobility minimal assistance 50 feet for increased functional independence. Prior Level of Function:   Patient was independence for all mobility including gait using no AD. Patient lives alone in a single story home no steps to enter. Unsure how pt was ambulatory prior to hospital with the noticeable gangrene of R foot but pt states she has no equipment at home and was ambulatory. - now s/p R AKA        Outcome: Progressing Towards Goal   PHYSICAL THERAPY TREATMENT    Patient: Moo Parks (64 y.o. female)  Date: 2/3/2020  Diagnosis: Pancreatitis [K85.90]  Hyperosmolar hyperglycemic coma due to diabetes mellitus without ketoacidosis ([facility]) [E11.01]  Hyperosmolar non-ketotic state in patient with type 2 diabetes mellitus ([facility] [E11.00]   <principal problem not specified>  Procedure(s) (LRB):  RIGHT ABOVE KNEE AMPUTATION (AKA) (Right) 4 Days Post-Op  Precautions: Fall, NWB(RLE)    ASSESSMENT:  Patient is cleared by nursing for PT, and patient consents to therapy. Pt just finished with OT treatment where pt was sitting EOB with maximum assistance. Pt not wanting to sit up again so soon.  Performed rolling in bed moderate/maximum assistance both sides and staying on Left sidelying for R hip exercises. Laying flat in bed for at least 10 minutes with supine therex B LE for strengthening for increasing functional mobility including bed mobility. Scooting in bed moderate/maximum assistance including sideways and up in bed. Pt needs increased time especially with R LE exercises and movement. Pt ended therapy supine in bed with all needs met and all rails up per pt request.     Educated on laying flat at least 3 times per day for 5 minutes to stretch hip flexors. Pt does not have a  or ace bandaged donned at this time, informed nursing and hospitalist. Pt will benefit from wrapping to assist with pain control, swelling, and future prosthetic use. Progression toward goals:    [x]      Improving appropriately and progressing toward goals  []      Improving slowly and progressing toward goals  []      Not making progress toward goals and plan of care will be adjusted     PLAN:  Patient continues to benefit from skilled intervention to address the above impairments. Continue treatment per established plan of care. Discharge Recommendations:  Skilled Nursing Facility  Further Equipment Recommendations for Discharge:  N/A     SUBJECTIVE:   Patient stated I need to go at my own pace.     OBJECTIVE DATA SUMMARY:   Critical Behavior:  Neurologic State: Alert  Orientation Level: Appropriate for age  Cognition: Follows commands, Impaired decision making  Safety/Judgement: Decreased insight into deficits, Decreased awareness of need for safety  Functional Mobility Training:  Bed Mobility:  Rolling: Moderate assistance;Maximum assistance  Supine to Sit: Maximum assistance (per OT)  Sit to Supine: Maximum assistance (per OT)  Scooting: Moderate assistance;Maximum assistance        Therapeutic Exercises:   Supine L LE heel slides, ankle pumps, hip abd/adduction, SLR, and partial bridges x 10 reps. R LE supine hip abd/adduction and sidelying hip flex/ext x 10 reps.        Pain:  Pain level pre-treatment: \"it's killing me\" (would not rate)  Pain level post-treatment: no change  Pain Intervention(s): Medication (see MAR); Rest, Repositioning   Response to intervention: Nurse notified, See doc flow    Activity Tolerance:   fair  Please refer to the flowsheet for vital signs taken during this treatment. After treatment:   [] Patient left in no apparent distress sitting up in chair  [x] Patient left in no apparent distress in bed  [x] Call bell left within reach  [x] Nursing notified Samson Aragon and notified Hospitalist and care management   [x] Personal items in reach  [] Caregiver present  [] Bed/chair alarm activated  [] SCDs applied      COMMUNICATION/EDUCATION:   [x]         Role of Physical Therapy in the acute care setting. [x]         Fall prevention education was provided and the patient/caregiver indicated understanding. [x]         Patient/family have participated as able in working toward goals and plan of care. [x]         Patient/family agree to work toward stated goals and plan of care. []         Patient understands intent and goals of therapy, but is neutral about his/her participation. []         Patient is unable to participate in stated goals/plan of care: ongoing with therapy staff.  []         Out of bed at least 3-5 times a day with nursing assistance.    []         Other:        Mikel Ramirez, PT, DPT   Time Calculation: 29 mins

## 2020-02-03 NOTE — PROGRESS NOTES
Called La at the PT office to see patient this AM asap. Updated authorization needed to be started for SNF. Patient on PT/OT schedule. Called Breann at [address] and rehab to [employer] to [facility] once updated PT/OT notes are received. [place] to check on authorization status.   Authorization resubmitted for [address] [place] [address], RN BSN  Care Manager  [phone]

## 2020-02-03 NOTE — WOUND CARE
Physical Exam  
Room 214: introduced myself & services to pt. Pt states she wants me to come back tomorrow.  
Becky MONDRAGONN, RN, [facility], [address]

## 2020-02-03 NOTE — PROGRESS NOTES
[facility]  Progress Note    Patient: Francesco Ramachandran Age: 79 y.o. : 1940 MR#: [id number] SSN: [id number]  Date: 2/3/2020     Subjective:   Patient seen independently by me. Endorses pain at site of RLE stump. Denies chest pain, SOB, abd pain, n/v, diarrhea. Assessment/Plan:   79 y.o female with prolonged hospitalization since her admission  for metabolic encephalopathy related to HHS, acute pancreatitis, EFRAIN. She has recovered from HHS, pancreatitis, EFRAIN but developed worsened ischemia of her right foot due to right lower extremity PAD with gangrene. CTA with multifocal stenosis of right lower extremity. She underwent angio with balloon angioplasty and stent placement at origin of common iliac (2020).  She underwent right femoral to above-knee popliteal bypass (2020). Podiatry consulted and recommended right AKA which was done. Consults: Podiatry, Heme Onc, Vascular, ICU     1)  Right leg PAD with gangrene, associated with uncontrolled DM2. S/p right above-knee amputation, s/p right fem-pop bypass  2)  Hyperglycemia with Type 2 diabetes, stable  - admitted with Hyperosmolar non-ketotic state in type 2 diabetes mellitus, resolved    - with episodes of hypoglycemia  3)  Acute on chronic renal failure Stage 3, resolved   4)  Metabolic acidosis due to renal issues, resolved  5)  Acute metabolic encephalopathy, resolved   6)  Acute pancreatitis, resolved, GI recommended CT abd in 8 weeks   7)  Hypertension  8)  Hypophosphatemia, replaced   9)  Normocytic anemia of chronic disease, recently decreased- Not amenable for transfusion due to Orthodox  8)  Urinary retention, gutierrez out. Resolved     - Vascular following- contacted prosthetics. Can place ACE wrap vs Tubigrip for gentle compression while awaiting shrinkers. F/u with Dr. Cardenas January in 304 wks for staple removal,.   - Heme Onc following- continue iron daily, retacrit 3x week.  S/p venofer 4 doses. - resume ISS and lantus 10 units daily   - resume norvasc, lopressor.   - standing bowel regimen while on pain control with prn percocet ; conservative with opioids to prevent AMS  - resume zyprexa. dispo- PT recs SNF - approved for [address] - discharge tomorrow     Full code  dvt ppx- heparin q8 hours   Gi ppx- pepcid. Diet- diabetic. Additional Notes:  Gnosticism- no blood transfusion. Case discussed with:  [x]Patient  []Family  []Nursing  []Case Management  DVT Prophylaxis:  []Lovenox  [x]Hep SQ  []SCDs  []Coumadin   []On Heparin gtt    Objective:   VS:   Visit Vitals  /52 (BP 1 Location: Left arm, BP Patient Position: At rest)   Pulse 74   Temp 98.1 °F (36.7 °C)   Resp 17   Ht 5' 5\" (1.651 m)   Wt 69.4 kg (153 lb)   SpO2 99%   Breastfeeding No   BMI 25.46 kg/m²      Tmax/24hrs: Temp (24hrs), Av.3 °F (36.8 °C), Min:97.4 °F (36.3 °C), Max:99.1 °F (37.3 °C)      Intake/Output Summary (Last 24 hours) at 2/3/2020 1423  Last data filed at 2/3/2020 1311  Gross per 24 hour   Intake 480 ml   Output 800 ml   Net -320 ml       General:  AA female laying in bed, NAD  Cardiovascular:  RRR  Pulmonary: on room air,  LSC throughout; respiratory effort WNL  GI:  +BS in all four quadrants, soft, non-tender  Extremities:    + RLE AKA, dressing scant blood, dry. LLE no edema, warm  Neuro: awake, alert, ox3, moving arms and LLE. Follows commands.   Calm and cooperative         Labs:    Recent Results (from the past 24 hour(s))   GLUCOSE, POC    Collection Time: 20  3:30 PM   Result Value Ref Range    Glucose (POC) 266 (H) 70 - 110 mg/dL   GLUCOSE, POC    Collection Time: 20  9:00 PM   Result Value Ref Range    Glucose (POC) 121 (H) 70 - 110 mg/dL   CBC WITH AUTOMATED DIFF    Collection Time: 20  5:15 AM   Result Value Ref Range    WBC 6.3 4.6 - 13.2 K/uL    RBC 2.58 (L) 4.20 - 5.30 M/uL    HGB 8.4 (L) 12.0 - 16.0 g/dL    HCT 28.3 (L) 35.0 - 45.0 % .7 (H) 74.0 - 97.0 FL    MCH 32.6 24.0 - 34.0 PG    MCHC 29.7 (L) 31.0 - 37.0 g/dL    RDW 18.2 (H) 11.6 - 14.5 %    PLATELET 043 265 - 335 K/uL    MPV 9.8 9.2 - 11.8 FL    NEUTROPHILS 57 40 - 73 %    LYMPHOCYTES 29 21 - 52 %    MONOCYTES 10 3 - 10 %    EOSINOPHILS 4 0 - 5 %    BASOPHILS 0 0 - 2 %    ABS. NEUTROPHILS 3.6 1.8 - 8.0 K/UL    ABS. LYMPHOCYTES 1.8 0.9 - 3.6 K/UL    ABS. MONOCYTES 0.7 0.05 - 1.2 K/UL    ABS. EOSINOPHILS 0.2 0.0 - 0.4 K/UL    ABS.  BASOPHILS 0.0 0.0 - 0.1 K/UL    DF AUTOMATED     METABOLIC PANEL, BASIC    Collection Time: 02/03/20  5:15 AM   Result Value Ref Range    Sodium 141 136 - 145 mmol/L    Potassium 4.8 3.5 - 5.5 mmol/L    Chloride 113 (H) 100 - 111 mmol/L    CO2 24 21 - 32 mmol/L    Anion gap 4 3.0 - 18 mmol/L    Glucose 147 (H) 74 - 99 mg/dL    BUN 21 (H) 7.0 - 18 MG/DL    Creatinine 1.25 0.6 - 1.3 MG/DL    BUN/Creatinine ratio 17 12 - 20      GFR est AA 50 (L) >60 ml/min/1.73m2    GFR est non-AA 41 (L) >60 ml/min/1.73m2    Calcium 8.5 8.5 - 10.1 MG/DL   GLUCOSE, POC    Collection Time: 02/03/20  7:29 AM   Result Value Ref Range    Glucose (POC) 151 (H) 70 - 110 mg/dL   GLUCOSE, POC    Collection Time: 02/03/20 11:17 AM   Result Value Ref Range    Glucose (POC) 378 (H) 70 - 110 mg/dL       Signed By: PATRICE Badillo     February 3, 2020

## 2020-02-03 NOTE — PROGRESS NOTES
New OT order received and chart reviewed. Patient currently has an active bedrest order. Please discontinue bedrest order for full participation in skilled OT evaluation/treatment. Will follow up as patient schedule allows.     Thank you for the referral.    Og Brennan MS, OTR/L

## 2020-02-03 NOTE — PROGRESS NOTES
Hematology/Medical Oncology Progress Note             Name: Abigail Aparicio   : 1940   MRN: [id number]   Date: 2/3/2020 8:13 AM     [x]I have reviewed the flowsheet and previous days notes. Events overnight reviewed and discussed with nursing staff. Vital signs and records reviewed. 79-year-old -American female past medical history of severe PVD, chronic anemia,uncontrolled diabetes mellitus type 2 now right lower limb ischemia and dry gangrene. S/p R Fem-pop bypass. In addition patient is Sikh and refuses blood transfusion. Initially patient received Retacrit 20,000 units X 7 days. Currently on Retacrit 20,000 units 3 X week. ROS:  Constitutional: Negative for fever. HENT: Negative for nosebleeds, congestion, facial swelling, mouth sores, trouble swallowing, neck pain, neck stiffness, voice change and postnasal drip. Eyes: Negative for photophobia, pain, discharge and itching. Respiratory: Negative for apnea, cough, choking, chest tightness, wheezing and stridor. Cardiovascular: Negative for chest pain, palpitations and leg swelling. Gastrointestinal: Negative for abdominal pain. Negative for nausea, diarrhea, constipation, blood in stool and rectal pain. Genitourinary: Negative for dysuria, urgency, hematuria, flank pain and difficulty urinating. Musculoskeletal:  Skin: Positive for Right AKA dressing intact. Neurological:  Negative for dizziness, facial asymmetry, speech difficulty, light-headedness and headaches. Hematological: Negative for adenopathy. Does not bruise/bleed easily.          Vital Signs:    Visit Vitals  /63 (BP 1 Location: Left arm, BP Patient Position: At rest)   Pulse 84   Temp 99.1 °F (37.3 °C)   Resp 16   Ht 5' 5\" (1.651 m)   Wt 69.4 kg (153 lb)   SpO2 97%   Breastfeeding No   BMI 25.46 kg/m²       O2 Device: Room air   O2 Flow Rate (L/min): 6 l/min   Temp (24hrs), Av.3 °F (36.8 °C), Min:97.4 °F (36.3 °C), Max:99.1 °F (37.3 °C)       Intake/Output:   Last shift:      No intake/output data recorded. Last 3 shifts: 02/01 1901 - 02/03 0700  In: -   Out: 1100 [Urine:1100]    Intake/Output Summary (Last 24 hours) at 2/3/2020 0854  Last data filed at 2/2/2020 1903  Gross per 24 hour   Intake    Output 1100 ml   Net -1100 ml       Physical Exam:  General: Appears comfortable, NAD. HEENT:  Anicteric sclerae; pink palpebral conjunctivae; mucosa moist  Resp:  Symmetrical chest expansion, no accessory muscle use; good airway entry; no rales/ wheezing/ rhonchi noted  CV:  S1, S2 present; regular rate and rhythm  GI:  Abdomen soft, non-tender; (+) active bowel sounds  Extremities:  R AKA. Skin:  Warm, dressing to AKA, intact-did not remove.   Neurologic: Alert and oriented X3      DATA:   Current Facility-Administered Medications   Medication Dose Route Frequency    ferrous sulfate tablet 325 mg  1 Tab Oral DAILY WITH BREAKFAST    amLODIPine (NORVASC) tablet 2.5 mg  2.5 mg Oral DAILY    insulin glargine (LANTUS) injection 10 Units  10 Units SubCUTAneous DAILY    glucose chewable tablet 16 g  4 Tab Oral PRN    insulin lispro (HUMALOG) injection   SubCUTAneous AC&HS    glucagon (GLUCAGEN) injection 1 mg  1 mg IntraMUSCular PRN    OLANZapine (ZyPREXA) tablet 2.5 mg  2.5 mg Oral QPM    epoetin bipin-epbx (RETACRIT) injection 20,000 Units  20,000 Units SubCUTAneous Q MON, WED & FRI    ondansetron (ZOFRAN) injection 4 mg  4 mg IntraVENous Q6H PRN    acetaminophen (TYLENOL) tablet 650 mg  650 mg Oral Q4H PRN    metoprolol tartrate (LOPRESSOR) tablet 12.5 mg  12.5 mg Oral Q12H    hydrALAZINE (APRESOLINE) 20 mg/mL injection 10 mg  10 mg IntraVENous Q6H PRN    bisacodyL (DULCOLAX) tablet 10 mg  10 mg Oral DAILY PRN    docusate sodium (COLACE) capsule 100 mg  100 mg Oral BID    tamsulosin (FLOMAX) capsule 0.4 mg  0.4 mg Oral DAILY    famotidine (PEPCID) tablet 20 mg  20 mg Oral DAILY    bisacodyl (DULCOLAX) suppository 10 mg  10 mg Rectal DAILY PRN    polyethylene glycol (MIRALAX) packet 17 g  17 g Oral DAILY    aspirin chewable tablet 81 mg  81 mg Oral DAILY    oxyCODONE-acetaminophen (PERCOCET) 5-325 mg per tablet 1-2 Tab  1-2 Tab Oral Q6H PRN    dextrose 10% infusion 125-250 mL  125-250 mL IntraVENous PRN    heparin (porcine) injection 5,000 Units  5,000 Units SubCUTAneous Q8H                    Labs:  Recent Labs     02/03/20  0515 02/02/20  0601 02/01/20  0529   WBC 6.3 7.5 7.1   HGB 8.4* 7.9* 7.3*   HCT 28.3* 26.5* 24.5*    294 283     Recent Labs     02/03/20  0515 02/02/20  0601    140   K 4.8 4.3   * 113*   CO2 24 23   * 119*   BUN 21* 23*   CREA 1.25 1.28   CA 8.5 8.0*     No results for input(s): PH, PCO2, PO2, HCO3, FIO2 in the last 72 hours. IMPRESSION:   · Anemia of chronic illness  · PVD  · Diabetes type 2  · Acute renal failure        · PLAN:  · H/H 8.4/28.3, continue Ferrous sulfate 325 mg po daily: Pt is Jehovahs witness-no PRBC transfusion. Continue Retacrit  20,000 units 3 times a week  for H/H less than 10/30 while inpatient. Patient has completed Venofer 4/4. Iron low at 22,TIBC 116, iron % sat 19, ferritin 100. B12 897, folate >20.0. · Recommend limiting lab draws. 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 · Pt to follow up with Dr. Yahaira Steel 7-10 days after discharge for anemia management. Ok to discharge from heme/onc perspective. ·  ·        The patient is: [x] acutely ill Risk of deterioration: [] moderate    [] critically ill  [] high         My assessment/plan was discussed with:  [x]nursing []PT/OT    []respiratory therapy [x]Dr.Lloyd Tomma Soulier, MD      []family []       [address] [facility] dictation software was used for portions of this report. Efforts have been made today to edit the dictations, but occasionally words are is mistranscribed.       Jenae Mora NP

## 2020-02-03 NOTE — PROGRESS NOTES
Emeka from [facility] has been contacted to get patient set up with shrinkers for right aka.

## 2020-02-03 NOTE — PROGRESS NOTES
Received report on pt.from off going RN. Resting quietly in bed on rounds. Denies c/o pain or SOB at this time. No acute distress noted. Dressing to rt stump intatc. Call bell at side. Bed alarms on. Will cont to monitor for any changes in status. 2425 Bedside and Verbal shift change report given to Laney WATKINS (oncoming nurse) by Sylvia aSnchez RN (offgoing nurse). Report given with Izaiah TATE and MAR.

## 2020-02-03 NOTE — PROGRESS NOTES
Problem: Self Care Deficits Care Plan (Adult)  Goal: *Acute Goals and Plan of Care (Insert Text)  Description  Occupational Therapy Goals  Pt re-evaluated 2/3/2020. Continue all goals. Initiated goals 6 and 7. To be achived within 7 day(s). 1.  Patient will perform upper body dressing with modified independence. 2.  Patient will perform lower body dressing with minimal assistance . 3. Patient will perform grooming with setup/supervision while sitting EOB. 4.  Patient will perform all aspects of toileting with moderate assistance . 5. Patient will participate in upper extremity therapeutic exercise/activities with supervision/set-up for 5 minutes. 6.  Patient will perform bed mobility with minimal assistance in preparation for basic self-care/ADLs. 7.  Patient will perform a functional activity/ADL sitting EOB with supervision/setup, presenting with F+ balance for 10 minutes. Prior Level of Function: Patient reported she lived alone PTA and was independent in self-care with no AE. Outcome: Progressing Towards Goal  OCCUPATIONAL THERAPY RE-EVALUATION    Patient: Abigail Aparicio (4 y.o. female)  Date: 2/3/2020  Primary Diagnosis: Pancreatitis [K85.90]  Hyperosmolar hyperglycemic coma due to diabetes mellitus without ketoacidosis ([facility] [address] [E11.01]  Hyperosmolar non-ketotic state in patient with type 2 diabetes mellitus ([facility] [address] [E11.00]  Procedure(s) (LRB):  RIGHT ABOVE KNEE AMPUTATION (AKA) (Right) 4 Days Post-Op   Precautions:   Fall, NWB(RLE)    ASSESSMENT :  Upon entering room, pt supine with HOB elevated, alert, and agreeable to therapy session. Based on the objective data described below, the patient presents with  decreased strength, decreased endurance, decreased activity tolerance, decreased functional sitting balance, and decreased ability to safely perform functional transfers  affecting the pt's performance in basic self-care/ADL tasks.  At bed level, pt is able to don her L sock with SBA, tailor sitting. Pt requires max assist for bed mobility to participate in further self-care. Sitting EOB, pt presents with F sitting balance to complete TherEx (see below). Mid-session pt reported she needed to use the BR. Provided pt with two options, BSC vs bedpan, for safety, however pt stated  \"I can go to the bathroom, I did it the other day\" presenting with decreased awareness of her deficits. Attempted to perform functional SPT from bed>chair (to simulate future transfers to [facility]) for safety, however pt became anxious/nervous therefore was not able to stand at this time; provided therapeutic use of of self to decrease anxiety. Pt was able to tolerate sitting EOB ~20 minutes. Pt agreeable to returning to supine position to use bed pan. Pt required max assist for rolling in bed for toileting task. Educated pt on the role of OT, re-evaluation process, and continued goals for therapy with pt demonstrating fair understanding. The pt will benefit from further OT services, in order to maximize her ADL performance and decrease the risk for complications associated with decreased functional activity. At the end of the session, pt left resting comfortably in bed, call-bell in reach, with all needs met. Handed pt off to PT who entered the room. Goal #2 revised and initiated goals #6 and #7. Patient will benefit from skilled intervention to address the above impairments.   Patient's rehabilitation potential is considered to be Good  Factors which may influence rehabilitation potential include:   []             None noted  [x]             Mental ability/status  [x]             Medical condition  [x]             Home/family situation and support systems  [x]             Safety awareness  [x]             Pain tolerance/management  []             Other:      PLAN :  Recommendations and Planned Interventions:   [x]               Self Care Training                  [x]      Therapeutic Activities  [x] Functional Mobility Training   [x]      Cognitive Retraining  [x]               Therapeutic Exercises           [x]      Endurance Activities  [x]               Balance Training                    [x]      Neuromuscular Re-Education  []               Visual/Perceptual Training     [x]      Home Safety Training  [x]               Patient Education                   [x]      Family Training/Education  []               Other (comment):    Frequency/Duration: Patient will be followed by occupational therapy 1-2 times per day/4-7 days per week to address goals. Discharge Recommendations: Charles Cook  Further Equipment Recommendations for Discharge: TBD at the next level of care     SUBJECTIVE:   Patient stated i'm nervous, I'm nervous, I'm nervous; provided therapeutic use of self to decrease pt's anxiety. \"I know I can go to the bathroom, I did it the other day\"    OBJECTIVE DATA SUMMARY:   Hospital course since last seen and reason for reevaluation: Pt underwent R AKA on 1/30/20, since last re-evaluation. OT will continue to follow the pt for further intervention during this hospitalization, in order to maximize her ADL performance and overall functional independence. Past Medical History:   Diagnosis Date    Diabetes ([facility] [address]     Hypercholesterolemia     Hypertension    History reviewed. No pertinent surgical history.   Barriers to Learning/Limitations: yes;  emotional  Compensate with: visual, verbal, tactile, kinesthetic cues/model    Home Situation:   Home Situation  Home Environment: Private residence  # Steps to Enter: 0  One/Two Story Residence: One story  Living Alone: Yes  Support Systems: Friends \ neighbors, Vadim Him / anitha community  Patient Expects to be Discharged to[de-identified] Private residence  Current DME Used/Available at Home: Crutches(niece also reported cane; patient could not remember cane)  Tub or Shower Type: Tub/Shower combination  []  Right hand dominant   []  Left hand dominant    Cognitive/Behavioral Status:  Neurologic State: Alert  Orientation Level: Appropriate for age  Cognition: Follows commands; Impaired decision making  Safety/Judgement: Decreased insight into deficits; Decreased awareness of need for safety    Skin: Visible skin appeared intact  Edema: None noted      Coordination: BUE  Coordination: Within functional limits  Fine Motor Skills-Upper: Left Intact; Right Intact    Gross Motor Skills-Upper: Left Intact; Right Intact    Balance:  Sitting: Impaired; With support  Sitting - Static: Fair (occasional)  Sitting - Dynamic: Fair (occasional)    Strength: BUE  Strength: Generally decreased, functional      Tone & Sensation: BUE  Tone: Normal  Sensation: Intact      Range of Motion: BUE  AROM: Within functional limits        Functional Mobility and Transfers for ADLs:  Bed Mobility:  Rolling: Maximum assistance  Supine to Sit: Maximum assistance  Sit to Supine: Maximum assistance  Scooting: Maximum assistance  Transfers:  NT for safety as pt became anxious during session. ADL Assessment:   Feeding: Independent    Oral Facial Hygiene/Grooming: Supervision;Setup    Bathing: Minimum assistance; Moderate assistance    Upper Body Dressing: Supervision;Setup    Lower Body Dressing: Moderate assistance    Toileting: Maximum assistance      ADL Intervention:  Lower Body Dressing Assistance  Dressing Assistance: Stand-by assistance  Socks: Stand-by assistance  Leg Crossed Method Used: Yes  Position Performed: Long sitting on bed  Cues: Verbal cues provided; Vena Fails  Toileting Assistance: Maximum assistance(using bed pan)    Cognitive Retraining  Safety/Judgement: Decreased insight into deficits; Decreased awareness of need for safety    Therapeutic Exercise:  Pt was able to perform:    EXERCISE   Sets   Reps   Active Active Assist   Passive Self- assisted ROM   Comments   Shoulder horizontal abduction     [] [] [] []     Shoulder flexion 1  15 [x] [] [] []  BUEs   Shoulder extension     [] [] [] []     Bicep curls     [] [] [] []     Shoulder external rotation     [] [] [] []     Shoulder internal rotation     [] [] [] []     Wrist flexion/extension     [] [] [] []     Chair pushups   [] [] [] []    In preparation for ADLs      Pain:  Pain level pre-treatment: 0/10   Pain level post-treatment: 0/10  Pt denied pain during session. Pain Intervention(s): Medication (see MAR); Rest, Ice, Repositioning  Response to intervention: Nurse notified, See doc flow    Activity Tolerance:   Fair    Please refer to the flowsheet for vital signs taken during this treatment. After treatment:   [] Patient left in no apparent distress sitting up in chair  [x] Patient left in no apparent distress in bed  [x] Call bell left within reach  [x] Nursing notified  [x] PT and student present  [] Bed alarm activated    COMMUNICATION/EDUCATION:   [x] Role of Occupational Therapy in the acute care setting  [] Home safety education was provided and the patient/caregiver indicated understanding. [x] Patient/family have participated as able in goal setting and plan of care. [x] Patient/family agree to work toward stated goals and plan of care. [] Patient understands intent and goals of therapy, but is neutral about his/her participation. [] Patient is unable to participate in goal setting and plan of care.     Thank you for this referral.  Ana Aguilera MS, OTR/L  Time Calculation: 38 mins

## 2020-02-03 NOTE — PROGRESS NOTES
Pt doing well. No complaints. Right AKA stump does have moderate edema. Incision dressing with scant blood staining. Can place ACE wrap vs Tubigrip for gentle compression while awaiting shrinkers. Prosthetics contacted. Note plan for possible discharge tomorrow to SNF. Will need f/u in 3-4 weeks in our office for staple removal.   Please call with further questions or concerns.

## 2020-02-04 VITALS
RESPIRATION RATE: 17 BRPM | TEMPERATURE: 98.2 F | HEART RATE: 85 BPM | BODY MASS INDEX: 24.57 KG/M2 | OXYGEN SATURATION: 100 % | WEIGHT: 147.5 LBS | SYSTOLIC BLOOD PRESSURE: 118 MMHG | HEIGHT: 65 IN | DIASTOLIC BLOOD PRESSURE: 60 MMHG

## 2020-02-04 LAB
GLUCOSE BLD STRIP.AUTO-MCNC: 138 MG/DL (ref 70–110)
GLUCOSE BLD STRIP.AUTO-MCNC: 158 MG/DL (ref 70–110)
GLUCOSE BLD STRIP.AUTO-MCNC: 158 MG/DL (ref 70–110)

## 2020-02-04 PROCEDURE — 82962 GLUCOSE BLOOD TEST: CPT

## 2020-02-04 PROCEDURE — 74011250637 HC RX REV CODE- 250/637: Performed by: EMERGENCY MEDICINE

## 2020-02-04 PROCEDURE — 74011250637 HC RX REV CODE- 250/637: Performed by: INTERNAL MEDICINE

## 2020-02-04 PROCEDURE — 74011636637 HC RX REV CODE- 636/637: Performed by: INTERNAL MEDICINE

## 2020-02-04 PROCEDURE — 74011250637 HC RX REV CODE- 250/637: Performed by: HOSPITALIST

## 2020-02-04 PROCEDURE — 74011250637 HC RX REV CODE- 250/637: Performed by: NURSE PRACTITIONER

## 2020-02-04 RX ADMIN — TAMSULOSIN HYDROCHLORIDE 0.4 MG: 0.4 CAPSULE ORAL at 10:18

## 2020-02-04 RX ADMIN — INSULIN GLARGINE 10 UNITS: 100 INJECTION, SOLUTION SUBCUTANEOUS at 09:00

## 2020-02-04 RX ADMIN — DOCUSATE SODIUM 100 MG: 100 CAPSULE, LIQUID FILLED ORAL at 10:14

## 2020-02-04 RX ADMIN — FAMOTIDINE 20 MG: 20 TABLET, FILM COATED ORAL at 10:14

## 2020-02-04 RX ADMIN — AMLODIPINE BESYLATE 2.5 MG: 2.5 TABLET ORAL at 10:15

## 2020-02-04 RX ADMIN — OXYCODONE HYDROCHLORIDE AND ACETAMINOPHEN 1 TABLET: 5; 325 TABLET ORAL at 10:17

## 2020-02-04 RX ADMIN — INSULIN LISPRO 3 UNITS: 100 INJECTION, SOLUTION INTRAVENOUS; SUBCUTANEOUS at 09:00

## 2020-02-04 RX ADMIN — INSULIN LISPRO 3 UNITS: 100 INJECTION, SOLUTION INTRAVENOUS; SUBCUTANEOUS at 12:06

## 2020-02-04 RX ADMIN — Medication 81 MG: at 10:15

## 2020-02-04 RX ADMIN — Medication 325 MG: at 10:14

## 2020-02-04 RX ADMIN — METOPROLOL TARTRATE 12.5 MG: 25 TABLET ORAL at 10:16

## 2020-02-04 NOTE — PROGRESS NOTES
Transition of Care Plan to SNF/Rehab    SNF/Rehab Transition:  Patient has been accepted to [facility] and meets criteria for admission. Patient will  be transported by [facility] and expected to leave at [facility].    Communication to Patient/Family:  Met with patient and spoke with her family and they are agreeable to the transition plan. Communication to SNF/Rehab:  Bedside RN, Jonathan Maldonado, has been notified of the transition plan to the facility and to call report (phone number [phone]). Discharge information has been updated on the AVS. And communicated to facility via [facility], or CC link. SNF/Rehab Transition:    PCP/Specialist: Dr. Jose Ware Please include all hard scripts for controlled substances, med rec and dc summary, and AVS in packet. Reviewed and confirmed with facility representative, Mila at [facility], that they can manage the patient care needs for the following:     Evangelina Walker with (X) only those applicable:    Medication:  []  Medications will be available at the facility  []  IV Antibiotics  []  Controlled Substance - hard copy to be sent with patient   []  Weekly Labs    Documents:  [] Hard RX  Number sent   [] MAR  [] Kardex  [] AVS  [] Wound care note  [x]Transfer Summary/Discharge Summary    Equipment:  []  CPAP/BiPAP  []  Wound Vacuum  []  Ortega or Urinary Device  []  PICC/Central Line  []  Nebulizer  []  Ventilator    Treatment:  []Isolation (for MRSA, VRE, etc.)  []Surgical Drain Management  []Tracheostomy Care  []Dressing Changes  []Dialysis with transportation and chair time. []PEG Care  []Oxygen  []Daily Weights for Heart Failure    Dietary:  []Any diet limitations  []Tube Feedings   []Total Parenteral Management (TPN)    Eligible for Medicaid Long Term Services and Supports  Yes:  [] Eligible for medical assistance or will become eligible within 180 days and LTSS completed.    [] Provider/Patient and/or support system has requested screening. [x] LTSS copy provided to patient or responsible party and the facility. [] LTSS unavailable at discharge will send once processed to SNF provider.  [] LTSS  unavailable at discharged mailed to patient  [] LTSS performed by outside agency. No:   [] Private pay and is not financially eligible for Medicaid within the next 180 days. [] Reside out-of-state. [] A residents of a state owned/operated facility that is licensed  by [facility] or [facility]  [] Enrollment in [facility] hospice services  [] [address] [place] [address]  [] Patient /Family declines to have screening completed or provide financial information for screening          Financial Resources:  Medicaid    [] Initiated and application pending   [] Full coverage      Advanced Care Plan:  []Surrogate Decision Maker of Care  []POA  []Communicated Code Status/ sent FULL (DDNR, POST, Advance Directive)     Iwona Palacios.  [place] MSW  Care Manager  Pager#: [phone]

## 2020-02-04 NOTE — PROGRESS NOTES
Hematology/Medical Oncology Progress Note             Name: Faith Caballero   : 1940   MRN: [id number]   Date: 2020 8:13 AM     [x]I have reviewed the flowsheet and previous days notes. Events overnight reviewed and discussed with nursing staff. Vital signs and records reviewed. 18-year-old -American female past medical history of severe PVD, chronic anemia,uncontrolled diabetes mellitus type 2 now right lower limb ischemia and dry gangrene. S/p R Fem-pop bypass. In addition patient is Oriental orthodox and refuses blood transfusion. Initially patient received Retacrit 20,000 units X 7 days. Currently on Retacrit 20,000 units 3 X week. Tentative plan is for discharge to rehab on today. Pt feels ready to transfer. ROS:  Constitutional: Negative for fever. HENT: Negative for nosebleeds, congestion, facial swelling, mouth sores, trouble swallowing, neck pain, neck stiffness, voice change and postnasal drip. Eyes: Negative for photophobia, pain, discharge and itching. Respiratory: Negative for apnea, cough, choking, chest tightness, wheezing and stridor. Cardiovascular: Negative for chest pain, palpitations and leg swelling. Gastrointestinal: Negative for abdominal pain. Negative for nausea, diarrhea, constipation, blood in stool and rectal pain. Genitourinary: Negative for dysuria, urgency, hematuria, flank pain and difficulty urinating. Musculoskeletal:  Skin: Positive for Right AKA dressing intact. Neurological:  Negative for dizziness, facial asymmetry, speech difficulty, light-headedness and headaches. Hematological: Negative for adenopathy. Does not bruise/bleed easily.          Vital Signs:    Visit Vitals  /66 (BP 1 Location: Left arm, BP Patient Position: At rest)   Pulse 89   Temp 100.1 °F (37.8 °C)   Resp 17   Ht 5' 5\" (1.651 m)   Wt 66.9 kg (147 lb 8 oz)   SpO2 99%   Breastfeeding No   BMI 24.55 kg/m²       O2 Device: Room air   O2 Flow Rate (L/min): 6 l/min   Temp (24hrs), Av.9 °F (37.2 °C), Min:97.8 °F (36.6 °C), Max:100.1 °F (37.8 °C)       Intake/Output:   Last shift:      No intake/output data recorded. Last 3 shifts:  1901 -  0700  In: 480 [P.O.:480]  Out: 1000 [Urine:1000]    Intake/Output Summary (Last 24 hours) at 2020 0818  Last data filed at 2/3/2020 1545  Gross per 24 hour   Intake 480 ml   Output 200 ml   Net 280 ml       Physical Exam:  General: Appears comfortable, NAD. HEENT:  Anicteric sclerae; pink palpebral conjunctivae; mucosa moist  Resp:  Symmetrical chest expansion, no accessory muscle use; good airway entry; no rales/ wheezing/ rhonchi noted  CV:  S1, S2 present; regular rate and rhythm  GI:  Abdomen soft, non-tender; (+) active bowel sounds  Extremities:  R AKA. Skin:  Warm, dressing to AKA, intact-did not remove.   Neurologic: Alert and oriented X3      DATA:   Current Facility-Administered Medications   Medication Dose Route Frequency    ferrous sulfate tablet 325 mg  1 Tab Oral DAILY WITH BREAKFAST    amLODIPine (NORVASC) tablet 2.5 mg  2.5 mg Oral DAILY    insulin glargine (LANTUS) injection 10 Units  10 Units SubCUTAneous DAILY    glucose chewable tablet 16 g  4 Tab Oral PRN    insulin lispro (HUMALOG) injection   SubCUTAneous AC&HS    glucagon (GLUCAGEN) injection 1 mg  1 mg IntraMUSCular PRN    OLANZapine (ZyPREXA) tablet 2.5 mg  2.5 mg Oral QPM    epoetin bipin-epbx (RETACRIT) injection 20,000 Units  20,000 Units SubCUTAneous Q MON, WED & FRI    ondansetron (ZOFRAN) injection 4 mg  4 mg IntraVENous Q6H PRN    acetaminophen (TYLENOL) tablet 650 mg  650 mg Oral Q4H PRN    metoprolol tartrate (LOPRESSOR) tablet 12.5 mg  12.5 mg Oral Q12H    hydrALAZINE (APRESOLINE) 20 mg/mL injection 10 mg  10 mg IntraVENous Q6H PRN    bisacodyL (DULCOLAX) tablet 10 mg  10 mg Oral DAILY PRN    docusate sodium (COLACE) capsule 100 mg  100 mg Oral BID    tamsulosin (FLOMAX) capsule 0.4 mg  0.4 mg Oral DAILY    famotidine (PEPCID) tablet 20 mg  20 mg Oral DAILY    bisacodyl (DULCOLAX) suppository 10 mg  10 mg Rectal DAILY PRN    polyethylene glycol (MIRALAX) packet 17 g  17 g Oral DAILY    aspirin chewable tablet 81 mg  81 mg Oral DAILY    oxyCODONE-acetaminophen (PERCOCET) 5-325 mg per tablet 1-2 Tab  1-2 Tab Oral Q6H PRN    dextrose 10% infusion 125-250 mL  125-250 mL IntraVENous PRN    heparin (porcine) injection 5,000 Units  5,000 Units SubCUTAneous Q8H                    Labs:  Recent Labs     02/03/20  0515 02/02/20  0601   WBC 6.3 7.5   HGB 8.4* 7.9*   HCT 28.3* 26.5*    294     Recent Labs     02/03/20  0515 02/02/20  0601    140   K 4.8 4.3   * 113*   CO2 24 23   * 119*   BUN 21* 23*   CREA 1.25 1.28   CA 8.5 8.0*     No results for input(s): PH, PCO2, PO2, HCO3, FIO2 in the last 72 hours. IMPRESSION:   · Anemia of chronic illness  · PVD  · Diabetes type 2  · Acute renal failure        · PLAN:  · H/H 8.4/28.3, continue Ferrous sulfate 325 mg po daily: Pt is Jehovahs witness-no PRBC transfusion. Continue Retacrit  20,000 units 3 times a week  for H/H less than 10/30 while inpatient. Patient has completed Venofer 4/4. Iron low at 22,TIBC 116, iron % sat 19, ferritin 100. B12 897, folate >20.0. · Recommend limiting lab draws. 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 · Pt to follow up with Dr. Reginaldo Unger 7-10 days after discharge for anemia management. Ok to discharge from heme/onc perspective. ·  ·        The patient is: [x] acutely ill Risk of deterioration: [] moderate    [] critically ill  [] high         My assessment/plan was discussed with:  [x]nursing []PT/OT    []respiratory therapy [x]Dr.Lloyd Seth Bangura MD      []family []       [facility] dictation software was used for portions of this report. Efforts have been made today to edit the dictations, but occasionally words are is mistranscribed.       Allen Pimentel NP

## 2020-02-04 NOTE — PROGRESS NOTES
Pt doing well.    in place  Plan for discharge today noted.    Will need f/u in our office in 3-4 weeks for staple removal

## 2020-02-04 NOTE — ROUTINE PROCESS
Bedside and Verbal shift change report given to Rosibel Oliver RN (oncoming nurse) by Shasha Del Valle RN 
 (offgoing nurse). Report included the following information SBAR, Kardex, Intake/Output, MAR, Recent Results, Med Rec Status and Cardiac Rhythm NSR.

## 2020-02-10 ENCOUNTER — TELEPHONE (OUTPATIENT)
Dept: VASCULAR SURGERY | Age: 80
End: 2020-02-10

## 2020-02-10 NOTE — TELEPHONE ENCOUNTER
Called and spoke with Shaun regarding patient amputation site, staples have come loose and purulent drainage, 0.3 deep , advised per julia to place aqua carlos ag to wound and cover with abd pad and kerlex, advised to see if patient would allow a light ace wrap. Advised we would move up appt. Sent message to jennifer to make appt for patient and to call her cell phone [phone].

## 2020-02-18 ENCOUNTER — OFFICE VISIT (OUTPATIENT)
Dept: ONCOLOGY | Age: 80
End: 2020-02-18

## 2020-02-18 ENCOUNTER — TELEPHONE (OUTPATIENT)
Dept: ONCOLOGY | Age: 80
End: 2020-02-18

## 2020-02-18 VITALS
TEMPERATURE: 97.8 F | HEART RATE: 67 BPM | WEIGHT: 147 LBS | RESPIRATION RATE: 16 BRPM | DIASTOLIC BLOOD PRESSURE: 67 MMHG | HEIGHT: 65 IN | SYSTOLIC BLOOD PRESSURE: 125 MMHG | BODY MASS INDEX: 24.49 KG/M2 | OXYGEN SATURATION: 96 %

## 2020-02-18 DIAGNOSIS — N18.4 ANEMIA DUE TO STAGE 4 CHRONIC KIDNEY DISEASE (HCC): Primary | ICD-10-CM

## 2020-02-18 DIAGNOSIS — D63.1 ANEMIA DUE TO STAGE 4 CHRONIC KIDNEY DISEASE (HCC): Primary | ICD-10-CM

## 2020-02-18 NOTE — PROGRESS NOTES
Kady Varela is a 79 y.o. female presenting today for a hospital follow-up appointment. Patient is ambulatory with a wheelchair no assistive devices, is accompanied by Gem ALFONSO, denies any generalized pain. Patient has no other complaints at this time. Chief Complaint   Patient presents with   [facility] Follow Up     Visit Vitals  /67 (BP 1 Location: Left arm, BP Patient Position: Sitting)   Pulse 67   Temp 97.8 °F (36.6 °C) (Oral)   Resp 16   Ht 5' 5\" (1.651 m)   Wt 147 lb (66.7 kg)   SpO2 96%   BMI 24.46 kg/m²     Current Outpatient Medications   Medication Sig    amLODIPine (NORVASC) 5 mg tablet Take 0.5 Tabs by mouth daily for 30 days.  aspirin 81 mg chewable tablet Take 1 Tab by mouth daily.  insulin glargine (LANTUS) 100 unit/mL injection 10 Units by SubCUTAneous route nightly for 30 days.  ferrous sulfate 325 mg (65 mg iron) tablet Take 1 Tab by mouth daily (with breakfast) for 14 days.  metoprolol tartrate (LOPRESSOR) 25 mg tablet Take 0.5 Tabs by mouth every twelve (12) hours for 30 days. Indications: high blood pressure    hydroCHLOROthiazide (HYDRODIURIL) 25 mg tablet Take 25 mg by mouth daily.  loratadine (CLARITIN) 10 mg tablet Take 10 mg by mouth daily.  losartan (COZAAR) 100 mg tablet Take 100 mg by mouth daily.  glipiZIDE (GLUCOTROL) 5 mg tablet Take 5 mg by mouth two (2) times a day.  metFORMIN (GLUCOPHAGE) 500 mg tablet Take 500 mg by mouth daily (with breakfast).  insulin lispro (HUMALOG) 100 unit/mL injection Less than 150 =   0 units  150 -199 =   3 units  200 -249 =   6 units  250 -299 =   9 units  300 -349 =   12 units  350 and above =   15 units, CALL MD    albuterol-ipratropium (DUO-NEB) 2.5 mg-0.5 mg/3 ml nebu 3 mL by Nebulization route every six (6) hours as needed (wheezing).  acetaminophen (TYLENOL) 325 mg tablet Take 2 Tabs by mouth every six (6) hours as needed.     cholecalciferol, vitamin D3, (VITAMIN D3) 2,000 unit tab Take  by mouth.    atorvastatin (LIPITOR) 20 mg tablet Take  by mouth daily.  famotidine (PEPCID) 20 mg tablet Take 1 Tab by mouth daily for 14 days. No current facility-administered medications for this visit. Fall Risk Assessment, last 12 mths 2/18/2020   Able to walk? Yes   Fall in past 12 months? No     3 most recent PHQ Screens 2/18/2020   Little interest or pleasure in doing things Several days   Feeling down, depressed, irritable, or hopeless Not at all   Total Score PHQ 2 1     No flowsheet data found. Health Maintenance Due   Topic Date Due    Foot Exam Q1  01/14/1950    Eye Exam Retinal or Dilated  01[birth year]14/1949    DTaP/Tdap/Td series (1 - Tdap) 01/14/1950    Shingrix Vaccine Age 50> (1 of 2) 01/14/1989    GLAUCOMA SCREENING Q2Y  01/14/2004    Pneumococcal 65+ years (1 of 1 - PPSV23) 01/14/2004    Medicare Yearly Exam  03/14/2018       Medications no longer taking/discontinued: none    Patient currently taking Antiplatelet therapy? yes    1. Have you been to the ER, urgent care clinic since your last visit? Hospitalized since your last visit? Yes, 2/2020, [facility]     2. Have you seen or consulted any other health care providers outside of the [address] [place] [address] since your last visit? Include any pap smears or colon screening. no

## 2020-02-18 NOTE — PROGRESS NOTES
Hematology/Oncology  Progress Note    Name: Anastacio Butt  Date: 2020  : 1940    PCP: Adrian Conley MD     Ms. Manjeet Avina is a 79 y.o. -American woman who is Hoahaoism and also has severe anemia due to chronic kidney disease. She was recently hospitalized and found to have iron deficiency anemia. Subjective:     Ms. Manjeet Avina is an 41-year-old -American woman who has severe iron deficiency anemia and chronic kidney disease with associated anemia. She was hospitalized recently and was treated with intravenous iron therapy. She was also started on Retacrit 20,000 units 3 times weekly during her hospital stay. She is here today in the outpatient clinic to establish outpatient continuity of care and to get scheduled to begin outpatient cytokine therapy for her anemia associated with her chronic kidney disease. Past medical history, family history, and social history: these were reviewed and remains unchanged. Past Medical History:   Diagnosis Date    Diabetes ([facility] [address]     Hypercholesterolemia     Hypertension      History reviewed. No pertinent surgical history.   Social History     Socioeconomic History    Marital status: SINGLE     Spouse name: Not on file    Number of children: Not on file    Years of education: Not on file    Highest education level: Not on file   Occupational History    Not on file   Social Needs    Financial resource strain: Not on file    Food insecurity:     Worry: Not on file     Inability: Not on file    Transportation needs:     Medical: Not on file     Non-medical: Not on file   Tobacco Use    Smoking status: Former Smoker     Packs/day: 3.00     Years: 20.00     Pack years: 60.00     Last attempt to quit: 1996     Years since quittin.0    Smokeless tobacco: Never Used   Substance and Sexual Activity    Alcohol use: Not on file    Drug use: Never    Sexual activity: Not Currently   Lifestyle    Physical activity:     Days per week: Not on file     Minutes per session: Not on file    Stress: Not on file   Relationships    Social connections:     Talks on phone: Not on file     Gets together: Not on file     Attends Nondenominational service: Not on file     Active member of club or organization: Not on file     Attends meetings of clubs or organizations: Not on file     Relationship status: Not on file    Intimate partner violence:     Fear of current or ex partner: Not on file     Emotionally abused: Not on file     Physically abused: Not on file     Forced sexual activity: Not on file   Other Topics Concern    Not on file   Social History Narrative    Not on file     Family History   Problem Relation Age of Onset    Hypertension Mother      Current Outpatient Medications   Medication Sig Dispense Refill    amLODIPine (NORVASC) 5 mg tablet Take 0.5 Tabs by mouth daily for 30 days. 30 Tab 0    aspirin 81 mg chewable tablet Take 1 Tab by mouth daily. 30 Tab 0    insulin glargine (LANTUS) 100 unit/mL injection 10 Units by SubCUTAneous route nightly for 30 days. 1 Vial 0    ferrous sulfate 325 mg (65 mg iron) tablet Take 1 Tab by mouth daily (with breakfast) for 14 days. 14 Tab 0    metoprolol tartrate (LOPRESSOR) 25 mg tablet Take 0.5 Tabs by mouth every twelve (12) hours for 30 days. Indications: high blood pressure 30 Tab 0    hydroCHLOROthiazide (HYDRODIURIL) 25 mg tablet Take 25 mg by mouth daily.  loratadine (CLARITIN) 10 mg tablet Take 10 mg by mouth daily.  losartan (COZAAR) 100 mg tablet Take 100 mg by mouth daily.  glipiZIDE (GLUCOTROL) 5 mg tablet Take 5 mg by mouth two (2) times a day.  metFORMIN (GLUCOPHAGE) 500 mg tablet Take 500 mg by mouth daily (with breakfast).       insulin lispro (HUMALOG) 100 unit/mL injection Less than 150 =   0 units  150 -199 =   3 units  200 -249 =   6 units  250 -299 =   9 units  300 -349 =   12 units  350 and above =   15 units, CALL MD 1 Vial 0    albuterol-ipratropium (DUO-NEB) 2.5 mg-0.5 mg/3 ml nebu 3 mL by Nebulization route every six (6) hours as needed (wheezing). 30 Nebule 0    acetaminophen (TYLENOL) 325 mg tablet Take 2 Tabs by mouth every six (6) hours as needed. 30 Tab 0    cholecalciferol, vitamin D3, (VITAMIN D3) 2,000 unit tab Take  by mouth.  atorvastatin (LIPITOR) 20 mg tablet Take  by mouth daily.  famotidine (PEPCID) 20 mg tablet Take 1 Tab by mouth daily for 14 days. 14 Tab 0       Review of Systems  Constitutional: The patient has complaints of some fatigue and weakness. HEENT: The patient denies recent head trauma, eye pain, blurred vision,  hearing deficit, oropharyngeal mucosal pain or lesions, and the patient denies throat pain or discomfort. Lymphatics: The patient denies palpable peripheral lymphadenopathy. Hematologic: The patient denies having bruising, bleeding, or progressive fatigue. Respiratory: Patient denies having shortness of breath, cough, sputum production, fever, or dyspnea on exertion. Cardiovascular: The patient denies having leg pain, leg swelling, heart palpitations, chest permit, chest pain, or lightheadedness. The patient denies having dyspnea on exertion. Gastrointestinal: The patient denies having nausea, emesis, or diarrhea. The patient denies having any hematemesis or blood in the stool. Genitourinary: Patient denies having urinary urgency, frequency, or dysuria. The patient denies having blood in the urine. Psychological: The patient denies having symptoms of nervousness, anxiety, depression, or thoughts of harming self. Skin: Patient denies having skin rashes, skin, ulcerations, or unexplained itching or pruritus. Musculoskeletal: The patient denies having pain in the joints or bones.       Objective:     Visit Vitals  /67 (BP 1 Location: Left arm, BP Patient Position: Sitting)   Pulse 67   Temp 97.8 °F (36.6 °C) (Oral)   Resp 16   Ht 5' 5\" (1.651 m)   Wt 66.7 kg (147 lb)   SpO2 96%   BMI 24.46 kg/m²     ECOG PS=0  Physical Exam:   Gen. Appearance: The patient is in no acute distress. Skin: There is no bruise or rash. HEENT: The exam is unremarkable. Neck: Supple without lymphadenopathy or thyromegaly. Lungs: Clear to auscultation and percussion; there are no wheezes or rhonchi. Heart: Regular rate and rhythm; there are no murmurs, gallops, or rubs. Abdomen: Bowel sounds are present and normal.  There is no guarding, tenderness, or hepatosplenomegaly. Extremities: There is no clubbing, cyanosis, or edema. Neurologic: There are no focal neurologic deficits. Lymphatics: There is no palpable peripheral lymphadenopathy. Musculoskeletal: The patient has full range of motion at all joints. There is no evidence of joint deformity or effusions. There is no focal joint tenderness. Psychological/psychiatric: There is no clinical evidence of anxiety, depression, or melancholy. Lab data:      No results found for this or any previous visit. Assessment:     1. Anemia due to stage 4 chronic kidney disease (HCC)      Plan:   Anemia due to stage IV chronic kidney disease: At this time I will check a comprehensive metabolic panel, CBC, iron profile, ferritin level, erythropoietin level, reticulocyte count, and protein electrophoresis. The patient will be scheduled to begin treatment with Retacrit as soon as possible every 2 weeks at a dose of 60,000 units. Follow-up in 6 weeks.   Orders Placed This Encounter    CBC WITH AUTOMATED DIFF     Standing Status:   Future     Standing Expiration Date:   2/21/7830    METABOLIC PANEL, COMPREHENSIVE     Standing Status:   Future     Standing Expiration Date:   2/18/2021    IRON PROFILE     Standing Status:   Future     Standing Expiration Date:   2/18/2021    FERRITIN     Standing Status:   Future     Standing Expiration Date:   2/18/2021    ERYTHROPOIETIN     Standing Status:   Future     Standing Expiration Date:   2/18/2021   [address] [place] [address] RETICULOCYTE COUNT     Standing Status:   Future     Standing Expiration Date:   2/18/2021    PROTEIN ELECTROPHORESIS     Standing Status:   Future     Standing Expiration Date:   2/18/2021       More Borges MD  2/18/2020      Please note: This document has been produced using voice recognition software. Unrecognized errors in transcription may be present.

## 2020-02-19 ENCOUNTER — OFFICE VISIT (OUTPATIENT)
Dept: VASCULAR SURGERY | Age: 80
End: 2020-02-19

## 2020-02-19 VITALS
RESPIRATION RATE: 18 BRPM | DIASTOLIC BLOOD PRESSURE: 70 MMHG | WEIGHT: 147 LBS | HEIGHT: 65 IN | BODY MASS INDEX: 24.49 KG/M2 | HEART RATE: 78 BPM | OXYGEN SATURATION: 98 % | SYSTOLIC BLOOD PRESSURE: 124 MMHG

## 2020-02-19 DIAGNOSIS — Z89.611 S/P AKA (ABOVE KNEE AMPUTATION), RIGHT (HCC): Primary | ICD-10-CM

## 2020-02-19 PROBLEM — N18.4 ANEMIA OF CHRONIC RENAL FAILURE, STAGE 4 (SEVERE) (HCC): Status: ACTIVE | Noted: 2020-02-19

## 2020-02-19 PROBLEM — D63.1 ANEMIA OF CHRONIC RENAL FAILURE, STAGE 4 (SEVERE) (HCC): Status: ACTIVE | Noted: 2020-02-19

## 2020-02-19 RX ORDER — SODIUM CHLORIDE 0.9 % (FLUSH) 0.9 %
10 SYRINGE (ML) INJECTION AS NEEDED
Status: CANCELLED
Start: 2020-02-20

## 2020-02-19 RX ORDER — OLANZAPINE 2.5 MG/1
TABLET ORAL
COMMUNITY

## 2020-02-19 RX ORDER — SODIUM CHLORIDE 9 MG/ML
10 INJECTION INTRAMUSCULAR; INTRAVENOUS; SUBCUTANEOUS AS NEEDED
Status: CANCELLED | OUTPATIENT
Start: 2020-02-20

## 2020-02-19 RX ORDER — HEPARIN 100 UNIT/ML
300-500 SYRINGE INTRAVENOUS AS NEEDED
Status: CANCELLED
Start: 2020-02-20

## 2020-02-19 RX ORDER — DOCUSATE SODIUM 100 MG/1
100 CAPSULE, LIQUID FILLED ORAL 2 TIMES DAILY
COMMUNITY

## 2020-02-19 NOTE — PROGRESS NOTES
Subjective:      Sarai Valdovinos is a 79 y.o. female who presents today for post op visit, status post R AKA  This visit is sooner than planned due to call last week from SNF  It was described that staples have come loose and purulent drainage, 0.3 deep , they were advised to place aqua carlos ag to wound and cover with abd pad and kerlex, advised to see if patient would allow a light ace wrap    Objective:     Visit Vitals  /70 (BP 1 Location: Left arm, BP Patient Position: Sitting)   Pulse 78   Resp 18   Ht 5' 5\" (1.651 m)   Wt 147 lb (66.7 kg)   SpO2 98%   BMI 24.46 kg/m²     The incision site of the right AKA appears that there was superficial dehiscence along the incision line. Staples are still in the skin but many of them really ineffective on creating any skin approximation. So all the staples were removed. There may potentially be some areas that will have some additional dehiscence, but overall it seemed pretty stable. We used Aquacel Ag along the incision line and a border gauze and I encouraged the nursing home that they can continue with this versus meta honey based on the wound assessment. We do need to incorporate compression as she does have a fair amount of edema. We did not have a Tubigrip large enough so I recommended that at the facility but then we also got clarification that she has shrinkers and of asked that these be applied    Assessment:     Wound check/discharge visit. Plan:       ICD-10-CM ICD-9-CM    1. S/P AKA (above knee amputation), right ([facility]) Z89.611 V49.76      Orders Placed This Encounter    docusate sodium (COLACE) 100 mg capsule    OLANZapine (ZYPREXA) 2.5 mg tablet       As above and we will reassess the wound in about 2 to 3 weeks      PATRICE Ladd    Portions of this note have been entered using voice recognition software.

## 2020-02-20 ENCOUNTER — HOSPITAL ENCOUNTER (OUTPATIENT)
Dept: INFUSION THERAPY | Age: 80
End: 2020-02-20

## 2020-02-20 DIAGNOSIS — N18.4 ANEMIA OF CHRONIC RENAL FAILURE, STAGE 4 (SEVERE) (HCC): ICD-10-CM

## 2020-02-20 DIAGNOSIS — D63.1 ANEMIA OF CHRONIC RENAL FAILURE, STAGE 4 (SEVERE) (HCC): ICD-10-CM

## 2020-02-20 LAB
ALBUMIN SERPL ELPH-MCNC: 3.3 G/DL (ref 2.9–4.4)
ALBUMIN SERPL-MCNC: 3.8 G/DL (ref 3.6–4.6)
ALBUMIN/GLOB SERPL: 0.9 {RATIO} (ref 0.7–1.7)
ALBUMIN/GLOB SERPL: 1.2 {RATIO} (ref 1.2–2.2)
ALP SERPL-CCNC: 213 IU/L (ref 39–117)
ALPHA1 GLOB SERPL ELPH-MCNC: 0.3 G/DL (ref 0–0.4)
ALPHA2 GLOB SERPL ELPH-MCNC: 0.9 G/DL (ref 0.4–1)
ALT SERPL-CCNC: 10 IU/L (ref 0–32)
AST SERPL-CCNC: 12 IU/L (ref 0–40)
B-GLOBULIN SERPL ELPH-MCNC: 1.3 G/DL (ref 0.7–1.3)
BASOPHILS # BLD AUTO: 0 X10E3/UL (ref 0–0.2)
BASOPHILS NFR BLD AUTO: 1 %
BILIRUB SERPL-MCNC: <0.2 MG/DL (ref 0–1.2)
BUN SERPL-MCNC: 31 MG/DL (ref 8–27)
BUN/CREAT SERPL: 23 (ref 12–28)
CALCIUM SERPL-MCNC: 9.4 MG/DL (ref 8.7–10.3)
CHLORIDE SERPL-SCNC: 113 MMOL/L (ref 96–106)
CO2 SERPL-SCNC: 18 MMOL/L (ref 20–29)
CREAT SERPL-MCNC: 1.37 MG/DL (ref 0.57–1)
EOSINOPHIL # BLD AUTO: 0.2 X10E3/UL (ref 0–0.4)
EOSINOPHIL NFR BLD AUTO: 3 %
EPO SERPL-ACNC: 7.3 MIU/ML (ref 2.6–18.5)
ERYTHROCYTE [DISTWIDTH] IN BLOOD BY AUTOMATED COUNT: 13.2 % (ref 11.7–15.4)
FERRITIN SERPL-MCNC: 515 NG/ML (ref 15–150)
GAMMA GLOB SERPL ELPH-MCNC: 1.1 G/DL (ref 0.4–1.8)
GLOBULIN SER CALC-MCNC: 3.1 G/DL (ref 1.5–4.5)
GLOBULIN SER CALC-MCNC: 3.6 G/DL (ref 2.2–3.9)
GLUCOSE SERPL-MCNC: 147 MG/DL (ref 65–99)
HCT VFR BLD AUTO: 38.5 % (ref 34–46.6)
HGB BLD-MCNC: 12 G/DL (ref 11.1–15.9)
IMM GRANULOCYTES # BLD AUTO: 0 X10E3/UL (ref 0–0.1)
IMM GRANULOCYTES NFR BLD AUTO: 0 %
IRON SATN MFR SERPL: 44 % (ref 15–55)
IRON SERPL-MCNC: 69 UG/DL (ref 27–139)
LYMPHOCYTES # BLD AUTO: 1.7 X10E3/UL (ref 0.7–3.1)
LYMPHOCYTES NFR BLD AUTO: 33 %
M PROTEIN SERPL ELPH-MCNC: NORMAL G/DL
MCH RBC QN AUTO: 32.5 PG (ref 26.6–33)
MCHC RBC AUTO-ENTMCNC: 31.2 G/DL (ref 31.5–35.7)
MCV RBC AUTO: 104 FL (ref 79–97)
MONOCYTES # BLD AUTO: 0.3 X10E3/UL (ref 0.1–0.9)
MONOCYTES NFR BLD AUTO: 6 %
NEUTROPHILS # BLD AUTO: 2.8 X10E3/UL (ref 1.4–7)
NEUTROPHILS NFR BLD AUTO: 57 %
PLATELET # BLD AUTO: 249 X10E3/UL (ref 150–450)
PLEASE NOTE, 011150: NORMAL
POTASSIUM SERPL-SCNC: 4.8 MMOL/L (ref 3.5–5.2)
PROT SERPL-MCNC: 6.9 G/DL (ref 6–8.5)
RBC # BLD AUTO: 3.69 X10E6/UL (ref 3.77–5.28)
RETICS/RBC NFR AUTO: 0.6 % (ref 0.6–2.6)
SODIUM SERPL-SCNC: 146 MMOL/L (ref 134–144)
TIBC SERPL-MCNC: 158 UG/DL (ref 250–450)
UIBC SERPL-MCNC: 89 UG/DL (ref 118–369)
WBC # BLD AUTO: 5 X10E3/UL (ref 3.4–10.8)

## 2020-02-25 ENCOUNTER — APPOINTMENT (OUTPATIENT)
Dept: INFUSION THERAPY | Age: 80
End: 2020-02-25

## 2020-03-11 ENCOUNTER — OFFICE VISIT (OUTPATIENT)
Dept: VASCULAR SURGERY | Age: 80
End: 2020-03-11

## 2020-03-11 VITALS
HEART RATE: 88 BPM | DIASTOLIC BLOOD PRESSURE: 60 MMHG | HEIGHT: 65 IN | SYSTOLIC BLOOD PRESSURE: 122 MMHG | WEIGHT: 147 LBS | OXYGEN SATURATION: 97 % | RESPIRATION RATE: 16 BRPM | BODY MASS INDEX: 24.49 KG/M2

## 2020-03-11 DIAGNOSIS — Z89.611 S/P AKA (ABOVE KNEE AMPUTATION), RIGHT (HCC): Primary | ICD-10-CM

## 2020-03-11 NOTE — PROGRESS NOTES
Subjective:      Tyree Coy is a 79 y.o. female who presents today for wound check  She was here a few weeks ago  She came sooner than planned due to call from SNF  It was described that staples had come loose and purulent drainage    The incision site of the right AKA appeared that there was superficial dehiscence along the incision line. Staples were still in the skin but many of them really ineffective on creating any skin approximation. So all the staples were removed. There were potentially be some areas that will have some additional dehiscence, but overall it seemed pretty stable. We used Aquacel Ag along the incision line and a border gauze and I encouraged the nursing home that they can continue with this versus meta honey based on the wound assessment. We incorporated compression as she did have a fair amount of edema and we gave instructions to address that and having her follow up today    Objective:     Visit Vitals  /60 (BP 1 Location: Left arm, BP Patient Position: Sitting)   Pulse 88   Resp 16   Ht 5' 5\" (1.651 m)   Wt 147 lb (66.7 kg)   SpO2 97%   BMI 24.46 kg/m²     Some reduced swelling  Incision line overall intact  Still some areas of superficial opening that creates some mild serous drainage  I applied silver nitrate to these areas and then a dry dressing   Wrote orders for the same at the facility    Assessment:     Wound check/discharge visit. Plan:       ICD-10-CM ICD-9-CM    1. S/P AKA (above knee amputation), right ([facility] Z89.611 V49.76      No orders of the defined types were placed in this encounter.     I think this will continue to heal without issue  She is hopeful to get into a prosthesis  She says she needs more shrinkers which I think is necessary to help further reduce the edema and then soon an appropriate fitting can be made for the prosthetic  Will notify the prosthetist who has followed her      PATRICE Barber    Portions of this note have been entered using voice recognition software.

## 2020-03-11 NOTE — PROGRESS NOTES
1. Have you been to an emergency room or urgent care clinic since your last visit? Hospitalized since your last visit? If yes, where, when, and reason for visit?   no  2. Have you seen or consulted any other health care providers outside of the [facility] since your last visit including any procedures, health maintenance items. If yes, where, when and reason for visit?    yes

## 2020-03-12 ENCOUNTER — TELEPHONE (OUTPATIENT)
Dept: VASCULAR SURGERY | Age: 80
End: 2020-03-12

## 2020-03-12 ENCOUNTER — APPOINTMENT (OUTPATIENT)
Dept: INFUSION THERAPY | Age: 80
End: 2020-03-12

## 2020-03-12 DIAGNOSIS — D63.1 ANEMIA OF CHRONIC RENAL FAILURE, STAGE 4 (SEVERE) (HCC): ICD-10-CM

## 2020-03-12 DIAGNOSIS — N18.4 ANEMIA OF CHRONIC RENAL FAILURE, STAGE 4 (SEVERE) (HCC): ICD-10-CM

## 2020-03-12 NOTE — TELEPHONE ENCOUNTER
Called and spoke with Vlad Cee he will see patient on Friday for shrinkers and eval for temp leg. PATRICE Ingram aware.

## 2020-03-12 NOTE — TELEPHONE ENCOUNTER
----- Message from PATRICE Olivas sent at 3/11/2020  2:13 PM EDT -----  Can you have emma follow up with her  Needs more shrinkers per patient  And may soon be able to start eval for leg

## 2020-03-19 ENCOUNTER — HOSPITAL ENCOUNTER (INPATIENT)
Age: 80
LOS: 5 days | Discharge: NURSING, REHAB OR LONG-TERM CARE | DRG: 271 | End: 2020-03-25
Attending: EMERGENCY MEDICINE | Admitting: SURGERY
Payer: MEDICARE

## 2020-03-19 ENCOUNTER — APPOINTMENT (OUTPATIENT)
Dept: GENERAL RADIOLOGY | Age: 80
DRG: 271 | End: 2020-03-19
Attending: EMERGENCY MEDICINE
Payer: MEDICARE

## 2020-03-19 ENCOUNTER — APPOINTMENT (OUTPATIENT)
Dept: CT IMAGING | Age: 80
DRG: 271 | End: 2020-03-19
Attending: EMERGENCY MEDICINE
Payer: MEDICARE

## 2020-03-19 DIAGNOSIS — I72.4 PSEUDOANEURYSM OF FEMORAL ARTERY (HCC): Primary | ICD-10-CM

## 2020-03-19 LAB
ALBUMIN SERPL-MCNC: 2.9 G/DL (ref 3.4–5)
ALBUMIN/GLOB SERPL: 0.5 {RATIO} (ref 0.8–1.7)
ALP SERPL-CCNC: 146 U/L (ref 45–117)
ALT SERPL-CCNC: 15 U/L (ref 13–56)
ANION GAP SERPL CALC-SCNC: 5 MMOL/L (ref 3–18)
AST SERPL-CCNC: 23 U/L (ref 10–38)
BASOPHILS # BLD: 0 K/UL (ref 0–0.1)
BASOPHILS NFR BLD: 0 % (ref 0–2)
BILIRUB SERPL-MCNC: 0.3 MG/DL (ref 0.2–1)
BUN SERPL-MCNC: 49 MG/DL (ref 7–18)
BUN/CREAT SERPL: 29 (ref 12–20)
CALCIUM SERPL-MCNC: 10.2 MG/DL (ref 8.5–10.1)
CHLORIDE SERPL-SCNC: 115 MMOL/L (ref 100–111)
CO2 SERPL-SCNC: 25 MMOL/L (ref 21–32)
CREAT SERPL-MCNC: 1.69 MG/DL (ref 0.6–1.3)
DIFFERENTIAL METHOD BLD: ABNORMAL
EOSINOPHIL # BLD: 0.2 K/UL (ref 0–0.4)
EOSINOPHIL NFR BLD: 2 % (ref 0–5)
ERYTHROCYTE [DISTWIDTH] IN BLOOD BY AUTOMATED COUNT: 14.2 % (ref 11.6–14.5)
GLOBULIN SER CALC-MCNC: 6.1 G/DL (ref 2–4)
GLUCOSE BLD STRIP.AUTO-MCNC: 135 MG/DL (ref 70–110)
GLUCOSE BLD STRIP.AUTO-MCNC: 39 MG/DL (ref 70–110)
GLUCOSE SERPL-MCNC: 41 MG/DL (ref 74–99)
HCT VFR BLD AUTO: 35.1 % (ref 35–45)
HGB BLD-MCNC: 11 G/DL (ref 12–16)
LACTATE SERPL-SCNC: 1.5 MMOL/L (ref 0.4–2)
LYMPHOCYTES # BLD: 1.7 K/UL (ref 0.9–3.6)
LYMPHOCYTES NFR BLD: 23 % (ref 21–52)
MCH RBC QN AUTO: 31.6 PG (ref 24–34)
MCHC RBC AUTO-ENTMCNC: 31.3 G/DL (ref 31–37)
MCV RBC AUTO: 100.9 FL (ref 74–97)
MONOCYTES # BLD: 0.5 K/UL (ref 0.05–1.2)
MONOCYTES NFR BLD: 6 % (ref 3–10)
NEUTS SEG # BLD: 5.2 K/UL (ref 1.8–8)
NEUTS SEG NFR BLD: 69 % (ref 40–73)
PLATELET # BLD AUTO: 299 K/UL (ref 135–420)
PMV BLD AUTO: 10.1 FL (ref 9.2–11.8)
POTASSIUM SERPL-SCNC: 4.6 MMOL/L (ref 3.5–5.5)
PROT SERPL-MCNC: 9 G/DL (ref 6.4–8.2)
RBC # BLD AUTO: 3.48 M/UL (ref 4.2–5.3)
SODIUM SERPL-SCNC: 145 MMOL/L (ref 136–145)
WBC # BLD AUTO: 7.5 K/UL (ref 4.6–13.2)

## 2020-03-19 PROCEDURE — 71045 X-RAY EXAM CHEST 1 VIEW: CPT

## 2020-03-19 PROCEDURE — 80053 COMPREHEN METABOLIC PANEL: CPT

## 2020-03-19 PROCEDURE — 85025 COMPLETE CBC W/AUTO DIFF WBC: CPT

## 2020-03-19 PROCEDURE — 87186 SC STD MICRODIL/AGAR DIL: CPT

## 2020-03-19 PROCEDURE — 74011000250 HC RX REV CODE- 250

## 2020-03-19 PROCEDURE — 96365 THER/PROPH/DIAG IV INF INIT: CPT

## 2020-03-19 PROCEDURE — 87040 BLOOD CULTURE FOR BACTERIA: CPT

## 2020-03-19 PROCEDURE — 99285 EMERGENCY DEPT VISIT HI MDM: CPT

## 2020-03-19 PROCEDURE — 96375 TX/PRO/DX INJ NEW DRUG ADDON: CPT

## 2020-03-19 PROCEDURE — 96366 THER/PROPH/DIAG IV INF ADDON: CPT

## 2020-03-19 PROCEDURE — 74011000258 HC RX REV CODE- 258: Performed by: EMERGENCY MEDICINE

## 2020-03-19 PROCEDURE — 83605 ASSAY OF LACTIC ACID: CPT

## 2020-03-19 PROCEDURE — 87077 CULTURE AEROBIC IDENTIFY: CPT

## 2020-03-19 PROCEDURE — 75635 CT ANGIO ABDOMINAL ARTERIES: CPT

## 2020-03-19 PROCEDURE — 82962 GLUCOSE BLOOD TEST: CPT

## 2020-03-19 RX ORDER — DEXTROSE 50 % IN WATER (D50W) INTRAVENOUS SYRINGE
Status: COMPLETED
Start: 2020-03-19 | End: 2020-03-19

## 2020-03-19 RX ORDER — DEXTROSE MONOHYDRATE AND SODIUM CHLORIDE 5; .9 G/100ML; G/100ML
50 INJECTION, SOLUTION INTRAVENOUS CONTINUOUS
Status: DISCONTINUED | OUTPATIENT
Start: 2020-03-19 | End: 2020-03-20

## 2020-03-19 RX ORDER — DEXTROSE 50 % IN WATER (D50W) INTRAVENOUS SYRINGE
25
Status: COMPLETED | OUTPATIENT
Start: 2020-03-19 | End: 2020-03-19

## 2020-03-19 RX ORDER — SODIUM CHLORIDE 0.9 % (FLUSH) 0.9 %
5-10 SYRINGE (ML) INJECTION AS NEEDED
Status: DISCONTINUED | OUTPATIENT
Start: 2020-03-19 | End: 2020-03-25 | Stop reason: HOSPADM

## 2020-03-19 RX ADMIN — DEXTROSE MONOHYDRATE AND SODIUM CHLORIDE 50 ML/HR: 5; .9 INJECTION, SOLUTION INTRAVENOUS at 23:30

## 2020-03-19 RX ADMIN — DEXTROSE 50 % IN WATER (D50W) INTRAVENOUS SYRINGE 25 G: at 23:26

## 2020-03-19 RX ADMIN — DEXTROSE MONOHYDRATE 25 G: 25 INJECTION, SOLUTION INTRAVENOUS at 23:26

## 2020-03-20 ENCOUNTER — ANESTHESIA (OUTPATIENT)
Dept: CARDIOTHORACIC SURGERY | Age: 80
DRG: 271 | End: 2020-03-20
Payer: MEDICARE

## 2020-03-20 ENCOUNTER — ANESTHESIA EVENT (OUTPATIENT)
Dept: CARDIOTHORACIC SURGERY | Age: 80
DRG: 271 | End: 2020-03-20
Payer: MEDICARE

## 2020-03-20 PROBLEM — I72.4 PSEUDOANEURYSM OF FEMORAL ARTERY (HCC): Status: ACTIVE | Noted: 2020-03-20

## 2020-03-20 LAB
ABO + RH BLD: NORMAL
ATRIAL RATE: 86 BPM
BLOOD GROUP ANTIBODIES SERPL: NORMAL
CALCULATED P AXIS, ECG09: 58 DEGREES
CALCULATED R AXIS, ECG10: -2 DEGREES
CALCULATED T AXIS, ECG11: 21 DEGREES
DIAGNOSIS, 93000: NORMAL
GLUCOSE BLD STRIP.AUTO-MCNC: 121 MG/DL (ref 70–110)
GLUCOSE BLD STRIP.AUTO-MCNC: 191 MG/DL (ref 70–110)
GLUCOSE BLD STRIP.AUTO-MCNC: 68 MG/DL (ref 70–110)
GLUCOSE BLD STRIP.AUTO-MCNC: 85 MG/DL (ref 70–110)
GLUCOSE BLD STRIP.AUTO-MCNC: 94 MG/DL (ref 70–110)
GLUCOSE BLD STRIP.AUTO-MCNC: 99 MG/DL (ref 70–110)
P-R INTERVAL, ECG05: 110 MS
Q-T INTERVAL, ECG07: 364 MS
QRS DURATION, ECG06: 66 MS
QTC CALCULATION (BEZET), ECG08: 435 MS
SPECIMEN EXP DATE BLD: NORMAL
VENTRICULAR RATE, ECG03: 86 BPM

## 2020-03-20 PROCEDURE — 87077 CULTURE AEROBIC IDENTIFY: CPT

## 2020-03-20 PROCEDURE — 74011250637 HC RX REV CODE- 250/637: Performed by: SURGERY

## 2020-03-20 PROCEDURE — 04PY0JZ REMOVAL OF SYNTHETIC SUBSTITUTE FROM LOWER ARTERY, OPEN APPROACH: ICD-10-PCS | Performed by: SURGERY

## 2020-03-20 PROCEDURE — 74011000258 HC RX REV CODE- 258: Performed by: EMERGENCY MEDICINE

## 2020-03-20 PROCEDURE — 93005 ELECTROCARDIOGRAM TRACING: CPT

## 2020-03-20 PROCEDURE — 77030002986 HC SUT PROL J&J -A: Performed by: SURGERY

## 2020-03-20 PROCEDURE — 77030002996 HC SUT SLK J&J -A: Performed by: SURGERY

## 2020-03-20 PROCEDURE — 74011250636 HC RX REV CODE- 250/636: Performed by: INTERNAL MEDICINE

## 2020-03-20 PROCEDURE — 74011000250 HC RX REV CODE- 250: Performed by: NURSE ANESTHETIST, CERTIFIED REGISTERED

## 2020-03-20 PROCEDURE — 77030008462 HC STPLR SKN PROX J&J -A: Performed by: SURGERY

## 2020-03-20 PROCEDURE — 74011250636 HC RX REV CODE- 250/636: Performed by: SURGERY

## 2020-03-20 PROCEDURE — 77030002924 HC SUT GORTX WLGO -B: Performed by: SURGERY

## 2020-03-20 PROCEDURE — 74011250636 HC RX REV CODE- 250/636: Performed by: NURSE ANESTHETIST, CERTIFIED REGISTERED

## 2020-03-20 PROCEDURE — 76060000035 HC ANESTHESIA 2 TO 2.5 HR: Performed by: SURGERY

## 2020-03-20 PROCEDURE — 77030031139 HC SUT VCRL2 J&J -A: Performed by: SURGERY

## 2020-03-20 PROCEDURE — 74011000250 HC RX REV CODE- 250

## 2020-03-20 PROCEDURE — 87205 SMEAR GRAM STAIN: CPT

## 2020-03-20 PROCEDURE — P9045 ALBUMIN (HUMAN), 5%, 250 ML: HCPCS

## 2020-03-20 PROCEDURE — 77030019908 HC STETH ESOPH SIMS -A: Performed by: ANESTHESIOLOGY

## 2020-03-20 PROCEDURE — 76010000108 HC CV SURG 2 TO 2.5 HR: Performed by: SURGERY

## 2020-03-20 PROCEDURE — 77030013079 HC BLNKT BAIR HGGR 3M -A: Performed by: ANESTHESIOLOGY

## 2020-03-20 PROCEDURE — 74011636320 HC RX REV CODE- 636/320: Performed by: EMERGENCY MEDICINE

## 2020-03-20 PROCEDURE — 04LH0ZZ OCCLUSION OF RIGHT EXTERNAL ILIAC ARTERY, OPEN APPROACH: ICD-10-PCS | Performed by: SURGERY

## 2020-03-20 PROCEDURE — 74011000258 HC RX REV CODE- 258: Performed by: INTERNAL MEDICINE

## 2020-03-20 PROCEDURE — 77010033678 HC OXYGEN DAILY

## 2020-03-20 PROCEDURE — 77030018836 HC SOL IRR NACL ICUM -A: Performed by: SURGERY

## 2020-03-20 PROCEDURE — 77030040392 HC DRSG OPTIFOAM MDII -A

## 2020-03-20 PROCEDURE — 77030008683 HC TU ET CUF COVD -A: Performed by: ANESTHESIOLOGY

## 2020-03-20 PROCEDURE — 77030019934 HC DRSG VAC ASST KCON -B: Performed by: SURGERY

## 2020-03-20 PROCEDURE — 94762 N-INVAS EAR/PLS OXIMTRY CONT: CPT

## 2020-03-20 PROCEDURE — 87186 SC STD MICRODIL/AGAR DIL: CPT

## 2020-03-20 PROCEDURE — 04LK0ZZ OCCLUSION OF RIGHT FEMORAL ARTERY, OPEN APPROACH: ICD-10-PCS | Performed by: SURGERY

## 2020-03-20 PROCEDURE — 74011000258 HC RX REV CODE- 258: Performed by: NURSE ANESTHETIST, CERTIFIED REGISTERED

## 2020-03-20 PROCEDURE — 82962 GLUCOSE BLOOD TEST: CPT

## 2020-03-20 PROCEDURE — 77030019952 HC CANSTR VAC ASST KCON -B: Performed by: SURGERY

## 2020-03-20 PROCEDURE — 77030038269 HC DRN EXT URIN PURWCK BARD -A

## 2020-03-20 PROCEDURE — 65620000000 HC RM CCU GENERAL

## 2020-03-20 PROCEDURE — 74011250636 HC RX REV CODE- 250/636

## 2020-03-20 PROCEDURE — 86900 BLOOD TYPING SEROLOGIC ABO: CPT

## 2020-03-20 PROCEDURE — 87040 BLOOD CULTURE FOR BACTERIA: CPT

## 2020-03-20 PROCEDURE — 87075 CULTR BACTERIA EXCEPT BLOOD: CPT

## 2020-03-20 RX ORDER — CEFAZOLIN SODIUM 2 G/50ML
SOLUTION INTRAVENOUS
Status: DISPENSED
Start: 2020-03-20 | End: 2020-03-21

## 2020-03-20 RX ORDER — HEPARIN SODIUM 200 [USP'U]/100ML
INJECTION, SOLUTION INTRAVENOUS
Status: DISPENSED
Start: 2020-03-20 | End: 2020-03-20

## 2020-03-20 RX ORDER — PROPOFOL 10 MG/ML
INJECTION, EMULSION INTRAVENOUS AS NEEDED
Status: DISCONTINUED | OUTPATIENT
Start: 2020-03-20 | End: 2020-03-20 | Stop reason: HOSPADM

## 2020-03-20 RX ORDER — OLANZAPINE 2.5 MG/1
2.5 TABLET ORAL
Status: DISCONTINUED | OUTPATIENT
Start: 2020-03-20 | End: 2020-03-25 | Stop reason: HOSPADM

## 2020-03-20 RX ORDER — ATORVASTATIN CALCIUM 20 MG/1
20 TABLET, FILM COATED ORAL DAILY
Status: DISCONTINUED | OUTPATIENT
Start: 2020-03-21 | End: 2020-03-25 | Stop reason: HOSPADM

## 2020-03-20 RX ORDER — NEOSTIGMINE METHYLSULFATE 1 MG/ML
INJECTION, SOLUTION INTRAVENOUS AS NEEDED
Status: DISCONTINUED | OUTPATIENT
Start: 2020-03-20 | End: 2020-03-20 | Stop reason: HOSPADM

## 2020-03-20 RX ORDER — ALBUMIN HUMAN 50 G/1000ML
SOLUTION INTRAVENOUS
Status: COMPLETED
Start: 2020-03-20 | End: 2020-03-20

## 2020-03-20 RX ORDER — DEXTROSE 50 % IN WATER (D50W) INTRAVENOUS SYRINGE
25-50 AS NEEDED
Status: DISCONTINUED | OUTPATIENT
Start: 2020-03-20 | End: 2020-03-24

## 2020-03-20 RX ORDER — OXYCODONE AND ACETAMINOPHEN 5; 325 MG/1; MG/1
1 TABLET ORAL
Status: DISCONTINUED | OUTPATIENT
Start: 2020-03-20 | End: 2020-03-25 | Stop reason: HOSPADM

## 2020-03-20 RX ORDER — SODIUM CHLORIDE 0.9 % (FLUSH) 0.9 %
5-40 SYRINGE (ML) INJECTION AS NEEDED
Status: DISCONTINUED | OUTPATIENT
Start: 2020-03-20 | End: 2020-03-25 | Stop reason: HOSPADM

## 2020-03-20 RX ORDER — HYDRALAZINE HYDROCHLORIDE 20 MG/ML
10 INJECTION INTRAMUSCULAR; INTRAVENOUS
Status: DISCONTINUED | OUTPATIENT
Start: 2020-03-20 | End: 2020-03-25 | Stop reason: HOSPADM

## 2020-03-20 RX ORDER — GLYCOPYRROLATE 0.2 MG/ML
INJECTION INTRAMUSCULAR; INTRAVENOUS AS NEEDED
Status: DISCONTINUED | OUTPATIENT
Start: 2020-03-20 | End: 2020-03-20 | Stop reason: HOSPADM

## 2020-03-20 RX ORDER — DOCUSATE SODIUM 100 MG/1
100 CAPSULE, LIQUID FILLED ORAL 2 TIMES DAILY
Status: DISCONTINUED | OUTPATIENT
Start: 2020-03-20 | End: 2020-03-25 | Stop reason: HOSPADM

## 2020-03-20 RX ORDER — ADHESIVE BANDAGE
30 BANDAGE TOPICAL DAILY PRN
Status: DISCONTINUED | OUTPATIENT
Start: 2020-03-20 | End: 2020-03-25 | Stop reason: HOSPADM

## 2020-03-20 RX ORDER — LOSARTAN POTASSIUM 50 MG/1
100 TABLET ORAL DAILY
Status: DISCONTINUED | OUTPATIENT
Start: 2020-03-20 | End: 2020-03-20 | Stop reason: CLARIF

## 2020-03-20 RX ORDER — HYDROCHLOROTHIAZIDE 25 MG/1
25 TABLET ORAL DAILY
Status: DISCONTINUED | OUTPATIENT
Start: 2020-03-20 | End: 2020-03-25 | Stop reason: HOSPADM

## 2020-03-20 RX ORDER — HEPARIN SODIUM 5000 [USP'U]/ML
5000 INJECTION, SOLUTION INTRAVENOUS; SUBCUTANEOUS EVERY 8 HOURS
Status: DISCONTINUED | OUTPATIENT
Start: 2020-03-20 | End: 2020-03-25 | Stop reason: HOSPADM

## 2020-03-20 RX ORDER — VANCOMYCIN HYDROCHLORIDE
1250 ONCE
Status: COMPLETED | OUTPATIENT
Start: 2020-03-20 | End: 2020-03-20

## 2020-03-20 RX ORDER — ALBUMIN HUMAN 50 G/1000ML
SOLUTION INTRAVENOUS
Status: DISPENSED
Start: 2020-03-20 | End: 2020-03-21

## 2020-03-20 RX ORDER — LOSARTAN POTASSIUM 25 MG/1
100 TABLET ORAL DAILY
Status: DISCONTINUED | OUTPATIENT
Start: 2020-03-21 | End: 2020-03-20

## 2020-03-20 RX ORDER — IPRATROPIUM BROMIDE AND ALBUTEROL SULFATE 2.5; .5 MG/3ML; MG/3ML
3 SOLUTION RESPIRATORY (INHALATION)
Status: DISCONTINUED | OUTPATIENT
Start: 2020-03-20 | End: 2020-03-25 | Stop reason: HOSPADM

## 2020-03-20 RX ORDER — HYDROCHLOROTHIAZIDE 25 MG/1
25 TABLET ORAL DAILY
Status: DISCONTINUED | OUTPATIENT
Start: 2020-03-21 | End: 2020-03-20

## 2020-03-20 RX ORDER — FENTANYL CITRATE 50 UG/ML
INJECTION, SOLUTION INTRAMUSCULAR; INTRAVENOUS AS NEEDED
Status: DISCONTINUED | OUTPATIENT
Start: 2020-03-20 | End: 2020-03-20 | Stop reason: HOSPADM

## 2020-03-20 RX ORDER — GUAIFENESIN 100 MG/5ML
81 LIQUID (ML) ORAL DAILY
Status: DISCONTINUED | OUTPATIENT
Start: 2020-03-21 | End: 2020-03-25 | Stop reason: HOSPADM

## 2020-03-20 RX ORDER — DEXMEDETOMIDINE HYDROCHLORIDE 100 UG/ML
INJECTION, SOLUTION INTRAVENOUS AS NEEDED
Status: DISCONTINUED | OUTPATIENT
Start: 2020-03-20 | End: 2020-03-20 | Stop reason: HOSPADM

## 2020-03-20 RX ORDER — MAGNESIUM SULFATE 100 %
4 CRYSTALS MISCELLANEOUS AS NEEDED
Status: DISCONTINUED | OUTPATIENT
Start: 2020-03-20 | End: 2020-03-24

## 2020-03-20 RX ORDER — ALBUMIN HUMAN 50 G/1000ML
SOLUTION INTRAVENOUS AS NEEDED
Status: DISCONTINUED | OUTPATIENT
Start: 2020-03-20 | End: 2020-03-20 | Stop reason: HOSPADM

## 2020-03-20 RX ORDER — LOSARTAN POTASSIUM 50 MG/1
100 TABLET ORAL DAILY
Status: DISCONTINUED | OUTPATIENT
Start: 2020-03-20 | End: 2020-03-25 | Stop reason: HOSPADM

## 2020-03-20 RX ORDER — INSULIN LISPRO 100 [IU]/ML
INJECTION, SOLUTION INTRAVENOUS; SUBCUTANEOUS
Status: DISCONTINUED | OUTPATIENT
Start: 2020-03-20 | End: 2020-03-23

## 2020-03-20 RX ORDER — LIDOCAINE HYDROCHLORIDE 20 MG/ML
INJECTION, SOLUTION EPIDURAL; INFILTRATION; INTRACAUDAL; PERINEURAL AS NEEDED
Status: DISCONTINUED | OUTPATIENT
Start: 2020-03-20 | End: 2020-03-20 | Stop reason: HOSPADM

## 2020-03-20 RX ORDER — IODIXANOL 320 MG/ML
INJECTION, SOLUTION INTRAVASCULAR
Status: DISCONTINUED
Start: 2020-03-20 | End: 2020-03-20 | Stop reason: WASHOUT

## 2020-03-20 RX ORDER — ACETAMINOPHEN 325 MG/1
650 TABLET ORAL
Status: DISCONTINUED | OUTPATIENT
Start: 2020-03-20 | End: 2020-03-25 | Stop reason: HOSPADM

## 2020-03-20 RX ORDER — SODIUM CHLORIDE 0.9 % (FLUSH) 0.9 %
5-40 SYRINGE (ML) INJECTION EVERY 8 HOURS
Status: DISCONTINUED | OUTPATIENT
Start: 2020-03-20 | End: 2020-03-25 | Stop reason: HOSPADM

## 2020-03-20 RX ORDER — CEFAZOLIN SODIUM IN 0.9 % NACL 2 G/100 ML
PLASTIC BAG, INJECTION (ML) INTRAVENOUS AS NEEDED
Status: DISCONTINUED | OUTPATIENT
Start: 2020-03-20 | End: 2020-03-20 | Stop reason: HOSPADM

## 2020-03-20 RX ORDER — DIPHENHYDRAMINE HYDROCHLORIDE 50 MG/ML
12.5 INJECTION, SOLUTION INTRAMUSCULAR; INTRAVENOUS
Status: DISCONTINUED | OUTPATIENT
Start: 2020-03-20 | End: 2020-03-25 | Stop reason: HOSPADM

## 2020-03-20 RX ORDER — GLIPIZIDE 5 MG/1
5 TABLET ORAL 2 TIMES DAILY
Status: DISCONTINUED | OUTPATIENT
Start: 2020-03-20 | End: 2020-03-23

## 2020-03-20 RX ORDER — METFORMIN HYDROCHLORIDE 500 MG/1
500 TABLET ORAL
Status: DISCONTINUED | OUTPATIENT
Start: 2020-03-23 | End: 2020-03-23

## 2020-03-20 RX ORDER — NALOXONE HYDROCHLORIDE 0.4 MG/ML
0.4 INJECTION, SOLUTION INTRAMUSCULAR; INTRAVENOUS; SUBCUTANEOUS AS NEEDED
Status: DISCONTINUED | OUTPATIENT
Start: 2020-03-20 | End: 2020-03-25 | Stop reason: HOSPADM

## 2020-03-20 RX ORDER — ONDANSETRON 2 MG/ML
4 INJECTION INTRAMUSCULAR; INTRAVENOUS
Status: DISCONTINUED | OUTPATIENT
Start: 2020-03-20 | End: 2020-03-25 | Stop reason: HOSPADM

## 2020-03-20 RX ORDER — MORPHINE SULFATE 2 MG/ML
2 INJECTION, SOLUTION INTRAMUSCULAR; INTRAVENOUS
Status: DISCONTINUED | OUTPATIENT
Start: 2020-03-20 | End: 2020-03-25 | Stop reason: HOSPADM

## 2020-03-20 RX ORDER — SODIUM CHLORIDE, SODIUM LACTATE, POTASSIUM CHLORIDE, CALCIUM CHLORIDE 600; 310; 30; 20 MG/100ML; MG/100ML; MG/100ML; MG/100ML
25 INJECTION, SOLUTION INTRAVENOUS CONTINUOUS
Status: DISCONTINUED | OUTPATIENT
Start: 2020-03-20 | End: 2020-03-25 | Stop reason: HOSPADM

## 2020-03-20 RX ORDER — ROCURONIUM BROMIDE 10 MG/ML
INJECTION, SOLUTION INTRAVENOUS AS NEEDED
Status: DISCONTINUED | OUTPATIENT
Start: 2020-03-20 | End: 2020-03-20 | Stop reason: HOSPADM

## 2020-03-20 RX ORDER — LORATADINE 10 MG/1
10 TABLET ORAL DAILY
Status: DISCONTINUED | OUTPATIENT
Start: 2020-03-21 | End: 2020-03-25 | Stop reason: HOSPADM

## 2020-03-20 RX ADMIN — Medication 10 ML: at 22:21

## 2020-03-20 RX ADMIN — PROPOFOL 100 MG: 10 INJECTION, EMULSION INTRAVENOUS at 12:18

## 2020-03-20 RX ADMIN — DEXMEDETOMIDINE 0.4 MCG/KG/HR: 100 INJECTION, SOLUTION, CONCENTRATE INTRAVENOUS at 13:02

## 2020-03-20 RX ADMIN — SODIUM CHLORIDE, SODIUM LACTATE, POTASSIUM CHLORIDE, AND CALCIUM CHLORIDE 125 ML/HR: 600; 310; 30; 20 INJECTION, SOLUTION INTRAVENOUS at 14:30

## 2020-03-20 RX ADMIN — HEPARIN SODIUM 5000 UNITS: 5000 INJECTION INTRAVENOUS; SUBCUTANEOUS at 22:30

## 2020-03-20 RX ADMIN — DEXMEDETOMIDINE 10 MCG: 100 INJECTION, SOLUTION, CONCENTRATE INTRAVENOUS at 13:04

## 2020-03-20 RX ADMIN — LOSARTAN POTASSIUM 100 MG: 50 TABLET ORAL at 17:14

## 2020-03-20 RX ADMIN — MORPHINE SULFATE 2 MG: 2 INJECTION, SOLUTION INTRAMUSCULAR; INTRAVENOUS at 17:59

## 2020-03-20 RX ADMIN — Medication 2 G: at 13:00

## 2020-03-20 RX ADMIN — PIPERACILLIN AND TAZOBACTAM 3.38 G: 3; .375 INJECTION, POWDER, FOR SOLUTION INTRAVENOUS at 17:12

## 2020-03-20 RX ADMIN — ROCURONIUM BROMIDE 50 MG: 10 INJECTION, SOLUTION INTRAVENOUS at 12:18

## 2020-03-20 RX ADMIN — Medication 10 ML: at 15:00

## 2020-03-20 RX ADMIN — DEXTROSE MONOHYDRATE AND SODIUM CHLORIDE: 5; .9 INJECTION, SOLUTION INTRAVENOUS at 12:07

## 2020-03-20 RX ADMIN — GLYCOPYRROLATE 0.4 MG: 0.2 INJECTION, SOLUTION INTRAMUSCULAR; INTRAVENOUS at 13:56

## 2020-03-20 RX ADMIN — PIPERACILLIN AND TAZOBACTAM 3.38 G: 3; .375 INJECTION, POWDER, FOR SOLUTION INTRAVENOUS at 22:30

## 2020-03-20 RX ADMIN — VANCOMYCIN HYDROCHLORIDE 1250 MG: 10 INJECTION, POWDER, LYOPHILIZED, FOR SOLUTION INTRAVENOUS at 17:12

## 2020-03-20 RX ADMIN — OLANZAPINE 2.5 MG: 2.5 TABLET, FILM COATED ORAL at 22:31

## 2020-03-20 RX ADMIN — DEXMEDETOMIDINE 6 MCG: 100 INJECTION, SOLUTION, CONCENTRATE INTRAVENOUS at 13:59

## 2020-03-20 RX ADMIN — HYDROCHLOROTHIAZIDE 25 MG: 25 TABLET ORAL at 16:41

## 2020-03-20 RX ADMIN — HEPARIN SODIUM 5000 UNITS: 5000 INJECTION INTRAVENOUS; SUBCUTANEOUS at 16:45

## 2020-03-20 RX ADMIN — IOPAMIDOL 72 ML: 755 INJECTION, SOLUTION INTRAVENOUS at 00:37

## 2020-03-20 RX ADMIN — DOCUSATE SODIUM 100 MG: 100 CAPSULE, LIQUID FILLED ORAL at 17:10

## 2020-03-20 RX ADMIN — LIDOCAINE HYDROCHLORIDE 60 MG: 20 INJECTION, SOLUTION INTRAVENOUS at 12:18

## 2020-03-20 RX ADMIN — FENTANYL CITRATE 50 MCG: 50 INJECTION, SOLUTION INTRAMUSCULAR; INTRAVENOUS at 14:03

## 2020-03-20 RX ADMIN — ALBUMIN HUMAN 250 ML: 50 SOLUTION INTRAVENOUS at 12:55

## 2020-03-20 RX ADMIN — PHENYLEPHRINE HYDROCHLORIDE 2 MCG/MIN: 10 INJECTION INTRAVENOUS at 12:53

## 2020-03-20 RX ADMIN — DEXMEDETOMIDINE 10 MCG: 100 INJECTION, SOLUTION, CONCENTRATE INTRAVENOUS at 13:02

## 2020-03-20 RX ADMIN — FENTANYL CITRATE 100 MCG: 50 INJECTION, SOLUTION INTRAMUSCULAR; INTRAVENOUS at 12:18

## 2020-03-20 RX ADMIN — ALBUMIN HUMAN 250 ML: 50 SOLUTION INTRAVENOUS at 13:02

## 2020-03-20 RX ADMIN — DEXMEDETOMIDINE 10 MCG: 100 INJECTION, SOLUTION, CONCENTRATE INTRAVENOUS at 13:25

## 2020-03-20 RX ADMIN — ALBUMIN HUMAN 250 ML: 50 SOLUTION INTRAVENOUS at 13:26

## 2020-03-20 RX ADMIN — HYDRALAZINE HYDROCHLORIDE 10 MG: 20 INJECTION INTRAMUSCULAR; INTRAVENOUS at 17:45

## 2020-03-20 RX ADMIN — Medication 3 MG: at 13:57

## 2020-03-20 NOTE — PROGRESS NOTES
Discussed with family member over phone. They wish to have a meeting amongst family members to decide on surgery. Patient is Agueda Feller witness which would complicate her care. Awaiting family decision.

## 2020-03-20 NOTE — PROGRESS NOTES
0920: Entered chart to get report for expected arrival to [facility] ICU post op today. 1400:  TRANSFER - IN REPORT:Verbal report received from JUNE Montero(name) on Jany Phi  being received from [facility] OR(unit) for routine post - op    Report consisted of patients Situation, Background, Assessment and Recommendations(SBAR). Information from the following report(s) OR Summary, Intake/Output, MAR, Accordion and Cardiac Rhythm NSR. was reviewed with the receiving nurse. Opportunity for questions and clarification was provided. Assessment completed upon patients arrival to unit and care assumed. 1420:  Received from [facility] OR via bed with monitor, anesthesia provider and OR RN at side into [facility] ICU room 2353 s/p surgery. Settled into room. Complete assessment performed. NSR. BP via right art line corresponds with noninvasive cuff left arm; BP low, see flowsheet. DrCamacho at side; new orders for maintenance IVF noted and Neosynephrine drip if needed to keep MAP 65 or above. LR started as ordered via PIV. NSR. Right groin with Wound Vac intact, continuous suction 125mm, no drainage at this time. Right lower quadrant surgical dry dressing clean/dry/intact. No family at [facility] at this time. Monitor closely. 1445:  BP responding to IVF, see flowsheet. Continue monitor. No need for Neosynephrine drip.  1500: Face mask oxygen changed to NC 4L/min. 1630:  BP  Trended up out of goal range as patient awakened. Bright drip never started post op in ICU. Denies pain. Dr Pardeep gary, notified of increasing BP. New orders noted. IVF rate decreased to Sheridan Cast[place]. Daily oral BP meds changed to start today, now, see MAR for meds given. Monitor. 1730:  BP remains elevated, see flowsheets. Denies pain. Dr Pardeep gary, notified of sustained elevated BP and + Blood culture as resulted in Critical Result Flowsheet. Unable to reach [facility] service, to relay blood culture results.  Renetta aware which antibiotics Dr Len Marie started this evening for patient. New prn BP order noted; see MAR for meds given and flowsheet for vital sign details. Accucheck=68, juice given, taking PO well without difficulty. 1800:  Pure wick in place for urination, urine leaked onto pads. Skin care delivered, pads changed. Repositioned. Denies dinner as offered now. Accucheck =94 recheck. Pain med given. BP responded appropriately to Hydralazine, see flowsheets. 1900: Wound Vac with no drainage in cannister. Right groin and right lower abdominal incision without change. Bedside and Verbal shift change report given to MyVR (oncoming nurse) by Yehuda Kong RN (offgoing nurse). Report included the following information SBAR, Kardex, OR Summary, Intake/Output, MAR, Accordion, Recent Results, Cardiac Rhythm NSR. and Alarm Parameters .

## 2020-03-20 NOTE — DIABETES MGMT
Glycemic Control Plan of Care    T2DM with A1c of 14.2% (1/02/2020). Patient came from [facility] and [address] [place] [address] facility. Home diabetes medications: Patient came from [facility] and [address] [place] [address]  Metformin 500 mg  Daily with breakfast.  Lispro sliding scale insulin. Seen patient this morning pre-op. POC BG report on 03/20/2020: 39, 135. Patient was treated with 25 gm of D50 at 11:26 PM for hypoglycemia with f/u BG of 135 at 11:45 PM.    POC BG report on 03/21/2020 at time of review: 121, 191, 85. Results for Maria C Mcdonnell (MRN [id number]) as of 3/20/2020 16:00   Ref. Range 3/19/2020 22:38 3/19/2020 22:50   GFR est non-AA Latest Ref Range: >60 ml/min/1.73m2 29 (L)    GFR est AA Latest Ref Range: >60 ml/min/1.73m2 35 (L)      Recommendation(s):  1.) cont glycemic monitoring and correctiona lispro insulin as ordered. 2.) diabetic consistent carb diet when patient is ready for solids. Assessment:  Patient is 79year old with past medical history including type 2 diabetes mellitus, hypertension, hypercholesterolemia, right AKA January 2020, and status post femoral stent last week - was admitted/transferred from [facility] and [address] [place] [address] facility on 3/19/2020 with report of bleeding and oozing from the femoral site. Noted:  Infected graft with femoral blow out. Status post right femoral common femoral artery ligation/removal of infected graft/wound vac placement on 03/20/2020. T2DM. Most recent blood glucose values:        Current A1C: 14.2% (1/02/2020) which is equivalent to estimated average blood glucose of 361 mg/dL during the previous 2-3 months. Current hospital diabetes medications:  Correctional lispro insulin ACHS. Normal sensitivity dose. Total daily dose insulin requirement previous day: 03/19/2020  None. Diet: NPO.     Goals:  Blood glucose will be within target range of  mg/dL by 03/23/2020    Education:  ___  Refer to Diabetes Education Record             _X__  Education not indicated at this time    Arlette Cook RN [facility]  Pager: [phone]

## 2020-03-20 NOTE — ANESTHESIA PREPROCEDURE EVALUATION
Relevant Problems   No relevant active problems       Anesthetic History   No history of anesthetic complications            Review of Systems / Medical History  Patient summary reviewed, nursing notes reviewed and pertinent labs reviewed    Pulmonary  Within defined limits                 Neuro/Psych   Within defined limits           Cardiovascular    Hypertension          PAD and hyperlipidemia         GI/Hepatic/Renal         Renal disease (acute on chronic ): CRI      Comments: pancreatitis Endo/Other    Diabetes: using insulin    Anemia     Other Findings   Comments: Refuse blood transfusion           Physical Exam    Airway  Mallampati: II  TM Distance: 4 - 6 cm  Neck ROM: normal range of motion   Mouth opening: Normal     Cardiovascular    Rhythm: regular           Dental    Dentition: Poor dentition     Pulmonary      Decreased breath sounds: bibasilar           Abdominal  GI exam deferred       Other Findings            Anesthetic Plan    ASA: 3, emergent  Anesthesia type: general            Anesthetic plan and risks discussed with: Patient

## 2020-03-20 NOTE — INTERVAL H&P NOTE
H&P Update: 
Bere Mccoy was seen and examined. History and physical has been reviewed. The patient has been examined.  There have been no significant clinical changes since the completion of the originally dated History and Physical.

## 2020-03-20 NOTE — PROGRESS NOTES
Long conversation over phone with multiple family members they have agreed to surgery. Will ligate EIA first through RP incision given JW and no blood products. Will move forward with emergent surgery.

## 2020-03-20 NOTE — ROUTINE PROCESS
TRANSFER - IN REPORT: 
 
Verbal report received from Cecilia Kirkland on Beatrice Levitier  being received from [facility] for ordered procedure Report consisted of patients Situation, Background, Assessment and  
Recommendations(SBAR). Information from the following report(s) SBAR, Kardex, Intake/Output, MAR and Recent Results was reviewed with the receiving nurse. Opportunity for questions and clarification was provided. Assessment completed upon patients arrival to unit and care assumed.

## 2020-03-20 NOTE — H&P
Surgery History and Physical    Subjective:      Kayleigh Novoa is a 79 y.o. female who presents with what appears to be right common femoral artery blow out with pseudoaneurysm and bleeding. She has had some bleeding over past couple of days with opening of skin wound on right groin. Currently patient doesn't want to talk to me and wants me to discuss with her cousins or nephews. I'm unsure. She refused blood and surgery currently. Patient Active Problem List    Diagnosis Date Noted    Pseudoaneurysm of femoral artery ([address] 2020    Anemia of chronic renal failure, stage 4 (severe) ([address] 2020    Ischemia of foot 2020    Pancreatitis 2020    Hyperosmolar hyperglycemic coma due to diabetes mellitus without ketoacidosis ([address] 2020    Hyperosmolar non-ketotic state in patient with type 2 diabetes mellitus ([address] 2020    Hyperglycemia 2018    Type 2 diabetes mellitus with hyperosmolarity ([address] 2018    Acute UTI 2018    Dehydration 2018    Acute renal failure (ARF) ([address] 2018    Noncompliance 2018     Past Medical History:   Diagnosis Date    Diabetes ([address]     Hypercholesterolemia     Hypertension       History reviewed. No pertinent surgical history. Social History     Tobacco Use    Smoking status: Former Smoker     Packs/day: 3.00     Years: 20.00     Pack years: 60.00     Last attempt to quit: 1996     Years since quittin.1    Smokeless tobacco: Never Used   Substance Use Topics    Alcohol use: Not on file      Family History   Problem Relation Age of Onset    Hypertension Mother       Prior to Admission medications    Medication Sig Start Date End Date Taking? Authorizing Provider   docusate sodium (COLACE) 100 mg capsule Take 100 mg by mouth two (2) times a day. Provider, Historical   OLANZapine (ZYPREXA) 2.5 mg tablet Take  by mouth nightly.     Provider, Historical   aspirin 81 mg chewable tablet Take 1 Tab by mouth daily. 2/3/20   PATRICE Carlton   hydroCHLOROthiazide (HYDRODIURIL) 25 mg tablet Take 25 mg by mouth daily. Provider, Historical   loratadine (CLARITIN) 10 mg tablet Take 10 mg by mouth daily. Provider, Historical   losartan (COZAAR) 100 mg tablet Take 100 mg by mouth daily. Provider, Historical   glipiZIDE (GLUCOTROL) 5 mg tablet Take 5 mg by mouth two (2) times a day. Provider, Historical   metFORMIN (GLUCOPHAGE) 500 mg tablet Take 500 mg by mouth daily (with breakfast). Provider, Historical   insulin lispro (HUMALOG) 100 unit/mL injection Less than 150 =   0 units  150 -199 =   3 units  200 -249 =   6 units  250 -299 =   9 units  300 -349 =   12 units  350 and above =   15 units, CALL MD 12/26/18   Adia Renteria NP   albuterol-ipratropium (DUO-NEB) 2.5 mg-0.5 mg/3 ml nebu 3 mL by Nebulization route every six (6) hours as needed (wheezing). 12/26/18   Adia Renteria NP   acetaminophen (TYLENOL) 325 mg tablet Take 2 Tabs by mouth every six (6) hours as needed. 12/26/18   Adia Renteria NP   cholecalciferol, vitamin D3, (VITAMIN D3) 2,000 unit tab Take  by mouth. Provider, Historical   atorvastatin (LIPITOR) 20 mg tablet Take  by mouth daily. Provider, Historical     No Known Allergies      ROS:  Constitutional: negative  Eyes: negative  Ears, nose, mouth, throat, and face: negative  Respiratory: negative  Cardiovascular: negative  Gastrointestinal: negative  Genitourinary:negative  Hematologic/lymphatic: negative  Musculoskeletal:negative  Neurological: negative  Behavioral/Psych: negative  Endocrine: negative  Allergic/Immunologic: negative  Unless otherwise mentioned in the HPI.     Objective:     Patient Vitals for the past 8 hrs:   BP Temp Pulse Resp SpO2 Weight   03/20/20 0717 146/61 98.4 °F (36.9 °C) 93 19 100 %    03/20/20 0710      120 lb 1.6 oz (54.5 kg)   03/20/20 0649     100 %    03/20/20 0643  98.5 °F (36.9 °C)       03/20/20 0640   76 15 100 %    03/20/20 0622   76 15 100 %    03/20/20 0621 129/52        03/20/20 0515 130/63  87 18 97 %    03/20/20 0500 134/56  83 16 99 %    03/20/20 0446 125/52 98.6 °F (37 °C) 84 18 100 %    03/20/20 0445 125/52  84 17 100 %    03/20/20 0432 (!) 102/30  81 21 97 %    03/20/20 0415   80 23 96 %    03/20/20 0400   82 20 96 %    03/20/20 0345   84 22 97 %    03/20/20 0330   82 22 96 %    03/20/20 0315   83 21 99 %    03/20/20 0300   80 23 98 %    03/20/20 0245   77 18 100 %    03/20/20 0230   75 20 98 %    03/20/20 0215   75 18 100 %    03/20/20 0200   94 23 100 %    03/20/20 0145   84 16 100 %    03/20/20 0130   83 16 100 %    03/20/20 0115   86 14 100 %    03/20/20 0100   87 21 100 %    03/20/20 0045   85 19 100 %        Temp (24hrs), Av.6 °F (37 °C), Min:98.4 °F (36.9 °C), Max:98.7 °F (37.1 °C)      Physical Exam:  GENERAL: ill appearing female in nad  HEENT: EOMI no scleral icterus  PUlm: no iwob  CV: rrr  ABD: nt/nd, no rebound or guarding  EXT: right AKA dressed. Right groin with large opening at inferior incision with no active bleeding currently but purulent bloody discharge on dressing. Nursing just held pressure to stop some bleeding.   Neuro: CN II-XII are grossly intact no tremors    Labs:   Recent Results (from the past 24 hour(s))   CULTURE, BLOOD    Collection Time: 20 10:38 PM   Result Value Ref Range    Special Requests: NO SPECIAL REQUESTS      Culture result: NO GROWTH AFTER 8 HOURS     METABOLIC PANEL, COMPREHENSIVE    Collection Time: 20 10:38 PM   Result Value Ref Range    Sodium 145 136 - 145 mmol/L    Potassium 4.6 3.5 - 5.5 mmol/L    Chloride 115 (H) 100 - 111 mmol/L    CO2 25 21 - 32 mmol/L    Anion gap 5 3.0 - 18 mmol/L    Glucose 41 (LL) 74 - 99 mg/dL    BUN 49 (H) 7.0 - 18 MG/DL    Creatinine 1.69 (H) 0.6 - 1.3 MG/DL    BUN/Creatinine ratio 29 (H) 12 - 20      GFR est AA 35 (L) >60 ml/min/1.73m2    GFR est non-AA 29 (L) >60 ml/min/1.73m2    Calcium 10.2 (H) 8.5 - 10.1 MG/DL    Bilirubin, total 0.3 0.2 - 1.0 MG/DL    ALT (SGPT) 15 13 - 56 U/L    AST (SGOT) 23 10 - 38 U/L    Alk. phosphatase 146 (H) 45 - 117 U/L    Protein, total 9.0 (H) 6.4 - 8.2 g/dL    Albumin 2.9 (L) 3.4 - 5.0 g/dL    Globulin 6.1 (H) 2.0 - 4.0 g/dL    A-G Ratio 0.5 (L) 0.8 - 1.7     CBC WITH AUTOMATED DIFF    Collection Time: 03/19/20 10:38 PM   Result Value Ref Range    WBC 7.5 4.6 - 13.2 K/uL    RBC 3.48 (L) 4.20 - 5.30 M/uL    HGB 11.0 (L) 12.0 - 16.0 g/dL    HCT 35.1 35.0 - 45.0 %    .9 (H) 74.0 - 97.0 FL    MCH 31.6 24.0 - 34.0 PG    MCHC 31.3 31.0 - 37.0 g/dL    RDW 14.2 11.6 - 14.5 %    PLATELET 091 769 - 140 K/uL    MPV 10.1 9.2 - 11.8 FL    NEUTROPHILS 69 40 - 73 %    LYMPHOCYTES 23 21 - 52 %    MONOCYTES 6 3 - 10 %    EOSINOPHILS 2 0 - 5 %    BASOPHILS 0 0 - 2 %    ABS. NEUTROPHILS 5.2 1.8 - 8.0 K/UL    ABS. LYMPHOCYTES 1.7 0.9 - 3.6 K/UL    ABS. MONOCYTES 0.5 0.05 - 1.2 K/UL    ABS. EOSINOPHILS 0.2 0.0 - 0.4 K/UL    ABS.  BASOPHILS 0.0 0.0 - 0.1 K/UL    DF AUTOMATED     LACTIC ACID    Collection Time: 03/19/20 10:50 PM   Result Value Ref Range    Lactic acid 1.5 0.4 - 2.0 MMOL/L   GLUCOSE, POC    Collection Time: 03/19/20 11:23 PM   Result Value Ref Range    Glucose (POC) 39 (LL) 70 - 110 mg/dL   GLUCOSE, POC    Collection Time: 03/19/20 11:45 PM   Result Value Ref Range    Glucose (POC) 135 (H) 70 - 110 mg/dL   EKG, 12 LEAD, INITIAL    Collection Time: 03/20/20  1:30 AM   Result Value Ref Range    Ventricular Rate 86 BPM    Atrial Rate 86 BPM    P-R Interval 110 ms    QRS Duration 66 ms    Q-T Interval 364 ms    QTC Calculation (Bezet) 435 ms    Calculated P Axis 58 degrees    Calculated R Axis -2 degrees    Calculated T Axis 21 degrees    Diagnosis       Sinus rhythm with short AR  Cannot rule out Anterior infarct (cited on or before 20-MAR-2020)  Abnormal ECG  When compared with ECG of 02-JAN-2020 20:25,  QRS duration has decreased     GLUCOSE, POC    Collection Time: 03/20/20  1:37 AM   Result Value Ref Range    Glucose (POC) 121 (H) 70 - 110 mg/dL   CULTURE, BLOOD    Collection Time: 03/20/20  4:39 AM   Result Value Ref Range    Special Requests: NO SPECIAL REQUESTS      Culture result: NO GROWTH AFTER 2 HOURS     TYPE & SCREEN    Collection Time: 03/20/20  4:39 AM   Result Value Ref Range    Crossmatch Expiration 03/23/2020     ABO/Rh(D) A POSITIVE     Antibody screen NEG    GLUCOSE, POC    Collection Time: 03/20/20  4:53 AM   Result Value Ref Range    Glucose (POC) 191 (H) 70 - 110 mg/dL       Data Review:    CBC:   Lab Results   Component Value Date/Time    WBC 7.5 03/19/2020 10:38 PM    RBC 3.48 (L) 03/19/2020 10:38 PM    HGB 11.0 (L) 03/19/2020 10:38 PM    HCT 35.1 03/19/2020 10:38 PM    PLATELET 396 66/97/9708 10:38 PM      BMP:   Lab Results   Component Value Date/Time    Glucose 41 (LL) 03/19/2020 10:38 PM    Sodium 145 03/19/2020 10:38 PM    Potassium 4.6 03/19/2020 10:38 PM    Chloride 115 (H) 03/19/2020 10:38 PM    CO2 25 03/19/2020 10:38 PM    BUN 49 (H) 03/19/2020 10:38 PM    Creatinine 1.69 (H) 03/19/2020 10:38 PM    Calcium 10.2 (H) 03/19/2020 10:38 PM     Coagulation:   Lab Results   Component Value Date/Time    Prothrombin time 12.9 12/24/2018 10:14 PM    INR 1.0 12/24/2018 10:14 PM    aPTT 30.6 12/24/2018 10:14 PM     Cardiac markers: No results found for: CPK, CKND1, WILDA  ABGs: No results found for: PH, PO2, PCO2, HCO3    Assessment:     Active Problems:    Pseudoaneurysm of femoral artery ([address] (3/20/2020)        Plan:     78 y/o female with femoral blowout likely infected pseudoaneurysm of right CFA. --She needs emergent surgery but I can't get in touch with any family member  --Patient refuses blood and surgery  --We will continue to attempt to call family to see if we can perform ligation of her CFA to stop bleeding. --I cannot perform surgery on patient as she is currently refuses.   --We will attempt to treat her as best we can until she dies or accepts surgery. --Patient has been told this and seems to understand.     Signed By: Joy Perez MD     March 20, 2020

## 2020-03-20 NOTE — PROGRESS NOTES
Chart reviewed, patient is currently refusing treatment, plan for family meeting. Case management will reach out to family after medical decisions have been made regarding care. In regards to discharge planning, patient has been at [facility] and rehab since 2/4/2020 after AKA, would think SNF day are used up, however Mila at [facility] reports that patient is okay to return just need new auth ,[employer] was continuing to pay. Jermain Scott will obtain authorization when she is medically cleared, however patient must be skillable though, cannot go on comfort/hospice if pt continues to refuse treatment patient will have to go home. Per chart, Medicaid application was supposed to be completed, however patient does not have Medicaid, have consulted [employer] to assist in this. UAI was completed on 01/07/2020, successfully processed. Per Mlia, patient has not turned in any documents to support medicaid application, business office was trying to work with , Luciano Randle- brother to obtain these. Per Melissa Frank, patient has $19,000 in bank account and life insurance policy. Patient would be on spend down before medicaid could be obtained anyways.         LISA Obrien  Case Management  [phone]

## 2020-03-20 NOTE — PROGRESS NOTES
conducted an initial consultation and Spiritual Assessment for Nidia Walker, who is a 79 y.o.,female. Patient's Primary Language is: [place]. According to the patient's EMR Amish Affiliation is: Jehovah witness. The reason the Patient came to the hospital is:   Patient Active Problem List    Diagnosis Date Noted    Pseudoaneurysm of femoral artery ([facility] 03/20/2020    Anemia of chronic renal failure, stage 4 (severe) ([facility] 02/19/2020    Ischemia of foot 01/20/2020    Pancreatitis 01/03/2020    Hyperosmolar hyperglycemic coma due to diabetes mellitus without ketoacidosis ([facility] 01/03/2020    Hyperosmolar non-ketotic state in patient with type 2 diabetes mellitus ([facility] 01/03/2020    Hyperglycemia 12/25/2018    Type 2 diabetes mellitus with hyperosmolarity ([facility] 12/25/2018    Acute UTI 12/25/2018    Dehydration 12/25/2018    Acute renal failure (ARF) ([facility] 12/25/2018    Noncompliance 12/25/2018      Patient does not want visits from  at this time. The  provided the following Interventions:  Initiated a relationship of care and support. Explored issues of anitha, belief, spirituality and Oriental orthodox/ritual needs while hospitalized. Listened empathically. Provided chaplaincy education. Provided information about Spiritual Care Services. Offered assurance of continued prayers on patient's behalf. Chart reviewed. The following outcomes where achieved:      Assessment:  Patient does not have any Oriental orthodox/cultural needs that will affect patient's preferences in health care. There are no spiritual or Oriental orthodox issues which require intervention at this time. Plan:  Chaplains will continue to follow and will provide pastoral care on an as needed/requested basis.  recommends bedside caregivers page  on duty if patient shows signs of acute spiritual or emotional distress. Chaplain Joana ALFONSO  [address] [facility]   [phone]

## 2020-03-20 NOTE — ROUTINE PROCESS
TRANSFER - OUT REPORT: 
 
Verbal report given to Jasmina Quinteros RN on Rigoberto Conley  being transferred to CVT ICU for routine post - op Report consisted of patients Situation, Background, Assessment and  
Recommendations(SBAR). Information from the following report(s) SBAR, Kardex, OR Summary, Procedure Summary, Intake/Output, MAR and Recent Results was reviewed with the receiving nurse. Lines:  
Peripheral IV 03/19/20 Right;Upper Arm (Active) Site Assessment Clean, dry, & intact 3/19/2020 10:55 PM  
Phlebitis Assessment 0 3/19/2020 10:55 PM  
Infiltration Assessment 0 3/19/2020 10:55 PM  
Dressing Status Clean, dry, & intact 3/19/2020 10:55 PM  
Dressing Type Transparent 3/19/2020 10:55 PM  
Hub Color/Line Status Pink;Flushed;Patent 3/19/2020 10:55 PM  
Action Taken Blood drawn 3/19/2020 10:55 PM  
   
Peripheral IV 03/19/20 Left Antecubital (Active) Site Assessment Clean, dry, & intact 3/19/2020 10:56 PM  
Phlebitis Assessment 0 3/19/2020 10:56 PM  
Infiltration Assessment 0 3/19/2020 10:56 PM  
Dressing Status Clean, dry, & intact 3/19/2020 10:56 PM  
Dressing Type Transparent 3/19/2020 10:56 PM  
Hub Color/Line Status Pink;Flushed;Patent 3/19/2020 10:56 PM  
Action Taken Blood drawn 3/19/2020 10:56 PM  
  
 
Opportunity for questions and clarification was provided. Patient transported with: 
 Monitor Registered Nurse

## 2020-03-20 NOTE — PROGRESS NOTES
7494  AMPatient arrived to the cvt stepdown unit at 0620 am. Pt was bathed with chlorhexidine wipes informed that Dr. Lord Mata will be in this morning to discuss a procedure with her.  6698 pT PLACED on cardiac telemetry monitor in stable condition, denies pain and denies shortness of breath.  Bed is in the lowest position with the wheels locked and call bell placed on top of patients blanket by her hand

## 2020-03-20 NOTE — ED TRIAGE NOTES
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

## 2020-03-20 NOTE — ED NOTES
TRANSFER - OUT REPORT:    Verbal report given to [employer] (name) on Nidia Walker  being transferred to CVT stepdown rm: 2310(unit) for routine progression of care       Report consisted of patients Situation, Background, Assessment and   Recommendations(SBAR). Information from the following report(s) SBAR, Kardex, Procedure Summary, Recent Results and Med Rec Status was reviewed with the receiving nurse. Lines:   Peripheral IV 03/19/20 Right;Upper Arm (Active)   Site Assessment Clean, dry, & intact 3/19/2020 10:55 PM   Phlebitis Assessment 0 3/19/2020 10:55 PM   Infiltration Assessment 0 3/19/2020 10:55 PM   Dressing Status Clean, dry, & intact 3/19/2020 10:55 PM   Dressing Type Transparent 3/19/2020 10:55 PM   Hub Color/Line Status Pink;Flushed;Patent 3/19/2020 10:55 PM   Action Taken Blood drawn 3/19/2020 10:55 PM       Peripheral IV 03/19/20 Left Antecubital (Active)   Site Assessment Clean, dry, & intact 3/19/2020 10:56 PM   Phlebitis Assessment 0 3/19/2020 10:56 PM   Infiltration Assessment 0 3/19/2020 10:56 PM   Dressing Status Clean, dry, & intact 3/19/2020 10:56 PM   Dressing Type Transparent 3/19/2020 10:56 PM   Hub Color/Line Status Pink;Flushed;Patent 3/19/2020 10:56 PM   Action Taken Blood drawn 3/19/2020 10:56 PM        Opportunity for questions and clarification was provided.       Patient transported with:   Registered Nurse

## 2020-03-20 NOTE — CONSULTS
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 female with h/o type 2 DM (Last hgbA1C 14.2 on 1/2/20) who presented to [facility] on 3/20/20  With  bleeding over past couple of days with opening of skin wound on right groin. 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 Intra op cultures - negative    Recommendations:    1. D/c cefazolin. Start pip/tazo, vancomycin  2. F/u intra op cultures  3. Will need prolonged iv abx. Thank you for consultation request. Above plan was discussed in details with patient and dr Go Means. Please call me if any further questions or concerns. Will continue to participate in the care of this patient. HPI:    79 y.o. female with h/o type 2 DM (Last hgbA1C 14.2 on 1/2/20) who presented to [facility] on 3/20/20  With  bleeding over past couple of days with opening of skin wound on right groin. Patient did not have any fever or leukocytosis on presentation. She was evaluated by vascular surgery. There was concern for infection of the right common femoral artery graft. Status post ligation of the right external iliac artery, removal of right infected common femoral artery bypass graft, ligation of the distal right common femoral artery, ligation of the proximal right common femoral artery on 3/20. Patient was given cefazolin perioperatively. I have been consulted for further recommendations. She was recently admitted to [facility] and discharged on 2/3/2020.   At that time, she presented with ischemia of her right foot due to Right lower extremity PAD with gangrene. CTA showed multifocal stenosis of RLE. She underwent angio with balloon angioplasty and stent placement at origin of common iliac on 1/13/2020. She underwent right femoral to above the knee popliteal bypass on 1/16/2020. Then she had right AKA on 1/30/2020    Patient is sleepy. Detailed ros not feasible. Past Medical History:   Diagnosis Date    Diabetes ([address]     Hypercholesterolemia     Hypertension        History reviewed. No pertinent surgical history. Medication List    Details   docusate sodium (COLACE) 100 mg capsule Take 100 mg by mouth two (2) times a day. OLANZapine (ZYPREXA) 2.5 mg tablet Take  by mouth nightly. aspirin 81 mg chewable tablet Take 1 Tab by mouth daily. Qty: 30 Tab, Refills: 0      hydroCHLOROthiazide (HYDRODIURIL) 25 mg tablet Take 25 mg by mouth daily. loratadine (CLARITIN) 10 mg tablet Take 10 mg by mouth daily. losartan (COZAAR) 100 mg tablet Take 100 mg by mouth daily. glipiZIDE (GLUCOTROL) 5 mg tablet Take 5 mg by mouth two (2) times a day. metFORMIN (GLUCOPHAGE) 500 mg tablet Take 500 mg by mouth daily (with breakfast). insulin lispro (HUMALOG) 100 unit/mL injection Less than 150 =   0 units  150 -199 =   3 units  200 -249 =   6 units  250 -299 =   9 units  300 -349 =   12 units  350 and above =   15 units, CALL MD  Qty: 1 Vial, Refills: 0      albuterol-ipratropium (DUO-NEB) 2.5 mg-0.5 mg/3 ml nebu 3 mL by Nebulization route every six (6) hours as needed (wheezing). Qty: 30 Nebule, Refills: 0      acetaminophen (TYLENOL) 325 mg tablet Take 2 Tabs by mouth every six (6) hours as needed. Qty: 30 Tab, Refills: 0      cholecalciferol, vitamin D3, (VITAMIN D3) 2,000 unit tab Take  by mouth. atorvastatin (LIPITOR) 20 mg tablet Take  by mouth daily.              Current Facility-Administered Medications   Medication Dose Route Frequency    sodium chloride 0.9 % bolus infusion 1,000 mL  1,000 mL IntraVENous ONCE    heparinized saline 2 units/mL 1,000 unit/500 mL infusion        ceFAZolin in dextrose (iso-os) 2 gram/50 mL IVPB pgbk        albuterol-ipratropium (DUO-NEB) 2.5 MG-0.5 MG/3 ML  3 mL Nebulization Q6H PRN    [START ON 3/21/2020] aspirin chewable tablet 81 mg  81 mg Oral DAILY    [START ON 3/21/2020] atorvastatin (LIPITOR) tablet 20 mg  20 mg Oral DAILY    docusate sodium (COLACE) capsule 100 mg  100 mg Oral BID    glipiZIDE (GLUCOTROL) tablet 5 mg  5 mg Oral BID    [START ON 3/21/2020] hydroCHLOROthiazide (HYDRODIURIL) tablet 25 mg  25 mg Oral DAILY    [START ON 3/21/2020] loratadine (CLARITIN) tablet 10 mg  10 mg Oral DAILY    [START ON 3/21/2020] losartan (COZAAR) tablet 100 mg  100 mg Oral DAILY    [START ON 3/23/2020] metFORMIN (GLUCOPHAGE) tablet 500 mg  500 mg Oral DAILY WITH BREAKFAST    OLANZapine (ZyPREXA) tablet 2.5 mg  2.5 mg Oral QHS    sodium chloride (NS) flush 5-40 mL  5-40 mL IntraVENous Q8H    sodium chloride (NS) flush 5-40 mL  5-40 mL IntraVENous PRN    acetaminophen (TYLENOL) tablet 650 mg  650 mg Oral Q4H PRN    oxyCODONE-acetaminophen (PERCOCET) 5-325 mg per tablet 1 Tab  1 Tab Oral Q4H PRN    morphine injection 2 mg  2 mg IntraVENous Q2H PRN    naloxone (NARCAN) injection 0.4 mg  0.4 mg IntraVENous PRN    ondansetron (ZOFRAN) injection 4 mg  4 mg IntraVENous Q4H PRN    diphenhydrAMINE (BENADRYL) injection 12.5 mg  12.5 mg IntraVENous Q4H PRN    magnesium hydroxide (MILK OF MAGNESIA) 400 mg/5 mL oral suspension 30 mL  30 mL Oral DAILY PRN    heparin (porcine) injection 5,000 Units  5,000 Units SubCUTAneous Q8H    insulin lispro (HUMALOG) injection   SubCUTAneous AC&HS    glucose chewable tablet 16 g  4 Tab Oral PRN    glucagon (GLUCAGEN) injection 1 mg  1 mg IntraMUSCular PRN    dextrose (D50W) injection syrg 12.5-25 g  25-50 mL IntraVENous PRN    albumin human 5% (BUMINATE) 5 % solution        lactated Ringers infusion  125 mL/hr IntraVENous CONTINUOUS    PHENYLephrine (ALLA-SYNEPHRINE) 30 mg in 0.9% sodium chloride 250 mL infusion   mcg/min IntraVENous TITRATE    sodium chloride (NS) flush 5-10 mL  5-10 mL IntraVENous PRN    dextrose 5% and 0.9% NaCl infusion  50 mL/hr IntraVENous CONTINUOUS       Allergies: Patient has no known allergies.     Family History   Problem Relation Age of Onset    Hypertension Mother      Social History     Socioeconomic History    Marital status: SINGLE     Spouse name: Not on file    Number of children: Not on file    Years of education: Not on file    Highest education level: Not on file   Occupational History    Not on file   Social Needs    Financial resource strain: Not on file    Food insecurity     Worry: Not on file     Inability: Not on file    Transportation needs     Medical: Not on file     Non-medical: Not on file   Tobacco Use    Smoking status: Former Smoker     Packs/day: 3.00     Years: 20.00     Pack years: 60.00     Last attempt to quit: 1996     Years since quittin.1    Smokeless tobacco: Never Used   Substance and Sexual Activity    Alcohol use: Not on file    Drug use: Never    Sexual activity: Not Currently   Lifestyle    Physical activity     Days per week: Not on file     Minutes per session: Not on file    Stress: Not on file   Relationships    Social connections     Talks on phone: Not on file     Gets together: Not on file     Attends Congregation service: Not on file     Active member of club or organization: Not on file     Attends meetings of clubs or organizations: Not on file     Relationship status: Not on file    Intimate partner violence     Fear of current or ex partner: Not on file     Emotionally abused: Not on file     Physically abused: Not on file     Forced sexual activity: Not on file   Other Topics Concern    Not on file   Social History Narrative    Not on file     Social History     Tobacco Use   Smoking Status Former Smoker    Packs/day: 3.00    Years: 20.00    Pack years: 60.00    Last attempt to quit: 1996    Years since quittin.1   Smokeless Tobacco Never Used        Temp (24hrs), Av.2 °F (36.8 °C), Min:97.4 °F (36.3 °C), Max:98.7 °F (37.1 °C)    Visit Vitals  BP 90/44   Pulse (!) 58   Temp 97.4 °F (36.3 °C)   Resp 17   Wt 54.5 kg (120 lb 1.6 oz)   SpO2 100%   BMI 19.99 kg/m²       ROS: . Detailed ros not feasible. Physical Exam:    General: elderly female laying on the bed, sleepy,  no acute distress. HEENT:  Normocephalic, atraumatic,  Neck supple, no bruits. Lymph Nodes:   no cervical, axillary or inguinal adenopathy   Lungs:   non-labored, bilaterally clear to auscultation- no crackles wheezes rales or rhonchi   Heart:  RRR, s1 and s2; no rubs or gallops, no edema, + pedal pulses   Abdomen:  soft, non-distended, active bowel sounds, no hepatomegaly, no splenomegaly. 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 Speech appropriate.  Cranial nerves intact                        Skin:  No rash or ulcers noted   Back:  no spinal or paraspinal muscle tenderness or rigidity, no CVA tenderness     Psychiatric:  No suicidal or homicidal ideations, appropriate mood and affect         Labs: Results:   Chemistry Recent Labs     20   GLU 41*      K 4.6   *   CO2 25   BUN 49*   CREA 1.69*   CA 10.2*   AGAP 5   BUCR 29*   *   TP 9.0*   ALB 2.9*   GLOB 6.1*   AGRAT 0.5*      CBC w/Diff Recent Labs     20   WBC 7.5   RBC 3.48*   HGB 11.0*   HCT 35.1      GRANS 69   LYMPH 23   EOS 2      Microbiology Recent Labs     20  0439 20   CULT NO GROWTH AFTER 2 HOURS NO GROWTH AFTER 8 HOURS          RADIOLOGY:    All available imaging studies/reports in [facility] for this admission were reviewed    Dr. Bharathi Crook, Infectious Disease Specialist  [phone]  March 20, 2020  3:35 PM

## 2020-03-20 NOTE — BRIEF OP NOTE
BRIEF OPERATIVE NOTE    Date of Procedure: 3/20/2020   Preoperative Diagnosis: infected graft with femoral blow outDX  Postoperative Diagnosis: infected graft with femoral blow outDX    Procedure(s):  RIGHT COMMON FEMORAL ARTERY LIGATION / REMOVAL OF INFECTED GRAFT / WOUND VAC PLACEMENT  Surgeon(s) and Role:     * Mckenzie Freeman MD - Primary         Surgical Assistant: none    Surgical Staff:  Circ-1: Betsy Mercado RN  Scrub Tech-1: Chris JOHNSON  Surg Asst-1: Cholo Carlos  Event Time In   Incision Start 03/20/2020 1301   Incision Close      Anesthesia: General   Estimated Blood Loss: 150mL  Specimens:   ID Type Source Tests Collected by Time Destination   1 : Right Groin Body Fluid Wound CULTURE, ANAEROBIC, CULTURE, ANAEROBIC AND AEROBIC Mckenzie Freeman MD 3/20/2020 1349 Microbiology      Findings: infected graft with pseudoaneurysm bleeding off side of PTFE anastomosis   Complications: none  Implants: * No implants in log *

## 2020-03-20 NOTE — OP NOTES
Preoperative diagnosis: Likely infected graft of right common femoral artery with pseudoaneurysm and active bleeding femoral blowout. Postoperative diagnosis: Pseudoaneurysm off right common femoral artery with active bleeding likely infected graft    Procedures performed:  #1  Ligation of right external iliac artery with retroperitoneal exposure  #2  Removal of right infected common femoral previous bypass PTFE  #3  Ligation of distal right common femoral artery  #4  Ligation of proximal right common femoral artery  #5 right femoral wound washout  #6 placement of wound VAC within the right groin measurement 7.2 x 2.7 x 2.5 cm    Cultures: None    Specimens: None    Drains: None    Estimated blood loss: 150 mL     Assistants: None    Implants: Please see above    Complications: None    Anesthesia: General    Indications for the procedure:  Lucero Sanchez is a 79 y.o. female with femoral blowout in need of emergency surgery. Patient was given the appropriate risk and benefits of the procedure including but not limited to bleeding, infection, damage to adjacent structures, MI, stroke, death, loss of lower extremity, need for further surgery. Patient was understanding of all the risks and underwent a procedure. Operative findings:   #1  Active bleeding and loss of common femoral artery at the lateral aspect of the PTFE bypass anastomosis    Procedure:  Patient was correctly identified in the precath area and taken to the OR in stable condition. Patient had pre-incision timeout prior to any incision. Patient was prepped and draped in the normal sterile fashion according to CDC guidelines aseptic technique. Patient had preoperative antibiotics prior to any incision. We were able to make a retroperitoneal exposure dissect down to the right external iliac artery and ligate this with 2 separate 2-0 silk ties.   This was done given the fact that the patient is a [place] and we wanted minimal blood loss.  Family was aware. We then were able to go through the previous femoral incision easily identify the PTFE which was floating in blood. We were able to dissect down towards the arterial anastomosis identified active bleeding around the lateral aspect of the PTFE. We are able to get to the distal aspect of the common femoral artery and ligate this with 2-0 silk tie. We then were able to slowly dissect out the proximal common femoral artery clamped this with a angled femoral.  We then were able to oversew the proximal common femoral artery with 4-0 Prolene suture x2. We were able to fully remove all PTFE. We copiously irrigated the wound and closed over the artery with 2-0 Vicryl suture. We then were able to measure the wound measurements are shown below above and a wound VAC was placed. We did closed the retroperitoneal incision with a 2-0 Vicryl suture to fix a small tear within the abdominal cavity. We then were able to use 1-0  PDS suture to close the fascia. 2-0 Vicryl suture was used to close the fatty tissues together and then skin staple and cover Derm for dressing. This was done prior to us getting back into the groin. Patient tolerated the procedure well was extubated and taken to the ICU in stable condition. We did copiously irrigate both wounds prior to our closures and VAC placement.

## 2020-03-20 NOTE — ED PROVIDER NOTES
EMERGENCY DEPARTMENT HISTORY AND PHYSICAL EXAM    5:36 AM  Date: 3/19/2020  Patient Name: Abigail Aparicio    History of Presenting Illness     Chief Complaint   Patient presents with    Wound Check        History Provided By: Patient    HPI: Abigail Aparicio is a 79 y.o. female with history multiple medical problems as below including a right AKA status post femoral stent last week. Patient is presenting with bleeding and oozing from her femoral site. Denies any pain, fever or chills. No history of abdominal pain or vomiting. Location:  Severity:  Timing/course: Onset/Duration:     PCP: Eleni Garcia MD    Past History     Past Medical History:  Past Medical History:   Diagnosis Date    Diabetes ([address]     Hypercholesterolemia     Hypertension        Past Surgical History:  History reviewed. No pertinent surgical history. Family History:  Family History   Problem Relation Age of Onset    Hypertension Mother        Social History:  Social History     Tobacco Use    Smoking status: Former Smoker     Packs/day: 3.00     Years: 20.00     Pack years: 60.00     Last attempt to quit: 1996     Years since quittin.1    Smokeless tobacco: Never Used   Substance Use Topics    Alcohol use: Not on file    Drug use: Never       Allergies:  No Known Allergies    Review of Systems   Review of Systems   Musculoskeletal:        Lower extremity pain   All other systems reviewed and are negative.        Physical Exam     Patient Vitals for the past 12 hrs:   Temp Pulse Resp BP SpO2   20 0515  87 18 130/63 97 %   20 0500  83 16 134/56 99 %   20 0446 98.6 °F (37 °C) 84 18 125/52 100 %   20 0445  84 17 125/52 100 %   20 0432  81 21 (!) 102/30 97 %   20 0415  80 23  96 %   20 0400  82 20  96 %   20 0345  84 22  97 %   20 0330  82 22  96 %   20 0315  83 21  99 %   20 0300  80 23  98 %   20 0245  77 18  100 % 03/20/20 0230  75 20  98 %   03/20/20 0215  75 18  100 %   03/20/20 0200  94 23  100 %   03/20/20 0145  84 16  100 %   03/20/20 0130  83 16  100 %   03/20/20 0115  86 14  100 %   03/20/20 0100  87 21  100 %   03/20/20 0045  85 19  100 %   03/19/20 2345  93 20 131/60 99 %   03/19/20 2145  79 18 118/45    03/19/20 2130  79 18 115/42    03/19/20 2115  79 15 132/49    03/19/20 2105 98.7 °F (37.1 °C) 78 19 131/49 100 %   03/19/20 2100  84 16 131/49        Physical Exam  Vitals signs and nursing note reviewed. Constitutional:       Appearance: Normal appearance. HENT:      Head: Normocephalic and atraumatic. Nose: Nose normal.      Mouth/Throat:      Mouth: Mucous membranes are moist.   Eyes:      Extraocular Movements: Extraocular movements intact. Neck:      Musculoskeletal: Normal range of motion and neck supple. Cardiovascular:      Rate and Rhythm: Normal rate. Pulmonary:      Effort: Pulmonary effort is normal. No respiratory distress. Abdominal:      General: There is no distension. Palpations: Abdomen is soft. Tenderness: There is no abdominal tenderness. Musculoskeletal:      Comments: Right AKA. Groin site still open about 3 to 4 mm slowly oozing some scant blood. Surrounded by dried blood. Skin:     General: Skin is warm and dry. Neurological:      General: No focal deficit present. Mental Status: She is alert and oriented to person, place, and time.    Psychiatric:         Mood and Affect: Mood normal.         Behavior: Behavior normal.         Diagnostic Study Results     Labs -  Recent Results (from the past 12 hour(s))   METABOLIC PANEL, COMPREHENSIVE    Collection Time: 03/19/20 10:38 PM   Result Value Ref Range    Sodium 145 136 - 145 mmol/L    Potassium 4.6 3.5 - 5.5 mmol/L    Chloride 115 (H) 100 - 111 mmol/L    CO2 25 21 - 32 mmol/L    Anion gap 5 3.0 - 18 mmol/L    Glucose 41 (LL) 74 - 99 mg/dL    BUN 49 (H) 7.0 - 18 MG/DL    Creatinine 1.69 (H) 0.6 - 1.3 MG/DL    BUN/Creatinine ratio 29 (H) 12 - 20      GFR est AA 35 (L) >60 ml/min/1.73m2    GFR est non-AA 29 (L) >60 ml/min/1.73m2    Calcium 10.2 (H) 8.5 - 10.1 MG/DL    Bilirubin, total 0.3 0.2 - 1.0 MG/DL    ALT (SGPT) 15 13 - 56 U/L    AST (SGOT) 23 10 - 38 U/L    Alk. phosphatase 146 (H) 45 - 117 U/L    Protein, total 9.0 (H) 6.4 - 8.2 g/dL    Albumin 2.9 (L) 3.4 - 5.0 g/dL    Globulin 6.1 (H) 2.0 - 4.0 g/dL    A-G Ratio 0.5 (L) 0.8 - 1.7     CBC WITH AUTOMATED DIFF    Collection Time: 03/19/20 10:38 PM   Result Value Ref Range    WBC 7.5 4.6 - 13.2 K/uL    RBC 3.48 (L) 4.20 - 5.30 M/uL    HGB 11.0 (L) 12.0 - 16.0 g/dL    HCT 35.1 35.0 - 45.0 %    .9 (H) 74.0 - 97.0 FL    MCH 31.6 24.0 - 34.0 PG    MCHC 31.3 31.0 - 37.0 g/dL    RDW 14.2 11.6 - 14.5 %    PLATELET 744 194 - 885 K/uL    MPV 10.1 9.2 - 11.8 FL    NEUTROPHILS 69 40 - 73 %    LYMPHOCYTES 23 21 - 52 %    MONOCYTES 6 3 - 10 %    EOSINOPHILS 2 0 - 5 %    BASOPHILS 0 0 - 2 %    ABS. NEUTROPHILS 5.2 1.8 - 8.0 K/UL    ABS. LYMPHOCYTES 1.7 0.9 - 3.6 K/UL    ABS. MONOCYTES 0.5 0.05 - 1.2 K/UL    ABS. EOSINOPHILS 0.2 0.0 - 0.4 K/UL    ABS.  BASOPHILS 0.0 0.0 - 0.1 K/UL    DF AUTOMATED     LACTIC ACID    Collection Time: 03/19/20 10:50 PM   Result Value Ref Range    Lactic acid 1.5 0.4 - 2.0 MMOL/L   GLUCOSE, POC    Collection Time: 03/19/20 11:23 PM   Result Value Ref Range    Glucose (POC) 39 (LL) 70 - 110 mg/dL   GLUCOSE, POC    Collection Time: 03/19/20 11:45 PM   Result Value Ref Range    Glucose (POC) 135 (H) 70 - 110 mg/dL   EKG, 12 LEAD, INITIAL    Collection Time: 03/20/20  1:30 AM   Result Value Ref Range    Ventricular Rate 86 BPM    Atrial Rate 86 BPM    P-R Interval 110 ms    QRS Duration 66 ms    Q-T Interval 364 ms    QTC Calculation (Bezet) 435 ms    Calculated P Axis 58 degrees    Calculated R Axis -2 degrees    Calculated T Axis 21 degrees    Diagnosis       Sinus rhythm with short LA  Cannot rule out Anterior infarct (cited on or before 20-MAR-2020)  Abnormal ECG  When compared with ECG of 02-JAN-2020 20:25,  QRS duration has decreased     GLUCOSE, POC    Collection Time: 03/20/20  1:37 AM   Result Value Ref Range    Glucose (POC) 121 (H) 70 - 110 mg/dL   TYPE & SCREEN    Collection Time: 03/20/20  4:39 AM   Result Value Ref Range    Crossmatch Expiration 03/23/2020     ABO/Rh(D) A POSITIVE     Antibody screen NEG    GLUCOSE, POC    Collection Time: 03/20/20  4:53 AM   Result Value Ref Range    Glucose (POC) 191 (H) 70 - 110 mg/dL       Radiologic Studies -   No results found. Medical Decision Making     ED Course: Progress Notes, Reevaluation, and Consults:    5:36 AM Initial assessment performed. The patients presenting problems have been discussed, and they/their family are in agreement with the care plan formulated and outlined with them. I have encouraged them to ask questions as they arise throughout their visit. Case discussed with Dr. Lorne Fountain vascular surgeon, agrees on the plan and he asked to keep the patient n.p.o. for procedure tomorrow morning. Provider Notes (Medical Decision Making): 79-year-old female status post iliofemoral stent and AKA a week ago presenting with oozing from her femoral site. Did not feel any pulsatile mass in the area with no tenderness, surrounded by dried blood. The remaining part of the lower extremity feels warm and well perfused. I was unable to feel for femoral pulse. I will obtain screening labs as well as a CTA of her lower extremity to evaluate for pseudoaneurysm versus obstruction. Procedures:     Critical Care Time:     Vital Signs-Reviewed the patient's vital signs. Reviewed pt's pulse ox reading. EKG: Interpreted by the EP.    Time Interpreted:    Rate:    Rhythm:    Interpretation:   Comparison:     Records Reviewed: Nursing Notes (Time of Review: 5:36 AM)  -I am the first provider for this patient.  -I reviewed the vital signs, available nursing notes, past medical history, past surgical history, family history and social history. Current Facility-Administered Medications   Medication Dose Route Frequency Provider Last Rate Last Dose    sodium chloride 0.9 % bolus infusion 1,000 mL  1,000 mL IntraVENous ONCE Carlos Carvajal MD        sodium chloride (NS) flush 5-10 mL  5-10 mL IntraVENous PRN Carlos Carvajal MD        dextrose 5% and 0.9% NaCl infusion  50 mL/hr IntraVENous CONTINUOUS Carlos Carvajal MD   Stopped at 03/20/20 7468     Current Outpatient Medications   Medication Sig Dispense Refill    docusate sodium (COLACE) 100 mg capsule Take 100 mg by mouth two (2) times a day.  OLANZapine (ZYPREXA) 2.5 mg tablet Take  by mouth nightly.  aspirin 81 mg chewable tablet Take 1 Tab by mouth daily. 30 Tab 0    hydroCHLOROthiazide (HYDRODIURIL) 25 mg tablet Take 25 mg by mouth daily.  loratadine (CLARITIN) 10 mg tablet Take 10 mg by mouth daily.  losartan (COZAAR) 100 mg tablet Take 100 mg by mouth daily.  glipiZIDE (GLUCOTROL) 5 mg tablet Take 5 mg by mouth two (2) times a day.  metFORMIN (GLUCOPHAGE) 500 mg tablet Take 500 mg by mouth daily (with breakfast).  insulin lispro (HUMALOG) 100 unit/mL injection Less than 150 =   0 units  150 -199 =   3 units  200 -249 =   6 units  250 -299 =   9 units  300 -349 =   12 units  350 and above =   15 units, CALL MD 1 Vial 0    albuterol-ipratropium (DUO-NEB) 2.5 mg-0.5 mg/3 ml nebu 3 mL by Nebulization route every six (6) hours as needed (wheezing). 30 Nebule 0    acetaminophen (TYLENOL) 325 mg tablet Take 2 Tabs by mouth every six (6) hours as needed. 30 Tab 0    cholecalciferol, vitamin D3, (VITAMIN D3) 2,000 unit tab Take  by mouth.  atorvastatin (LIPITOR) 20 mg tablet Take  by mouth daily. Clinical Impression     Clinical Impression: No diagnosis found.     Disposition: Admit      This note was dictated utilizing voice recognition software which may lead to typographical errors. I apologize in advance if the situation occurs. If questions arise please do not hesitate to contact me or call our department.     Candi Baker MD  5:36 AM

## 2020-03-21 LAB
ANION GAP SERPL CALC-SCNC: 5 MMOL/L (ref 3–18)
BUN SERPL-MCNC: 25 MG/DL (ref 7–18)
BUN/CREAT SERPL: 20 (ref 12–20)
CALCIUM SERPL-MCNC: 9 MG/DL (ref 8.5–10.1)
CHLORIDE SERPL-SCNC: 119 MMOL/L (ref 100–111)
CO2 SERPL-SCNC: 22 MMOL/L (ref 21–32)
CREAT SERPL-MCNC: 1.23 MG/DL (ref 0.6–1.3)
ERYTHROCYTE [DISTWIDTH] IN BLOOD BY AUTOMATED COUNT: 14.3 % (ref 11.6–14.5)
EST. AVERAGE GLUCOSE BLD GHB EST-MCNC: 140 MG/DL
GLUCOSE BLD STRIP.AUTO-MCNC: 103 MG/DL (ref 70–110)
GLUCOSE BLD STRIP.AUTO-MCNC: 107 MG/DL (ref 70–110)
GLUCOSE BLD STRIP.AUTO-MCNC: 241 MG/DL (ref 70–110)
GLUCOSE BLD STRIP.AUTO-MCNC: 49 MG/DL (ref 70–110)
GLUCOSE BLD STRIP.AUTO-MCNC: 55 MG/DL (ref 70–110)
GLUCOSE BLD STRIP.AUTO-MCNC: 87 MG/DL (ref 70–110)
GLUCOSE BLD STRIP.AUTO-MCNC: 89 MG/DL (ref 70–110)
GLUCOSE SERPL-MCNC: 84 MG/DL (ref 74–99)
HBA1C MFR BLD: 6.5 % (ref 4.2–5.6)
HCT VFR BLD AUTO: 28.6 % (ref 35–45)
HGB BLD-MCNC: 8.7 G/DL (ref 12–16)
MCH RBC QN AUTO: 30.7 PG (ref 24–34)
MCHC RBC AUTO-ENTMCNC: 30.4 G/DL (ref 31–37)
MCV RBC AUTO: 101.1 FL (ref 74–97)
PLATELET # BLD AUTO: 252 K/UL (ref 135–420)
PMV BLD AUTO: 10.5 FL (ref 9.2–11.8)
POTASSIUM SERPL-SCNC: 4.8 MMOL/L (ref 3.5–5.5)
RBC # BLD AUTO: 2.83 M/UL (ref 4.2–5.3)
SODIUM SERPL-SCNC: 146 MMOL/L (ref 136–145)
VANCOMYCIN SERPL-MCNC: 11.2 UG/ML (ref 5–40)
WBC # BLD AUTO: 9 K/UL (ref 4.6–13.2)

## 2020-03-21 PROCEDURE — 83036 HEMOGLOBIN GLYCOSYLATED A1C: CPT

## 2020-03-21 PROCEDURE — 94762 N-INVAS EAR/PLS OXIMTRY CONT: CPT

## 2020-03-21 PROCEDURE — 74011250637 HC RX REV CODE- 250/637: Performed by: SURGERY

## 2020-03-21 PROCEDURE — 74011250636 HC RX REV CODE- 250/636: Performed by: SURGERY

## 2020-03-21 PROCEDURE — 74011636637 HC RX REV CODE- 636/637: Performed by: SURGERY

## 2020-03-21 PROCEDURE — 85027 COMPLETE CBC AUTOMATED: CPT

## 2020-03-21 PROCEDURE — 74011000258 HC RX REV CODE- 258: Performed by: INTERNAL MEDICINE

## 2020-03-21 PROCEDURE — 36415 COLL VENOUS BLD VENIPUNCTURE: CPT

## 2020-03-21 PROCEDURE — 82962 GLUCOSE BLOOD TEST: CPT

## 2020-03-21 PROCEDURE — 74011250636 HC RX REV CODE- 250/636: Performed by: INTERNAL MEDICINE

## 2020-03-21 PROCEDURE — 65620000000 HC RM CCU GENERAL

## 2020-03-21 PROCEDURE — 80202 ASSAY OF VANCOMYCIN: CPT

## 2020-03-21 PROCEDURE — 80048 BASIC METABOLIC PNL TOTAL CA: CPT

## 2020-03-21 RX ORDER — VANCOMYCIN HYDROCHLORIDE
1250 EVERY 24 HOURS
Status: DISCONTINUED | OUTPATIENT
Start: 2020-03-21 | End: 2020-03-22

## 2020-03-21 RX ADMIN — HEPARIN SODIUM 5000 UNITS: 5000 INJECTION INTRAVENOUS; SUBCUTANEOUS at 15:30

## 2020-03-21 RX ADMIN — OXYCODONE HYDROCHLORIDE AND ACETAMINOPHEN 1 TABLET: 5; 325 TABLET ORAL at 21:55

## 2020-03-21 RX ADMIN — GLIPIZIDE 5 MG: 5 TABLET ORAL at 17:20

## 2020-03-21 RX ADMIN — LORATADINE 10 MG: 10 TABLET ORAL at 09:40

## 2020-03-21 RX ADMIN — DOCUSATE SODIUM 100 MG: 100 CAPSULE, LIQUID FILLED ORAL at 09:40

## 2020-03-21 RX ADMIN — PIPERACILLIN AND TAZOBACTAM 3.38 G: 3; .375 INJECTION, POWDER, FOR SOLUTION INTRAVENOUS at 10:51

## 2020-03-21 RX ADMIN — MORPHINE SULFATE 2 MG: 2 INJECTION, SOLUTION INTRAMUSCULAR; INTRAVENOUS at 17:56

## 2020-03-21 RX ADMIN — ASPIRIN 81 MG 81 MG: 81 TABLET ORAL at 09:40

## 2020-03-21 RX ADMIN — Medication 10 ML: at 21:56

## 2020-03-21 RX ADMIN — DOCUSATE SODIUM 100 MG: 100 CAPSULE, LIQUID FILLED ORAL at 17:18

## 2020-03-21 RX ADMIN — PIPERACILLIN AND TAZOBACTAM 3.38 G: 3; .375 INJECTION, POWDER, FOR SOLUTION INTRAVENOUS at 04:35

## 2020-03-21 RX ADMIN — OLANZAPINE 2.5 MG: 2.5 TABLET, FILM COATED ORAL at 21:55

## 2020-03-21 RX ADMIN — LOSARTAN POTASSIUM 100 MG: 50 TABLET ORAL at 09:40

## 2020-03-21 RX ADMIN — HEPARIN SODIUM 5000 UNITS: 5000 INJECTION INTRAVENOUS; SUBCUTANEOUS at 07:03

## 2020-03-21 RX ADMIN — VANCOMYCIN HYDROCHLORIDE 1250 MG: 10 INJECTION, POWDER, LYOPHILIZED, FOR SOLUTION INTRAVENOUS at 23:50

## 2020-03-21 RX ADMIN — PIPERACILLIN AND TAZOBACTAM 3.38 G: 3; .375 INJECTION, POWDER, FOR SOLUTION INTRAVENOUS at 22:24

## 2020-03-21 RX ADMIN — PIPERACILLIN AND TAZOBACTAM 3.38 G: 3; .375 INJECTION, POWDER, FOR SOLUTION INTRAVENOUS at 17:18

## 2020-03-21 RX ADMIN — HYDROCHLOROTHIAZIDE 25 MG: 25 TABLET ORAL at 09:40

## 2020-03-21 RX ADMIN — GLIPIZIDE 5 MG: 5 TABLET ORAL at 09:40

## 2020-03-21 RX ADMIN — Medication 10 ML: at 07:04

## 2020-03-21 RX ADMIN — ATORVASTATIN CALCIUM 20 MG: 20 TABLET, FILM COATED ORAL at 09:40

## 2020-03-21 RX ADMIN — Medication 10 ML: at 15:30

## 2020-03-21 RX ADMIN — INSULIN LISPRO 4 UNITS: 100 INJECTION, SOLUTION INTRAVENOUS; SUBCUTANEOUS at 21:55

## 2020-03-21 RX ADMIN — HEPARIN SODIUM 5000 UNITS: 5000 INJECTION INTRAVENOUS; SUBCUTANEOUS at 22:24

## 2020-03-21 NOTE — PROGRESS NOTES
Pt seen and examined. No events. Visit Vitals  /63   Pulse 99   Temp 98.6 °F (37 °C)   Resp 20   Wt 131 lb 11.2 oz (59.7 kg) Comment: Taken by Jeanna Cheung RN   SpO2 100%   BMI 21.92 kg/m²     NAD  Lethargic  Right abdominal incision c/d/i. Right groin with wound vac    Continue with VAC therapy. Plan for change on Monday. Abx per ID. Will follow up with cultures.   ICU until tomorrow

## 2020-03-21 NOTE — ROUTINE PROCESS
1910 Pt received after bedside shift report from Kevin Nicole RN. Pt resting in bed with eyes closed. Easily aroused. VSS. Arterial line to right radial in place. LR infusing at 25 cc/hr as ordered. Wound vac to right groin in place with no output in canister noted. Bed locked and in low position. Call bell in reach. 2300 Pt soiled with urine. Patricia care performed, Bed pads changed, Mepilex applied. Repositioned. Pure wick changed. Call bell in reach Bedside shift change report given to Marilyn Villalobos RN (oncoming nurses) by Tami Wyatt RN (offgoing nurse). Report included the following information Sbar, Mar, Kardex and Recent Results.

## 2020-03-21 NOTE — PROGRESS NOTES
0735-Assumed care of pt.  0749-Pt's blood glucose 103 prior to breakfast.  0800-Pt assessment done. Wound vac to right groin intact, no air leak noted. Pulses to left lower leg found by doppler. Pt sleeping but easily aroused. Pt denies pain at this time. 0940-Scheduled meds given. Pt reminded to eat breakfast. Pt refused breakfast, states she is not hungry. 1051-Zosyn given. 1132-Blood glucose 87. Pt encouraged to eat lunch because she did not eat any breakfast.  1200-Pt ate very little lunch, a couple of bites of chicken and potato with a few grapes, also given orange juice with meal.  1641-First blood glucose check 49, recheck 55. Pt alert and oriented to person, place, and time, not situation. Pt given attila crackers with peanut butter and orange juice. Pt was fed crackers by nurse. 1708-Blood glucose recheck after snack 89. Pt given dinner tray with pasta and cranberry juice. Will continue to monitor. 1730-Pt states she is worried that her family does not know where she is. Pt informed that family has called to check on her today while she was asleep, nurse spoke with her nephew/Felipa when he called. Pt verbalized understanding. 1812-Pt's blood glucose rechecked again, 107. Pt states she is starting to fell like herself again after surgery. 1845-Pt's right arm IV found infiltrated, IV fluids stopped. 1855-Pt transferred to new room/2315.

## 2020-03-22 LAB
ANION GAP SERPL CALC-SCNC: 6 MMOL/L (ref 3–18)
BACTERIA SPEC CULT: ABNORMAL
BUN SERPL-MCNC: 27 MG/DL (ref 7–18)
BUN/CREAT SERPL: 18 (ref 12–20)
CALCIUM SERPL-MCNC: 9.1 MG/DL (ref 8.5–10.1)
CHLORIDE SERPL-SCNC: 120 MMOL/L (ref 100–111)
CO2 SERPL-SCNC: 20 MMOL/L (ref 21–32)
CREAT SERPL-MCNC: 1.48 MG/DL (ref 0.6–1.3)
GLUCOSE BLD STRIP.AUTO-MCNC: 113 MG/DL (ref 70–110)
GLUCOSE BLD STRIP.AUTO-MCNC: 117 MG/DL (ref 70–110)
GLUCOSE BLD STRIP.AUTO-MCNC: 119 MG/DL (ref 70–110)
GLUCOSE BLD STRIP.AUTO-MCNC: 197 MG/DL (ref 70–110)
GLUCOSE SERPL-MCNC: 38 MG/DL (ref 74–99)
GRAM STN SPEC: ABNORMAL
GRAM STN SPEC: ABNORMAL
POTASSIUM SERPL-SCNC: 4.7 MMOL/L (ref 3.5–5.5)
SERVICE CMNT-IMP: ABNORMAL
SODIUM SERPL-SCNC: 146 MMOL/L (ref 136–145)

## 2020-03-22 PROCEDURE — 82962 GLUCOSE BLOOD TEST: CPT

## 2020-03-22 PROCEDURE — 36415 COLL VENOUS BLD VENIPUNCTURE: CPT

## 2020-03-22 PROCEDURE — 74011250637 HC RX REV CODE- 250/637: Performed by: SURGERY

## 2020-03-22 PROCEDURE — 87040 BLOOD CULTURE FOR BACTERIA: CPT

## 2020-03-22 PROCEDURE — 74011250636 HC RX REV CODE- 250/636: Performed by: SURGERY

## 2020-03-22 PROCEDURE — 65620000000 HC RM CCU GENERAL

## 2020-03-22 PROCEDURE — 74011636637 HC RX REV CODE- 636/637: Performed by: SURGERY

## 2020-03-22 PROCEDURE — 74011250636 HC RX REV CODE- 250/636: Performed by: INTERNAL MEDICINE

## 2020-03-22 PROCEDURE — 74011000258 HC RX REV CODE- 258: Performed by: INTERNAL MEDICINE

## 2020-03-22 PROCEDURE — 80048 BASIC METABOLIC PNL TOTAL CA: CPT

## 2020-03-22 RX ORDER — DEXTROSE 50 % IN WATER (D50W) INTRAVENOUS SYRINGE
Status: DISPENSED
Start: 2020-03-22 | End: 2020-03-22

## 2020-03-22 RX ADMIN — PIPERACILLIN AND TAZOBACTAM 3.38 G: 3; .375 INJECTION, POWDER, FOR SOLUTION INTRAVENOUS at 22:11

## 2020-03-22 RX ADMIN — PIPERACILLIN AND TAZOBACTAM 3.38 G: 3; .375 INJECTION, POWDER, FOR SOLUTION INTRAVENOUS at 06:26

## 2020-03-22 RX ADMIN — ASPIRIN 81 MG 81 MG: 81 TABLET ORAL at 08:49

## 2020-03-22 RX ADMIN — LOSARTAN POTASSIUM 100 MG: 50 TABLET ORAL at 08:49

## 2020-03-22 RX ADMIN — Medication 10 ML: at 22:10

## 2020-03-22 RX ADMIN — SODIUM CHLORIDE, SODIUM LACTATE, POTASSIUM CHLORIDE, AND CALCIUM CHLORIDE 25 ML/HR: 600; 310; 30; 20 INJECTION, SOLUTION INTRAVENOUS at 07:38

## 2020-03-22 RX ADMIN — ONDANSETRON 4 MG: 2 INJECTION INTRAMUSCULAR; INTRAVENOUS at 22:04

## 2020-03-22 RX ADMIN — HYDROCHLOROTHIAZIDE 25 MG: 25 TABLET ORAL at 08:49

## 2020-03-22 RX ADMIN — LORATADINE 10 MG: 10 TABLET ORAL at 08:49

## 2020-03-22 RX ADMIN — Medication 10 ML: at 15:34

## 2020-03-22 RX ADMIN — ATORVASTATIN CALCIUM 20 MG: 20 TABLET, FILM COATED ORAL at 08:49

## 2020-03-22 RX ADMIN — PIPERACILLIN AND TAZOBACTAM 3.38 G: 3; .375 INJECTION, POWDER, FOR SOLUTION INTRAVENOUS at 17:16

## 2020-03-22 RX ADMIN — GLIPIZIDE 5 MG: 5 TABLET ORAL at 17:16

## 2020-03-22 RX ADMIN — PIPERACILLIN AND TAZOBACTAM 3.38 G: 3; .375 INJECTION, POWDER, FOR SOLUTION INTRAVENOUS at 11:30

## 2020-03-22 RX ADMIN — MORPHINE SULFATE 2 MG: 2 INJECTION, SOLUTION INTRAMUSCULAR; INTRAVENOUS at 22:03

## 2020-03-22 RX ADMIN — INSULIN LISPRO 2 UNITS: 100 INJECTION, SOLUTION INTRAVENOUS; SUBCUTANEOUS at 17:18

## 2020-03-22 RX ADMIN — HEPARIN SODIUM 5000 UNITS: 5000 INJECTION INTRAVENOUS; SUBCUTANEOUS at 15:33

## 2020-03-22 RX ADMIN — Medication 10 ML: at 06:27

## 2020-03-22 RX ADMIN — HEPARIN SODIUM 5000 UNITS: 5000 INJECTION INTRAVENOUS; SUBCUTANEOUS at 07:41

## 2020-03-22 RX ADMIN — OLANZAPINE 2.5 MG: 2.5 TABLET, FILM COATED ORAL at 22:09

## 2020-03-22 RX ADMIN — DOCUSATE SODIUM 100 MG: 100 CAPSULE, LIQUID FILLED ORAL at 08:49

## 2020-03-22 RX ADMIN — HEPARIN SODIUM 5000 UNITS: 5000 INJECTION INTRAVENOUS; SUBCUTANEOUS at 22:09

## 2020-03-22 RX ADMIN — DOCUSATE SODIUM 100 MG: 100 CAPSULE, LIQUID FILLED ORAL at 17:16

## 2020-03-22 RX ADMIN — GLIPIZIDE 5 MG: 5 TABLET ORAL at 11:30

## 2020-03-22 NOTE — PROGRESS NOTES
Bedside shift change report given to Salvador Rodriguez RN (oncoming nurse) by Kris Nobles RN (offgoing nurse). Report included the following information SBAR, Intake/Output, MAR and Cardiac Rhythm NSR.

## 2020-03-22 NOTE — PROGRESS NOTES
Pt seen and examined. No events. Visit Vitals  /61   Pulse 95   Temp 97.8 °F (36.6 °C)   Resp 17   Wt 138 lb 4.8 oz (62.7 kg)   SpO2 100%   BMI 23.01 kg/m²     NAD  RRR  Right abdominal wall incision c/d/i.  R groin with wound VAC. Minimal drainage. No bleeding or purulence. Appreciate Dr. Ye Render input. Follow ID recs. Wound VAC change tomorrow.   Dispo planning

## 2020-03-22 NOTE — ANESTHESIA POSTPROCEDURE EVALUATION
Procedure(s):  RIGHT COMMON FEMORAL ARTERY LIGATION / REMOVAL OF INFECTED GRAFT / WOUND VAC PLACEMENT. general    Anesthesia Post Evaluation      Multimodal analgesia: multimodal analgesia used between 6 hours prior to anesthesia start to PACU discharge  Patient location during evaluation: bedside  Patient participation: complete - patient participated  Level of consciousness: awake  Pain score: 0  Pain management: adequate  Airway patency: patent  Anesthetic complications: no  Cardiovascular status: stable  Respiratory status: acceptable  Hydration status: acceptable  Post anesthesia nausea and vomiting:  none      Vitals Value Taken Time   BP     Temp     Pulse 95 3/22/2020 11:36 AM   Resp 20 3/22/2020 11:36 AM   SpO2 85 % 3/22/2020 11:32 AM   Vitals shown include unvalidated device data.

## 2020-03-22 NOTE — PROGRESS NOTES
Interim events noted. Blood culture positive for morganella. Wound cx: gnr. Continue pip/tazo. D/c vancomycin.    Repeat blood culture x 1 today to determine clearance of bacteremia

## 2020-03-23 LAB
ANION GAP SERPL CALC-SCNC: 5 MMOL/L (ref 3–18)
BACTERIA SPEC CULT: ABNORMAL
BUN SERPL-MCNC: 19 MG/DL (ref 7–18)
BUN/CREAT SERPL: 15 (ref 12–20)
CALCIUM SERPL-MCNC: 9.2 MG/DL (ref 8.5–10.1)
CHLORIDE SERPL-SCNC: 117 MMOL/L (ref 100–111)
CO2 SERPL-SCNC: 23 MMOL/L (ref 21–32)
CREAT SERPL-MCNC: 1.27 MG/DL (ref 0.6–1.3)
GLUCOSE BLD STRIP.AUTO-MCNC: 102 MG/DL (ref 70–110)
GLUCOSE BLD STRIP.AUTO-MCNC: 168 MG/DL (ref 70–110)
GLUCOSE BLD STRIP.AUTO-MCNC: 194 MG/DL (ref 70–110)
GLUCOSE BLD STRIP.AUTO-MCNC: 76 MG/DL (ref 70–110)
GLUCOSE BLD STRIP.AUTO-MCNC: 79 MG/DL (ref 70–110)
GLUCOSE SERPL-MCNC: 66 MG/DL (ref 74–99)
GRAM STN SPEC: ABNORMAL
GRAM STN SPEC: ABNORMAL
POTASSIUM SERPL-SCNC: 4.4 MMOL/L (ref 3.5–5.5)
SERVICE CMNT-IMP: ABNORMAL
SODIUM SERPL-SCNC: 145 MMOL/L (ref 136–145)

## 2020-03-23 PROCEDURE — 74011250637 HC RX REV CODE- 250/637: Performed by: SURGERY

## 2020-03-23 PROCEDURE — 77030019934 HC DRSG VAC ASST KCON -B

## 2020-03-23 PROCEDURE — 82962 GLUCOSE BLOOD TEST: CPT

## 2020-03-23 PROCEDURE — 97605 NEG PRS WND THER DME<=50SQCM: CPT

## 2020-03-23 PROCEDURE — 80048 BASIC METABOLIC PNL TOTAL CA: CPT

## 2020-03-23 PROCEDURE — 74011250636 HC RX REV CODE- 250/636: Performed by: INTERNAL MEDICINE

## 2020-03-23 PROCEDURE — 94762 N-INVAS EAR/PLS OXIMTRY CONT: CPT

## 2020-03-23 PROCEDURE — 74011000258 HC RX REV CODE- 258: Performed by: INTERNAL MEDICINE

## 2020-03-23 PROCEDURE — 65620000000 HC RM CCU GENERAL

## 2020-03-23 PROCEDURE — 36415 COLL VENOUS BLD VENIPUNCTURE: CPT

## 2020-03-23 PROCEDURE — 74011250636 HC RX REV CODE- 250/636: Performed by: SURGERY

## 2020-03-23 PROCEDURE — 74011636637 HC RX REV CODE- 636/637: Performed by: PHYSICIAN ASSISTANT

## 2020-03-23 RX ORDER — INSULIN LISPRO 100 [IU]/ML
INJECTION, SOLUTION INTRAVENOUS; SUBCUTANEOUS
Status: DISCONTINUED | OUTPATIENT
Start: 2020-03-23 | End: 2020-03-25 | Stop reason: HOSPADM

## 2020-03-23 RX ORDER — MAGNESIUM SULFATE 100 %
4 CRYSTALS MISCELLANEOUS AS NEEDED
Status: DISCONTINUED | OUTPATIENT
Start: 2020-03-23 | End: 2020-03-25 | Stop reason: HOSPADM

## 2020-03-23 RX ORDER — DEXTROSE 50 % IN WATER (D50W) INTRAVENOUS SYRINGE
25-50 AS NEEDED
Status: DISCONTINUED | OUTPATIENT
Start: 2020-03-23 | End: 2020-03-25 | Stop reason: HOSPADM

## 2020-03-23 RX ADMIN — HEPARIN SODIUM 5000 UNITS: 5000 INJECTION INTRAVENOUS; SUBCUTANEOUS at 15:28

## 2020-03-23 RX ADMIN — MORPHINE SULFATE 2 MG: 2 INJECTION, SOLUTION INTRAMUSCULAR; INTRAVENOUS at 09:01

## 2020-03-23 RX ADMIN — PIPERACILLIN AND TAZOBACTAM 3.38 G: 3; .375 INJECTION, POWDER, FOR SOLUTION INTRAVENOUS at 22:31

## 2020-03-23 RX ADMIN — LORATADINE 10 MG: 10 TABLET ORAL at 09:03

## 2020-03-23 RX ADMIN — INSULIN LISPRO 2 UNITS: 100 INJECTION, SOLUTION INTRAVENOUS; SUBCUTANEOUS at 21:38

## 2020-03-23 RX ADMIN — PIPERACILLIN AND TAZOBACTAM 3.38 G: 3; .375 INJECTION, POWDER, FOR SOLUTION INTRAVENOUS at 12:20

## 2020-03-23 RX ADMIN — DOCUSATE SODIUM 100 MG: 100 CAPSULE, LIQUID FILLED ORAL at 18:15

## 2020-03-23 RX ADMIN — INSULIN LISPRO 2 UNITS: 100 INJECTION, SOLUTION INTRAVENOUS; SUBCUTANEOUS at 16:18

## 2020-03-23 RX ADMIN — PIPERACILLIN AND TAZOBACTAM 3.38 G: 3; .375 INJECTION, POWDER, FOR SOLUTION INTRAVENOUS at 06:50

## 2020-03-23 RX ADMIN — HYDROCHLOROTHIAZIDE 25 MG: 25 TABLET ORAL at 09:02

## 2020-03-23 RX ADMIN — HEPARIN SODIUM 5000 UNITS: 5000 INJECTION INTRAVENOUS; SUBCUTANEOUS at 22:31

## 2020-03-23 RX ADMIN — LOSARTAN POTASSIUM 100 MG: 50 TABLET ORAL at 09:01

## 2020-03-23 RX ADMIN — Medication 10 ML: at 06:51

## 2020-03-23 RX ADMIN — ATORVASTATIN CALCIUM 20 MG: 20 TABLET, FILM COATED ORAL at 09:02

## 2020-03-23 RX ADMIN — ASPIRIN 81 MG 81 MG: 81 TABLET ORAL at 09:03

## 2020-03-23 RX ADMIN — Medication 10 ML: at 14:00

## 2020-03-23 RX ADMIN — DOCUSATE SODIUM 100 MG: 100 CAPSULE, LIQUID FILLED ORAL at 09:02

## 2020-03-23 RX ADMIN — Medication 10 ML: at 21:44

## 2020-03-23 RX ADMIN — PIPERACILLIN AND TAZOBACTAM 3.38 G: 3; .375 INJECTION, POWDER, FOR SOLUTION INTRAVENOUS at 16:18

## 2020-03-23 RX ADMIN — HEPARIN SODIUM 5000 UNITS: 5000 INJECTION INTRAVENOUS; SUBCUTANEOUS at 06:49

## 2020-03-23 RX ADMIN — OLANZAPINE 2.5 MG: 2.5 TABLET, FILM COATED ORAL at 21:31

## 2020-03-23 RX ADMIN — OXYCODONE HYDROCHLORIDE AND ACETAMINOPHEN 1 TABLET: 5; 325 TABLET ORAL at 06:35

## 2020-03-23 NOTE — PROGRESS NOTES
Discharge Planning:    CM called and left a voicemail with pt's primary agent Edward [phone] to inquire about discharge planning and if ok for pt to return to [facility] once medically ready. CM has also asked med assist to screen pt for longterm care medicaid.      Reggie Huynh RN BSN  Care Manager  [phone]

## 2020-03-23 NOTE — PROGRESS NOTES
Pt seen and examined. No events. Visit Vitals  /44   Pulse 80   Temp 98.1 °F (36.7 °C)   Resp 16   Wt 136 lb 9.6 oz (62 kg)   SpO2 100%   BMI 22.73 kg/m²     NAD  No resp difficulty  RRR  Right abdominal wall dressing c/d/i.  R groin with wound VAC. No bleeding or purulence. No significant LLE edema. Foot warm, no ulcers. Appreciate ID assistance. Follow ID recs. Wound VAC changed today. Appreciate wound care team assistance. Dispo planning ongoing.

## 2020-03-23 NOTE — ROUTINE PROCESS
1922 Pt received after bedside shift report from Matthew Ag RN. Pt resting in bed with no distress noted. Easily aroused. Wound vac to right groin in place. PIV fluid infusing as ordered. Call bell in reach. 2133 Pt awake. NAD noted. Call bell in reach. 0000 Pt dry, purewick in place. Call bell in reach. 0200 Asleep, no distress noted. 0400 Remains asleep. No distress noted. 0630 Pain medication administered for right groin pain per request. Patricia care performed. Purewick changed. Canister emptied. Bedside shift change report given to Erica Maldonado [phone] (oncoming nurse) by Irais San RN (offgoing nurse). Report included the following information Sbar, Mar, Kardex and Recent Results.

## 2020-03-23 NOTE — PROGRESS NOTES
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 female with h/o type 2 DM (Last hgbA1C 14.2 on 1/2/20) who presented to [facility] on 3/20/20  With  bleeding over past couple of days with opening of skin wound on right groin. 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 cont pip/tazo, d/c vancomycin  2. F/u intra op cultures. Repeat blood cultures   3. Will place picc line in am for outpatient iv abx if repeat blood cultures 3/22- at 48 hours     Above plan was discussed in details with patient. Please call me if any further questions or concerns. Will continue to participate in the care of this patient. HPI:    Feels better. No new complaints. Patient denies headaches, visual disturbances, sore throat, runny nose, earaches, cp, sob, chills, cough, abdominal pain, diarrhea, burning micturition, pain or weakness in extremities. He denies back pain/flank pain. He denies recent sick contacts. No h/o recent travel. No known h/o MRSA colonization or infection in the past.         Medication List    Details   docusate sodium (COLACE) 100 mg capsule Take 100 mg by mouth two (2) times a day. OLANZapine (ZYPREXA) 2.5 mg tablet Take  by mouth nightly. aspirin 81 mg chewable tablet Take 1 Tab by mouth daily.   Qty: 30 Tab, Refills: 0      hydroCHLOROthiazide (HYDRODIURIL) 25 mg tablet Take 25 mg by mouth daily. loratadine (CLARITIN) 10 mg tablet Take 10 mg by mouth daily. losartan (COZAAR) 100 mg tablet Take 100 mg by mouth daily. glipiZIDE (GLUCOTROL) 5 mg tablet Take 5 mg by mouth two (2) times a day. metFORMIN (GLUCOPHAGE) 500 mg tablet Take 500 mg by mouth daily (with breakfast). insulin lispro (HUMALOG) 100 unit/mL injection Less than 150 =   0 units  150 -199 =   3 units  200 -249 =   6 units  250 -299 =   9 units  300 -349 =   12 units  350 and above =   15 units, CALL MD  Qty: 1 Vial, Refills: 0      albuterol-ipratropium (DUO-NEB) 2.5 mg-0.5 mg/3 ml nebu 3 mL by Nebulization route every six (6) hours as needed (wheezing). Qty: 30 Nebule, Refills: 0      acetaminophen (TYLENOL) 325 mg tablet Take 2 Tabs by mouth every six (6) hours as needed. Qty: 30 Tab, Refills: 0      cholecalciferol, vitamin D3, (VITAMIN D3) 2,000 unit tab Take  by mouth. atorvastatin (LIPITOR) 20 mg tablet Take  by mouth daily.              Current Facility-Administered Medications   Medication Dose Route Frequency    albuterol-ipratropium (DUO-NEB) 2.5 MG-0.5 MG/3 ML  3 mL Nebulization Q6H PRN    aspirin chewable tablet 81 mg  81 mg Oral DAILY    atorvastatin (LIPITOR) tablet 20 mg  20 mg Oral DAILY    docusate sodium (COLACE) capsule 100 mg  100 mg Oral BID    loratadine (CLARITIN) tablet 10 mg  10 mg Oral DAILY    metFORMIN (GLUCOPHAGE) tablet 500 mg  500 mg Oral DAILY WITH BREAKFAST    OLANZapine (ZyPREXA) tablet 2.5 mg  2.5 mg Oral QHS    sodium chloride (NS) flush 5-40 mL  5-40 mL IntraVENous Q8H    sodium chloride (NS) flush 5-40 mL  5-40 mL IntraVENous PRN    acetaminophen (TYLENOL) tablet 650 mg  650 mg Oral Q4H PRN    oxyCODONE-acetaminophen (PERCOCET) 5-325 mg per tablet 1 Tab  1 Tab Oral Q4H PRN    morphine injection 2 mg  2 mg IntraVENous Q2H PRN    naloxone (NARCAN) injection 0.4 mg  0.4 mg IntraVENous PRN    ondansetron (ZOFRAN) injection 4 mg  4 mg IntraVENous Q4H PRN    diphenhydrAMINE (BENADRYL) injection 12.5 mg  12.5 mg IntraVENous Q4H PRN    magnesium hydroxide (MILK OF MAGNESIA) 400 mg/5 mL oral suspension 30 mL  30 mL Oral DAILY PRN    heparin (porcine) injection 5,000 Units  5,000 Units SubCUTAneous Q8H    insulin lispro (HUMALOG) injection   SubCUTAneous AC&HS    glucose chewable tablet 16 g  4 Tab Oral PRN    glucagon (GLUCAGEN) injection 1 mg  1 mg IntraMUSCular PRN    dextrose (D50W) injection syrg 12.5-25 g  25-50 mL IntraVENous PRN    lactated Ringers infusion  25 mL/hr IntraVENous CONTINUOUS    piperacillin-tazobactam (ZOSYN) 3.375 g in 0.9% sodium chloride (MBP/ADV) 100 mL MBP  3.375 g IntraVENous Q6H    hydroCHLOROthiazide (HYDRODIURIL) tablet 25 mg  25 mg Oral DAILY    losartan (COZAAR) tablet 100 mg  100 mg Oral DAILY    hydrALAZINE (APRESOLINE) 20 mg/mL injection 10 mg  10 mg IntraVENous Q2H PRN    sodium chloride (NS) flush 5-10 mL  5-10 mL IntraVENous PRN       Allergies: Patient has no known allergies. Temp (24hrs), Av.2 °F (36.8 °C), Min:98 °F (36.7 °C), Max:98.3 °F (36.8 °C)    Visit Vitals  /81   Pulse 95   Temp 98.1 °F (36.7 °C)   Resp 14   Wt 62 kg (136 lb 9.6 oz)   SpO2 (!) 84%   BMI 22.73 kg/m²       ROS: . Detailed ros not feasible. Physical Exam:    General: elderly female laying on the bed, alert,  no acute distress. HEENT:  Normocephalic, atraumatic,  Neck supple, no bruits. Lymph Nodes:   no cervical, axillary or inguinal adenopathy   Lungs:   non-labored, bilaterally clear to auscultation- no crackles wheezes rales or rhonchi   Heart:  RRR, s1 and s2; no rubs or gallops, no edema, + pedal pulses   Abdomen:  soft, non-distended, active bowel sounds, no hepatomegaly, no splenomegaly.   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 Speech appropriate.  Cranial nerves intact                        Skin:  No rash or ulcers noted   Back:  no spinal or paraspinal muscle tenderness or rigidity, no CVA tenderness     Psychiatric:  No suicidal or homicidal ideations, appropriate mood and affect         Labs: Results:   Chemistry Recent Labs     03/23/20  0548 03/22/20  0344 03/21/20  0500   GLU 66* 38* 84    146* 146*   K 4.4 4.7 4.8   * 120* 119*   CO2 23 20* 22   BUN 19* 27* 25*   CREA 1.27 1.48* 1.23   CA 9.2 9.1 9.0   AGAP 5 6 5   BUCR 15 18 20      CBC w/Diff Recent Labs     03/21/20  0500   WBC 9.0   RBC 2.83*   HGB 8.7*   HCT 28.6*         Microbiology Recent Labs     03/22/20  1520 03/22/20  1518 03/20/20  1349   CULT NO GROWTH AFTER 15 HOURS NO GROWTH AFTER 12 HOURS LIGHT GRAM NEGATIVE RODS*          RADIOLOGY:    All available imaging studies/reports in [facility] for this admission were reviewed    Dr. Asiya Araya, Infectious Disease Specialist  [phone]  March 23, 2020  3:35 PM

## 2020-03-23 NOTE — DIABETES MGMT
Glycemic Control Plan of Care    T2DM with A1c of 14.2% (1/02/2020). Patient came from [facility] and [address] [place] [address] facility. Home diabetes medications: Patient came from [facility] and [address] [place] [address]  Metformin 500 mg  Daily with breakfast.  Lispro sliding scale insulin. Patient is in CVT ICU. POC BG range on 03/22/2020: 113-197. Lab FBG 3/23/2020 at 5:48 AM: 66    POC BG report on 03/23/2020 at time of review: 68    Discussed with nursing staff this morning lab hypoglycemia and recommended to hold the glipizide 5 mg BID at this time until further discussion with the provider. Glipizide dose held this morning. Recommendation(s):  1.) d/c glipizide while in patient for prevent hypoglycemia. Obtained order from Dr. Ramirez Faustin. 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 Noted:  Infected graft with femoral blow out. Status post right femoral common femoral artery ligation/removal of infected graft/wound vac placement on 03/20/2020. T2DM. Most recent blood glucose values:        Current A1C: 14.2% (1/02/2020) which is equivalent to estimated average blood glucose of 361 mg/dL during the previous 2-3 months. Current hospital diabetes medications:  Correctional lispro insulin ACHS. Normal sensitivity dose. Metformin 500 mg with breakfast.    Total daily dose insulin requirement previous day: 03/22/2020  Lispro: 2 units  Glipizide 10 mg    Diet: Diabetic consistent carb regular.     Goals:  Blood glucose will be within target range of  mg/dL by 03/26/2020    Education:  ___  Refer to Diabetes Education Record             _X__  Education not indicated at this time    Everardo Koenig RN [facility]  Pager: [phone]

## 2020-03-23 NOTE — WOUND CARE
Physical Exam 
Musculoskeletal:  
     Legs: 
 
 
Room 2353: wound assess Vacuum Assisted Closure Right Groin (Active) Vac Status Suction, continuous; Patent;Draining 3/23/2020 10:05 AM  
Suction (mmHg) 125 3/23/2020 10:05 AM  
Site Assessment Intact 3/23/2020 10:05 AM  
Dressing Status New 3/23/2020 10:05 AM  
Intake (ml) 0 ml 3/23/2020 10:05 AM  
Cannister Changed No 3/23/2020 10:05 AM  
Machine Type KCI ulta 3/23/2020 10:05 AM  
Number of days: 3 Wound Groin Right right groin open surgical incision  (Active) Dressing Status Intact; Removed 3/23/2020 10:02 AM  
Dressing Type Negative pressure wound therapy 3/23/2020 10:02 AM  
Incision Site Well Approximated No 3/23/2020 10:02 AM  
Non-staged Wound Description Full thickness 3/23/2020 10:02 AM  
Shape irregular 3/23/2020 10:02 AM  
Wound Length (cm) 5 cm 3/23/2020 10:02 AM  
Wound Width (cm) 2 cm 3/23/2020 10:02 AM  
Wound Depth (cm) 1.8 cm 3/23/2020 10:02 AM  
Wound Surface Area (cm^2) 10 cm^2 3/23/2020 10:02 AM  
Wound Volume (cm^3) 18 cm^3 3/23/2020 10:02 AM  
Change in Wound Size % 48.56 3/23/2020 10:02 AM  
Condition of Base Other (comment) 3/23/2020 10:02 AM  
Condition of Edges Closed 3/23/2020 10:02 AM  
Epithelialization (%) 0 3/23/2020 10:02 AM  
Tissue Type Percent Yellow 100 3/23/2020 10:02 AM  
Drainage Amount Small 3/23/2020 10:02 AM  
Drainage Color Serosanguinous 3/23/2020 10:02 AM  
Wound Odor None 3/23/2020 10:02 AM  
Patricia-wound Assessment Intact 3/23/2020 10:02 AM  
Margins Attached edges; Defined edges 3/23/2020 10:02 AM  
Dressing Changed Changed/New 3/23/2020 10:02 AM  
Dressing Type Applied Negative pressure wound therapy 3/23/2020 10:02 AM  
Procedure Tolerated Well 3/23/2020 10:02 AM  
Number of days: 3 Wound Abdomen Right right lower abdomen closed incision  (Active) Dressing Type Dry dressing 3/22/2020  8:00 AM  
Assessment Drainage 3/20/2020  8:00 PM  
Drainage Amount None 3/20/2020  8:00 PM  
Drainage Color Serosanguinous 3/20/2020  8:00 PM  
Wound Odor None 3/20/2020  8:00 PM  
Patricia-wound Assessment Clean;Dry; Intact 3/20/2020  8:00 PM  
Number of days: 3 Wound Heel Left left heel soft tissue necrosis POA (Active) Number of days: 0 Applied deroyal composite dressing to right lower abdomen closed surgical incision. Right groin wound vac dressing changed, utilized silver foam with bridge technique, base pale yellow. Left heel has soft tissue necrosis, no drainage, suggest heel silicone dressing & heel elevation. Right AKA noted with silicone dressing in place. Discussed with Tyler Yepez primary nurse. Will turn over care to nursing staff at this time.  
Nikole Ramírez BSN, RN, [facility], [address]

## 2020-03-24 ENCOUNTER — APPOINTMENT (OUTPATIENT)
Dept: INTERVENTIONAL RADIOLOGY/VASCULAR | Age: 80
DRG: 271 | End: 2020-03-24
Attending: INTERNAL MEDICINE
Payer: MEDICARE

## 2020-03-24 LAB
BACTERIA SPEC ANAEROBE CULT: NORMAL
GLUCOSE BLD STRIP.AUTO-MCNC: 108 MG/DL (ref 70–110)
GLUCOSE BLD STRIP.AUTO-MCNC: 224 MG/DL (ref 70–110)
GLUCOSE BLD STRIP.AUTO-MCNC: 91 MG/DL (ref 70–110)
GLUCOSE BLD STRIP.AUTO-MCNC: 95 MG/DL (ref 70–110)
SPECIMEN SOURCE: NORMAL

## 2020-03-24 PROCEDURE — 74011636637 HC RX REV CODE- 636/637: Performed by: PHYSICIAN ASSISTANT

## 2020-03-24 PROCEDURE — 97530 THERAPEUTIC ACTIVITIES: CPT

## 2020-03-24 PROCEDURE — 74011250636 HC RX REV CODE- 250/636: Performed by: SURGERY

## 2020-03-24 PROCEDURE — 82962 GLUCOSE BLOOD TEST: CPT

## 2020-03-24 PROCEDURE — 97535 SELF CARE MNGMENT TRAINING: CPT

## 2020-03-24 PROCEDURE — 97162 PT EVAL MOD COMPLEX 30 MIN: CPT

## 2020-03-24 PROCEDURE — 97166 OT EVAL MOD COMPLEX 45 MIN: CPT

## 2020-03-24 PROCEDURE — 77030038269 HC DRN EXT URIN PURWCK BARD -A

## 2020-03-24 PROCEDURE — 77030037878 HC DRSG MEPILEX >48IN BORD MOLN -B

## 2020-03-24 PROCEDURE — 77030040392 HC DRSG OPTIFOAM MDII -A

## 2020-03-24 PROCEDURE — 74011250636 HC RX REV CODE- 250/636: Performed by: INTERNAL MEDICINE

## 2020-03-24 PROCEDURE — 74011000258 HC RX REV CODE- 258: Performed by: INTERNAL MEDICINE

## 2020-03-24 PROCEDURE — 74011250637 HC RX REV CODE- 250/637: Performed by: SURGERY

## 2020-03-24 PROCEDURE — 65620000000 HC RM CCU GENERAL

## 2020-03-24 PROCEDURE — 02HV33Z INSERTION OF INFUSION DEVICE INTO SUPERIOR VENA CAVA, PERCUTANEOUS APPROACH: ICD-10-PCS | Performed by: PHYSICIAN ASSISTANT

## 2020-03-24 PROCEDURE — 77001 FLUOROGUIDE FOR VEIN DEVICE: CPT

## 2020-03-24 RX ORDER — OXYCODONE AND ACETAMINOPHEN 5; 325 MG/1; MG/1
1 TABLET ORAL
Qty: 40 TAB | Refills: 0 | Status: SHIPPED | OUTPATIENT
Start: 2020-03-24 | End: 2020-03-31

## 2020-03-24 RX ORDER — LEVOFLOXACIN 5 MG/ML
500 INJECTION, SOLUTION INTRAVENOUS EVERY 24 HOURS
Qty: 3000 ML | Refills: 1 | Status: SHIPPED | OUTPATIENT
Start: 2020-03-25 | End: 2020-05-03

## 2020-03-24 RX ORDER — LEVOFLOXACIN 5 MG/ML
500 INJECTION, SOLUTION INTRAVENOUS EVERY 24 HOURS
Status: DISCONTINUED | OUTPATIENT
Start: 2020-03-24 | End: 2020-03-25 | Stop reason: HOSPADM

## 2020-03-24 RX ADMIN — Medication 10 ML: at 22:07

## 2020-03-24 RX ADMIN — OLANZAPINE 2.5 MG: 2.5 TABLET, FILM COATED ORAL at 22:06

## 2020-03-24 RX ADMIN — PIPERACILLIN AND TAZOBACTAM 3.38 G: 3; .375 INJECTION, POWDER, FOR SOLUTION INTRAVENOUS at 17:30

## 2020-03-24 RX ADMIN — HEPARIN SODIUM 5000 UNITS: 5000 INJECTION INTRAVENOUS; SUBCUTANEOUS at 08:20

## 2020-03-24 RX ADMIN — HYDROCHLOROTHIAZIDE 25 MG: 25 TABLET ORAL at 08:20

## 2020-03-24 RX ADMIN — HEPARIN SODIUM 5000 UNITS: 5000 INJECTION INTRAVENOUS; SUBCUTANEOUS at 22:06

## 2020-03-24 RX ADMIN — SODIUM CHLORIDE, SODIUM LACTATE, POTASSIUM CHLORIDE, AND CALCIUM CHLORIDE 25 ML/HR: 600; 310; 30; 20 INJECTION, SOLUTION INTRAVENOUS at 05:22

## 2020-03-24 RX ADMIN — LEVOFLOXACIN 500 MG: 5 INJECTION, SOLUTION INTRAVENOUS at 09:40

## 2020-03-24 RX ADMIN — ASPIRIN 81 MG 81 MG: 81 TABLET ORAL at 08:19

## 2020-03-24 RX ADMIN — PIPERACILLIN AND TAZOBACTAM 3.38 G: 3; .375 INJECTION, POWDER, FOR SOLUTION INTRAVENOUS at 11:52

## 2020-03-24 RX ADMIN — LORATADINE 10 MG: 10 TABLET ORAL at 08:20

## 2020-03-24 RX ADMIN — ATORVASTATIN CALCIUM 20 MG: 20 TABLET, FILM COATED ORAL at 08:20

## 2020-03-24 RX ADMIN — PIPERACILLIN AND TAZOBACTAM 3.38 G: 3; .375 INJECTION, POWDER, FOR SOLUTION INTRAVENOUS at 05:22

## 2020-03-24 RX ADMIN — Medication 10 ML: at 13:30

## 2020-03-24 RX ADMIN — INSULIN LISPRO 4 UNITS: 100 INJECTION, SOLUTION INTRAVENOUS; SUBCUTANEOUS at 11:51

## 2020-03-24 RX ADMIN — PIPERACILLIN AND TAZOBACTAM 3.38 G: 3; .375 INJECTION, POWDER, FOR SOLUTION INTRAVENOUS at 22:06

## 2020-03-24 RX ADMIN — OXYCODONE HYDROCHLORIDE AND ACETAMINOPHEN 1 TABLET: 5; 325 TABLET ORAL at 13:30

## 2020-03-24 RX ADMIN — OXYCODONE HYDROCHLORIDE AND ACETAMINOPHEN 1 TABLET: 5; 325 TABLET ORAL at 08:20

## 2020-03-24 RX ADMIN — Medication 10 ML: at 05:22

## 2020-03-24 RX ADMIN — LOSARTAN POTASSIUM 100 MG: 50 TABLET ORAL at 08:19

## 2020-03-24 RX ADMIN — HEPARIN SODIUM 5000 UNITS: 5000 INJECTION INTRAVENOUS; SUBCUTANEOUS at 17:30

## 2020-03-24 NOTE — PROGRESS NOTES
Problem: Mobility Impaired (Adult and Pediatric)  Goal: *Acute Goals and Plan of Care (Insert Text)  Description: Physical Therapy Goals  Initiated 3/24/2020 and to be accomplished within 7 day(s)  1. Patient will move from supine to sit and sit to supine , scoot up and down and roll side to side in bed with minimal assistance/contact guard assist.     2.  Patient will transfer from bed to chair and chair to bed with moderate assistance using a slide board. 3.  Patient will perform sit to stand with maximal assistance. PLOF: Pt was admitted for SNF for rehad and states she was working on sliding board transfers there to get into a wheelchair. Outcome: Progressing Towards Goal     PHYSICAL THERAPY EVALUATION    Patient: Bere Mccoy (89 y.o. 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 Pt is pleasant and agreeable, ROM is WFL and strength is decreased but functional. Pt is able to come to sitting EOB with max A with max A x 1 to scoot to EOB however she is able to roll to either side in the bed with CGA with manual placement of hands on rails to help with pulling herself over. Pt is able to sit EOB with support but lowest bed setting still prevents her from being able to put her L foot on the ground for scooting leverage. Pt HR remained 120-124s bpm with movement during session and SpO2 on 98-98% on RA.  Pt was returned to supine with mod A and left with all needs met, nurse notified of progress and need for sliding board to transfer to chair. Pt will be followed in the acute setting followed by return to SNF upon discharge to continue rehab process. Patient will benefit from skilled intervention to address the above impairments. Patient's rehabilitation potential is considered to be Good  Factors which may influence rehabilitation potential include:   []         None noted  []         Mental ability/status  []         Medical condition  [x]         Home/family situation and support systems  []         Safety awareness  []         Pain tolerance/management  []         Other:      PLAN :  Recommendations and Planned Interventions:   [x]           Bed Mobility Training             [x]    Neuromuscular Re-Education  [x]           Transfer Training                   []    Orthotic/Prosthetic Training  [x]           Gait Training                          []    Modalities  [x]           Therapeutic Exercises           []    Edema Management/Control  [x]           Therapeutic Activities            [x]    Family Training/Education  [x]           Patient Education  []           Other (comment):    Frequency/Duration: Patient will be followed by physical therapy 1-2 times per day/4-7 days per week to address goals. Discharge Recommendations: Skilled Nursing Facility  Further Equipment Recommendations for Discharge: wheelchair 18 inch and N/A     SUBJECTIVE:   Patient stated I was working on all this at rehab.     OBJECTIVE DATA SUMMARY:     Past Medical History:   Diagnosis Date    Diabetes ([facility]     Hypercholesterolemia     Hypertension    History reviewed. No pertinent surgical history.   Barriers to Learning/Limitations: None  Compensate with: N/A  Home Situation:  Home Situation  Home Environment: Skilled nursing facility  Living Alone: No  Support Systems: Skilled nursing facility  Patient Expects to be Discharged to[de-identified] Skilled nursing facility  Current DME Used/Available at Home: Wheelchair  Critical Behavior:  Neurologic State: Alert  Orientation Level: Oriented X4  Cognition: Follows commands     Psychosocial  Patient Behaviors: Calm; Cooperative  Purposeful Interaction: Yes  Pt Identified Daily Priority: Clinical issues (comment)  Caritas Process: Establish trust;Teaching/learning; Attend basic human needs  Caring Interventions: Reassure  Reassure: Therapeutic listening; Informing  Therapeutic Modalities: Intentional therapeutic touch  Other Caring Modalities: hourly rounds  Skin Condition/Temp: Dry;Flaky; Warm     Skin Integrity: Incision (comment); Wound (add Wound LDA)  Skin Integumentary  Skin Color: Appropriate for ethnicity  Skin Condition/Temp: Dry;Flaky; Warm  Skin Integrity: Incision (comment); Wound (add Wound LDA)  Turgor: Epidermis thin w/ loss of subcut tissue  Hair Growth: Sparce  Varicosities: Absent     Strength:    Strength: Generally decreased, functional                    Tone & Sensation:   Tone: Normal    Sensation: Intact    Range Of Motion:  AROM: Within functional limits     PROM: Within functional limits    Functional Mobility:  Bed Mobility:  Rolling: Minimum assistance;Contact guard assistance  Supine to Sit: Maximum assistance  Sit to Supine: Moderate assistance  Scooting: Maximum assistance    Balance:   Sitting: Intact; With support    Pain:  Pain level pre-treatment: 0/10   Pain level post-treatment: 0/10       Activity Tolerance:   Good     Please refer to the flowsheet for vital signs taken during this treatment. After treatment:   []         Patient left in no apparent distress sitting up in chair  [x]         Patient left in no apparent distress in bed  [x]         Call bell left within reach  [x]         Nursing notified  []         Caregiver present  []         Bed alarm activated  []         SCDs applied    COMMUNICATION/EDUCATION:   [x]         Role of Physical Therapy in the acute care setting.   [x]         Fall prevention education was provided and the patient/caregiver indicated understanding. [x]         Patient/family have participated as able in goal setting and plan of care. [x]         Patient/family agree to work toward stated goals and plan of care. []         Patient understands intent and goals of therapy, but is neutral about his/her participation. []         Patient is unable to participate in goal setting/plan of care: ongoing with therapy staff.  []         Other:     Thank you for this referral.  Delores Flight   Time Calculation: 26 mins      Eval Complexity: History: MEDIUM  Complexity : 1-2 comorbidities / personal factors will impact the outcome/ POC Exam:MEDIUM Complexity : 3 Standardized tests and measures addressing body structure, function, activity limitation and / or participation in recreation  Presentation: MEDIUM Complexity : Evolving with changing characteristics  Clinical Decision Making:Medium Complexity    Overall Complexity:MEDIUM

## 2020-03-24 NOTE — PROGRESS NOTES
PABLO spoke with primary contact Bety Browne regarding upcoming discharge within the next few days and planning for pt to return to [address] [place] [address]. CM explained that pt has used her SNF days and would be using her co-pay days. PABLO explained to Lynn Mackey that once pt has spent down her savings he could assist her with applying for Medicaid. Lynn Mackey verbalized understanding and agreed to transition plan once medically ready. Lynn Mackey is requesting a call once PT/OT see pt to hear their recommendations for therapy.      Emilee Nichols RN BSN  Care Manager  [phone]

## 2020-03-24 NOTE — PROGRESS NOTES
Pt seen and examined. No events. Pt doing well. Eating breakfast.     Visit Vitals  /65   Pulse (!) 118   Temp 98.7 °F (37.1 °C)   Resp 22   Wt 136 lb 9.6 oz (62 kg)   SpO2 100%   BMI 22.73 kg/m²     NAD  No resp difficulty  RRR  Right abdominal wall dressing c/d/i.  R groin with wound VAC. No bleeding or purulence. No significant LLE edema. Foot warm, no ulcers. Await ID recs for ABX. Once ABX finalized and pt cleared to return to nursing facility she is ready for discharge. Wound care orders written.

## 2020-03-24 NOTE — PROGRESS NOTES
Patient will need 6 weeks of IV ABX, PICC line today. Patient is in co-pay days at [facility], [facility] and rehab will take her back and will initiate authorization today through [email or web address]. Awaiting PT/OT evals and finalization of ABX for authorization. Attempted to call Rin Pham (brother?) multiple times to discuss d/c planning, payments to SNF, and medicaid [place].         LISA Manzo  Case Management  [phone]

## 2020-03-24 NOTE — PROGRESS NOTES
0730:  Report received, care assumed. Complete assessment performed. No acute changes noted from report. 1000: To Angio lab via bed for PICC line insertion as ordered. 1125:  Returned to room s/p Dual Lumen PICC insertion right upper arm. Settled into bed. Reassessed. Status without acute change noted. Repositioned for comfort. Call bell at side. 1730: Repositioned. Dinner served, assisted with same. 1900:  Bedside and Verbal shift change report given to Fei (oncoming nurse) by Fco Trevino RN(offgoing nurse). Report included the following information SBAR, Kardex, Intake/Output, MAR, Accordion, Recent Results, Cardiac Rhythm NSR. and Alarm Parameters .

## 2020-03-24 NOTE — PROGRESS NOTES
Problem: Falls - Risk of  Goal: *Absence of Falls  Description: Document Easter Getting Fall Risk and appropriate interventions in the flowsheet. Outcome: Progressing Towards Goal  Note: Fall Risk Interventions:       Mentation Interventions: Adequate sleep, hydration, pain control, Door open when patient unattended, Update white board, Toileting rounds, Room close to nurse's station, More frequent rounding, Increase mobility    Medication Interventions: Evaluate medications/consider consulting pharmacy, Teach patient to arise slowly, Patient to call before getting OOB    Elimination Interventions: Call light in reach              Problem: Patient Education: Go to Patient Education Activity  Goal: Patient/Family Education  Outcome: Progressing Towards Goal     Problem: Pressure Injury - Risk of  Goal: *Prevention of pressure injury  Description: Document Marc Scale and appropriate interventions in the flowsheet. Outcome: Progressing Towards Goal  Note: Pressure Injury Interventions:  Sensory Interventions: Assess changes in LOC, Chair cushion, Check visual cues for pain, Float heels, Keep linens dry and wrinkle-free, Maintain/enhance activity level, Minimize linen layers, Pressure redistribution bed/mattress (bed type), Turn and reposition approx. every two hours (pillows and wedges if needed)    Moisture Interventions: Absorbent underpads, Apply protective barrier, creams and emollients, Assess need for specialty bed, Check for incontinence Q2 hours and as needed, Contain wound drainage, Internal/External urinary devices, Maintain skin hydration (lotion/cream), Minimize layers, Moisture barrier    Activity Interventions: Assess need for specialty bed, Increase time out of bed, Pressure redistribution bed/mattress(bed type), PT/OT evaluation    Mobility Interventions: Assess need for specialty bed, Pressure redistribution bed/mattress (bed type), Turn and reposition approx.  every two hours(pillow and wedges), Float heels    Nutrition Interventions: Document food/fluid/supplement intake, Offer support with meals,snacks and hydration    Friction and Shear Interventions: Apply protective barrier, creams and emollients, Feet elevated on foot rest, Foam dressings/transparent film/skin sealants, Lift sheet, Minimize layers, Transferring/repositioning devices                Problem: Patient Education: Go to Patient Education Activity  Goal: Patient/Family Education  Outcome: Progressing Towards Goal

## 2020-03-24 NOTE — DIABETES MGMT
Glycemic Control Plan of Care    T2DM with A1c of 14.2% (1/02/2020). Patient came from [facility] and [address] [place] [address] facility. Home diabetes medications: Patient came from [facility] and [address] [place] [address]  Metformin 500 mg  Daily with breakfast.  Lispro sliding scale insulin. Seen patient this afternoon in CVT ICU and noted that she just finished eating lunch. Patient stated that she was hungry and ate 100% of meal.    POC BG range on 03/23/2020: . Discontinued glipizide due to hypoglycemia lab FBG of 66. One BG value above target range. POC BG report on 03/24/2020 at time of review: 108, 224. Patient is currently on correctional lispro insulin. Recommendation(s):  1.) consider adding basal lantus insulin 5 units daily. 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 Noted:  Infected graft with femoral blow out. Status post right femoral common femoral artery ligation/removal of infected graft/wound vac placement on 03/20/2020. T2DM. Most recent blood glucose values:        Current A1C: 14.2% (1/02/2020) which is equivalent to estimated average blood glucose of 361 mg/dL during the previous 2-3 months. Current hospital diabetes medications:  Correctional lispro insulin ACHS. Normal sensitivity dose. Metformin 500 mg with breakfast.    Total daily dose insulin requirement previous day: 03/23/2020  Lispro: 4 units    Diet: Diabetic consistent carb regular.     Goals:  Blood glucose will be within target range of  mg/dL by 03/27/2020    Education:  ___  Refer to Diabetes Education Record             _X__  Education not indicated at this time    Johan Agus Oseguera RN [facility]  Pager: [phone]

## 2020-03-24 NOTE — PROGRESS NOTES
Problem: Self Care Deficits Care Plan (Adult)  Goal: *Acute Goals and Plan of Care (Insert Text)  Description: Occupational Therapy Goals  Initiated 3/24/2020 within 7 day(s). 1.  Patient will perform upper body dressing with supervision/set-up. 2.  Patient will perform lower body dressing with supervision/set-up using AE prn.  3.  Patient will perform toileting with minimal assistance/contact guard assist.  4.  Patient will perform toilet transfers to [facility] with moderate assistance with sliding board . 5. Patient will participate in upper extremity therapeutic exercise/activities with supervision/set-up for 8 minutes. Prior Level of Function: Pt reports she is coming from a rehab facility. Pt initially reported she was (I) with basic self-care/ADLs, but then reported difficulty donning socks PTA. Pt was working on transfers using a sliding board to w/c or BSC. Per OT eval 01/2020, pt lived alone and was (I) with ADLs. Outcome: Progressing Towards Goal   OCCUPATIONAL THERAPY EVALUATION    Patient: Beatrice Arcos (65 y.o. 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 Upon entering room, pt received semi-reclined in bed, alert, and agreeable to OT eval.  Based on the objective data described below, the patient presents with increased pain, decreased strength, decreased activity tolerance, decreased ability to safely perform functional transfers and mobility, and decreased independence in ADLs, increasing the need for assistance from others. 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 At bed level, pt requires max assist to don sock over L foot.  Pt performed supine-sit with max assist in preparation for participation with further self-care. Sitting EOB, pt only able to reach up to L ankle. Pt reports she was provided with AE, including a sock aid at [facility], but it was not present during session. Pt scooted laterally with max assist x2, in preparation for sliding board transfers to [facility], before returning to supine position in bed. The patient requires skilled OT services to assess safety and increase independence with basic self-care/ADLs, enhancing the patients quality of life by improving their ability to return to [facility]. At the end of the session, pt left resting comfortably in bed, call bell in reach, with all needs met. Patient will benefit from skilled intervention to address the above impairments. Patient's rehabilitation potential is considered to be Good  Factors which may influence rehabilitation potential include:   []             None noted  []             Mental ability/status  [x]             Medical condition  []             Home/family situation and support systems  []             Safety awareness  [x]             Pain tolerance/management  []             Other:      PLAN :  Recommendations and Planned Interventions:   [x]               Self Care Training                  [x]      Therapeutic Activities  [x]               Functional Mobility Training   []      Cognitive Retraining  [x]               Therapeutic Exercises           [x]      Endurance Activities  [x]               Balance Training                    []      Neuromuscular Re-Education  []               Visual/Perceptual Training     [x]      Home Safety Training  [x]               Patient Education                   [x]      Family Training/Education  []               Other (comment):    Frequency/Duration: Patient will be followed by occupational therapy 1-2 times per day/4-7 days per week to address goals.   Discharge Recommendations: [facility] to [facility]  Further Equipment Recommendations for Discharge: TBD at the next level of care     SUBJECTIVE:   Patient stated I remember you\"; \"watch that second toeAleta    OBJECTIVE DATA SUMMARY:     Past Medical History:   Diagnosis Date    Diabetes ([address]     Hypercholesterolemia     Hypertension    History reviewed. No pertinent surgical history. Barriers to Learning/Limitations: yes;  physical  Compensate with: visual, verbal, tactile, kinesthetic cues/model    Home Situation:   Home Situation  Home Environment: Skilled nursing facility  Living Alone: No  Support Systems: Skilled nursing facility  Patient Expects to be Discharged to[de-identified] Skilled nursing facility  Current DME Used/Available at Home: Wheelchair  []  Right hand dominant   []  Left hand dominant    Cognitive/Behavioral Status:  Neurologic State: Alert  Orientation Level: Oriented X4  Cognition: Follows commands  Safety/Judgement: Fall prevention    Skin: Visible skin appeared intact. Edema: None noted        Coordination: BUE  Coordination: Within functional limits  Fine Motor Skills-Upper: Left Intact; Right Intact    Gross Motor Skills-Upper: Left Intact; Right Intact    Balance:  Sitting: Intact    Strength: BUE  Strength: Generally decreased, functional    Tone & Sensation: BUE  Tone: Normal  Sensation: Intact      Range of Motion: BUE  AROM: Within functional limits      Functional Mobility and Transfers for ADLs:  Bed Mobility:  Rolling: Minimum assistance;Contact guard assistance  Supine to Sit: Maximum assistance  Sit to Supine: Moderate assistance  Scooting: Maximum assistance;Assist x2  Transfers:  NT    ADL Assessment:   Feeding: Setup    Oral Facial Hygiene/Grooming: Setup    Bathing: Moderate assistance    Upper Body Dressing: Minimum assistance    Lower Body Dressing: Maximum assistance    Toileting: Maximum assistance      ADL Intervention:  Feeding  Feeding Assistance: Set-up; Stand-by assistance  Food to Mouth: Set-up  Drink to Mouth: Set-up      Lower Body Dressing Assistance  Dressing Assistance: Maximum assistance  Socks: Maximum assistance  Position Performed: Long sitting on bed    Cognitive Retraining  Safety/Judgement: Fall prevention      Pain:  Pain level pre-treatment: 10/10   Pain level post-treatment: 10/10   Pain Intervention(s): Medication (see MAR); Rest, Ice, Repositioning   Response to intervention: Nurse notified, See doc flow    Activity Tolerance:   Fair    Please refer to the flowsheet for vital signs taken during this treatment. After treatment:   [] Patient left in no apparent distress sitting up in chair  [x] Patient left in no apparent distress in bed  [x] Call bell left within reach  [x] Nursing notified  [] Caregiver present  [] Bed alarm activated    COMMUNICATION/EDUCATION:   [x] Role of Occupational Therapy in the acute care setting  [] Home safety education was provided and the patient/caregiver indicated understanding. [x] Patient/family have participated as able in goal setting and plan of care. [x] Patient/family agree to work toward stated goals and plan of care. [] Patient understands intent and goals of therapy, but is neutral about his/her participation. [] Patient is unable to participate in goal setting and plan of care. Thank you for this referral.  Pradip Díaz MS, OTR/L  Time Calculation: 25 mins    Eval Complexity: History: MEDIUM Complexity : Expanded review of history including physical, cognitive and psychosocial  history ; Examination: MEDIUM Complexity : 3-5 performance deficits relating to physical, cognitive , or psychosocial skils that result in activity limitations and / or participation restrictions; Decision Making:MEDIUM Complexity : Patient may present with comorbidities that affect occupational performnce.  Miniml to moderate modification of tasks or assistance (eg, physical or verbal ) with assesment(s) is necessary to enable patient to complete evaluation

## 2020-03-24 NOTE — PROCEDURES
INTERVENTIONAL RADIOLOGY POST PICC LINE NOTE       March 24, 2020       11:03 AM     Preoperative Diagnosis:   IV Access    Postoperative Diagnosis:  Same. : Dakota Montalvo PA-C    Assistant:  None. Attending Physician: Chao    Type of Anesthesia:  1% Lidocaine local    Procedure/Description: right cephalic  upper extremity PICC Line. Findings:  right cephalic 5 Wallisian catheter placed. Tip at SVC/RA junction. OK to use. Length 37 cm. Estimated blood Loss: Minimal    Specimen Removed: None    Drains: None     Complications:  None. Condition:  Stable.     Discharge Plan:  continue present therapy    Farhad Hollingsworth PA-C

## 2020-03-24 NOTE — PROGRESS NOTES
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 female with h/o type 2 DM (Last hgbA1C 14.2 on 1/2/20) who presented to [facility] on 3/20/20  With  bleeding over past couple of days with opening of skin wound on right groin. 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 cont pip/tazo, add levofloxacin since morganella resistant to cefazolin. Risk of inducible beta lactamase mediated resistance. Continue these antibiotics till 5/3/20. 2. place picc line for outpatient iv abx  3. Ok to d/c patient from ID standpoint         Above plan was discussed in details with patient, primary team, . Please call me if any further questions or concerns. Will continue to participate in the care of this patient. HPI:    Feels better. No new complaints. Patient denies headaches, visual disturbances, sore throat, runny nose, earaches, cp, sob, chills, cough, abdominal pain, diarrhea, burning micturition, pain or weakness in extremities. He denies back pain/flank pain. Medication List    Details   docusate sodium (COLACE) 100 mg capsule Take 100 mg by mouth two (2) times a day. OLANZapine (ZYPREXA) 2.5 mg tablet Take  by mouth nightly. aspirin 81 mg chewable tablet Take 1 Tab by mouth daily. Qty: 30 Tab, Refills: 0      hydroCHLOROthiazide (HYDRODIURIL) 25 mg tablet Take 25 mg by mouth daily. loratadine (CLARITIN) 10 mg tablet Take 10 mg by mouth daily. losartan (COZAAR) 100 mg tablet Take 100 mg by mouth daily. glipiZIDE (GLUCOTROL) 5 mg tablet Take 5 mg by mouth two (2) times a day. metFORMIN (GLUCOPHAGE) 500 mg tablet Take 500 mg by mouth daily (with breakfast). insulin lispro (HUMALOG) 100 unit/mL injection Less than 150 =   0 units  150 -199 =   3 units  200 -249 =   6 units  250 -299 =   9 units  300 -349 =   12 units  350 and above =   15 units, CALL MD  Qty: 1 Vial, Refills: 0      albuterol-ipratropium (DUO-NEB) 2.5 mg-0.5 mg/3 ml nebu 3 mL by Nebulization route every six (6) hours as needed (wheezing). Qty: 30 Nebule, Refills: 0      acetaminophen (TYLENOL) 325 mg tablet Take 2 Tabs by mouth every six (6) hours as needed. Qty: 30 Tab, Refills: 0      cholecalciferol, vitamin D3, (VITAMIN D3) 2,000 unit tab Take  by mouth. atorvastatin (LIPITOR) 20 mg tablet Take  by mouth daily.              Current Facility-Administered Medications   Medication Dose Route Frequency    insulin lispro (HUMALOG) injection   SubCUTAneous AC&HS    glucose chewable tablet 16 g  4 Tab Oral PRN    glucagon (GLUCAGEN) injection 1 mg  1 mg IntraMUSCular PRN    dextrose (D50W) injection syrg 12.5-25 g  25-50 mL IntraVENous PRN    albuterol-ipratropium (DUO-NEB) 2.5 MG-0.5 MG/3 ML  3 mL Nebulization Q6H PRN    aspirin chewable tablet 81 mg  81 mg Oral DAILY    atorvastatin (LIPITOR) tablet 20 mg  20 mg Oral DAILY    docusate sodium (COLACE) capsule 100 mg  100 mg Oral BID    loratadine (CLARITIN) tablet 10 mg  10 mg Oral DAILY    OLANZapine (ZyPREXA) tablet 2.5 mg  2.5 mg Oral QHS    sodium chloride (NS) flush 5-40 mL  5-40 mL IntraVENous Q8H    sodium chloride (NS) flush 5-40 mL  5-40 mL IntraVENous PRN    acetaminophen (TYLENOL) tablet 650 mg  650 mg Oral Q4H PRN    oxyCODONE-acetaminophen (PERCOCET) 5-325 mg per tablet 1 Tab  1 Tab Oral Q4H PRN    morphine injection 2 mg  2 mg IntraVENous Q2H PRN    naloxone (NARCAN) injection 0.4 mg  0.4 mg IntraVENous PRN    ondansetron (ZOFRAN) injection 4 mg  4 mg IntraVENous Q4H PRN    diphenhydrAMINE (BENADRYL) injection 12.5 mg  12.5 mg IntraVENous Q4H PRN    magnesium hydroxide (MILK OF MAGNESIA) 400 mg/5 mL oral suspension 30 mL  30 mL Oral DAILY PRN    heparin (porcine) injection 5,000 Units  5,000 Units SubCUTAneous Q8H    glucose chewable tablet 16 g  4 Tab Oral PRN    glucagon (GLUCAGEN) injection 1 mg  1 mg IntraMUSCular PRN    dextrose (D50W) injection syrg 12.5-25 g  25-50 mL IntraVENous PRN    lactated Ringers infusion  25 mL/hr IntraVENous CONTINUOUS    piperacillin-tazobactam (ZOSYN) 3.375 g in 0.9% sodium chloride (MBP/ADV) 100 mL MBP  3.375 g IntraVENous Q6H    hydroCHLOROthiazide (HYDRODIURIL) tablet 25 mg  25 mg Oral DAILY    losartan (COZAAR) tablet 100 mg  100 mg Oral DAILY    hydrALAZINE (APRESOLINE) 20 mg/mL injection 10 mg  10 mg IntraVENous Q2H PRN    sodium chloride (NS) flush 5-10 mL  5-10 mL IntraVENous PRN       Allergies: Patient has no known allergies. Temp (24hrs), Av.5 °F (36.9 °C), Min:98 °F (36.7 °C), Max:98.7 °F (37.1 °C)    Visit Vitals  /65   Pulse (!) 118   Temp 98.7 °F (37.1 °C)   Resp 22   Wt 62 kg (136 lb 9.6 oz)   SpO2 100%   BMI 22.73 kg/m²       ROS: . 12 point ROS obtained in details. Pertinent positive as mentioned in HPI, otherwise negative    Physical Exam:    General: elderly female laying on the bed, alert,  no acute distress. HEENT:  Normocephalic, atraumatic,  Neck supple, no bruits.    Lymph Nodes:   no cervical, axillary or inguinal adenopathy   Lungs:   non-labored, bilaterally clear to auscultation- no crackles wheezes rales or rhonchi   Heart:  RRR, s1 and s2; no rubs or gallops, no edema, + pedal pulses   Abdomen:  soft, non-distended, active bowel sounds, no hepatomegaly, no splenomegaly. 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 Speech appropriate. Cranial nerves intact                        Skin:  No rash or ulcers noted   Back:  no spinal or paraspinal muscle tenderness or rigidity, no CVA tenderness     Psychiatric:  No suicidal or homicidal ideations, appropriate mood and affect         Labs: Results:   Chemistry Recent Labs     03/23/20  0548 03/22/20  0344   GLU 66* 38*    146*   K 4.4 4.7   * 120*   CO2 23 20*   BUN 19* 27*   CREA 1.27 1.48*   CA 9.2 9.1   AGAP 5 6   BUCR 15 18      CBC w/Diff No results for input(s): WBC, RBC, HGB, HCT, PLT, GRANS, LYMPH, EOS, HGBEXT, HCTEXT, PLTEXT, HGBEXT, HCTEXT, PLTEXT in the last 72 hours.    Microbiology Recent Labs     03/22/20  1520 03/22/20  1518   CULT NO GROWTH 2 DAYS NO GROWTH 2 DAYS          RADIOLOGY:    All available imaging studies/reports in [facility] for this admission were reviewed    Dr. Montana Mcclain, Infectious Disease Specialist  [phone]  March 24, 2020  3:35 PM

## 2020-03-25 VITALS
SYSTOLIC BLOOD PRESSURE: 158 MMHG | RESPIRATION RATE: 17 BRPM | DIASTOLIC BLOOD PRESSURE: 71 MMHG | BODY MASS INDEX: 22.83 KG/M2 | HEART RATE: 98 BPM | OXYGEN SATURATION: 100 % | WEIGHT: 137.2 LBS | TEMPERATURE: 98.7 F

## 2020-03-25 LAB
GLUCOSE BLD STRIP.AUTO-MCNC: 104 MG/DL (ref 70–110)
GLUCOSE BLD STRIP.AUTO-MCNC: 178 MG/DL (ref 70–110)

## 2020-03-25 PROCEDURE — 97110 THERAPEUTIC EXERCISES: CPT

## 2020-03-25 PROCEDURE — 74011250636 HC RX REV CODE- 250/636: Performed by: SURGERY

## 2020-03-25 PROCEDURE — 74011250637 HC RX REV CODE- 250/637: Performed by: SURGERY

## 2020-03-25 PROCEDURE — 97535 SELF CARE MNGMENT TRAINING: CPT

## 2020-03-25 PROCEDURE — 82962 GLUCOSE BLOOD TEST: CPT

## 2020-03-25 PROCEDURE — 74011000258 HC RX REV CODE- 258: Performed by: INTERNAL MEDICINE

## 2020-03-25 PROCEDURE — 77030038269 HC DRN EXT URIN PURWCK BARD -A

## 2020-03-25 PROCEDURE — 74011250636 HC RX REV CODE- 250/636: Performed by: INTERNAL MEDICINE

## 2020-03-25 PROCEDURE — 77030041247 HC PROTECTOR HEEL HEELMEDIX MDII -B

## 2020-03-25 PROCEDURE — 97530 THERAPEUTIC ACTIVITIES: CPT

## 2020-03-25 PROCEDURE — 74011636637 HC RX REV CODE- 636/637: Performed by: PHYSICIAN ASSISTANT

## 2020-03-25 RX ADMIN — LOSARTAN POTASSIUM 100 MG: 50 TABLET ORAL at 10:04

## 2020-03-25 RX ADMIN — ATORVASTATIN CALCIUM 20 MG: 20 TABLET, FILM COATED ORAL at 10:04

## 2020-03-25 RX ADMIN — HEPARIN SODIUM 5000 UNITS: 5000 INJECTION INTRAVENOUS; SUBCUTANEOUS at 06:23

## 2020-03-25 RX ADMIN — HEPARIN SODIUM 5000 UNITS: 5000 INJECTION INTRAVENOUS; SUBCUTANEOUS at 15:39

## 2020-03-25 RX ADMIN — HYDROCHLOROTHIAZIDE 25 MG: 25 TABLET ORAL at 10:04

## 2020-03-25 RX ADMIN — Medication 10 ML: at 06:24

## 2020-03-25 RX ADMIN — Medication 10 ML: at 15:40

## 2020-03-25 RX ADMIN — PIPERACILLIN AND TAZOBACTAM 3.38 G: 3; .375 INJECTION, POWDER, FOR SOLUTION INTRAVENOUS at 06:23

## 2020-03-25 RX ADMIN — ASPIRIN 81 MG 81 MG: 81 TABLET ORAL at 10:04

## 2020-03-25 RX ADMIN — PIPERACILLIN AND TAZOBACTAM 3.38 G: 3; .375 INJECTION, POWDER, FOR SOLUTION INTRAVENOUS at 11:34

## 2020-03-25 RX ADMIN — LORATADINE 10 MG: 10 TABLET ORAL at 10:04

## 2020-03-25 RX ADMIN — INSULIN LISPRO 2 UNITS: 100 INJECTION, SOLUTION INTRAVENOUS; SUBCUTANEOUS at 12:27

## 2020-03-25 RX ADMIN — LEVOFLOXACIN 500 MG: 5 INJECTION, SOLUTION INTRAVENOUS at 10:09

## 2020-03-25 NOTE — PROGRESS NOTES
Transition of Care Plan to SNF/Rehab    SNF/Rehab Transition:  Patient has been accepted to [facility] and meets criteria for admission. Patient will  be transported [facility] and expected to leave at 1730. Communication to Patient/Family:  Met with patient and she is agreeable to the transition plan. CM called and left a voicemail with pt's brother Sabra Robert him of d/c and time of transport. Communication to SNF/Rehab:  Bedside RN, Neil Rivera, has been notified of the transition plan to the facility and to call report (phone number [phone]). Discharge information has been updated on the AVS. And communicated to facility via [facility], or CC link. SNF/Rehab Transition:    PCP/Specialist: Dr Nieves Terry Please include all hard scripts for controlled substances, med rec and dc summary, and AVS in packet.      Reviewed and confirmed with facility representative, Mila  at [facility] and they can manage the patient care needs for the following:     Gallo Leon with (X) only those applicable:    Medication:  [x]  Medications will be available at the facility  [x]  IV Antibiotics Levaquin and Piperacillin-Tazobactam  [x]  Controlled Substance - hard copy to be sent with patient   []  Weekly Labs    Documents:  [x] Hard RX  Number sent 1 Percocet  [] MAR  [] Kardex  [x] AVS  [] Wound care note  [x]Transfer Summary/Discharge Summary    Equipment:  []  CPAP/BiPAP  [x]  Wound Vacuum- pt will need wound vac   []  Ortgea or Urinary Device  [x]  PICC/Central Line  []  Nebulizer  []  Ventilator    Treatment:  []Isolation (for MRSA, VRE, etc.)  []Surgical Drain Management  []Tracheostomy Care  [x]Dressing Changes  []Dialysis with transportation and chair time  Center Mode of tranpsort   []PEG Care  []Oxygen  []Daily Weights for Heart Failure    Dietary:  []Any diet limitations  []Tube Feedings   []Total Parenteral Management (TPN)    Eligible for Medicaid Long Term Services and Supports  Yes:  [] Eligible for medical assistance or will become eligible within 180 days and LTSS completed. [] Provider/Patient and/or support system has requested screening. [x] LTSS copy provided to patient or responsible party  [] LTSS  unavailable at discharged mailed to patient  [] LTSS performed by outside agency  on  with tracking number   No:   [] Private pay and is not financially eligible for Medicaid within the next 180 days. [] Reside out-of-state.   [] A residents of a state owned/operated facility that is licensed  by [facility] and Developmental Services or [facility]  [] Enrollment in [facility] hospice services  []  [address]  [] Patient /Family declines to have screening completed or provide financial information for screening          Financial Resources:  Medicaid    [] Initiated and application pending   [] Full coverage      Advanced Care Plan:  []Surrogate Decision Maker of Care  []POA  [x]Communicated Code Status/ sent FULL CODE     Other      [address]  [phone]

## 2020-03-25 NOTE — PROGRESS NOTES
0700-Assumed care of pt.  0800-Pt assessment done. Pt denies pain at this time. 1000-Scheduled meds given. 1115-PT/OT in room working with pt.  1136-Pt's blood sugar 178, nurse waiting on lunch to arrive before giving insulin. 1630-Pt's wound vac discontinued and wet to dry dressing applied to right groin for transport to [address] [facility].  1635-First attempt to call report to [facility]/[phone], person answered phone but was unclear and then ended call. 1640-Second attempt to call report to [address] [facility]. Phone rings and then goes to voicemail. 1645-Third attempt to call report, message was left with contact information and informing the staff that the pt will be picked up from [place] at [phone] and will be transported there. 1650-Fourth attempt to call report, phone went to voicemail. 1705-Fifth attempt to call report, phone went to voicemail. 1710-Sixth attempt to call report. Nurse left message on nurse leadership's voicemail, informing them that there will be a patient arriving after [phone] today and there were 5 prior attempts to give report but no one will answer the phone. 1800-Transport arrived to take pt to [address] [facility].  1825-Pt left with transport.

## 2020-03-25 NOTE — DISCHARGE INSTRUCTIONS
Patient Education        Angiogram: What to Expect at Home  Your Recovery  An angiogram is an X-ray test that uses dye and a camera to take pictures of the blood flow in an artery or a vein. The doctor inserted a thin, flexible tube (catheter) into a blood vessel in your groin. In some cases, the catheter is placed in a blood vessel in the arm. An angiogram is done for many reasons. For example, you may have an angiogram to find the source of bleeding, such as an ulcer. Or it may be done to look for blocked blood vessels in your lungs. After an angiogram, your groin or arm may have a bruise and feel sore for a day or two. You can do light activities around the house but nothing strenuous for several days. Your doctor may give you specific instructions on when you can do your normal activities again, such as driving and going back to work. This care sheet gives you a general idea about how long it will take for you to recover. But each person recovers at a different pace. Follow the steps below to feel better as quickly as possible. How can you care for yourself at home? Activity    · Do not do strenuous exercise and do not lift, pull, or push anything heavy until your doctor says it is okay. This may be for a day or two. You can walk around the house and do light activity, such as cooking.     · If the catheter was placed in your groin, try not to walk up stairs for the first couple of days.     · If the catheter was placed in your arm near your wrist, do not bend your wrist deeply for the first couple of days. Be careful using your hand to get into and out of a chair or bed.     · If your doctor recommends it, get more exercise. Walking is a good choice. Bit by bit, increase the amount you walk every day. Try for at least 30 minutes on most days of the week. Diet    · Drink plenty of fluids to help your body flush out the dye.  If you have kidney, heart, or liver disease and have to limit fluids, talk with your doctor before you increase the amount of fluids you drink.     · You can eat your normal diet. If your stomach is upset, try bland, low-fat foods like plain rice, broiled chicken, toast, and yogurt. Medicines    · Be safe with medicines. Read and follow all instructions on the label. ? If the doctor gave you a prescription medicine for pain, take it as prescribed. ? If you are not taking a prescription pain medicine, ask your doctor if you can take an over-the-counter medicine.     · If you take aspirin or some other blood thinner, ask your doctor if and when to start taking it again. Make sure that you understand exactly what your doctor wants you to do.     · Your doctor will tell you if and when you can restart your medicines. He or she will also give you instructions about taking any new medicines.    Care of the catheter site    · You will have a dressing over the cut (incision). A dressing helps the incision heal and protects it. Your doctor will tell you how to take care of this.     · Put ice or a cold pack on the area for 10 to 20 minutes at a time to help with soreness or swelling. Put a thin cloth between the ice and your skin.     · You may shower 24 to 48 hours after the procedure, if your doctor okays it. Pat the incision dry.     · Do not soak the catheter site until it is healed. Don't take a bath for 1 week, or until your doctor tells you it is okay.     · Watch for bleeding from the site. A small amount of blood (up to the size of a quarter) on the bandage can be normal.     · If you are bleeding, lie down and press on the area for 15 minutes to try to make it stop. If the bleeding does not stop, call your doctor or seek immediate medical care. Follow-up care is a key part of your treatment and safety. Be sure to make and go to all appointments, and call your doctor if you are having problems.  It's also a good idea to know your test results and keep a list of the medicines you take.  When should you call for help? Call 911 anytime you think you may need emergency care. For example, call if:    · You passed out (lost consciousness).     · You have severe trouble breathing.     · You have sudden chest pain and shortness of breath, or you cough up blood.    Call your doctor now or seek immediate medical care if:    · You are bleeding from the area where the catheter was put in your artery.     · You have a fast-growing, painful lump at the catheter site.     · You have signs of infection, such as:  ? Increased pain, swelling, warmth, or redness. ? Red streaks leading from the incision. ? Pus draining from the incision. ? A fever.    Watch closely for any changes in your health, and be sure to contact your doctor if:    · You don't get better as expected. Where can you learn more? Go to http://melvin-jesus.info/  Enter [id number] in the search box to learn more about \"Angiogram: What to Expect at Home. \"  Current as of: December 8, 2019Content Version: 12.4  © 8384-2237 [employer]. Care instructions adapted under license by [employer] (which disclaims liability or warranty for this information). If you have questions about a medical condition or this instruction, always ask your healthcare professional. [address] any warranty or liability for your use of this information. _  DISCHARGE SUMMARY from Nurse    PATIENT INSTRUCTIONS:    After general anesthesia or intravenous sedation, for 24 hours or while taking prescription Narcotics:  · Limit your activities  · Do not drive and operate hazardous machinery  · Do not make important personal or business decisions  · Do  not drink alcoholic beverages  · If you have not urinated within 8 hours after discharge, please contact your surgeon on call.     Report the following to your surgeon:  · Excessive pain, swelling, redness or odor of or around the surgical area  · Temperature over 100.5  · Nausea and vomiting lasting longer than 4 hours or if unable to take medications  · Any signs of decreased circulation or nerve impairment to extremity: change in color, persistent  numbness, tingling, coldness or increase pain  · Any questions    What to do at Home:  Recommended activity: Activity as tolerated and no driving for today. *  Please give a list of your current medications to your Primary Care Provider. *  Please update this list whenever your medications are discontinued, doses are      changed, or new medications (including over-the-counter products) are added. *  Please carry medication information at all times in case of emergency situations. These are general instructions for a healthy lifestyle:    No smoking/ No tobacco products/ Avoid exposure to second hand smoke  Surgeon General's Warning:  Quitting smoking now greatly reduces serious risk to your health. Obesity, smoking, and sedentary lifestyle greatly increases your risk for illness    A healthy diet, regular physical exercise & weight monitoring are important for maintaining a healthy lifestyle    You may be retaining fluid if you have a history of heart failure or if you experience any of the following symptoms:  Weight gain of 3 pounds or more overnight or 5 pounds in a week, increased swelling in our hands or feet or shortness of breath while lying flat in bed. Please call your doctor as soon as you notice any of these symptoms; do not wait until your next office visit. The discharge information has been reviewed with the patient. The patient verbalized understanding.   Discharge medications reviewed with the patient and appropriate educational materials and side effects teaching were provided.   _____________________________________________________________________________________________________________________________________________________________________________________________________________________________________________________________________

## 2020-03-25 NOTE — PROGRESS NOTES
Problem: Self Care Deficits Care Plan (Adult)  Goal: *Acute Goals and Plan of Care (Insert Text)  Description: Occupational Therapy Goals  Initiated 3/24/2020 within 7 day(s). 1.  Patient will perform upper body dressing with supervision/set-up. 2.  Patient will perform lower body dressing with supervision/set-up using AE prn.  3.  Patient will perform toileting with minimal assistance/contact guard assist.  4.  Patient will perform toilet transfers to [facility] with moderate assistance with sliding board . 5. Patient will participate in upper extremity therapeutic exercise/activities with supervision/set-up for 8 minutes. Prior Level of Function: Pt reports she is coming from a rehab facility. Pt initially reported she was (I) with basic self-care/ADLs, but then reported difficulty donning socks PTA. Pt was working on transfers using a sliding board to w/c or BSC. Per OT eval 01/2020, pt lived alone and was (I) with ADLs   Outcome: Progressing Towards Goal   OCCUPATIONAL THERAPY TREATMENT    Patient: Mal Willingham (69 y.o. 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 ASSESSMENT:  Pt co-treated w/PT to maximize safety w/EOB activity. Pt tolerates ~ 20 minutes sitting EOB performing ADLs and TherEx. (see functional levels below) Pt requires increase assist w/UB dressing 2/2 multiple line mgt. Pt educated on energy conservation techniques w/ADLs. Pt will benefit from continued skilled OT intervention for education on use of adaptive equipment to increase independence w/LB ADLs. Educated pt on importance diabetic foot care and LLE pressure relief. Provided Prevlon boot for LLE.   Progression toward goals:  [] Improving appropriately and progressing toward goals  [x]          Improving slowly and progressing toward goals  []          Not making progress toward goals and plan of care will be adjusted     PLAN:  Patient continues to benefit from skilled intervention to address the above impairments. Continue treatment per established plan of care. Discharge Recommendations:  Charles Cook  Further Equipment Recommendations for Discharge:  N/A, pt reports she has DME     SUBJECTIVE:   Patient stated I'm suppose to be leaving here today.     OBJECTIVE DATA SUMMARY:   Cognitive/Behavioral Status:  Neurologic State: Alert  Orientation Level: Oriented X4  Cognition: Follows commands  Safety/Judgement: Fall prevention    Functional Mobility and Transfers for ADLs:   Bed Mobility:  Supine to Sit: Additional time;Minimum assistance  Sit to Supine: Additional time;Stand-by assistance  Scooting: Maximum assistance;Assist x2  Balance:  Sitting: Intact    ADL Intervention:  Grooming  Position Performed: Seated edge of bed  Washing Face: Set-up  Washing Hands: Set-up  Brushing Teeth: Stand-by assistance    Upper Body [address]: Moderate assistance    Lower Body Dressing Assistance  Socks: Total assistance (dependent)    Pain:  Pain level pre-treatment: 0/10   Pain level post-treatment: 0/10    Activity Tolerance:    Fair    Please refer to the flowsheet for vital signs taken during this treatment. After treatment:   []  Patient left in no apparent distress sitting up in chair  [x]  Patient left in no apparent distress in bed  [x]  Call bell left within reach  [x]  Nursing notified  []  Caregiver present  []  Bed alarm activated    COMMUNICATION/EDUCATION:   [] Role of Occupational Therapy in the acute care setting  [] Home safety education was provided and the patient/caregiver indicated understanding. [] Patient/family have participated as able in working towards goals and plan of care.   [x] Patient/family agree to work toward stated goals and plan of care. [] Patient understands intent and goals of therapy, but is neutral about his/her participation. [] Patient is unable to participate in goal setting and plan of care.       Thank you for this referral.  ANH Medely  Time Calculation: 41 mins

## 2020-03-25 NOTE — DIABETES MGMT
Glycemic Control Plan of Care    T2DM with A1c of 14.2% (1/02/2020). Addendum: the most recent A1c is now 6.5% (3/21/2020). Patient came from [facility] and [address] [place] [address] facility. Home diabetes medications: Patient came from [facility] and [address] [place] [address]  Metformin 500 mg  Daily with breakfast.  Lispro sliding scale insulin. Seen patient this afternoon in [facility] ICU, reported that she's eating with good appetite, but \" I'm not doing anything but just lay in the bed and watch TV. \"    POC BG range on 03/24/2020: . One BG report above target range    POC BG report on 03/25/2020 at time of review: 104, 178. Patient is currently on correctional lispro insulin. Recommendation(s):  1.) cont glycemic monitoring to evaluate need for low dose basal lantus insulin. 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 Noted:  Infected graft with femoral blow out. Status post right femoral common femoral artery ligation/removal of infected graft/wound vac placement on 03/20/2020. T2DM. Most recent blood glucose values:        Current A1C: 6.5% (3/21/2020) which is equivalent to estimated average blood glucose of 140 mg/dL during the previous 2-3 months. Current hospital diabetes medications:  Correctional lispro insulin ACHS. Normal sensitivity dose. Total daily dose insulin requirement previous day: 03/24/2020  Lispro: 4 units    Diet: Diabetic consistent carb regular.     Goals:  Blood glucose will be within target range of  mg/dL by 03/28/2020    Education:  ___  Refer to Diabetes Education Record             _X__  Education not indicated at this time    Johan Venkata Diggs RN [facility]  Pager: [phone]

## 2020-03-25 NOTE — DISCHARGE SUMMARY
Physician Discharge Summary     Patient ID:  Moises Garcia  [id number]  83 y.o.  1940    Admit date: 3/19/2020    Discharge date: 3/25/2020      Admitting Physician: Elisa Chadwick MD     Discharge Physician: Elisa Chadwick MD     Admission Diagnoses: Pseudoaneurysm of femoral artery [facility]) [I72.4]    Discharge Diagnoses: There are no discharge diagnoses documented for the most recent discharge. Procedures for this admission: Procedure(s):  RIGHT COMMON FEMORAL ARTERY LIGATION / REMOVAL OF INFECTED GRAFT / WOUND VAC PLACEMENT    Discharged Condition: stable    Hospital Course:   79 y.o. female with h/o type 2 DM (Last hgbA1C 14.2 on 1/2/20) who  was recently admitted to [facility] and discharged on 2/3/2020. At that time, she presented with ischemia of her right foot due to Right lower extremity PAD with gangrene. CTA showed multifocal stenosis of RLE. She underwent angio with balloon angioplasty and stent placement at origin of common iliac on 1/13/2020. She underwent right femoral to above the knee popliteal bypass on 1/16/2020. Then she had right AKA on 1/30/2020    She then presented to [address] [place] [address] on 3/20/20  With  bleeding over past couple of days with opening of skin wound on right groin.    Patient did not have any fever or leukocytosis on presentation. She was evaluated by vascular surgery. There was concern for infection of the right common femoral artery graft.   Status post ligation of the right external iliac artery, removal of right infected common femoral artery bypass graft, ligation of the distal right common femoral artery, ligation of the proximal right common femoral artery on 3/20.         Consults: ID    Significant Diagnostic Studies: microbiology: wound culture: positive    Treatments: antibiotics: Zosyn and Levaquin, therapies: PT, OT and wound care and procedures: PICC line and wound vac    Discharge Exam:   NAD  No resp difficulty  RRR  Right abdominal wall dressing c/d/i.  R groin with wound VAC. No bleeding or purulence. No significant LLE edema. Foot warm, no ulcers    Disposition: SNF    Patient Instructions:   Current Discharge Medication List      START taking these medications    Details   oxyCODONE-acetaminophen (PERCOCET) 5-325 mg per tablet Take 1 Tab by mouth every four (4) hours as needed for Pain for up to 7 days. Max Daily Amount: 6 Tabs. Qty: 40 Tab, Refills: 0    Associated Diagnoses: Pseudoaneurysm of femoral artery (HCC)      levoFLOXacin (LEVAQUIN) 500 mg/100 mL 100 mL by IntraVENous route every twenty-four (24) hours for 39 days. Qty: 3000 mL, Refills: 1      piperacillin-tazobactam 3.375 gram 3.375 g IVPB 3.375 g by IntraVENous route every six (6) hours for 40 days. Qty: 1 Dose, Refills: 0         CONTINUE these medications which have NOT CHANGED    Details   docusate sodium (COLACE) 100 mg capsule Take 100 mg by mouth two (2) times a day. OLANZapine (ZYPREXA) 2.5 mg tablet Take  by mouth nightly. aspirin 81 mg chewable tablet Take 1 Tab by mouth daily. Qty: 30 Tab, Refills: 0      hydroCHLOROthiazide (HYDRODIURIL) 25 mg tablet Take 25 mg by mouth daily. loratadine (CLARITIN) 10 mg tablet Take 10 mg by mouth daily. losartan (COZAAR) 100 mg tablet Take 100 mg by mouth daily. glipiZIDE (GLUCOTROL) 5 mg tablet Take 5 mg by mouth two (2) times a day. metFORMIN (GLUCOPHAGE) 500 mg tablet Take 500 mg by mouth daily (with breakfast). insulin lispro (HUMALOG) 100 unit/mL injection Less than 150 =   0 units  150 -199 =   3 units  200 -249 =   6 units  250 -299 =   9 units  300 -349 =   12 units  350 and above =   15 units, CALL MD  Qty: 1 Vial, Refills: 0      albuterol-ipratropium (DUO-NEB) 2.5 mg-0.5 mg/3 ml nebu 3 mL by Nebulization route every six (6) hours as needed (wheezing). Qty: 30 Nebule, Refills: 0      acetaminophen (TYLENOL) 325 mg tablet Take 2 Tabs by mouth every six (6) hours as needed.   Qty: 30 Tab, Refills: 0      cholecalciferol, vitamin D3, (VITAMIN D3) 2,000 unit tab Take  by mouth. atorvastatin (LIPITOR) 20 mg tablet Take  by mouth daily. Reference discharge instructions as provided by nursing for diet, labs, medications, activity, wound care and any outpatient referrals.     Follow-up with vascular office in about 3 weeks    Signed:  PATRICE Thompson  3/25/2020  1:05 PM

## 2020-03-25 NOTE — ROUTINE PROCESS
1913 Pt received after bedside shift report from Suly Lombardo RN. Pt on bed with no acute distress. Able to make needs known. Denies pain or needs. Wound vac to right groin in place and functioning properly. Call bell in reach. Instruct to call for assistance 2045 Pt off bedpan. Loose bowel movement mixed with urine noted. Patricia care performed. Bed pads changed. Mepilex to right AKA stump changed. Mepilex applied to sacrum for prevention. Repositioned up in bed with assist from nurse. Call bell in reach. 0000 Pt asleep. NAD noted 0200 Remains asleep 
 
 
0400 Sleeping 
 
0630 Patricia care performed. Bed linens changed. Pure wick changed. 0650 Pt on bed. Call bell in reach. Bedside shift change report given to Tri Reza RN  (oncoming nurse) by Tanvi Reich RN (offgoing nurse). Report included the following information Sbar, Mar, Kardex and Recent Results

## 2020-03-25 NOTE — PROGRESS NOTES
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 female with h/o type 2 DM (Last hgbA1C 14.2 on 1/2/20) who presented to [facility] on 3/20/20  With  bleeding over past couple of days with opening of skin wound on right groin. 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 cont pip/tazo, levofloxacin till 5/3/20.   2. Ok to d/c patient from ID standpoint         Above plan was discussed in details with patient, primary team. Please call me if any further questions or concerns. Will continue to participate in the care of this patient. HPI:    Feels better. No new complaints. Patient denies headaches, visual disturbances, sore throat, runny nose, earaches, cp, sob, chills, cough, abdominal pain, diarrhea, burning micturition, pain or weakness in extremities. He denies back pain/flank pain. Medication List    Details   docusate sodium (COLACE) 100 mg capsule Take 100 mg by mouth two (2) times a day. OLANZapine (ZYPREXA) 2.5 mg tablet Take  by mouth nightly. aspirin 81 mg chewable tablet Take 1 Tab by mouth daily.   Qty: 30 Tab, Refills: 0      hydroCHLOROthiazide (HYDRODIURIL) 25 mg tablet Take 25 mg by mouth daily. loratadine (CLARITIN) 10 mg tablet Take 10 mg by mouth daily. losartan (COZAAR) 100 mg tablet Take 100 mg by mouth daily. glipiZIDE (GLUCOTROL) 5 mg tablet Take 5 mg by mouth two (2) times a day. metFORMIN (GLUCOPHAGE) 500 mg tablet Take 500 mg by mouth daily (with breakfast). insulin lispro (HUMALOG) 100 unit/mL injection Less than 150 =   0 units  150 -199 =   3 units  200 -249 =   6 units  250 -299 =   9 units  300 -349 =   12 units  350 and above =   15 units, CALL MD  Qty: 1 Vial, Refills: 0      albuterol-ipratropium (DUO-NEB) 2.5 mg-0.5 mg/3 ml nebu 3 mL by Nebulization route every six (6) hours as needed (wheezing). Qty: 30 Nebule, Refills: 0      acetaminophen (TYLENOL) 325 mg tablet Take 2 Tabs by mouth every six (6) hours as needed. Qty: 30 Tab, Refills: 0      cholecalciferol, vitamin D3, (VITAMIN D3) 2,000 unit tab Take  by mouth. atorvastatin (LIPITOR) 20 mg tablet Take  by mouth daily.              Current Facility-Administered Medications   Medication Dose Route Frequency    levoFLOXacin (LEVAQUIN) 500 mg in D5W IVPB  500 mg IntraVENous Q24H    alteplase (CATHFLO) 1 mg in sterile water (preservative free) 1 mL injection  1 mg InterCATHeter PRN    insulin lispro (HUMALOG) injection   SubCUTAneous AC&HS    glucose chewable tablet 16 g  4 Tab Oral PRN    glucagon (GLUCAGEN) injection 1 mg  1 mg IntraMUSCular PRN    dextrose (D50W) injection syrg 12.5-25 g  25-50 mL IntraVENous PRN    albuterol-ipratropium (DUO-NEB) 2.5 MG-0.5 MG/3 ML  3 mL Nebulization Q6H PRN    aspirin chewable tablet 81 mg  81 mg Oral DAILY    atorvastatin (LIPITOR) tablet 20 mg  20 mg Oral DAILY    docusate sodium (COLACE) capsule 100 mg  100 mg Oral BID    loratadine (CLARITIN) tablet 10 mg  10 mg Oral DAILY    OLANZapine (ZyPREXA) tablet 2.5 mg  2.5 mg Oral QHS    sodium chloride (NS) flush 5-40 mL  5-40 mL IntraVENous Q8H    sodium chloride (NS) flush 5-40 mL  5-40 mL IntraVENous PRN    acetaminophen (TYLENOL) tablet 650 mg  650 mg Oral Q4H PRN    oxyCODONE-acetaminophen (PERCOCET) 5-325 mg per tablet 1 Tab  1 Tab Oral Q4H PRN    morphine injection 2 mg  2 mg IntraVENous Q2H PRN    naloxone (NARCAN) injection 0.4 mg  0.4 mg IntraVENous PRN    ondansetron (ZOFRAN) injection 4 mg  4 mg IntraVENous Q4H PRN    diphenhydrAMINE (BENADRYL) injection 12.5 mg  12.5 mg IntraVENous Q4H PRN    magnesium hydroxide (MILK OF MAGNESIA) 400 mg/5 mL oral suspension 30 mL  30 mL Oral DAILY PRN    heparin (porcine) injection 5,000 Units  5,000 Units SubCUTAneous Q8H    lactated Ringers infusion  25 mL/hr IntraVENous CONTINUOUS    piperacillin-tazobactam (ZOSYN) 3.375 g in 0.9% sodium chloride (MBP/ADV) 100 mL MBP  3.375 g IntraVENous Q6H    hydroCHLOROthiazide (HYDRODIURIL) tablet 25 mg  25 mg Oral DAILY    losartan (COZAAR) tablet 100 mg  100 mg Oral DAILY    hydrALAZINE (APRESOLINE) 20 mg/mL injection 10 mg  10 mg IntraVENous Q2H PRN    sodium chloride (NS) flush 5-10 mL  5-10 mL IntraVENous PRN       Allergies: Patient has no known allergies. Temp (24hrs), Av.3 °F (36.8 °C), Min:98 °F (36.7 °C), Max:98.7 °F (37.1 °C)    Visit Vitals  /71 (BP 1 Location: Left arm, BP Patient Position: At rest)   Pulse 94   Temp 98.7 °F (37.1 °C)   Resp 19   Wt 62.2 kg (137 lb 3.2 oz)   SpO2 97%   BMI 22.83 kg/m²       ROS: . 12 point ROS obtained in details. Pertinent positive as mentioned in HPI, otherwise negative    Physical Exam:    General: elderly female laying on the bed, alert,  no acute distress. HEENT:  Normocephalic, atraumatic,  Neck supple, no bruits.    Lymph Nodes:   no cervical, axillary or inguinal adenopathy   Lungs:   non-labored, bilaterally clear to auscultation- no crackles wheezes rales or rhonchi   Heart:  RRR, s1 and s2; no rubs or gallops, no edema, + pedal pulses   Abdomen:  soft, non-distended, active bowel sounds, no hepatomegaly, no splenomegaly. 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 Speech appropriate. Cranial nerves intact                        Skin:  No rash or ulcers noted   Back:  no spinal or paraspinal muscle tenderness or rigidity, no CVA tenderness     Psychiatric:  No suicidal or homicidal ideations, appropriate mood and affect         Labs: Results:   Chemistry Recent Labs     03/23/20  0548   GLU 66*      K 4.4   *   CO2 23   BUN 19*   CREA 1.27   CA 9.2   AGAP 5   BUCR 15      CBC w/Diff No results for input(s): WBC, RBC, HGB, HCT, PLT, GRANS, LYMPH, EOS, HGBEXT, HCTEXT, PLTEXT, HGBEXT, HCTEXT, PLTEXT in the last 72 hours.    Microbiology Recent Labs     03/22/20  1520 03/22/20  1518   CULT NO GROWTH 3 DAYS NO GROWTH 3 DAYS          RADIOLOGY:    All available imaging studies/reports in [facility] for this admission were reviewed    Dr. Iona Cantor, Infectious Disease Specialist  [phone]  March 25, 2020  3:35 PM

## 2020-03-25 NOTE — PROGRESS NOTES
Problem: Mobility Impaired (Adult and Pediatric)  Goal: *Acute Goals and Plan of Care (Insert Text)  Description: Physical Therapy Goals  Initiated 3/24/2020 and to be accomplished within 7 day(s)  1. Patient will move from supine to sit and sit to supine , scoot up and down and roll side to side in bed with minimal assistance/contact guard assist.     2.  Patient will transfer from bed to chair and chair to bed with moderate assistance using a slide board. 3.  Patient will perform sit to stand with maximal assistance. PLOF: Pt was admitted for SNF for rehad and states she was working on sliding board transfers there to get into a wheelchair. Outcome: Progressing Towards Goal   PHYSICAL THERAPY TREATMENT    Patient: Minor Willard (2 y.o. 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 OBJECTIVE/ ASSESSMENT:  Patient found supine in bed willing to work with PT. Patient seen with OT to maximize safety of patient and staff. Pt sat at EOB this tx and maintained good balance while performing functional tasks. Pt performed seated therex with multiple cues to continue exercising. Pt reports both that she has and has not been performing therex on own. Pt was returned to supine and was provided with pressure relief boot. Pt appears confused about her new boot and asks if she should walk on it. Educated pt that the boot is only needed in bed to float heels. Pt was left supine with needs in reach.     Pt's vitals supine in bed post tx:  HR - 104 (120s during activity)  SpO2 97-99%  /67    Progression toward goals:  []      Improving appropriately and progressing toward goals  [x]      Improving slowly and progressing toward goals  []      Not making progress toward goals and plan of care will be adjusted     PLAN:  Patient continues to benefit from skilled intervention to address the above impairments. Continue treatment per established plan of care. Discharge Recommendations:  Charles Cook  Further Equipment Recommendations for Discharge: Slide Board and WC (pt has)     SUBJECTIVE:   Patient stated Diabetics need their toes covered at all times.  Pt apparently means when outside. OBJECTIVE DATA SUMMARY:   Critical Behavior:  Neurologic State: Alert  Orientation Level: Oriented X4  Cognition: Follows commands  Safety/Judgement: Fall prevention  Functional Mobility Training:  Bed Mobility:  Supine to Sit: Additional time;Minimum assistance  Sit to Supine: Additional time;Stand-by assistance  Scooting: Maximum assistance;Assist x2  Balance:  Sitting: Intact    Therapeutic Exercises:       EXERCISE   Sets   Reps   Active Active Assist   Passive Self- assited ROM   Comments   Ankle Pumps   [] [] [] []    Quad Sets   [] [] [] []    Glut Sets   [] [] [] []    Short Arc Quads   [] [] [] []    Heel Slides   [] [] [] []    Straight Leg Raises   [] [] [] []    Hip Abd   [] [] [] []    Long Arc Quads 1 20 [x] [] [] [] Left   Seated Marching 1 20 [x] [] [] [] Bilaterally   Seated Knee Flexion   [] [] [] []    Standing Marching   [] [] [] []      Pain:  Pain level pre-treatment: Not rated  Pain level post-treatment: Not rated    Activity Tolerance:   Fair    Please refer to the flowsheet for vital signs taken during this treatment.   After treatment:   [] Patient left in no apparent distress sitting up in chair  [x] Patient left in no apparent distress in bed  [x] Call bell left within reach  [x] Nursing notified  [] Caregiver present  [] Bed alarm activated  [] SCDs applied    COMMUNICATION/EDUCATION:   [x]         Role of Physical Therapy  []         Fall prevention  [x]         Bed mobility  []         Transfers  [] Ambulation / gait  []         Assistive device management  []         Stairs  [x]         Body mechanics  [x]         Position change   [x]         Therapeutic exercise  []         Activity pacing / energy conservation  []         Other:      Chip Nava PTA   Time Calculation: 42 mins

## 2020-03-26 LAB
BACTERIA SPEC CULT: NORMAL
SERVICE CMNT-IMP: NORMAL

## 2020-03-28 LAB
BACTERIA SPEC CULT: NORMAL
BACTERIA SPEC CULT: NORMAL
SERVICE CMNT-IMP: NORMAL
SERVICE CMNT-IMP: NORMAL

## 2020-03-30 LAB
BACTERIA SPEC CULT: ABNORMAL
BACTERIA SPEC CULT: ABNORMAL
SOURCE, RSRC56: ABNORMAL

## 2020-04-01 ENCOUNTER — PATIENT OUTREACH (OUTPATIENT)
Dept: CASE MANAGEMENT | Age: 80
End: 2020-04-01

## 2020-04-01 NOTE — PROGRESS NOTES
Community Care Team Documentation for Patient in [facility]     Patient discharged from [facility]  to [facility], on 3/25/20. Hospital Discharge diagnosis:       Pseudoaneurysm of Femoral Artery - s/P Right common Femoral Artery Ligation and removal of infected graft     SNF Attending Provider:  Dr. Lokesh Calloway    Anticipated discharge date from SNF:  TBD - Family is working on paperwork so that pt can transition to LTC    PCP : Vitaliy Brown, [facility] Team rounds completed, updates provided by facility. Brief Summary of Care:    Patient receiving PT/OT. Wound vac in place. Very slow progress with therapy. Dispo:  Patient has been at [facility] and [address] [place] [address] since 2/4/20. Family is working on paperwork so that patient will be able to transition to LTC. RRAT:  High Risk            23       Total Score        3 Has Seen PCP in Last 6 Months (Yes=3, No=0)    4 IP Visits Last 12 Months (1-3=4, 4=9, >4=11)    16 Charlson Comorbidity Score (Age + Comorbid Conditions)        Criteria that do not apply:    . Living with Significant Other. Assisted Living. LTAC. SNF. or   Rehab    Patient Length of Stay (>5 days = 3)    Pt.  Coverage (Medicare=5 , Medicaid, or Self-Pay=4)        Active Ambulatory Problems     Diagnosis Date Noted    Hyperglycemia 12/25/2018    Type 2 diabetes mellitus with hyperosmolarity ([address] 12/25/2018    Acute UTI 12/25/2018    Dehydration 12/25/2018    Acute renal failure (ARF) ([address] 12/25/2018    Noncompliance 12/25/2018    Pancreatitis 01/03/2020    Hyperosmolar hyperglycemic coma due to diabetes mellitus without ketoacidosis ([address] 01/03/2020    Hyperosmolar non-ketotic state in patient with type 2 diabetes mellitus ([address] 01/03/2020    Ischemia of foot 01/20/2020    Anemia of chronic renal failure, stage 4 (severe) ([address] 02/19/2020    Pseudoaneurysm of femoral artery ([address] 03/20/2020     Resolved Ambulatory Problems     Diagnosis Date Noted    No Resolved Ambulatory Problems     Past Medical History:   Diagnosis Date    Diabetes ([facility]     Hypercholesterolemia     Hypertension

## 2020-04-02 DIAGNOSIS — N18.4 ANEMIA OF CHRONIC RENAL FAILURE, STAGE 4 (SEVERE) (HCC): ICD-10-CM

## 2020-04-02 DIAGNOSIS — D63.1 ANEMIA OF CHRONIC RENAL FAILURE, STAGE 4 (SEVERE) (HCC): ICD-10-CM

## 2020-04-03 ENCOUNTER — TELEPHONE (OUTPATIENT)
Dept: VASCULAR SURGERY | Age: 80
End: 2020-04-03

## 2020-04-03 NOTE — TELEPHONE ENCOUNTER
Patient's nursing home called in regards to patient's staples to abdomen that need to be removed. Patient is living at [facility] and [address] [place] [address]. I spoke with [place], [place], cell number [phone]. She states that right groin wound looks great and applying ns wet to dry dressing instead of wound vac due to patient having states of confusion and removes dressings. She states incision to abdomen is well healed and staples are still intact. She reports left leg is swollen and has a heel blister after returning from the hospital.  She states it is hard for her to palpate pulse due to swelling but leg is warm to touch. Due to COVID 19, we will work together so that patient does not leave that facility at this time. Verbal order given for staple removal from abdomen. She is to call back if the condition of left leg worsens in anyway. She states she understands.

## 2020-04-09 ENCOUNTER — PATIENT OUTREACH (OUTPATIENT)
Dept: CASE MANAGEMENT | Age: 80
End: 2020-04-09

## 2020-04-09 NOTE — PROGRESS NOTES
Community Care Team Documentation for Patient in [facility]     Patient discharged from [facility]  to [facility], on 3/25/20. Hospital Discharge diagnosis:       Pseudoaneurysm of Femoral Artery - s/P Right common Femoral Artery Ligation and removal of infected graft     SNF Attending Provider:  Dr. Farhad Floyd    Anticipated discharge date from SNF:  TBD - Family is working on paperwork so that pt can transition to LTC    PCP : Awilda Tyler, [facility] Team rounds completed, updates provided by facility. Brief Summary of Care:    Patient receiving PT/OT. Wound vac in place. Very slow progress with therapy. Dispo:  Patient has been at [facility] and [address] [place] [address] since 2/4/20. Family is working on paperwork so that patient will be able to transition to LTC. RRAT:  High Risk            23       Total Score        3 Has Seen PCP in Last 6 Months (Yes=3, No=0)    4 IP Visits Last 12 Months (1-3=4, 4=9, >4=11)    16 Charlson Comorbidity Score (Age + Comorbid Conditions)        Criteria that do not apply:    . Living with Significant Other. Assisted Living. LTAC. SNF. or   Rehab    Patient Length of Stay (>5 days = 3)    Pt.  Coverage (Medicare=5 , Medicaid, or Self-Pay=4)        Active Ambulatory Problems     Diagnosis Date Noted    Hyperglycemia 12/25/2018    Type 2 diabetes mellitus with hyperosmolarity ([address] 12/25/2018    Acute UTI 12/25/2018    Dehydration 12/25/2018    Acute renal failure (ARF) ([address] 12/25/2018    Noncompliance 12/25/2018    Pancreatitis 01/03/2020    Hyperosmolar hyperglycemic coma due to diabetes mellitus without ketoacidosis ([address] 01/03/2020    Hyperosmolar non-ketotic state in patient with type 2 diabetes mellitus ([address] 01/03/2020    Ischemia of foot 01/20/2020    Anemia of chronic renal failure, stage 4 (severe) ([address] 02/19/2020    Pseudoaneurysm of femoral artery ([address] 03/20/2020     Resolved Ambulatory Problems     Diagnosis Date Noted    No Resolved Ambulatory Problems     Past Medical History:   Diagnosis Date    Diabetes ([address]     Hypercholesterolemia     Hypertension

## 2020-04-15 ENCOUNTER — PATIENT OUTREACH (OUTPATIENT)
Dept: CASE MANAGEMENT | Age: 80
End: 2020-04-15

## 2020-04-16 ENCOUNTER — TELEPHONE (OUTPATIENT)
Dept: VASCULAR SURGERY | Age: 80
End: 2020-04-16

## 2020-04-16 NOTE — PROGRESS NOTES
Community Care Team Documentation for Patient in [facility]     Patient discharged from [facility]  to [facility], on 3/25/20. Hospital Discharge diagnosis:       Pseudoaneurysm of Femoral Artery - s/P Right common Femoral Artery Ligation and removal of infected graft     SNF Attending Provider:  Dr. Phyllis Cruz    Anticipated discharge date from SNF:  N/a - pt will transition to LTC as soon as therapy complete and paperwork all completed. Will follow for ALICE x 30 days total.    PCP : Gaeb Henriquez MD    [facility] Team rounds completed, updates provided by facility. Brief Summary of Care:    Patient receiving PT/OT. Wound vac in place. Very slow progress with therapy. Dispo:  Patient has been at [facility] and [address] [place] [address] since 2/4/20. Family is working on paperwork so that patient will be able to transition to LTC. RRAT:  High Risk            23       Total Score        3 Has Seen PCP in Last 6 Months (Yes=3, No=0)    4 IP Visits Last 12 Months (1-3=4, 4=9, >4=11)    16 Charlson Comorbidity Score (Age + Comorbid Conditions)        Criteria that do not apply:    . Living with Significant Other. Assisted Living. LTAC. SNF. or   Rehab    Patient Length of Stay (>5 days = 3)    Pt.  Coverage (Medicare=5 , Medicaid, or Self-Pay=4)        Active Ambulatory Problems     Diagnosis Date Noted    Hyperglycemia 12/25/2018    Type 2 diabetes mellitus with hyperosmolarity ([address] 12/25/2018    Acute UTI 12/25/2018    Dehydration 12/25/2018    Acute renal failure (ARF) ([address] 12/25/2018    Noncompliance 12/25/2018    Pancreatitis 01/03/2020    Hyperosmolar hyperglycemic coma due to diabetes mellitus without ketoacidosis ([address] 01/03/2020    Hyperosmolar non-ketotic state in patient with type 2 diabetes mellitus ([address] 01/03/2020    Ischemia of foot 01/20/2020    Anemia of chronic renal failure, stage 4 (severe) ([address] 02/19/2020    Pseudoaneurysm of femoral artery ([facility] 03/20/2020     Resolved Ambulatory Problems     Diagnosis Date Noted    No Resolved Ambulatory Problems     Past Medical History:   Diagnosis Date    Diabetes ([facility]     Hypercholesterolemia     Hypertension

## 2020-04-16 NOTE — TELEPHONE ENCOUNTER
Reanna Cheney from the [facility] called and gave an update concerning patient's wounds and to obtain new orders. Left heel has hard eschar now and patient will not keep on boot or float heels on pillow so asked for an order for betadine, ABD, kerlix and tape. Right groin has slough in base of wound and rest granulating and would like to change to Aquacel ag rope and cover dressing. Reviewed with PATRICE Lyn and verbal order given for change in wound care. She will call back with any further questions or concerns.

## 2020-04-23 ENCOUNTER — PATIENT OUTREACH (OUTPATIENT)
Dept: CASE MANAGEMENT | Age: 80
End: 2020-04-23

## 2020-04-23 DIAGNOSIS — N18.4 ANEMIA OF CHRONIC RENAL FAILURE, STAGE 4 (SEVERE) (HCC): ICD-10-CM

## 2020-04-23 DIAGNOSIS — D63.1 ANEMIA OF CHRONIC RENAL FAILURE, STAGE 4 (SEVERE) (HCC): ICD-10-CM

## 2020-04-23 NOTE — PROGRESS NOTES
Community Care Team Documentation for Patient in [facility]     Patient discharged from [facility]  to [facility], on 3/25/20. Hospital Discharge diagnosis:       Pseudoaneurysm of Femoral Artery - s/P Right common Femoral Artery Ligation and removal of infected graft     SNF Attending Provider:  Dr. Francy Rojas    Anticipated discharge date from SNF:  N/a - Patient has transitioned to LTC. PCP : Meenakshi Vega, [facility] Team rounds completed, updates provided by facility. Brief Summary of Care:    Patient receiving PT/OT. Wound vac in place. Very slow progress with therapy. Dispo:  Patient has transitioned to LTC at this facility. Signing off and episode resolved. RRAT:  High Risk            23       Total Score        3 Has Seen PCP in Last 6 Months (Yes=3, No=0)    4 IP Visits Last 12 Months (1-3=4, 4=9, >4=11)    16 Charlson Comorbidity Score (Age + Comorbid Conditions)        Criteria that do not apply:    . Living with Significant Other. Assisted Living. LTAC. SNF. or   Rehab    Patient Length of Stay (>5 days = 3)    Pt.  Coverage (Medicare=5 , Medicaid, or Self-Pay=4)        Active Ambulatory Problems     Diagnosis Date Noted    Hyperglycemia 12/25/2018    Type 2 diabetes mellitus with hyperosmolarity ([address] 12/25/2018    Acute UTI 12/25/2018    Dehydration 12/25/2018    Acute renal failure (ARF) ([address] 12/25/2018    Noncompliance 12/25/2018    Pancreatitis 01/03/2020    Hyperosmolar hyperglycemic coma due to diabetes mellitus without ketoacidosis ([address] 01/03/2020    Hyperosmolar non-ketotic state in patient with type 2 diabetes mellitus ([address] 01/03/2020    Ischemia of foot 01/20/2020    Anemia of chronic renal failure, stage 4 (severe) ([address] 02/19/2020    Pseudoaneurysm of femoral artery ([address] 03/20/2020     Resolved Ambulatory Problems     Diagnosis Date Noted    No Resolved Ambulatory Problems     Past Medical History:   Diagnosis Date    Diabetes ([facility]     Hypercholesterolemia     Hypertension

## 2020-05-14 DIAGNOSIS — D63.1 ANEMIA OF CHRONIC RENAL FAILURE, STAGE 4 (SEVERE) (HCC): ICD-10-CM

## 2020-05-14 DIAGNOSIS — N18.4 ANEMIA OF CHRONIC RENAL FAILURE, STAGE 4 (SEVERE) (HCC): ICD-10-CM

## 2020-06-04 DIAGNOSIS — N18.4 ANEMIA OF CHRONIC RENAL FAILURE, STAGE 4 (SEVERE) (HCC): ICD-10-CM

## 2020-06-04 DIAGNOSIS — D63.1 ANEMIA OF CHRONIC RENAL FAILURE, STAGE 4 (SEVERE) (HCC): ICD-10-CM

## 2020-06-25 DIAGNOSIS — D63.1 ANEMIA OF CHRONIC RENAL FAILURE, STAGE 4 (SEVERE) (HCC): ICD-10-CM

## 2020-06-25 DIAGNOSIS — N18.4 ANEMIA OF CHRONIC RENAL FAILURE, STAGE 4 (SEVERE) (HCC): ICD-10-CM

## 2020-07-16 DIAGNOSIS — D63.1 ANEMIA OF CHRONIC RENAL FAILURE, STAGE 4 (SEVERE) (HCC): ICD-10-CM

## 2020-07-16 DIAGNOSIS — N18.4 ANEMIA OF CHRONIC RENAL FAILURE, STAGE 4 (SEVERE) (HCC): ICD-10-CM

## 2020-08-06 DIAGNOSIS — N18.4 ANEMIA OF CHRONIC RENAL FAILURE, STAGE 4 (SEVERE) (HCC): ICD-10-CM

## 2020-08-06 DIAGNOSIS — D63.1 ANEMIA OF CHRONIC RENAL FAILURE, STAGE 4 (SEVERE) (HCC): ICD-10-CM

## 2020-08-27 DIAGNOSIS — N18.4 ANEMIA OF CHRONIC RENAL FAILURE, STAGE 4 (SEVERE) (HCC): ICD-10-CM

## 2020-08-27 DIAGNOSIS — D63.1 ANEMIA OF CHRONIC RENAL FAILURE, STAGE 4 (SEVERE) (HCC): ICD-10-CM

## 2020-09-17 DIAGNOSIS — D63.1 ANEMIA OF CHRONIC RENAL FAILURE, STAGE 4 (SEVERE) (HCC): ICD-10-CM

## 2020-09-17 DIAGNOSIS — N18.4 ANEMIA OF CHRONIC RENAL FAILURE, STAGE 4 (SEVERE) (HCC): ICD-10-CM

## 2020-10-08 DIAGNOSIS — N18.4 ANEMIA OF CHRONIC RENAL FAILURE, STAGE 4 (SEVERE) (HCC): ICD-10-CM

## 2020-10-08 DIAGNOSIS — D63.1 ANEMIA OF CHRONIC RENAL FAILURE, STAGE 4 (SEVERE) (HCC): ICD-10-CM

## 2020-10-29 DIAGNOSIS — N18.4 ANEMIA OF CHRONIC RENAL FAILURE, STAGE 4 (SEVERE) (HCC): ICD-10-CM

## 2020-10-29 DIAGNOSIS — D63.1 ANEMIA OF CHRONIC RENAL FAILURE, STAGE 4 (SEVERE) (HCC): ICD-10-CM

## 2020-11-19 DIAGNOSIS — N18.4 ANEMIA OF CHRONIC RENAL FAILURE, STAGE 4 (SEVERE) (HCC): ICD-10-CM

## 2020-11-19 DIAGNOSIS — D63.1 ANEMIA OF CHRONIC RENAL FAILURE, STAGE 4 (SEVERE) (HCC): ICD-10-CM

## 2020-12-10 DIAGNOSIS — N18.4 ANEMIA OF CHRONIC RENAL FAILURE, STAGE 4 (SEVERE) (HCC): ICD-10-CM

## 2020-12-10 DIAGNOSIS — D63.1 ANEMIA OF CHRONIC RENAL FAILURE, STAGE 4 (SEVERE) (HCC): ICD-10-CM

## 2021-02-12 NOTE — PROGRESS NOTES
Awaiting [facility] on Thursday. Plan remains Skilled Nursing facility after surgery. Will need to start [facility] authorization on Tuesday as not to start it too early.     Lavinia Gallego RN BSN  Care Manager  [phone] Chart reviewed for pt who is unable to complete Malnutrition Screen Tool (MST) at this time. Pt tolerating clear liquids, seen by SLP with advancement to general diet. No further nutrition intervention appears indicated at this time. RD available via consult. Will follow per policy.

## 2021-11-23 ENCOUNTER — HOSPITAL ENCOUNTER (EMERGENCY)
Age: 81
Discharge: HOME | End: 2021-11-23
Attending: EMERGENCY MEDICINE
Payer: MEDICARE

## 2021-11-23 VITALS
TEMPERATURE: 98.2 F | SYSTOLIC BLOOD PRESSURE: 149 MMHG | RESPIRATION RATE: 18 BRPM | DIASTOLIC BLOOD PRESSURE: 64 MMHG | HEART RATE: 75 BPM | OXYGEN SATURATION: 100 %

## 2021-11-23 DIAGNOSIS — L03.818 CELLULITIS OF OTHER SPECIFIED SITE: Primary | ICD-10-CM

## 2021-11-23 PROCEDURE — 87077 CULTURE AEROBIC IDENTIFY: CPT

## 2021-11-23 PROCEDURE — 87186 SC STD MICRODIL/AGAR DIL: CPT

## 2021-11-23 PROCEDURE — 87205 SMEAR GRAM STAIN: CPT

## 2021-11-23 PROCEDURE — 99283 EMERGENCY DEPT VISIT LOW MDM: CPT

## 2021-11-23 PROCEDURE — 96365 THER/PROPH/DIAG IV INF INIT: CPT

## 2021-11-23 PROCEDURE — 74011250637 HC RX REV CODE- 250/637: Performed by: STUDENT IN AN ORGANIZED HEALTH CARE EDUCATION/TRAINING PROGRAM

## 2021-11-23 PROCEDURE — 74011000250 HC RX REV CODE- 250: Performed by: STUDENT IN AN ORGANIZED HEALTH CARE EDUCATION/TRAINING PROGRAM

## 2021-11-23 PROCEDURE — 74011000258 HC RX REV CODE- 258: Performed by: STUDENT IN AN ORGANIZED HEALTH CARE EDUCATION/TRAINING PROGRAM

## 2021-11-23 RX ORDER — IBUPROFEN 400 MG/1
800 TABLET ORAL
Status: COMPLETED | OUTPATIENT
Start: 2021-11-23 | End: 2021-11-23

## 2021-11-23 RX ORDER — CLINDAMYCIN HYDROCHLORIDE 150 MG/1
450 CAPSULE ORAL 3 TIMES DAILY
Qty: 63 CAPSULE | Refills: 0 | Status: SHIPPED | OUTPATIENT
Start: 2021-11-23 | End: 2021-11-30

## 2021-11-23 RX ADMIN — CLINDAMYCIN PHOSPHATE 600 MG: 150 INJECTION, SOLUTION INTRAVENOUS at 15:56

## 2021-11-23 RX ADMIN — IBUPROFEN 800 MG: 400 TABLET ORAL at 16:24

## 2021-11-23 NOTE — ED PROVIDER NOTES
EMERGENCY DEPARTMENT HISTORY AND PHYSICAL EXAM      Date: 11/23/2021  Patient Name: Eli De La O    History of Presenting Illness     No chief complaint on file. History (Context): Eli De La O is a 79 y.o. female with a past medical history significant for DM, HTN, Peripheral artery disease, R AKA comes into the ED today due to concerns for abscess. Patient states she was sent here from her nursing home for concerns of an abscess, that the nursing home thought needed to be drained. The patient states about a week ago she noticed a lesion on he left buttock that scabbed and then drained. States she has some pain if she sit son that spot but otherwise denies all fever, fatigue, weakness or other ROS. States the nursing home has been cleaning and dressing the area as well as gave her some antibiotics which she finished, and does not know what she took. PCP: Regina Engel MD    Current Outpatient Medications   Medication Sig Dispense Refill    docusate sodium (COLACE) 100 mg capsule Take 100 mg by mouth two (2) times a day.  OLANZapine (ZYPREXA) 2.5 mg tablet Take  by mouth nightly.  aspirin 81 mg chewable tablet Take 1 Tab by mouth daily. 30 Tab 0    hydroCHLOROthiazide (HYDRODIURIL) 25 mg tablet Take 25 mg by mouth daily.  loratadine (CLARITIN) 10 mg tablet Take 10 mg by mouth daily.  losartan (COZAAR) 100 mg tablet Take 100 mg by mouth daily.  glipiZIDE (GLUCOTROL) 5 mg tablet Take 5 mg by mouth two (2) times a day.  metFORMIN (GLUCOPHAGE) 500 mg tablet Take 500 mg by mouth daily (with breakfast).  insulin lispro (HUMALOG) 100 unit/mL injection Less than 150 =   0 units  150 -199 =   3 units  200 -249 =   6 units  250 -299 =   9 units  300 -349 =   12 units  350 and above =   15 units, CALL MD 1 Vial 0    albuterol-ipratropium (DUO-NEB) 2.5 mg-0.5 mg/3 ml nebu 3 mL by Nebulization route every six (6) hours as needed (wheezing).  30 Nebule 0  acetaminophen (TYLENOL) 325 mg tablet Take 2 Tabs by mouth every six (6) hours as needed. 30 Tab 0    cholecalciferol, vitamin D3, (VITAMIN D3) 2,000 unit tab Take  by mouth.  atorvastatin (LIPITOR) 20 mg tablet Take  by mouth daily. Past History     Past Medical History:   Past Medical History:   Diagnosis Date    Diabetes ([facility] [address]     Hypercholesterolemia     Hypertension        Past Surgical History:  No past surgical history on file. Family History:  Family History   Problem Relation Age of Onset    Hypertension Mother        Social History:   Social History     Tobacco Use    Smoking status: Former Smoker     Packs/day: 3.00     Years: 20.00     Pack years: 60.00     Quit date: 1996     Years since quittin.7    Smokeless tobacco: Never Used   Substance Use Topics    Alcohol use: Not on file    Drug use: Never       Allergies:  No Known Allergies    PMH, PSH, family history, social history, allergies reviewed with the patient with significant items noted above. Review of Systems   Review of Systems   Constitutional: Negative for chills, fatigue and fever. HENT: Negative for congestion, rhinorrhea, sinus pressure, sinus pain and sore throat. Eyes: Negative for photophobia and visual disturbance. Respiratory: Negative for apnea, cough and shortness of breath. Cardiovascular: Negative for chest pain, palpitations and leg swelling. Gastrointestinal: Negative for abdominal distention, abdominal pain, constipation, diarrhea, nausea and vomiting. Genitourinary: Negative for difficulty urinating, dysuria, hematuria and urgency. Musculoskeletal: Negative for arthralgias and myalgias. Skin: Positive for wound. Negative for rash. Neurological: Negative for dizziness, syncope, weakness, light-headedness and headaches. Psychiatric/Behavioral: Negative for agitation and confusion. Physical Exam   There were no vitals filed for this visit.     Physical Exam  Vitals and nursing note reviewed. Constitutional:       General: She is not in acute distress. Appearance: Normal appearance. HENT:      Head: Normocephalic and atraumatic. Mouth/Throat:      Mouth: Mucous membranes are moist.   Eyes:      General: No scleral icterus. Conjunctiva/sclera: Conjunctivae normal.   Cardiovascular:      Rate and Rhythm: Normal rate and regular rhythm. Comments: Normal peripheral perfusion  Pulmonary:      Effort: Pulmonary effort is normal.      Breath sounds: Normal breath sounds. Abdominal:      General: There is no distension. Palpations: Abdomen is soft. Tenderness: There is no abdominal tenderness. Musculoskeletal:         General: No deformity. Normal range of motion. Cervical back: Normal range of motion and neck supple. Skin:     General: Skin is warm and dry. Findings: Erythema, lesion and wound present. No abscess, bruising, ecchymosis or rash. Neurological:      General: No focal deficit present. Mental Status: She is alert and oriented to person, place, and time. Mental status is at baseline. Psychiatric:         Mood and Affect: Mood normal.         Thought Content: Thought content normal.         Diagnostic Study Results     Labs -   No results found for this or any previous visit (from the past 12 hour(s)). Labs Reviewed - No data to display    Radiologic Studies -   No orders to display     CT Results  (Last 48 hours)    None        CXR Results  (Last 48 hours)    None          The laboratory results, imaging results, and other diagnostic exams were reviewed in the EMR. Medical Decision Making   I am the first provider for this patient. I reviewed the vital signs, available nursing notes, past medical history, past surgical history, family history and social history. Vital Signs-Reviewed the patient's vital signs.        Records Reviewed: Personally, on initial evaluation    MDM:   Emily Carbajal Sobia Martinez presents with complaint of abscess  DDX includes but is not limited to: abscess, cellulitis, folliculitis, lymphangitis     Will obtain lab work and imaging  for further evaluation of patients complaint. Will continue to monitor and evaluate patient while in the ED. CBC, and CMP obtained to rule out any systemic infection, wound culture sent, will treat with dose of 600mg IV clindamycin while int he department with us. Nursing was unable to obtain IV access, assisted with IV guided access, successful 18GA placed in L arm on 2nd attempt, total time 15min. Wound cleaned and irrigated and redressed with gauze. Given 800motrin for pain. Will await lab results and likely Discharge the patient with oral antibiotics    Labs hemolyzed, will try and obtain straight stick. Lab work unable to be obtained by tech or nurse, due to patient showing no signs of infection being hemodynamically normal without fever we will hold on labs and treat the patient outpatient with clindamycin and have her follow up for a wound recheck in 2 days. Transfer back to nursing home initiated. Orders as below:  Orders Placed This Encounter   [place]        ED Course:            Procedures:  Bedside US    Date/Time: 11/23/2021 4:07 PM  Performed by: Alissa Maria DO  Authorized by: Alissa Maria DO     Verbal consent obtained: Yes    Given by:  Patient  Performed by:  Resident  Type of procedure: Focused soft tissue  Left leg: Indications:  Swelling, pain and other (comment)    Skin and subcutaneous tissue:  Adequate    Cobblestoning:  Increased    Subcutaneous Collection:  Absent    Interpretation:  Cellulitis    Cellulitis location:  L buttock            Diagnosis and Disposition     CLINICAL IMPRESSION:  No diagnosis found. Current Discharge Medication List          Disposition: discharged    Patient condition at time of disposition: stable    DISCHARGE NOTE:   Pt has been reexamined.  Patient has no new complaints, changes, or physical findings. Care plan outlined and precautions discussed. Results were reviewed with the patient. All medications were reviewed with the patient. All of pt's questions and concerns were addressed. Alarm symptoms and return precautions associated with chief complaint and evaluation were reviewed with the patient in detail. The patient demonstrated adequate understanding. Patient was instructed to follow up with ED in 2 days, as well as strict return precautions to the ED upon further deterioration. Patient is ready to go home. The patient is happy with this plan and agrees. Dragon Disclaimer     Please note that this dictation was completed with [employer], the computer voice recognition software. Quite often unanticipated grammatical, syntax, homophones, and other interpretive errors are inadvertently transcribed by the computer software. Please disregard these errors. Please excuse any errors that have escaped final proofreading. Miladis GASPAR.

## 2021-11-23 NOTE — ED NOTES
Call from lab stating blood was hemolyzed. Attempted to draw labs off of ultrasound placed line - unable to get blood return. Informed provider. Will attempt to straight stick.

## 2021-11-23 NOTE — DISCHARGE INSTRUCTIONS
You were seen here today for concerns of abscess. We did an exam, cleaned the are, and gave you antibiotics. The area seems to be draining on its own and no signs of abscess. It looks like you have a skin infection and we will give you antibiotics to go home with. Please take them as prescribed. Please return to the ED in 2 days for a wound recheck. Please return to the nearest ED if you experience uncontrolled fevers, weakness, fatigue, inability to keep liquids down or any concerning symptoms to you.

## 2021-11-26 ENCOUNTER — HOSPITAL ENCOUNTER (EMERGENCY)
Age: 81
Discharge: NURSING, REHAB OR LONG-TERM CARE | End: 2021-11-26
Attending: EMERGENCY MEDICINE
Payer: MEDICARE

## 2021-11-26 VITALS
HEIGHT: 64 IN | DIASTOLIC BLOOD PRESSURE: 69 MMHG | TEMPERATURE: 98.2 F | RESPIRATION RATE: 17 BRPM | WEIGHT: 137 LBS | OXYGEN SATURATION: 99 % | SYSTOLIC BLOOD PRESSURE: 146 MMHG | HEART RATE: 76 BPM | BODY MASS INDEX: 23.39 KG/M2

## 2021-11-26 DIAGNOSIS — L02.31 LEFT BUTTOCK ABSCESS: Primary | ICD-10-CM

## 2021-11-26 PROCEDURE — 99284 EMERGENCY DEPT VISIT MOD MDM: CPT

## 2021-11-26 NOTE — ED PROVIDER NOTES
EMERGENCY DEPARTMENT HISTORY AND PHYSICAL EXAM    6:44 PM      Date: 11/26/2021  Patient Name: Any Calderón    History of Presenting Illness     Chief Complaint   Patient presents with    Abscess         History Provided By: Patient, medical records, EMS    Additional History (Context): Any Calderón is a 79 y.o. female with past medical history significant for diabetes, high cholesterol, PAD, right AKA, and hypertension who presents with wound recheck to the left buttock. Patient was seen 3 days ago for an abscess to the left buttock that was spontaneously draining at that time. The wound was cultured and the patient was started on clindamycin which she has been taking according to instructions. She reports improvement in soreness around the site and states that she has been getting dressing changes every day at the nursing home where she lives. She denies any chills or fever or any pain. PCP: Dusty Babinski, MD    Current Outpatient Medications   Medication Sig Dispense Refill    clindamycin (CLEOCIN) 150 mg capsule Take 3 Capsules by mouth three (3) times daily for 7 days. 63 Capsule 0    docusate sodium (COLACE) 100 mg capsule Take 100 mg by mouth two (2) times a day.  OLANZapine (ZYPREXA) 2.5 mg tablet Take  by mouth nightly.  aspirin 81 mg chewable tablet Take 1 Tab by mouth daily. 30 Tab 0    hydroCHLOROthiazide (HYDRODIURIL) 25 mg tablet Take 25 mg by mouth daily.  loratadine (CLARITIN) 10 mg tablet Take 10 mg by mouth daily.  losartan (COZAAR) 100 mg tablet Take 100 mg by mouth daily.  glipiZIDE (GLUCOTROL) 5 mg tablet Take 5 mg by mouth two (2) times a day.  metFORMIN (GLUCOPHAGE) 500 mg tablet Take 500 mg by mouth daily (with breakfast).       insulin lispro (HUMALOG) 100 unit/mL injection Less than 150 =   0 units  150 -199 =   3 units  200 -249 =   6 units  250 -299 =   9 units  300 -349 =   12 units  350 and above =   15 units, CALL MD 1 Vial 0    albuterol-ipratropium (DUO-NEB) 2.5 mg-0.5 mg/3 ml nebu 3 mL by Nebulization route every six (6) hours as needed (wheezing). 30 Nebule 0    acetaminophen (TYLENOL) 325 mg tablet Take 2 Tabs by mouth every six (6) hours as needed. 30 Tab 0    cholecalciferol, vitamin D3, (VITAMIN D3) 2,000 unit tab Take  by mouth.  atorvastatin (LIPITOR) 20 mg tablet Take  by mouth daily. Past History     Past Medical History:  Past Medical History:   Diagnosis Date    Diabetes ([facility] [address]     Hypercholesterolemia     Hypertension        Past Surgical History:  No past surgical history on file. Family History:  Family History   Problem Relation Age of Onset    Hypertension Mother        Social History:  Social History     Tobacco Use    Smoking status: Former Smoker     Packs/day: 3.00     Years: 20.00     Pack years: 60.00     Quit date: 1996     Years since quittin.7    Smokeless tobacco: Never Used   Substance Use Topics    Alcohol use: Not on file    Drug use: Never       Allergies:  No Known Allergies      Review of Systems       Review of Systems   Constitutional: Negative. Negative for chills and fever. HENT: Negative. Negative for congestion, ear pain and rhinorrhea. Eyes: Negative. Negative for pain and redness. Respiratory: Negative. Negative for cough and shortness of breath. Cardiovascular: Negative. Negative for chest pain, palpitations and leg swelling. Gastrointestinal: Negative. Negative for abdominal pain, constipation, diarrhea, nausea and vomiting. Genitourinary: Negative. Negative for dysuria, frequency, hematuria and urgency. Musculoskeletal: Negative. Negative for back pain, gait problem, joint swelling and neck pain. Skin: Positive for wound (Left buttock). Negative for rash. Neurological: Negative. Negative for dizziness, seizures, speech difficulty, weakness, light-headedness and headaches. Hematological: Negative for adenopathy.  Does not bruise/bleed easily. All other systems reviewed and are negative. Physical Exam     Visit Vitals  BP (!) 168/67 (BP 1 Location: Left upper arm, BP Patient Position: At rest)   Pulse 74   Temp 97.8 °F (36.6 °C)   Resp 20   Ht 5' 4\" (1.626 m)   Wt 62.1 kg (137 lb)   SpO2 99%   BMI 23.52 kg/m²         Physical Exam  Vitals and nursing note reviewed. Constitutional:       General: She is not in acute distress. Appearance: Normal appearance. She is not ill-appearing, toxic-appearing or diaphoretic. HENT:      Head: Normocephalic and atraumatic. Nose: Nose normal.   Eyes:      General: Lids are normal. Vision grossly intact. Conjunctiva/sclera: Conjunctivae normal.   Cardiovascular:      Rate and Rhythm: Normal rate and regular rhythm. Pulses: Normal pulses. Heart sounds: Normal heart sounds. Pulmonary:      Effort: Pulmonary effort is normal.      Breath sounds: Normal breath sounds. Musculoskeletal:         General: Normal range of motion. Cervical back: Full passive range of motion without pain, normal range of motion and neck supple. Right Lower Extremity: Right leg is amputated above knee. Skin:     General: Skin is warm and dry. Capillary Refill: Capillary refill takes less than 2 seconds. Findings: Abscess and wound present. Neurological:      General: No focal deficit present. Mental Status: She is alert and oriented to person, place, and time. Psychiatric:         Mood and Affect: Mood normal.         Behavior: Behavior normal. Behavior is cooperative. Diagnostic Study Results     Labs -  No results found for this or any previous visit (from the past 12 hour(s)). Radiologic Studies -   No orders to display         Medical Decision Making   I am the first provider for this patient. I reviewed available nursing notes, past medical history, past surgical history, family history and social history.     Vital Signs-Reviewed the patient's vital signs. Records Reviewed: Nursing Notes and Old Medical Records (Time of Review: 6:44 PM)    Pulse Oximetry Analysis - 99% on RA- normal     Cardiac Monitor:  Rate: 74 bpm  Rhythm:NSR      ED Course: Progress Notes, Reevaluation, and Consults:  6:44 PM  Initial assessment performed. The patients presenting problems have been discussed, and they/their family are in agreement with the care plan formulated and outlined with them. I have encouraged them to ask questions as they arise throughout their visit. Provider Notes (Medical Decision Making):     Patient is an 69-year-old female presents to the ER from her nursing home for wound recheck of an abscess to the left buttock. This wound started actively draining about 3 days ago at which time she was seen in the ER. She was then placed on clindamycin which she has been taking according to instructions    Patient's wound culture shows 1+ gram-positive cocci in pairs with light Klebsiella oxytoca and heavy staph aureus. Patient is improving clinically on clindamycin and I do not think patient needs IV antibiotics at this time. Patient's vitals are stable and there are no signs of sepsis and she has no purulent drainage at this time. We will have patient continue clindamycin with daily wound care at the nursing home where she is residing at this time. He is discussed with attending ER physician, Dr. Noy Schaffer who agrees with this plan. Instructed on inspection of the wound daily and continuation of antibiotics. ER return precautions discussed. Diagnosis     Clinical Impression:   1. Left buttock abscess        Disposition: d/c     DISCHARGE NOTE:     Patient has been reexamined. Patient has no new complaints, changes, or physical findings. Care plan outlined and precautions discussed. Results of PE findings were reviewed with the patient. All of patient's questions and concerns were addressed.  Patient was instructed and agrees to follow up with PCP, as well as to return to the ED upon further deterioration. Patient is ready to go home. Follow-up Information     Follow up With Specialties Details Why Contact Info    Regina Engel MD [facility] Medicine Schedule an appointment as soon as possible for a visit  Follow-up from the Emergency Department [place] [address] [facility] EMERGENCY DEPT Emergency Medicine  As needed, If symptoms worsen [address] [place] [address]  [phone]           Current Discharge Medication List            Dictation disclaimer:  Please note that this dictation was completed with [employer], the computer voice recognition software. Quite often unanticipated grammatical, syntax, homophones, and other interpretive errors are inadvertently transcribed by the computer software. Please disregard these errors. Please excuse any errors that have escaped final proofreading.

## 2021-11-26 NOTE — ED TRIAGE NOTES
From [facility] rehab, client here for antibiotic therapy due to suspected infection of abscess on l. Gluteal. Client state the area begain to drain 2 weeks and staff has been using cream to area. Area tender to touch. NAD. AXOX4.

## 2021-11-26 NOTE — DISCHARGE INSTRUCTIONS
Continue clindamycin, antibiotic, as prescribed until complete. Inspect the wound daily for signs of increased redness, warmth, or pain. Return to the ER at anytime for worsening, persistent symptoms, or any new concerns.

## 2021-11-26 NOTE — CALL BACK NOTE
Called and spoke with POA. She will bring the patient back to the ER due to having Klebsiella which produces ESBL and possible need for IV abx.      PATRICE Restrepo 11:00 AM

## 2021-11-27 NOTE — ED NOTES
I have reviewed discharge instructions with the patient. The patient verbalized understanding of the instructions. The discharge paperwork was given to the [facility] transport team. The patient left the ED via stretcher with the medical transport team in stable condition.

## 2021-11-27 NOTE — ED NOTES
Patient awaiting medical transport. Patient laying in bed with patient bedside. Patient has no complaints of discomfort. No signs of distress.

## 2021-11-28 LAB
BACTERIA SPEC CULT: ABNORMAL
GRAM STN SPEC: ABNORMAL
GRAM STN SPEC: ABNORMAL
SERVICE CMNT-IMP: ABNORMAL

## 2021-12-01 ENCOUNTER — HOSPITAL ENCOUNTER (INPATIENT)
Age: 81
LOS: 4 days | Discharge: NURSING, REHAB OR LONG-TERM CARE | DRG: 682 | End: 2021-12-06
Attending: STUDENT IN AN ORGANIZED HEALTH CARE EDUCATION/TRAINING PROGRAM | Admitting: STUDENT IN AN ORGANIZED HEALTH CARE EDUCATION/TRAINING PROGRAM
Payer: MEDICARE

## 2021-12-01 ENCOUNTER — APPOINTMENT (OUTPATIENT)
Dept: GENERAL RADIOLOGY | Age: 81
DRG: 682 | End: 2021-12-01
Attending: STUDENT IN AN ORGANIZED HEALTH CARE EDUCATION/TRAINING PROGRAM
Payer: MEDICARE

## 2021-12-01 DIAGNOSIS — N17.9 AKI (ACUTE KIDNEY INJURY) (HCC): ICD-10-CM

## 2021-12-01 DIAGNOSIS — E87.5 ACUTE HYPERKALEMIA: Primary | ICD-10-CM

## 2021-12-01 DIAGNOSIS — L02.91 ABSCESS: ICD-10-CM

## 2021-12-01 DIAGNOSIS — R79.89 CREATININE ELEVATION: ICD-10-CM

## 2021-12-01 PROBLEM — I10 HTN (HYPERTENSION): Status: ACTIVE | Noted: 2021-12-01

## 2021-12-01 PROBLEM — E87.29 HYPERCHLOREMIC METABOLIC ACIDOSIS: Status: ACTIVE | Noted: 2021-12-01

## 2021-12-01 PROBLEM — E11.9 DM (DIABETES MELLITUS) (HCC): Status: ACTIVE | Noted: 2021-12-01

## 2021-12-01 PROBLEM — E11.65 TYPE 2 DIABETES MELLITUS WITH HYPERGLYCEMIA, WITHOUT LONG-TERM CURRENT USE OF INSULIN (HCC): Status: ACTIVE | Noted: 2021-12-01

## 2021-12-01 LAB
ALBUMIN SERPL-MCNC: 3.1 G/DL (ref 3.4–5)
ALBUMIN/GLOB SERPL: 0.7 {RATIO} (ref 0.8–1.7)
ALP SERPL-CCNC: 105 U/L (ref 45–117)
ALT SERPL-CCNC: 21 U/L (ref 13–56)
ANION GAP SERPL CALC-SCNC: 3 MMOL/L (ref 3–18)
ANION GAP SERPL CALC-SCNC: 5 MMOL/L (ref 3–18)
AST SERPL-CCNC: 18 U/L (ref 10–38)
BASOPHILS # BLD: 0 K/UL (ref 0–0.1)
BASOPHILS NFR BLD: 0 % (ref 0–2)
BILIRUB SERPL-MCNC: 0.2 MG/DL (ref 0.2–1)
BUN SERPL-MCNC: 58 MG/DL (ref 7–18)
BUN SERPL-MCNC: 59 MG/DL (ref 7–18)
BUN/CREAT SERPL: 28 (ref 12–20)
BUN/CREAT SERPL: 29 (ref 12–20)
CALCIUM SERPL-MCNC: 9.5 MG/DL (ref 8.5–10.1)
CALCIUM SERPL-MCNC: 9.7 MG/DL (ref 8.5–10.1)
CHLORIDE SERPL-SCNC: 125 MMOL/L (ref 100–111)
CHLORIDE SERPL-SCNC: 126 MMOL/L (ref 100–111)
CO2 SERPL-SCNC: 14 MMOL/L (ref 21–32)
CO2 SERPL-SCNC: 16 MMOL/L (ref 21–32)
CREAT SERPL-MCNC: 2.04 MG/DL (ref 0.6–1.3)
CREAT SERPL-MCNC: 2.06 MG/DL (ref 0.6–1.3)
DIFFERENTIAL METHOD BLD: ABNORMAL
EOSINOPHIL # BLD: 0.1 K/UL (ref 0–0.4)
EOSINOPHIL NFR BLD: 2 % (ref 0–5)
ERYTHROCYTE [DISTWIDTH] IN BLOOD BY AUTOMATED COUNT: 14.3 % (ref 11.6–14.5)
GLOBULIN SER CALC-MCNC: 4.5 G/DL (ref 2–4)
GLUCOSE SERPL-MCNC: 107 MG/DL (ref 74–99)
GLUCOSE SERPL-MCNC: 144 MG/DL (ref 74–99)
HCT VFR BLD AUTO: 33.2 % (ref 35–45)
HGB BLD-MCNC: 10.1 G/DL (ref 12–16)
IMM GRANULOCYTES # BLD AUTO: 0 K/UL (ref 0–0.04)
IMM GRANULOCYTES NFR BLD AUTO: 0 % (ref 0–0.5)
LYMPHOCYTES # BLD: 2.4 K/UL (ref 0.9–3.6)
LYMPHOCYTES NFR BLD: 36 % (ref 21–52)
MCH RBC QN AUTO: 31.2 PG (ref 24–34)
MCHC RBC AUTO-ENTMCNC: 30.4 G/DL (ref 31–37)
MCV RBC AUTO: 102.5 FL (ref 78–100)
MONOCYTES # BLD: 0.6 K/UL (ref 0.05–1.2)
MONOCYTES NFR BLD: 9 % (ref 3–10)
NEUTS SEG # BLD: 3.6 K/UL (ref 1.8–8)
NEUTS SEG NFR BLD: 53 % (ref 40–73)
NRBC # BLD: 0 K/UL (ref 0–0.01)
NRBC BLD-RTO: 0 PER 100 WBC
PLATELET # BLD AUTO: 173 K/UL (ref 135–420)
PMV BLD AUTO: 11.1 FL (ref 9.2–11.8)
POTASSIUM SERPL-SCNC: 7.1 MMOL/L (ref 3.5–5.5)
POTASSIUM SERPL-SCNC: 7.2 MMOL/L (ref 3.5–5.5)
PROT SERPL-MCNC: 7.6 G/DL (ref 6.4–8.2)
RBC # BLD AUTO: 3.24 M/UL (ref 4.2–5.3)
SODIUM SERPL-SCNC: 144 MMOL/L (ref 136–145)
SODIUM SERPL-SCNC: 145 MMOL/L (ref 136–145)
WBC # BLD AUTO: 6.7 K/UL (ref 4.6–13.2)

## 2021-12-01 PROCEDURE — 96375 TX/PRO/DX INJ NEW DRUG ADDON: CPT

## 2021-12-01 PROCEDURE — 99285 EMERGENCY DEPT VISIT HI MDM: CPT

## 2021-12-01 PROCEDURE — APPSS60 APP SPLIT SHARED TIME 46-60 MINUTES: Performed by: PHYSICIAN ASSISTANT

## 2021-12-01 PROCEDURE — 71045 X-RAY EXAM CHEST 1 VIEW: CPT

## 2021-12-01 PROCEDURE — 74011636637 HC RX REV CODE- 636/637: Performed by: STUDENT IN AN ORGANIZED HEALTH CARE EDUCATION/TRAINING PROGRAM

## 2021-12-01 PROCEDURE — 94640 AIRWAY INHALATION TREATMENT: CPT

## 2021-12-01 PROCEDURE — 80053 COMPREHEN METABOLIC PANEL: CPT

## 2021-12-01 PROCEDURE — 74011000250 HC RX REV CODE- 250: Performed by: STUDENT IN AN ORGANIZED HEALTH CARE EDUCATION/TRAINING PROGRAM

## 2021-12-01 PROCEDURE — 93005 ELECTROCARDIOGRAM TRACING: CPT

## 2021-12-01 PROCEDURE — 96361 HYDRATE IV INFUSION ADD-ON: CPT

## 2021-12-01 PROCEDURE — G0378 HOSPITAL OBSERVATION PER HR: HCPCS

## 2021-12-01 PROCEDURE — 74011250636 HC RX REV CODE- 250/636: Performed by: PHYSICIAN ASSISTANT

## 2021-12-01 PROCEDURE — 96372 THER/PROPH/DIAG INJ SC/IM: CPT

## 2021-12-01 PROCEDURE — 99219 PR INITIAL OBSERVATION CARE/DAY 50 MINUTES: CPT | Performed by: STUDENT IN AN ORGANIZED HEALTH CARE EDUCATION/TRAINING PROGRAM

## 2021-12-01 PROCEDURE — 96374 THER/PROPH/DIAG INJ IV PUSH: CPT

## 2021-12-01 PROCEDURE — 74011000250 HC RX REV CODE- 250: Performed by: PHYSICIAN ASSISTANT

## 2021-12-01 PROCEDURE — 85025 COMPLETE CBC W/AUTO DIFF WBC: CPT

## 2021-12-01 RX ORDER — SODIUM POLYSTYRENE SULFONATE 15 G/60ML
30 SUSPENSION ORAL; RECTAL
Status: DISCONTINUED | OUTPATIENT
Start: 2021-12-01 | End: 2021-12-01

## 2021-12-01 RX ORDER — GUAIFENESIN 100 MG/5ML
81 LIQUID (ML) ORAL DAILY
Status: DISCONTINUED | OUTPATIENT
Start: 2021-12-02 | End: 2021-12-06 | Stop reason: HOSPADM

## 2021-12-01 RX ORDER — HYDRALAZINE HYDROCHLORIDE 20 MG/ML
10 INJECTION INTRAMUSCULAR; INTRAVENOUS
Status: DISCONTINUED | OUTPATIENT
Start: 2021-12-01 | End: 2021-12-02

## 2021-12-01 RX ORDER — SODIUM CHLORIDE 450 MG/100ML
100 INJECTION, SOLUTION INTRAVENOUS CONTINUOUS
Status: DISCONTINUED | OUTPATIENT
Start: 2021-12-01 | End: 2021-12-02

## 2021-12-01 RX ORDER — DEXTROSE 50 % IN WATER (D50W) INTRAVENOUS SYRINGE
25-50 AS NEEDED
Status: DISCONTINUED | OUTPATIENT
Start: 2021-12-01 | End: 2021-12-06 | Stop reason: HOSPADM

## 2021-12-01 RX ORDER — ACETAMINOPHEN 325 MG/1
650 TABLET ORAL
Status: DISCONTINUED | OUTPATIENT
Start: 2021-12-01 | End: 2021-12-06 | Stop reason: HOSPADM

## 2021-12-01 RX ORDER — SODIUM CHLORIDE 0.9 % (FLUSH) 0.9 %
5-40 SYRINGE (ML) INJECTION EVERY 8 HOURS
Status: DISCONTINUED | OUTPATIENT
Start: 2021-12-01 | End: 2021-12-05

## 2021-12-01 RX ORDER — MAGNESIUM SULFATE 100 %
4 CRYSTALS MISCELLANEOUS AS NEEDED
Status: DISCONTINUED | OUTPATIENT
Start: 2021-12-01 | End: 2021-12-06 | Stop reason: HOSPADM

## 2021-12-01 RX ORDER — OLANZAPINE 2.5 MG/1
2.5 TABLET ORAL
Status: DISCONTINUED | OUTPATIENT
Start: 2021-12-01 | End: 2021-12-06 | Stop reason: HOSPADM

## 2021-12-01 RX ORDER — ALBUTEROL SULFATE 2.5 MG/.5ML
20 SOLUTION RESPIRATORY (INHALATION) ONCE
Status: COMPLETED | OUTPATIENT
Start: 2021-12-01 | End: 2021-12-01

## 2021-12-01 RX ORDER — SODIUM CHLORIDE 0.9 % (FLUSH) 0.9 %
5-40 SYRINGE (ML) INJECTION AS NEEDED
Status: DISCONTINUED | OUTPATIENT
Start: 2021-12-01 | End: 2021-12-06 | Stop reason: HOSPADM

## 2021-12-01 RX ORDER — HEPARIN SODIUM 5000 [USP'U]/ML
5000 INJECTION, SOLUTION INTRAVENOUS; SUBCUTANEOUS EVERY 8 HOURS
Status: DISCONTINUED | OUTPATIENT
Start: 2021-12-02 | End: 2021-12-06 | Stop reason: HOSPADM

## 2021-12-01 RX ORDER — DEXTROSE 50 % IN WATER (D50W) INTRAVENOUS SYRINGE
50 ONCE
Status: COMPLETED | OUTPATIENT
Start: 2021-12-01 | End: 2021-12-01

## 2021-12-01 RX ORDER — ATORVASTATIN CALCIUM 20 MG/1
20 TABLET, FILM COATED ORAL DAILY
Status: DISCONTINUED | OUTPATIENT
Start: 2021-12-02 | End: 2021-12-06 | Stop reason: HOSPADM

## 2021-12-01 RX ORDER — SODIUM BICARBONATE 1 MEQ/ML
50 SYRINGE (ML) INTRAVENOUS ONCE
Status: COMPLETED | OUTPATIENT
Start: 2021-12-01 | End: 2021-12-01

## 2021-12-01 RX ORDER — INSULIN LISPRO 100 [IU]/ML
INJECTION, SOLUTION INTRAVENOUS; SUBCUTANEOUS
Status: DISCONTINUED | OUTPATIENT
Start: 2021-12-02 | End: 2021-12-06 | Stop reason: HOSPADM

## 2021-12-01 RX ADMIN — Medication 10 ML: at 23:19

## 2021-12-01 RX ADMIN — SODIUM CHLORIDE 100 ML/HR: 450 INJECTION, SOLUTION INTRAVENOUS at 23:33

## 2021-12-01 RX ADMIN — DEXTROSE MONOHYDRATE 50 G: 25 INJECTION, SOLUTION INTRAVENOUS at 21:42

## 2021-12-01 RX ADMIN — HEPARIN SODIUM 5000 UNITS: 5000 INJECTION INTRAVENOUS; SUBCUTANEOUS at 23:47

## 2021-12-01 RX ADMIN — Medication 10 ML: at 23:00

## 2021-12-01 RX ADMIN — ALBUTEROL SULFATE 20 MG: 2.5 SOLUTION RESPIRATORY (INHALATION) at 21:36

## 2021-12-01 RX ADMIN — HUMAN INSULIN 10 UNITS: 100 INJECTION, SOLUTION SUBCUTANEOUS at 21:39

## 2021-12-01 RX ADMIN — SODIUM BICARBONATE 50 MEQ: 84 INJECTION, SOLUTION INTRAVENOUS at 21:45

## 2021-12-01 NOTE — ED TRIAGE NOTES
Patient sent from [facility] rehab facility for hyperkalemia. Patient is alert  And oriented x4. Patient states she has coccyx incision from I&D drainage.

## 2021-12-01 NOTE — ED PROVIDER NOTES
EMERGENCY DEPARTMENT HISTORY AND PHYSICAL EXAM    6:51 PM      Date: 12/1/2021  Patient Name: Eli De La O    History of Presenting Illness     Chief Complaint   Patient presents with    Abnormal Lab Results         History Provided By: Patient  Location/Duration/Severity/Modifying factors   50-year-old female with a history of hypertension, type 2 diabetes presenting today with concerns for abnormal lab values  Patient is a permanent resident at [facility] and reportedly her potassium today was elevated at six-point something  Patient is otherwise asymptomatic however is experiencing pain at her lower back/gluteal area where she recently had an I&D for an abscess          PCP: Regina Engel MD    Current Outpatient Medications   Medication Sig Dispense Refill    docusate sodium (COLACE) 100 mg capsule Take 100 mg by mouth two (2) times a day.  OLANZapine (ZYPREXA) 2.5 mg tablet Take  by mouth nightly.  aspirin 81 mg chewable tablet Take 1 Tab by mouth daily. 30 Tab 0    hydroCHLOROthiazide (HYDRODIURIL) 25 mg tablet Take 25 mg by mouth daily.  loratadine (CLARITIN) 10 mg tablet Take 10 mg by mouth daily.  losartan (COZAAR) 100 mg tablet Take 100 mg by mouth daily.  glipiZIDE (GLUCOTROL) 5 mg tablet Take 5 mg by mouth two (2) times a day.  metFORMIN (GLUCOPHAGE) 500 mg tablet Take 500 mg by mouth daily (with breakfast).  insulin lispro (HUMALOG) 100 unit/mL injection Less than 150 =   0 units  150 -199 =   3 units  200 -249 =   6 units  250 -299 =   9 units  300 -349 =   12 units  350 and above =   15 units, CALL MD 1 Vial 0    albuterol-ipratropium (DUO-NEB) 2.5 mg-0.5 mg/3 ml nebu 3 mL by Nebulization route every six (6) hours as needed (wheezing). 30 Nebule 0    acetaminophen (TYLENOL) 325 mg tablet Take 2 Tabs by mouth every six (6) hours as needed. 30 Tab 0    cholecalciferol, vitamin D3, (VITAMIN D3) 2,000 unit tab Take  by mouth.       atorvastatin (LIPITOR) 20 mg tablet Take  by mouth daily. Past History     Past Medical History:  Past Medical History:   Diagnosis Date    Diabetes ([address]     Hypercholesterolemia     Hypertension        Past Surgical History:  No past surgical history on file. Family History:  Family History   Problem Relation Age of Onset    Hypertension Mother        Social History:  Social History     Tobacco Use    Smoking status: Former Smoker     Packs/day: 3.00     Years: 20.00     Pack years: 60.00     Quit date: 1996     Years since quittin.8    Smokeless tobacco: Never Used   Substance Use Topics    Alcohol use: Not on file    Drug use: Never       Allergies:  No Known Allergies      Review of Systems       Review of Systems   Constitutional: Negative for chills and fatigue. HENT: Negative for postnasal drip and rhinorrhea. Eyes: Negative for visual disturbance. Gastrointestinal: Negative for abdominal pain, constipation, nausea and vomiting. Genitourinary: Negative for hematuria. Musculoskeletal: Positive for back pain. Allergic/Immunologic: Negative for immunocompromised state. Neurological: Negative for weakness, light-headedness, numbness and headaches. Physical Exam     Visit Vitals  BP (!) 196/49 (BP 1 Location: Left arm)   Pulse 72   Temp 98.1 °F (36.7 °C)   Resp 15   Ht 5' 5\" (1.651 m)   Wt 65.8 kg (145 lb)   SpO2 100%   BMI 24.13 kg/m²         Physical Exam  Vitals and nursing note reviewed. Constitutional:       General: She is not in acute distress. Appearance: Normal appearance. She is normal weight. She is not ill-appearing or toxic-appearing. HENT:      Head: Normocephalic and atraumatic. Nose: Nose normal.      Mouth/Throat:      Mouth: Mucous membranes are moist.      Pharynx: No oropharyngeal exudate. Eyes:      General: No scleral icterus. Extraocular Movements: Extraocular movements intact.       Pupils: Pupils are equal, round, and reactive to light. Cardiovascular:      Rate and Rhythm: Normal rate and regular rhythm. Pulses: Normal pulses. Heart sounds: No murmur heard. Pulmonary:      Effort: Pulmonary effort is normal. No respiratory distress. Breath sounds: Normal breath sounds. No stridor. No wheezing, rhonchi or rales. Abdominal:      General: Abdomen is flat. Palpations: Abdomen is soft. Tenderness: There is no abdominal tenderness. Musculoskeletal:      Cervical back: Normal range of motion. Left lower leg: Edema present. Right Lower Extremity: Right leg is amputated above knee. Skin:     General: Skin is warm. Neurological:      General: No focal deficit present. Mental Status: She is alert and oriented to person, place, and time. Cranial Nerves: No cranial nerve deficit. Sensory: No sensory deficit. Motor: No weakness. Diagnostic Study Results     Labs -  Recent Results (from the past 12 hour(s))   CBC WITH AUTOMATED DIFF    Collection Time: 12/01/21  6:49 PM   Result Value Ref Range    WBC 6.7 4.6 - 13.2 K/uL    RBC 3.24 (L) 4.20 - 5.30 M/uL    HGB 10.1 (L) 12.0 - 16.0 g/dL    HCT 33.2 (L) 35.0 - 45.0 %    .5 (H) 78.0 - 100.0 FL    MCH 31.2 24.0 - 34.0 PG    MCHC 30.4 (L) 31.0 - 37.0 g/dL    RDW 14.3 11.6 - 14.5 %    PLATELET 123 692 - 839 K/uL    MPV 11.1 9.2 - 11.8 FL    NRBC 0.0 0  WBC    ABSOLUTE NRBC 0.00 0.00 - 0.01 K/uL    NEUTROPHILS 53 40 - 73 %    LYMPHOCYTES 36 21 - 52 %    MONOCYTES 9 3 - 10 %    EOSINOPHILS 2 0 - 5 %    BASOPHILS 0 0 - 2 %    IMMATURE GRANULOCYTES 0 0.0 - 0.5 %    ABS. NEUTROPHILS 3.6 1.8 - 8.0 K/UL    ABS. LYMPHOCYTES 2.4 0.9 - 3.6 K/UL    ABS. MONOCYTES 0.6 0.05 - 1.2 K/UL    ABS. EOSINOPHILS 0.1 0.0 - 0.4 K/UL    ABS. BASOPHILS 0.0 0.0 - 0.1 K/UL    ABS. IMM.  GRANS. 0.0 0.00 - 0.04 K/UL    DF AUTOMATED     EKG, 12 LEAD, INITIAL    Collection Time: 12/01/21  7:07 PM   Result Value Ref Range    Ventricular Rate 74 BPM Atrial Rate 74 BPM    P-R Interval 136 ms    QRS Duration 78 ms    Q-T Interval 342 ms    QTC Calculation (Bezet) 379 ms    Calculated P Axis 27 degrees    Calculated R Axis 3 degrees    Calculated T Axis -9 degrees    Diagnosis       Normal sinus rhythm  Normal ECG  When compared with ECG of 20-MAR-2020 01:30,  QT has shortened     METABOLIC PANEL, COMPREHENSIVE    Collection Time: 12/01/21  8:05 PM   Result Value Ref Range    Sodium 145 136 - 145 mmol/L    Potassium PENDING mmol/L    Chloride 126 (H) 100 - 111 mmol/L    CO2 14 (L) 21 - 32 mmol/L    Anion gap 5 3.0 - 18 mmol/L    Glucose 107 (H) 74 - 99 mg/dL    BUN 58 (H) 7.0 - 18 MG/DL    Creatinine 2.04 (H) 0.6 - 1.3 MG/DL    BUN/Creatinine ratio 28 (H) 12 - 20      GFR est AA 28 (L) >60 ml/min/1.73m2    GFR est non-AA 23 (L) >60 ml/min/1.73m2    Calcium 9.5 8.5 - 10.1 MG/DL    Bilirubin, total PENDING MG/DL    ALT (SGPT) PENDING U/L    AST (SGOT) PENDING U/L    Alk. phosphatase PENDING U/L    Protein, total PENDING g/dL    Albumin 3.1 (L) 3.4 - 5.0 g/dL    Globulin PENDING g/dL    A-G Ratio PENDING         Radiologic Studies -   XR CHEST PORT    (Results Pending)         Medical Decision Making   I am the first provider for this patient. I reviewed the vital signs, available nursing notes, past medical history, past surgical history, family history and social history. Vital Signs-Reviewed the patient's vital signs.       EKG: Rate 74 bpm, , QRS 78, QTc 379, normal sinus rhythm, normal axis, no segmental elevations or depressions, no QRS widening    Records Reviewed: Nursing Notes and Old Medical Records (Time of Review: 6:51 PM)    ED Course: Progress Notes, Reevaluation, and Consults:         Provider Notes (Medical Decision Making):   MDM  Number of Diagnoses or Management Options  Acute hyperkalemia  Creatinine elevation  Diagnosis management comments: 19-year-old female with a history of hypertension, type 2 diabetes presenting today with concerns for abnormal lab values  Patient is a permanent resident at [facility] and reportedly her potassium today was elevated at six-point something  Patient is otherwise asymptomatic however is experiencing pain at her lower back/gluteal where she recently had an I&D for an abscess    Denies chest pain, shortness of breath, numbness, tingling, vision changes, nausea, abdominal pain    Patient is asymptomatic aside from pain  Vital signs significant for hypertension 196/49    Physical exam showing known right AKA, clear lungs, heart rate regular with no murmurs, abdomen soft, nontender  Patient alert and oriented x3    EKG rate 74 bpm, , QTc 379, normal sinus rhythm, normal axis, no ST elevations or depressions, narrow complex      Creatinine elevated at 2.04  BUN:   CBC with no evidence of leukocytosis, hemoglobin low however is improved from prior  Received phone call from lab reporting a critical lab value of a potassium of 7.1  Immediate repeat form straight stick 7.2  Bicarbonate low at 14  Continues to be asymptomatic  Hyperkalemia treatment initiated with albuterol, insulin, sodium bicarbonate  Pt getting new IV as her previous one failed  Repeat EKG pending appears similar. Hospitalist paged for admission. Pt admitted to Dr. Swetha Patiño for continued medical treatment. Procedures    Critical Care Time:       Diagnosis     Clinical Impression: No diagnosis found. Disposition: Admit    Follow-up Information    None          Patient's Medications   Start Taking    No medications on file   Continue Taking    ACETAMINOPHEN (TYLENOL) 325 MG TABLET    Take 2 Tabs by mouth every six (6) hours as needed. ALBUTEROL-IPRATROPIUM (DUO-NEB) 2.5 MG-0.5 MG/3 ML NEBU    3 mL by Nebulization route every six (6) hours as needed (wheezing). ASPIRIN 81 MG CHEWABLE TABLET    Take 1 Tab by mouth daily. ATORVASTATIN (LIPITOR) 20 MG TABLET    Take  by mouth daily.     CHOLECALCIFEROL, VITAMIN D3, (VITAMIN D3) 2,000 UNIT TAB    Take  by mouth. DOCUSATE SODIUM (COLACE) 100 MG CAPSULE    Take 100 mg by mouth two (2) times a day. GLIPIZIDE (GLUCOTROL) 5 MG TABLET    Take 5 mg by mouth two (2) times a day. HYDROCHLOROTHIAZIDE (HYDRODIURIL) 25 MG TABLET    Take 25 mg by mouth daily. INSULIN LISPRO (HUMALOG) 100 UNIT/ML INJECTION    Less than 150 =   0 units  150 -199 =   3 units  200 -249 =   6 units  250 -299 =   9 units  300 -349 =   12 units  350 and above =   15 units, CALL MD    LORATADINE (CLARITIN) 10 MG TABLET    Take 10 mg by mouth daily. LOSARTAN (COZAAR) 100 MG TABLET    Take 100 mg by mouth daily. METFORMIN (GLUCOPHAGE) 500 MG TABLET    Take 500 mg by mouth daily (with breakfast). OLANZAPINE (ZYPREXA) 2.5 MG TABLET    Take  by mouth nightly. These Medications have changed    No medications on file   Stop Taking    No medications on file     Disclaimer: Sections of this note are dictated using utilizing voice recognition software. Minor typographical errors may be present. If questions arise, please do not hesitate to contact me or call our department.

## 2021-12-02 LAB
ANION GAP SERPL CALC-SCNC: 4 MMOL/L (ref 3–18)
ATRIAL RATE: 116 BPM
ATRIAL RATE: 74 BPM
BUN SERPL-MCNC: 63 MG/DL (ref 7–18)
BUN/CREAT SERPL: 30 (ref 12–20)
CALCIUM SERPL-MCNC: 8.8 MG/DL (ref 8.5–10.1)
CALCIUM SERPL-MCNC: 9.8 MG/DL (ref 8.5–10.1)
CALCULATED P AXIS, ECG09: 27 DEGREES
CALCULATED P AXIS, ECG09: 60 DEGREES
CALCULATED R AXIS, ECG10: -10 DEGREES
CALCULATED R AXIS, ECG10: 3 DEGREES
CALCULATED T AXIS, ECG11: -9 DEGREES
CALCULATED T AXIS, ECG11: 125 DEGREES
CHLORIDE SERPL-SCNC: 124 MMOL/L (ref 100–111)
CK MB CFR SERPL CALC: 2.6 % (ref 0–4)
CK MB SERPL-MCNC: 1.9 NG/ML (ref 5–25)
CK SERPL-CCNC: 72 U/L (ref 26–192)
CO2 SERPL-SCNC: 15 MMOL/L (ref 21–32)
CREAT SERPL-MCNC: 2.1 MG/DL (ref 0.6–1.3)
DIAGNOSIS, 93000: NORMAL
DIAGNOSIS, 93000: NORMAL
ERYTHROCYTE [DISTWIDTH] IN BLOOD BY AUTOMATED COUNT: 14.6 % (ref 11.6–14.5)
EST. AVERAGE GLUCOSE BLD GHB EST-MCNC: 232 MG/DL
GLUCOSE BLD STRIP.AUTO-MCNC: 115 MG/DL (ref 70–110)
GLUCOSE BLD STRIP.AUTO-MCNC: 120 MG/DL (ref 70–110)
GLUCOSE BLD STRIP.AUTO-MCNC: 259 MG/DL (ref 70–110)
GLUCOSE BLD STRIP.AUTO-MCNC: 79 MG/DL (ref 70–110)
GLUCOSE SERPL-MCNC: 159 MG/DL (ref 74–99)
HBA1C MFR BLD: 9.7 % (ref 4.2–5.6)
HCT VFR BLD AUTO: 27.3 % (ref 35–45)
HGB BLD-MCNC: 8 G/DL (ref 12–16)
MCH RBC QN AUTO: 30.4 PG (ref 24–34)
MCHC RBC AUTO-ENTMCNC: 29.3 G/DL (ref 31–37)
MCV RBC AUTO: 103.8 FL (ref 78–100)
NRBC # BLD: 0 K/UL (ref 0–0.01)
NRBC BLD-RTO: 0 PER 100 WBC
P-R INTERVAL, ECG05: 136 MS
P-R INTERVAL, ECG05: 152 MS
PLATELET # BLD AUTO: 171 K/UL (ref 135–420)
PMV BLD AUTO: 10.9 FL (ref 9.2–11.8)
POTASSIUM SERPL-SCNC: 5.9 MMOL/L (ref 3.5–5.5)
POTASSIUM SERPL-SCNC: 6.2 MMOL/L (ref 3.5–5.5)
PTH-INTACT SERPL-MCNC: 88.6 PG/ML (ref 18.4–88)
Q-T INTERVAL, ECG07: 296 MS
Q-T INTERVAL, ECG07: 342 MS
QRS DURATION, ECG06: 78 MS
QRS DURATION, ECG06: 80 MS
QTC CALCULATION (BEZET), ECG08: 379 MS
QTC CALCULATION (BEZET), ECG08: 411 MS
RBC # BLD AUTO: 2.63 M/UL (ref 4.2–5.3)
SODIUM SERPL-SCNC: 143 MMOL/L (ref 136–145)
TROPONIN I SERPL-MCNC: 0.05 NG/ML (ref 0–0.04)
VENTRICULAR RATE, ECG03: 116 BPM
VENTRICULAR RATE, ECG03: 74 BPM
WBC # BLD AUTO: 6.5 K/UL (ref 4.6–13.2)

## 2021-12-02 PROCEDURE — 96372 THER/PROPH/DIAG INJ SC/IM: CPT

## 2021-12-02 PROCEDURE — 74011636637 HC RX REV CODE- 636/637: Performed by: PHYSICIAN ASSISTANT

## 2021-12-02 PROCEDURE — 74011250636 HC RX REV CODE- 250/636: Performed by: PHYSICIAN ASSISTANT

## 2021-12-02 PROCEDURE — 96361 HYDRATE IV INFUSION ADD-ON: CPT

## 2021-12-02 PROCEDURE — 65660000000 HC RM CCU STEPDOWN

## 2021-12-02 PROCEDURE — 74011250636 HC RX REV CODE- 250/636: Performed by: INTERNAL MEDICINE

## 2021-12-02 PROCEDURE — 99232 SBSQ HOSP IP/OBS MODERATE 35: CPT | Performed by: HOSPITALIST

## 2021-12-02 PROCEDURE — G0378 HOSPITAL OBSERVATION PER HR: HCPCS

## 2021-12-02 PROCEDURE — 83970 ASSAY OF PARATHORMONE: CPT

## 2021-12-02 PROCEDURE — 74011250637 HC RX REV CODE- 250/637: Performed by: PHYSICIAN ASSISTANT

## 2021-12-02 PROCEDURE — 74011000250 HC RX REV CODE- 250: Performed by: INTERNAL MEDICINE

## 2021-12-02 PROCEDURE — 82962 GLUCOSE BLOOD TEST: CPT

## 2021-12-02 PROCEDURE — 85027 COMPLETE CBC AUTOMATED: CPT

## 2021-12-02 PROCEDURE — 83036 HEMOGLOBIN GLYCOSYLATED A1C: CPT

## 2021-12-02 PROCEDURE — 84132 ASSAY OF SERUM POTASSIUM: CPT

## 2021-12-02 PROCEDURE — 82553 CREATINE MB FRACTION: CPT

## 2021-12-02 PROCEDURE — 36415 COLL VENOUS BLD VENIPUNCTURE: CPT

## 2021-12-02 RX ORDER — HYDRALAZINE HYDROCHLORIDE 25 MG/1
25 TABLET, FILM COATED ORAL
Status: DISCONTINUED | OUTPATIENT
Start: 2021-12-02 | End: 2021-12-06 | Stop reason: HOSPADM

## 2021-12-02 RX ORDER — INSULIN GLARGINE 100 [IU]/ML
8 INJECTION, SOLUTION SUBCUTANEOUS
Status: DISCONTINUED | OUTPATIENT
Start: 2021-12-02 | End: 2021-12-06 | Stop reason: HOSPADM

## 2021-12-02 RX ORDER — CEFUROXIME AXETIL 250 MG/1
250 TABLET ORAL EVERY 12 HOURS
Status: DISCONTINUED | OUTPATIENT
Start: 2021-12-02 | End: 2021-12-06 | Stop reason: HOSPADM

## 2021-12-02 RX ADMIN — SODIUM ZIRCONIUM CYCLOSILICATE 10 G: 10 POWDER, FOR SUSPENSION ORAL at 09:00

## 2021-12-02 RX ADMIN — Medication 40 ML: at 14:00

## 2021-12-02 RX ADMIN — SODIUM ZIRCONIUM CYCLOSILICATE 10 G: 10 POWDER, FOR SUSPENSION ORAL at 00:18

## 2021-12-02 RX ADMIN — ATORVASTATIN CALCIUM 20 MG: 20 TABLET, FILM COATED ORAL at 10:22

## 2021-12-02 RX ADMIN — Medication 10 ML: at 06:35

## 2021-12-02 RX ADMIN — Medication 10 ML: at 06:36

## 2021-12-02 RX ADMIN — HEPARIN SODIUM 5000 UNITS: 5000 INJECTION INTRAVENOUS; SUBCUTANEOUS at 17:30

## 2021-12-02 RX ADMIN — HEPARIN SODIUM 5000 UNITS: 5000 INJECTION INTRAVENOUS; SUBCUTANEOUS at 09:00

## 2021-12-02 RX ADMIN — OLANZAPINE 2.5 MG: 2.5 TABLET, FILM COATED ORAL at 00:17

## 2021-12-02 RX ADMIN — SODIUM BICARBONATE: 84 INJECTION, SOLUTION INTRAVENOUS at 15:15

## 2021-12-02 RX ADMIN — INSULIN LISPRO 6 UNITS: 100 INJECTION, SOLUTION INTRAVENOUS; SUBCUTANEOUS at 12:50

## 2021-12-02 RX ADMIN — ASPIRIN 81 MG CHEWABLE TABLET 81 MG: 81 TABLET CHEWABLE at 10:22

## 2021-12-02 NOTE — CONSULTS
[facility]  (A Division of [address] [place] [address])      Consultation Note      Date of Admission: 12/1/2021    Date of Note: 12/2/2021      Reason for Referral: ESBL abscess  Referring Physician: Dr. Mnoty Jerez from this admission:   11/23 abscess cx: MRSA, ESBL Klebsiella    Current Antimicrobials:    Prior Antimicrobials:  none Clindamycin 11/23-12/1       Assessment: Rec / Plan:   Left buttock abscess:  -Status post drainage on 11/23 in the ED.  -Completed 1 week of clindamycin  -Wound cultures positive for ESBL Klebsiella as well as MRSA  - will give cefoxitin x7 days given slow wound healing. While this is not optimal in the setting of an ESBL producing organism using aminoglycosides would potentiate her renal failure. She has already gotten 7 days of clindamycin which should cover MRSA which was originally noted in the wound.  -Continue with wound care. Appreciate wound care consult.  -Discussed frequent turns and taking the pressure off of pressure points when possible while she is in her wheel chair. EFRAIN    Hyperkalemia    Diabetes mellitus type 2 hemoglobin A1c is 9.7 -tight glucose control for optimal wound healing   Limited mobility, wheelchair-bound at  [place] [address], [facility]  [address], [address] [facility] [place] [address]  Office: [phone]  Mobile/Text: [phone]    Lines / Catheters:      HPI:  Ms. Luís Silva is a very pleasant 79-year-old female with a past medical history significant for hypertension, dyslipidemia and diabetes mellitus type 2 who is a resident of a nursing facility. She tells me that she was brought to the emergency department because her potassium was too high on a check of her labs. She notes that she is wheelchair-bound. She had developed a blister on her left buttock as a result of pressure from her wheelchair.  She notes that over time the area has become more painful and tender and eventually had ruptured. She was seen in the emergency department on 11/23/2021 at which time it was lanced. She was started on clindamycin for 7-day course which she completed yesterday. She has been getting wound care at the nursing facility and is concerned because when she last looked she states that the wound had gotten larger. She denies having any fevers or chills at the present time however she did have some low-grade fevers and body aches 3 to 4 days ago for a couple of days. She denies any shortness of breath, chest pain, diaphoresis, dysuria, diarrhea. Past Medical History:   Diagnosis Date    Diabetes ([facility] [address]     Hypercholesterolemia     Hypertension      No past surgical history on file.   Family History   Problem Relation Age of Onset    Hypertension Mother      Medications reviewed as below:   Current Facility-Administered Medications   Medication Dose Route Frequency Provider Last Rate Last Admin    sodium chloride (NS) flush 5-40 mL  5-40 mL IntraVENous Q8H ReprogleKeisha PA   10 mL at 12/02/21 0636    sodium chloride (NS) flush 5-40 mL  5-40 mL IntraVENous PRN ReproElis wolf PA        aspirin chewable tablet 81 mg  81 mg Oral DAILY BaileeglElis sibley PA        atorvastatin (LIPITOR) tablet 20 mg  20 mg Oral DAILY Elis Garza PA        OLANZapine (ZyPREXA) tablet 2.5 mg  2.5 mg Oral QHS Keisha Garza PA   2.5 mg at 12/02/21 0017    sodium chloride (NS) flush 5-40 mL  5-40 mL IntraVENous Q8H Keisha Garza PA   10 mL at 12/02/21 0636    sodium chloride (NS) flush 5-40 mL  5-40 mL IntraVENous PRN Elis Garza PA   10 mL at 12/02/21 6562    acetaminophen (TYLENOL) tablet 650 mg  650 mg Oral Q4H PRN Elis Garza PA        hydrALAZINE (APRESOLINE) 20 mg/mL injection 10 mg  10 mg IntraVENous Q6H PRN Elis Garza PA        0.45% sodium chloride infusion  100 mL/hr IntraVENous CONTINUOUS Elis Garza  mL/hr at 12/01/21 2333 100 mL/hr at 12/01/21 2333    insulin lispro (HUMALOG) injection   SubCUTAneous AC&HS ReprogleJamison, PA        glucose chewable tablet 16 g  4 Tablet Oral PRN ReprogleJamison, PA        glucagon (GLUCAGEN) injection 1 mg  1 mg IntraMUSCular PRN ReprogleJamison, PA        dextrose (D50W) injection syrg 12.5-25 g  25-50 mL IntraVENous PRN ReprogleJamison, PA        heparin (porcine) injection 5,000 Units  5,000 Units SubCUTAneous Q8H ReprogleJamison, PA   5,000 Units at 12/01/21 2347    sodium zirconium cyclosilicate (LOKELMA) powder packet 10 g  10 g Oral ONCE Jamison Garza, PA         Current Outpatient Medications   Medication Sig Dispense Refill    docusate sodium (COLACE) 100 mg capsule Take 100 mg by mouth two (2) times a day.  OLANZapine (ZYPREXA) 2.5 mg tablet Take  by mouth nightly.  aspirin 81 mg chewable tablet Take 1 Tab by mouth daily. 30 Tab 0    hydroCHLOROthiazide (HYDRODIURIL) 25 mg tablet Take 25 mg by mouth daily.  loratadine (CLARITIN) 10 mg tablet Take 10 mg by mouth daily.  losartan (COZAAR) 100 mg tablet Take 100 mg by mouth daily.  glipiZIDE (GLUCOTROL) 5 mg tablet Take 5 mg by mouth two (2) times a day.  metFORMIN (GLUCOPHAGE) 500 mg tablet Take 500 mg by mouth daily (with breakfast).  insulin lispro (HUMALOG) 100 unit/mL injection Less than 150 =   0 units  150 -199 =   3 units  200 -249 =   6 units  250 -299 =   9 units  300 -349 =   12 units  350 and above =   15 units, CALL MD 1 Vial 0    albuterol-ipratropium (DUO-NEB) 2.5 mg-0.5 mg/3 ml nebu 3 mL by Nebulization route every six (6) hours as needed (wheezing). 30 Nebule 0    acetaminophen (TYLENOL) 325 mg tablet Take 2 Tabs by mouth every six (6) hours as needed. 30 Tab 0    cholecalciferol, vitamin D3, (VITAMIN D3) 2,000 unit tab Take  by mouth.  atorvastatin (LIPITOR) 20 mg tablet Take  by mouth daily.        No Known Allergies  Social History     Socioeconomic History    Marital status: SINGLE     Spouse name: Not on file    Number of children: Not on file    Years of education: Not on file    Highest education level: Not on file   Occupational History    Not on file   Tobacco Use    Smoking status: Former Smoker     Packs/day: 3.00     Years: 20.00     Pack years: 60.00     Quit date: 1996     Years since quittin.8    Smokeless tobacco: Never Used   Substance and Sexual Activity    Alcohol use: Not on file    Drug use: Never    Sexual activity: Not Currently   Other Topics Concern    Not on file   Social History Narrative    Not on file     Social Determinants of Health     Financial Resource Strain:     Difficulty of Paying Living Expenses: Not on file   Food Insecurity:     Worried About [address] in the Last Year: Not on file    Russel of Food in the Last Year: Not on file   Transportation Needs:     Lack of Transportation (Medical): Not on file    Lack of Transportation (Non-Medical):  Not on file   Physical Activity:     Days of Exercise per Week: Not on file    Minutes of Exercise per Session: Not on file   Stress:     Feeling of Stress : Not on file   Social Connections:     Frequency of Communication with Friends and Family: Not on file    Frequency of Social Gatherings with Friends and Family: Not on file    Attends Rastafarian Services: Not on file    Active Member of [address] [place] [address] or Organizations: Not on file    Attends Club or Organization Meetings: Not on file    Marital Status: Not on file   Intimate Partner Violence:     Fear of Current or Ex-Partner: Not on file    Emotionally Abused: Not on file    Physically Abused: Not on file    Sexually Abused: Not on file   Housing Stability:     Unable to Pay for Housing in the Last Year: Not on file    Number of [place] in the Last Year: Not on file    Unstable Housing in the Last Year: Not on file        Review of Systems    Negative Unless BOLDED    General: fevers, chills, myalgias, arthralgias, unexplained weight loss, malaise, fatigue. HEENT:  headaches,sinus pain or presure, recent URI, recent dental procedures;  tinnitus, hearing loss , visual changes, catarats, dizziness or blurred vision  PUlMONARY:  cough , shortness of breath, sputum production, hx of asthma or COPD. previous treatement for TB or PPD. Cardiovascular: chest pain, previous CAD/MI, vavlular heart disease,  murmurs  GI:   nausea, vomiting, diarrhea, abdominal pain, prior C.diff  :  urinary frequency, dysuria, hematuria, bladder incontinence. Neurologic:  seizures, syncope or prior CVA/TIA, confusion, memory impairment, neuropathy  Musculoskeletal:  myalgias arthralgias, joint pain/ swelling,  back pain  Skin:  Purities,  recurrent cellulitis,  chronic stasis ulcer, diabetic foot ulcers, pressure wound and abscess to left buttock  Endocrine: polyuria, polydipsia, hair loss, weight gain  Psych: Denies depression or treatment by a psychiatrist/psycologist  Heme-Onc: prior DVT, easy bruising, fatigue, malignancy        Objective:        Visit Vitals  BP (!) 109/52 (BP 1 Location: Left upper arm, BP Patient Position: At rest;Lying)   Pulse 96   Temp 98.4 °F (36.9 °C)   Resp 20   Ht 5' 5\" (1.651 m)   Wt 65.8 kg (145 lb)   SpO2 100%   BMI 24.13 kg/m²     Temp (24hrs), Av.3 °F (36.8 °C), Min:98.1 °F (36.7 °C), Max:98.4 °F (36.9 °C)        General:   awake alert and oriented   Skin:   Silver dollar sized wound on left buttock with denudation of skin, slight sloughing, old blood, minimal necrotic tissue, no surrounding erythema, no fluctuance, no significant purulent drainage although there is thin drainage noted. HEENT:  Normocephalic, atraumatic, PERRL, EOMI, no scleral icterus or pallor; no conjunctival hemmohage;  nasal and oral mucous are moist and without evidence of lesions. No thrush. Dentition good. Neck supple, no bruits.    Lymph Nodes:   no cervical, axillary or inguinal adenopathy   Lungs:   non-labored, bilaterally clear to aspiration- no crackles wheezes rales or rhonchi   Heart:  RRR, s1 and s2; no murmurs rubs or gallops, no edema, + pedal pulses   Abdomen:  soft, non-distended, active bowel sounds, no hepatomegaly, no splenomegaly. Appropriate surgical scars for stated surgeries. Non-tender   Genitourinary:  deferred   Extremities:   no clubbing, cyanosis; no joint effusions or swelling; Full ROM of all large joints to the upper and lower extremities; muscle mass appropriate for age   Neurologic:  No gross focal sensory abnormalities; 5/5 muscle strength to upper and lower extremities. Speech appropirate. Cranial nerves intact   Psychiatric:   appropriate and interactive. Labs: Results:   Chemistry Recent Labs     12/02/21 0457 12/02/21 0255 12/01/21  2100 12/01/21 2005 12/01/21 2005   *  --  144*  --  107*     --  144  --  145   K 6.2* 5.9* 7.2*   < > 7.1*   *  --  125*  --  126*   CO2 15*  --  16*  --  14*   BUN 63*  --  59*  --  58*   CREA 2.10*  --  2.06*  --  2.04*   CA 9.8  --  9.7  --  9.5   AGAP 4  --  3  --  5   BUCR 30*  --  29*  --  28*   AP  --   --   --   --  105   TP  --   --   --   --  7.6   ALB  --   --   --   --  3.1*   GLOB  --   --   --   --  4.5*   AGRAT  --   --   --   --  0.7*    < > = values in this interval not displayed.       CBC w/Diff Recent Labs     12/02/21 0457 12/01/21  1849   WBC 6.5 6.7   RBC 2.63* 3.24*   HGB 8.0* 10.1*   HCT 27.3* 33.2*    173   GRANS  --  53   LYMPH  --  36   EOS  --  2            Lab Results   Component Value Date/Time    Specimen Description: URINE 01/09/2013 04:30 PM    Specimen Description: LEG 04/24/2012 04:51 PM    Lab Results   Component Value Date/Time    Culture result: (A) 11/23/2021 03:15 PM     LIGHT KLEBSIELLA OXYTOCA ** (EXTENDED SPECTRUM BETA LACTAMASE ) **    Culture result: (A) 11/23/2021 03:15 PM     HEAVY * METHICILLIN RESISTANT STAPHYLOCOCCUS AUREUS *    Culture result:  11/23/2021 03:15 PM     (NOTE) KLEB OXYTOCA ESBL CALLED TO Mehdi IBRAHIM AT 6972 ON 11.26.21. KP     Culture result: NO GROWTH 6 DAYS 03/22/2020 03:20 PM    Culture result: NO GROWTH 6 DAYS 03/22/2020 03:18 PM        Results     Procedure Component Value Units Date/Time    CULTURE, BODY FLUID Colleen Muslim STAIN [[id number]]  (Abnormal)  (Susceptibility) Collected: 11/23/21 1515    Order Status: Completed Specimen: Body Fluid from Drainage Updated: 11/28/21 1447     Special Requests: NO SPECIAL REQUESTS        GRAM STAIN NO WBC'S SEEN               1+ GRAM POSITIVE COCCI IN PAIRS           Culture result:       LIGHT KLEBSIELLA OXYTOCA ** (EXTENDED SPECTRUM BETA LACTAMASE ) **                  HEAVY * METHICILLIN RESISTANT STAPHYLOCOCCUS AUREUS *            (NOTE) KLEB OXYTOCA ESBL CALLED TO MACK IBRAHIM AT 0204 ON 11.26.21. KP     Susceptibility      Klebsiella oxytoca     ANGIE     Amikacin ($) Susceptible     Ampicillin ($) Resistant     Ampicillin/sulbactam ($) Resistant     Cefazolin ($) Resistant     Cefepime ($$) Resistant     Cefoxitin Susceptible     Ceftazidime ($) Resistant     Ceftriaxone ($) Resistant     Ciprofloxacin ($) Resistant     Gentamicin ($) Intermediate     Levofloxacin ($) Resistant     Meropenem ($$) Susceptible     Piperacillin/Tazobac ($) Resistant     Tobramycin ($) Intermediate     Trimeth/Sulfa Resistant                  Linear View               Susceptibility      Staphylococcus aureus Methcillin Resistant     ANGIE     Ciprofloxacin ($) Resistant     Daptomycin ($$$$$) Susceptible     Erythromycin ($$$$) Resistant     Gentamicin ($) Susceptible     Levofloxacin ($) Resistant     Linezolid ($$$$$) Susceptible     Oxacillin Resistant     Tetracycline Susceptible     Trimeth/Sulfa Susceptible     Vancomycin ($) Susceptible  [1]                  [1]  For ANGIE >1, consider using another antibiotic. Linear View                           Imaging:      All imaging reviewed from Admission to present as per radiology interpretation in [facility]

## 2021-12-02 NOTE — PROGRESS NOTES
Consult Note  Consult requested by: Dr. Delia Tavarez is a 79 y.o. female BLACK/ who is being seen on consult for hyperklaemia, renal failure   Chief Complaint   Patient presents with    Abnormal Lab Results     Admission diagnosis: Hyperkalemia     79y F with PMH DM, HTN, CKD, admitted for sepsis, hyperkalemia following for renal failure    Impression & Plan:   IMPRESSION:   Acute on chronic kidney injury, pre renal ATN, baseline creatinine around 1.6-1.7mg/dl, risk factors, diuretics ,on ARB  CKD 3, baseline creatinine 1.6-1.7mg/dl   Hyperklaemia, poor dietary restriction, on losartan   Metabolic acidosis, higher suspicion for RTA  Sepsis,   DM  HTN   PLAN:    She received lokelma, D50/Insulin in ER, change IV hydration to bicarbonate infusion. Repeat serum chemistry. Hold diuretics and ARB. Continue daily lokelma. Check bladder scan. Check urine protien study, PTH  Adjust medication per current renal function status. Thank you very much for allowing me to participate in the care of this patient. We will continue to monitor with you and make recommendations as needed.         HPI:  79y F with PMH DM, HTN, from nursing home was sent to ER for hyperkalemia. Her work up in ER EFRAIN, metabolic acidosis, hyperkalemia. She has uncontrolled DM, her medication includes, ARB, HCTZ. She admits she does not follow any dietary restriction. She received initial treatment for hyperkalemia. During my visit, she denies for any chest pain, short of breath. Past Medical History:   Diagnosis Date    Diabetes ([facility] [address]     Hypercholesterolemia     Hypertension       No past surgical history on file.     Social History     Socioeconomic History    Marital status: SINGLE     Spouse name: Not on file    Number of children: Not on file    Years of education: Not on file    Highest education level: Not on file   Occupational History    Not on file   Tobacco Use    Smoking Message left for patient. Did not call patient. No orders placed for Dr. Schaffer at last visit. Lab work to be followed up with in [facility].   Call back number left if any questions/concerns.      status: Former Smoker     Packs/day: 3.00     Years: 20.00     Pack years: 60.00     Quit date: 1996     Years since quittin.8    Smokeless tobacco: Never Used   Substance and Sexual Activity    Alcohol use: Not on file    Drug use: Never    Sexual activity: Not Currently   Other Topics Concern    Not on file   Social History Narrative    Not on file     Social Determinants of Health     Financial Resource Strain:     Difficulty of Paying Living Expenses: Not on file   Food Insecurity:     Worried About Running Out of Food in the Last Year: Not on file    Russel of Food in the Last Year: Not on file   Transportation Needs:     Lack of Transportation (Medical): Not on file    Lack of Transportation (Non-Medical): Not on file   Physical Activity:     Days of Exercise per Week: Not on file    Minutes of Exercise per Session: Not on file   Stress:     Feeling of Stress : Not on file   Social Connections:     Frequency of Communication with Friends and Family: Not on file    Frequency of Social Gatherings with Friends and Family: Not on file    Attends Denominational Services: Not on file    Active Member of [address] [place] [address] or Organizations: Not on file    Attends Club or Organization Meetings: Not on file    Marital Status: Not on file   Intimate Partner Violence:     Fear of Current or Ex-Partner: Not on file    Emotionally Abused: Not on file    Physically Abused: Not on file    Sexually Abused: Not on file   Housing Stability:     Unable to Pay for Housing in the Last Year: Not on file    Number of [place] in the Last Year: Not on file    Unstable Housing in the Last Year: Not on file       Family History   Problem Relation Age of Onset    Hypertension Mother      No Known Allergies     Home Medications:     Prior to Admission Medications   Prescriptions Last Dose Informant Patient Reported? Taking? OLANZapine (ZYPREXA) 2.5 mg tablet   Yes No   Sig: Take  by mouth nightly. acetaminophen (TYLENOL) 325 mg tablet   No No   Sig: Take 2 Tabs by mouth every six (6) hours as needed. albuterol-ipratropium (DUO-NEB) 2.5 mg-0.5 mg/3 ml nebu   No No   Sig: 3 mL by Nebulization route every six (6) hours as needed (wheezing). aspirin 81 mg chewable tablet   No No   Sig: Take 1 Tab by mouth daily. atorvastatin (LIPITOR) 20 mg tablet   Yes No   Sig: Take  by mouth daily. cholecalciferol, vitamin D3, (VITAMIN D3) 2,000 unit tab   Yes No   Sig: Take  by mouth. clindamycin (CLEOCIN) 150 mg capsule   No No   Sig: Take 3 Capsules by mouth three (3) times daily for 7 days. docusate sodium (COLACE) 100 mg capsule   Yes No   Sig: Take 100 mg by mouth two (2) times a day. glipiZIDE (GLUCOTROL) 5 mg tablet   Yes No   Sig: Take 5 mg by mouth two (2) times a day. hydroCHLOROthiazide (HYDRODIURIL) 25 mg tablet   Yes No   Sig: Take 25 mg by mouth daily. insulin lispro (HUMALOG) 100 unit/mL injection   No No   Sig: Less than 150 =   0 units  150 -199 =   3 units  200 -249 =   6 units  250 -299 =   9 units  300 -349 =   12 units  350 and above =   15 units, CALL MD   loratadine (CLARITIN) 10 mg tablet   Yes No   Sig: Take 10 mg by mouth daily. losartan (COZAAR) 100 mg tablet   Yes No   Sig: Take 100 mg by mouth daily. metFORMIN (GLUCOPHAGE) 500 mg tablet   Yes No   Sig: Take 500 mg by mouth daily (with breakfast).       Facility-Administered Medications: None       Current Facility-Administered Medications   Medication Dose Route Frequency    sodium chloride (NS) flush 5-40 mL  5-40 mL IntraVENous Q8H    sodium chloride (NS) flush 5-40 mL  5-40 mL IntraVENous PRN    aspirin chewable tablet 81 mg  81 mg Oral DAILY    atorvastatin (LIPITOR) tablet 20 mg  20 mg Oral DAILY    OLANZapine (ZyPREXA) tablet 2.5 mg  2.5 mg Oral QHS    sodium chloride (NS) flush 5-40 mL  5-40 mL IntraVENous Q8H    sodium chloride (NS) flush 5-40 mL  5-40 mL IntraVENous PRN    acetaminophen (TYLENOL) tablet 650 mg  650 mg Oral Q4H PRN    hydrALAZINE (APRESOLINE) 20 mg/mL injection 10 mg  10 mg IntraVENous Q6H PRN    0.45% sodium chloride infusion  100 mL/hr IntraVENous CONTINUOUS    insulin lispro (HUMALOG) injection   SubCUTAneous AC&HS    glucose chewable tablet 16 g  4 Tablet Oral PRN    glucagon (GLUCAGEN) injection 1 mg  1 mg IntraMUSCular PRN    dextrose (D50W) injection syrg 12.5-25 g  25-50 mL IntraVENous PRN    heparin (porcine) injection 5,000 Units  5,000 Units SubCUTAneous Q8H     Current Outpatient Medications   Medication Sig    docusate sodium (COLACE) 100 mg capsule Take 100 mg by mouth two (2) times a day.  OLANZapine (ZYPREXA) 2.5 mg tablet Take  by mouth nightly.  aspirin 81 mg chewable tablet Take 1 Tab by mouth daily.  hydroCHLOROthiazide (HYDRODIURIL) 25 mg tablet Take 25 mg by mouth daily.  loratadine (CLARITIN) 10 mg tablet Take 10 mg by mouth daily.  losartan (COZAAR) 100 mg tablet Take 100 mg by mouth daily.  glipiZIDE (GLUCOTROL) 5 mg tablet Take 5 mg by mouth two (2) times a day.  metFORMIN (GLUCOPHAGE) 500 mg tablet Take 500 mg by mouth daily (with breakfast).  insulin lispro (HUMALOG) 100 unit/mL injection Less than 150 =   0 units  150 -199 =   3 units  200 -249 =   6 units  250 -299 =   9 units  300 -349 =   12 units  350 and above =   15 units, CALL MD    albuterol-ipratropium (DUO-NEB) 2.5 mg-0.5 mg/3 ml nebu 3 mL by Nebulization route every six (6) hours as needed (wheezing).  acetaminophen (TYLENOL) 325 mg tablet Take 2 Tabs by mouth every six (6) hours as needed.  cholecalciferol, vitamin D3, (VITAMIN D3) 2,000 unit tab Take  by mouth.  atorvastatin (LIPITOR) 20 mg tablet Take  by mouth daily. Review of Systems:     Complete 10-point review of systems were obtained and discussed in length  with the patient. Complete review of systems was negative/unremarkable  except as mentioned in HPI section.   Data Review:    Labs: Results: Chemistry Recent Labs     12/02/21 0457 12/02/21 0255 12/01/21  2100 12/01/21 2005 12/01/21 2005   *  --  144*  --  107*     --  144  --  145   K 6.2* 5.9* 7.2*   < > 7.1*   *  --  125*  --  126*   CO2 15*  --  16*  --  14*   BUN 63*  --  59*  --  58*   CREA 2.10*  --  2.06*  --  2.04*   CA 9.8  --  9.7  --  9.5   AGAP 4  --  3  --  5   BUCR 30*  --  29*  --  28*   AP  --   --   --   --  105   TP  --   --   --   --  7.6   ALB  --   --   --   --  3.1*   GLOB  --   --   --   --  4.5*   AGRAT  --   --   --   --  0.7*    < > = values in this interval not displayed. CBC w/Diff Recent Labs     12/02/21 0457 12/01/21  1849   WBC 6.5 6.7   RBC 2.63* 3.24*   HGB 8.0* 10.1*   HCT 27.3* 33.2*    173   GRANS  --  53   LYMPH  --  36   EOS  --  2      Coagulation No results for input(s): PTP, INR, APTT, INREXT in the last 72 hours. Iron/Ferritin No results for input(s): IRON in the last 72 hours. No lab exists for component: TIBCCALC   BNP No results for input(s): BNPP in the last 72 hours.    Cardiac Enzymes Recent Labs     12/02/21 0255   CPK 72   CKND1 2.6      Liver Enzymes Recent Labs     12/01/21 2005   TP 7.6   ALB 3.1*         Thyroid Studies Lab Results   Component Value Date/Time    TSH 3.70 04/23/2012 12:02 PM         EKG: normal EKG, qtc 411    Physical Assessment:     Visit Vitals  BP (!) 128/42   Pulse 67   Temp 98.4 °F (36.9 °C)   Resp 18   Ht 5' 5\" (1.651 m)   Wt 65.8 kg (145 lb)   SpO2 100%   BMI 24.13 kg/m²     Weight change:   No intake or output data in the 24 hours ending 12/02/21 1400  Physical Exam:   General: comfortable, no acute distress   HEENT sclera anicteric, supple neck, no thyromegaly  CVS: S1S2 heard,  no rub  RS: + air entry b/l,   Abd: Soft, Non tender,  Neuro: non focal, awake, alert , CN II-XII are grossly intact  Extrm: R AKA,   Skin: no visible  Rash  Musculoskeletal: No gross joints or bone deformities     Procedures/imaging: see electronic medical records for all procedures, Xrays and details which were not copied into this note but were reviewed prior to creation of Plan        Eddie Ko MD  December 2, 2021  [facility]  Office [phone]

## 2021-12-02 NOTE — DIABETES MGMT
Diabetes/ Glycemic Control Plan of Care    12/02: Patient seen in ED and stated that she stays in a nursing home facility, [facility] and [address] [place] [address]. Reviewed admission notes: she was admitted on 12/01/2021 with report elevated potassium level. Noted: buttock abscess, culture (+) MRSA and ESBL    Recommendations: correctional lispro insulin as ordered. Assessment:   DX:   1. Acute hyperkalemia     2. Creatinine elevation     3. EFRAIN (acute kidney injury) ([address]     4. Abscess        Fasting/ Morning blood glucose:   Lab Results   Component Value Date/Time    Glucose 159 (H) 12/02/2021 04:57 AM    Glucose (POC) 259 (H) 12/02/2021 12:45 PM    Glucose, POC 92 01/16/2020 02:46 PM     IV Fluids containing dextrose: None. Steroids:   None. Blood glucose values: Within target range (70-180mg/dL):  Yes. Current insulin orders:   Correctional lispro insulin ACHS. Normal sensitivity dose    Total Daily Dose previous 24 hours: 12/01/2021  Regular insulin: 10 units    Current A1c:   Lab Results   Component Value Date/Time    Hemoglobin A1c 9.7 (H) 12/02/2021 04:57 AM      equivalent  to ave Blood Glucose of 232 mg/dl for 2-3 months prior to admission    Adequate glycemic control PTA: No.    Nutrition/Diet:   Active Orders   Diet    ADULT DIET Regular; Low Fat/Low Chol/High Fiber/2 gm Na; Low Potassium (Less than 3000 mg/day)      Meal Intake:  No data found. Supplement Intake:  No data found. Home diabetes medications: Patient cannot list her diabetes medications. She's from the nursing home facility and stated taking two pills and insulin for diabetes. Key Antihyperglycemic Medications             glipiZIDE (GLUCOTROL) 5 mg tablet Take 5 mg by mouth two (2) times a day. metFORMIN (GLUCOPHAGE) 500 mg tablet Take 500 mg by mouth daily (with breakfast).     insulin lispro (HUMALOG) 100 unit/mL injection Less than 150 =   0 units  150 -199 =   3 units  200 -249 =   6 units  250 -299 =   9 units  300 -349 =   12 units  350 and above =   15 units, CALL MD          Plan/Goals:   Blood glucose will be within target of 70 - 180 mg/dl within 72 hours    Education:  [x] Refer to Diabetes Education Record: 12/09/2021                       [] Education not indicated at this time     Kashmir Nelson RN  Pager: [phone]

## 2021-12-02 NOTE — PROGRESS NOTES
Hospitalist Progress Note    Patient: Angel Junior MRN: [id number]  CSN: [id number]    YOB: 1940  Age: 79 y.o. Sex: female    DOA: 12/1/2021 LOS:  LOS: 0 days          Patient seen and examined at bedside, she denies pain, cough, shortness of breath, nausea vomiting. She is unclear with regard to recent dietary intake. Assessment/Plan       1. Hyperkalemia. Patient has received medical management, including Norm Prima. ARB held. Changed to bicarb infusion per nephrology. 2.  EFRAIN. ARB, HCTZ held. Nephrology follows. .  3.  Hyperchloremic metabolic acidosis, suspicion for RTA. 4.  Abscess POA. wound care consult. Cx + MRSA and ESBL ID follows. 5.  DM2 with hyperglycemia (A1c 9.7 12/2/21). Oral hypoglycemics held. Diet clarified, Lantus, SSI. 6.  Hypertension. ARB, HCTZ held. Hydralazine as needed, close monitoring. 7.  Dyslipidemia on statin   8. DVT prophylaxis   9. Full code. PT OT. I discussed the case with Dr. Yoel Mendoza. Additional Notes:      Case discussed with:  [x]Patient  []Family  [x]Nursing  []Case Management  DVT Prophylaxis:  []Lovenox  [x]Hep SQ  []SCDs  []Coumadin   []On Heparin gtt    Vital signs/Intake and Output:  Visit Vitals  BP (!) 109/52 (BP 1 Location: Left upper arm, BP Patient Position: At rest;Lying)   Pulse 96   Temp 98.4 °F (36.9 °C)   Resp 20   Ht 5' 5\" (1.651 m)   Wt 65.8 kg (145 lb)   SpO2 100%   BMI 24.13 kg/m²     Current Shift:  No intake/output data recorded. Last three shifts:  No intake/output data recorded. General: NAD, appears stated age, alert  Eyes: PERRL, sclera is non-icteric. Oropharynx clear. HENT: normocephalic/atraumatic, moist mucus membranes  Respiratory: CTA with no signs of respiratory distress  Cardiovascular: RRR, no m/r/g  GI: soft, non-tender, normal bowel sounds  Extremities: no cyanosis, right AKA   Neuro: moves all extremities, normal speech. Other than hearing, no focal deficit.   Skin: no rash to visible skin.      Medications Reviewed      Labs: Results:       Chemistry Recent Labs     12/02/21 0457 12/02/21  0255 12/01/21  2100 12/01/21 2005 12/01/21 2005   *  --  144*  --  107*     --  144  --  145   K 6.2* 5.9* 7.2*   < > 7.1*   *  --  125*  --  126*   CO2 15*  --  16*  --  14*   BUN 63*  --  59*  --  58*   CREA 2.10*  --  2.06*  --  2.04*   CA 9.8  --  9.7  --  9.5   AGAP 4  --  3  --  5   BUCR 30*  --  29*  --  28*   AP  --   --   --   --  105   TP  --   --   --   --  7.6   ALB  --   --   --   --  3.1*   GLOB  --   --   --   --  4.5*   AGRAT  --   --   --   --  0.7*    < > = values in this interval not displayed. CBC w/Diff Recent Labs     12/02/21 0457 12/01/21  1849   WBC 6.5 6.7   RBC 2.63* 3.24*   HGB 8.0* 10.1*   HCT 27.3* 33.2*    173   GRANS  --  53   LYMPH  --  36   EOS  --  2      Cardiac Enzymes Recent Labs     12/02/21 0255   CPK 72   CKND1 2.6      Coagulation No results for input(s): PTP, INR, APTT, INREXT in the last 72 hours. Lipid Panel Lab Results   Component Value Date/Time    Cholesterol, total 173 01/04/2020 02:35 AM    HDL Cholesterol 33 (L) 01/04/2020 02:35 AM    LDL, calculated 109 (H) 01/04/2020 02:35 AM    VLDL, calculated 31 01/04/2020 02:35 AM    Triglyceride 155 (H) 01/04/2020 02:35 AM    CHOL/HDL Ratio 5.2 (H) 01/04/2020 02:35 AM      BNP No results for input(s): BNPP in the last 72 hours. Liver Enzymes Recent Labs     12/01/21 2005   TP 7.6   ALB 3.1*         Thyroid Studies Lab Results   Component Value Date/Time    TSH 3.70 04/23/2012 12:02 PM        Procedures/imaging: see electronic medical records for all procedures/Xrays and details which were not copied into this note but were reviewed prior to creation of Plan.

## 2021-12-02 NOTE — PROGRESS NOTES
Prelim ID Note    Discussed with Dr. Graham Mcclellan  Buttock abscess, wound with prior spontaneous drainage. Cx + MRSA and ESBL  Contact precautions. Wound care consult    Full note and further recommendations to follow.

## 2021-12-02 NOTE — ED NOTES
Labs recollected and taken to lab. Patient without any acute distress noted at the present time. Denies any pain or difficulty breathing. Sinus rhythm noted on the monitor at this time.

## 2021-12-02 NOTE — PROGRESS NOTES
conducted an initial consultation and Spiritual Assessment for Darrel Lopes, who is a 79 y.o.,female. Patients Primary Language is: [place]. According to the patients EMR Methodist Affiliation is: Jehovah witness. The reason the Patient came to the hospital is:   Patient Active Problem List    Diagnosis Date Noted    Hyperkalemia 12/01/2021    EFRAIN (acute kidney [facility]) ([facility] 12/01/2021    Abscess 12/01/2021    Type 2 diabetes mellitus with hyperglycemia, without long-term current use of insulin ([address] 12/01/2021    HTN (hypertension) 12/01/2021    Hyperchloremic metabolic acidosis 02/73/9271    Pseudoaneurysm of femoral artery ([address] 03/20/2020    Anemia of chronic renal failure, stage 4 (severe) ([address] 02/19/2020    Ischemia of foot 01/20/2020    Pancreatitis 01/03/2020    Hyperosmolar hyperglycemic coma due to diabetes mellitus without ketoacidosis ([address] 01/03/2020    Hyperosmolar non-ketotic state in patient with type 2 diabetes mellitus ([address] 01/03/2020    Hyperglycemia 12/25/2018    Type 2 diabetes mellitus with hyperosmolarity ([address] 12/25/2018    Acute UTI 12/25/2018    Dehydration 12/25/2018    Acute renal failure (ARF) ([address] 12/25/2018    Noncompliance 12/25/2018        The  provided the following Interventions:  Initiated a relationship of care and support. Explored issues of anitha, belief, spirituality and Roman Catholic/ritual needs while hospitalized. Listened empathically. Patient upon learning that I am a  made it known to me that she is a Jehovah Witness. Provided chaplaincy education. Provided information about Spiritual Care Services. Offered assurance of continued prayers on patient's behalf. Chart reviewed. The following outcomes where achieved:  Patient shared limited information about both her medical narrative and spiritual journey/beliefs.  confirmed Patient's Methodist Affiliation witih Zoroastrianism.   Patient processed feeling about current hospitalization. Patient expressed gratitude for 's visit. Assessment:  Patient is not in any emotional or spiritual distress. She said her son will be visiting her in a few while. Patient does not have any Pentecostal/cultural needs that will affect patients preferences in health care. There are no spiritual or Pentecostal issues which require intervention at this time. Plan:  Chaplains will continue to follow and will provide pastoral care on an as needed/requested basis.  recommends bedside caregivers page  on duty if patient shows signs of acute spiritual or emotional distress.     Blanca Bello [address]   [phone]

## 2021-12-02 NOTE — H&P
History and Physical          Subjective     HPI: Mgaali Easton is a 79 y.o. female with a PMHx of DM, HTN, R AKA who presented to the ED from nursing facility due to abnormal labs. She state she was getting routine lab work done and was sent to the ED when her potassium was found to be elevated >7.0. She states she has had no abnormal symptoms over the last few days. She specifically denies SOB, chest pain, weakness, headache. She denies prior history of renal disease. She does report recently being started on abx (clinda) due to an abscess that was found to be spontaneously draining on , and she followed up on . Wound culture +MRSA, ESBL klebsiella oxytoca. Wound was improved on follow up visit per ED notes. In the ED, K 7.1, SCr 2.04 (baseline 1.2), VSS, EKG without significant abnormality. Patient will be admitted for further evaluation and treatment. PMHx:  Past Medical History:   Diagnosis Date    Diabetes [facility]     Hypercholesterolemia     Hypertension        PSurgHx:  No past surgical history on file.     SocialHx:  Social History     Socioeconomic History    Marital status: SINGLE     Spouse name: Not on file    Number of children: Not on file    Years of education: Not on file    Highest education level: Not on file   Occupational History    Not on file   Tobacco Use    Smoking status: Former Smoker     Packs/day: 3.00     Years: 20.00     Pack years: 60.00     Quit date: 1996     Years since quittin.8    Smokeless tobacco: Never Used   Substance and Sexual Activity    Alcohol use: Not on file    Drug use: Never    Sexual activity: Not Currently   Other Topics Concern    Not on file   Social History Narrative    Not on file     Social Determinants of Health     Financial Resource Strain:     Difficulty of Paying Living Expenses: Not on file   Food Insecurity:     Worried About [address] in the Last Year: Not on file    [address] in the Last Year: Not on file   Transportation Needs:     Lack of Transportation (Medical): Not on file    Lack of Transportation (Non-Medical): Not on file   Physical Activity:     Days of Exercise per Week: Not on file    Minutes of Exercise per Session: Not on file   Stress:     Feeling of Stress : Not on file   Social Connections:     Frequency of Communication with Friends and Family: Not on file    Frequency of Social Gatherings with Friends and Family: Not on file    Attends Synagogue Services: Not on file    Active Member of [address] [place] [address] [employer] or Organizations: Not on file    Attends Club or Organization Meetings: Not on file    Marital Status: Not on file   Intimate Partner Violence:     Fear of Current or Ex-Partner: Not on file    Emotionally Abused: Not on file    Physically Abused: Not on file    Sexually Abused: Not on file   Housing Stability:     Unable to Pay for Housing in the Last Year: Not on file    Number of [place] in the Last Year: Not on file    Unstable Housing in the Last Year: Not on file       FamilyHx:  Family History   Problem Relation Age of Onset    Hypertension Mother        Home Medications:  Prior to Admission Medications   Prescriptions Last Dose Informant Patient Reported? Taking? OLANZapine (ZYPREXA) 2.5 mg tablet   Yes No   Sig: Take  by mouth nightly. acetaminophen (TYLENOL) 325 mg tablet   No No   Sig: Take 2 Tabs by mouth every six (6) hours as needed. albuterol-ipratropium (DUO-NEB) 2.5 mg-0.5 mg/3 ml nebu   No No   Sig: 3 mL by Nebulization route every six (6) hours as needed (wheezing). aspirin 81 mg chewable tablet   No No   Sig: Take 1 Tab by mouth daily. atorvastatin (LIPITOR) 20 mg tablet   Yes No   Sig: Take  by mouth daily. cholecalciferol, vitamin D3, (VITAMIN D3) 2,000 unit tab   Yes No   Sig: Take  by mouth. clindamycin (CLEOCIN) 150 mg capsule   No No   Sig: Take 3 Capsules by mouth three (3) times daily for 7 days.    docusate sodium (COLACE) 100 mg capsule   Yes No   Sig: Take 100 mg by mouth two (2) times a day. glipiZIDE (GLUCOTROL) 5 mg tablet   Yes No   Sig: Take 5 mg by mouth two (2) times a day. hydroCHLOROthiazide (HYDRODIURIL) 25 mg tablet   Yes No   Sig: Take 25 mg by mouth daily. insulin lispro (HUMALOG) 100 unit/mL injection   No No   Sig: Less than 150 =   0 units  150 -199 =   3 units  200 -249 =   6 units  250 -299 =   9 units  300 -349 =   12 units  350 and above =   15 units, CALL MD   loratadine (CLARITIN) 10 mg tablet   Yes No   Sig: Take 10 mg by mouth daily. losartan (COZAAR) 100 mg tablet   Yes No   Sig: Take 100 mg by mouth daily. metFORMIN (GLUCOPHAGE) 500 mg tablet   Yes No   Sig: Take 500 mg by mouth daily (with breakfast). Facility-Administered Medications: None       Allergies:  No Known Allergies     Review of Systems:  CONST: no weight loss, no falls, no fever or chills  HEENT: No change in vision, no earache, no tinnitus, no sore throat or sinus congestion. NECK: No pain or stiffness. PULM: No shortness of breath, no cough or wheeze. CV: no pnd or orthopnea, no CP, no palpitations, no edema  GI: No abdominal pain, no nausea, no vomiting or diarrhea, no melena or bright red blood per rectum. : No urinary frequency, no urgency, no hesitancy or dysuria. MSK: No joint or muscle pain, no back pain, no recent trauma. INTEG: No rash, no itching, +lesions. ENDO No polyuria, no polydipsia, no heat or cold intolerance. HEME: No anemia or easy bruising or bleeding. NEURO No headache, no dizziness, no seizures, no numbness, no tingling or weakness.    PSYCH: Anxiety and depression      Objective     Physical Exam:  Visit Vitals  BP (!) 172/52   Pulse 76   Temp 98.1 °F (36.7 °C)   Resp 15   Ht 5' 5\" (1.651 m)   Wt 65.8 kg (145 lb)   SpO2 100%   BMI 24.13 kg/m²       General: NAD, appears stated age, alert  Skin: warm, dry, no rashes  Eyes: PERRL, sclera is non-icteric  HENT: normocephalic/atraumatic, moist mucus membranes  Respiratory: CTA with no signs of respiratory distress  Cardiovascular: RRR, no m/r/g  GI: soft, non-tender, normal bowel sounds  Extremities: no cyanosis, 1+ pitting edema L lower leg, R AKA  Neuro: moves all extremities, no focal deficits, normal speech  Psych: appropriate mood and affect, no visual or auditory hallucinations    Laboratory Studies:  Recent Results (from the past 24 hour(s))   CBC WITH AUTOMATED DIFF    Collection Time: 12/01/21  6:49 PM   Result Value Ref Range    WBC 6.7 4.6 - 13.2 K/uL    RBC 3.24 (L) 4.20 - 5.30 M/uL    HGB 10.1 (L) 12.0 - 16.0 g/dL    HCT 33.2 (L) 35.0 - 45.0 %    .5 (H) 78.0 - 100.0 FL    MCH 31.2 24.0 - 34.0 PG    MCHC 30.4 (L) 31.0 - 37.0 g/dL    RDW 14.3 11.6 - 14.5 %    PLATELET 574 605 - 824 K/uL    MPV 11.1 9.2 - 11.8 FL    NRBC 0.0 0  WBC    ABSOLUTE NRBC 0.00 0.00 - 0.01 K/uL    NEUTROPHILS 53 40 - 73 %    LYMPHOCYTES 36 21 - 52 %    MONOCYTES 9 3 - 10 %    EOSINOPHILS 2 0 - 5 %    BASOPHILS 0 0 - 2 %    IMMATURE GRANULOCYTES 0 0.0 - 0.5 %    ABS. NEUTROPHILS 3.6 1.8 - 8.0 K/UL    ABS. LYMPHOCYTES 2.4 0.9 - 3.6 K/UL    ABS. MONOCYTES 0.6 0.05 - 1.2 K/UL    ABS. EOSINOPHILS 0.1 0.0 - 0.4 K/UL    ABS. BASOPHILS 0.0 0.0 - 0.1 K/UL    ABS. IMM.  GRANS. 0.0 0.00 - 0.04 K/UL    DF AUTOMATED     EKG, 12 LEAD, INITIAL    Collection Time: 12/01/21  7:07 PM   Result Value Ref Range    Ventricular Rate 74 BPM    Atrial Rate 74 BPM    P-R Interval 136 ms    QRS Duration 78 ms    Q-T Interval 342 ms    QTC Calculation (Bezet) 379 ms    Calculated P Axis 27 degrees    Calculated R Axis 3 degrees    Calculated T Axis -9 degrees    Diagnosis       Normal sinus rhythm  Normal ECG  When compared with ECG of 20-MAR-2020 01:30,  QT has shortened     METABOLIC PANEL, COMPREHENSIVE    Collection Time: 12/01/21  8:05 PM   Result Value Ref Range    Sodium 145 136 - 145 mmol/L    Potassium 7.1 (HH) 3.5 - 5.5 mmol/L    Chloride 126 (H) 100 - 111 mmol/L    CO2 14 (L) 21 - 32 mmol/L    Anion gap 5 3.0 - 18 mmol/L    Glucose 107 (H) 74 - 99 mg/dL    BUN 58 (H) 7.0 - 18 MG/DL    Creatinine 2.04 (H) 0.6 - 1.3 MG/DL    BUN/Creatinine ratio 28 (H) 12 - 20      GFR est AA 28 (L) >60 ml/min/1.73m2    GFR est non-AA 23 (L) >60 ml/min/1.73m2    Calcium 9.5 8.5 - 10.1 MG/DL    Bilirubin, total 0.2 0.2 - 1.0 MG/DL    ALT (SGPT) 21 13 - 56 U/L    AST (SGOT) 18 10 - 38 U/L    Alk. phosphatase 105 45 - 117 U/L    Protein, total 7.6 6.4 - 8.2 g/dL    Albumin 3.1 (L) 3.4 - 5.0 g/dL    Globulin 4.5 (H) 2.0 - 4.0 g/dL    A-G Ratio 0.7 (L) 0.8 - 1.7     METABOLIC PANEL, BASIC    Collection Time: 12/01/21  9:00 PM   Result Value Ref Range    Sodium 144 136 - 145 mmol/L    Potassium 7.2 (HH) 3.5 - 5.5 mmol/L    Chloride 125 (H) 100 - 111 mmol/L    CO2 16 (L) 21 - 32 mmol/L    Anion gap 3 3.0 - 18 mmol/L    Glucose 144 (H) 74 - 99 mg/dL    BUN 59 (H) 7.0 - 18 MG/DL    Creatinine 2.06 (H) 0.6 - 1.3 MG/DL    BUN/Creatinine ratio 29 (H) 12 - 20      GFR est AA 28 (L) >60 ml/min/1.73m2    GFR est non-AA 23 (L) >60 ml/min/1.73m2    Calcium 9.7 8.5 - 10.1 MG/DL   EKG, 12 LEAD, SUBSEQUENT    Collection Time: 12/01/21  9:58 PM   Result Value Ref Range    Ventricular Rate 116 BPM    Atrial Rate 116 BPM    P-R Interval 152 ms    QRS Duration 80 ms    Q-T Interval 296 ms    QTC Calculation (Bezet) 411 ms    Calculated P Axis 60 degrees    Calculated R Axis -10 degrees    Calculated T Axis 125 degrees    Diagnosis       Sinus tachycardia with occasional premature ventricular complexes  Anterior infarct , age undetermined  Abnormal ECG  When compared with ECG of 01-DEC-2021 19:07,  premature ventricular complexes are now present  Vent. rate has increased BY  42 BPM  ST now depressed in Lateral leads  Nonspecific T wave abnormality, improved in Inferior leads         Imaging Reviewed:  XR CHEST PORT    Result Date: 12/1/2021  EXAM:  Chest Portable. INDICATION:  Hyperkalemia. Chest pain. COMPARISON:  None. TECHNIQUE:  Portable AP chest study FINDINGS:  - Both lungs are clear. - No pleural effusion or pneumothorax is detected. - Cardiac silhouette, mediastinum and hilar regions appear unremarkable. Negative study. Assessment/Plan     Principal Problem:    Hyperkalemia (12/1/2021)    Active Problems:    EFRAIN (acute kidney injury) ([facility] [address] (12/1/2021)      Hyperchloremic metabolic acidosis (89/6/0617)      Abscess (12/1/2021)      Type 2 diabetes mellitus with hyperglycemia, without long-term current use of insulin ([facility] [address] (12/1/2021)      HTN (hypertension) (12/1/2021)      Hyperkalemia  - was given bicarb, insulin, D50, albuterol in ED  - Give lokelma 10g now, have ordered another dose for 0800 tomorrow. Patient refusing kayexalate. - recheck early tomorrow morning and continue to monitor  - cardiac monitoring    EFRAIN, hyperchloremic metabolic acidosis  - possibly type 4 RTA given hyperkalemia and acidosis  - I have reached out to nephrology for consult tomorrow  - will start IV fluids  - hold HCTZ, losartan    Abscess  - was spontaneously draining in ED on 11/25  - was given clinda, but cultures came back with ESBL klebsiella and MRSA  - patient has no s/sx of infection, will hold on abx  - recommend ID consult tomorrow for recommendations  - wound care  - contact precautions    DM  - ssi  - check a1c  - monitor achs    HTN  - PRN hydralazine for SBP >180  - monitor BP    DVT Prophylaxis:  []Lovenox  [x]Hep SQ  []SCDs  []Coumadin   []On Heparin gtt []PO anticoagulant    I have personally reviewed all pertinent labs, films and EKGs that have officially resulted. I reviewed available electronic documentation outlining the initial presentation as well as the emergency room physician's encounter.     [facility]  Hospitalist Division  Office:  [phone]  Pager: [phone]

## 2021-12-02 NOTE — DIABETES MGMT
Diabetes Patient/Family Education Record    Factors That May Influence Patients Ability to Learn or Comply with Recommendations   []   Language barrier    []   Cultural needs   []   Motivation    []   Cognitive limitation    []   Physical   [x]   Education    []   Physiological factors   []   Hearing/vision/speaking impairment   []   Taoist beliefs    []   Financial factors   []  Other:   []  No factors identified at this time. Person Instructed:   [x]   Patient   []   Family   []  Other     Preference for Learning:   [x]   Verbal   []   Written   []  Demonstration     Level of Comprehension & Competence:    [x]  Good                                      [] Fair                                     []  Poor                             []  Needs Reinforcement   [x]  Teach back completed    Education Component:   [x]  Medication management, including how to administer insulin (if appropriate) and potential medication interactions: Patient reported that she doesn't live at home - she stays in a nursing home facility, [facility] and [place]. Patient stated that she takes diabetes medications: two different pills and insulin but she doesn't know the name of drugs. Noted the following diabetes meds listed PTA:  Glipizide 5 mg 2 times daily  Metformin 500 mg daily with breakfast  Correctional lispro insulin before meals     [x]  Nutritional management - [x] Obtained usual meal pattern: Patient stated, \"I try to eat better but it is hard. \" Addressed attila crackers she was eating after breakfast and how it will affect her blood glucose readings. Patient is not interested in nutrition education at this time.  Encouraged patient:    []   Basic carbohydrate counting  []  Plate method  [x]  Limit concentrated sweets and avoid sweetened beverages  [x]  Portion control  []    Avoid skipping meals     []  Exercise   [x]  Signs, symptoms, and treatment of hyperglycemia and hypoglycemia: Patient reported that her blood glucose in the nursing home is always high. Discussed and educated patient. [x] Prevention, recognition and treatment of hyperglycemia and hypoglycemia: Discussed low blood glucose less than 70 with patient. Patient responded, \"I know that but I've not had any low blood sugar in a long time because mine is always high. [x]  Importance of blood glucose monitoring  [x] Blood Glucose targets: Patient reported that her blood sugar is checked everyday in the nursing home facility by the staff. []   Provided patient with blood glucose meter  []  Has glucometer and supplies at home     []  Instruction on use of the blood glucose meter and recommended monitoring schedule   [x]  Discuss the importance of HbA1C monitoring. Patients A1c is 9.7% (12/02/2021). This is equivalent to average glucose of 232 mg/dl for the past 2-3 months. Discussed and encouraged diet compliance. []  Sick day guidelines   []  Proper use and disposal of lancets, needles, syringes or insulin pens (if appropriate)   [x]  Potential long-term complications (retinopathy, kidney disease, neuropathy, foot care)   [] Information about whom to contact in case of emergency or for more information    []  Goal:  Patient/family will demonstrate understanding of Diabetes Self- Management Skills by: 12/09/2021  Plan for post-discharge education or self-management support:    [] Outpatient class schedule provided            [] Patient Declined    [] Scheduled for outpatient classes (date) _______    [] Written information provided  Verify: [x] Prior to admission Diabetes medications    Does patient understand how diabetes medications work? Yes. Patient stated, \"I take it because my sugar is always high. \"  Does patient have difficulty obtaining diabetes medications or testing supplies? No. Patient reported that she stays at [facility] and [address] [place] [address].     Walker Calabrese JUNE [facility]  Pager: [phone]

## 2021-12-02 NOTE — WOUND CARE
Physical Exam  Musculoskeletal:        Legs:         Focused assessment  ER10  Patient received sitting up on stretcher. A & O x 3. Patient reports left buttock pain with pressure. Patient able to turn and assist with repositioning. Right AKA. POA left buttock abscess s/p I/D. 3x4cm pale pink with minimal slough. Moderate sanguinous drainage noted on old dressing. Old dressing dated 12/1/21. Topical treatment protocol in place as follows:   Clean wound to left buttock with saline or wound spray then pat dry. Apply Therahoney to [employer] Ag then to wound bed and cover with silicone dressing. Change every 2-3 days and prn soilage or dislodgement. Care turned over to nursing staff at this time. Margaux Oropeza RN, BSN, [facility]

## 2021-12-03 LAB
ANION GAP SERPL CALC-SCNC: 1 MMOL/L (ref 3–18)
BASOPHILS # BLD: 0 K/UL (ref 0–0.1)
BASOPHILS NFR BLD: 0 % (ref 0–2)
BUN SERPL-MCNC: 48 MG/DL (ref 7–18)
BUN/CREAT SERPL: 28 (ref 12–20)
CALCIUM SERPL-MCNC: 9.5 MG/DL (ref 8.5–10.1)
CHLORIDE SERPL-SCNC: 118 MMOL/L (ref 100–111)
CO2 SERPL-SCNC: 21 MMOL/L (ref 21–32)
CREAT SERPL-MCNC: 1.69 MG/DL (ref 0.6–1.3)
DIFFERENTIAL METHOD BLD: ABNORMAL
EOSINOPHIL # BLD: 0.1 K/UL (ref 0–0.4)
EOSINOPHIL NFR BLD: 2 % (ref 0–5)
ERYTHROCYTE [DISTWIDTH] IN BLOOD BY AUTOMATED COUNT: 14 % (ref 11.6–14.5)
GLUCOSE BLD STRIP.AUTO-MCNC: 115 MG/DL (ref 70–110)
GLUCOSE BLD STRIP.AUTO-MCNC: 152 MG/DL (ref 70–110)
GLUCOSE BLD STRIP.AUTO-MCNC: 243 MG/DL (ref 70–110)
GLUCOSE SERPL-MCNC: 149 MG/DL (ref 74–99)
HCT VFR BLD AUTO: 29.5 % (ref 35–45)
HGB BLD-MCNC: 9.1 G/DL (ref 12–16)
IMM GRANULOCYTES # BLD AUTO: 0 K/UL (ref 0–0.04)
IMM GRANULOCYTES NFR BLD AUTO: 0 % (ref 0–0.5)
LYMPHOCYTES # BLD: 2.4 K/UL (ref 0.9–3.6)
LYMPHOCYTES NFR BLD: 39 % (ref 21–52)
MAGNESIUM SERPL-MCNC: 2.5 MG/DL (ref 1.6–2.6)
MCH RBC QN AUTO: 31.5 PG (ref 24–34)
MCHC RBC AUTO-ENTMCNC: 30.8 G/DL (ref 31–37)
MCV RBC AUTO: 102.1 FL (ref 78–100)
MONOCYTES # BLD: 0.5 K/UL (ref 0.05–1.2)
MONOCYTES NFR BLD: 8 % (ref 3–10)
NEUTS SEG # BLD: 3 K/UL (ref 1.8–8)
NEUTS SEG NFR BLD: 50 % (ref 40–73)
NRBC # BLD: 0 K/UL (ref 0–0.01)
NRBC BLD-RTO: 0 PER 100 WBC
PHOSPHATE SERPL-MCNC: 2.7 MG/DL (ref 2.5–4.9)
PLATELET # BLD AUTO: 170 K/UL (ref 135–420)
PMV BLD AUTO: 11.3 FL (ref 9.2–11.8)
POTASSIUM SERPL-SCNC: 5.4 MMOL/L (ref 3.5–5.5)
RBC # BLD AUTO: 2.89 M/UL (ref 4.2–5.3)
SODIUM SERPL-SCNC: 140 MMOL/L (ref 136–145)
WBC # BLD AUTO: 6 K/UL (ref 4.6–13.2)

## 2021-12-03 PROCEDURE — 74011250637 HC RX REV CODE- 250/637: Performed by: PHYSICIAN ASSISTANT

## 2021-12-03 PROCEDURE — 65660000000 HC RM CCU STEPDOWN

## 2021-12-03 PROCEDURE — 99232 SBSQ HOSP IP/OBS MODERATE 35: CPT | Performed by: HOSPITALIST

## 2021-12-03 PROCEDURE — 36415 COLL VENOUS BLD VENIPUNCTURE: CPT

## 2021-12-03 PROCEDURE — 85025 COMPLETE CBC W/AUTO DIFF WBC: CPT

## 2021-12-03 PROCEDURE — 83735 ASSAY OF MAGNESIUM: CPT

## 2021-12-03 PROCEDURE — 74011250637 HC RX REV CODE- 250/637: Performed by: INTERNAL MEDICINE

## 2021-12-03 PROCEDURE — 96372 THER/PROPH/DIAG INJ SC/IM: CPT

## 2021-12-03 PROCEDURE — 74011250636 HC RX REV CODE- 250/636: Performed by: PHYSICIAN ASSISTANT

## 2021-12-03 PROCEDURE — 74011636637 HC RX REV CODE- 636/637: Performed by: HOSPITALIST

## 2021-12-03 PROCEDURE — 82962 GLUCOSE BLOOD TEST: CPT

## 2021-12-03 PROCEDURE — 84100 ASSAY OF PHOSPHORUS: CPT

## 2021-12-03 PROCEDURE — 80048 BASIC METABOLIC PNL TOTAL CA: CPT

## 2021-12-03 RX ORDER — CEFUROXIME AXETIL 250 MG/1
250 TABLET ORAL EVERY 12 HOURS
Qty: 12 TABLET | Refills: 0 | Status: SHIPPED
Start: 2021-12-03 | End: 2021-12-06 | Stop reason: SDUPTHER

## 2021-12-03 RX ORDER — AMLODIPINE BESYLATE 2.5 MG/1
2.5 TABLET ORAL DAILY
Qty: 30 TABLET | Refills: 0 | Status: SHIPPED
Start: 2021-12-03 | End: 2021-12-06 | Stop reason: SDUPTHER

## 2021-12-03 RX ORDER — HYDRALAZINE HYDROCHLORIDE 25 MG/1
25 TABLET, FILM COATED ORAL 3 TIMES DAILY
Qty: 90 TABLET | Refills: 0 | Status: SHIPPED
Start: 2021-12-03 | End: 2021-12-06

## 2021-12-03 RX ADMIN — HEPARIN SODIUM 5000 UNITS: 5000 INJECTION INTRAVENOUS; SUBCUTANEOUS at 03:05

## 2021-12-03 RX ADMIN — CEFUROXIME AXETIL 250 MG: 250 TABLET, FILM COATED ORAL at 22:18

## 2021-12-03 RX ADMIN — INSULIN GLARGINE 8 UNITS: 100 INJECTION, SOLUTION SUBCUTANEOUS at 18:16

## 2021-12-03 RX ADMIN — CEFUROXIME AXETIL 250 MG: 250 TABLET, FILM COATED ORAL at 13:01

## 2021-12-03 RX ADMIN — OLANZAPINE 2.5 MG: 2.5 TABLET, FILM COATED ORAL at 22:18

## 2021-12-03 RX ADMIN — ASPIRIN 81 MG CHEWABLE TABLET 81 MG: 81 TABLET CHEWABLE at 13:01

## 2021-12-03 RX ADMIN — SODIUM ZIRCONIUM CYCLOSILICATE 10 G: 5 POWDER, FOR SUSPENSION ORAL at 13:01

## 2021-12-03 RX ADMIN — HEPARIN SODIUM 5000 UNITS: 5000 INJECTION INTRAVENOUS; SUBCUTANEOUS at 18:16

## 2021-12-03 RX ADMIN — ATORVASTATIN CALCIUM 20 MG: 20 TABLET, FILM COATED ORAL at 13:01

## 2021-12-03 RX ADMIN — CEFUROXIME AXETIL 250 MG: 250 TABLET, FILM COATED ORAL at 03:05

## 2021-12-03 RX ADMIN — OLANZAPINE 2.5 MG: 2.5 TABLET, FILM COATED ORAL at 03:05

## 2021-12-03 NOTE — DIABETES MGMT
Diabetes/ Glycemic Control Plan of Care    12/02: Patient seen in ED and stated that she stays in a nursing home facility, [facility] and [place]. Noted: buttock abscess, culture (+) MRSA and ESBL    12/03: Patient transferred from ED to [address] [place] [address]. Post prandial  after breakfast. Noted first dose of basal lantus insulin 8 units starting today (dinner) ordered. Recommendations:   1.) modify correctional lispro insulin to very resistant dose per protocol. Done. 2.) continue to monitor and consider increasing the daily basal lantus insulin dose to 10 units if BG remain above target range. Assessment:   DX:   1. Acute hyperkalemia     2. Creatinine elevation     3. EFRAIN (acute kidney injury) ([address]     4. Abscess        Fasting/ Morning blood glucose:   Lab Results   Component Value Date/Time    Glucose 149 (H) 12/03/2021 06:01 AM    Glucose (POC) 243 (H) 12/03/2021 11:46 AM    Glucose, POC 92 01/16/2020 02:46 PM     IV Fluids containing dextrose: None. Steroids:   None. Blood glucose values: Within target range (70-180mg/dL):  Yes. Current insulin orders:   Correctional lispro insulin ACHS. Modified to very resistant dose  Basal lantus insulin 8 units daily, first dose ordered 12/03/2021 (dinner)    Total Daily Dose previous 24 hours: 12/02/2021  Correctional lispro: 6 units    Current A1c:   Lab Results   Component Value Date/Time    Hemoglobin A1c 9.7 (H) 12/02/2021 04:57 AM      equivalent  to ave Blood Glucose of 232 mg/dl for 2-3 months prior to admission    Adequate glycemic control PTA: No.    Nutrition/Diet:   Active Orders   Diet    ADULT DIET Regular; 4 carb choices (60 gm/meal); Low Fat/Low Chol/High Fiber/2 gm Na; Low Potassium (Less than 3000 mg/day); Low Phosphorus (Less than 1000 mg)      Meal Intake:  No data found. Supplement Intake:  No data found. Home diabetes medications: Patient cannot list her diabetes medications.  She's from the nursing home facility and stated taking two pills and insulin for diabetes. Key Antihyperglycemic Medications             glipiZIDE (GLUCOTROL) 5 mg tablet Take 5 mg by mouth two (2) times a day. metFORMIN (GLUCOPHAGE) 500 mg tablet Take 500 mg by mouth daily (with breakfast).     insulin lispro (HUMALOG) 100 unit/mL injection Less than 150 =   0 units  150 -199 =   3 units  200 -249 =   6 units  250 -299 =   9 units  300 -349 =   12 units  350 and above =   15 units, CALL MD          Plan/Goals:   Blood glucose will be within target of 70 - 180 mg/dl within 72 hours    Education:  [x] Refer to Diabetes Education Record: 12/09/2021                       [] Education not indicated at this time     Leonora Jarquin RN [facility]  Pager: [phone]

## 2021-12-03 NOTE — PROGRESS NOTES
Discharge order noted. CM called [address] [facility], [facility],  to see if they could accept patient back today, went to voicemail. CM left a message to see if they can accept patient back today, or possibly tomorrow. CM left phone number for a return call. CM uploaded patient to [address] [place] [address] and [facility] [place] [facility]           Arsen Ye RN  Case Management [phone]

## 2021-12-03 NOTE — PROGRESS NOTES
Progress  Note      79y F with PMH DM, HTN, CKD, admitted for sepsis, hyperkalemia following for renal failure    Subjective      Overnight event noted    IMPRESSION:   Acute on chronic kidney injury, pre renal ATN, baseline creatinine around 1.6-1.7mg/dl, risk factors, diuretics ,on ARB  CKD 3, baseline creatinine 1.6-1.7mg/dl   Hyperklaemia, poor dietary restriction, on losartan   Metabolic acidosis, higher suspicion for RTA  Sepsis,   DM  HTN   PLAN:    DC bicarbonate drip. Marked improvement in her renal function and electrolytes. I would recommend to avoid ARB or ACE inhibitor in future. Start on lokelma 5gm  Daily( lower dose) continue outpatient for 2 weeks, check lab at nursing home and it can be dc or adjust dose. Will follow case peripherally available, if any question or concern. Adjust medication per current renal function status.     Discussed with Dr. Antonette Butler    Facility-Administered Medications: None       Current Facility-Administered Medications   Medication Dose Route Frequency    sodium bicarbonate (8.4%) 150 mEq in dextrose 5% 1,000 mL infusion   IntraVENous CONTINUOUS    sodium zirconium cyclosilicate (LOKELMA) powder packet 10 g  10 g Oral DAILY    insulin glargine (LANTUS) injection 8 Units  8 Units SubCUTAneous ACD    hydrALAZINE (APRESOLINE) tablet 25 mg  25 mg Oral Q6H PRN    cefUROXime (CEFTIN) tablet 250 mg  250 mg Oral Q12H    sodium chloride (NS) flush 5-40 mL  5-40 mL IntraVENous Q8H    sodium chloride (NS) flush 5-40 mL  5-40 mL IntraVENous PRN    aspirin chewable tablet 81 mg  81 mg Oral DAILY    atorvastatin (LIPITOR) tablet 20 mg  20 mg Oral DAILY    OLANZapine (ZyPREXA) tablet 2.5 mg  2.5 mg Oral QHS    sodium chloride (NS) flush 5-40 mL  5-40 mL IntraVENous Q8H    sodium chloride (NS) flush 5-40 mL  5-40 mL IntraVENous PRN    acetaminophen (TYLENOL) tablet 650 mg  650 mg Oral Q4H PRN    insulin lispro (HUMALOG) injection   SubCUTAneous AC&HS    glucose chewable tablet 16 g  4 Tablet Oral PRN    glucagon (GLUCAGEN) injection 1 mg  1 mg IntraMUSCular PRN    dextrose (D50W) injection syrg 12.5-25 g  25-50 mL IntraVENous PRN    heparin (porcine) injection 5,000 Units  5,000 Units SubCUTAneous Q8H       Review of Systems:     As above   Data Review:    Labs: Results:       Chemistry Recent Labs     12/03/21  0601 12/02/21  0457 12/02/21  0255 12/01/21 2100 12/01/21 2100 12/01/21 2005 12/01/21 2005   * 159*  --   --  144*   < > 107*    143  --   --  144   < > 145   K 5.4 6.2* 5.9*   < > 7.2*   < > 7.1*   * 124*  --   --  125*   < > 126*   CO2 21 15*  --   --  16*   < > 14*   BUN 48* 63*  --   --  59*   < > 58*   CREA 1.69* 2.10*  --   --  2.06*   < > 2.04*   CA 9.5 8.8  9.8  --   --  9.7   < > 9.5   AGAP 1* 4  --   --  3   < > 5   BUCR 28* 30*  --   --  29*   < > 28*   AP  --   --   --   --   --   --  105   TP  --   --   --   --   --   --  7.6   ALB  --   --   --   --   --   --  3.1*   GLOB  --   --   --   --   --   --  4.5*   AGRAT  --   --   --   --   --   --  0.7*    < > = values in this interval not displayed. CBC w/Diff Recent Labs     12/03/21  0601 12/02/21 0457 12/01/21  1849   WBC 6.0 6.5 6.7   RBC 2.89* 2.63* 3.24*   HGB 9.1* 8.0* 10.1*   HCT 29.5* 27.3* 33.2*    171 173   GRANS 50  --  53   LYMPH 39  --  36   EOS 2  --  2      Coagulation No results for input(s): PTP, INR, APTT, INREXT, INREXT in the last 72 hours. Iron/Ferritin No results for input(s): IRON in the last 72 hours. No lab exists for component: TIBCCALC   BNP No results for input(s): BNPP in the last 72 hours.    Cardiac Enzymes Recent Labs     12/02/21  0255   CPK 72   CKND1 2.6      Liver Enzymes Recent Labs     12/01/21 2005   TP 7.6   ALB 3.1*         Thyroid Studies Lab Results   Component Value Date/Time    TSH 3.70 04/23/2012 12:02 PM         EKG: normal EKG, qtc 411    Physical Assessment:     Visit Vitals  BP (!) 170/71   Pulse 73 Temp 97.4 °F (36.3 °C)   Resp 20   Ht 5' 5\" (1.651 m)   Wt 65.8 kg (145 lb)   SpO2 98%   BMI 24.13 kg/m²     Weight change:     Intake/Output Summary (Last 24 hours) at 12/3/2021 1323  Last data filed at 12/2/2021 1935  Gross per 24 hour   Intake 400 ml   Output    Net 400 ml     Physical Exam:   General: comfortable, no acute distress   HEENT sclera anicteric, supple neck, no thyromegaly  CVS: S1S2 heard,  no rub  RS: + air entry b/l,   Abd: Soft, Non tender,  Neuro: non focal, awake, alert , CN II-XII are grossly intact  Extrm: R AKA,   Skin: no visible  Rash  Musculoskeletal: No gross joints or bone deformities     Procedures/imaging: see electronic medical records for all procedures, Xrays and details which were not copied into this note but were reviewed prior to creation of Phyllis Aguilera MD  December 3, 2021  [facility]ph[facility]  Office [phone]

## 2021-12-03 NOTE — DISCHARGE SUMMARY
Discharge Summary    Patient: Any Calderón MRN: [id number]  CSN: [id number]    YOB: 1940  Age: 79 y.o.   Sex: female    DOA: 12/1/2021 LOS: 2 days Discharge Date: 12/4/21     Admission Diagnoses: Hyperkalemia [E87.5]    Discharge Diagnoses:    Problem List as of 12/3/2021 Date Reviewed: 2/23/2020          Codes Class Noted - Resolved    * (Principal) Hyperkalemia ICD-10-CM: E87.5  ICD-9-CM: 276.7  12/1/2021 - Present        EFRAIN (acute kidney injury) ([facility] ICD-10-CM: N17.9  ICD-9-CM: 584.9  12/1/2021 - Present        Abscess ICD-10-CM: L02.91  ICD-9-CM: 682.9  12/1/2021 - Present        Type 2 diabetes mellitus with hyperglycemia, without long-term current use of insulin (HCC) ICD-10-CM: E11.65  ICD-9-CM: 250.00, 790.29  12/1/2021 - Present        HTN (hypertension) ICD-10-CM: I10  ICD-9-CM: 401.9  12/1/2021 - Present        Hyperchloremic metabolic acidosis LGE-86-OS: E87.2  ICD-9-CM: 276.2  12/1/2021 - Present        Pseudoaneurysm of femoral artery ([facility] ICD-10-CM: I72.4  ICD-9-CM: 442.3  3/20/2020 - Present        Anemia of chronic renal failure, stage 4 (severe) (HCC) ICD-10-CM: N18.4, D63.1  ICD-9-CM: 285.21, 585.4  2/19/2020 - Present        Ischemia of foot ICD-10-CM: I99.8  ICD-9-CM: 459.9  1/20/2020 - Present        Pancreatitis ICD-10-CM: K85.90  ICD-9-CM: 333.3  1/3/2020 - Present        Hyperosmolar hyperglycemic coma due to diabetes mellitus without ketoacidosis ([facility] ICD-10-CM: E11.01  ICD-9-CM: 250.20, 250.30  1/3/2020 - Present        Hyperosmolar non-ketotic state in patient with type 2 diabetes mellitus ([facility] ICD-10-CM: E11.00  ICD-9-CM: 250.20  1/3/2020 - Present        Hyperglycemia ICD-10-CM: R73.9  ICD-9-CM: 790.29  12/25/2018 - Present        Type 2 diabetes mellitus with hyperosmolarity ([facility] ICD-10-CM: E11.00  ICD-9-CM: 250.20  12/25/2018 - Present        Acute UTI ICD-10-CM: N39.0  ICD-9-CM: 599.0  12/25/2018 - Present        Dehydration ICD-10-CM: E86.0  ICD-9-CM: 276.51  12/25/2018 - Present        Acute renal failure (ARF) ([facility] ICD-10-CM: N17.9  ICD-9-CM: 584.9  12/25/2018 - Present        Noncompliance ICD-10-CM: Z91.19  ICD-9-CM: V15.81  12/25/2018 - Present              Reason for Admission  79 y.o. female with a PMHx of diabetes, hypertension, R AKA who presented to the ED from nursing facility due to abnormal labs. She stated she was getting routine lab work done and was sent to the ED when her potassium was found to be elevated >7.0. She denied any symptoms over the last few days. She specifically denied SOB, chest pain, weakness, headache. She denied prior history of renal disease. She did report recently being started on abx (clinda) due to an abscess that was found to be spontaneously draining on 11/25, and she followed up on 11/28. Wound culture +MRSA, ESBL klebsiella oxytoca. Wound was improved on follow up visit per ED notes. In the ED, K 7.1, SCr 2.04 (baseline 1.2). Discharge Condition: Fair    PHYSICAL EXAM   Visit Vitals  BP (!) 170/71   Pulse 73   Temp 97.4 °F (36.3 °C)   Resp 20   Ht 5' 5\" (1.651 m)   Wt 65.8 kg (145 lb)   SpO2 98%   BMI 24.13 kg/m²       General: NAD, appears stated age, alert  Eyes: PERRL, sclera is non-icteric. Oropharynx clear. HENT: normocephalic/atraumatic, moist mucus membranes  Respiratory: CTA with no signs of respiratory distress  Cardiovascular: RRR, no m/r/g  GI: soft, non-tender, normal bowel sounds  Extremities: no cyanosis, right AKA   Neuro: moves all extremities, normal speech. Other than hearing, no focal deficit. Skin: no rash to visible skin. Hospital Course:   1. Hyperkalemia. Patient has received medical management, including Vesta Gaucher. ARB held. she received bicarb infusion per nephrology. Discharge w/ lokelma x 2 weeks, then check labs to determine further management. 2.  EFRAIN improving. ARB, HCTZ held. Nephrology followed in consult. 3.  Hyperchloremic metabolic acidosis, suspicion for RTA.    4. Abscess POA. wound care recs below. Cx + MRSA and ESBL. Per ID,   5.  DM2 with hyperglycemia (A1c 9.7 12/2/21). Resume oral hypoglycemics. Diabetes educator met with her regarding adherence to diabetic diet. 6.  Hypertension. given concern for RTA, ARB stopped. Resume hctz. Low dose hydralazine and norvasc started, these may be titrated as needed. 7.  Dyslipidemia on statin   8. Left buttock abscess:  -Status post drainage on 11/23 in the ED.  -Completed 1 week of clindamycin  -Wound cultures positive for ESBL Klebsiella as well as MRSA  --> Continue cefoxitin (stop date 12/8) per ID recs. Wound care as below. 9. DVT prophylaxis was given in the form of heparin subcut tid  10. Full code. return to long term care.      Consults: Nephrology Dr. Wyman Drop Dr. Fabian Varela Studies: labs:   Recent Results (from the past 24 hour(s))   GLUCOSE, POC    Collection Time: 12/02/21  5:24 PM   Result Value Ref Range    Glucose (POC) 79 70 - 110 mg/dL   GLUCOSE, POC    Collection Time: 12/02/21 11:24 PM   Result Value Ref Range    Glucose (POC) 115 (H) 70 - 813 mg/dL   METABOLIC PANEL, BASIC    Collection Time: 12/03/21  6:01 AM   Result Value Ref Range    Sodium 140 136 - 145 mmol/L    Potassium 5.4 3.5 - 5.5 mmol/L    Chloride 118 (H) 100 - 111 mmol/L    CO2 21 21 - 32 mmol/L    Anion gap 1 (L) 3.0 - 18 mmol/L    Glucose 149 (H) 74 - 99 mg/dL    BUN 48 (H) 7.0 - 18 MG/DL    Creatinine 1.69 (H) 0.6 - 1.3 MG/DL    BUN/Creatinine ratio 28 (H) 12 - 20      GFR est AA 35 (L) >60 ml/min/1.73m2    GFR est non-AA 29 (L) >60 ml/min/1.73m2    Calcium 9.5 8.5 - 10.1 MG/DL   CBC WITH AUTOMATED DIFF    Collection Time: 12/03/21  6:01 AM   Result Value Ref Range    WBC 6.0 4.6 - 13.2 K/uL    RBC 2.89 (L) 4.20 - 5.30 M/uL    HGB 9.1 (L) 12.0 - 16.0 g/dL    HCT 29.5 (L) 35.0 - 45.0 %    .1 (H) 78.0 - 100.0 FL    MCH 31.5 24.0 - 34.0 PG    MCHC 30.8 (L) 31.0 - 37.0 g/dL    RDW 14.0 11.6 - 14.5 % PLATELET 525 299 - 722 K/uL    MPV 11.3 9.2 - 11.8 FL    NRBC 0.0 0  WBC    ABSOLUTE NRBC 0.00 0.00 - 0.01 K/uL    NEUTROPHILS 50 40 - 73 %    LYMPHOCYTES 39 21 - 52 %    MONOCYTES 8 3 - 10 %    EOSINOPHILS 2 0 - 5 %    BASOPHILS 0 0 - 2 %    IMMATURE GRANULOCYTES 0 0.0 - 0.5 %    ABS. NEUTROPHILS 3.0 1.8 - 8.0 K/UL    ABS. LYMPHOCYTES 2.4 0.9 - 3.6 K/UL    ABS. MONOCYTES 0.5 0.05 - 1.2 K/UL    ABS. EOSINOPHILS 0.1 0.0 - 0.4 K/UL    ABS. BASOPHILS 0.0 0.0 - 0.1 K/UL    ABS. IMM. GRANS. 0.0 0.00 - 0.04 K/UL    DF AUTOMATED     PHOSPHORUS    Collection Time: 12/03/21  6:01 AM   Result Value Ref Range    Phosphorus 2.7 2.5 - 4.9 MG/DL   MAGNESIUM    Collection Time: 12/03/21  6:01 AM   Result Value Ref Range    Magnesium 2.5 1.6 - 2.6 mg/dL   GLUCOSE, POC    Collection Time: 12/03/21 11:46 AM   Result Value Ref Range    Glucose (POC) 243 (H) 70 - 110 mg/dL     Results     Procedure Component Value Units Date/Time    CULTURE, BODY FLUID Roosvelt Karena STAIN [[id number]]  (Abnormal)  (Susceptibility) Collected: 11/23/21 1515    Order Status: Completed Specimen:  Body Fluid from Drainage Updated: 11/28/21 1447     Special Requests: NO SPECIAL REQUESTS        GRAM STAIN NO WBC'S SEEN               1+ GRAM POSITIVE COCCI IN PAIRS           Culture result:       LIGHT KLEBSIELLA OXYTOCA ** (EXTENDED SPECTRUM BETA LACTAMASE ) **                  HEAVY * METHICILLIN RESISTANT STAPHYLOCOCCUS AUREUS *            (NOTE) KLEB OXYTOCA ESBL CALLED TO MACK IBRAHIM AT [id number] ON 11.26.21. [facility]     Susceptibility      Klebsiella oxytoca     ANGIE     Amikacin ($) Susceptible     Ampicillin ($) Resistant     Ampicillin/sulbactam ($) Resistant     Cefazolin ($) Resistant     Cefepime ($$) Resistant     Cefoxitin Susceptible     Ceftazidime ($) Resistant     Ceftriaxone ($) Resistant     Ciprofloxacin ($) Resistant     Gentamicin ($) Intermediate     Levofloxacin ($) Resistant     Meropenem ($$) Susceptible     Piperacillin/Tazobac ($) Resistant     Tobramycin ($) Intermediate     Trimeth/Sulfa Resistant                  Linear View               Susceptibility      Staphylococcus aureus Methcillin Resistant     ANGIE     Ciprofloxacin ($) Resistant     Daptomycin ($$$$$) Susceptible     Erythromycin ($$$$) Resistant     Gentamicin ($) Susceptible     Levofloxacin ($) Resistant     Linezolid ($$$$$) Susceptible     Oxacillin Resistant     Tetracycline Susceptible     Trimeth/Sulfa Susceptible     Vancomycin ($) Susceptible  [1]                  [1]  For ANGIE >1, consider using another antibiotic. Linear View                       IMAGING  XR Results (most recent):  Results from Hospital Encounter encounter on 12/01/21    XR CHEST PORT    Narrative  EXAM:  Chest Portable. INDICATION:  Hyperkalemia. Chest pain. COMPARISON:  None. TECHNIQUE:  Portable AP chest study    FINDINGS:    - Both lungs are clear.  - No pleural effusion or pneumothorax is detected. - Cardiac silhouette, mediastinum and hilar regions appear unremarkable. Impression  Negative study. EKG Results     Procedure 720 Value Units Date/Time    EKG, 12 LEAD, REPEAT [[id number]] Collected: 12/01/21 2158    Order Status: Completed Updated: 12/02/21 1348     Ventricular Rate 116 BPM      Atrial Rate 116 BPM      P-R Interval 152 ms      QRS Duration 80 ms      Q-T Interval 296 ms      QTC Calculation (Bezet) 411 ms      Calculated P Axis 60 degrees      Calculated R Axis -10 degrees      Calculated T Axis 125 degrees      Diagnosis --     Sinus tachycardia with occasional premature ventricular complexes  Anterior infarct , age undetermined  Abnormal ECG  When compared with ECG of 01-DEC-2021 19:07,  premature ventricular complexes are now present  Vent.  rate has increased BY  42 BPM  ST now depressed in Lateral leads  Nonspecific T wave abnormality, improved in Inferior leads  Confirmed by Jorge Osorio MD, --- ([id number]) on 12/2/2021 1:48:49 PM      EKG, 12 LEAD, INITIAL [[id number]] Collected: 12/01/21 1907    Order Status: Completed Updated: 12/02/21 1346     Ventricular Rate 74 BPM      Atrial Rate 74 BPM      P-R Interval 136 ms      QRS Duration 78 ms      Q-T Interval 342 ms      QTC Calculation (Bezet) 379 ms      Calculated P Axis 27 degrees      Calculated R Axis 3 degrees      Calculated T Axis -9 degrees      Diagnosis --     Normal sinus rhythm  Normal ECG  When compared with ECG of 20-MAR-2020 01:30,  QT has shortened  Confirmed by Caden Magdaleno MD, --- ([id number]) on 12/2/2021 1:46:19 PM              Discharge Medications:     Current Discharge Medication List      START taking these medications    Details   cefUROXime (CEFTIN) 250 mg tablet Take 1 Tablet by mouth every twelve (12) hours for 6 days. Qty: 12 Tablet, Refills: 0      sodium zirconium cyclosilicate (LOKELMA) 5 gram powder packet Take 1 Packet by mouth daily for 14 days. Qty: 14 Packet, Refills: 0      hydrALAZINE (APRESOLINE) 25 mg tablet Take 1 Tablet by mouth three (3) times daily. Qty: 90 Tablet, Refills: 0      amLODIPine (Norvasc) 2.5 mg tablet Take 1 Tablet by mouth daily. Qty: 30 Tablet, Refills: 0         CONTINUE these medications which have NOT CHANGED    Details   docusate sodium (COLACE) 100 mg capsule Take 100 mg by mouth two (2) times a day. OLANZapine (ZYPREXA) 2.5 mg tablet Take  by mouth nightly. aspirin 81 mg chewable tablet Take 1 Tab by mouth daily. Qty: 30 Tab, Refills: 0      hydroCHLOROthiazide (HYDRODIURIL) 25 mg tablet Take 25 mg by mouth daily. loratadine (CLARITIN) 10 mg tablet Take 10 mg by mouth daily. glipiZIDE (GLUCOTROL) 5 mg tablet Take 5 mg by mouth two (2) times a day. metFORMIN (GLUCOPHAGE) 500 mg tablet Take 500 mg by mouth daily (with breakfast).       insulin lispro (HUMALOG) 100 unit/mL injection Less than 150 =   0 units  150 -199 =   3 units  200 -249 =   6 units  250 -299 =   9 units  300 -349 =   12 units  350 and above =   15 units, CALL MD  Qty: 1 Vial, Refills: 0      albuterol-ipratropium (DUO-NEB) 2.5 mg-0.5 mg/3 ml nebu 3 mL by Nebulization route every six (6) hours as needed (wheezing). Qty: 30 Nebule, Refills: 0      acetaminophen (TYLENOL) 325 mg tablet Take 2 Tabs by mouth every six (6) hours as needed. Qty: 30 Tab, Refills: 0      cholecalciferol, vitamin D3, (VITAMIN D3) 2,000 unit tab Take  by mouth. atorvastatin (LIPITOR) 20 mg tablet Take  by mouth daily. STOP taking these medications       clindamycin (CLEOCIN) 150 mg capsule Comments:   Reason for Stopping:         losartan (COZAAR) 100 mg tablet Comments:   Reason for Stopping:               Activity: PT/OT Eval and Treat. Fall precautions    Diet: Diabetic Diet. Low potassium. Wound Care: Clean wound to left buttock with saline or wound spray then pat dry. Apply Therahoney to Opticell Ag then to wound bed and cover with silicone dressing. Change every 2-3 days and prn soilage or dislodgement. Follow-up:   Follow-up Appointments   Procedures    FOLLOW UP VISIT Appointment in: Other (Specify) SNF doctor upon arrival.     SNF doctor upon arrival.     Standing Status:   Standing     Number of Occurrences:   1     Order Specific Question:   Appointment in     Answer:    Other (Specify)         Minutes spent on discharge: >30

## 2021-12-03 NOTE — PROGRESS NOTES
[facility]  (A Division of [address] [place] [address])    Follow-up Note      Date of Admission: 2021       Date of Note:  12/3/2021          Current Antimicrobials:    Prior Antimicrobials:  cefuroximine - present Clindamycin -       Assessment: Rec / Plan:   Left buttock abscess:  -Status post drainage on  in the ED.  -Completed 1 week of clindamycin  -Wound cultures positive for ESBL Klebsiella as well as MRSA  - Continue cefoxitin (stop date ) given slow wound healing. While this is not optimal in the setting of an ESBL producing organism using aminoglycosides would potentiate her renal failure. She has already gotten 7 days of clindamycin which should cover MRSA which was originally noted in the wound.  -Continue with wound care. Appreciate wound care consult.  -Discussed frequent turns and taking the pressure off of pressure points when possible while she is in her wheel chair. If discharged over the weekend, may f/u in my office in 2 weeks for wound check, otherwise will see again on Monday   EFRAIN     Hyperkalemia     Diabetes mellitus type 2 hemoglobin A1c is 9.7 -tight glucose control for optimal wound healing   Limited mobility, wheelchair-bound at [address], DO  [place] [facility]  [address], [address] [facility] [address]  Office: [phone]  Mobile/Text: [phone]     Microbiology:   abscess cx: MRSA, ESBL Klebsiella    Lines / Catheters:      Subjective:   Patient seen and examined at bedside. Nursing in room during my visit. Overall feeling well. No particular complaints toiday. No fevers, chils, or SOB.      Objective:        Visit Vitals  /62   Pulse 84   Temp 98.9 °F (37.2 °C)   Resp 19   Ht 5' 5\" (1.651 m)   Wt 65.8 kg (145 lb)   SpO2 97%   BMI 24.13 kg/m²     Temp (24hrs), Av.9 °F (37.2 °C), Min:98.9 °F (37.2 °C), Max:98.9 °F (37.2 °C)        General:   awake alert and oriented, non-toxic   Skin:    dry and warm, silver-dollar sized wound, some slough, clean base, no odor, scant drainage   HEENT:  No scleral icterus or pallor; oral mucosa moist, lips moist   Lymph Nodes:   not assessed today   Lungs:   non, labored; bilaterally clear to aspiration- no crackles wheezes rales or rhonchi   Heart:  RRR, s1 and s2; no murmurs rubs or gallops; no edema, + pedal pulses   Abdomen:  soft, non-distended, active bowel sounds, non-tender   Genitourinary:  deferred   Extremities:   average muscle tone; no contractures, no joint effusions   Neurologic:  No gross focal motor or sensory abnormalities; CN 2-12 intact; Follows commands. Psychiatric:   appropriate and interactive. Lab results:    Chemistry  Recent Labs     12/03/21  0601 12/02/21 0457 12/02/21 0255 12/01/21 2100 12/01/21 2100 12/01/21 2005 12/01/21 2005   * 159*  --   --  144*   < > 107*    143  --   --  144   < > 145   K 5.4 6.2* 5.9*   < > 7.2*   < > 7.1*   * 124*  --   --  125*   < > 126*   CO2 21 15*  --   --  16*   < > 14*   BUN 48* 63*  --   --  59*   < > 58*   CREA 1.69* 2.10*  --   --  2.06*   < > 2.04*   CA 9.5 8.8  9.8  --   --  9.7   < > 9.5   AGAP 1* 4  --   --  3   < > 5   BUCR 28* 30*  --   --  29*   < > 28*   AP  --   --   --   --   --   --  105   TP  --   --   --   --   --   --  7.6   ALB  --   --   --   --   --   --  3.1*   GLOB  --   --   --   --   --   --  4.5*   AGRAT  --   --   --   --   --   --  0.7*    < > = values in this interval not displayed.        CBC w/ Diff  Recent Labs     12/03/21  0601 12/02/21 0457 12/01/21  1849   WBC 6.0 6.5 6.7   RBC 2.89* 2.63* 3.24*   HGB 9.1* 8.0* 10.1*   HCT 29.5* 27.3* 33.2*    171 173   GRANS 50  --  53   LYMPH 39  --  36   EOS 2  --  2       Microbiology  All Micro Results     None           Selvin Ramirez MD   12/3/2021   9:48 AM

## 2021-12-03 NOTE — ED NOTES
TRANSFER - OUT REPORT:    Verbal report given to Whitney Carroll RN on James Khanna  being transferred to [address] [place] [address], Room 457 for routine progression of care       Report consisted of patients Situation, Background, Assessment and   Recommendations(SBAR). Information from the following report(s) SBAR was reviewed with the receiving nurse. Lines:       Opportunity for questions and clarification was provided.       Patient transported with:  Hospital transportation

## 2021-12-04 LAB
ANION GAP SERPL CALC-SCNC: 4 MMOL/L (ref 3–18)
BUN SERPL-MCNC: 43 MG/DL (ref 7–18)
BUN/CREAT SERPL: 26 (ref 12–20)
CALCIUM SERPL-MCNC: 9.7 MG/DL (ref 8.5–10.1)
CHLORIDE SERPL-SCNC: 121 MMOL/L (ref 100–111)
CO2 SERPL-SCNC: 18 MMOL/L (ref 21–32)
CREAT SERPL-MCNC: 1.64 MG/DL (ref 0.6–1.3)
GLUCOSE BLD STRIP.AUTO-MCNC: 135 MG/DL (ref 70–110)
GLUCOSE BLD STRIP.AUTO-MCNC: 136 MG/DL (ref 70–110)
GLUCOSE BLD STRIP.AUTO-MCNC: 163 MG/DL (ref 70–110)
GLUCOSE BLD STRIP.AUTO-MCNC: 82 MG/DL (ref 70–110)
GLUCOSE SERPL-MCNC: 119 MG/DL (ref 74–99)
POTASSIUM SERPL-SCNC: 5.9 MMOL/L (ref 3.5–5.5)
SODIUM SERPL-SCNC: 143 MMOL/L (ref 136–145)

## 2021-12-04 PROCEDURE — 82962 GLUCOSE BLOOD TEST: CPT

## 2021-12-04 PROCEDURE — 74011250637 HC RX REV CODE- 250/637: Performed by: INTERNAL MEDICINE

## 2021-12-04 PROCEDURE — 74011636637 HC RX REV CODE- 636/637: Performed by: HOSPITALIST

## 2021-12-04 PROCEDURE — 80048 BASIC METABOLIC PNL TOTAL CA: CPT

## 2021-12-04 PROCEDURE — 74011250637 HC RX REV CODE- 250/637: Performed by: HOSPITALIST

## 2021-12-04 PROCEDURE — 65660000000 HC RM CCU STEPDOWN

## 2021-12-04 PROCEDURE — 74011250636 HC RX REV CODE- 250/636: Performed by: PHYSICIAN ASSISTANT

## 2021-12-04 PROCEDURE — 36415 COLL VENOUS BLD VENIPUNCTURE: CPT

## 2021-12-04 PROCEDURE — 99232 SBSQ HOSP IP/OBS MODERATE 35: CPT | Performed by: HOSPITALIST

## 2021-12-04 PROCEDURE — 74011250637 HC RX REV CODE- 250/637: Performed by: PHYSICIAN ASSISTANT

## 2021-12-04 RX ORDER — SODIUM BICARBONATE 650 MG/1
325 TABLET ORAL 3 TIMES DAILY
Status: DISCONTINUED | OUTPATIENT
Start: 2021-12-04 | End: 2021-12-06 | Stop reason: HOSPADM

## 2021-12-04 RX ORDER — AMLODIPINE BESYLATE 5 MG/1
5 TABLET ORAL DAILY
Status: DISCONTINUED | OUTPATIENT
Start: 2021-12-05 | End: 2021-12-05

## 2021-12-04 RX ORDER — AMLODIPINE BESYLATE 5 MG/1
2.5 TABLET ORAL
Status: COMPLETED | OUTPATIENT
Start: 2021-12-04 | End: 2021-12-04

## 2021-12-04 RX ADMIN — INSULIN GLARGINE 8 UNITS: 100 INJECTION, SOLUTION SUBCUTANEOUS at 17:01

## 2021-12-04 RX ADMIN — HEPARIN SODIUM 5000 UNITS: 5000 INJECTION INTRAVENOUS; SUBCUTANEOUS at 08:44

## 2021-12-04 RX ADMIN — SODIUM ZIRCONIUM CYCLOSILICATE 10 G: 10 POWDER, FOR SUSPENSION ORAL at 17:03

## 2021-12-04 RX ADMIN — INSULIN LISPRO 3 UNITS: 100 INJECTION, SOLUTION INTRAVENOUS; SUBCUTANEOUS at 17:02

## 2021-12-04 RX ADMIN — SODIUM BICARBONATE 650 MG TABLET 325 MG: at 11:41

## 2021-12-04 RX ADMIN — CEFUROXIME AXETIL 250 MG: 250 TABLET, FILM COATED ORAL at 08:43

## 2021-12-04 RX ADMIN — HEPARIN SODIUM 5000 UNITS: 5000 INJECTION INTRAVENOUS; SUBCUTANEOUS at 00:33

## 2021-12-04 RX ADMIN — SODIUM BICARBONATE 650 MG TABLET 325 MG: at 17:02

## 2021-12-04 RX ADMIN — SODIUM ZIRCONIUM CYCLOSILICATE 5 G: 5 POWDER, FOR SUSPENSION ORAL at 08:43

## 2021-12-04 RX ADMIN — AMLODIPINE BESYLATE 2.5 MG: 5 TABLET ORAL at 17:02

## 2021-12-04 RX ADMIN — HEPARIN SODIUM 5000 UNITS: 5000 INJECTION INTRAVENOUS; SUBCUTANEOUS at 17:03

## 2021-12-04 RX ADMIN — ATORVASTATIN CALCIUM 20 MG: 20 TABLET, FILM COATED ORAL at 08:43

## 2021-12-04 RX ADMIN — CEFUROXIME AXETIL 250 MG: 250 TABLET, FILM COATED ORAL at 20:58

## 2021-12-04 RX ADMIN — ASPIRIN 81 MG CHEWABLE TABLET 81 MG: 81 TABLET CHEWABLE at 08:43

## 2021-12-04 RX ADMIN — OLANZAPINE 2.5 MG: 2.5 TABLET, FILM COATED ORAL at 21:03

## 2021-12-04 RX ADMIN — SODIUM BICARBONATE 650 MG TABLET 325 MG: at 21:03

## 2021-12-04 NOTE — PROGRESS NOTES
Dr. Dre Fontanez, said patient should be ready for discharge on Monday. CM called [address] [facility] [phone], received voicemail for admissions, CM left a message that patient should be ready for discharge on Monday, and to please call CM back to make sure they can accept patient back to LTC. CM left a phone number for a return call.            Ritu Mendoza RN  Case Management [phone]

## 2021-12-04 NOTE — PROGRESS NOTES
Hospitalist Progress Note    Patient: Marry Kam MRN: [id number]  CSN: [id number]    YOB: 1940  Age: 79 y.o. Sex: female    DOA: 12/1/2021 LOS:  LOS: 2 days            K 5.9. CO2 18. Creatinine 1.64. Patient seen and examined at bedside, she reports she had loose stool. Denies pain anywhere, cough, shortness of breath. She accepted 5 mg of Lokelma this morning, declined further dosing. Assessment/Plan       1. Hyperkalemia. Continue Lokelma. ARB held. Started on bicarb per nephrology. 2.  EFRAIN. ARB, HCTZ held. Nephrology follows. .  3.  Hyperchloremic metabolic acidosis, suspicion for RTA. 4.  Abscess POA. wound care consult. Cx + MRSA and ESBL ID follows. 5.  DM2 with hyperglycemia (A1c 9.7 12/2/21). Oral hypoglycemics held. Diet clarified, Lantus, SSI. 6.  Hypertension, suboptimal control. ARB, HCTZ held. Hydralazine as needed. Start low-dose Norvasc. 7.  Dyslipidemia on statin   8. DVT prophylaxis   9. Full code. PT OT. I discussed the case with Dr. Swathi Reyna. Additional Notes:      Case discussed with:  [x]Patient  []Family  [x]Nursing  [x]Case Management  DVT Prophylaxis:  []Lovenox  [x]Hep SQ  []SCDs  []Coumadin   []On Heparin gtt    Vital signs/Intake and Output:  Visit Vitals  BP (!) 159/84 (BP 1 Location: Left upper arm)   Pulse 70   Temp 98.3 °F (36.8 °C)   Resp 18   Ht 5' 5\" (1.651 m)   Wt 65.8 kg (145 lb)   SpO2 97%   BMI 24.13 kg/m²     Current Shift:  No intake/output data recorded. Last three shifts:  12/02 1901 - 12/04 0700  In: 520 [P.O.:120; I.V.:400]  Out: -     General: NAD, appears stated age, alert, talkative. Eye contact improved. Eyes: PERRL, sclera is non-icteric. Oropharynx clear. HENT: normocephalic/atraumatic, moist mucus membranes  Respiratory: CTA with no signs of respiratory distress  Cardiovascular: RRR, no m/r/g  GI: soft, non-tender, normal bowel sounds.   Nondistended  Extremities: no cyanosis, right AKA   Neuro: moves all extremities, normal speech. Other than hearing, no focal deficit. Skin: no rash to visible skin. Medications Reviewed      Labs: Results:       Chemistry Recent Labs     12/04/21  0057 12/03/21  0601 12/02/21  0457 12/01/21  2100 12/01/21 2005   * 149* 159*   < > 107*    140 143   < > 145   K 5.9* 5.4 6.2*   < > 7.1*   * 118* 124*   < > 126*   CO2 18* 21 15*   < > 14*   BUN 43* 48* 63*   < > 58*   CREA 1.64* 1.69* 2.10*   < > 2.04*   CA 9.7 9.5 8.8  9.8   < > 9.5   AGAP 4 1* 4   < > 5   BUCR 26* 28* 30*   < > 28*   AP  --   --   --   --  105   TP  --   --   --   --  7.6   ALB  --   --   --   --  3.1*   GLOB  --   --   --   --  4.5*   AGRAT  --   --   --   --  0.7*    < > = values in this interval not displayed. CBC w/Diff Recent Labs     12/03/21  0601 12/02/21  0457 12/01/21  1849   WBC 6.0 6.5 6.7   RBC 2.89* 2.63* 3.24*   HGB 9.1* 8.0* 10.1*   HCT 29.5* 27.3* 33.2*    171 173   GRANS 50  --  53   LYMPH 39  --  36   EOS 2  --  2      Cardiac Enzymes Recent Labs     12/02/21  0255   CPK 72   CKND1 2.6      Coagulation No results for input(s): PTP, INR, APTT, INREXT, INREXT in the last 72 hours. Lipid Panel Lab Results   Component Value Date/Time    Cholesterol, total 173 01/04/2020 02:35 AM    HDL Cholesterol 33 (L) 01/04/2020 02:35 AM    LDL, calculated 109 (H) 01/04/2020 02:35 AM    VLDL, calculated 31 01/04/2020 02:35 AM    Triglyceride 155 (H) 01/04/2020 02:35 AM    CHOL/HDL Ratio 5.2 (H) 01/04/2020 02:35 AM      BNP No results for input(s): BNPP in the last 72 hours. Liver Enzymes Recent Labs     12/01/21 2005   TP 7.6   ALB 3.1*         Thyroid Studies Lab Results   Component Value Date/Time    TSH 3.70 04/23/2012 12:02 PM        Procedures/imaging: see electronic medical records for all procedures/Xrays and details which were not copied into this note but were reviewed prior to creation of Plan.

## 2021-12-04 NOTE — PROGRESS NOTES
Patient refused Lokelma 10g packet. States \"That's that milky stuff, isn't it? I don't want that, it makes me go all over the bed. No, nu-uh, I don't want that. Just give me some water and I'll be okay. \" Attempted to explain that it was for lowering her potassium and patient continued to refused. Will continue to monitor.

## 2021-12-05 LAB
ANION GAP SERPL CALC-SCNC: 2 MMOL/L (ref 3–18)
BUN SERPL-MCNC: 36 MG/DL (ref 7–18)
BUN/CREAT SERPL: 24 (ref 12–20)
CALCIUM SERPL-MCNC: 9.3 MG/DL (ref 8.5–10.1)
CHLORIDE SERPL-SCNC: 122 MMOL/L (ref 100–111)
CO2 SERPL-SCNC: 22 MMOL/L (ref 21–32)
CREAT SERPL-MCNC: 1.5 MG/DL (ref 0.6–1.3)
GLUCOSE BLD STRIP.AUTO-MCNC: 118 MG/DL (ref 70–110)
GLUCOSE BLD STRIP.AUTO-MCNC: 142 MG/DL (ref 70–110)
GLUCOSE BLD STRIP.AUTO-MCNC: 151 MG/DL (ref 70–110)
GLUCOSE BLD STRIP.AUTO-MCNC: 79 MG/DL (ref 70–110)
GLUCOSE SERPL-MCNC: 78 MG/DL (ref 74–99)
POTASSIUM SERPL-SCNC: 5.4 MMOL/L (ref 3.5–5.5)
SODIUM SERPL-SCNC: 146 MMOL/L (ref 136–145)

## 2021-12-05 PROCEDURE — 74011250636 HC RX REV CODE- 250/636: Performed by: PHYSICIAN ASSISTANT

## 2021-12-05 PROCEDURE — 65660000000 HC RM CCU STEPDOWN

## 2021-12-05 PROCEDURE — 82962 GLUCOSE BLOOD TEST: CPT

## 2021-12-05 PROCEDURE — 74011636637 HC RX REV CODE- 636/637: Performed by: HOSPITALIST

## 2021-12-05 PROCEDURE — 74011250637 HC RX REV CODE- 250/637: Performed by: INTERNAL MEDICINE

## 2021-12-05 PROCEDURE — 80048 BASIC METABOLIC PNL TOTAL CA: CPT

## 2021-12-05 PROCEDURE — 36415 COLL VENOUS BLD VENIPUNCTURE: CPT

## 2021-12-05 PROCEDURE — 74011250637 HC RX REV CODE- 250/637: Performed by: HOSPITALIST

## 2021-12-05 PROCEDURE — 99232 SBSQ HOSP IP/OBS MODERATE 35: CPT | Performed by: HOSPITALIST

## 2021-12-05 PROCEDURE — 74011250637 HC RX REV CODE- 250/637: Performed by: PHYSICIAN ASSISTANT

## 2021-12-05 RX ORDER — AMLODIPINE BESYLATE 10 MG/1
10 TABLET ORAL DAILY
Status: DISCONTINUED | OUTPATIENT
Start: 2021-12-06 | End: 2021-12-06 | Stop reason: HOSPADM

## 2021-12-05 RX ORDER — HYDRALAZINE HYDROCHLORIDE 25 MG/1
25 TABLET, FILM COATED ORAL 3 TIMES DAILY
Status: DISCONTINUED | OUTPATIENT
Start: 2021-12-05 | End: 2021-12-06 | Stop reason: HOSPADM

## 2021-12-05 RX ADMIN — SODIUM BICARBONATE 650 MG TABLET 325 MG: at 09:21

## 2021-12-05 RX ADMIN — ATORVASTATIN CALCIUM 20 MG: 20 TABLET, FILM COATED ORAL at 09:20

## 2021-12-05 RX ADMIN — HEPARIN SODIUM 5000 UNITS: 5000 INJECTION INTRAVENOUS; SUBCUTANEOUS at 09:20

## 2021-12-05 RX ADMIN — INSULIN GLARGINE 8 UNITS: 100 INJECTION, SOLUTION SUBCUTANEOUS at 16:58

## 2021-12-05 RX ADMIN — HEPARIN SODIUM 5000 UNITS: 5000 INJECTION INTRAVENOUS; SUBCUTANEOUS at 01:06

## 2021-12-05 RX ADMIN — HYDRALAZINE HYDROCHLORIDE 25 MG: 25 TABLET, FILM COATED ORAL at 18:50

## 2021-12-05 RX ADMIN — HYDRALAZINE HYDROCHLORIDE 25 MG: 25 TABLET, FILM COATED ORAL at 21:15

## 2021-12-05 RX ADMIN — CEFUROXIME AXETIL 250 MG: 250 TABLET, FILM COATED ORAL at 21:15

## 2021-12-05 RX ADMIN — SODIUM ZIRCONIUM CYCLOSILICATE 10 G: 10 POWDER, FOR SUSPENSION ORAL at 18:50

## 2021-12-05 RX ADMIN — HEPARIN SODIUM 5000 UNITS: 5000 INJECTION INTRAVENOUS; SUBCUTANEOUS at 16:58

## 2021-12-05 RX ADMIN — SODIUM BICARBONATE 650 MG TABLET 325 MG: at 16:58

## 2021-12-05 RX ADMIN — CEFUROXIME AXETIL 250 MG: 250 TABLET, FILM COATED ORAL at 09:21

## 2021-12-05 RX ADMIN — Medication 10 ML: at 16:21

## 2021-12-05 RX ADMIN — SODIUM BICARBONATE 650 MG TABLET 325 MG: at 21:15

## 2021-12-05 RX ADMIN — SODIUM ZIRCONIUM CYCLOSILICATE 10 G: 10 POWDER, FOR SUSPENSION ORAL at 09:20

## 2021-12-05 RX ADMIN — ASPIRIN 81 MG CHEWABLE TABLET 81 MG: 81 TABLET CHEWABLE at 09:21

## 2021-12-05 RX ADMIN — AMLODIPINE BESYLATE 5 MG: 5 TABLET ORAL at 09:21

## 2021-12-05 RX ADMIN — OLANZAPINE 2.5 MG: 2.5 TABLET, FILM COATED ORAL at 21:15

## 2021-12-05 RX ADMIN — INSULIN LISPRO 3 UNITS: 100 INJECTION, SOLUTION INTRAVENOUS; SUBCUTANEOUS at 21:15

## 2021-12-05 NOTE — PROGRESS NOTES
Problem: Risk for Spread of Infection  Goal: Prevent transmission of infectious organism to others  Description: Prevent the transmission of infectious organisms to other patients, staff members, and visitors. Outcome: Progressing Towards Goal     Problem: Patient Education:  Go to Education Activity  Goal: Patient/Family Education  Outcome: Progressing Towards Goal     Problem: Falls - Risk of  Goal: *Absence of Falls  Description: Document Benedicto Salas Fall Risk and appropriate interventions in the flowsheet. Outcome: Progressing Towards Goal  Note: Fall Risk Interventions:  Mobility Interventions: Bed/chair exit alarm, Patient to call before getting OOB, Utilize walker, cane, or other assistive device    Mentation Interventions: Adequate sleep, hydration, pain control, Bed/chair exit alarm, Door open when patient unattended, More frequent rounding, Reorient patient, Update white board    Medication Interventions: Bed/chair exit alarm, Patient to call before getting OOB, Teach patient to arise slowly                   Problem: Patient Education: Go to Patient Education Activity  Goal: Patient/Family Education  Outcome: Progressing Towards Goal     Problem: Diabetes Self-Management  Goal: *Disease process and treatment process  Description: Define diabetes and identify own type of diabetes; list 3 options for treating diabetes. Outcome: Progressing Towards Goal  Goal: *Incorporating nutritional management into lifestyle  Description: Describe effect of type, amount and timing of food on blood glucose; list 3 methods for planning meals. Outcome: Progressing Towards Goal  Goal: *Incorporating physical activity into lifestyle  Description: State effect of exercise on blood glucose levels. Outcome: Progressing Towards Goal  Goal: *Developing strategies to promote health/change behavior  Description: Define the ABC's of diabetes; identify appropriate screenings, schedule and personal plan for screenings.   Outcome: Progressing Towards Goal  Goal: *Using medications safely  Description: State effect of diabetes medications on diabetes; name diabetes medication taking, action and side effects. Outcome: Progressing Towards Goal  Goal: *Monitoring blood glucose, interpreting and using results  Description: Identify recommended blood glucose targets  and personal targets. Outcome: Progressing Towards Goal  Goal: *Prevention, detection, treatment of acute complications  Description: List symptoms of hyper- and hypoglycemia; describe how to treat low blood sugar and actions for lowering  high blood glucose level. Outcome: Progressing Towards Goal  Goal: *Prevention, detection and treatment of chronic complications  Description: Define the natural course of diabetes and describe the relationship of blood glucose levels to long term complications of diabetes. Outcome: Progressing Towards Goal  Goal: *Developing strategies to address psychosocial issues  Description: Describe feelings about living with diabetes; identify support needed and support network  Outcome: Progressing Towards Goal  Goal: *Insulin pump training  Outcome: Progressing Towards Goal  Goal: *Sick day guidelines  Outcome: Progressing Towards Goal  Goal: *Patient Specific Goal (EDIT GOAL, INSERT TEXT)  Outcome: Progressing Towards Goal     Problem: Patient Education: Go to Patient Education Activity  Goal: Patient/Family Education  Outcome: Progressing Towards Goal     Problem: Pressure Injury - Risk of  Goal: *Prevention of pressure injury  Description: Document Marc Scale and appropriate interventions in the flowsheet.   Outcome: Progressing Towards Goal     Problem: Patient Education: Go to Patient Education Activity  Goal: Patient/Family Education  Outcome: Progressing Towards Goal

## 2021-12-05 NOTE — PROGRESS NOTES
Progress  Note      79y F with PMH DM, HTN, CKD, admitted for sepsis, hyperkalemia following for renal failure    Subjective      Overnight event noted    IMPRESSION:   Acute on chronic kidney injury, pre renal ATN, baseline creatinine around 1.6-1.7mg/dl, risk factors, diuretics ,on ARB  CKD 3, baseline creatinine 1.6-1.7mg/dl   Hyperklaemia, poor dietary restriction, was on losartan   Metabolic acidosis, higher suspicion for RTA  Sepsis,   DM  HTN   PLAN:    acidosis improved,continue po bicarb  Marked improvement in her renal function and electrolytes. I would recommend  avoid ARB or ACE inhibitor in future. Continue lokelma for hyperkalemia,will follow in the office  Will follow case peripherally available, if any question or concern. Adjust medication per current renal function status.     Discussed with Dr. Narinder Florentino    Facility-Administered Medications: None       Current Facility-Administered Medications   Medication Dose Route Frequency    sodium zirconium cyclosilicate (LOKELMA) powder packet 10 g  10 g Oral BID    sodium bicarbonate tablet 325 mg  325 mg Oral TID    amLODIPine (NORVASC) tablet 5 mg  5 mg Oral DAILY    insulin glargine (LANTUS) injection 8 Units  8 Units SubCUTAneous ACD    hydrALAZINE (APRESOLINE) tablet 25 mg  25 mg Oral Q6H PRN    cefUROXime (CEFTIN) tablet 250 mg  250 mg Oral Q12H    sodium chloride (NS) flush 5-40 mL  5-40 mL IntraVENous Q8H    sodium chloride (NS) flush 5-40 mL  5-40 mL IntraVENous PRN    aspirin chewable tablet 81 mg  81 mg Oral DAILY    atorvastatin (LIPITOR) tablet 20 mg  20 mg Oral DAILY    OLANZapine (ZyPREXA) tablet 2.5 mg  2.5 mg Oral QHS    sodium chloride (NS) flush 5-40 mL  5-40 mL IntraVENous Q8H    sodium chloride (NS) flush 5-40 mL  5-40 mL IntraVENous PRN    acetaminophen (TYLENOL) tablet 650 mg  650 mg Oral Q4H PRN    insulin lispro (HUMALOG) injection   SubCUTAneous AC&HS    glucose chewable tablet 16 g  4 Tablet Oral PRN    glucagon (GLUCAGEN) injection 1 mg  1 mg IntraMUSCular PRN    dextrose (D50W) injection syrg 12.5-25 g  25-50 mL IntraVENous PRN    heparin (porcine) injection 5,000 Units  5,000 Units SubCUTAneous Q8H       Review of Systems:     As above   Data Review:    Labs: Results:       Chemistry Recent Labs     12/05/21  0321 12/04/21  0057 12/03/21  0601   GLU 78 119* 149*   * 143 140   K 5.4 5.9* 5.4   * 121* 118*   CO2 22 18* 21   BUN 36* 43* 48*   CREA 1.50* 1.64* 1.69*   CA 9.3 9.7 9.5   AGAP 2* 4 1*   BUCR 24* 26* 28*      CBC w/Diff Recent Labs     12/03/21  0601   WBC 6.0   RBC 2.89*   HGB 9.1*   HCT 29.5*      GRANS 50   LYMPH 39   EOS 2      Coagulation No results for input(s): PTP, INR, APTT, INREXT, INREXT in the last 72 hours. Iron/Ferritin No results for input(s): IRON in the last 72 hours. No lab exists for component: TIBCCALC   BNP No results for input(s): BNPP in the last 72 hours. Cardiac Enzymes No results for input(s): CPK, CKND1, WILDA in the last 72 hours. No lab exists for component: CKRMB, TROIP   Liver Enzymes No results for input(s): TP, ALB, TBIL, AP in the last 72 hours.     No lab exists for component: SGOT, GPT, DBIL   Thyroid Studies Lab Results   Component Value Date/Time    TSH 3.70 04/23/2012 12:02 PM         EKG: normal EKG, qtc 411    Physical Assessment:     Visit Vitals  BP (!) 152/61   Pulse 83   Temp 97.6 °F (36.4 °C)   Resp 16   Ht 5' 5\" (1.651 m)   Wt 65.8 kg (145 lb)   SpO2 97%   BMI 24.13 kg/m²     Weight change:     Intake/Output Summary (Last 24 hours) at 12/5/2021 1329  Last data filed at 12/5/2021 1210  Gross per 24 hour   Intake 480 ml   Output    Net 480 ml     Physical Exam:   General: comfortable, no acute distress   HEENT sclera anicteric, supple neck, no thyromegaly  CVS: S1S2 heard,  no rub  RS: + air entry b/l,   Abd: Soft, Non tender,  Neuro: non focal, awake, alert , CN II-XII are grossly intact  Extrm: R AKA,   Skin: no visible Rash  Musculoskeletal: No gross joints or bone deformities     Procedures/imaging: see electronic medical records for all procedures, Xrays and details which were not copied into this note but were reviewed prior to creation of Plan        Santi Schuster MD  December 5, 2021  [facility]  Office [phone]

## 2021-12-05 NOTE — PROGRESS NOTES
Hospitalist Progress Note    Patient: Rajeev Rodriguez MRN: [id number]  CSN: [id number]    YOB: 1940  Age: 79 y.o. Sex: female    DOA: 12/1/2021 LOS:  LOS: 3 days            No acute overnight events. Potassium 5.4. Sodium 146. Creatinine 1.5. Patient seen and examined at bedside, she reports her appetite has been okay. She denies constipation. Denies pain anywhere, shortness of breath, cough. Assessment/Plan       1. Hyperkalemia. Continue Lokelma. ARB held. Started on bicarb per nephrology. 2.  EFRAIN. ARB, HCTZ held. Nephrology follows. .  3.  Hyperchloremic metabolic acidosis, suspicion for RTA. 4.  Abscess POA. wound care consult. Cx + MRSA and ESBL ID follows. 5.  DM2 with hyperglycemia (A1c 9.7 12/2/21). Oral hypoglycemics held. Diet clarified, Lantus, SSI. 6.  Hypertension, suboptimal control. ARB, HCTZ held. Hydralazine as needed. Increase Norvasc, hydralazine 3 times daily  7. Dyslipidemia on statin   8. DVT prophylaxis   9. Full code. I discussed the case with Dr. Dominga Shi. Anticipate return to SNF in the morning. Additional Notes:      Case discussed with:  [x]Patient  []Family  [x]Nursing  [x]Case Management  DVT Prophylaxis:  []Lovenox  [x]Hep SQ  []SCDs  []Coumadin   []On Heparin gtt    Vital signs/Intake and Output:  Visit Vitals  BP (!) 183/70 (BP 1 Location: Right upper arm, BP Patient Position: At rest)   Pulse 93   Temp 98.2 °F (36.8 °C)   Resp 16   Ht 5' 5\" (1.651 m)   Wt 65.8 kg (145 lb)   SpO2 99%   BMI 24.13 kg/m²     Current Shift:  No intake/output data recorded. Last three shifts:  12/03 1901 - 12/05 0700  In: 5 [P.O.:840]  Out: -     General: NAD, appears stated age, alert, talkative. Good eye contact, more appropriate. .  Eyes: PERRL, sclera is non-icteric. Oropharynx clear.    HENT: normocephalic/atraumatic, moist mucus membranes  Respiratory: CTA with no signs of respiratory distress  Cardiovascular: RRR, no m/r/g  GI: soft, non-tender, normal bowel sounds. Nondistended  Extremities: no cyanosis, right AKA   Neuro: moves all extremities, normal speech. Other than hearing, no focal deficit. Skin: no rash to visible skin. Medications Reviewed      Labs: Results:       Chemistry Recent Labs     12/05/21  0321 12/04/21  0057 12/03/21  0601   GLU 78 119* 149*   * 143 140   K 5.4 5.9* 5.4   * 121* 118*   CO2 22 18* 21   BUN 36* 43* 48*   CREA 1.50* 1.64* 1.69*   CA 9.3 9.7 9.5   AGAP 2* 4 1*   BUCR 24* 26* 28*      CBC w/Diff Recent Labs     12/03/21  0601   WBC 6.0   RBC 2.89*   HGB 9.1*   HCT 29.5*      GRANS 50   LYMPH 39   EOS 2      Cardiac Enzymes No results for input(s): CPK, CKND1, WILDA in the last 72 hours. No lab exists for component: CKRMB, TROIP   Coagulation No results for input(s): PTP, INR, APTT, INREXT, INREXT in the last 72 hours. Lipid Panel Lab Results   Component Value Date/Time    Cholesterol, total 173 01/04/2020 02:35 AM    HDL Cholesterol 33 (L) 01/04/2020 02:35 AM    LDL, calculated 109 (H) 01/04/2020 02:35 AM    VLDL, calculated 31 01/04/2020 02:35 AM    Triglyceride 155 (H) 01/04/2020 02:35 AM    CHOL/HDL Ratio 5.2 (H) 01/04/2020 02:35 AM      BNP No results for input(s): BNPP in the last 72 hours. Liver Enzymes No results for input(s): TP, ALB, TBIL, AP in the last 72 hours. No lab exists for component: SGOT, GPT, DBIL   Thyroid Studies Lab Results   Component Value Date/Time    TSH 3.70 04/23/2012 12:02 PM        Procedures/imaging: see electronic medical records for all procedures/Xrays and details which were not copied into this note but were reviewed prior to creation of Plan.

## 2021-12-06 VITALS
HEIGHT: 65 IN | DIASTOLIC BLOOD PRESSURE: 67 MMHG | SYSTOLIC BLOOD PRESSURE: 136 MMHG | WEIGHT: 145 LBS | HEART RATE: 78 BPM | TEMPERATURE: 98.3 F | RESPIRATION RATE: 16 BRPM | OXYGEN SATURATION: 100 % | BODY MASS INDEX: 24.16 KG/M2

## 2021-12-06 LAB
ANION GAP SERPL CALC-SCNC: 4 MMOL/L (ref 3–18)
BUN SERPL-MCNC: 35 MG/DL (ref 7–18)
BUN/CREAT SERPL: 23 (ref 12–20)
CALCIUM SERPL-MCNC: 8.8 MG/DL (ref 8.5–10.1)
CHLORIDE SERPL-SCNC: 119 MMOL/L (ref 100–111)
CO2 SERPL-SCNC: 21 MMOL/L (ref 21–32)
CREAT SERPL-MCNC: 1.51 MG/DL (ref 0.6–1.3)
GLUCOSE BLD STRIP.AUTO-MCNC: 140 MG/DL (ref 70–110)
GLUCOSE BLD STRIP.AUTO-MCNC: 144 MG/DL (ref 70–110)
GLUCOSE SERPL-MCNC: 62 MG/DL (ref 74–99)
POTASSIUM SERPL-SCNC: 5 MMOL/L (ref 3.5–5.5)
SODIUM SERPL-SCNC: 144 MMOL/L (ref 136–145)

## 2021-12-06 PROCEDURE — 74011250637 HC RX REV CODE- 250/637: Performed by: HOSPITALIST

## 2021-12-06 PROCEDURE — 74011250637 HC RX REV CODE- 250/637: Performed by: INTERNAL MEDICINE

## 2021-12-06 PROCEDURE — 74011250637 HC RX REV CODE- 250/637: Performed by: PHYSICIAN ASSISTANT

## 2021-12-06 PROCEDURE — 80048 BASIC METABOLIC PNL TOTAL CA: CPT

## 2021-12-06 PROCEDURE — 74011250636 HC RX REV CODE- 250/636: Performed by: PHYSICIAN ASSISTANT

## 2021-12-06 PROCEDURE — 99239 HOSP IP/OBS DSCHRG MGMT >30: CPT | Performed by: HOSPITALIST

## 2021-12-06 PROCEDURE — 74011636637 HC RX REV CODE- 636/637: Performed by: HOSPITALIST

## 2021-12-06 PROCEDURE — 82962 GLUCOSE BLOOD TEST: CPT

## 2021-12-06 PROCEDURE — 36415 COLL VENOUS BLD VENIPUNCTURE: CPT

## 2021-12-06 RX ORDER — SODIUM BICARBONATE 325 MG/1
325 TABLET ORAL 3 TIMES DAILY
Qty: 90 TABLET | Refills: 0 | Status: SHIPPED
Start: 2021-12-06

## 2021-12-06 RX ORDER — HYDRALAZINE HYDROCHLORIDE 25 MG/1
25 TABLET, FILM COATED ORAL 3 TIMES DAILY
Qty: 90 TABLET | Refills: 0 | Status: SHIPPED
Start: 2021-12-06

## 2021-12-06 RX ORDER — CEFUROXIME AXETIL 250 MG/1
250 TABLET ORAL EVERY 12 HOURS
Qty: 6 TABLET | Refills: 0 | Status: SHIPPED
Start: 2021-12-06 | End: 2021-12-09

## 2021-12-06 RX ORDER — AMLODIPINE BESYLATE 10 MG/1
10 TABLET ORAL DAILY
Qty: 30 TABLET | Refills: 0 | Status: SHIPPED
Start: 2021-12-06

## 2021-12-06 RX ADMIN — INSULIN GLARGINE 8 UNITS: 100 INJECTION, SOLUTION SUBCUTANEOUS at 15:54

## 2021-12-06 RX ADMIN — SODIUM BICARBONATE 650 MG TABLET 325 MG: at 09:43

## 2021-12-06 RX ADMIN — HEPARIN SODIUM 5000 UNITS: 5000 INJECTION INTRAVENOUS; SUBCUTANEOUS at 00:05

## 2021-12-06 RX ADMIN — ASPIRIN 81 MG CHEWABLE TABLET 81 MG: 81 TABLET CHEWABLE at 09:42

## 2021-12-06 RX ADMIN — HYDRALAZINE HYDROCHLORIDE 25 MG: 25 TABLET, FILM COATED ORAL at 15:53

## 2021-12-06 RX ADMIN — AMLODIPINE BESYLATE 10 MG: 10 TABLET ORAL at 09:42

## 2021-12-06 RX ADMIN — SODIUM BICARBONATE 650 MG TABLET 325 MG: at 15:54

## 2021-12-06 RX ADMIN — HEPARIN SODIUM 5000 UNITS: 5000 INJECTION INTRAVENOUS; SUBCUTANEOUS at 09:43

## 2021-12-06 RX ADMIN — ATORVASTATIN CALCIUM 20 MG: 20 TABLET, FILM COATED ORAL at 09:42

## 2021-12-06 RX ADMIN — HEPARIN SODIUM 5000 UNITS: 5000 INJECTION INTRAVENOUS; SUBCUTANEOUS at 15:54

## 2021-12-06 RX ADMIN — HYDRALAZINE HYDROCHLORIDE 25 MG: 25 TABLET, FILM COATED ORAL at 09:42

## 2021-12-06 RX ADMIN — CEFUROXIME AXETIL 250 MG: 250 TABLET, FILM COATED ORAL at 09:42

## 2021-12-06 NOTE — ROUTINE PROCESS
0740. Dixon Confer Dixon Confer Bedside and Verbal shift change report given to Shoshana Romero (oncoming nurse) by Nu Nguyen RN (offgoing nurse). Report included the following information SBAR, Kardex and Intake/Output.

## 2021-12-06 NOTE — PROGRESS NOTES
Progress  Note      79y F with PMH DM, HTN, CKD, admitted for sepsis, hyperkalemia following for renal failure    Subjective      Overnight event noted    IMPRESSION:   Acute on chronic kidney injury, pre renal ATN, baseline creatinine around 1.6-1.7mg/dl, risk factors, diuretics ,on ARB  CKD 3, baseline creatinine 1.6-1.7mg/dl   Hyperklaemia, poor dietary restriction, was on losartan   Metabolic acidosis, higher suspicion for RTA  Sepsis,   DM  HTN   PLAN:    acidosis improved,continue po bicarb  Marked improvement in her renal function and electrolytes. I would recommend  avoid ARB or ACE inhibitor in future. decrease lokelma for hyperkalemia,will follow in the office  Will follow case peripherally available, if any question or concern. Adjust medication per current renal function status.     Discussed with Dr. Piotr Aaron    Facility-Administered Medications: None       Current Facility-Administered Medications   Medication Dose Route Frequency    [START ON 12/7/2021] sodium zirconium cyclosilicate (LOKELMA) powder packet 10 g  10 g Oral DAILY    amLODIPine (NORVASC) tablet 10 mg  10 mg Oral DAILY    hydrALAZINE (APRESOLINE) tablet 25 mg  25 mg Oral TID    sodium bicarbonate tablet 325 mg  325 mg Oral TID    insulin glargine (LANTUS) injection 8 Units  8 Units SubCUTAneous ACD    hydrALAZINE (APRESOLINE) tablet 25 mg  25 mg Oral Q6H PRN    cefUROXime (CEFTIN) tablet 250 mg  250 mg Oral Q12H    sodium chloride (NS) flush 5-40 mL  5-40 mL IntraVENous PRN    aspirin chewable tablet 81 mg  81 mg Oral DAILY    atorvastatin (LIPITOR) tablet 20 mg  20 mg Oral DAILY    OLANZapine (ZyPREXA) tablet 2.5 mg  2.5 mg Oral QHS    sodium chloride (NS) flush 5-40 mL  5-40 mL IntraVENous PRN    acetaminophen (TYLENOL) tablet 650 mg  650 mg Oral Q4H PRN    insulin lispro (HUMALOG) injection   SubCUTAneous AC&HS    glucose chewable tablet 16 g  4 Tablet Oral PRN    glucagon (GLUCAGEN) injection 1 mg  1 mg IntraMUSCular PRN    dextrose (D50W) injection syrg 12.5-25 g  25-50 mL IntraVENous PRN    heparin (porcine) injection 5,000 Units  5,000 Units SubCUTAneous Q8H       Review of Systems:     As above   Data Review:    Labs: Results:       Chemistry Recent Labs     12/06/21  0238 12/05/21  0321 12/04/21  0057   GLU 62* 78 119*    146* 143   K 5.0 5.4 5.9*   * 122* 121*   CO2 21 22 18*   BUN 35* 36* 43*   CREA 1.51* 1.50* 1.64*   CA 8.8 9.3 9.7   AGAP 4 2* 4   BUCR 23* 24* 26*      CBC w/Diff No results for input(s): WBC, RBC, HGB, HCT, PLT, GRANS, LYMPH, EOS, HGBEXT, HCTEXT, PLTEXT, HGBEXT, HCTEXT, PLTEXT in the last 72 hours. Coagulation No results for input(s): PTP, INR, APTT, INREXT, INREXT in the last 72 hours. Iron/Ferritin No results for input(s): IRON in the last 72 hours. No lab exists for component: TIBCCALC   BNP No results for input(s): BNPP in the last 72 hours. Cardiac Enzymes No results for input(s): CPK, CKND1, WILDA in the last 72 hours. No lab exists for component: CKRMB, TROIP   Liver Enzymes No results for input(s): TP, ALB, TBIL, AP in the last 72 hours.     No lab exists for component: SGOT, GPT, DBIL   Thyroid Studies Lab Results   Component Value Date/Time    TSH 3.70 04/23/2012 12:02 PM         EKG: normal EKG, qtc 411    Physical Assessment:     Visit Vitals  /67   Pulse 78   Temp 98.3 °F (36.8 °C)   Resp 16   Ht 5' 5\" (1.651 m)   Wt 65.8 kg (145 lb)   SpO2 100%   BMI 24.13 kg/m²     Weight change:     Intake/Output Summary (Last 24 hours) at 12/6/2021 1023  Last data filed at 12/5/2021 1823  Gross per 24 hour   Intake 840 ml   Output    Net 840 ml     Physical Exam:   General: comfortable, no acute distress   HEENT sclera anicteric, supple neck, no thyromegaly  CVS: S1S2 heard,  no rub  RS: + air entry b/l,   Abd: Soft, Non tender,  Neuro: non focal, awake, alert , CN II-XII are grossly intact  Extrm: R AKA,   Skin: no visible  Rash  Musculoskeletal: No gross joints or bone deformities     Procedures/imaging: see electronic medical records for all procedures, Xrays and details which were not copied into this note but were reviewed prior to creation of Plan        Florina Tavares MD  December 6, 2021  [facility]  Office [phone]

## 2021-12-06 NOTE — PROGRESS NOTES
Transition of Care Plan to LTC    LTC Transition:  Patient has been accepted to  [facility] and meets criteria for admission. Patient will transported by [address]  and expected to leave at 4:30 p.m. Transportation to [facility] [place] [facility] is [address], [place] [address] and phone number [phone]  Patient will require BLS transport. Pt requires Stretcher If stretcher, reason: EFRAIN, DM2, HTN, Dyslipidema, Left buttock absecess, R AKA  Patient is currently requiring oxygen No   Height:5'5\"   Weight: 145lb  Pt is on isolation: Yes Isolation is for: contact Isolation for MRSA and ESBL  Is the pt ready now? yes  Requested time: Next Available  4:30 p.m. PCS Faxed: yes  Insurance verified on face sheet: yes  Auth needed for transport: no  CM completed PCS/ Envelope and placed on chart. Communication to Patient/Family:  Met with patient and Refug[place] evelyne Rondon, and they are agreeable to the transition plan. Communication to LTC:  Bedside RN, Kady Madden, has been notified to update the transition plan to the facility and call report [phone]. Nursing Please include all hard scripts for controlled substances, med rec and dc summary, and AVS in packet.      Reviewed and confirmed with Haider with [facility] and  can manage the patient care needs for the following:     Nanci Vallejo with (X) only those applicable:    Medication:  [x]  Medications will be available at the facility  []  IV Antibiotics   []  Controlled Substance - hard copy to be sent with patient   [x]  Weekly Labs   Documents:  [] Hard RX  [] MAR  [] Kardex  [] AVS  []Transfer Summary  [x]Discharge summary   Equipment:  []  CPAP/BiPAP  []  Wound Vacuum  []  Ortega or Urinary Device  []  PICC/Central Line  []  Nebulizer  []  Ventilator   Treatment:  []Isolation (for MRSA, VRE, etc.)  []Surgical Drain Management  []Tracheostomy Care  []Dressing Changes  []Dialysis with transportation and chair time   []PEG Care  []Oxygen  []Daily Weights for Heart Failure   Dietary:  []Any diet limitations  []Tube Feedings   []Total Parenteral Management (TPN)   g     Financial Resources:  Medicaid    [] Initiated and application pending   [] Full coverage     Advanced Care Plan:  []Surrogate Decision Maker of Care  []POA  []Communicated Code Status  ( \"Full\")    Other   . ALANNA BrunsonN, RN  Pager # [phone]  Care Manager

## 2021-12-06 NOTE — DISCHARGE SUMMARY
Discharge Summary    Patient: Sandra Alston MRN: [id number]  CSN: [id number]    YOB: 1940  Age: 79 y.o.   Sex: female    DOA: 12/1/2021 LOS:  LOS: 4 days   Discharge Date:      Admission Diagnoses: Hyperkalemia [E87.5]    Discharge Diagnoses:    Problem List as of 12/6/2021 Date Reviewed: 2/23/2020          Codes Class Noted - Resolved    * (Principal) Hyperkalemia ICD-10-CM: E87.5  ICD-9-CM: 276.7  12/1/2021 - Present        EFRAIN (acute kidney injury) ([facility] ICD-10-CM: N17.9  ICD-9-CM: 584.9  12/1/2021 - Present        Abscess ICD-10-CM: L02.91  ICD-9-CM: 682.9  12/1/2021 - Present        Type 2 diabetes mellitus with hyperglycemia, without long-term current use of insulin ([facility] ICD-10-CM: E11.65  ICD-9-CM: 250.00, 790.29  12/1/2021 - Present        HTN (hypertension) ICD-10-CM: I10  ICD-9-CM: 401.9  12/1/2021 - Present        Hyperchloremic metabolic acidosis RIQ-24-EX: E87.2  ICD-9-CM: 276.2  12/1/2021 - Present        Pseudoaneurysm of femoral artery ([facility] ICD-10-CM: I72.4  ICD-9-CM: 442.3  3/20/2020 - Present        Anemia of chronic renal failure, stage 4 (severe) ([facility] ICD-10-CM: N18.4, D63.1  ICD-9-CM: 285.21, 585.4  2/19/2020 - Present        Ischemia of foot ICD-10-CM: I99.8  ICD-9-CM: 459.9  1/20/2020 - Present        Pancreatitis ICD-10-CM: K85.90  ICD-9-CM: 782.5  1/3/2020 - Present        Hyperosmolar hyperglycemic coma due to diabetes mellitus without ketoacidosis ([facility] ICD-10-CM: E11.01  ICD-9-CM: 250.20, 250.30  1/3/2020 - Present        Hyperosmolar non-ketotic state in patient with type 2 diabetes mellitus ([facility] ICD-10-CM: E11.00  ICD-9-CM: 250.20  1/3/2020 - Present        Hyperglycemia ICD-10-CM: R73.9  ICD-9-CM: 790.29  12/25/2018 - Present        Type 2 diabetes mellitus with hyperosmolarity ([facility] ICD-10-CM: E11.00  ICD-9-CM: 250.20  12/25/2018 - Present        Acute UTI ICD-10-CM: N39.0  ICD-9-CM: 599.0  12/25/2018 - Present        Dehydration ICD-10-CM: E86.0  ICD-9-CM: 276.51  12/25/2018 - Present        Acute renal failure (ARF) ([facility] ICD-10-CM: N17.9  ICD-9-CM: 584.9  12/25/2018 - Present        Noncompliance ICD-10-CM: Z91.19  ICD-9-CM: V15.81  12/25/2018 - Present              Reason for Admission  81 y. o. female with a PMHx of diabetes, hypertension, R AKA who presented to the ED from nursing facility due to abnormal labs. She stated she was getting routine lab work done and was sent to the ED when her potassium was found to be elevated >7.0. She denied any symptoms over the last few days. She specifically denied SOB, chest pain, weakness, headache. She denied prior history of renal disease. She did report recently being started on abx (clinda) due to an abscess that was found to be spontaneously draining on 11/25, and she followed up on 11/28. Wound culture +MRSA, ESBL klebsiella oxytoca. Wound was improved on follow up visit per ED notes. In the ED, K 7.1, SCr 2.04 (baseline 1.2). Discharge Condition: Good    PHYSICAL EXAM   Visit Vitals  /67   Pulse 78   Temp 98.3 °F (36.8 °C)   Resp 16   Ht 5' 5\" (1.651 m)   Wt 65.8 kg (145 lb)   SpO2 100%   BMI 24.13 kg/m²     General: NAD, appears stated age, alert  Eyes: PERRL, sclera is non-icteric. Oropharynx clear.   HENT: normocephalic/atraumatic, moist mucus membranes  Respiratory: CTA with no signs of respiratory distress  Cardiovascular: RRR, no m/r/g  GI: soft, non-tender, normal bowel sounds  Extremities: no cyanosis, right AKA   Neuro: moves all extremities, normal speech. Other than hearing, no focal deficit. Skin: no rash to visible skin. Hospital Course:   1.  Hyperkalemia.  Patient has received medical management, including Caye Divers.  ARB held. Coleraine Roof received bicarb infusion per nephrology. Discharge w/ lokelma x 2 weeks, and sodium bicarb, then check labs to determine further management. 2.  EFRAIN improving.  ARB, HCTZ discontinued.  Nephrology followed in consult.    3.  Hyperchloremic metabolic acidosis, suspicion for RTA. Avoid ARB, diuretic. See #1.  Yamila Fidencio AREVALO. wound care recs below. Cx + MRSA and ESBL. Per ID,  continue wound care, and complete course of Ceftin as below. 5.  DM2 with hyperglycemia (A1c 9.7 12/2/21). Resume oral hypoglycemics. Diabetes educator met with her regarding adherence to diabetic diet. 6.  Hypertension.  given concern for RTA, ARB and diuretic stopped. Norvasc and hydralazine started, blood pressure well controlled on current regimen. 7.  Dyslipidemia on statin   8.  Left buttock abscess:  -Status post drainage on 11/23 in the ED.  -Completed 1 week of clindamycin  -Wound cultures positive for ESBL Klebsiella as well as MRSA  --> Continue cefoxitin (stop date 12/8) per ID recs. Wound care as below.    9. DVT prophylaxis was given in the form of heparin subcut tid  10.  Full code.  return to long term care.      Consults: Nephrology Dr. Quinton Redding      Significant Diagnostic Studies: labs:   Recent Results (from the past 24 hour(s))   GLUCOSE, POC    Collection Time: 12/05/21 12:08 PM   Result Value Ref Range    Glucose (POC) 118 (H) 70 - 110 mg/dL   GLUCOSE, POC    Collection Time: 12/05/21  4:00 PM   Result Value Ref Range    Glucose (POC) 142 (H) 70 - 110 mg/dL   GLUCOSE, POC    Collection Time: 12/05/21  9:14 PM   Result Value Ref Range    Glucose (POC) 151 (H) 70 - 332 mg/dL   METABOLIC PANEL, BASIC    Collection Time: 12/06/21  2:38 AM   Result Value Ref Range    Sodium 144 136 - 145 mmol/L    Potassium 5.0 3.5 - 5.5 mmol/L    Chloride 119 (H) 100 - 111 mmol/L    CO2 21 21 - 32 mmol/L    Anion gap 4 3.0 - 18 mmol/L    Glucose 62 (L) 74 - 99 mg/dL    BUN 35 (H) 7.0 - 18 MG/DL    Creatinine 1.51 (H) 0.6 - 1.3 MG/DL    BUN/Creatinine ratio 23 (H) 12 - 20      GFR est AA 40 (L) >60 ml/min/1.73m2    GFR est non-AA 33 (L) >60 ml/min/1.73m2    Calcium 8.8 8.5 - 10.1 MG/DL   GLUCOSE, POC    Collection Time: 12/06/21  9:53 AM   Result Value Ref Range    Glucose (POC) 144 (H) 70 - 110 mg/dL     Results     Procedure Component Value Units Date/Time    CULTURE, BODY FLUID Clifford Dajuan STAIN [[id number]]  (Abnormal)  (Susceptibility) Collected: 11/23/21 1515    Order Status: Completed Specimen: Body Fluid from Drainage Updated: 11/28/21 1447     Special Requests: NO SPECIAL REQUESTS        GRAM STAIN NO WBC'S SEEN               1+ GRAM POSITIVE COCCI IN PAIRS           Culture result:       LIGHT KLEBSIELLA OXYTOCA ** (EXTENDED SPECTRUM BETA LACTAMASE ) **                  HEAVY * METHICILLIN RESISTANT STAPHYLOCOCCUS AUREUS *            (NOTE) KLEB OXYTOCA ESBL CALLED TO MCAK IBRAHIM AT 6635 ON 11.26.21. Formerly [facility]     Susceptibility      Klebsiella oxytoca     ANGIE     Amikacin ($) Susceptible     Ampicillin ($) Resistant     Ampicillin/sulbactam ($) Resistant     Cefazolin ($) Resistant     Cefepime ($$) Resistant     Cefoxitin Susceptible     Ceftazidime ($) Resistant     Ceftriaxone ($) Resistant     Ciprofloxacin ($) Resistant     Gentamicin ($) Intermediate     Levofloxacin ($) Resistant     Meropenem ($$) Susceptible     Piperacillin/Tazobac ($) Resistant     Tobramycin ($) Intermediate     Trimeth/Sulfa Resistant                  Linear View               Susceptibility      Staphylococcus aureus Methcillin Resistant     ANGIE     Ciprofloxacin ($) Resistant     Daptomycin ($$$$$) Susceptible     Erythromycin ($$$$) Resistant     Gentamicin ($) Susceptible     Levofloxacin ($) Resistant     Linezolid ($$$$$) Susceptible     Oxacillin Resistant     Tetracycline Susceptible     Trimeth/Sulfa Susceptible     Vancomycin ($) Susceptible  [1]                  [1]  For ANGIE >1, consider using another antibiotic. Linear View                       IMAGING  XR Results (most recent):  Results from Hospital Encounter encounter on 12/01/21    XR CHEST PORT    Narrative  EXAM:  Chest Portable. INDICATION:  Hyperkalemia. Chest pain. COMPARISON:  None.     TECHNIQUE:  Portable AP chest study    FINDINGS:    - Both lungs are clear.  - No pleural effusion or pneumothorax is detected. - Cardiac silhouette, mediastinum and hilar regions appear unremarkable. Impression  Negative study. EKG Results     Procedure 720 Value Units Date/Time    EKG, 12 LEAD, REPEAT [[id number]] Collected: 12/01/21 2158    Order Status: Completed Updated: 12/02/21 1348     Ventricular Rate 116 BPM      Atrial Rate 116 BPM      P-R Interval 152 ms      QRS Duration 80 ms      Q-T Interval 296 ms      QTC Calculation (Bezet) 411 ms      Calculated P Axis 60 degrees      Calculated R Axis -10 degrees      Calculated T Axis 125 degrees      Diagnosis --     Sinus tachycardia with occasional premature ventricular complexes  Anterior infarct , age undetermined  Abnormal ECG  When compared with ECG of 01-DEC-2021 19:07,  premature ventricular complexes are now present  Vent. rate has increased BY  42 BPM  ST now depressed in Lateral leads  Nonspecific T wave abnormality, improved in Inferior leads  Confirmed by Valentino Kingdom MD, --- ([id number]) on 12/2/2021 1:48:49 PM      EKG, 12 LEAD, INITIAL [[id number]] Collected: 12/01/21 1907    Order Status: Completed Updated: 12/02/21 1346     Ventricular Rate 74 BPM      Atrial Rate 74 BPM      P-R Interval 136 ms      QRS Duration 78 ms      Q-T Interval 342 ms      QTC Calculation (Bezet) 379 ms      Calculated P Axis 27 degrees      Calculated R Axis 3 degrees      Calculated T Axis -9 degrees      Diagnosis --     Normal sinus rhythm  Normal ECG  When compared with ECG of 20-MAR-2020 01:30,  QT has shortened  Confirmed by Caden Rai MD, --- ([id number]) on 12/2/2021 1:46:19 PM            Discharge Medications:     Current Discharge Medication List      START taking these medications    Details   cefUROXime (CEFTIN) 250 mg tablet Take 1 Tablet by mouth every twelve (12) hours for 3 days. Qty: 6 Tablet, Refills: 0      amLODIPine (Norvasc) 10 mg tablet Take 1 Tablet by mouth daily.   Qty: 30 Tablet, Refills: 0      hydrALAZINE (APRESOLINE) 25 mg tablet Take 1 Tablet by mouth three (3) times daily. Qty: 90 Tablet, Refills: 0      sodium zirconium cyclosilicate (LOKELMA) 10 gram powder packet Take 1 Packet by mouth two (2) times a day. Qty: 60 Packet, Refills: 0      sodium bicarbonate 325 mg tablet Take 1 Tablet by mouth three (3) times daily. Qty: 90 Tablet, Refills: 0         CONTINUE these medications which have NOT CHANGED    Details   docusate sodium (COLACE) 100 mg capsule Take 100 mg by mouth two (2) times a day. OLANZapine (ZYPREXA) 2.5 mg tablet Take  by mouth nightly. aspirin 81 mg chewable tablet Take 1 Tab by mouth daily. Qty: 30 Tab, Refills: 0      loratadine (CLARITIN) 10 mg tablet Take 10 mg by mouth daily. glipiZIDE (GLUCOTROL) 5 mg tablet Take 5 mg by mouth two (2) times a day. metFORMIN (GLUCOPHAGE) 500 mg tablet Take 500 mg by mouth daily (with breakfast). insulin lispro (HUMALOG) 100 unit/mL injection Less than 150 =   0 units  150 -199 =   3 units  200 -249 =   6 units  250 -299 =   9 units  300 -349 =   12 units  350 and above =   15 units, CALL MD  Qty: 1 Vial, Refills: 0      albuterol-ipratropium (DUO-NEB) 2.5 mg-0.5 mg/3 ml nebu 3 mL by Nebulization route every six (6) hours as needed (wheezing). Qty: 30 Nebule, Refills: 0      acetaminophen (TYLENOL) 325 mg tablet Take 2 Tabs by mouth every six (6) hours as needed. Qty: 30 Tab, Refills: 0      cholecalciferol, vitamin D3, (VITAMIN D3) 2,000 unit tab Take  by mouth. atorvastatin (LIPITOR) 20 mg tablet Take  by mouth daily.          STOP taking these medications       clindamycin (CLEOCIN) 150 mg capsule Comments:   Reason for Stopping:         hydroCHLOROthiazide (HYDRODIURIL) 25 mg tablet Comments:   Reason for Stopping:         losartan (COZAAR) 100 mg tablet Comments:   Reason for Stopping:               Activity: PT/OT Eval and Treat    Diet: Cardiac Diet and Diabetic Diet    Wound Care: Clean wound to left buttock with saline or wound spray then pat dry. Apply Therahoney to [employer] Ag then to wound bed and cover with silicone dressing. Change every 2-3 days and prn soilage or dislodgement. Follow-up:   Follow-up Appointments   Procedures    FOLLOW UP VISIT Appointment in: Other (Specify) SNF doctor upon arrival.     SNF doctor upon arrival.     Standing Status:   Standing     Number of Occurrences:   1     Order Specific Question:   Appointment in     Answer:    Other (Specify)         Minutes spent on discharge: >30

## 2021-12-06 NOTE — DISCHARGE INSTRUCTIONS
DISCHARGE SUMMARY from Nurse    PATIENT INSTRUCTIONS:    After general anesthesia or intravenous sedation, for 24 hours or while taking prescription Narcotics:  · Limit your activities  · Do not drive and operate hazardous machinery  · Do not make important personal or business decisions  · Do  not drink alcoholic beverages  · If you have not urinated within 8 hours after discharge, please contact your surgeon on call. Report the following to your surgeon:  · Excessive pain, swelling, redness or odor of or around the surgical area  · Temperature over 100.5  · Nausea and vomiting lasting longer than 4 hours or if unable to take medications  · Any signs of decreased circulation or nerve impairment to extremity: change in color, persistent  numbness, tingling, coldness or increase pain  · Any questions    What to do at Home:  Recommended activity: Activity as tolerated, ***    If you experience any of the following symptoms chest pain, shortness of breath, fever greater than 100.4, nausea, vomiting pain unrelieved by medication, please follow up with [facility]. *  Please give a list of your current medications to your Primary Care Provider. *  Please update this list whenever your medications are discontinued, doses are      changed, or new medications (including over-the-counter products) are added. *  Please carry medication information at all times in case of emergency situations. These are general instructions for a healthy lifestyle:    No smoking/ No tobacco products/ Avoid exposure to second hand smoke  Surgeon General's Warning:  Quitting smoking now greatly reduces serious risk to your health.     Obesity, smoking, and sedentary lifestyle greatly increases your risk for illness    A healthy diet, regular physical exercise & weight monitoring are important for maintaining a healthy lifestyle    You may be retaining fluid if you have a history of heart failure or if you experience any of the following symptoms:  Weight gain of 3 pounds or more overnight or 5 pounds in a week, increased swelling in our hands or feet or shortness of breath while lying flat in bed. Please call your doctor as soon as you notice any of these symptoms; do not wait until your next office visit. Patient armband removed and shredded    MyChart Activation    Thank you for requesting access to [employer]. Please follow the instructions below to securely access and download your online medical record. [employer] allows you to send messages to your doctor, view your test results, renew your prescriptions, schedule appointments, and more. How Do I Sign Up? 1. In your internet browser, go to [email or web address]  2. Click on the First Time User? Click Here link in the Sign In box. You will be redirect to the New Member Sign Up page. 3. Enter your [employer] Access Code exactly as it appears below. You will not need to use this code after youve completed the sign-up process. If you do not sign up before the expiration date, you must request a new code. [employer] Access Code: [email or web address]  Expires: 2022  2:06 PM (This is the date your [employer] access code will )    4. Enter the last four digits of your Social Security Number (xxxx) and Date of Birth (mm/dd/yyyy) as indicated and click Submit. You will be taken to the next sign-up page. 5. Create a [employer] ID. This will be your [employer] login ID and cannot be changed, so think of one that is secure and easy to remember. 6. Create a [employer] password. You can change your password at any time. 7. Enter your Password Reset Question and Answer. This can be used at a later time if you forget your password. 8. Enter your e-mail address. You will receive e-mail notification when new information is available in [address] 9. Click Sign Up. You can now view and download portions of your medical record.   10. Click the Download Summary menu link to download a portable copy of your medical information. Additional Information    If you have questions, please visit the Frequently Asked Questions section of the [employer] website at [email or web address] Remember, [employer] is NOT to be used for urgent needs. For medical emergencies, dial 911. The discharge information has been reviewed with the {PATIENT PARENT GUARDIAN:90363}. The {PATIENT PARENT GUARDIAN:41436} verbalized understanding. Discharge medications reviewed with the {Dishcarge meds reviewed UQUW:62948} and appropriate educational materials and side effects teaching were provided.   ___________________________________________________________________________________________________________________________________

## 2021-12-06 NOTE — PROGRESS NOTES
Report given to Samuel Cohen LPN at [facility]. Awaiting for transportation times for .  [facility] LPN

## 2021-12-06 NOTE — PROGRESS NOTES
[facility]  (A Division of [address] [place] [address])    Follow-up Note      Date of Admission: 2021       Date of Note:  2021          Current Antimicrobials:    Prior Antimicrobials:  cefuroximine - present Clindamycin -       Assessment: Rec / Plan:   Left buttock abscess:  -Status post drainage on  in the ED.  -Completed 1 week of clindamycin  -Wound cultures positive for ESBL Klebsiella as well as MRSA  - Continue cefoxitin (stop date ) given slow wound healing. While this is not optimal in the setting of an ESBL producing organism using aminoglycosides would potentiate her renal failure. She has already gotten 7 days of clindamycin which should cover MRSA which was originally noted in the wound.  -Continue with wound care. Appreciate wound care consult.  -Discussed frequent turns and taking the pressure off of pressure points when possible while she is in her wheel chair. f/u in my office in 2 weeks for wound check. EFRAIN     Hyperkalemia     Diabetes mellitus type 2 hemoglobin A1c is 9.7 -tight glucose control for optimal wound healing   Limited mobility, wheelchair-bound at [address], DO  [facility]  [address], [address] [facility] [place] [address]  Office: [phone]  Mobile/Text: [phone]     Microbiology:   abscess cx: MRSA, ESBL Klebsiella    Lines / Catheters:      Subjective:   Patient seen and examined at bedside. Nursing in room during my visit. Overall feeling well and wants to go home. No particular complaints toiday. No fevers, chils, or SOB. Bed wet and incontinent of urine.      Objective:        Visit Vitals  /67   Pulse 78   Temp 98.3 °F (36.8 °C)   Resp 16   Ht 5' 5\" (1.651 m)   Wt 65.8 kg (145 lb)   SpO2 100%   BMI 24.13 kg/m²     Temp (24hrs), Av °F (36.7 °C), Min:97.4 °F (36.3 °C), Max:98.3 °F (36.8 °C)        General:   awake alert and oriented, non-toxic   Skin:    dry and warm, silver-dollar sized wound, some slough, clean base, no odor, scant drainage   HEENT:  No scleral icterus or pallor; oral mucosa moist, lips moist   Lymph Nodes:   not assessed today   Lungs:   non, labored; bilaterally clear to aspiration- no crackles wheezes rales or rhonchi   Heart:  RRR, s1 and s2; no murmurs rubs or gallops; no edema, + pedal pulses   Abdomen:  soft, non-distended, active bowel sounds, non-tender   Genitourinary:  deferred   Extremities:   average muscle tone; no contractures, no joint effusions   Neurologic:  No gross focal motor or sensory abnormalities; CN 2-12 intact; Follows commands. Psychiatric:   appropriate and interactive. Lab results:    Chemistry  Recent Labs     12/06/21  0238 12/05/21  0321 12/04/21  0057   GLU 62* 78 119*    146* 143   K 5.0 5.4 5.9*   * 122* 121*   CO2 21 22 18*   BUN 35* 36* 43*   CREA 1.51* 1.50* 1.64*   CA 8.8 9.3 9.7   AGAP 4 2* 4   BUCR 23* 24* 26*       CBC w/ Diff  No results for input(s): WBC, RBC, HGB, HCT, PLT, GRANS, LYMPH, EOS, HGBEXT, HCTEXT, PLTEXT, HGBEXT, HCTEXT, PLTEXT in the last 72 hours.     Microbiology  All Micro Results     None           Alec Nelson MD   12/6/2021

## 2021-12-06 NOTE — PROGRESS NOTES
8:11 am Received voice message from Miriam Price with [facility] [place] [facility]. Per Miriam Price, patient can return as LTC to facility.     MARINE Feliz, RN  Pager # [phone]  Care Manager

## 2022-03-02 NOTE — PROGRESS NOTES
Problem: Diabetes Self-Management  Goal: *Disease process and treatment process  Description  Define diabetes and identify own type of diabetes; list 3 options for treating diabetes. Outcome: Progressing Towards Goal  Goal: *Incorporating nutritional management into lifestyle  Description  Describe effect of type, amount and timing of food on blood glucose; list 3 methods for planning meals. Outcome: Progressing Towards Goal  Goal: *Incorporating physical activity into lifestyle  Description  State effect of exercise on blood glucose levels. Outcome: Progressing Towards Goal  Goal: *Developing strategies to promote health/change behavior  Description  Define the ABC's of diabetes; identify appropriate screenings, schedule and personal plan for screenings. Outcome: Progressing Towards Goal  Goal: *Using medications safely  Description  State effect of diabetes medications on diabetes; name diabetes medication taking, action and side effects. Outcome: Progressing Towards Goal  Goal: *Monitoring blood glucose, interpreting and using results  Description  Identify recommended blood glucose targets  and personal targets. Outcome: Progressing Towards Goal  Goal: *Prevention, detection, treatment of acute complications  Description  List symptoms of hyper- and hypoglycemia; describe how to treat low blood sugar and actions for lowering  high blood glucose level. Outcome: Progressing Towards Goal  Goal: *Prevention, detection and treatment of chronic complications  Description  Define the natural course of diabetes and describe the relationship of blood glucose levels to long term complications of diabetes.   Outcome: Progressing Towards Goal  Goal: *Developing strategies to address psychosocial issues  Description  Describe feelings about living with diabetes; identify support needed and support network  Outcome: Progressing Towards Goal  Goal: *Insulin pump training  Outcome: Progressing Towards Goal  Goal: *Sick day guidelines  Outcome: Progressing Towards Goal  Goal: *Patient Specific Goal (EDIT GOAL, INSERT TEXT)  Outcome: Progressing Towards Goal     Problem: Patient Education: Go to Patient Education Activity  Goal: Patient/Family Education  Outcome: Progressing Towards Goal     Problem: Falls - Risk of  Goal: *Absence of Falls  Description  Document Rosibel Latin Fall Risk and appropriate interventions in the flowsheet. Outcome: Progressing Towards Goal  Note: Fall Risk Interventions:  Mobility Interventions: Bed/chair exit alarm    Mentation Interventions: Bed/chair exit alarm, Door open when patient unattended    Medication Interventions: Bed/chair exit alarm    Elimination Interventions: Call light in reach              Problem: Patient Education: Go to Patient Education Activity  Goal: Patient/Family Education  Outcome: Progressing Towards Goal     Problem: Pressure Injury - Risk of  Goal: *Prevention of pressure injury  Description  Document Marc Scale and appropriate interventions in the flowsheet.   Outcome: Progressing Towards Goal  Note: Pressure Injury Interventions:  Sensory Interventions: Assess changes in LOC    Moisture Interventions: Absorbent underpads    Activity Interventions: Increase time out of bed    Mobility Interventions: HOB 30 degrees or less    Nutrition Interventions: Document food/fluid/supplement intake    Friction and Shear Interventions: Apply protective barrier, creams and emollients                Problem: Patient Education: Go to Patient Education Activity  Goal: Patient/Family Education  Outcome: Progressing Towards Goal     Problem: Pain  Goal: *Control of Pain  Outcome: Progressing Towards Goal  Goal: *PALLIATIVE CARE:  Alleviation of Pain  Outcome: Progressing Towards Goal     Problem: Patient Education: Go to Patient Education Activity  Goal: Patient/Family Education  Outcome: Progressing Towards Goal     Problem: Injury - Risk of, Adverse Drug Event  Goal: *Absence of adverse drug events  Outcome: Progressing Towards Goal  Goal: *Absence of medication errors  Outcome: Progressing Towards Goal  Goal: *Knowledge of prescribed medications  Outcome: Progressing Towards Goal     Problem: Patient Education: Go to Patient Education Activity  Goal: Patient/Family Education  Outcome: Progressing Towards Goal     Problem: Altered Thought Process (Adult/Pediatric)  Goal: *STG: Participates in treatment plan  Outcome: Progressing Towards Goal  Goal: *STG: Remains safe in hospital  Outcome: Progressing Towards Goal  Goal: *STG: Seeks staff when feelings of anxiety and fear arise  Outcome: Progressing Towards Goal  Goal: *STG: Complies with medication therapy  Outcome: Progressing Towards Goal  Goal: *STG: Attends activities and groups  Outcome: Progressing Towards Goal  Goal: *STG: Decreased delusional thinking  Outcome: Progressing Towards Goal  Goal: *STG: Decreased hallucinations  Outcome: Progressing Towards Goal  Goal: *STG: Absence of lethality  Outcome: Progressing Towards Goal  Goal: *STG: Demonstrates ability to understand and use improved judgment in daily activities and relationships  Outcome: Progressing Towards Goal  Goal: *LTG: Returns to baseline functioning  Outcome: Progressing Towards Goal  Goal: Interventions  Outcome: Progressing Towards Goal     Problem: Patient Education: Go to Patient Education Activity  Goal: Patient/Family Education  Outcome: Progressing Towards Goal     Problem: Nutrition Deficit  Goal: *Optimize nutritional status  Outcome: Progressing Towards Goal     Problem: Infection - Risk of, Urinary Catheter-Associated Urinary Tract Infection  Goal: *Absence of infection signs and symptoms  Outcome: Progressing Towards Goal     Problem: Patient Education: Go to Patient Education Activity  Goal: Patient/Family Education  Outcome: Progressing Towards Goal Regular Diet - No restrictions

## 2022-03-18 PROBLEM — D63.1 ANEMIA OF CHRONIC RENAL FAILURE, STAGE 4 (SEVERE) (HCC): Status: ACTIVE | Noted: 2020-02-19

## 2022-03-18 PROBLEM — R73.9 HYPERGLYCEMIA: Status: ACTIVE | Noted: 2018-12-25

## 2022-03-18 PROBLEM — N18.4 ANEMIA OF CHRONIC RENAL FAILURE, STAGE 4 (SEVERE) (HCC): Status: ACTIVE | Noted: 2020-02-19

## 2022-03-18 PROBLEM — E87.5 HYPERKALEMIA: Status: ACTIVE | Noted: 2021-12-01

## 2022-03-18 PROBLEM — E87.29 HYPERCHLOREMIC METABOLIC ACIDOSIS: Status: ACTIVE | Noted: 2021-12-01

## 2022-03-18 PROBLEM — L02.91 ABSCESS: Status: ACTIVE | Noted: 2021-12-01

## 2022-03-18 PROBLEM — E11.65 TYPE 2 DIABETES MELLITUS WITH HYPERGLYCEMIA, WITHOUT LONG-TERM CURRENT USE OF INSULIN (HCC): Status: ACTIVE | Noted: 2021-12-01

## 2022-03-19 PROBLEM — E11.01 HYPEROSMOLAR HYPERGLYCEMIC COMA DUE TO DIABETES MELLITUS WITHOUT KETOACIDOSIS (HCC): Status: ACTIVE | Noted: 2020-01-03

## 2022-03-19 PROBLEM — I99.8 ISCHEMIA OF FOOT: Status: ACTIVE | Noted: 2020-01-20

## 2022-03-19 PROBLEM — E11.00 TYPE 2 DIABETES MELLITUS WITH HYPEROSMOLARITY (HCC): Status: ACTIVE | Noted: 2018-12-25

## 2022-03-19 PROBLEM — I72.4 PSEUDOANEURYSM OF FEMORAL ARTERY (HCC): Status: ACTIVE | Noted: 2020-03-20

## 2022-03-19 PROBLEM — I10 HTN (HYPERTENSION): Status: ACTIVE | Noted: 2021-12-01

## 2022-03-19 PROBLEM — E11.00 HYPEROSMOLAR NON-KETOTIC STATE IN PATIENT WITH TYPE 2 DIABETES MELLITUS (HCC): Status: ACTIVE | Noted: 2020-01-03

## 2022-03-19 PROBLEM — K85.90 PANCREATITIS: Status: ACTIVE | Noted: 2020-01-03

## 2022-03-19 PROBLEM — N17.9 ACUTE RENAL FAILURE (ARF) (HCC): Status: ACTIVE | Noted: 2018-12-25

## 2022-03-19 PROBLEM — Z91.199 NONCOMPLIANCE: Status: ACTIVE | Noted: 2018-12-25

## 2022-03-19 PROBLEM — N39.0 ACUTE UTI: Status: ACTIVE | Noted: 2018-12-25

## 2022-03-20 PROBLEM — E86.0 DEHYDRATION: Status: ACTIVE | Noted: 2018-12-25

## 2022-03-20 PROBLEM — N17.9 AKI (ACUTE KIDNEY INJURY) (HCC): Status: ACTIVE | Noted: 2021-12-01

## 2022-08-31 NOTE — PROGRESS NOTES
Important Message from [facility]" reviewed and explained with the patient and/or representative at bedside and signature was obtained. A signed copy provided to patient/representative. Original signed document placed in patient's chart. 80

## 2022-11-17 NOTE — TELEPHONE ENCOUNTER
Left voicemail for Ms. Terrance Brian to let her know her infusion appt and time is on Thursday 20th @ 2pm.
no

## 2023-04-28 ENCOUNTER — APPOINTMENT (OUTPATIENT)
Facility: HOSPITAL | Age: 83
DRG: 683 | End: 2023-04-28
Payer: MEDICARE

## 2023-04-28 ENCOUNTER — HOSPITAL ENCOUNTER (INPATIENT)
Facility: HOSPITAL | Age: 83
LOS: 3 days | Discharge: NURSING, REHAB OR LONG-TERM CARE | DRG: 683 | End: 2023-05-01
Attending: EMERGENCY MEDICINE | Admitting: INTERNAL MEDICINE
Payer: MEDICARE

## 2023-04-28 DIAGNOSIS — N18.30 ACUTE RENAL FAILURE SUPERIMPOSED ON STAGE 3 CHRONIC KIDNEY DISEASE, UNSPECIFIED ACUTE RENAL FAILURE TYPE, UNSPECIFIED WHETHER STAGE 3A OR 3B CKD (HCC): Primary | ICD-10-CM

## 2023-04-28 DIAGNOSIS — N17.9 ACUTE RENAL FAILURE SUPERIMPOSED ON STAGE 3 CHRONIC KIDNEY DISEASE, UNSPECIFIED ACUTE RENAL FAILURE TYPE, UNSPECIFIED WHETHER STAGE 3A OR 3B CKD (HCC): Primary | ICD-10-CM

## 2023-04-28 PROBLEM — N18.32 ACUTE RENAL FAILURE SUPERIMPOSED ON STAGE 3B CHRONIC KIDNEY DISEASE, UNSPECIFIED ACUTE RENAL FAILURE TYPE (HCC): Status: ACTIVE | Noted: 2023-04-28

## 2023-04-28 LAB
ALBUMIN SERPL-MCNC: 3.1 G/DL (ref 3.4–5)
ALBUMIN/GLOB SERPL: 0.9 (ref 0.8–1.7)
ALP SERPL-CCNC: 171 U/L (ref 45–117)
ALT SERPL-CCNC: 37 U/L (ref 13–56)
AMORPH CRY URNS QL MICRO: ABNORMAL
ANION GAP SERPL CALC-SCNC: 6 MMOL/L (ref 3–18)
APPEARANCE UR: CLEAR
AST SERPL-CCNC: 42 U/L (ref 10–38)
BACTERIA URNS QL MICRO: NEGATIVE /HPF
BASOPHILS # BLD: 0 K/UL (ref 0–0.1)
BASOPHILS NFR BLD: 1 % (ref 0–2)
BILIRUB SERPL-MCNC: 0.2 MG/DL (ref 0.2–1)
BILIRUB UR QL: NEGATIVE
BUN SERPL-MCNC: 72 MG/DL (ref 7–18)
BUN/CREAT SERPL: 16 (ref 12–20)
CALCIUM SERPL-MCNC: 8.8 MG/DL (ref 8.5–10.1)
CHLORIDE SERPL-SCNC: 124 MMOL/L (ref 100–111)
CO2 SERPL-SCNC: 15 MMOL/L (ref 21–32)
COLOR UR: YELLOW
CREAT SERPL-MCNC: 4.43 MG/DL (ref 0.6–1.3)
CREAT UR-MCNC: 70 MG/DL (ref 30–125)
DIFFERENTIAL METHOD BLD: ABNORMAL
EOSINOPHIL # BLD: 0.1 K/UL (ref 0–0.4)
EOSINOPHIL NFR BLD: 3 % (ref 0–5)
EPITH CASTS URNS QL MICRO: ABNORMAL /LPF (ref 0–5)
ERYTHROCYTE [DISTWIDTH] IN BLOOD BY AUTOMATED COUNT: 15.4 % (ref 11.6–14.5)
FOLATE SERPL-MCNC: 11.6 NG/ML (ref 3.1–17.5)
GLOBULIN SER CALC-MCNC: 3.5 G/DL (ref 2–4)
GLUCOSE BLD STRIP.AUTO-MCNC: 185 MG/DL (ref 70–110)
GLUCOSE SERPL-MCNC: 139 MG/DL (ref 74–99)
GLUCOSE UR STRIP.AUTO-MCNC: 100 MG/DL
HCT VFR BLD AUTO: 26.4 % (ref 35–45)
HGB BLD-MCNC: 8.1 G/DL (ref 12–16)
HGB UR QL STRIP: NEGATIVE
IMM GRANULOCYTES # BLD AUTO: 0 K/UL (ref 0–0.04)
IMM GRANULOCYTES NFR BLD AUTO: 0 % (ref 0–0.5)
KETONES UR QL STRIP.AUTO: NEGATIVE MG/DL
LEUKOCYTE ESTERASE UR QL STRIP.AUTO: NEGATIVE
LYMPHOCYTES # BLD: 1 K/UL (ref 0.9–3.6)
LYMPHOCYTES NFR BLD: 25 % (ref 21–52)
MAGNESIUM SERPL-MCNC: 2.1 MG/DL (ref 1.6–2.6)
MCH RBC QN AUTO: 31.8 PG (ref 24–34)
MCHC RBC AUTO-ENTMCNC: 30.7 G/DL (ref 31–37)
MCV RBC AUTO: 103.5 FL (ref 78–100)
MONOCYTES # BLD: 0.3 K/UL (ref 0.05–1.2)
MONOCYTES NFR BLD: 8 % (ref 3–10)
NEUTS SEG # BLD: 2.6 K/UL (ref 1.8–8)
NEUTS SEG NFR BLD: 63 % (ref 40–73)
NITRITE UR QL STRIP.AUTO: NEGATIVE
NRBC # BLD: 0 K/UL (ref 0–0.01)
NRBC BLD-RTO: 0 PER 100 WBC
PH UR STRIP: 5.5 (ref 5–8)
PHOSPHATE SERPL-MCNC: 5.2 MG/DL (ref 2.5–4.9)
PLATELET # BLD AUTO: 164 K/UL (ref 135–420)
PMV BLD AUTO: 11.9 FL (ref 9.2–11.8)
POTASSIUM SERPL-SCNC: 4.9 MMOL/L (ref 3.5–5.5)
PROT SERPL-MCNC: 6.6 G/DL (ref 6.4–8.2)
PROT UR STRIP-MCNC: >1000 MG/DL
PROT UR-MCNC: 498 MG/DL
PROT/CREAT UR-RTO: 7.1
RBC # BLD AUTO: 2.55 M/UL (ref 4.2–5.3)
RBC #/AREA URNS HPF: NEGATIVE /HPF (ref 0–5)
SODIUM SERPL-SCNC: 145 MMOL/L (ref 136–145)
SP GR UR REFRACTOMETRY: 1.02 (ref 1–1.03)
TROPONIN I SERPL HS-MCNC: 40 NG/L (ref 0–54)
TSH SERPL DL<=0.05 MIU/L-ACNC: 6.85 UIU/ML (ref 0.36–3.74)
UROBILINOGEN UR QL STRIP.AUTO: 0.2 EU/DL (ref 0.2–1)
VIT B12 SERPL-MCNC: 669 PG/ML (ref 211–911)
WBC # BLD AUTO: 4.1 K/UL (ref 4.6–13.2)
WBC URNS QL MICRO: ABNORMAL /HPF (ref 0–4)

## 2023-04-28 PROCEDURE — 84156 ASSAY OF PROTEIN URINE: CPT

## 2023-04-28 PROCEDURE — 2580000003 HC RX 258: Performed by: INTERNAL MEDICINE

## 2023-04-28 PROCEDURE — 84100 ASSAY OF PHOSPHORUS: CPT

## 2023-04-28 PROCEDURE — 85025 COMPLETE CBC W/AUTO DIFF WBC: CPT

## 2023-04-28 PROCEDURE — 82962 GLUCOSE BLOOD TEST: CPT

## 2023-04-28 PROCEDURE — 51798 US URINE CAPACITY MEASURE: CPT | Performed by: EMERGENCY MEDICINE

## 2023-04-28 PROCEDURE — 6360000002 HC RX W HCPCS: Performed by: INTERNAL MEDICINE

## 2023-04-28 PROCEDURE — 99285 EMERGENCY DEPT VISIT HI MDM: CPT | Performed by: EMERGENCY MEDICINE

## 2023-04-28 PROCEDURE — 82607 VITAMIN B-12: CPT

## 2023-04-28 PROCEDURE — 83735 ASSAY OF MAGNESIUM: CPT

## 2023-04-28 PROCEDURE — 93005 ELECTROCARDIOGRAM TRACING: CPT | Performed by: EMERGENCY MEDICINE

## 2023-04-28 PROCEDURE — 76770 US EXAM ABDO BACK WALL COMP: CPT

## 2023-04-28 PROCEDURE — 84484 ASSAY OF TROPONIN QUANT: CPT

## 2023-04-28 PROCEDURE — 82570 ASSAY OF URINE CREATININE: CPT

## 2023-04-28 PROCEDURE — 6370000000 HC RX 637 (ALT 250 FOR IP): Performed by: INTERNAL MEDICINE

## 2023-04-28 PROCEDURE — 81001 URINALYSIS AUTO W/SCOPE: CPT

## 2023-04-28 PROCEDURE — 2500000003 HC RX 250 WO HCPCS: Performed by: INTERNAL MEDICINE

## 2023-04-28 PROCEDURE — 82746 ASSAY OF FOLIC ACID SERUM: CPT

## 2023-04-28 PROCEDURE — 84443 ASSAY THYROID STIM HORMONE: CPT

## 2023-04-28 PROCEDURE — 94761 N-INVAS EAR/PLS OXIMETRY MLT: CPT

## 2023-04-28 PROCEDURE — 80053 COMPREHEN METABOLIC PANEL: CPT

## 2023-04-28 PROCEDURE — 1100000003 HC PRIVATE W/ TELEMETRY

## 2023-04-28 PROCEDURE — 99223 1ST HOSP IP/OBS HIGH 75: CPT | Performed by: INTERNAL MEDICINE

## 2023-04-28 RX ORDER — ONDANSETRON 4 MG/1
4 TABLET, ORALLY DISINTEGRATING ORAL EVERY 8 HOURS PRN
Status: DISCONTINUED | OUTPATIENT
Start: 2023-04-28 | End: 2023-05-01 | Stop reason: HOSPADM

## 2023-04-28 RX ORDER — HEPARIN SODIUM 5000 [USP'U]/ML
5000 INJECTION, SOLUTION INTRAVENOUS; SUBCUTANEOUS EVERY 8 HOURS SCHEDULED
Status: DISCONTINUED | OUTPATIENT
Start: 2023-04-28 | End: 2023-05-01 | Stop reason: HOSPADM

## 2023-04-28 RX ORDER — SODIUM CHLORIDE 0.9 % (FLUSH) 0.9 %
5-40 SYRINGE (ML) INJECTION PRN
Status: DISCONTINUED | OUTPATIENT
Start: 2023-04-28 | End: 2023-05-01 | Stop reason: HOSPADM

## 2023-04-28 RX ORDER — ASPIRIN 81 MG/1
81 TABLET, CHEWABLE ORAL DAILY
Status: DISCONTINUED | OUTPATIENT
Start: 2023-04-28 | End: 2023-05-01 | Stop reason: HOSPADM

## 2023-04-28 RX ORDER — SODIUM CHLORIDE 0.9 % (FLUSH) 0.9 %
5-40 SYRINGE (ML) INJECTION EVERY 12 HOURS SCHEDULED
Status: DISCONTINUED | OUTPATIENT
Start: 2023-04-28 | End: 2023-05-01 | Stop reason: HOSPADM

## 2023-04-28 RX ORDER — CARVEDILOL 6.25 MG/1
6.25 TABLET ORAL 2 TIMES DAILY WITH MEALS
Status: DISCONTINUED | OUTPATIENT
Start: 2023-04-28 | End: 2023-04-30

## 2023-04-28 RX ORDER — POLYETHYLENE GLYCOL 3350 17 G/17G
17 POWDER, FOR SOLUTION ORAL DAILY PRN
Status: DISCONTINUED | OUTPATIENT
Start: 2023-04-28 | End: 2023-05-01 | Stop reason: HOSPADM

## 2023-04-28 RX ORDER — ONDANSETRON 2 MG/ML
4 INJECTION INTRAMUSCULAR; INTRAVENOUS EVERY 6 HOURS PRN
Status: DISCONTINUED | OUTPATIENT
Start: 2023-04-28 | End: 2023-05-01 | Stop reason: HOSPADM

## 2023-04-28 RX ORDER — SODIUM CHLORIDE 9 MG/ML
INJECTION, SOLUTION INTRAVENOUS PRN
Status: DISCONTINUED | OUTPATIENT
Start: 2023-04-28 | End: 2023-05-01 | Stop reason: HOSPADM

## 2023-04-28 RX ORDER — ACETAMINOPHEN 650 MG/1
650 SUPPOSITORY RECTAL EVERY 6 HOURS PRN
Status: DISCONTINUED | OUTPATIENT
Start: 2023-04-28 | End: 2023-05-01 | Stop reason: HOSPADM

## 2023-04-28 RX ORDER — ACETAMINOPHEN 325 MG/1
650 TABLET ORAL EVERY 6 HOURS PRN
Status: DISCONTINUED | OUTPATIENT
Start: 2023-04-28 | End: 2023-05-01 | Stop reason: HOSPADM

## 2023-04-28 RX ADMIN — CARVEDILOL 6.25 MG: 6.25 TABLET, FILM COATED ORAL at 17:49

## 2023-04-28 RX ADMIN — SODIUM CHLORIDE, PRESERVATIVE FREE 10 ML: 5 INJECTION INTRAVENOUS at 21:03

## 2023-04-28 RX ADMIN — ASPIRIN 81 MG CHEWABLE TABLET 81 MG: 81 TABLET CHEWABLE at 17:49

## 2023-04-28 RX ADMIN — HEPARIN SODIUM 5000 UNITS: 5000 INJECTION INTRAVENOUS; SUBCUTANEOUS at 22:14

## 2023-04-28 RX ADMIN — SODIUM BICARBONATE: 84 INJECTION, SOLUTION INTRAVENOUS at 21:03

## 2023-04-28 ASSESSMENT — ENCOUNTER SYMPTOMS
SORE THROAT: 0
EYE PAIN: 0
CONSTIPATION: 0
BACK PAIN: 0
VOMITING: 0
EYE ITCHING: 0
VOICE CHANGE: 0
STRIDOR: 0
DIARRHEA: 0
COUGH: 0
NAUSEA: 0
RHINORRHEA: 0
EYE REDNESS: 0
FACIAL SWELLING: 0
ABDOMINAL PAIN: 0
ABDOMINAL DISTENTION: 0
SHORTNESS OF BREATH: 0

## 2023-04-28 ASSESSMENT — PAIN - FUNCTIONAL ASSESSMENT: PAIN_FUNCTIONAL_ASSESSMENT: NONE - DENIES PAIN

## 2023-04-29 ENCOUNTER — APPOINTMENT (OUTPATIENT)
Facility: HOSPITAL | Age: 83
DRG: 683 | End: 2023-04-29
Payer: MEDICARE

## 2023-04-29 LAB
ANION GAP SERPL CALC-SCNC: 6 MMOL/L (ref 3–18)
BUN SERPL-MCNC: 70 MG/DL (ref 7–18)
BUN/CREAT SERPL: 16 (ref 12–20)
CALCIUM SERPL-MCNC: 8.7 MG/DL (ref 8.5–10.1)
CALCIUM SERPL-MCNC: 8.7 MG/DL (ref 8.5–10.1)
CHLORIDE SERPL-SCNC: 122 MMOL/L (ref 100–111)
CO2 SERPL-SCNC: 18 MMOL/L (ref 21–32)
CREAT SERPL-MCNC: 4.38 MG/DL (ref 0.6–1.3)
EKG ATRIAL RATE: 77 BPM
EKG DIAGNOSIS: NORMAL
EKG P AXIS: 67 DEGREES
EKG P-R INTERVAL: 136 MS
EKG Q-T INTERVAL: 384 MS
EKG QRS DURATION: 70 MS
EKG QTC CALCULATION (BAZETT): 434 MS
EKG R AXIS: 2 DEGREES
EKG T AXIS: 3 DEGREES
EKG VENTRICULAR RATE: 77 BPM
FERRITIN SERPL-MCNC: 161 NG/ML (ref 8–388)
GLUCOSE BLD STRIP.AUTO-MCNC: 143 MG/DL (ref 70–110)
GLUCOSE BLD STRIP.AUTO-MCNC: 210 MG/DL (ref 70–110)
GLUCOSE BLD STRIP.AUTO-MCNC: 96 MG/DL (ref 70–110)
GLUCOSE SERPL-MCNC: 132 MG/DL (ref 74–99)
IRON SATN MFR SERPL: 49 % (ref 20–50)
IRON SERPL-MCNC: 92 UG/DL (ref 50–175)
PHOSPHATE SERPL-MCNC: 4.6 MG/DL (ref 2.5–4.9)
POTASSIUM SERPL-SCNC: 4.7 MMOL/L (ref 3.5–5.5)
PTH-INTACT SERPL-MCNC: 233.2 PG/ML (ref 18.4–88)
SODIUM SERPL-SCNC: 146 MMOL/L (ref 136–145)
TIBC SERPL-MCNC: 187 UG/DL (ref 250–450)

## 2023-04-29 PROCEDURE — 2500000003 HC RX 250 WO HCPCS: Performed by: INTERNAL MEDICINE

## 2023-04-29 PROCEDURE — 93010 ELECTROCARDIOGRAM REPORT: CPT | Performed by: INTERNAL MEDICINE

## 2023-04-29 PROCEDURE — 2580000003 HC RX 258: Performed by: INTERNAL MEDICINE

## 2023-04-29 PROCEDURE — 83970 ASSAY OF PARATHORMONE: CPT

## 2023-04-29 PROCEDURE — 6370000000 HC RX 637 (ALT 250 FOR IP): Performed by: INTERNAL MEDICINE

## 2023-04-29 PROCEDURE — 99233 SBSQ HOSP IP/OBS HIGH 50: CPT | Performed by: STUDENT IN AN ORGANIZED HEALTH CARE EDUCATION/TRAINING PROGRAM

## 2023-04-29 PROCEDURE — 36415 COLL VENOUS BLD VENIPUNCTURE: CPT

## 2023-04-29 PROCEDURE — 1100000003 HC PRIVATE W/ TELEMETRY

## 2023-04-29 PROCEDURE — 82962 GLUCOSE BLOOD TEST: CPT

## 2023-04-29 PROCEDURE — 6370000000 HC RX 637 (ALT 250 FOR IP): Performed by: STUDENT IN AN ORGANIZED HEALTH CARE EDUCATION/TRAINING PROGRAM

## 2023-04-29 PROCEDURE — 82728 ASSAY OF FERRITIN: CPT

## 2023-04-29 PROCEDURE — 84100 ASSAY OF PHOSPHORUS: CPT

## 2023-04-29 PROCEDURE — 80048 BASIC METABOLIC PNL TOTAL CA: CPT

## 2023-04-29 PROCEDURE — 6360000002 HC RX W HCPCS: Performed by: INTERNAL MEDICINE

## 2023-04-29 PROCEDURE — 83540 ASSAY OF IRON: CPT

## 2023-04-29 PROCEDURE — 93306 TTE W/DOPPLER COMPLETE: CPT

## 2023-04-29 PROCEDURE — 83550 IRON BINDING TEST: CPT

## 2023-04-29 RX ORDER — INSULIN LISPRO 100 [IU]/ML
0-4 INJECTION, SOLUTION INTRAVENOUS; SUBCUTANEOUS
Status: DISCONTINUED | OUTPATIENT
Start: 2023-04-30 | End: 2023-05-01 | Stop reason: HOSPADM

## 2023-04-29 RX ORDER — INSULIN LISPRO 100 [IU]/ML
0-4 INJECTION, SOLUTION INTRAVENOUS; SUBCUTANEOUS NIGHTLY
Status: DISCONTINUED | OUTPATIENT
Start: 2023-04-29 | End: 2023-05-01 | Stop reason: HOSPADM

## 2023-04-29 RX ORDER — DEXTROSE MONOHYDRATE 100 MG/ML
INJECTION, SOLUTION INTRAVENOUS CONTINUOUS PRN
Status: DISCONTINUED | OUTPATIENT
Start: 2023-04-29 | End: 2023-05-01 | Stop reason: HOSPADM

## 2023-04-29 RX ORDER — AMLODIPINE BESYLATE 10 MG/1
10 TABLET ORAL DAILY
Status: DISCONTINUED | OUTPATIENT
Start: 2023-04-29 | End: 2023-05-01 | Stop reason: HOSPADM

## 2023-04-29 RX ORDER — HYDRALAZINE HYDROCHLORIDE 25 MG/1
25 TABLET, FILM COATED ORAL EVERY 6 HOURS PRN
Status: DISCONTINUED | OUTPATIENT
Start: 2023-04-29 | End: 2023-04-30

## 2023-04-29 RX ADMIN — CARVEDILOL 6.25 MG: 6.25 TABLET, FILM COATED ORAL at 17:04

## 2023-04-29 RX ADMIN — CARVEDILOL 6.25 MG: 6.25 TABLET, FILM COATED ORAL at 08:58

## 2023-04-29 RX ADMIN — SODIUM CHLORIDE, PRESERVATIVE FREE 10 ML: 5 INJECTION INTRAVENOUS at 20:19

## 2023-04-29 RX ADMIN — AMLODIPINE BESYLATE 10 MG: 10 TABLET ORAL at 11:27

## 2023-04-29 RX ADMIN — HYDRALAZINE HYDROCHLORIDE 25 MG: 25 TABLET, FILM COATED ORAL at 21:25

## 2023-04-29 RX ADMIN — SODIUM BICARBONATE: 84 INJECTION, SOLUTION INTRAVENOUS at 15:25

## 2023-04-29 RX ADMIN — HEPARIN SODIUM 5000 UNITS: 5000 INJECTION INTRAVENOUS; SUBCUTANEOUS at 20:20

## 2023-04-29 RX ADMIN — ASPIRIN 81 MG CHEWABLE TABLET 81 MG: 81 TABLET CHEWABLE at 08:58

## 2023-04-29 RX ADMIN — HEPARIN SODIUM 5000 UNITS: 5000 INJECTION INTRAVENOUS; SUBCUTANEOUS at 13:23

## 2023-04-30 LAB
ANION GAP SERPL CALC-SCNC: 5 MMOL/L (ref 3–18)
BASOPHILS # BLD: 0 K/UL (ref 0–0.1)
BASOPHILS NFR BLD: 0 % (ref 0–2)
BUN SERPL-MCNC: 66 MG/DL (ref 7–18)
BUN/CREAT SERPL: 16 (ref 12–20)
CALCIUM SERPL-MCNC: 8.4 MG/DL (ref 8.5–10.1)
CHLORIDE SERPL-SCNC: 120 MMOL/L (ref 100–111)
CO2 SERPL-SCNC: 22 MMOL/L (ref 21–32)
CREAT SERPL-MCNC: 4.13 MG/DL (ref 0.6–1.3)
DIFFERENTIAL METHOD BLD: ABNORMAL
ECHO AO ASC DIAM: 2.7 CM
ECHO AO ASCENDING AORTA INDEX: 1.64 CM/M2
ECHO AO ROOT DIAM: 2.7 CM
ECHO AO ROOT INDEX: 1.64 CM/M2
ECHO AV AREA PEAK VELOCITY: 1.8 CM2
ECHO AV AREA/BSA PEAK VELOCITY: 1.1 CM2/M2
ECHO AV PEAK GRADIENT: 14 MMHG
ECHO AV PEAK VELOCITY: 1.9 M/S
ECHO AV VELOCITY RATIO: 0.58
ECHO BSA: 1.67 M2
ECHO EST RA PRESSURE: 3 MMHG
ECHO LA VOL 2C: 56 ML (ref 22–52)
ECHO LA VOL 4C: 52 ML (ref 22–52)
ECHO LA VOLUME AREA LENGTH: 57 ML
ECHO LA VOLUME INDEX A2C: 34 ML/M2 (ref 16–34)
ECHO LA VOLUME INDEX A4C: 32 ML/M2 (ref 16–34)
ECHO LA VOLUME INDEX AREA LENGTH: 35 ML/M2 (ref 16–34)
ECHO LV E' LATERAL VELOCITY: 6 CM/S
ECHO LV E' SEPTAL VELOCITY: 5 CM/S
ECHO LV FRACTIONAL SHORTENING: 13 % (ref 28–44)
ECHO LV INTERNAL DIMENSION DIASTOLE INDEX: 2.3 CM/M2
ECHO LV INTERNAL DIMENSION DIASTOLIC: 3.8 CM (ref 3.9–5.3)
ECHO LV INTERNAL DIMENSION SYSTOLIC INDEX: 2 CM/M2
ECHO LV INTERNAL DIMENSION SYSTOLIC: 3.3 CM
ECHO LV IVSD: 1.5 CM (ref 0.6–0.9)
ECHO LV MASS 2D: 205.2 G (ref 67–162)
ECHO LV MASS INDEX 2D: 124.4 G/M2 (ref 43–95)
ECHO LV POSTERIOR WALL DIASTOLIC: 1.4 CM (ref 0.6–0.9)
ECHO LV RELATIVE WALL THICKNESS RATIO: 0.74
ECHO LVOT AREA: 3.1 CM2
ECHO LVOT DIAM: 2 CM
ECHO LVOT MEAN GRADIENT: 2 MMHG
ECHO LVOT PEAK GRADIENT: 5 MMHG
ECHO LVOT PEAK VELOCITY: 1.1 M/S
ECHO LVOT STROKE VOLUME INDEX: 54.6 ML/M2
ECHO LVOT SV: 90.1 ML
ECHO LVOT VTI: 28.7 CM
ECHO MV A VELOCITY: 1.36 M/S
ECHO MV E DECELERATION TIME (DT): 360.4 MS
ECHO MV E VELOCITY: 0.66 M/S
ECHO MV E/A RATIO: 0.49
ECHO MV E/E' LATERAL: 11
ECHO MV E/E' RATIO (AVERAGED): 12.1
ECHO MV E/E' SEPTAL: 13.2
ECHO RIGHT VENTRICULAR SYSTOLIC PRESSURE (RVSP): 39 MMHG
ECHO RV FREE WALL PEAK S': 11 CM/S
ECHO RV TAPSE: 1.9 CM (ref 1.7–?)
ECHO TV REGURGITANT MAX VELOCITY: 3.02 M/S
ECHO TV REGURGITANT PEAK GRADIENT: 36 MMHG
EOSINOPHIL # BLD: 0.1 K/UL (ref 0–0.4)
EOSINOPHIL NFR BLD: 3 % (ref 0–5)
ERYTHROCYTE [DISTWIDTH] IN BLOOD BY AUTOMATED COUNT: 14.9 % (ref 11.6–14.5)
EST. AVERAGE GLUCOSE BLD GHB EST-MCNC: 120 MG/DL
GLUCOSE BLD STRIP.AUTO-MCNC: 105 MG/DL (ref 70–110)
GLUCOSE BLD STRIP.AUTO-MCNC: 123 MG/DL (ref 70–110)
GLUCOSE BLD STRIP.AUTO-MCNC: 150 MG/DL (ref 70–110)
GLUCOSE BLD STRIP.AUTO-MCNC: 178 MG/DL (ref 70–110)
GLUCOSE SERPL-MCNC: 109 MG/DL (ref 74–99)
HBA1C MFR BLD: 5.8 % (ref 4.2–5.6)
HCT VFR BLD AUTO: 23.7 % (ref 35–45)
HGB BLD-MCNC: 7.6 G/DL (ref 12–16)
IMM GRANULOCYTES # BLD AUTO: 0 K/UL (ref 0–0.04)
IMM GRANULOCYTES NFR BLD AUTO: 0 % (ref 0–0.5)
LYMPHOCYTES # BLD: 1.6 K/UL (ref 0.9–3.6)
LYMPHOCYTES NFR BLD: 38 % (ref 21–52)
MCH RBC QN AUTO: 32.2 PG (ref 24–34)
MCHC RBC AUTO-ENTMCNC: 32.1 G/DL (ref 31–37)
MCV RBC AUTO: 100.4 FL (ref 78–100)
MONOCYTES # BLD: 0.4 K/UL (ref 0.05–1.2)
MONOCYTES NFR BLD: 10 % (ref 3–10)
NEUTS SEG # BLD: 2.1 K/UL (ref 1.8–8)
NEUTS SEG NFR BLD: 49 % (ref 40–73)
NRBC # BLD: 0 K/UL (ref 0–0.01)
NRBC BLD-RTO: 0 PER 100 WBC
PLATELET # BLD AUTO: 135 K/UL (ref 135–420)
PMV BLD AUTO: 12 FL (ref 9.2–11.8)
POTASSIUM SERPL-SCNC: 4 MMOL/L (ref 3.5–5.5)
RBC # BLD AUTO: 2.36 M/UL (ref 4.2–5.3)
SODIUM SERPL-SCNC: 147 MMOL/L (ref 136–145)
WBC # BLD AUTO: 4.3 K/UL (ref 4.6–13.2)

## 2023-04-30 PROCEDURE — 6370000000 HC RX 637 (ALT 250 FOR IP): Performed by: INTERNAL MEDICINE

## 2023-04-30 PROCEDURE — 6360000002 HC RX W HCPCS: Performed by: INTERNAL MEDICINE

## 2023-04-30 PROCEDURE — 80048 BASIC METABOLIC PNL TOTAL CA: CPT

## 2023-04-30 PROCEDURE — 85025 COMPLETE CBC W/AUTO DIFF WBC: CPT

## 2023-04-30 PROCEDURE — 99233 SBSQ HOSP IP/OBS HIGH 50: CPT | Performed by: STUDENT IN AN ORGANIZED HEALTH CARE EDUCATION/TRAINING PROGRAM

## 2023-04-30 PROCEDURE — 6370000000 HC RX 637 (ALT 250 FOR IP): Performed by: STUDENT IN AN ORGANIZED HEALTH CARE EDUCATION/TRAINING PROGRAM

## 2023-04-30 PROCEDURE — 83036 HEMOGLOBIN GLYCOSYLATED A1C: CPT

## 2023-04-30 PROCEDURE — 2580000003 HC RX 258: Performed by: INTERNAL MEDICINE

## 2023-04-30 PROCEDURE — 36415 COLL VENOUS BLD VENIPUNCTURE: CPT

## 2023-04-30 PROCEDURE — 93306 TTE W/DOPPLER COMPLETE: CPT | Performed by: INTERNAL MEDICINE

## 2023-04-30 PROCEDURE — 1100000003 HC PRIVATE W/ TELEMETRY

## 2023-04-30 PROCEDURE — 82962 GLUCOSE BLOOD TEST: CPT

## 2023-04-30 RX ORDER — HYDRALAZINE HYDROCHLORIDE 50 MG/1
50 TABLET, FILM COATED ORAL EVERY 6 HOURS PRN
Status: DISCONTINUED | OUTPATIENT
Start: 2023-04-30 | End: 2023-05-01 | Stop reason: HOSPADM

## 2023-04-30 RX ORDER — CARVEDILOL 12.5 MG/1
12.5 TABLET ORAL 2 TIMES DAILY WITH MEALS
Status: DISCONTINUED | OUTPATIENT
Start: 2023-04-30 | End: 2023-05-01 | Stop reason: HOSPADM

## 2023-04-30 RX ADMIN — SODIUM CHLORIDE, PRESERVATIVE FREE 10 ML: 5 INJECTION INTRAVENOUS at 20:16

## 2023-04-30 RX ADMIN — CARVEDILOL 12.5 MG: 12.5 TABLET, FILM COATED ORAL at 16:44

## 2023-04-30 RX ADMIN — HYDRALAZINE HYDROCHLORIDE 25 MG: 25 TABLET, FILM COATED ORAL at 04:55

## 2023-04-30 RX ADMIN — ASPIRIN 81 MG CHEWABLE TABLET 81 MG: 81 TABLET CHEWABLE at 09:13

## 2023-04-30 RX ADMIN — HYDRALAZINE HYDROCHLORIDE 25 MG: 25 TABLET, FILM COATED ORAL at 13:18

## 2023-04-30 RX ADMIN — AMLODIPINE BESYLATE 10 MG: 10 TABLET ORAL at 09:13

## 2023-04-30 RX ADMIN — HEPARIN SODIUM 5000 UNITS: 5000 INJECTION INTRAVENOUS; SUBCUTANEOUS at 05:50

## 2023-04-30 RX ADMIN — HEPARIN SODIUM 5000 UNITS: 5000 INJECTION INTRAVENOUS; SUBCUTANEOUS at 20:17

## 2023-04-30 RX ADMIN — CARVEDILOL 6.25 MG: 6.25 TABLET, FILM COATED ORAL at 09:13

## 2023-04-30 RX ADMIN — HEPARIN SODIUM 5000 UNITS: 5000 INJECTION INTRAVENOUS; SUBCUTANEOUS at 13:18

## 2023-04-30 RX ADMIN — HYDRALAZINE HYDROCHLORIDE 50 MG: 50 TABLET, FILM COATED ORAL at 23:55

## 2023-05-01 VITALS
WEIGHT: 138 LBS | RESPIRATION RATE: 20 BRPM | OXYGEN SATURATION: 95 % | SYSTOLIC BLOOD PRESSURE: 145 MMHG | HEART RATE: 58 BPM | DIASTOLIC BLOOD PRESSURE: 55 MMHG | TEMPERATURE: 98.1 F | BODY MASS INDEX: 24.45 KG/M2 | HEIGHT: 63 IN

## 2023-05-01 PROBLEM — E11.65 TYPE 2 DIABETES MELLITUS WITH HYPERGLYCEMIA, WITHOUT LONG-TERM CURRENT USE OF INSULIN (HCC): Status: ACTIVE | Noted: 2021-12-01

## 2023-05-01 PROBLEM — N18.4 ANEMIA OF CHRONIC RENAL FAILURE, STAGE 4 (SEVERE) (HCC): Status: ACTIVE | Noted: 2020-02-19

## 2023-05-01 PROBLEM — E87.29 HYPERCHLOREMIC METABOLIC ACIDOSIS: Status: ACTIVE | Noted: 2021-12-01

## 2023-05-01 PROBLEM — N17.9 ACUTE RENAL FAILURE (ARF) (HCC): Status: ACTIVE | Noted: 2018-12-25

## 2023-05-01 PROBLEM — I10 HTN (HYPERTENSION): Status: ACTIVE | Noted: 2021-12-01

## 2023-05-01 PROBLEM — D63.1 ANEMIA OF CHRONIC RENAL FAILURE, STAGE 4 (SEVERE) (HCC): Status: ACTIVE | Noted: 2020-02-19

## 2023-05-01 LAB
ANION GAP SERPL CALC-SCNC: 4 MMOL/L (ref 3–18)
BASOPHILS # BLD: 0 K/UL (ref 0–0.1)
BASOPHILS NFR BLD: 0 % (ref 0–2)
BUN SERPL-MCNC: 61 MG/DL (ref 7–18)
BUN/CREAT SERPL: 14 (ref 12–20)
CALCIUM SERPL-MCNC: 8.5 MG/DL (ref 8.5–10.1)
CHLORIDE SERPL-SCNC: 120 MMOL/L (ref 100–111)
CO2 SERPL-SCNC: 22 MMOL/L (ref 21–32)
CREAT SERPL-MCNC: 4.24 MG/DL (ref 0.6–1.3)
DIFFERENTIAL METHOD BLD: ABNORMAL
EOSINOPHIL # BLD: 0.1 K/UL (ref 0–0.4)
EOSINOPHIL NFR BLD: 3 % (ref 0–5)
ERYTHROCYTE [DISTWIDTH] IN BLOOD BY AUTOMATED COUNT: 14.9 % (ref 11.6–14.5)
GLUCOSE BLD STRIP.AUTO-MCNC: 125 MG/DL (ref 70–110)
GLUCOSE BLD STRIP.AUTO-MCNC: 94 MG/DL (ref 70–110)
GLUCOSE SERPL-MCNC: 97 MG/DL (ref 74–99)
HCT VFR BLD AUTO: 22.8 % (ref 35–45)
HGB BLD-MCNC: 7.3 G/DL (ref 12–16)
IMM GRANULOCYTES # BLD AUTO: 0 K/UL (ref 0–0.04)
IMM GRANULOCYTES NFR BLD AUTO: 0 % (ref 0–0.5)
LYMPHOCYTES # BLD: 1.6 K/UL (ref 0.9–3.6)
LYMPHOCYTES NFR BLD: 35 % (ref 21–52)
MCH RBC QN AUTO: 32.4 PG (ref 24–34)
MCHC RBC AUTO-ENTMCNC: 32 G/DL (ref 31–37)
MCV RBC AUTO: 101.3 FL (ref 78–100)
MONOCYTES # BLD: 0.5 K/UL (ref 0.05–1.2)
MONOCYTES NFR BLD: 11 % (ref 3–10)
NEUTS SEG # BLD: 2.2 K/UL (ref 1.8–8)
NEUTS SEG NFR BLD: 50 % (ref 40–73)
NRBC # BLD: 0 K/UL (ref 0–0.01)
NRBC BLD-RTO: 0 PER 100 WBC
PLATELET # BLD AUTO: 133 K/UL (ref 135–420)
PMV BLD AUTO: 12.3 FL (ref 9.2–11.8)
POTASSIUM SERPL-SCNC: 4.1 MMOL/L (ref 3.5–5.5)
RBC # BLD AUTO: 2.25 M/UL (ref 4.2–5.3)
SODIUM SERPL-SCNC: 146 MMOL/L (ref 136–145)
WBC # BLD AUTO: 4.5 K/UL (ref 4.6–13.2)

## 2023-05-01 PROCEDURE — 85025 COMPLETE CBC W/AUTO DIFF WBC: CPT

## 2023-05-01 PROCEDURE — 99239 HOSP IP/OBS DSCHRG MGMT >30: CPT | Performed by: STUDENT IN AN ORGANIZED HEALTH CARE EDUCATION/TRAINING PROGRAM

## 2023-05-01 PROCEDURE — 6370000000 HC RX 637 (ALT 250 FOR IP): Performed by: INTERNAL MEDICINE

## 2023-05-01 PROCEDURE — 2700000000 HC OXYGEN THERAPY PER DAY

## 2023-05-01 PROCEDURE — 6360000002 HC RX W HCPCS: Performed by: INTERNAL MEDICINE

## 2023-05-01 PROCEDURE — 80048 BASIC METABOLIC PNL TOTAL CA: CPT

## 2023-05-01 PROCEDURE — 82962 GLUCOSE BLOOD TEST: CPT

## 2023-05-01 PROCEDURE — 36415 COLL VENOUS BLD VENIPUNCTURE: CPT

## 2023-05-01 PROCEDURE — 2580000003 HC RX 258: Performed by: INTERNAL MEDICINE

## 2023-05-01 RX ORDER — HYDRALAZINE HYDROCHLORIDE 25 MG/1
25 TABLET, FILM COATED ORAL EVERY 8 HOURS SCHEDULED
Status: DISCONTINUED | OUTPATIENT
Start: 2023-05-01 | End: 2023-05-01 | Stop reason: HOSPADM

## 2023-05-01 RX ORDER — CARVEDILOL 12.5 MG/1
12.5 TABLET ORAL 2 TIMES DAILY WITH MEALS
Qty: 60 TABLET | Refills: 3 | Status: SHIPPED | OUTPATIENT
Start: 2023-05-01

## 2023-05-01 RX ORDER — BUMETANIDE 1 MG/1
1 TABLET ORAL DAILY
Qty: 30 TABLET | Refills: 3 | Status: SHIPPED | OUTPATIENT
Start: 2023-05-01

## 2023-05-01 RX ORDER — BUMETANIDE 1 MG/1
1 TABLET ORAL DAILY
Status: DISCONTINUED | OUTPATIENT
Start: 2023-05-01 | End: 2023-05-01 | Stop reason: HOSPADM

## 2023-05-01 RX ORDER — HYDRALAZINE HYDROCHLORIDE 50 MG/1
50 TABLET, FILM COATED ORAL EVERY 6 HOURS PRN
Qty: 90 TABLET | Refills: 3 | Status: SHIPPED | OUTPATIENT
Start: 2023-05-01

## 2023-05-01 RX ORDER — EPOETIN ALFA 4000 [IU]/ML
4000 SOLUTION INTRAVENOUS; SUBCUTANEOUS
Qty: 12 ML | Refills: 0 | Status: SHIPPED | OUTPATIENT
Start: 2023-05-01

## 2023-05-01 RX ADMIN — BUMETANIDE 1 MG: 1 TABLET ORAL at 11:13

## 2023-05-01 RX ADMIN — CARVEDILOL 12.5 MG: 12.5 TABLET, FILM COATED ORAL at 08:25

## 2023-05-01 RX ADMIN — ONDANSETRON 4 MG: 4 TABLET, ORALLY DISINTEGRATING ORAL at 09:37

## 2023-05-01 RX ADMIN — AMLODIPINE BESYLATE 10 MG: 10 TABLET ORAL at 08:25

## 2023-05-01 RX ADMIN — SODIUM CHLORIDE, PRESERVATIVE FREE 10 ML: 5 INJECTION INTRAVENOUS at 08:35

## 2023-05-01 RX ADMIN — HEPARIN SODIUM 5000 UNITS: 5000 INJECTION INTRAVENOUS; SUBCUTANEOUS at 05:28

## 2023-05-01 RX ADMIN — ASPIRIN 81 MG CHEWABLE TABLET 81 MG: 81 TABLET CHEWABLE at 08:25

## 2023-05-01 NOTE — DISCHARGE INSTRUCTIONS
Ms. Oly Melendez,  Thank you for allowing me to care for you while you were admitted. At this time, you do not require dialysis. However, if may be needed in the near future. Please follow up with Dr. Alberto Hickey (nephrology) and Dr. Armen Dowd (vascular) for workup to get you prepared for dialysis. I have included Dr. Joseph Piedra phone number in your discharge instructions. Thank you,  Dr. Elizabeth Platt     Discussed with the patient and all questioned fully answered. She will call me if any problems arise. DISCHARGE SUMMARY from Nurse    PATIENT INSTRUCTIONS:    What to do at home or nursing facility:  Recommended activity: activity as tolerated,     If you experience any of the following symptoms body weakness, unable to urinate, uncontrolled blood pressure please follow up with the assigned nurse or Primary MD.    *  Please give a list of your current medications to your Primary Care Provider. *  Please update this list whenever your medications are discontinued, doses are      changed, or new medications (including over-the-counter products) are added. *  Please carry medication information at all times in case of emergency situations. These are general instructions for a healthy lifestyle:    No smoking/ No tobacco products/ Avoid exposure to second hand smoke  Surgeon General's Warning:  Quitting smoking now greatly reduces serious risk to your health. Obesity, smoking, and sedentary lifestyle greatly increases your risk for illness    A healthy diet, regular physical exercise & weight monitoring are important for maintaining a healthy lifestyle    You may be retaining fluid if you have a history of heart failure or if you experience any of the following symptoms:  Weight gain of 3 pounds or more overnight or 5 pounds in a week, increased swelling in our hands or feet or shortness of breath while lying flat in bed.   Please call your doctor as soon as you notice any of these symptoms; do not wait until your next

## 2023-05-01 NOTE — CARE COORDINATION
Requested Case Management specialist to assist with transportation to [address] [place] [address]. Address is [facility] [address] [place] [id number] and phone number is [phone]  Patient will require BLS transport. Pt requires Stretcher If stretcher, reason: Impaired mobility, Right BKA  Patient is currently requiring oxygen: yes at 3 L  Height:5'3   Weight: 138 lb  Pt is on isolation: yes Isolation is for: MRSA, ESBL  Is the pt ready now? yes  Requested time:   PCS Faxed: no  Insurance verified on face sheet: yes  Auth needed for transport: yes  CM completed PCS/ Envelope and placed on chart.      Rajinder Jurado, BSN, RN  Case Management

## 2023-05-01 NOTE — PROGRESS NOTES
Endorsed patient case status to EMS transporter. Discharge instruction given to the transporter. IV line aseptically removed.

## 2023-05-01 NOTE — PLAN OF CARE
Problem: Discharge Planning  Goal: Discharge to home or other facility with appropriate resources  5/1/2023 1101 by Dax Perales RN  Outcome: Progressing  4/30/2023 2133 by Merly Webster RN  Outcome: Progressing     Problem: Safety - Adult  Goal: Free from fall injury  5/1/2023 1101 by Dax Perales RN  Outcome: Progressing  4/30/2023 2133 by Merly Webster RN  Outcome: Progressing     Problem: Chronic Conditions and Co-morbidities  Goal: Patient's chronic conditions and co-morbidity symptoms are monitored and maintained or improved  5/1/2023 1101 by Dax Perales RN  Outcome: Progressing  4/30/2023 2133 by Merly Webster RN  Outcome: Progressing

## 2023-05-01 NOTE — CARE COORDINATION
Discharge order noted for today. Patient will return to [address] [facility]. Confirmed with Marylouise Leventhal that a bed is available today. Met with the patient at bedside and spoke with her niece Thomas Fortunato via phone and both are agreeable to the transition plan today. Transport to facility has been arranged through [address]. Patient's discharge summary placed in chart at the nurse's station and forwarded via [facility]. Bedside RN, Grant Miguel, has been updated to the transition plan. Discharge information has been updated on the AVS.  Please call report to [phone].       ALECIA JerryN, RN  Case Management

## 2023-05-01 NOTE — CARE COORDINATION
Call made to [facility] [phone], spoke with Katherine Brown, dispatch will call the nurses station with GINO. Trip # [id number].

## 2023-05-01 NOTE — DISCHARGE SUMMARY
Discharge Summary    Patient: Maria Luisa Jones MRN: [id number]  CSN: [id number]    YOB: 1940  Age: 79 y.o. Sex: female    DOA: 4/28/2023 LOS:  LOS: 3 days   Discharge Date:      Admission Diagnosis: Acute renal failure superimposed on stage 3b chronic kidney disease, unspecified acute renal failure type ([address] [N17.9, N18.32]  Acute renal failure superimposed on stage 3 chronic kidney disease, unspecified acute renal failure type, unspecified whether stage 3a or 3b CKD ([address] [N17.9, N18.30]  Acute renal failure (ARF) ([address] [N17.9]    Discharge Diagnosis:   Principal Problem:    Acute renal failure superimposed on stage 3b chronic kidney disease, unspecified acute renal failure type (HCC)  Active Problems:    Anemia of chronic renal failure, stage 4 (severe) (HCC)    Type 2 diabetes mellitus with hyperglycemia, without long-term current use of insulin (HCC)    Hyperchloremic metabolic acidosis    HTN (hypertension)  Resolved Problems:    * No resolved hospital problems. *     Discharge Condition: Stable  Discharge Disposition: SNF     PHYSICAL EXAM  Visit Vitals  BP (!) 145/55   Pulse 58   Temp 98.1 °F (36.7 °C) (Oral)   Resp 20   Ht 5' 3\" (1.6 m)   Wt 138 lb (62.6 kg)   SpO2 95%   BMI 24.45 kg/m²       General: Alert, cooperative, no acute distress    HEENT: NC, Atraumatic. PERRLA, EOMI. Anicteric sclerae. Lungs:  CTA Bilaterally. No Wheezing/Rhonchi/Rales. Heart:  Regular  rhythm,  No murmur, No Rubs, No Gallops  Abdomen: Soft, Non distended, Non tender. +Bowel sounds, no HSM  Extremities: No c/c/e  Psych:   Good insight. Not anxious or agitated. Neurologic:  CN 2-12 grossly intact, oriented X 3. No acute neurological                                 Deficits,     Hospital Course By Problem:   LUCY on CKD3b - likely pre-renal LUCY vs ATN. Unclear at this point. #1. Nephrology consulted. Dialysis does not need to be initiated immediately.  However, patient should see vascular surgery

## 2023-05-01 NOTE — PLAN OF CARE
Problem: Discharge Planning  Goal: Discharge to home or other facility with appropriate resources  5/1/2023 1331 by Momo Dietrich RN  Outcome: Completed  5/1/2023 1101 by Momo Dietrich RN  Outcome: Progressing     Problem: Safety - Adult  Goal: Free from fall injury  5/1/2023 1331 by Momo Dietrich RN  Outcome: Completed  5/1/2023 1101 by Momo Dietrich RN  Outcome: Progressing     Problem: Chronic Conditions and Co-morbidities  Goal: Patient's chronic conditions and co-morbidity symptoms are monitored and maintained or improved  5/1/2023 1331 by Momo Dietrich RN  Outcome: Completed  5/1/2023 1101 by Momo Dietrich RN  Outcome: Progressing

## 2023-05-01 NOTE — CARE COORDINATION
KASSY spoke with Eliana River with [address]. Patient can return to facility today. Booking request uploaded in [address]. Transportation to be arranged.       Ariel Everett, ALECIAN, RN  Case Management

## 2023-05-01 NOTE — PROGRESS NOTES
Progress Note  79y F with PMH DM, HTN, PAD, admitted for renal failure and severe metabolic acidosis,  Subjective      Overnight event noted  No accurate urine output documentation        IMPRESSION:   LUCY on CKD stage 3b. UA not available. Etiology include pre renal LUCY vs ATN but not sure at this point  Hx CKD stage 3b. No labs since 2021. Progression of CKD cannot be excluded  Heavy proteinuria   Anemia ?of chronic disease  Diabetes  Hypertension, not at goal   Metabolic acidosis non AG, renal failure   PLAN:   NO indicaton for dialysis today . Plan to prepare her for dialysis outpatient. Start her on low dose diuretics while she is in hospital and continue as tolerated. She will need epogen at nursing home. Monitor urine output     Dicussed with Dr. Darci Chaudhari.      No Known Allergies     Home Medications:   [unfilled]    Current Facility-Administered Medications   Medication Dose Route Frequency    hydrALAZINE (APRESOLINE) tablet 25 mg  25 mg Oral 3 times per day    carvedilol (COREG) tablet 12.5 mg  12.5 mg Oral BID WC    hydrALAZINE (APRESOLINE) tablet 50 mg  50 mg Oral Q6H PRN    amLODIPine (NORVASC) tablet 10 mg  10 mg Oral Daily    glucose chewable tablet 16 g  4 tablet Oral PRN    dextrose bolus 10% 125 mL  125 mL IntraVENous PRN    Or    dextrose bolus 10% 250 mL  250 mL IntraVENous PRN    glucagon (rDNA) injection 1 mg  1 mg SubCUTAneous PRN    dextrose 10 % infusion   IntraVENous Continuous PRN    insulin lispro (HUMALOG) injection vial 0-4 Units  0-4 Units SubCUTAneous TID WC    insulin lispro (HUMALOG) injection vial 0-4 Units  0-4 Units SubCUTAneous Nightly    sodium chloride flush 0.9 % injection 5-40 mL  5-40 mL IntraVENous 2 times per day    sodium chloride flush 0.9 % injection 5-40 mL  5-40 mL IntraVENous PRN    0.9 % sodium chloride infusion   IntraVENous PRN    heparin (porcine) injection 5,000 Units  5,000 Units SubCUTAneous 3 times per day    ondansetron (ZOFRAN-ODT) disintegrating

## 2023-05-01 NOTE — PLAN OF CARE
Problem: Discharge Planning  Goal: Discharge to home or other facility with appropriate resources  4/30/2023 2133 by Stephanie Lorenzo RN  Outcome: Progressing  4/30/2023 1153 by Michael Sanford RN  Outcome: Progressing     Problem: Safety - Adult  Goal: Free from fall injury  4/30/2023 2133 by Stephanie Lorenzo RN  Outcome: Progressing  4/30/2023 1153 by Michael Sanford RN  Outcome: Progressing     Problem: Chronic Conditions and Co-morbidities  Goal: Patient's chronic conditions and co-morbidity symptoms are monitored and maintained or improved  4/30/2023 2133 by Stephanie Lorenzo RN  Outcome: Progressing  4/30/2023 1153 by Michael Sanford RN  Outcome: Progressing

## 2023-05-01 NOTE — CARE COORDINATION
Transition of Care Plan to SNF/Rehab    SNF/Rehab Transition:  Patient has been accepted to [facility] and meets criteria for admission. Patient will transported by [facility] transport and expected to leave at within 3 hour time frame. Communication to Patient/Family:  Met with patient and nilandry Jean (identified care giver) and they are agreeable to the transition plan. Communication to SNF/Rehab:  Bedside RN, Bailey, has been notified to update the transition plan to the facility and call report (phone number [phone]). Discharge information has been updated on the AVS.     Discharge instructions to be fax'd to facility sent via [address] and in folder    Nursing Please include all hard scripts for controlled substances, med rec and dc summary, and AVS in packet. Reviewed and confirmed with facility,Aneesh at [facility], can manage the patient care needs for the following:     Juanie Service with (X) only those applicable:    Medication:  [x]  Medications will be available at the facility  []  IV Antibiotics   []  Controlled Substance - hard copy to be sent with patient   []  Weekly Labs   Documents:  [x] Hard RX  [] MAR  [] Kardex  [] AVS  []Transfer Summary  [x]Discharge   Equipment:  []  CPAP/BiPAP  []  Wound Vacuum  []  Elaine or Urinary Device  []  PICC/Central Line  []  Nebulizer  []  Ventilator   Treatment:  [x]Isolation (for MRSA, VRE, etc.)  []Surgical Drain Management  []Tracheostomy Care  []Dressing Changes  []Dialysis with transportation and chair time   []PEG Care  [x]Oxygen  []Daily Weights for Heart Failure   Dietary:  []Any diet limitations  []Tube Feedings   []Total Parenteral Management (TPN)   Eligible for Medicaid Long Term Services and Supports  Yes:  [] Eligible for medical assistance or will become eligible within 180 days and UAI completed. [] Provider/Patient and/or support system has requested screening.   [] UAI copy provided to patient or

## 2023-06-21 ENCOUNTER — APPOINTMENT (OUTPATIENT)
Facility: HOSPITAL | Age: 83
DRG: 640 | End: 2023-06-21
Payer: MEDICARE

## 2023-06-21 ENCOUNTER — HOSPITAL ENCOUNTER (INPATIENT)
Facility: HOSPITAL | Age: 83
LOS: 7 days | Discharge: NURSING, REHAB OR LONG-TERM CARE | DRG: 640 | End: 2023-06-28
Attending: EMERGENCY MEDICINE | Admitting: INTERNAL MEDICINE
Payer: MEDICARE

## 2023-06-21 DIAGNOSIS — N18.6 ESRD (END STAGE RENAL DISEASE) (HCC): Primary | ICD-10-CM

## 2023-06-21 DIAGNOSIS — I16.9 HYPERTENSIVE CRISIS: ICD-10-CM

## 2023-06-21 DIAGNOSIS — J90 PLEURAL EFFUSION ON RIGHT: ICD-10-CM

## 2023-06-21 DIAGNOSIS — E87.5 HYPERKALEMIA: ICD-10-CM

## 2023-06-21 PROBLEM — E78.5 HYPERLIPIDEMIA: Chronic | Status: ACTIVE | Noted: 2023-06-21

## 2023-06-21 PROBLEM — E11.9 DIABETES MELLITUS TYPE 2, INSULIN DEPENDENT (HCC): Status: ACTIVE | Noted: 2018-12-25

## 2023-06-21 PROBLEM — Z53.1 BLOOD TRANSFUSION DECLINED BECAUSE PATIENT IS JEHOVAH'S WITNESS: Chronic | Status: ACTIVE | Noted: 2023-06-21

## 2023-06-21 PROBLEM — I73.9 PERIPHERAL VASCULAR DISEASE (HCC): Chronic | Status: ACTIVE | Noted: 2023-06-21

## 2023-06-21 PROBLEM — E86.0 DEHYDRATION: Status: RESOLVED | Noted: 2018-12-25 | Resolved: 2023-06-21

## 2023-06-21 PROBLEM — E11.01 HYPEROSMOLAR HYPERGLYCEMIC COMA DUE TO DIABETES MELLITUS WITHOUT KETOACIDOSIS (HCC): Status: RESOLVED | Noted: 2020-01-03 | Resolved: 2023-06-21

## 2023-06-21 PROBLEM — Z79.4 DIABETES MELLITUS TYPE 2, INSULIN DEPENDENT (HCC): Chronic | Status: ACTIVE | Noted: 2018-12-25

## 2023-06-21 PROBLEM — Z79.4 DIABETES MELLITUS TYPE 2, INSULIN DEPENDENT (HCC): Status: ACTIVE | Noted: 2018-12-25

## 2023-06-21 PROBLEM — R73.9 HYPERGLYCEMIA: Status: RESOLVED | Noted: 2018-12-25 | Resolved: 2023-06-21

## 2023-06-21 PROBLEM — E11.00 HYPEROSMOLAR NON-KETOTIC STATE IN PATIENT WITH TYPE 2 DIABETES MELLITUS (HCC): Status: RESOLVED | Noted: 2020-01-03 | Resolved: 2023-06-21

## 2023-06-21 PROBLEM — N18.32 ACUTE RENAL FAILURE SUPERIMPOSED ON STAGE 3B CHRONIC KIDNEY DISEASE, UNSPECIFIED ACUTE RENAL FAILURE TYPE (HCC): Status: RESOLVED | Noted: 2023-04-28 | Resolved: 2023-06-21

## 2023-06-21 PROBLEM — N17.9 ACUTE RENAL FAILURE SUPERIMPOSED ON STAGE 3B CHRONIC KIDNEY DISEASE, UNSPECIFIED ACUTE RENAL FAILURE TYPE (HCC): Status: RESOLVED | Noted: 2023-04-28 | Resolved: 2023-06-21

## 2023-06-21 PROBLEM — E11.9 DIABETES MELLITUS TYPE 2, INSULIN DEPENDENT (HCC): Chronic | Status: ACTIVE | Noted: 2018-12-25

## 2023-06-21 PROBLEM — S78.111A UNILATERAL AKA, RIGHT (HCC): Chronic | Status: ACTIVE | Noted: 2023-06-21

## 2023-06-21 PROBLEM — I10 HTN (HYPERTENSION): Chronic | Status: ACTIVE | Noted: 2021-12-01

## 2023-06-21 PROBLEM — Z66 DNR (DO NOT RESUSCITATE): Chronic | Status: ACTIVE | Noted: 2023-06-21

## 2023-06-21 PROBLEM — N18.5 CHRONIC KIDNEY DISEASE, STAGE V (VERY SEVERE) (HCC): Chronic | Status: ACTIVE | Noted: 2023-06-21

## 2023-06-21 PROBLEM — E11.65 TYPE 2 DIABETES MELLITUS WITH HYPERGLYCEMIA, WITHOUT LONG-TERM CURRENT USE OF INSULIN (HCC): Status: RESOLVED | Noted: 2021-12-01 | Resolved: 2023-06-21

## 2023-06-21 LAB
ALBUMIN SERPL-MCNC: 2.8 G/DL (ref 3.4–5)
ALBUMIN/GLOB SERPL: 0.8 (ref 0.8–1.7)
ALP SERPL-CCNC: 114 U/L (ref 45–117)
ALT SERPL-CCNC: 29 U/L (ref 13–56)
ANION GAP SERPL CALC-SCNC: 7 MMOL/L (ref 3–18)
AST SERPL-CCNC: 38 U/L (ref 10–38)
BASOPHILS # BLD: 0 K/UL (ref 0–0.1)
BASOPHILS NFR BLD: 1 % (ref 0–2)
BILIRUB SERPL-MCNC: 0.2 MG/DL (ref 0.2–1)
BUN SERPL-MCNC: 50 MG/DL (ref 7–18)
BUN/CREAT SERPL: 10 (ref 12–20)
CALCIUM SERPL-MCNC: 8.6 MG/DL (ref 8.5–10.1)
CHLORIDE SERPL-SCNC: 119 MMOL/L (ref 100–111)
CO2 SERPL-SCNC: 18 MMOL/L (ref 21–32)
CREAT SERPL-MCNC: 5.09 MG/DL (ref 0.6–1.3)
DIFFERENTIAL METHOD BLD: ABNORMAL
EKG ATRIAL RATE: 51 BPM
EKG DIAGNOSIS: NORMAL
EKG P AXIS: 22 DEGREES
EKG P-R INTERVAL: 104 MS
EKG Q-T INTERVAL: 432 MS
EKG QRS DURATION: 70 MS
EKG QTC CALCULATION (BAZETT): 398 MS
EKG R AXIS: -5 DEGREES
EKG T AXIS: 35 DEGREES
EKG VENTRICULAR RATE: 51 BPM
EOSINOPHIL # BLD: 0.1 K/UL (ref 0–0.4)
EOSINOPHIL NFR BLD: 4 % (ref 0–5)
ERYTHROCYTE [DISTWIDTH] IN BLOOD BY AUTOMATED COUNT: 15.7 % (ref 11.6–14.5)
GLOBULIN SER CALC-MCNC: 3.4 G/DL (ref 2–4)
GLUCOSE BLD STRIP.AUTO-MCNC: 101 MG/DL (ref 70–110)
GLUCOSE SERPL-MCNC: 61 MG/DL (ref 74–99)
HBV SURFACE AG SER QL: <0.1 INDEX
HBV SURFACE AG SER QL: NEGATIVE
HCT VFR BLD AUTO: 30.9 % (ref 35–45)
HGB BLD-MCNC: 9.2 G/DL (ref 12–16)
IMM GRANULOCYTES # BLD AUTO: 0 K/UL (ref 0–0.04)
IMM GRANULOCYTES NFR BLD AUTO: 0 % (ref 0–0.5)
LYMPHOCYTES # BLD: 0.8 K/UL (ref 0.9–3.6)
LYMPHOCYTES NFR BLD: 26 % (ref 21–52)
MAGNESIUM SERPL-MCNC: 2.7 MG/DL (ref 1.6–2.6)
MCH RBC QN AUTO: 31.3 PG (ref 24–34)
MCHC RBC AUTO-ENTMCNC: 29.8 G/DL (ref 31–37)
MCV RBC AUTO: 105.1 FL (ref 78–100)
MONOCYTES # BLD: 0.4 K/UL (ref 0.05–1.2)
MONOCYTES NFR BLD: 14 % (ref 3–10)
NEUTS SEG # BLD: 1.8 K/UL (ref 1.8–8)
NEUTS SEG NFR BLD: 56 % (ref 40–73)
NRBC # BLD: 0 K/UL (ref 0–0.01)
NRBC BLD-RTO: 0 PER 100 WBC
PLATELET # BLD AUTO: 149 K/UL (ref 135–420)
PMV BLD AUTO: 11.7 FL (ref 9.2–11.8)
POTASSIUM SERPL-SCNC: 6.6 MMOL/L (ref 3.5–5.5)
PROT SERPL-MCNC: 6.2 G/DL (ref 6.4–8.2)
RBC # BLD AUTO: 2.94 M/UL (ref 4.2–5.3)
SODIUM SERPL-SCNC: 144 MMOL/L (ref 136–145)
TROPONIN I SERPL HS-MCNC: 20 NG/L (ref 0–54)
WBC # BLD AUTO: 3.2 K/UL (ref 4.6–13.2)

## 2023-06-21 PROCEDURE — 96375 TX/PRO/DX INJ NEW DRUG ADDON: CPT

## 2023-06-21 PROCEDURE — 90935 HEMODIALYSIS ONE EVALUATION: CPT

## 2023-06-21 PROCEDURE — 87340 HEPATITIS B SURFACE AG IA: CPT

## 2023-06-21 PROCEDURE — 6370000000 HC RX 637 (ALT 250 FOR IP): Performed by: EMERGENCY MEDICINE

## 2023-06-21 PROCEDURE — 96365 THER/PROPH/DIAG IV INF INIT: CPT

## 2023-06-21 PROCEDURE — 71045 X-RAY EXAM CHEST 1 VIEW: CPT

## 2023-06-21 PROCEDURE — 99285 EMERGENCY DEPT VISIT HI MDM: CPT

## 2023-06-21 PROCEDURE — 84484 ASSAY OF TROPONIN QUANT: CPT

## 2023-06-21 PROCEDURE — 85025 COMPLETE CBC W/AUTO DIFF WBC: CPT

## 2023-06-21 PROCEDURE — 80053 COMPREHEN METABOLIC PANEL: CPT

## 2023-06-21 PROCEDURE — 93005 ELECTROCARDIOGRAM TRACING: CPT | Performed by: EMERGENCY MEDICINE

## 2023-06-21 PROCEDURE — 2580000003 HC RX 258: Performed by: EMERGENCY MEDICINE

## 2023-06-21 PROCEDURE — 93010 ELECTROCARDIOGRAM REPORT: CPT | Performed by: INTERNAL MEDICINE

## 2023-06-21 PROCEDURE — 86706 HEP B SURFACE ANTIBODY: CPT

## 2023-06-21 PROCEDURE — 82962 GLUCOSE BLOOD TEST: CPT

## 2023-06-21 PROCEDURE — 2500000003 HC RX 250 WO HCPCS: Performed by: EMERGENCY MEDICINE

## 2023-06-21 PROCEDURE — 83735 ASSAY OF MAGNESIUM: CPT

## 2023-06-21 PROCEDURE — 99223 1ST HOSP IP/OBS HIGH 75: CPT | Performed by: INTERNAL MEDICINE

## 2023-06-21 PROCEDURE — 1100000003 HC PRIVATE W/ TELEMETRY

## 2023-06-21 RX ORDER — SODIUM POLYSTYRENE SULFONATE 15 G/60ML
30 SUSPENSION ORAL; RECTAL ONCE
Status: DISCONTINUED | OUTPATIENT
Start: 2023-06-21 | End: 2023-06-22

## 2023-06-21 RX ORDER — AMLODIPINE BESYLATE 5 MG/1
5 TABLET ORAL DAILY
Status: DISCONTINUED | OUTPATIENT
Start: 2023-06-21 | End: 2023-06-21

## 2023-06-21 RX ORDER — ERGOCALCIFEROL 1.25 MG/1
50000 CAPSULE ORAL WEEKLY
Status: ON HOLD | COMMUNITY
End: 2023-06-27 | Stop reason: HOSPADM

## 2023-06-21 RX ORDER — HYDRALAZINE HYDROCHLORIDE 25 MG/1
25 TABLET, FILM COATED ORAL 3 TIMES DAILY
Status: DISCONTINUED | OUTPATIENT
Start: 2023-06-21 | End: 2023-06-22

## 2023-06-21 RX ORDER — HEPARIN SODIUM 5000 [USP'U]/ML
5000 INJECTION, SOLUTION INTRAVENOUS; SUBCUTANEOUS EVERY 8 HOURS SCHEDULED
Status: DISCONTINUED | OUTPATIENT
Start: 2023-06-22 | End: 2023-06-22

## 2023-06-21 RX ORDER — INSULIN LISPRO 100 [IU]/ML
0-4 INJECTION, SOLUTION INTRAVENOUS; SUBCUTANEOUS EVERY 6 HOURS
Status: DISCONTINUED | OUTPATIENT
Start: 2023-06-21 | End: 2023-06-23

## 2023-06-21 RX ORDER — DOCUSATE SODIUM 100 MG/1
100 CAPSULE, LIQUID FILLED ORAL 2 TIMES DAILY
Status: DISCONTINUED | OUTPATIENT
Start: 2023-06-21 | End: 2023-06-29 | Stop reason: HOSPADM

## 2023-06-21 RX ORDER — MECOBALAMIN 5000 MCG
5 TABLET,DISINTEGRATING ORAL NIGHTLY PRN
Status: DISCONTINUED | OUTPATIENT
Start: 2023-06-21 | End: 2023-06-29 | Stop reason: HOSPADM

## 2023-06-21 RX ORDER — CALCIUM GLUCONATE 20 MG/ML
1000 INJECTION, SOLUTION INTRAVENOUS ONCE
Status: COMPLETED | OUTPATIENT
Start: 2023-06-21 | End: 2023-06-21

## 2023-06-21 RX ORDER — SODIUM CHLORIDE 9 MG/ML
INJECTION, SOLUTION INTRAVENOUS PRN
Status: DISCONTINUED | OUTPATIENT
Start: 2023-06-21 | End: 2023-06-29 | Stop reason: HOSPADM

## 2023-06-21 RX ORDER — ONDANSETRON 4 MG/1
4 TABLET, ORALLY DISINTEGRATING ORAL EVERY 8 HOURS PRN
Status: DISCONTINUED | OUTPATIENT
Start: 2023-06-21 | End: 2023-06-29 | Stop reason: HOSPADM

## 2023-06-21 RX ORDER — SODIUM CHLORIDE 0.9 % (FLUSH) 0.9 %
5-40 SYRINGE (ML) INJECTION PRN
Status: DISCONTINUED | OUTPATIENT
Start: 2023-06-21 | End: 2023-06-29 | Stop reason: HOSPADM

## 2023-06-21 RX ORDER — SODIUM CHLORIDE 0.9 % (FLUSH) 0.9 %
5-40 SYRINGE (ML) INJECTION EVERY 12 HOURS SCHEDULED
Status: DISCONTINUED | OUTPATIENT
Start: 2023-06-21 | End: 2023-06-29 | Stop reason: HOSPADM

## 2023-06-21 RX ORDER — IPRATROPIUM BROMIDE AND ALBUTEROL SULFATE 2.5; .5 MG/3ML; MG/3ML
1 SOLUTION RESPIRATORY (INHALATION) EVERY 4 HOURS PRN
Status: DISCONTINUED | OUTPATIENT
Start: 2023-06-21 | End: 2023-06-29 | Stop reason: HOSPADM

## 2023-06-21 RX ORDER — DEXTROSE MONOHYDRATE 100 MG/ML
INJECTION, SOLUTION INTRAVENOUS CONTINUOUS PRN
Status: DISCONTINUED | OUTPATIENT
Start: 2023-06-21 | End: 2023-06-29 | Stop reason: HOSPADM

## 2023-06-21 RX ORDER — HYDRALAZINE HYDROCHLORIDE 20 MG/ML
5 INJECTION INTRAMUSCULAR; INTRAVENOUS EVERY 6 HOURS PRN
Status: DISCONTINUED | OUTPATIENT
Start: 2023-06-21 | End: 2023-06-23

## 2023-06-21 RX ORDER — DULAGLUTIDE 0.75 MG/.5ML
0.75 INJECTION, SOLUTION SUBCUTANEOUS WEEKLY
Status: ON HOLD | COMMUNITY
End: 2023-06-27 | Stop reason: HOSPADM

## 2023-06-21 RX ORDER — POLYETHYLENE GLYCOL 3350 17 G/17G
17 POWDER, FOR SOLUTION ORAL DAILY PRN
Status: DISCONTINUED | OUTPATIENT
Start: 2023-06-21 | End: 2023-06-29 | Stop reason: HOSPADM

## 2023-06-21 RX ORDER — ASPIRIN 81 MG/1
81 TABLET, CHEWABLE ORAL DAILY
Status: DISCONTINUED | OUTPATIENT
Start: 2023-06-21 | End: 2023-06-29 | Stop reason: HOSPADM

## 2023-06-21 RX ORDER — AMLODIPINE BESYLATE 5 MG/1
5 TABLET ORAL DAILY
Status: DISCONTINUED | OUTPATIENT
Start: 2023-06-22 | End: 2023-06-22

## 2023-06-21 RX ORDER — ACETAMINOPHEN 650 MG/1
650 SUPPOSITORY RECTAL EVERY 6 HOURS PRN
Status: DISCONTINUED | OUTPATIENT
Start: 2023-06-21 | End: 2023-06-29 | Stop reason: HOSPADM

## 2023-06-21 RX ORDER — ACETAMINOPHEN 325 MG/1
650 TABLET ORAL EVERY 6 HOURS PRN
Status: DISCONTINUED | OUTPATIENT
Start: 2023-06-21 | End: 2023-06-29 | Stop reason: HOSPADM

## 2023-06-21 RX ORDER — ONDANSETRON 2 MG/ML
4 INJECTION INTRAMUSCULAR; INTRAVENOUS EVERY 6 HOURS PRN
Status: DISCONTINUED | OUTPATIENT
Start: 2023-06-21 | End: 2023-06-29 | Stop reason: HOSPADM

## 2023-06-21 RX ORDER — ATORVASTATIN CALCIUM 20 MG/1
20 TABLET, FILM COATED ORAL DAILY
Status: DISCONTINUED | OUTPATIENT
Start: 2023-06-21 | End: 2023-06-29 | Stop reason: HOSPADM

## 2023-06-21 RX ORDER — INSULIN LISPRO 100 [IU]/ML
0-4 INJECTION, SOLUTION INTRAVENOUS; SUBCUTANEOUS EVERY 4 HOURS
Status: DISCONTINUED | OUTPATIENT
Start: 2023-06-21 | End: 2023-06-21

## 2023-06-21 RX ADMIN — DEXTROSE MONOHYDRATE 250 ML: 10 INJECTION, SOLUTION INTRAVENOUS at 15:38

## 2023-06-21 RX ADMIN — SODIUM BICARBONATE 50 MEQ: 84 INJECTION INTRAVENOUS at 15:37

## 2023-06-21 RX ADMIN — INSULIN HUMAN 10 UNITS: 100 INJECTION, SOLUTION PARENTERAL at 15:39

## 2023-06-21 RX ADMIN — CALCIUM GLUCONATE 1000 MG: 20 INJECTION, SOLUTION INTRAVENOUS at 15:37

## 2023-06-21 ASSESSMENT — ENCOUNTER SYMPTOMS
VOMITING: 0
BACK PAIN: 0
DIARRHEA: 0
SHORTNESS OF BREATH: 1
NAUSEA: 0
ABDOMINAL PAIN: 0
COUGH: 0

## 2023-06-21 ASSESSMENT — PAIN - FUNCTIONAL ASSESSMENT: PAIN_FUNCTIONAL_ASSESSMENT: NONE - DENIES PAIN

## 2023-06-22 ENCOUNTER — APPOINTMENT (OUTPATIENT)
Facility: HOSPITAL | Age: 83
DRG: 640 | End: 2023-06-22
Attending: INTERNAL MEDICINE
Payer: MEDICARE

## 2023-06-22 PROBLEM — N18.6 ESRD (END STAGE RENAL DISEASE) (HCC): Status: ACTIVE | Noted: 2023-06-22

## 2023-06-22 LAB
ALBUMIN SERPL-MCNC: 2.6 G/DL (ref 3.4–5)
ALBUMIN/GLOB SERPL: 0.7 (ref 0.8–1.7)
ALP SERPL-CCNC: 117 U/L (ref 45–117)
ALT SERPL-CCNC: 28 U/L (ref 13–56)
ANION GAP SERPL CALC-SCNC: 5 MMOL/L (ref 3–18)
AST SERPL-CCNC: 39 U/L (ref 10–38)
BASOPHILS # BLD: 0 K/UL (ref 0–0.1)
BASOPHILS NFR BLD: 1 % (ref 0–2)
BILIRUB SERPL-MCNC: 0.7 MG/DL (ref 0.2–1)
BUN SERPL-MCNC: 30 MG/DL (ref 7–18)
BUN/CREAT SERPL: 9 (ref 12–20)
CALCIUM SERPL-MCNC: 8.5 MG/DL (ref 8.5–10.1)
CHLORIDE SERPL-SCNC: 112 MMOL/L (ref 100–111)
CO2 SERPL-SCNC: 23 MMOL/L (ref 21–32)
CREAT SERPL-MCNC: 3.41 MG/DL (ref 0.6–1.3)
DIFFERENTIAL METHOD BLD: ABNORMAL
ECHO AO ROOT DIAM: 2.7 CM
ECHO AO ROOT INDEX: 1.6 CM/M2
ECHO AV PEAK GRADIENT: 17 MMHG
ECHO AV PEAK VELOCITY: 2.1 M/S
ECHO BSA: 1.69 M2
ECHO EST RA PRESSURE: 8 MMHG
ECHO LA VOL 2C: 55 ML (ref 22–52)
ECHO LA VOL 2C: 58 ML (ref 22–52)
ECHO LA VOL 4C: 57 ML (ref 22–52)
ECHO LA VOL 4C: 61 ML (ref 22–52)
ECHO LA VOLUME AREA LENGTH: 60 ML
ECHO LA VOLUME INDEX AREA LENGTH: 36 ML/M2 (ref 16–34)
ECHO LV FRACTIONAL SHORTENING: 31 % (ref 28–44)
ECHO LV INTERNAL DIMENSION DIASTOLE INDEX: 2.31 CM/M2
ECHO LV INTERNAL DIMENSION DIASTOLIC: 3.9 CM (ref 3.9–5.3)
ECHO LV INTERNAL DIMENSION SYSTOLIC INDEX: 1.6 CM/M2
ECHO LV INTERNAL DIMENSION SYSTOLIC: 2.7 CM
ECHO LV IVSD: 0.9 CM (ref 0.6–0.9)
ECHO LV MASS 2D: 113.6 G (ref 67–162)
ECHO LV MASS INDEX 2D: 67.2 G/M2 (ref 43–95)
ECHO LV POSTERIOR WALL DIASTOLIC: 1 CM (ref 0.6–0.9)
ECHO LV RELATIVE WALL THICKNESS RATIO: 0.51
ECHO LVOT AREA: 2.5 CM2
ECHO LVOT DIAM: 1.8 CM
ECHO PV MAX VELOCITY: 0.9 M/S
ECHO PV PEAK GRADIENT: 3 MMHG
ECHO RA AREA 4C: 13.2 CM2
ECHO RIGHT VENTRICULAR SYSTOLIC PRESSURE (RVSP): 40 MMHG
ECHO RV BASAL DIMENSION: 3.5 CM
ECHO RV FREE WALL PEAK S': 14 CM/S
ECHO RV TAPSE: 2.6 CM (ref 1.7–?)
ECHO TV REGURGITANT MAX VELOCITY: 2.82 M/S
ECHO TV REGURGITANT PEAK GRADIENT: 32 MMHG
EOSINOPHIL # BLD: 0.1 K/UL (ref 0–0.4)
EOSINOPHIL NFR BLD: 3 % (ref 0–5)
ERYTHROCYTE [DISTWIDTH] IN BLOOD BY AUTOMATED COUNT: 15 % (ref 11.6–14.5)
FERRITIN SERPL-MCNC: 41 NG/ML (ref 8–388)
GLOBULIN SER CALC-MCNC: 3.5 G/DL (ref 2–4)
GLUCOSE BLD STRIP.AUTO-MCNC: 102 MG/DL (ref 70–110)
GLUCOSE BLD STRIP.AUTO-MCNC: 104 MG/DL (ref 70–110)
GLUCOSE BLD STRIP.AUTO-MCNC: 170 MG/DL (ref 70–110)
GLUCOSE BLD STRIP.AUTO-MCNC: 62 MG/DL (ref 70–110)
GLUCOSE BLD STRIP.AUTO-MCNC: 63 MG/DL (ref 70–110)
GLUCOSE BLD STRIP.AUTO-MCNC: 63 MG/DL (ref 70–110)
GLUCOSE BLD STRIP.AUTO-MCNC: 75 MG/DL (ref 70–110)
GLUCOSE BLD STRIP.AUTO-MCNC: 95 MG/DL (ref 70–110)
GLUCOSE SERPL-MCNC: 58 MG/DL (ref 74–99)
HBV SURFACE AB SER QL IA: NEGATIVE
HBV SURFACE AB SERPL IA-ACNC: 3.32 MIU/ML
HCT VFR BLD AUTO: 29.6 % (ref 35–45)
HGB BLD-MCNC: 9.1 G/DL (ref 12–16)
IMM GRANULOCYTES # BLD AUTO: 0 K/UL (ref 0–0.04)
IMM GRANULOCYTES NFR BLD AUTO: 1 % (ref 0–0.5)
INR PPP: 1 (ref 0.8–1.2)
IRON SATN MFR SERPL: 50 % (ref 20–50)
IRON SERPL-MCNC: 112 UG/DL (ref 50–175)
LYMPHOCYTES # BLD: 1.2 K/UL (ref 0.9–3.6)
LYMPHOCYTES NFR BLD: 28 % (ref 21–52)
Lab: ABNORMAL
MAGNESIUM SERPL-MCNC: 2.3 MG/DL (ref 1.6–2.6)
MCH RBC QN AUTO: 31.7 PG (ref 24–34)
MCHC RBC AUTO-ENTMCNC: 30.7 G/DL (ref 31–37)
MCV RBC AUTO: 103.1 FL (ref 78–100)
MONOCYTES # BLD: 0.5 K/UL (ref 0.05–1.2)
MONOCYTES NFR BLD: 13 % (ref 3–10)
NEUTS SEG # BLD: 2.3 K/UL (ref 1.8–8)
NEUTS SEG NFR BLD: 55 % (ref 40–73)
NRBC # BLD: 0 K/UL (ref 0–0.01)
NRBC BLD-RTO: 0 PER 100 WBC
PHOSPHATE SERPL-MCNC: 3.8 MG/DL (ref 2.5–4.9)
PLATELET # BLD AUTO: 137 K/UL (ref 135–420)
PMV BLD AUTO: 11 FL (ref 9.2–11.8)
POTASSIUM SERPL-SCNC: 4.7 MMOL/L (ref 3.5–5.5)
PROT SERPL-MCNC: 6.1 G/DL (ref 6.4–8.2)
PROTHROMBIN TIME: 14 SEC (ref 11.5–15.2)
RBC # BLD AUTO: 2.87 M/UL (ref 4.2–5.3)
SODIUM SERPL-SCNC: 140 MMOL/L (ref 136–145)
TIBC SERPL-MCNC: 223 UG/DL (ref 250–450)
WBC # BLD AUTO: 4.2 K/UL (ref 4.6–13.2)

## 2023-06-22 PROCEDURE — 6370000000 HC RX 637 (ALT 250 FOR IP): Performed by: INTERNAL MEDICINE

## 2023-06-22 PROCEDURE — 93321 DOPPLER ECHO F-UP/LMTD STD: CPT

## 2023-06-22 PROCEDURE — 83540 ASSAY OF IRON: CPT

## 2023-06-22 PROCEDURE — 90935 HEMODIALYSIS ONE EVALUATION: CPT

## 2023-06-22 PROCEDURE — 83735 ASSAY OF MAGNESIUM: CPT

## 2023-06-22 PROCEDURE — 80053 COMPREHEN METABOLIC PANEL: CPT

## 2023-06-22 PROCEDURE — 36415 COLL VENOUS BLD VENIPUNCTURE: CPT

## 2023-06-22 PROCEDURE — 6360000002 HC RX W HCPCS: Performed by: INTERNAL MEDICINE

## 2023-06-22 PROCEDURE — 86704 HEP B CORE ANTIBODY TOTAL: CPT

## 2023-06-22 PROCEDURE — 82962 GLUCOSE BLOOD TEST: CPT

## 2023-06-22 PROCEDURE — 84100 ASSAY OF PHOSPHORUS: CPT

## 2023-06-22 PROCEDURE — 82728 ASSAY OF FERRITIN: CPT

## 2023-06-22 PROCEDURE — 83550 IRON BINDING TEST: CPT

## 2023-06-22 PROCEDURE — 85610 PROTHROMBIN TIME: CPT

## 2023-06-22 PROCEDURE — 2580000003 HC RX 258: Performed by: INTERNAL MEDICINE

## 2023-06-22 PROCEDURE — 85025 COMPLETE CBC W/AUTO DIFF WBC: CPT

## 2023-06-22 PROCEDURE — 99233 SBSQ HOSP IP/OBS HIGH 50: CPT | Performed by: INTERNAL MEDICINE

## 2023-06-22 PROCEDURE — 1100000003 HC PRIVATE W/ TELEMETRY

## 2023-06-22 RX ORDER — HYDRALAZINE HYDROCHLORIDE 50 MG/1
50 TABLET, FILM COATED ORAL 3 TIMES DAILY
Status: DISCONTINUED | OUTPATIENT
Start: 2023-06-22 | End: 2023-06-23

## 2023-06-22 RX ORDER — NIFEDIPINE 30 MG/1
60 TABLET, EXTENDED RELEASE ORAL DAILY
Status: DISCONTINUED | OUTPATIENT
Start: 2023-06-22 | End: 2023-06-29 | Stop reason: HOSPADM

## 2023-06-22 RX ORDER — HEPARIN SODIUM 5000 [USP'U]/ML
5000 INJECTION, SOLUTION INTRAVENOUS; SUBCUTANEOUS 2 TIMES DAILY
Status: DISCONTINUED | OUTPATIENT
Start: 2023-06-22 | End: 2023-06-29 | Stop reason: HOSPADM

## 2023-06-22 RX ADMIN — SODIUM CHLORIDE, PRESERVATIVE FREE 10 ML: 5 INJECTION INTRAVENOUS at 21:06

## 2023-06-22 RX ADMIN — ATORVASTATIN CALCIUM 20 MG: 20 TABLET, FILM COATED ORAL at 14:30

## 2023-06-22 RX ADMIN — SODIUM CHLORIDE, PRESERVATIVE FREE 10 ML: 5 INJECTION INTRAVENOUS at 14:33

## 2023-06-22 RX ADMIN — HEPARIN SODIUM 5000 UNITS: 5000 INJECTION INTRAVENOUS; SUBCUTANEOUS at 21:05

## 2023-06-22 RX ADMIN — NITROGLYCERIN 0.5 INCH: 20 OINTMENT TOPICAL at 04:27

## 2023-06-22 RX ADMIN — DEXTROSE MONOHYDRATE 125 ML: 100 INJECTION, SOLUTION INTRAVENOUS at 00:43

## 2023-06-22 RX ADMIN — SODIUM CHLORIDE, PRESERVATIVE FREE 10 ML: 5 INJECTION INTRAVENOUS at 00:49

## 2023-06-22 RX ADMIN — ASPIRIN 81 MG CHEWABLE TABLET 81 MG: 81 TABLET CHEWABLE at 14:30

## 2023-06-22 RX ADMIN — HYDRALAZINE HYDROCHLORIDE 5 MG: 20 INJECTION INTRAMUSCULAR; INTRAVENOUS at 06:01

## 2023-06-22 RX ADMIN — DOCUSATE SODIUM 100 MG: 100 CAPSULE, LIQUID FILLED ORAL at 14:30

## 2023-06-22 RX ADMIN — HYDRALAZINE HYDROCHLORIDE 50 MG: 50 TABLET, FILM COATED ORAL at 21:05

## 2023-06-22 RX ADMIN — Medication 5 MG: at 00:48

## 2023-06-22 RX ADMIN — DEXTROSE MONOHYDRATE 125 ML: 100 INJECTION, SOLUTION INTRAVENOUS at 06:07

## 2023-06-22 RX ADMIN — DOCUSATE SODIUM 100 MG: 100 CAPSULE, LIQUID FILLED ORAL at 21:05

## 2023-06-22 RX ADMIN — HYDRALAZINE HYDROCHLORIDE 25 MG: 25 TABLET, FILM COATED ORAL at 00:48

## 2023-06-22 RX ADMIN — NIFEDIPINE 60 MG: 30 TABLET, FILM COATED, EXTENDED RELEASE ORAL at 14:30

## 2023-06-22 RX ADMIN — ACETAMINOPHEN 325MG 650 MG: 325 TABLET ORAL at 14:43

## 2023-06-22 ASSESSMENT — PAIN SCALES - GENERAL: PAINLEVEL_OUTOF10: 4

## 2023-06-22 ASSESSMENT — PAIN DESCRIPTION - LOCATION: LOCATION: LEG;TOE (COMMENT WHICH ONE)

## 2023-06-22 ASSESSMENT — PAIN DESCRIPTION - DESCRIPTORS: DESCRIPTORS: ACHING

## 2023-06-22 ASSESSMENT — PAIN DESCRIPTION - ORIENTATION: ORIENTATION: LEFT;RIGHT

## 2023-06-23 ENCOUNTER — ANESTHESIA (OUTPATIENT)
Dept: CARDIOTHORACIC SURGERY | Facility: HOSPITAL | Age: 83
End: 2023-06-23
Payer: MEDICARE

## 2023-06-23 ENCOUNTER — APPOINTMENT (OUTPATIENT)
Facility: HOSPITAL | Age: 83
DRG: 640 | End: 2023-06-23
Payer: MEDICARE

## 2023-06-23 ENCOUNTER — ANESTHESIA EVENT (OUTPATIENT)
Dept: CARDIOTHORACIC SURGERY | Facility: HOSPITAL | Age: 83
End: 2023-06-23
Payer: MEDICARE

## 2023-06-23 LAB
ALBUMIN SERPL-MCNC: 2.4 G/DL (ref 3.4–5)
ANION GAP SERPL CALC-SCNC: 6 MMOL/L (ref 3–18)
BASOPHILS # BLD: 0 K/UL (ref 0–0.1)
BASOPHILS NFR BLD: 0 % (ref 0–2)
BUN SERPL-MCNC: 22 MG/DL (ref 7–18)
BUN/CREAT SERPL: 7 (ref 12–20)
CALCIUM SERPL-MCNC: 8 MG/DL (ref 8.5–10.1)
CALCIUM SERPL-MCNC: 8.1 MG/DL (ref 8.5–10.1)
CHLORIDE SERPL-SCNC: 107 MMOL/L (ref 100–111)
CO2 SERPL-SCNC: 26 MMOL/L (ref 21–32)
CREAT SERPL-MCNC: 3.13 MG/DL (ref 0.6–1.3)
DIFFERENTIAL METHOD BLD: ABNORMAL
EOSINOPHIL # BLD: 0.1 K/UL (ref 0–0.4)
EOSINOPHIL NFR BLD: 2 % (ref 0–5)
ERYTHROCYTE [DISTWIDTH] IN BLOOD BY AUTOMATED COUNT: 14.9 % (ref 11.6–14.5)
GLUCOSE BLD STRIP.AUTO-MCNC: 100 MG/DL (ref 70–110)
GLUCOSE BLD STRIP.AUTO-MCNC: 142 MG/DL (ref 70–110)
GLUCOSE BLD STRIP.AUTO-MCNC: 144 MG/DL (ref 70–110)
GLUCOSE BLD STRIP.AUTO-MCNC: 89 MG/DL (ref 70–110)
GLUCOSE BLD STRIP.AUTO-MCNC: 92 MG/DL (ref 70–110)
GLUCOSE SERPL-MCNC: 90 MG/DL (ref 74–99)
HBA1C MFR BLD: 4.8 % (ref 4.2–5.6)
HBV CORE AB SERPL QL IA: NEGATIVE
HCT VFR BLD AUTO: 28.2 % (ref 35–45)
HGB BLD-MCNC: 8.9 G/DL (ref 12–16)
IMM GRANULOCYTES # BLD AUTO: 0 K/UL (ref 0–0.04)
IMM GRANULOCYTES NFR BLD AUTO: 0 % (ref 0–0.5)
LYMPHOCYTES # BLD: 1.5 K/UL (ref 0.9–3.6)
LYMPHOCYTES NFR BLD: 41 % (ref 21–52)
MAGNESIUM SERPL-MCNC: 2.3 MG/DL (ref 1.6–2.6)
MCH RBC QN AUTO: 32.1 PG (ref 24–34)
MCHC RBC AUTO-ENTMCNC: 31.6 G/DL (ref 31–37)
MCV RBC AUTO: 101.8 FL (ref 78–100)
MONOCYTES # BLD: 0.6 K/UL (ref 0.05–1.2)
MONOCYTES NFR BLD: 16 % (ref 3–10)
NEUTS SEG # BLD: 1.5 K/UL (ref 1.8–8)
NEUTS SEG NFR BLD: 41 % (ref 40–73)
NRBC # BLD: 0 K/UL (ref 0–0.01)
NRBC BLD-RTO: 0 PER 100 WBC
PHOSPHATE SERPL-MCNC: 3.9 MG/DL (ref 2.5–4.9)
PLATELET # BLD AUTO: 114 K/UL (ref 135–420)
PMV BLD AUTO: 12.1 FL (ref 9.2–11.8)
POTASSIUM SERPL-SCNC: 4.3 MMOL/L (ref 3.5–5.5)
PTH-INTACT SERPL-MCNC: 228.6 PG/ML (ref 18.4–88)
RBC # BLD AUTO: 2.77 M/UL (ref 4.2–5.3)
SODIUM SERPL-SCNC: 139 MMOL/L (ref 136–145)
WBC # BLD AUTO: 3.7 K/UL (ref 4.6–13.2)

## 2023-06-23 PROCEDURE — 2500000003 HC RX 250 WO HCPCS: Performed by: SURGERY

## 2023-06-23 PROCEDURE — 6360000002 HC RX W HCPCS: Performed by: ANESTHESIOLOGY

## 2023-06-23 PROCEDURE — 7100000001 HC PACU RECOVERY - ADDTL 15 MIN: Performed by: SURGERY

## 2023-06-23 PROCEDURE — 85025 COMPLETE CBC W/AUTO DIFF WBC: CPT

## 2023-06-23 PROCEDURE — 2580000003 HC RX 258: Performed by: ANESTHESIOLOGY

## 2023-06-23 PROCEDURE — 6370000000 HC RX 637 (ALT 250 FOR IP): Performed by: INTERNAL MEDICINE

## 2023-06-23 PROCEDURE — 3600000002 HC SURGERY LEVEL 2 BASE: Performed by: SURGERY

## 2023-06-23 PROCEDURE — 36415 COLL VENOUS BLD VENIPUNCTURE: CPT

## 2023-06-23 PROCEDURE — 3600000012 HC SURGERY LEVEL 2 ADDTL 15MIN: Performed by: SURGERY

## 2023-06-23 PROCEDURE — 85732 THROMBOPLASTIN TIME PARTIAL: CPT

## 2023-06-23 PROCEDURE — C1750 CATH, HEMODIALYSIS,LONG-TERM: HCPCS | Performed by: SURGERY

## 2023-06-23 PROCEDURE — 0JH63XZ INSERTION OF TUNNELED VASCULAR ACCESS DEVICE INTO CHEST SUBCUTANEOUS TISSUE AND FASCIA, PERCUTANEOUS APPROACH: ICD-10-PCS | Performed by: SURGERY

## 2023-06-23 PROCEDURE — 3700000000 HC ANESTHESIA ATTENDED CARE: Performed by: SURGERY

## 2023-06-23 PROCEDURE — 82962 GLUCOSE BLOOD TEST: CPT

## 2023-06-23 PROCEDURE — 7100000000 HC PACU RECOVERY - FIRST 15 MIN: Performed by: SURGERY

## 2023-06-23 PROCEDURE — C1769 GUIDE WIRE: HCPCS | Performed by: SURGERY

## 2023-06-23 PROCEDURE — 85598 HEXAGNAL PHOSPH PLTLT NEUTRL: CPT

## 2023-06-23 PROCEDURE — 80069 RENAL FUNCTION PANEL: CPT

## 2023-06-23 PROCEDURE — 6370000000 HC RX 637 (ALT 250 FOR IP): Performed by: HOSPITALIST

## 2023-06-23 PROCEDURE — 99232 SBSQ HOSP IP/OBS MODERATE 35: CPT | Performed by: HOSPITALIST

## 2023-06-23 PROCEDURE — 83735 ASSAY OF MAGNESIUM: CPT

## 2023-06-23 PROCEDURE — 71045 X-RAY EXAM CHEST 1 VIEW: CPT

## 2023-06-23 PROCEDURE — 2580000003 HC RX 258: Performed by: INTERNAL MEDICINE

## 2023-06-23 PROCEDURE — C1894 INTRO/SHEATH, NON-LASER: HCPCS | Performed by: SURGERY

## 2023-06-23 PROCEDURE — B5181ZA FLUOROSCOPY OF SUPERIOR VENA CAVA USING LOW OSMOLAR CONTRAST, GUIDANCE: ICD-10-PCS | Performed by: SURGERY

## 2023-06-23 PROCEDURE — 1100000003 HC PRIVATE W/ TELEMETRY

## 2023-06-23 PROCEDURE — 2709999900 HC NON-CHARGEABLE SUPPLY: Performed by: SURGERY

## 2023-06-23 PROCEDURE — 85613 RUSSELL VIPER VENOM DILUTED: CPT

## 2023-06-23 PROCEDURE — 6360000002 HC RX W HCPCS: Performed by: SURGERY

## 2023-06-23 PROCEDURE — 3700000001 HC ADD 15 MINUTES (ANESTHESIA): Performed by: SURGERY

## 2023-06-23 PROCEDURE — 02H633Z INSERTION OF INFUSION DEVICE INTO RIGHT ATRIUM, PERCUTANEOUS APPROACH: ICD-10-PCS | Performed by: SURGERY

## 2023-06-23 PROCEDURE — 83970 ASSAY OF PARATHORMONE: CPT

## 2023-06-23 PROCEDURE — 83036 HEMOGLOBIN GLYCOSYLATED A1C: CPT

## 2023-06-23 RX ORDER — OXYCODONE HYDROCHLORIDE 5 MG/1
5 TABLET ORAL PRN
Status: DISCONTINUED | OUTPATIENT
Start: 2023-06-23 | End: 2023-06-23 | Stop reason: ALTCHOICE

## 2023-06-23 RX ORDER — HEPARIN SODIUM 5000 [USP'U]/ML
INJECTION, SOLUTION INTRAVENOUS; SUBCUTANEOUS PRN
Status: DISCONTINUED | OUTPATIENT
Start: 2023-06-23 | End: 2023-06-23 | Stop reason: ALTCHOICE

## 2023-06-23 RX ORDER — SODIUM CHLORIDE 9 MG/ML
INJECTION, SOLUTION INTRAVENOUS
Status: COMPLETED
Start: 2023-06-23 | End: 2023-06-23

## 2023-06-23 RX ORDER — MIDAZOLAM HYDROCHLORIDE 1 MG/ML
INJECTION INTRAMUSCULAR; INTRAVENOUS PRN
Status: DISCONTINUED | OUTPATIENT
Start: 2023-06-23 | End: 2023-06-23 | Stop reason: SDUPTHER

## 2023-06-23 RX ORDER — MIDAZOLAM HYDROCHLORIDE 2 MG/2ML
1 INJECTION, SOLUTION INTRAMUSCULAR; INTRAVENOUS
Status: DISCONTINUED | OUTPATIENT
Start: 2023-06-23 | End: 2023-06-23 | Stop reason: ALTCHOICE

## 2023-06-23 RX ORDER — LABETALOL HYDROCHLORIDE 5 MG/ML
5 INJECTION, SOLUTION INTRAVENOUS
Status: DISCONTINUED | OUTPATIENT
Start: 2023-06-23 | End: 2023-06-23 | Stop reason: ALTCHOICE

## 2023-06-23 RX ORDER — FENTANYL CITRATE 50 UG/ML
25 INJECTION, SOLUTION INTRAMUSCULAR; INTRAVENOUS EVERY 5 MIN PRN
Status: DISCONTINUED | OUTPATIENT
Start: 2023-06-23 | End: 2023-06-23 | Stop reason: ALTCHOICE

## 2023-06-23 RX ORDER — LIDOCAINE HYDROCHLORIDE 10 MG/ML
INJECTION, SOLUTION EPIDURAL; INFILTRATION; INTRACAUDAL; PERINEURAL PRN
Status: DISCONTINUED | OUTPATIENT
Start: 2023-06-23 | End: 2023-06-23 | Stop reason: HOSPADM

## 2023-06-23 RX ORDER — HEPARIN SODIUM 5000 [USP'U]/ML
INJECTION, SOLUTION INTRAVENOUS; SUBCUTANEOUS
Status: DISPENSED
Start: 2023-06-23 | End: 2023-06-23

## 2023-06-23 RX ORDER — SODIUM CHLORIDE 0.9 % (FLUSH) 0.9 %
5-40 SYRINGE (ML) INJECTION PRN
Status: DISCONTINUED | OUTPATIENT
Start: 2023-06-23 | End: 2023-06-23 | Stop reason: ALTCHOICE

## 2023-06-23 RX ORDER — ONDANSETRON 2 MG/ML
4 INJECTION INTRAMUSCULAR; INTRAVENOUS
Status: DISCONTINUED | OUTPATIENT
Start: 2023-06-23 | End: 2023-06-23

## 2023-06-23 RX ORDER — OXYCODONE HYDROCHLORIDE 10 MG/1
10 TABLET ORAL PRN
Status: DISCONTINUED | OUTPATIENT
Start: 2023-06-23 | End: 2023-06-23 | Stop reason: ALTCHOICE

## 2023-06-23 RX ORDER — SODIUM CHLORIDE 0.9 % (FLUSH) 0.9 %
5-40 SYRINGE (ML) INJECTION EVERY 12 HOURS SCHEDULED
Status: DISCONTINUED | OUTPATIENT
Start: 2023-06-23 | End: 2023-06-23 | Stop reason: ALTCHOICE

## 2023-06-23 RX ORDER — HYDRALAZINE HYDROCHLORIDE 25 MG/1
25 TABLET, FILM COATED ORAL EVERY 8 HOURS PRN
Status: DISCONTINUED | OUTPATIENT
Start: 2023-06-23 | End: 2023-06-29 | Stop reason: HOSPADM

## 2023-06-23 RX ORDER — SODIUM CHLORIDE 9 MG/ML
INJECTION, SOLUTION INTRAVENOUS PRN
Status: DISCONTINUED | OUTPATIENT
Start: 2023-06-23 | End: 2023-06-23 | Stop reason: ALTCHOICE

## 2023-06-23 RX ORDER — BUPIVACAINE HYDROCHLORIDE 5 MG/ML
INJECTION, SOLUTION EPIDURAL; INTRACAUDAL
Status: DISPENSED
Start: 2023-06-23 | End: 2023-06-23

## 2023-06-23 RX ORDER — PROPOFOL 10 MG/ML
INJECTION, EMULSION INTRAVENOUS PRN
Status: DISCONTINUED | OUTPATIENT
Start: 2023-06-23 | End: 2023-06-23 | Stop reason: SDUPTHER

## 2023-06-23 RX ORDER — INSULIN LISPRO 100 [IU]/ML
0-4 INJECTION, SOLUTION INTRAVENOUS; SUBCUTANEOUS
Status: DISCONTINUED | OUTPATIENT
Start: 2023-06-24 | End: 2023-06-29 | Stop reason: HOSPADM

## 2023-06-23 RX ORDER — IPRATROPIUM BROMIDE AND ALBUTEROL SULFATE 2.5; .5 MG/3ML; MG/3ML
1 SOLUTION RESPIRATORY (INHALATION)
Status: DISCONTINUED | OUTPATIENT
Start: 2023-06-23 | End: 2023-06-23 | Stop reason: ALTCHOICE

## 2023-06-23 RX ORDER — HEPARIN SODIUM 200 [USP'U]/100ML
INJECTION, SOLUTION INTRAVENOUS
Status: DISPENSED
Start: 2023-06-23 | End: 2023-06-23

## 2023-06-23 RX ORDER — HEPARIN SODIUM 200 [USP'U]/100ML
INJECTION, SOLUTION INTRAVENOUS CONTINUOUS PRN
Status: DISCONTINUED | OUTPATIENT
Start: 2023-06-23 | End: 2023-06-23 | Stop reason: HOSPADM

## 2023-06-23 RX ORDER — PROCHLORPERAZINE EDISYLATE 5 MG/ML
5 INJECTION INTRAMUSCULAR; INTRAVENOUS
Status: DISCONTINUED | OUTPATIENT
Start: 2023-06-23 | End: 2023-06-23 | Stop reason: ALTCHOICE

## 2023-06-23 RX ORDER — HYDRALAZINE HYDROCHLORIDE 20 MG/ML
5 INJECTION INTRAMUSCULAR; INTRAVENOUS
Status: DISCONTINUED | OUTPATIENT
Start: 2023-06-23 | End: 2023-06-23 | Stop reason: ALTCHOICE

## 2023-06-23 RX ORDER — SODIUM CHLORIDE, SODIUM LACTATE, POTASSIUM CHLORIDE, CALCIUM CHLORIDE 600; 310; 30; 20 MG/100ML; MG/100ML; MG/100ML; MG/100ML
INJECTION, SOLUTION INTRAVENOUS CONTINUOUS
Status: DISCONTINUED | OUTPATIENT
Start: 2023-06-23 | End: 2023-06-23 | Stop reason: ALTCHOICE

## 2023-06-23 RX ADMIN — HYDRALAZINE HYDROCHLORIDE 50 MG: 50 TABLET, FILM COATED ORAL at 14:15

## 2023-06-23 RX ADMIN — SODIUM CHLORIDE: 9 INJECTION, SOLUTION INTRAVENOUS at 08:10

## 2023-06-23 RX ADMIN — ACETAMINOPHEN 325MG 650 MG: 325 TABLET ORAL at 22:12

## 2023-06-23 RX ADMIN — PROPOFOL 20 MG: 10 INJECTION, EMULSION INTRAVENOUS at 09:00

## 2023-06-23 RX ADMIN — PROPOFOL 20 MG: 10 INJECTION, EMULSION INTRAVENOUS at 08:47

## 2023-06-23 RX ADMIN — MIDAZOLAM 1 MG: 1 INJECTION, SOLUTION INTRAMUSCULAR; INTRAVENOUS at 08:43

## 2023-06-23 RX ADMIN — ATORVASTATIN CALCIUM 20 MG: 20 TABLET, FILM COATED ORAL at 14:47

## 2023-06-23 RX ADMIN — MIDAZOLAM 1 MG: 1 INJECTION, SOLUTION INTRAMUSCULAR; INTRAVENOUS at 08:59

## 2023-06-23 RX ADMIN — ASPIRIN 81 MG CHEWABLE TABLET 81 MG: 81 TABLET CHEWABLE at 14:46

## 2023-06-23 RX ADMIN — HYDRALAZINE HYDROCHLORIDE 75 MG: 50 TABLET, FILM COATED ORAL at 22:00

## 2023-06-23 RX ADMIN — SODIUM CHLORIDE, PRESERVATIVE FREE 10 ML: 5 INJECTION INTRAVENOUS at 14:49

## 2023-06-23 RX ADMIN — SODIUM CHLORIDE 2000 MG: 9 INJECTION, SOLUTION INTRAVENOUS at 09:14

## 2023-06-23 RX ADMIN — NIFEDIPINE 60 MG: 30 TABLET, FILM COATED, EXTENDED RELEASE ORAL at 14:47

## 2023-06-23 RX ADMIN — PROPOFOL 20 MG: 10 INJECTION, EMULSION INTRAVENOUS at 09:16

## 2023-06-23 RX ADMIN — DOCUSATE SODIUM 100 MG: 100 CAPSULE, LIQUID FILLED ORAL at 22:00

## 2023-06-23 RX ADMIN — SODIUM CHLORIDE, PRESERVATIVE FREE 10 ML: 5 INJECTION INTRAVENOUS at 22:00

## 2023-06-23 ASSESSMENT — PAIN DESCRIPTION - DESCRIPTORS: DESCRIPTORS: PATIENT UNABLE TO DESCRIBE

## 2023-06-23 ASSESSMENT — PAIN SCALES - GENERAL
PAINLEVEL_OUTOF10: 0
PAINLEVEL_OUTOF10: 2
PAINLEVEL_OUTOF10: 0
PAINLEVEL_OUTOF10: 0

## 2023-06-23 ASSESSMENT — PAIN DESCRIPTION - ORIENTATION: ORIENTATION: RIGHT

## 2023-06-23 ASSESSMENT — PAIN - FUNCTIONAL ASSESSMENT: PAIN_FUNCTIONAL_ASSESSMENT: ACTIVITIES ARE NOT PREVENTED

## 2023-06-23 ASSESSMENT — PAIN DESCRIPTION - PAIN TYPE: TYPE: ACUTE PAIN

## 2023-06-23 ASSESSMENT — PAIN DESCRIPTION - LOCATION: LOCATION: NECK

## 2023-06-23 NOTE — ANESTHESIA PRE PROCEDURE
Department of Anesthesiology  Preprocedure Note       Name:  Markos New   Age:  79 y.o.  :  1940                                          MRN:  [id number]         Date:  2023      Surgeon: Zachariah Menchaca):  Celso Arnold MD    Procedure: Procedure(s):  TUNNELED CATHETER PLACEMENT; C-ARM    Medications prior to admission:   Prior to Admission medications    Medication Sig Start Date End Date Taking?  Authorizing Provider   ergocalciferol (ERGOCALCIFEROL) 1.25 MG (17611 UT) capsule Take 1 capsule by mouth once a week   Yes Historical Provider, MD   Dulaglutide (TRULICITY) 4.93 XT/2.0ZA SOPN Inject 0.75 mg into the skin once a week   Yes Historical Provider, MD   epoetin joanie (EPOGEN) 4000 UNIT/ML injection Inject 1 mL into the skin three times a week 23  Yes Amy Goddard DO   bumetanide (BUMEX) 1 MG tablet Take 1 tablet by mouth daily 23  Yes Amy Goddard DO   amLODIPine (NORVASC) 10 MG tablet Take 0.5 tablets by mouth daily 21  Yes Ar Automatic Reconciliation   aspirin 81 MG chewable tablet Take 1 tablet by mouth daily 2/3/20  Yes Ar Automatic Reconciliation   atorvastatin (LIPITOR) 20 MG tablet Take by mouth daily   Yes Ar Automatic Reconciliation   docusate (COLACE, DULCOLAX) 100 MG CAPS Take 100 mg by mouth 2 times daily   Yes Ar Automatic Reconciliation   hydrALAZINE (APRESOLINE) 50 MG tablet Take 1 tablet by mouth every 6 hours as needed (systolics over 790, diastolics over 691)  Patient taking differently: Take 0.5 tablets by mouth 3 times daily 23   Amy Goddard DO   acetaminophen (TYLENOL) 325 MG tablet Take 2 tablets by mouth every 6 hours as needed 18   Ar Automatic Reconciliation   insulin lispro (HUMALOG) 100 UNIT/ML SOLN injection vial Less than 150 =   0 units  150 -199 =   3 units  200 -249 =   6 units  250 -299 =   9 units  300 -349 =   12 units  350 and above =   15 units, CALL MD 18   Ar Automatic Reconciliation

## 2023-06-23 NOTE — ANESTHESIA POSTPROCEDURE EVALUATION
Department of Anesthesiology  Postprocedure Note    Patient: Amy Amezcua  MRN: [id number]  YOB: 1940  Date of evaluation: 6/23/2023      Procedure Summary     Date: 06/23/23 Room / Location: [facility] CVT 02 / [facility] CARDIAC SURGERY    Anesthesia Start: 3530 Anesthesia Stop: 1004    Procedure: TUNNELED CATHETER PLACEMENT; C-ARM (Right: Chest) Diagnosis:       ESRD (end stage renal disease) ([facility]      (ESRD (end stage renal disease) ([facility] [N18.6])    Surgeons: Jean-Paul Davidson MD Responsible Provider: Jennifer Tristan MD    Anesthesia Type: MAC ASA Status: 3          Anesthesia Type: MAC    Berta Phase I:      Berta Phase II:        Anesthesia Post Evaluation    Patient location during evaluation: bedside  Airway patency: patent  Complications: no  Cardiovascular status: hemodynamically stable  Respiratory status: acceptable  Hydration status: stable

## 2023-06-24 LAB
ANION GAP SERPL CALC-SCNC: 7 MMOL/L (ref 3–18)
BASOPHILS # BLD: 0 K/UL (ref 0–0.1)
BASOPHILS NFR BLD: 1 % (ref 0–2)
BUN SERPL-MCNC: 28 MG/DL (ref 7–18)
BUN/CREAT SERPL: 7 (ref 12–20)
CALCIUM SERPL-MCNC: 8.5 MG/DL (ref 8.5–10.1)
CHLORIDE SERPL-SCNC: 109 MMOL/L (ref 100–111)
CO2 SERPL-SCNC: 25 MMOL/L (ref 21–32)
CREAT SERPL-MCNC: 3.8 MG/DL (ref 0.6–1.3)
DIFFERENTIAL METHOD BLD: ABNORMAL
EOSINOPHIL # BLD: 0.1 K/UL (ref 0–0.4)
EOSINOPHIL NFR BLD: 2 % (ref 0–5)
ERYTHROCYTE [DISTWIDTH] IN BLOOD BY AUTOMATED COUNT: 14.8 % (ref 11.6–14.5)
FOLATE SERPL-MCNC: 7.9 NG/ML (ref 3.1–17.5)
GLUCOSE BLD STRIP.AUTO-MCNC: 109 MG/DL (ref 70–110)
GLUCOSE BLD STRIP.AUTO-MCNC: 137 MG/DL (ref 70–110)
GLUCOSE BLD STRIP.AUTO-MCNC: 183 MG/DL (ref 70–110)
GLUCOSE SERPL-MCNC: 101 MG/DL (ref 74–99)
HCT VFR BLD AUTO: 26.8 % (ref 35–45)
HGB BLD-MCNC: 8.2 G/DL (ref 12–16)
IMM GRANULOCYTES # BLD AUTO: 0 K/UL (ref 0–0.04)
IMM GRANULOCYTES NFR BLD AUTO: 0 % (ref 0–0.5)
LYMPHOCYTES # BLD: 1.6 K/UL (ref 0.9–3.6)
LYMPHOCYTES NFR BLD: 34 % (ref 21–52)
MCH RBC QN AUTO: 31.2 PG (ref 24–34)
MCHC RBC AUTO-ENTMCNC: 30.6 G/DL (ref 31–37)
MCV RBC AUTO: 101.9 FL (ref 78–100)
MONOCYTES # BLD: 0.7 K/UL (ref 0.05–1.2)
MONOCYTES NFR BLD: 15 % (ref 3–10)
NEUTS SEG # BLD: 2.3 K/UL (ref 1.8–8)
NEUTS SEG NFR BLD: 48 % (ref 40–73)
NRBC # BLD: 0 K/UL (ref 0–0.01)
NRBC BLD-RTO: 0 PER 100 WBC
PLATELET # BLD AUTO: 133 K/UL (ref 135–420)
PMV BLD AUTO: 11.6 FL (ref 9.2–11.8)
POTASSIUM SERPL-SCNC: 4.5 MMOL/L (ref 3.5–5.5)
RBC # BLD AUTO: 2.63 M/UL (ref 4.2–5.3)
SODIUM SERPL-SCNC: 141 MMOL/L (ref 136–145)
VIT B12 SERPL-MCNC: 505 PG/ML (ref 211–911)
WBC # BLD AUTO: 4.8 K/UL (ref 4.6–13.2)

## 2023-06-24 PROCEDURE — 1100000003 HC PRIVATE W/ TELEMETRY

## 2023-06-24 PROCEDURE — 80048 BASIC METABOLIC PNL TOTAL CA: CPT

## 2023-06-24 PROCEDURE — 6370000000 HC RX 637 (ALT 250 FOR IP): Performed by: HOSPITALIST

## 2023-06-24 PROCEDURE — 82607 VITAMIN B-12: CPT

## 2023-06-24 PROCEDURE — 82746 ASSAY OF FOLIC ACID SERUM: CPT

## 2023-06-24 PROCEDURE — 6360000002 HC RX W HCPCS: Performed by: INTERNAL MEDICINE

## 2023-06-24 PROCEDURE — 36415 COLL VENOUS BLD VENIPUNCTURE: CPT

## 2023-06-24 PROCEDURE — 90935 HEMODIALYSIS ONE EVALUATION: CPT

## 2023-06-24 PROCEDURE — 6360000002 HC RX W HCPCS: Performed by: HOSPITALIST

## 2023-06-24 PROCEDURE — 6370000000 HC RX 637 (ALT 250 FOR IP): Performed by: INTERNAL MEDICINE

## 2023-06-24 PROCEDURE — 82962 GLUCOSE BLOOD TEST: CPT

## 2023-06-24 PROCEDURE — 85025 COMPLETE CBC W/AUTO DIFF WBC: CPT

## 2023-06-24 PROCEDURE — 2580000003 HC RX 258: Performed by: INTERNAL MEDICINE

## 2023-06-24 PROCEDURE — 99232 SBSQ HOSP IP/OBS MODERATE 35: CPT | Performed by: HOSPITALIST

## 2023-06-24 RX ORDER — HYDRALAZINE HYDROCHLORIDE 50 MG/1
100 TABLET, FILM COATED ORAL 3 TIMES DAILY
Status: DISCONTINUED | OUTPATIENT
Start: 2023-06-24 | End: 2023-06-29 | Stop reason: HOSPADM

## 2023-06-24 RX ADMIN — ATORVASTATIN CALCIUM 20 MG: 20 TABLET, FILM COATED ORAL at 08:18

## 2023-06-24 RX ADMIN — ACETAMINOPHEN 325MG 650 MG: 325 TABLET ORAL at 08:31

## 2023-06-24 RX ADMIN — SODIUM CHLORIDE, PRESERVATIVE FREE 10 ML: 5 INJECTION INTRAVENOUS at 20:16

## 2023-06-24 RX ADMIN — HYDRALAZINE HYDROCHLORIDE 75 MG: 50 TABLET, FILM COATED ORAL at 13:17

## 2023-06-24 RX ADMIN — HEPARIN SODIUM 5000 UNITS: 5000 INJECTION INTRAVENOUS; SUBCUTANEOUS at 20:17

## 2023-06-24 RX ADMIN — NIFEDIPINE 60 MG: 30 TABLET, FILM COATED, EXTENDED RELEASE ORAL at 13:16

## 2023-06-24 RX ADMIN — DOCUSATE SODIUM 100 MG: 100 CAPSULE, LIQUID FILLED ORAL at 20:16

## 2023-06-24 RX ADMIN — HYDRALAZINE HYDROCHLORIDE 100 MG: 50 TABLET, FILM COATED ORAL at 20:15

## 2023-06-24 RX ADMIN — SODIUM CHLORIDE, PRESERVATIVE FREE 10 ML: 5 INJECTION INTRAVENOUS at 08:32

## 2023-06-24 RX ADMIN — Medication 5 MG: at 20:17

## 2023-06-24 RX ADMIN — ASPIRIN 81 MG CHEWABLE TABLET 81 MG: 81 TABLET CHEWABLE at 08:18

## 2023-06-24 RX ADMIN — HEPARIN SODIUM 5000 UNITS: 5000 INJECTION INTRAVENOUS; SUBCUTANEOUS at 08:19

## 2023-06-24 RX ADMIN — DOCUSATE SODIUM 100 MG: 100 CAPSULE, LIQUID FILLED ORAL at 08:18

## 2023-06-24 RX ADMIN — IRON SUCROSE 200 MG: 20 INJECTION, SOLUTION INTRAVENOUS at 10:05

## 2023-06-24 ASSESSMENT — PAIN SCALES - GENERAL
PAINLEVEL_OUTOF10: 0
PAINLEVEL_OUTOF10: 5
PAINLEVEL_OUTOF10: 0
PAINLEVEL_OUTOF10: 5

## 2023-06-24 ASSESSMENT — PAIN DESCRIPTION - PAIN TYPE: TYPE: ACUTE PAIN

## 2023-06-24 ASSESSMENT — PAIN DESCRIPTION - LOCATION
LOCATION: HEAD
LOCATION: HEAD

## 2023-06-24 ASSESSMENT — PAIN DESCRIPTION - ORIENTATION: ORIENTATION: MID

## 2023-06-24 ASSESSMENT — PAIN DESCRIPTION - DESCRIPTORS
DESCRIPTORS: ACHING
DESCRIPTORS: ACHING

## 2023-06-25 LAB
GLUCOSE BLD STRIP.AUTO-MCNC: 118 MG/DL (ref 70–110)
GLUCOSE BLD STRIP.AUTO-MCNC: 132 MG/DL (ref 70–110)
GLUCOSE BLD STRIP.AUTO-MCNC: 153 MG/DL (ref 70–110)
GLUCOSE BLD STRIP.AUTO-MCNC: 154 MG/DL (ref 70–110)

## 2023-06-25 PROCEDURE — 97166 OT EVAL MOD COMPLEX 45 MIN: CPT

## 2023-06-25 PROCEDURE — 6370000000 HC RX 637 (ALT 250 FOR IP): Performed by: HOSPITALIST

## 2023-06-25 PROCEDURE — 2580000003 HC RX 258: Performed by: INTERNAL MEDICINE

## 2023-06-25 PROCEDURE — 6360000002 HC RX W HCPCS: Performed by: INTERNAL MEDICINE

## 2023-06-25 PROCEDURE — 6370000000 HC RX 637 (ALT 250 FOR IP): Performed by: INTERNAL MEDICINE

## 2023-06-25 PROCEDURE — 99232 SBSQ HOSP IP/OBS MODERATE 35: CPT | Performed by: HOSPITALIST

## 2023-06-25 PROCEDURE — 82962 GLUCOSE BLOOD TEST: CPT

## 2023-06-25 PROCEDURE — 1100000003 HC PRIVATE W/ TELEMETRY

## 2023-06-25 RX ORDER — LOSARTAN POTASSIUM 25 MG/1
25 TABLET ORAL DAILY
Status: DISCONTINUED | OUTPATIENT
Start: 2023-06-25 | End: 2023-06-27

## 2023-06-25 RX ORDER — FOLIC ACID 1 MG/1
1 TABLET ORAL DAILY
Status: DISCONTINUED | OUTPATIENT
Start: 2023-06-26 | End: 2023-06-29 | Stop reason: HOSPADM

## 2023-06-25 RX ADMIN — DOCUSATE SODIUM 100 MG: 100 CAPSULE, LIQUID FILLED ORAL at 20:06

## 2023-06-25 RX ADMIN — HEPARIN SODIUM 5000 UNITS: 5000 INJECTION INTRAVENOUS; SUBCUTANEOUS at 09:44

## 2023-06-25 RX ADMIN — HYDRALAZINE HYDROCHLORIDE 100 MG: 50 TABLET, FILM COATED ORAL at 09:44

## 2023-06-25 RX ADMIN — SODIUM CHLORIDE, PRESERVATIVE FREE 10 ML: 5 INJECTION INTRAVENOUS at 20:07

## 2023-06-25 RX ADMIN — HYDRALAZINE HYDROCHLORIDE 100 MG: 50 TABLET, FILM COATED ORAL at 14:40

## 2023-06-25 RX ADMIN — NIFEDIPINE 60 MG: 30 TABLET, FILM COATED, EXTENDED RELEASE ORAL at 09:44

## 2023-06-25 RX ADMIN — ASPIRIN 81 MG CHEWABLE TABLET 81 MG: 81 TABLET CHEWABLE at 09:44

## 2023-06-25 RX ADMIN — HYDRALAZINE HYDROCHLORIDE 100 MG: 50 TABLET, FILM COATED ORAL at 20:06

## 2023-06-25 RX ADMIN — LOSARTAN POTASSIUM 25 MG: 25 TABLET, FILM COATED ORAL at 11:43

## 2023-06-25 RX ADMIN — HEPARIN SODIUM 5000 UNITS: 5000 INJECTION INTRAVENOUS; SUBCUTANEOUS at 20:06

## 2023-06-25 RX ADMIN — DOCUSATE SODIUM 100 MG: 100 CAPSULE, LIQUID FILLED ORAL at 09:44

## 2023-06-25 RX ADMIN — ATORVASTATIN CALCIUM 20 MG: 20 TABLET, FILM COATED ORAL at 09:44

## 2023-06-25 ASSESSMENT — PAIN SCALES - GENERAL: PAINLEVEL_OUTOF10: 0

## 2023-06-26 LAB
AMMONIA PLAS-SCNC: <10 UMOL/L (ref 11–32)
ANION GAP SERPL CALC-SCNC: 5 MMOL/L (ref 3–18)
BASOPHILS # BLD: 0 K/UL (ref 0–0.1)
BASOPHILS NFR BLD: 0 % (ref 0–2)
BUN SERPL-MCNC: 23 MG/DL (ref 7–18)
BUN/CREAT SERPL: 7 (ref 12–20)
CALCIUM SERPL-MCNC: 8.3 MG/DL (ref 8.5–10.1)
CHLORIDE SERPL-SCNC: 106 MMOL/L (ref 100–111)
CO2 SERPL-SCNC: 29 MMOL/L (ref 21–32)
CREAT SERPL-MCNC: 3.46 MG/DL (ref 0.6–1.3)
DIFFERENTIAL METHOD BLD: ABNORMAL
EOSINOPHIL # BLD: 0.1 K/UL (ref 0–0.4)
EOSINOPHIL NFR BLD: 2 % (ref 0–5)
ERYTHROCYTE [DISTWIDTH] IN BLOOD BY AUTOMATED COUNT: 14.7 % (ref 11.6–14.5)
GLUCOSE BLD STRIP.AUTO-MCNC: 100 MG/DL (ref 70–110)
GLUCOSE BLD STRIP.AUTO-MCNC: 109 MG/DL (ref 70–110)
GLUCOSE BLD STRIP.AUTO-MCNC: 110 MG/DL (ref 70–110)
GLUCOSE BLD STRIP.AUTO-MCNC: 92 MG/DL (ref 70–110)
GLUCOSE SERPL-MCNC: 90 MG/DL (ref 74–99)
HCT VFR BLD AUTO: 24.2 % (ref 35–45)
HGB BLD-MCNC: 7.6 G/DL (ref 12–16)
IMM GRANULOCYTES # BLD AUTO: 0 K/UL (ref 0–0.04)
IMM GRANULOCYTES NFR BLD AUTO: 0 % (ref 0–0.5)
LYMPHOCYTES # BLD: 1.4 K/UL (ref 0.9–3.6)
LYMPHOCYTES NFR BLD: 29 % (ref 21–52)
MCH RBC QN AUTO: 31.8 PG (ref 24–34)
MCHC RBC AUTO-ENTMCNC: 31.4 G/DL (ref 31–37)
MCV RBC AUTO: 101.3 FL (ref 78–100)
MONOCYTES # BLD: 0.8 K/UL (ref 0.05–1.2)
MONOCYTES NFR BLD: 17 % (ref 3–10)
NEUTS SEG # BLD: 2.4 K/UL (ref 1.8–8)
NEUTS SEG NFR BLD: 52 % (ref 40–73)
NRBC # BLD: 0 K/UL (ref 0–0.01)
NRBC BLD-RTO: 0 PER 100 WBC
PLATELET # BLD AUTO: 105 K/UL (ref 135–420)
PMV BLD AUTO: 11.6 FL (ref 9.2–11.8)
POTASSIUM SERPL-SCNC: 3.7 MMOL/L (ref 3.5–5.5)
RBC # BLD AUTO: 2.39 M/UL (ref 4.2–5.3)
SODIUM SERPL-SCNC: 140 MMOL/L (ref 136–145)
T4 FREE SERPL-MCNC: 1 NG/DL (ref 0.7–1.5)
TSH SERPL DL<=0.05 MIU/L-ACNC: 7.82 UIU/ML (ref 0.36–3.74)
WBC # BLD AUTO: 4.7 K/UL (ref 4.6–13.2)

## 2023-06-26 PROCEDURE — 86704 HEP B CORE ANTIBODY TOTAL: CPT

## 2023-06-26 PROCEDURE — 2580000003 HC RX 258: Performed by: INTERNAL MEDICINE

## 2023-06-26 PROCEDURE — 97530 THERAPEUTIC ACTIVITIES: CPT

## 2023-06-26 PROCEDURE — 6370000000 HC RX 637 (ALT 250 FOR IP): Performed by: HOSPITALIST

## 2023-06-26 PROCEDURE — 85025 COMPLETE CBC W/AUTO DIFF WBC: CPT

## 2023-06-26 PROCEDURE — 84443 ASSAY THYROID STIM HORMONE: CPT

## 2023-06-26 PROCEDURE — 6370000000 HC RX 637 (ALT 250 FOR IP): Performed by: INTERNAL MEDICINE

## 2023-06-26 PROCEDURE — 6360000002 HC RX W HCPCS: Performed by: INTERNAL MEDICINE

## 2023-06-26 PROCEDURE — 84439 ASSAY OF FREE THYROXINE: CPT

## 2023-06-26 PROCEDURE — 97161 PT EVAL LOW COMPLEX 20 MIN: CPT

## 2023-06-26 PROCEDURE — 99232 SBSQ HOSP IP/OBS MODERATE 35: CPT | Performed by: HOSPITALIST

## 2023-06-26 PROCEDURE — 5A1D70Z PERFORMANCE OF URINARY FILTRATION, INTERMITTENT, LESS THAN 6 HOURS PER DAY: ICD-10-PCS | Performed by: INTERNAL MEDICINE

## 2023-06-26 PROCEDURE — 36415 COLL VENOUS BLD VENIPUNCTURE: CPT

## 2023-06-26 PROCEDURE — 82962 GLUCOSE BLOOD TEST: CPT

## 2023-06-26 PROCEDURE — 80048 BASIC METABOLIC PNL TOTAL CA: CPT

## 2023-06-26 PROCEDURE — 90935 HEMODIALYSIS ONE EVALUATION: CPT

## 2023-06-26 PROCEDURE — 82140 ASSAY OF AMMONIA: CPT

## 2023-06-26 PROCEDURE — 1100000003 HC PRIVATE W/ TELEMETRY

## 2023-06-26 RX ORDER — UBIDECARENONE 75 MG
100 CAPSULE ORAL DAILY
Status: DISCONTINUED | OUTPATIENT
Start: 2023-06-26 | End: 2023-06-29 | Stop reason: HOSPADM

## 2023-06-26 RX ORDER — LEVOTHYROXINE SODIUM 0.03 MG/1
25 TABLET ORAL
Status: DISCONTINUED | OUTPATIENT
Start: 2023-06-26 | End: 2023-06-29 | Stop reason: HOSPADM

## 2023-06-26 RX ADMIN — HYDRALAZINE HYDROCHLORIDE 100 MG: 50 TABLET, FILM COATED ORAL at 17:29

## 2023-06-26 RX ADMIN — DOCUSATE SODIUM 100 MG: 100 CAPSULE, LIQUID FILLED ORAL at 08:49

## 2023-06-26 RX ADMIN — SODIUM CHLORIDE, PRESERVATIVE FREE 10 ML: 5 INJECTION INTRAVENOUS at 09:00

## 2023-06-26 RX ADMIN — Medication 5 MG: at 22:00

## 2023-06-26 RX ADMIN — FOLIC ACID 1 MG: 1 TABLET ORAL at 08:49

## 2023-06-26 RX ADMIN — SODIUM CHLORIDE, PRESERVATIVE FREE 10 ML: 5 INJECTION INTRAVENOUS at 23:35

## 2023-06-26 RX ADMIN — HEPARIN SODIUM 5000 UNITS: 5000 INJECTION INTRAVENOUS; SUBCUTANEOUS at 08:53

## 2023-06-26 RX ADMIN — ATORVASTATIN CALCIUM 20 MG: 20 TABLET, FILM COATED ORAL at 08:49

## 2023-06-26 RX ADMIN — LOSARTAN POTASSIUM 25 MG: 25 TABLET, FILM COATED ORAL at 08:49

## 2023-06-26 RX ADMIN — DOCUSATE SODIUM 100 MG: 100 CAPSULE, LIQUID FILLED ORAL at 22:00

## 2023-06-26 RX ADMIN — NIFEDIPINE 60 MG: 30 TABLET, FILM COATED, EXTENDED RELEASE ORAL at 08:48

## 2023-06-26 RX ADMIN — HYDRALAZINE HYDROCHLORIDE 100 MG: 50 TABLET, FILM COATED ORAL at 23:35

## 2023-06-26 RX ADMIN — LEVOTHYROXINE SODIUM 25 MCG: 25 TABLET ORAL at 11:46

## 2023-06-26 RX ADMIN — ASPIRIN 81 MG CHEWABLE TABLET 81 MG: 81 TABLET CHEWABLE at 08:49

## 2023-06-26 RX ADMIN — HYDRALAZINE HYDROCHLORIDE 100 MG: 50 TABLET, FILM COATED ORAL at 08:48

## 2023-06-26 ASSESSMENT — PAIN SCALES - GENERAL: PAINLEVEL_OUTOF10: 0

## 2023-06-27 LAB
FLUAV RNA SPEC QL NAA+PROBE: NOT DETECTED
FLUBV RNA SPEC QL NAA+PROBE: NOT DETECTED
GLUCOSE BLD STRIP.AUTO-MCNC: 100 MG/DL (ref 70–110)
GLUCOSE BLD STRIP.AUTO-MCNC: 114 MG/DL (ref 70–110)
GLUCOSE BLD STRIP.AUTO-MCNC: 145 MG/DL (ref 70–110)
GLUCOSE BLD STRIP.AUTO-MCNC: 171 MG/DL (ref 70–110)
HEXAGONAL PHASE PHOSPHOLIPID: 3 SEC (ref 0–11)
LA 2 SCREEN W REFLEX-IMP: ABNORMAL
PTT-LA MIX: 41.5 SEC (ref 0–40.5)
SARS-COV-2 RNA RESP QL NAA+PROBE: NOT DETECTED
SCREEN APTT: 44.8 SEC (ref 0–43.5)
SCREEN DRVVT: 39.6 SEC (ref 0–47)

## 2023-06-27 PROCEDURE — 2700000000 HC OXYGEN THERAPY PER DAY

## 2023-06-27 PROCEDURE — 87636 SARSCOV2 & INF A&B AMP PRB: CPT

## 2023-06-27 PROCEDURE — 2580000003 HC RX 258: Performed by: INTERNAL MEDICINE

## 2023-06-27 PROCEDURE — 6370000000 HC RX 637 (ALT 250 FOR IP): Performed by: INTERNAL MEDICINE

## 2023-06-27 PROCEDURE — 99232 SBSQ HOSP IP/OBS MODERATE 35: CPT | Performed by: HOSPITALIST

## 2023-06-27 PROCEDURE — 97535 SELF CARE MNGMENT TRAINING: CPT

## 2023-06-27 PROCEDURE — 6370000000 HC RX 637 (ALT 250 FOR IP): Performed by: HOSPITALIST

## 2023-06-27 PROCEDURE — 1100000003 HC PRIVATE W/ TELEMETRY

## 2023-06-27 PROCEDURE — 6360000002 HC RX W HCPCS: Performed by: INTERNAL MEDICINE

## 2023-06-27 PROCEDURE — 82962 GLUCOSE BLOOD TEST: CPT

## 2023-06-27 RX ORDER — FOLIC ACID 1 MG/1
1 TABLET ORAL DAILY
Qty: 30 TABLET | Refills: 0 | Status: SHIPPED | OUTPATIENT
Start: 2023-06-28

## 2023-06-27 RX ORDER — NIFEDIPINE 30 MG/1
60 TABLET, EXTENDED RELEASE ORAL DAILY
Qty: 60 TABLET | Refills: 0 | Status: SHIPPED | OUTPATIENT
Start: 2023-06-28

## 2023-06-27 RX ORDER — HYDRALAZINE HYDROCHLORIDE 100 MG/1
100 TABLET, FILM COATED ORAL 3 TIMES DAILY
Qty: 90 TABLET | Refills: 0 | Status: SHIPPED | OUTPATIENT
Start: 2023-06-27

## 2023-06-27 RX ORDER — LEVOTHYROXINE SODIUM 0.03 MG/1
25 TABLET ORAL
Qty: 30 TABLET | Refills: 0 | Status: SHIPPED | OUTPATIENT
Start: 2023-06-28

## 2023-06-27 RX ORDER — LOSARTAN POTASSIUM 50 MG/1
50 TABLET ORAL DAILY
Status: DISCONTINUED | OUTPATIENT
Start: 2023-06-28 | End: 2023-06-29 | Stop reason: HOSPADM

## 2023-06-27 RX ADMIN — ASPIRIN 81 MG CHEWABLE TABLET 81 MG: 81 TABLET CHEWABLE at 08:30

## 2023-06-27 RX ADMIN — DOCUSATE SODIUM 100 MG: 100 CAPSULE, LIQUID FILLED ORAL at 08:30

## 2023-06-27 RX ADMIN — FOLIC ACID 1 MG: 1 TABLET ORAL at 08:30

## 2023-06-27 RX ADMIN — SODIUM CHLORIDE, PRESERVATIVE FREE 10 ML: 5 INJECTION INTRAVENOUS at 08:34

## 2023-06-27 RX ADMIN — HYDRALAZINE HYDROCHLORIDE 100 MG: 50 TABLET, FILM COATED ORAL at 08:29

## 2023-06-27 RX ADMIN — SODIUM CHLORIDE, PRESERVATIVE FREE 10 ML: 5 INJECTION INTRAVENOUS at 21:26

## 2023-06-27 RX ADMIN — HYDRALAZINE HYDROCHLORIDE 100 MG: 50 TABLET, FILM COATED ORAL at 21:26

## 2023-06-27 RX ADMIN — VITAM B12 100 MCG: 100 TAB at 08:30

## 2023-06-27 RX ADMIN — POLYETHYLENE GLYCOL 3350 17 G: 17 POWDER, FOR SOLUTION ORAL at 21:26

## 2023-06-27 RX ADMIN — ONDANSETRON 4 MG: 4 TABLET, ORALLY DISINTEGRATING ORAL at 14:05

## 2023-06-27 RX ADMIN — LEVOTHYROXINE SODIUM 25 MCG: 25 TABLET ORAL at 06:00

## 2023-06-27 RX ADMIN — ATORVASTATIN CALCIUM 20 MG: 20 TABLET, FILM COATED ORAL at 08:51

## 2023-06-27 RX ADMIN — DOCUSATE SODIUM 100 MG: 100 CAPSULE, LIQUID FILLED ORAL at 21:25

## 2023-06-27 RX ADMIN — LOSARTAN POTASSIUM 25 MG: 25 TABLET, FILM COATED ORAL at 08:30

## 2023-06-27 RX ADMIN — NIFEDIPINE 60 MG: 30 TABLET, FILM COATED, EXTENDED RELEASE ORAL at 08:29

## 2023-06-27 ASSESSMENT — PAIN SCALES - GENERAL: PAINLEVEL_OUTOF10: 0

## 2023-06-28 VITALS
DIASTOLIC BLOOD PRESSURE: 69 MMHG | HEIGHT: 65 IN | SYSTOLIC BLOOD PRESSURE: 159 MMHG | RESPIRATION RATE: 166 BRPM | TEMPERATURE: 98.6 F | OXYGEN SATURATION: 98 % | HEART RATE: 82 BPM | BODY MASS INDEX: 24.1 KG/M2 | WEIGHT: 144.62 LBS

## 2023-06-28 LAB
GLUCOSE BLD STRIP.AUTO-MCNC: 100 MG/DL (ref 70–110)
GLUCOSE BLD STRIP.AUTO-MCNC: 126 MG/DL (ref 70–110)
HBV CORE AB SERPL QL IA: NEGATIVE

## 2023-06-28 PROCEDURE — 6370000000 HC RX 637 (ALT 250 FOR IP): Performed by: HOSPITALIST

## 2023-06-28 PROCEDURE — 94761 N-INVAS EAR/PLS OXIMETRY MLT: CPT

## 2023-06-28 PROCEDURE — 6370000000 HC RX 637 (ALT 250 FOR IP): Performed by: INTERNAL MEDICINE

## 2023-06-28 PROCEDURE — 2580000003 HC RX 258: Performed by: INTERNAL MEDICINE

## 2023-06-28 PROCEDURE — 82962 GLUCOSE BLOOD TEST: CPT

## 2023-06-28 PROCEDURE — 2700000000 HC OXYGEN THERAPY PER DAY

## 2023-06-28 PROCEDURE — 90935 HEMODIALYSIS ONE EVALUATION: CPT

## 2023-06-28 RX ORDER — HEPARIN SODIUM 1000 [USP'U]/ML
3200 INJECTION, SOLUTION INTRAVENOUS; SUBCUTANEOUS PRN
Status: DISCONTINUED | OUTPATIENT
Start: 2023-06-28 | End: 2023-06-29 | Stop reason: HOSPADM

## 2023-06-28 RX ADMIN — SODIUM CHLORIDE, PRESERVATIVE FREE 10 ML: 5 INJECTION INTRAVENOUS at 09:00

## 2023-06-28 RX ADMIN — VITAM B12 100 MCG: 100 TAB at 08:28

## 2023-06-28 RX ADMIN — NIFEDIPINE 60 MG: 30 TABLET, FILM COATED, EXTENDED RELEASE ORAL at 08:28

## 2023-06-28 RX ADMIN — ASPIRIN 81 MG CHEWABLE TABLET 81 MG: 81 TABLET CHEWABLE at 08:28

## 2023-06-28 RX ADMIN — FOLIC ACID 1 MG: 1 TABLET ORAL at 08:28

## 2023-06-28 RX ADMIN — HYDRALAZINE HYDROCHLORIDE 100 MG: 50 TABLET, FILM COATED ORAL at 14:37

## 2023-06-28 RX ADMIN — ATORVASTATIN CALCIUM 20 MG: 20 TABLET, FILM COATED ORAL at 08:27

## 2023-06-28 RX ADMIN — DOCUSATE SODIUM 100 MG: 100 CAPSULE, LIQUID FILLED ORAL at 08:27

## 2023-06-28 RX ADMIN — LEVOTHYROXINE SODIUM 25 MCG: 25 TABLET ORAL at 06:04

## 2023-06-28 ASSESSMENT — PAIN SCALES - GENERAL
PAINLEVEL_OUTOF10: 0
PAINLEVEL_OUTOF10: 0

## 2023-07-05 ENCOUNTER — ANESTHESIA EVENT (OUTPATIENT)
Dept: CARDIOTHORACIC SURGERY | Facility: HOSPITAL | Age: 83
End: 2023-07-05
Payer: MEDICARE

## 2023-07-06 ENCOUNTER — APPOINTMENT (OUTPATIENT)
Facility: HOSPITAL | Age: 83
End: 2023-07-06
Attending: SURGERY
Payer: MEDICARE

## 2023-07-06 ENCOUNTER — HOSPITAL ENCOUNTER (OUTPATIENT)
Facility: HOSPITAL | Age: 83
Setting detail: OUTPATIENT SURGERY
Discharge: HOME | End: 2023-07-06
Attending: SURGERY | Admitting: SURGERY
Payer: MEDICARE

## 2023-07-06 ENCOUNTER — ANESTHESIA (OUTPATIENT)
Dept: CARDIOTHORACIC SURGERY | Facility: HOSPITAL | Age: 83
End: 2023-07-06
Payer: MEDICARE

## 2023-07-06 VITALS
RESPIRATION RATE: 16 BRPM | SYSTOLIC BLOOD PRESSURE: 180 MMHG | OXYGEN SATURATION: 92 % | HEART RATE: 74 BPM | DIASTOLIC BLOOD PRESSURE: 72 MMHG | TEMPERATURE: 97.9 F

## 2023-07-06 LAB
ANION GAP SERPL CALC-SCNC: 9 MMOL/L (ref 3–18)
BUN SERPL-MCNC: 30 MG/DL (ref 7–18)
BUN/CREAT SERPL: 8 (ref 12–20)
CALCIUM SERPL-MCNC: 9.2 MG/DL (ref 8.5–10.1)
CHLORIDE SERPL-SCNC: 108 MMOL/L (ref 100–111)
CO2 SERPL-SCNC: 21 MMOL/L (ref 21–32)
CREAT SERPL-MCNC: 3.69 MG/DL (ref 0.6–1.3)
GLUCOSE BLD STRIP.AUTO-MCNC: 111 MG/DL (ref 70–110)
GLUCOSE BLD STRIP.AUTO-MCNC: 114 MG/DL (ref 70–110)
GLUCOSE SERPL-MCNC: 99 MG/DL (ref 74–99)
POTASSIUM SERPL-SCNC: 3.6 MMOL/L (ref 3.5–5.5)
SODIUM SERPL-SCNC: 138 MMOL/L (ref 136–145)

## 2023-07-06 PROCEDURE — 2500000003 HC RX 250 WO HCPCS: Performed by: ANESTHESIOLOGY

## 2023-07-06 PROCEDURE — 6360000002 HC RX W HCPCS: Performed by: ANESTHESIOLOGY

## 2023-07-06 PROCEDURE — 7100000001 HC PACU RECOVERY - ADDTL 15 MIN: Performed by: SURGERY

## 2023-07-06 PROCEDURE — 71045 X-RAY EXAM CHEST 1 VIEW: CPT

## 2023-07-06 PROCEDURE — 2709999900 HC NON-CHARGEABLE SUPPLY: Performed by: SURGERY

## 2023-07-06 PROCEDURE — C1894 INTRO/SHEATH, NON-LASER: HCPCS | Performed by: SURGERY

## 2023-07-06 PROCEDURE — 82962 GLUCOSE BLOOD TEST: CPT

## 2023-07-06 PROCEDURE — C1769 GUIDE WIRE: HCPCS | Performed by: SURGERY

## 2023-07-06 PROCEDURE — 3600000002 HC SURGERY LEVEL 2 BASE: Performed by: SURGERY

## 2023-07-06 PROCEDURE — 2500000003 HC RX 250 WO HCPCS: Performed by: SURGERY

## 2023-07-06 PROCEDURE — 80048 BASIC METABOLIC PNL TOTAL CA: CPT

## 2023-07-06 PROCEDURE — 7100000010 HC PHASE II RECOVERY - FIRST 15 MIN: Performed by: SURGERY

## 2023-07-06 PROCEDURE — 3600000012 HC SURGERY LEVEL 2 ADDTL 15MIN: Performed by: SURGERY

## 2023-07-06 PROCEDURE — 7100000011 HC PHASE II RECOVERY - ADDTL 15 MIN: Performed by: SURGERY

## 2023-07-06 PROCEDURE — C1750 CATH, HEMODIALYSIS,LONG-TERM: HCPCS | Performed by: SURGERY

## 2023-07-06 PROCEDURE — 2580000003 HC RX 258: Performed by: NURSE ANESTHETIST, CERTIFIED REGISTERED

## 2023-07-06 PROCEDURE — 7100000000 HC PACU RECOVERY - FIRST 15 MIN: Performed by: SURGERY

## 2023-07-06 PROCEDURE — 3700000001 HC ADD 15 MINUTES (ANESTHESIA): Performed by: SURGERY

## 2023-07-06 PROCEDURE — 36415 COLL VENOUS BLD VENIPUNCTURE: CPT

## 2023-07-06 PROCEDURE — 6360000002 HC RX W HCPCS: Performed by: SURGERY

## 2023-07-06 PROCEDURE — 3700000000 HC ANESTHESIA ATTENDED CARE: Performed by: SURGERY

## 2023-07-06 RX ORDER — SODIUM CHLORIDE 9 MG/ML
INJECTION, SOLUTION INTRAVENOUS PRN
Status: DISCONTINUED | OUTPATIENT
Start: 2023-07-06 | End: 2023-07-06 | Stop reason: HOSPADM

## 2023-07-06 RX ORDER — DEXTROSE AND SODIUM CHLORIDE 5; .2 G/100ML; G/100ML
INJECTION, SOLUTION INTRAVENOUS CONTINUOUS
Status: DISCONTINUED | OUTPATIENT
Start: 2023-07-06 | End: 2023-07-06 | Stop reason: HOSPADM

## 2023-07-06 RX ORDER — SODIUM CHLORIDE 0.9 % (FLUSH) 0.9 %
5-40 SYRINGE (ML) INJECTION EVERY 12 HOURS SCHEDULED
Status: DISCONTINUED | OUTPATIENT
Start: 2023-07-06 | End: 2023-07-06 | Stop reason: HOSPADM

## 2023-07-06 RX ORDER — INSULIN LISPRO 100 [IU]/ML
0-4 INJECTION, SOLUTION INTRAVENOUS; SUBCUTANEOUS NIGHTLY
Status: DISCONTINUED | OUTPATIENT
Start: 2023-07-06 | End: 2023-07-06 | Stop reason: HOSPADM

## 2023-07-06 RX ORDER — HEPARIN SODIUM 5000 [USP'U]/ML
INJECTION, SOLUTION INTRAVENOUS; SUBCUTANEOUS
Status: DISCONTINUED
Start: 2023-07-06 | End: 2023-07-06 | Stop reason: HOSPADM

## 2023-07-06 RX ORDER — MEPERIDINE HYDROCHLORIDE 25 MG/ML
12.5 INJECTION INTRAMUSCULAR; INTRAVENOUS; SUBCUTANEOUS EVERY 5 MIN PRN
Status: DISCONTINUED | OUTPATIENT
Start: 2023-07-06 | End: 2023-07-06 | Stop reason: HOSPADM

## 2023-07-06 RX ORDER — INSULIN LISPRO 100 [IU]/ML
0-4 INJECTION, SOLUTION INTRAVENOUS; SUBCUTANEOUS EVERY 4 HOURS
Status: DISCONTINUED | OUTPATIENT
Start: 2023-07-06 | End: 2023-07-06 | Stop reason: HOSPADM

## 2023-07-06 RX ORDER — HEPARIN SODIUM 200 [USP'U]/100ML
INJECTION, SOLUTION INTRAVENOUS CONTINUOUS PRN
Status: COMPLETED | OUTPATIENT
Start: 2023-07-06 | End: 2023-07-06

## 2023-07-06 RX ORDER — LIDOCAINE HYDROCHLORIDE 20 MG/ML
INJECTION, SOLUTION EPIDURAL; INFILTRATION; INTRACAUDAL; PERINEURAL PRN
Status: DISCONTINUED | OUTPATIENT
Start: 2023-07-06 | End: 2023-07-06 | Stop reason: SDUPTHER

## 2023-07-06 RX ORDER — FENTANYL CITRATE 50 UG/ML
25 INJECTION, SOLUTION INTRAMUSCULAR; INTRAVENOUS EVERY 5 MIN PRN
Status: DISCONTINUED | OUTPATIENT
Start: 2023-07-06 | End: 2023-07-06 | Stop reason: HOSPADM

## 2023-07-06 RX ORDER — SODIUM CHLORIDE 0.9 % (FLUSH) 0.9 %
5-40 SYRINGE (ML) INJECTION PRN
Status: DISCONTINUED | OUTPATIENT
Start: 2023-07-06 | End: 2023-07-06 | Stop reason: HOSPADM

## 2023-07-06 RX ORDER — FENTANYL CITRATE 50 UG/ML
50 INJECTION, SOLUTION INTRAMUSCULAR; INTRAVENOUS EVERY 5 MIN PRN
Status: DISCONTINUED | OUTPATIENT
Start: 2023-07-06 | End: 2023-07-06 | Stop reason: HOSPADM

## 2023-07-06 RX ORDER — PROPOFOL 10 MG/ML
INJECTION, EMULSION INTRAVENOUS CONTINUOUS PRN
Status: DISCONTINUED | OUTPATIENT
Start: 2023-07-06 | End: 2023-07-06 | Stop reason: SDUPTHER

## 2023-07-06 RX ORDER — ONDANSETRON 2 MG/ML
4 INJECTION INTRAMUSCULAR; INTRAVENOUS
Status: DISCONTINUED | OUTPATIENT
Start: 2023-07-06 | End: 2023-07-06 | Stop reason: HOSPADM

## 2023-07-06 RX ORDER — HEPARIN SODIUM 200 [USP'U]/100ML
INJECTION, SOLUTION INTRAVENOUS
Status: DISCONTINUED
Start: 2023-07-06 | End: 2023-07-06 | Stop reason: HOSPADM

## 2023-07-06 RX ORDER — ACETAMINOPHEN 325 MG/1
650 TABLET ORAL
Status: DISCONTINUED | OUTPATIENT
Start: 2023-07-06 | End: 2023-07-06 | Stop reason: HOSPADM

## 2023-07-06 RX ORDER — INSULIN LISPRO 100 [IU]/ML
0-4 INJECTION, SOLUTION INTRAVENOUS; SUBCUTANEOUS
Status: DISCONTINUED | OUTPATIENT
Start: 2023-07-06 | End: 2023-07-06 | Stop reason: HOSPADM

## 2023-07-06 RX ORDER — LIDOCAINE HYDROCHLORIDE 10 MG/ML
INJECTION, SOLUTION EPIDURAL; INFILTRATION; INTRACAUDAL; PERINEURAL PRN
Status: DISCONTINUED | OUTPATIENT
Start: 2023-07-06 | End: 2023-07-06 | Stop reason: ALTCHOICE

## 2023-07-06 RX ORDER — DEXTROSE MONOHYDRATE 100 MG/ML
INJECTION, SOLUTION INTRAVENOUS CONTINUOUS PRN
Status: DISCONTINUED | OUTPATIENT
Start: 2023-07-06 | End: 2023-07-06 | Stop reason: HOSPADM

## 2023-07-06 RX ORDER — HEPARIN SODIUM 5000 [USP'U]/ML
INJECTION, SOLUTION INTRAVENOUS; SUBCUTANEOUS PRN
Status: DISCONTINUED | OUTPATIENT
Start: 2023-07-06 | End: 2023-07-06 | Stop reason: ALTCHOICE

## 2023-07-06 RX ORDER — CEFAZOLIN SODIUM 1 G/3ML
INJECTION, POWDER, FOR SOLUTION INTRAMUSCULAR; INTRAVENOUS PRN
Status: DISCONTINUED | OUTPATIENT
Start: 2023-07-06 | End: 2023-07-06 | Stop reason: SDUPTHER

## 2023-07-06 RX ADMIN — SODIUM CHLORIDE 25 ML/HR: 9 INJECTION, SOLUTION INTRAVENOUS at 12:12

## 2023-07-06 RX ADMIN — CEFAZOLIN 1 G: 1 INJECTION, POWDER, FOR SOLUTION INTRAMUSCULAR; INTRAVENOUS at 14:22

## 2023-07-06 RX ADMIN — LIDOCAINE HYDROCHLORIDE 20 MG: 20 INJECTION, SOLUTION EPIDURAL; INFILTRATION; INTRACAUDAL; PERINEURAL at 14:11

## 2023-07-06 RX ADMIN — PROPOFOL 100 MCG/KG/MIN: 10 INJECTION, EMULSION INTRAVENOUS at 14:09

## 2023-07-06 ASSESSMENT — PAIN - FUNCTIONAL ASSESSMENT: PAIN_FUNCTIONAL_ASSESSMENT: 0-10

## 2023-07-06 NOTE — DISCHARGE INSTRUCTIONS
Permacath (tunneled hemodialysis catheter)  You have just had a tube placed in your chest/neck for hemodialysis (or apheresis). The catheter is held in place by sutures attached to your skin. The catheter is designed with a small cuff around it that is under the skin. After a short time your tissue grows into the cuff, helping to anchor it in place and helping to prevent infection. Instructions:    Expect to be sore for a few days. You may use acetaminophen (Tylenol) or Motrin for this temporary discomfort. Ice packs may help with swelling and discomfort. A small amount of blood oozing from the insertion site is normal. If the dressing becomes saturated with blood, hold firm and direct pressure on the insertion site and sit up at a 90-degree angle for 20 minutes. Prevent tugging or pulling of the catheter. If it is dislodged it may not work properly and may need replacement. DO NOT use antibiotic ointments over the insertion area. The Dialysis Center will clean and dress your catheter at your dialysis appointments. Keep the catheter dry. You may not shower. Sitting in a tub is permissible as long as the water level is below the catheter. NO SWIMMING! You should seek medical attention regarding your catheter if:    Continued bleeding after 20 minutes of firm, direct pressure and sitting upright. Fever, chills, redness, or sudden swelling around insertion site. Catheter position changes or catheter pulled out. DISCHARGE SUMMARY from Nurse    PATIENT INSTRUCTIONS:    After general anesthesia or intravenous sedation, for 24 hours or while taking prescription Narcotics:  Limit your activities  Do not drive and operate hazardous machinery  Do not make important personal or business decisions  Do  not drink alcoholic beverages  If you have not urinated within 8 hours after discharge, please contact your surgeon on call.     Report the following to your surgeon:  Excessive pain, swelling, redness or odor of or

## 2023-07-06 NOTE — H&P
Surgery History and Physical    Subjective:      Toshia Nava is a 79 y.o.  female who presents with dysfunctional dialysis catheter.     Patient Active Problem List    Diagnosis Date Noted    ESRD (end stage renal disease) ([address]) 2023    Chronic hyperkalemia 2023    Hyperlipidemia 2023    Chronic kidney disease, stage V (very severe) ([address]) 2023    Blood transfusion declined because patient is [facility] 2023    Peripheral vascular disease ([address]) 2023    Pleural effusion on right 2023    Hypertensive crisis 2023    DNR (do not resuscitate) 2023    Unilateral AKA, right ([address]) 2023    Hyperkalemia 2021    Abscess 2021    Hyperchloremic metabolic acidosis     HTN (hypertension) 2021    Pseudoaneurysm of femoral artery ([address]) 2020    Anemia of chronic renal failure, stage 4 (severe) ([address]) 2020    Ischemia of foot 2020    Pancreatitis 2020    Acute UTI 2018    Stage 3 acute kidney injury ([address]) 2018    Diabetes mellitus type 2, insulin dependent ([address]) 2018    Noncompliance 2018     Past Medical History:   Diagnosis Date    Anemia of renal disease     Chronic kidney disease, stage V (very severe) ([facility])     Hypercholesterolemia     Hypertension     Peripheral vascular disease ([address])     Persistent proteinuria     Secondary hyperparathyroidism of renal origin ([address])     Type 2 diabetes mellitus treated with insulin [facility])       Past Surgical History:   Procedure Laterality Date    PORT SURGERY Right 2023    TUNNELED CATHETER PLACEMENT; C-ARM performed by Tracey Mullins MD at [facility] History     Tobacco Use    Smoking status: Former     Packs/day: 3.00     Types: Cigarettes     Quit date: 1996     Years since quittin.3    Smokeless tobacco: Never   Substance Use Topics    Alcohol use: Not on file      Family

## 2023-07-06 NOTE — ANESTHESIA POSTPROCEDURE EVALUATION
Department of Anesthesiology  Postprocedure Note    Patient: Stella Pickens  MRN: [id number]  YOB: 1940  Date of evaluation: 7/6/2023      Procedure Summary     Date: 07/06/23 Room / Location: [facility] CVT 02 / [facility] CARDIAC SURGERY    Anesthesia Start: 1406 Anesthesia Stop: 1446    Procedure: PERM CATHETER EXCHANGE; C-ARM (Right: Neck) Diagnosis:       ESRD (end stage renal disease) ([address])      (ESRD (end stage renal disease) ([address]) [N18.6])    Surgeons: Kami Layne MD Responsible Provider: Mack Bautista MD    Anesthesia Type: MAC ASA Status: 4          Anesthesia Type: MAC    Berta Phase I: Berta Score: 8    Berta Phase II:        Anesthesia Post Evaluation    Patient location during evaluation: PACU  Patient participation: complete - patient participated  Level of consciousness: sleepy but conscious  Pain score: 0  Airway patency: patent  Nausea & Vomiting: no nausea and no vomiting  Complications: no  Cardiovascular status: blood pressure returned to baseline  Respiratory status: acceptable  Hydration status: euvolemic

## 2023-07-06 NOTE — OP NOTE
Operative Note      Patient: Gila Landa  YOB: 1940  MRN: [id number]    Date of Procedure: 7/6/2023    Pre-Op Diagnosis Codes:     * ESRD (end stage renal disease) ([address]) [N18.6]    Post-Op Diagnosis: Same       Procedure(s):  PERM CATHETER EXCHANGE; C-ARM    Surgeon(s):  Karlee Medley MD    Assistant:   Surgical Assistant: Bernard Haines    Anesthesia: Monitor Anesthesia Care    Estimated Blood Loss (mL): Minimal    Complications: None    Specimens:   * No specimens in log *    Implants:  * No implants in log *      Drains: * No LDAs found *    Findings: Right IJ catheter placed with tip at superior vena cava atrial junction        Detailed Description of Procedure:   Patient reports poor function from her dialysis catheter. She had preop antibiotic moderate sedation supine position right chest wall was prepped draped usual standard fashion. Examined catheter no signs of infection on fluoroscopy has a high cervical loop. Localized cut the sutures wired the catheter and remove the existing catheter this is a 19 palindrome I replaced this with a 23 cm palindrome to get tip placement a little further in. This was placed under fluoroscopy the tip is in this. The cava near the atrial junction. I pulled the wire and dilators it flushes and aspirates nicely. Placed concentrated heparin sutured in place.   Sterile dressing was applied catheter applied she was transferred back to recovery stable condition    Electronically signed by Karlee Medley MD on 7/6/2023 at 2:51 PM

## 2023-07-06 NOTE — ANESTHESIA PRE PROCEDURE
Department of Anesthesiology  Preprocedure Note       Name:  Fatoumata Hussein   Age:  79 y.o.  :  1940                                          MRN:  [id number]         Date:  2023      Surgeon: Allyssa Peterson):  Cecilia Abrams MD    Procedure: Procedure(s):  PERM CATHETER EXCHANGE; C-ARM    Medications prior to admission:   Prior to Admission medications    Medication Sig Start Date End Date Taking? Authorizing Provider   hydrALAZINE (APRESOLINE) 100 MG tablet Take 1 tablet by mouth 3 times daily 23   Luca Wood MD   NIFEdipine (PROCARDIA XL) 30 MG extended release tablet Take 2 tablets by mouth daily 23   Luca Wood MD   folic acid (FOLVITE) 1 MG tablet Take 1 tablet by mouth daily 23   Luca Wood MD   vitamin B-12 100 MCG tablet Take 1 tablet by mouth daily 23   Luca Wood MD   levothyroxine (SYNTHROID) 25 MCG tablet Take 1 tablet by mouth every morning (before breakfast) 23   Luca Wood MD   acetaminophen (TYLENOL) 325 MG tablet Take 2 tablets by mouth every 6 hours as needed 18   Ar Automatic Reconciliation   aspirin 81 MG chewable tablet Take 1 tablet by mouth daily 2/3/20   Ar Automatic Reconciliation   atorvastatin (LIPITOR) 20 MG tablet Take by mouth daily    Ar Automatic Reconciliation   docusate (COLACE, DULCOLAX) 100 MG CAPS Take 100 mg by mouth 2 times daily    Ar Automatic Reconciliation   insulin lispro (HUMALOG) 100 UNIT/ML SOLN injection vial Less than 150 =   0 units  150 -199 =   3 units  200 -249 =   6 units  250 -299 =   9 units  300 -349 =   12 units  350 and above =   15 units, CALL MD 18   Ar Automatic Reconciliation   ipratropium-albuterol (DUONEB) 0.5-2.5 (3) MG/3ML SOLN nebulizer solution Inhale 3 mLs into the lungs every 6 hours as needed 18   Ar Automatic Reconciliation       Current medications:    No current facility-administered medications for this visit. No current outpatient medications on file.

## 2023-09-11 ENCOUNTER — APPOINTMENT (OUTPATIENT)
Facility: HOSPITAL | Age: 83
DRG: 884 | End: 2023-09-11
Payer: MEDICARE

## 2023-09-11 ENCOUNTER — HOSPITAL ENCOUNTER (EMERGENCY)
Facility: HOSPITAL | Age: 83
Discharge: HOME | DRG: 884 | End: 2023-09-12
Payer: MEDICARE

## 2023-09-11 VITALS
DIASTOLIC BLOOD PRESSURE: 61 MMHG | OXYGEN SATURATION: 97 % | TEMPERATURE: 97.2 F | RESPIRATION RATE: 14 BRPM | HEART RATE: 71 BPM | HEIGHT: 65 IN | SYSTOLIC BLOOD PRESSURE: 136 MMHG | BODY MASS INDEX: 23.32 KG/M2 | WEIGHT: 140 LBS

## 2023-09-11 DIAGNOSIS — E87.5 HYPERKALEMIA: Primary | ICD-10-CM

## 2023-09-11 DIAGNOSIS — F05 ACUTE CONFUSIONAL STATE: ICD-10-CM

## 2023-09-11 DIAGNOSIS — N18.6 ESRD ON DIALYSIS (HCC): ICD-10-CM

## 2023-09-11 DIAGNOSIS — Z99.2 ESRD ON DIALYSIS (HCC): ICD-10-CM

## 2023-09-11 LAB
ALBUMIN SERPL-MCNC: 3.2 G/DL (ref 3.4–5)
ALBUMIN/GLOB SERPL: 0.9 (ref 0.8–1.7)
ALP SERPL-CCNC: 218 U/L (ref 45–117)
ALT SERPL-CCNC: 24 U/L (ref 13–56)
ANION GAP SERPL CALC-SCNC: 4 MMOL/L (ref 3–18)
AST SERPL-CCNC: 31 U/L (ref 10–38)
BASOPHILS # BLD: 0 K/UL (ref 0–0.1)
BASOPHILS NFR BLD: 0 % (ref 0–2)
BILIRUB SERPL-MCNC: 0.2 MG/DL (ref 0.2–1)
BUN SERPL-MCNC: 36 MG/DL (ref 7–18)
BUN/CREAT SERPL: 8 (ref 12–20)
CALCIUM SERPL-MCNC: 8.7 MG/DL (ref 8.5–10.1)
CHLORIDE SERPL-SCNC: 104 MMOL/L (ref 100–111)
CO2 SERPL-SCNC: 28 MMOL/L (ref 21–32)
CREAT SERPL-MCNC: 4.25 MG/DL (ref 0.6–1.3)
DIFFERENTIAL METHOD BLD: ABNORMAL
EKG ATRIAL RATE: 65 BPM
EKG DIAGNOSIS: NORMAL
EKG P AXIS: 65 DEGREES
EKG P-R INTERVAL: 138 MS
EKG Q-T INTERVAL: 380 MS
EKG QRS DURATION: 82 MS
EKG QTC CALCULATION (BAZETT): 395 MS
EKG R AXIS: -22 DEGREES
EKG T AXIS: 36 DEGREES
EKG VENTRICULAR RATE: 65 BPM
EOSINOPHIL # BLD: 0 K/UL (ref 0–0.4)
EOSINOPHIL NFR BLD: 1 % (ref 0–5)
ERYTHROCYTE [DISTWIDTH] IN BLOOD BY AUTOMATED COUNT: 15.9 % (ref 11.6–14.5)
GLOBULIN SER CALC-MCNC: 3.6 G/DL (ref 2–4)
GLUCOSE BLD STRIP.AUTO-MCNC: 108 MG/DL (ref 70–110)
GLUCOSE SERPL-MCNC: 119 MG/DL (ref 74–99)
HBV SURFACE AG SER QL: <0.1 INDEX
HBV SURFACE AG SER QL: NEGATIVE
HCT VFR BLD AUTO: 35.4 % (ref 35–45)
HGB BLD-MCNC: 11 G/DL (ref 12–16)
IMM GRANULOCYTES # BLD AUTO: 0 K/UL (ref 0–0.04)
IMM GRANULOCYTES NFR BLD AUTO: 0 % (ref 0–0.5)
LYMPHOCYTES # BLD: 0.9 K/UL (ref 0.9–3.6)
LYMPHOCYTES NFR BLD: 18 % (ref 21–52)
MAGNESIUM SERPL-MCNC: 2.6 MG/DL (ref 1.6–2.6)
MCH RBC QN AUTO: 33.5 PG (ref 24–34)
MCHC RBC AUTO-ENTMCNC: 31.1 G/DL (ref 31–37)
MCV RBC AUTO: 107.9 FL (ref 78–100)
MONOCYTES # BLD: 0.5 K/UL (ref 0.05–1.2)
MONOCYTES NFR BLD: 9 % (ref 3–10)
NEUTS SEG # BLD: 3.7 K/UL (ref 1.8–8)
NEUTS SEG NFR BLD: 71 % (ref 40–73)
NRBC # BLD: 0 K/UL (ref 0–0.01)
NRBC BLD-RTO: 0 PER 100 WBC
PLATELET # BLD AUTO: 169 K/UL (ref 135–420)
PMV BLD AUTO: 11.1 FL (ref 9.2–11.8)
POTASSIUM SERPL-SCNC: 5.7 MMOL/L (ref 3.5–5.5)
PROT SERPL-MCNC: 6.8 G/DL (ref 6.4–8.2)
RBC # BLD AUTO: 3.28 M/UL (ref 4.2–5.3)
SODIUM SERPL-SCNC: 136 MMOL/L (ref 136–145)
TROPONIN I SERPL HS-MCNC: 18 NG/L (ref 0–54)
TROPONIN I SERPL HS-MCNC: 25 NG/L (ref 0–54)
WBC # BLD AUTO: 5.1 K/UL (ref 4.6–13.2)

## 2023-09-11 PROCEDURE — 71045 X-RAY EXAM CHEST 1 VIEW: CPT

## 2023-09-11 PROCEDURE — 87340 HEPATITIS B SURFACE AG IA: CPT

## 2023-09-11 PROCEDURE — 6360000002 HC RX W HCPCS

## 2023-09-11 PROCEDURE — 80053 COMPREHEN METABOLIC PANEL: CPT

## 2023-09-11 PROCEDURE — 83735 ASSAY OF MAGNESIUM: CPT

## 2023-09-11 PROCEDURE — 93010 ELECTROCARDIOGRAM REPORT: CPT | Performed by: INTERNAL MEDICINE

## 2023-09-11 PROCEDURE — 82962 GLUCOSE BLOOD TEST: CPT

## 2023-09-11 PROCEDURE — 2500000003 HC RX 250 WO HCPCS: Performed by: EMERGENCY MEDICINE

## 2023-09-11 PROCEDURE — 84484 ASSAY OF TROPONIN QUANT: CPT

## 2023-09-11 PROCEDURE — 6360000004 HC RX CONTRAST MEDICATION: Performed by: EMERGENCY MEDICINE

## 2023-09-11 PROCEDURE — 74177 CT ABD & PELVIS W/CONTRAST: CPT

## 2023-09-11 PROCEDURE — 90935 HEMODIALYSIS ONE EVALUATION: CPT

## 2023-09-11 PROCEDURE — 6360000002 HC RX W HCPCS: Performed by: EMERGENCY MEDICINE

## 2023-09-11 PROCEDURE — 86706 HEP B SURFACE ANTIBODY: CPT

## 2023-09-11 PROCEDURE — 71275 CT ANGIOGRAPHY CHEST: CPT

## 2023-09-11 PROCEDURE — 85025 COMPLETE CBC W/AUTO DIFF WBC: CPT

## 2023-09-11 PROCEDURE — 70450 CT HEAD/BRAIN W/O DYE: CPT

## 2023-09-11 PROCEDURE — 93005 ELECTROCARDIOGRAM TRACING: CPT | Performed by: EMERGENCY MEDICINE

## 2023-09-11 RX ORDER — HYDRALAZINE HYDROCHLORIDE 50 MG/1
50 TABLET, FILM COATED ORAL
Status: DISPENSED | OUTPATIENT
Start: 2023-09-11 | End: 2023-09-12

## 2023-09-11 RX ORDER — NALOXONE HYDROCHLORIDE 1 MG/ML
1 INJECTION INTRAMUSCULAR; INTRAVENOUS; SUBCUTANEOUS
Status: DISCONTINUED | OUTPATIENT
Start: 2023-09-11 | End: 2023-09-12 | Stop reason: HOSPADM

## 2023-09-11 RX ORDER — NALOXONE HYDROCHLORIDE 1 MG/ML
INJECTION INTRAMUSCULAR; INTRAVENOUS; SUBCUTANEOUS
Status: COMPLETED
Start: 2023-09-11 | End: 2023-09-11

## 2023-09-11 RX ORDER — NIFEDIPINE 30 MG/1
60 TABLET, EXTENDED RELEASE ORAL
Status: DISPENSED | OUTPATIENT
Start: 2023-09-11 | End: 2023-09-12

## 2023-09-11 RX ORDER — NALOXONE HYDROCHLORIDE 0.4 MG/ML
1 INJECTION, SOLUTION INTRAMUSCULAR; INTRAVENOUS; SUBCUTANEOUS PRN
Status: DISCONTINUED | OUTPATIENT
Start: 2023-09-11 | End: 2023-09-12 | Stop reason: HOSPADM

## 2023-09-11 RX ORDER — CALCIUM GLUCONATE 94 MG/ML
1000 INJECTION, SOLUTION INTRAVENOUS
Status: COMPLETED | OUTPATIENT
Start: 2023-09-11 | End: 2023-09-11

## 2023-09-11 RX ADMIN — CALCIUM GLUCONATE 1000 MG: 98 INJECTION, SOLUTION INTRAVENOUS at 15:14

## 2023-09-11 RX ADMIN — SODIUM BICARBONATE 50 MEQ: 84 INJECTION INTRAVENOUS at 15:13

## 2023-09-11 RX ADMIN — IOPAMIDOL 80 ML: 755 INJECTION, SOLUTION INTRAVENOUS at 18:50

## 2023-09-11 RX ADMIN — NALOXONE HYDROCHLORIDE 2 MG: 1 INJECTION PARENTERAL at 12:01

## 2023-09-11 ASSESSMENT — ENCOUNTER SYMPTOMS
APNEA: 1
GASTROINTESTINAL NEGATIVE: 1
ALLERGIC/IMMUNOLOGIC NEGATIVE: 1
EYES NEGATIVE: 1

## 2023-09-11 ASSESSMENT — PAIN - FUNCTIONAL ASSESSMENT: PAIN_FUNCTIONAL_ASSESSMENT: NONE - DENIES PAIN

## 2023-09-11 NOTE — PROGRESS NOTES
Received pre HD report from  CELESTE Edward , RN. Pt in bed, responsive to verbal stimuli, on O2 via NC @ 2L, no s/s of acute distress noted. Accessed right CVC w/o complications. Tx initiated at 1830 . CVC flowing with ease. UF goal 2000 ml. Offered assistance with repositioning every 2 hours. Vascular access visible at all times during treatment, line connections intact at all times. Tx completed at 2124, tolerated well, 2L removed. De-accessed per protocol. Heparin indwell 1.9ml in arterial, and 1.9ml in venous catheter. Unit nurse Severiano Hoyt RN given report.

## 2023-09-11 NOTE — ED NOTES
Pt exhibiting combative behavior. 2 nurses attempted x3 for IV start, pt jerking arm during insertion. Able to get green, lav, and gold tubes only. Pt grabbed both nurses by the wrist stating \"you're not putting no needles in my arm\". Pt stating continuously that she wants to leave, does not want to be here.       Teddy Newberry RN  09/11/23 2815

## 2023-09-11 NOTE — ACP (ADVANCE CARE PLANNING)
Advance Care Planning     Advance Care Planning Inpatient Note  [facility] Department    Today's Date: 9/11/2023  Unit: [facility] EMERGENCY DEPT    Received request from . Upon review of chart and communication with care team, patient's decision making abilities are not in question. . Patient was/were present in the room during visit. Goals of ACP Conversation:  Discuss advance care planning documents    Health Care Decision Makers:       Primary Decision Maker: Horacio Granger - [phone]    Secondary Decision Maker: Carla Cornejo - [phone]  Summary:  Verified [address]    Advance Care Planning Documents (Patient Wishes):  Healthcare Power of /Advance Directive Appointment of Health Care Agent     Assessment:  Patient seen as a new admit to the hospital from bed 13 of the emergency room. Found patient not responsive at this time and unable to communicate . There is an advance directive on file for her. Interventions:      Care Preferences Communicated:   No    Outcomes/Plan:  Existing advance directive reviewed with patient; no changes to patient's previously recorded wishes.     Electronically signed by Wallace Hinojosa., [address] [place] [address] on 9/11/2023 at 1:07 PM

## 2023-09-11 NOTE — ED TRIAGE NOTES
Pt arrived via EMS from nursing facility. Pt reported to be unresponsive with pinpoint pupils, low RR. EMS administered narcan x1 dose and pt began waking up.

## 2023-09-11 NOTE — FLOWSHEET NOTE
09/11/23 1430 09/11/23 1445 09/11/23 1446   Vital Signs   Pulse 69 64  --    BP (!) 122/39 (!) 135/39 (!) 125/39   MAP (Calculated) 67 71 68      09/11/23 1455 09/11/23 1500   Vital Signs   Pulse  --  63   BP (!) 120/47 (!) 132/46   MAP (Calculated) 71 75       Low diastolic BP discussed with provider. Verbal order received to hold narcan IV, nifedipine, and procardia.

## 2023-09-11 NOTE — PLAN OF CARE
INTERVENTION:  HEMODYNAMIC STABILIZATION  MAINTAIN BP WNL WHILE ON HD. INTERVENTION:  FLUID MANAGEMENT  WILL ATTEMPT 2000 ML TOTAL FLUID REMOVAL AS TOLERATED. INTERVENTION:  METABOLIC/ELECTROLYTE MANAGEMENT  2.0 POTASSIUM 2.5 CALCIUM DIALYSATE USED WITH HD TODAY. INTERVENTION:  HEMODIALYSIS ACCESS SITE MANAGEMENT  RIGHT CVC ACCESSED USING ASEPTIC TECHNIQUE. GOAL:  SIGNS AND SYMPTOMS OF LISTED POTENTIAL PROBLEMS WILL BE ABSENT OR MANAGEABLE. OUTCOME:  PROGRESSING. HD PLANNED FOR 3 HOURS TODAY.        Problem: Chronic Conditions and Co-morbidities  Goal: Patient's chronic conditions and co-morbidity symptoms are monitored and maintained or improved  Outcome: Progressing

## 2023-09-11 NOTE — ED PROVIDER NOTES
Glucose 119 (*)     BUN 36 (*)     Creatinine 4.25 (*)     Bun/Cre Ratio 8 (*)     Est, Glom Filt Rate 10 (*)     Alk Phosphatase 218 (*)     Albumin 3.2 (*)     All other components within normal limits   TROPONIN   TROPONIN   MAGNESIUM   HEPATITIS B SURFACE ANTIGEN   HEPATITIS B SURFACE ANTIBODY   POCT GLUCOSE       All other labs were within normal range or not returned as of this dictation. EMERGENCY DEPARTMENT COURSE and DIFFERENTIAL DIAGNOSIS/MDM:   Vitals:    Vitals:    09/11/23 1830 09/11/23 1845 09/11/23 1900 09/11/23 1930   BP: (!) 213/89 (!) 160/73 (!) 162/63 105/62   Pulse: 80 58 74 79   Resp:       Temp:       TempSrc:       SpO2:       Weight:       Height:           Differential includes hypoxia PE pneumonia electrolyte abnormality anemia accidental overdose NSTEMI    Medical Decision Making  Amount and/or Complexity of Data Reviewed  Labs: ordered. Radiology: ordered. ECG/medicine tests: ordered. Risk  Prescription drug management. Patient was not initially responsive talking to us we were calling her name. We gave her Narcan and she became responsive immediately. She is persistently hypoxic but does respond. Have ordered CTA head CT CT abdomen pelvis as patient's presentation not consistent with someone who is a nursing home resident and age. Spoke with Dr. Law Staff and asked him to defer dialysis until IV contrast was administered. Patient diverted from dialysis back to CT scan. Patient's blood pressure was initially high but repeat without intervention has changed markedly. REASSESSMENT        On CT head CT abdomen pelvis CTA chest there is no acute intracranial hemorrhage patient does not have a PE she has pleural effusions right greater than left and nothing acute intra-abdominal he.     Patient's transient confusion improved with Narcan which is surprising from a nursing home presentation but she is had her hyperkalemia treated here with calcium gluconate sodium bicarb she also was sent over to dialysis urgently arranged by Dr. Gerry Goldstein who is consulted for the hyperkalemia. She is on dialysis now and will need reassessment for her overall mentation when she returns. 7:51 PM : Pt care transferred to [address] [place] [address]  ,ED provider. History of patient complaint(s), available diagnostic reports and current treatment plan has been discussed thoroughly. Bedside rounding on patient occured : Yes  Intended disposition of patient : home  Pending diagnostics reports and/or labs (please list): dialysis, reasessment      CRITICAL CARE TIME   Total Critical Care time was 55 minutes, excluding separately reportable procedures. There was a high probability of clinically significant/life threatening deterioration in the patient's condition which required my urgent intervention. CONSULTS:  IP CONSULT TO NEPHROLOGY    PROCEDURES:  Unless otherwise noted below, none     Procedures    FINAL IMPRESSION      1. Hyperkalemia    2. Acute confusional state    3.  ESRD on dialysis [facility])          DISPOSITION/PLAN   DISPOSITION        PATIENT REFERRED TO:  Ronald Dobbs MD  [address] [address]  [address] [place] [address]  [phone]        [facility] EMERGENCY DEPT  [address] [place] [address]  [phone]    If symptoms worsen or unable to obtain follow up, return immediately      DISCHARGE MEDICATIONS:  New Prescriptions    No medications on file     Controlled Substances Monitoring:          No data to display                (Please note that portions of this note were completed with a voice recognition program.  Efforts were made to edit the dictations but occasionally words are mis-transcribed.)    DAYA Zimmerman (electronically signed)  Attending Emergency Physician           Lina Bee  09/11/23 1951

## 2023-09-11 NOTE — PROGRESS NOTES
Patient arrived to suite. Dialysis was initiated and immediately stopped d/t phone call stating patient needs to go to CT. Patient sent to CT. Will dialyze after CT. Nephrologist updated.

## 2023-09-12 LAB
HBV SURFACE AB SER QL IA: NEGATIVE
HBV SURFACE AB SERPL IA-ACNC: <3.1 MIU/ML
HEP BS AB COMMENT: ABNORMAL

## 2023-09-12 NOTE — ED PROVIDER NOTES
12:28 AM :Pt care assumed from  [facility]  , ED provider. Pt complaint(s), current treatment plan, progression and available diagnostic results have been discussed thoroughly. The patient was seen and evaluated on my shift. Rounding occurred: No   Intended Disposition: TBD   Pending diagnostic reports and/or labs (please list): Patient is currently in dialysis, awaiting re-eval       ED Course as of 09/12/23 0028   Mon Sep 11, 2023   2228 Re-evaluated patient she is alert and oriented, and awake. Patient is unsure of why she is in the ER though. Will continue to monitor  [CD]   [phone] Reevaluated patient, she is hemodynamically stable, not on any oxygen, not apneic, have monitored her in the ER, spoke to patient he states she is feels comfortable going home.   Patient's state was likely due to encephalopathy, and needing dialysis treatment, she is stable at this time. [CD]   e Sep 12, 2023   0027 Patient is easily arousable, in no oxygen desaturations, patient is awaiting transport at this time. [CD]      ED Course User Index  [CD] FEDERICA Glass PA-C  09/12/23 0028

## 2023-09-13 ENCOUNTER — APPOINTMENT (OUTPATIENT)
Facility: HOSPITAL | Age: 83
DRG: 884 | End: 2023-09-13
Payer: MEDICARE

## 2023-09-13 ENCOUNTER — HOSPITAL ENCOUNTER (INPATIENT)
Facility: HOSPITAL | Age: 83
LOS: 2 days | Discharge: NURSING, REHAB OR LONG-TERM CARE | DRG: 884 | End: 2023-09-15
Attending: EMERGENCY MEDICINE | Admitting: INTERNAL MEDICINE
Payer: MEDICARE

## 2023-09-13 DIAGNOSIS — E87.5 HYPERKALEMIA: ICD-10-CM

## 2023-09-13 DIAGNOSIS — R41.82 ALTERED MENTAL STATUS, UNSPECIFIED ALTERED MENTAL STATUS TYPE: Primary | ICD-10-CM

## 2023-09-13 LAB
ALBUMIN SERPL-MCNC: 3.3 G/DL (ref 3.4–5)
ALBUMIN/GLOB SERPL: 0.8 (ref 0.8–1.7)
ALP SERPL-CCNC: 209 U/L (ref 45–117)
ALT SERPL-CCNC: 23 U/L (ref 13–56)
AMMONIA PLAS-SCNC: 14 UMOL/L (ref 11–32)
ANION GAP SERPL CALC-SCNC: 10 MMOL/L (ref 3–18)
AST SERPL-CCNC: 40 U/L (ref 10–38)
B PERT DNA SPEC QL NAA+PROBE: NOT DETECTED
BILIRUB SERPL-MCNC: 0.4 MG/DL (ref 0.2–1)
BORDETELLA PARAPERTUSSIS BY PCR: NOT DETECTED
BUN SERPL-MCNC: 39 MG/DL (ref 7–18)
BUN/CREAT SERPL: 7 (ref 12–20)
C PNEUM DNA SPEC QL NAA+PROBE: NOT DETECTED
CALCIUM SERPL-MCNC: 8.8 MG/DL (ref 8.5–10.1)
CHLORIDE SERPL-SCNC: 103 MMOL/L (ref 100–111)
CHP ED QC CHECK: YES
CO2 SERPL-SCNC: 24 MMOL/L (ref 21–32)
CREAT SERPL-MCNC: 5.21 MG/DL (ref 0.6–1.3)
EKG ATRIAL RATE: 78 BPM
EKG DIAGNOSIS: NORMAL
EKG P AXIS: 67 DEGREES
EKG P-R INTERVAL: 134 MS
EKG Q-T INTERVAL: 350 MS
EKG QRS DURATION: 80 MS
EKG QTC CALCULATION (BAZETT): 399 MS
EKG R AXIS: -20 DEGREES
EKG T AXIS: 16 DEGREES
EKG VENTRICULAR RATE: 78 BPM
ERYTHROCYTE [DISTWIDTH] IN BLOOD BY AUTOMATED COUNT: 15.2 % (ref 11.6–14.5)
ETHANOL SERPL-MCNC: <3 MG/DL (ref 0–3)
FLUAV SUBTYP SPEC NAA+PROBE: NOT DETECTED
FLUBV RNA SPEC QL NAA+PROBE: NOT DETECTED
GLOBULIN SER CALC-MCNC: 4.2 G/DL (ref 2–4)
GLUCOSE BLD STRIP.AUTO-MCNC: 99 MG/DL (ref 70–110)
GLUCOSE BLD-MCNC: 99 MG/DL
GLUCOSE SERPL-MCNC: 100 MG/DL (ref 74–99)
HADV DNA SPEC QL NAA+PROBE: NOT DETECTED
HCOV 229E RNA SPEC QL NAA+PROBE: NOT DETECTED
HCOV HKU1 RNA SPEC QL NAA+PROBE: NOT DETECTED
HCOV NL63 RNA SPEC QL NAA+PROBE: NOT DETECTED
HCOV OC43 RNA SPEC QL NAA+PROBE: NOT DETECTED
HCT VFR BLD AUTO: 40.2 % (ref 35–45)
HGB BLD-MCNC: 12.6 G/DL (ref 12–16)
HMPV RNA SPEC QL NAA+PROBE: NOT DETECTED
HPIV1 RNA SPEC QL NAA+PROBE: NOT DETECTED
HPIV2 RNA SPEC QL NAA+PROBE: NOT DETECTED
HPIV3 RNA SPEC QL NAA+PROBE: NOT DETECTED
HPIV4 RNA SPEC QL NAA+PROBE: NOT DETECTED
LACTATE SERPL-SCNC: 1.4 MMOL/L (ref 0.4–2)
M PNEUMO DNA SPEC QL NAA+PROBE: NOT DETECTED
MAGNESIUM SERPL-MCNC: 2.7 MG/DL (ref 1.6–2.6)
MCH RBC QN AUTO: 34 PG (ref 24–34)
MCHC RBC AUTO-ENTMCNC: 31.3 G/DL (ref 31–37)
MCV RBC AUTO: 108.4 FL (ref 78–100)
NRBC # BLD: 0 K/UL (ref 0–0.01)
NRBC BLD-RTO: 0 PER 100 WBC
PHOSPHATE SERPL-MCNC: 7.4 MG/DL (ref 2.5–4.9)
PLATELET # BLD AUTO: 147 K/UL (ref 135–420)
PMV BLD AUTO: 10.1 FL (ref 9.2–11.8)
POTASSIUM SERPL-SCNC: 5.6 MMOL/L (ref 3.5–5.5)
PROT SERPL-MCNC: 7.5 G/DL (ref 6.4–8.2)
RBC # BLD AUTO: 3.71 M/UL (ref 4.2–5.3)
RSV RNA SPEC QL NAA+PROBE: NOT DETECTED
RV+EV RNA SPEC QL NAA+PROBE: NOT DETECTED
SARS-COV-2 RNA RESP QL NAA+PROBE: NOT DETECTED
SODIUM SERPL-SCNC: 137 MMOL/L (ref 136–145)
T4 FREE SERPL-MCNC: 0.8 NG/DL (ref 0.7–1.5)
TSH SERPL DL<=0.05 MIU/L-ACNC: 1.74 UIU/ML (ref 0.36–3.74)
WBC # BLD AUTO: 6 K/UL (ref 4.6–13.2)

## 2023-09-13 PROCEDURE — 84439 ASSAY OF FREE THYROXINE: CPT

## 2023-09-13 PROCEDURE — 83605 ASSAY OF LACTIC ACID: CPT

## 2023-09-13 PROCEDURE — 93010 ELECTROCARDIOGRAM REPORT: CPT | Performed by: INTERNAL MEDICINE

## 2023-09-13 PROCEDURE — 71045 X-RAY EXAM CHEST 1 VIEW: CPT

## 2023-09-13 PROCEDURE — 96374 THER/PROPH/DIAG INJ IV PUSH: CPT

## 2023-09-13 PROCEDURE — 1100000000 HC RM PRIVATE

## 2023-09-13 PROCEDURE — 83735 ASSAY OF MAGNESIUM: CPT

## 2023-09-13 PROCEDURE — 80053 COMPREHEN METABOLIC PANEL: CPT

## 2023-09-13 PROCEDURE — 82962 GLUCOSE BLOOD TEST: CPT

## 2023-09-13 PROCEDURE — 5A1D70Z PERFORMANCE OF URINARY FILTRATION, INTERMITTENT, LESS THAN 6 HOURS PER DAY: ICD-10-PCS | Performed by: INTERNAL MEDICINE

## 2023-09-13 PROCEDURE — 70450 CT HEAD/BRAIN W/O DYE: CPT

## 2023-09-13 PROCEDURE — 90935 HEMODIALYSIS ONE EVALUATION: CPT

## 2023-09-13 PROCEDURE — 0202U NFCT DS 22 TRGT SARS-COV-2: CPT

## 2023-09-13 PROCEDURE — 6360000002 HC RX W HCPCS

## 2023-09-13 PROCEDURE — G0378 HOSPITAL OBSERVATION PER HR: HCPCS

## 2023-09-13 PROCEDURE — 82077 ASSAY SPEC XCP UR&BREATH IA: CPT

## 2023-09-13 PROCEDURE — 84100 ASSAY OF PHOSPHORUS: CPT

## 2023-09-13 PROCEDURE — 93005 ELECTROCARDIOGRAM TRACING: CPT | Performed by: STUDENT IN AN ORGANIZED HEALTH CARE EDUCATION/TRAINING PROGRAM

## 2023-09-13 PROCEDURE — 84443 ASSAY THYROID STIM HORMONE: CPT

## 2023-09-13 PROCEDURE — 99285 EMERGENCY DEPT VISIT HI MDM: CPT

## 2023-09-13 PROCEDURE — 82140 ASSAY OF AMMONIA: CPT

## 2023-09-13 PROCEDURE — 85027 COMPLETE CBC AUTOMATED: CPT

## 2023-09-13 PROCEDURE — 87040 BLOOD CULTURE FOR BACTERIA: CPT

## 2023-09-13 PROCEDURE — 94761 N-INVAS EAR/PLS OXIMETRY MLT: CPT

## 2023-09-13 PROCEDURE — 99222 1ST HOSP IP/OBS MODERATE 55: CPT | Performed by: INTERNAL MEDICINE

## 2023-09-13 RX ORDER — ONDANSETRON 2 MG/ML
4 INJECTION INTRAMUSCULAR; INTRAVENOUS EVERY 6 HOURS PRN
Status: DISCONTINUED | OUTPATIENT
Start: 2023-09-13 | End: 2023-09-16 | Stop reason: HOSPADM

## 2023-09-13 RX ORDER — LEVOTHYROXINE SODIUM 0.03 MG/1
25 TABLET ORAL
Status: DISCONTINUED | OUTPATIENT
Start: 2023-09-14 | End: 2023-09-16 | Stop reason: HOSPADM

## 2023-09-13 RX ORDER — POLYETHYLENE GLYCOL 3350 17 G/17G
17 POWDER, FOR SOLUTION ORAL DAILY PRN
Status: DISCONTINUED | OUTPATIENT
Start: 2023-09-13 | End: 2023-09-16 | Stop reason: HOSPADM

## 2023-09-13 RX ORDER — ACETAMINOPHEN 325 MG/1
650 TABLET ORAL EVERY 6 HOURS PRN
Status: DISCONTINUED | OUTPATIENT
Start: 2023-09-13 | End: 2023-09-16 | Stop reason: HOSPADM

## 2023-09-13 RX ORDER — IPRATROPIUM BROMIDE AND ALBUTEROL SULFATE 2.5; .5 MG/3ML; MG/3ML
1 SOLUTION RESPIRATORY (INHALATION) EVERY 4 HOURS PRN
Status: DISCONTINUED | OUTPATIENT
Start: 2023-09-13 | End: 2023-09-16 | Stop reason: HOSPADM

## 2023-09-13 RX ORDER — ATORVASTATIN CALCIUM 40 MG/1
40 TABLET, FILM COATED ORAL DAILY
Status: DISCONTINUED | OUTPATIENT
Start: 2023-09-14 | End: 2023-09-16 | Stop reason: HOSPADM

## 2023-09-13 RX ORDER — DOCUSATE SODIUM 100 MG/1
100 CAPSULE, LIQUID FILLED ORAL 2 TIMES DAILY
Status: DISCONTINUED | OUTPATIENT
Start: 2023-09-13 | End: 2023-09-16 | Stop reason: HOSPADM

## 2023-09-13 RX ORDER — SODIUM CHLORIDE 0.9 % (FLUSH) 0.9 %
5-40 SYRINGE (ML) INJECTION EVERY 12 HOURS SCHEDULED
Status: DISCONTINUED | OUTPATIENT
Start: 2023-09-13 | End: 2023-09-16 | Stop reason: HOSPADM

## 2023-09-13 RX ORDER — ASPIRIN 81 MG/1
81 TABLET, CHEWABLE ORAL DAILY
Status: DISCONTINUED | OUTPATIENT
Start: 2023-09-14 | End: 2023-09-16 | Stop reason: HOSPADM

## 2023-09-13 RX ORDER — ONDANSETRON 4 MG/1
4 TABLET, ORALLY DISINTEGRATING ORAL EVERY 8 HOURS PRN
Status: DISCONTINUED | OUTPATIENT
Start: 2023-09-13 | End: 2023-09-16 | Stop reason: HOSPADM

## 2023-09-13 RX ORDER — SODIUM CHLORIDE 0.9 % (FLUSH) 0.9 %
5-40 SYRINGE (ML) INJECTION PRN
Status: DISCONTINUED | OUTPATIENT
Start: 2023-09-13 | End: 2023-09-16 | Stop reason: HOSPADM

## 2023-09-13 RX ORDER — UBIDECARENONE 75 MG
100 CAPSULE ORAL DAILY
Status: DISCONTINUED | OUTPATIENT
Start: 2023-09-14 | End: 2023-09-16 | Stop reason: HOSPADM

## 2023-09-13 RX ORDER — SODIUM CHLORIDE 9 MG/ML
INJECTION, SOLUTION INTRAVENOUS PRN
Status: DISCONTINUED | OUTPATIENT
Start: 2023-09-13 | End: 2023-09-16 | Stop reason: HOSPADM

## 2023-09-13 RX ORDER — MAGNESIUM SULFATE IN WATER 40 MG/ML
2000 INJECTION, SOLUTION INTRAVENOUS PRN
Status: DISCONTINUED | OUTPATIENT
Start: 2023-09-13 | End: 2023-09-16 | Stop reason: HOSPADM

## 2023-09-13 RX ORDER — FOLIC ACID 1 MG/1
1 TABLET ORAL DAILY
Status: DISCONTINUED | OUTPATIENT
Start: 2023-09-14 | End: 2023-09-16 | Stop reason: HOSPADM

## 2023-09-13 RX ORDER — POTASSIUM CHLORIDE 7.45 MG/ML
10 INJECTION INTRAVENOUS PRN
Status: DISCONTINUED | OUTPATIENT
Start: 2023-09-13 | End: 2023-09-16 | Stop reason: HOSPADM

## 2023-09-13 RX ORDER — NALOXONE HYDROCHLORIDE 0.4 MG/ML
0.4 INJECTION, SOLUTION INTRAMUSCULAR; INTRAVENOUS; SUBCUTANEOUS
Status: COMPLETED | OUTPATIENT
Start: 2023-09-13 | End: 2023-09-13

## 2023-09-13 RX ADMIN — NALOXONE HYDROCHLORIDE 0.4 MG: 0.4 INJECTION, SOLUTION INTRAMUSCULAR; INTRAVENOUS; SUBCUTANEOUS at 19:52

## 2023-09-13 NOTE — ED TRIAGE NOTES
Patient arrives by EMS from [facility] for reports of an overdose. Patient was found in room unresponsive and with O2 sats of 70%. Patient not normally on Oxygen. Once medic administered 2mg of Narcan IN, patient perked up and O2 sats were between 97%-98%. Patient still lethargic, requiring 3L of O2. Patient does not receive Narcotics at the facility.

## 2023-09-13 NOTE — ED PROVIDER NOTES
Emergency Department Treatment Report    Patient: Asad Schneider Age: 79 y.o. Sex: female    YOB: 1940 Admit Date: 9/13/2023 PCP: PROVIDER UNKNOWN   MRN: [id number]  CSN: [id number]     Room: LYNETTE/LYNETTE Time Dictated: 6:13 PM        Chief Complaint   Chief Complaint   Patient presents with    Altered Mental Status       History of Present Illness   Asad Schneider is a 79 y.o. female with past medical history of ESRD requiring hemodialysis, hypertension, hyperlipidemia, peripheral vascular disease, and insulin-dependent diabetes mellitus currently nursing home dependent who presents to the ED with the chief complaint of altered mental status. Patient was last seen well around 0 800 at her assisted living facility. She was found later to be responsive only to painful stimuli as such EMS was called. Upon EMS arrival they did appear to have decreased respiratory rate, pinpoint pupils, and only responsive to painful stimuli. Of note, she had a very similar presentation 2 days prior to this emergency department at which time she was given Narcan and returned to her baseline mental status. EMS also gave her 2 mg of intranasal Narcan which they state improved her mental status respiratory rate. They then subsequently gave her another 2 mg of IV Narcan upon arrival here. Patient does not has a history of opiate abuse nor does she have any prescriptions for opiate narcotics per nursing home documentation that I reviewed. No other additional history.     Review of Systems   Review of Systems   Unable to perform ROS: Mental status change          Past Medical/Surgical History     Past Medical History:   Diagnosis Date    Anemia of renal disease     Chronic kidney disease, stage V (very severe) ([facility])     Hypercholesterolemia     Hypertension     Peripheral vascular disease ([address])     Persistent proteinuria     Secondary hyperparathyroidism of renal origin ([address])     Type 2 diabetes mellitus treated with complexes  Minimal voltage criteria for LVH, may be normal variant ( R in aVL )  Anterior infarct (cited on or before 21-JUN-2023)  Abnormal ECG  When compared with ECG of 11-SEP-2023 13:53,  premature atrial complexes are now present  Confirmed by Kelsy Schroeder MD, Dennis Rg ([id number]) on 9/13/2023 12:45:10 PM     POCT Glucose    Collection Time: 09/13/23 12:27 PM   Result Value Ref Range    POC Glucose 99 70 - 110 mg/dL   POCT Glucose    Collection Time: 09/13/23 12:28 PM   Result Value Ref Range    Glucose 99 mg/dL    QC OK? yes        Imaging:    CT HEAD WO CONTRAST   Final Result      1. No acute intracranial abnormalities. 2.  Chronic microvascular ischemic changes. 3.  Sinus mucosal disease. XR CHEST PORTABLE   Final Result         1. No significant interval change. 2. Persistent vascular congestion and mild interstitial edema. EKG as interpreted by me shows Sinus rhythm at a ventricular rate of 78, normal axis, does meet LVH voltage criteria nonspecific T wave inversion in lead III but no evidence of ischemia. Cardiac monitor rhythm strip:  Ordered due to altered mental status. My interpretation of the cardiac monitor is that it shows sinus rhythm      ED Course/Medical Decision Making   76-year-old female with history of ESRD on dialysis and recent ER visit for altered mental status that responded to Narcan who presents to the ED with altered mental status. Differential diagnosis includes but is not limited to CVA/TIA, intracranial hemorrhage, toxic encephalopathy, metabolic encephalopathy, intentional/accidental opiate overdose, dialysis disequilibrium syndrome, sepsis, PNA. Opens eyes spontaneously but is nonverbal and does not follow commands. Pupils small and 2 mm to 3 mm but equal round reactive bilaterally. Initially significantly hypertensive in the 497S systolic but otherwise afebrile and hemodynamically appropriate. Point-of-care blood glucose appropriate.   Exam with no

## 2023-09-13 NOTE — DIALYSIS
HD Care plan  Time: 3 hrs  Dialysate: 2  K   2.5  Ca  [place]  Net UF:2 L  Access: Aseptically care for Rt chest TDC  Hemodynamic stability: Maintain BP WNL    Pre Dialysis:  Pt received from ER Nurse Delvin Courser , pt on a stretcher pt very sleepy,however responding to call of name, No s/s of distress noted  on  on oxygen 3 L via NC, SPO2 99% . RT Chest [facility]  assessed no abnormalities noted, line patent with good flow. TDC accessed  per protocol without any difficulty    Intra Dialysis:  Time out / safety process performed per policy, Tx initiated at 1530. TDC flowing with ease. For hemodynamic stability UF goal  set at 2000 ml as tolerated   Pt offered assistance with repositioning every 2 hours/prn    Vascular access visible  and line connections remained intact throughout entire duration of treatment. Vital signs checked every 15 mins, WNL    Post Dialysis: Tx completed at 1830,   Tolerated well ,2L removed. De-accessed per protocol. Dialysis catheter  locked accordingly with Heparin 1.9 ml in arterial port, and 1.9 ml in venous port ,catheter dressing clean, dry and intact.   Post Dialysis report given to ER Nurse Cheikh Wagner

## 2023-09-13 NOTE — ED NOTES
Report given to [facility] at dialysis, they are sending for transport.       Tapan Baker RN  09/13/23 4213

## 2023-09-14 ENCOUNTER — APPOINTMENT (OUTPATIENT)
Facility: HOSPITAL | Age: 83
DRG: 884 | End: 2023-09-14
Payer: MEDICARE

## 2023-09-14 LAB
ALBUMIN SERPL-MCNC: 2.9 G/DL (ref 3.4–5)
ALBUMIN/GLOB SERPL: 0.7 (ref 0.8–1.7)
ALP SERPL-CCNC: 172 U/L (ref 45–117)
ALT SERPL-CCNC: 20 U/L (ref 13–56)
AMPHET UR QL SCN: NEGATIVE
ANION GAP SERPL CALC-SCNC: 10 MMOL/L (ref 3–18)
APPEARANCE UR: ABNORMAL
AST SERPL-CCNC: 29 U/L (ref 10–38)
BACTERIA URNS QL MICRO: ABNORMAL /HPF
BARBITURATES UR QL SCN: NEGATIVE
BASOPHILS # BLD: 0.1 K/UL (ref 0–0.1)
BASOPHILS NFR BLD: 1 % (ref 0–2)
BENZODIAZ UR QL: NEGATIVE
BILIRUB SERPL-MCNC: 0.4 MG/DL (ref 0.2–1)
BILIRUB UR QL: NEGATIVE
BUN SERPL-MCNC: 23 MG/DL (ref 7–18)
BUN/CREAT SERPL: 6 (ref 12–20)
CALCIUM SERPL-MCNC: 9.5 MG/DL (ref 8.5–10.1)
CANNABINOIDS UR QL SCN: NEGATIVE
CHLORIDE SERPL-SCNC: 101 MMOL/L (ref 100–111)
CO2 SERPL-SCNC: 27 MMOL/L (ref 21–32)
COCAINE UR QL SCN: NEGATIVE
COLOR UR: ABNORMAL
CREAT SERPL-MCNC: 3.7 MG/DL (ref 0.6–1.3)
DIFFERENTIAL METHOD BLD: ABNORMAL
EOSINOPHIL # BLD: 0.1 K/UL (ref 0–0.4)
EOSINOPHIL NFR BLD: 2 % (ref 0–5)
EPITH CASTS URNS QL MICRO: ABNORMAL /LPF (ref 0–5)
ERYTHROCYTE [DISTWIDTH] IN BLOOD BY AUTOMATED COUNT: 14.6 % (ref 11.6–14.5)
GLOBULIN SER CALC-MCNC: 4.4 G/DL (ref 2–4)
GLUCOSE SERPL-MCNC: 78 MG/DL (ref 74–99)
GLUCOSE UR STRIP.AUTO-MCNC: 100 MG/DL
HCT VFR BLD AUTO: 36.2 % (ref 35–45)
HGB BLD-MCNC: 11.4 G/DL (ref 12–16)
HGB UR QL STRIP: NEGATIVE
IMM GRANULOCYTES # BLD AUTO: 0 K/UL (ref 0–0.04)
IMM GRANULOCYTES NFR BLD AUTO: 0 % (ref 0–0.5)
KETONES UR QL STRIP.AUTO: ABNORMAL MG/DL
LEUKOCYTE ESTERASE UR QL STRIP.AUTO: ABNORMAL
LYMPHOCYTES # BLD: 1.8 K/UL (ref 0.9–3.6)
LYMPHOCYTES NFR BLD: 34 % (ref 21–52)
Lab: ABNORMAL
MCH RBC QN AUTO: 34 PG (ref 24–34)
MCHC RBC AUTO-ENTMCNC: 31.5 G/DL (ref 31–37)
MCV RBC AUTO: 108.1 FL (ref 78–100)
METHADONE UR QL: NEGATIVE
MONOCYTES # BLD: 0.7 K/UL (ref 0.05–1.2)
MONOCYTES NFR BLD: 14 % (ref 3–10)
NEUTS SEG # BLD: 2.6 K/UL (ref 1.8–8)
NEUTS SEG NFR BLD: 49 % (ref 40–73)
NITRITE UR QL STRIP.AUTO: NEGATIVE
NRBC # BLD: 0 K/UL (ref 0–0.01)
NRBC BLD-RTO: 0 PER 100 WBC
OPIATES UR QL: POSITIVE
PCP UR QL: NEGATIVE
PH UR STRIP: 5 (ref 5–8)
PLATELET # BLD AUTO: 153 K/UL (ref 135–420)
PMV BLD AUTO: 10.3 FL (ref 9.2–11.8)
POTASSIUM SERPL-SCNC: 4.1 MMOL/L (ref 3.5–5.5)
PROT SERPL-MCNC: 7.3 G/DL (ref 6.4–8.2)
PROT UR STRIP-MCNC: >1000 MG/DL
RBC # BLD AUTO: 3.35 M/UL (ref 4.2–5.3)
RBC #/AREA URNS HPF: ABNORMAL /HPF (ref 0–5)
SODIUM SERPL-SCNC: 138 MMOL/L (ref 136–145)
SP GR UR REFRACTOMETRY: 1.02 (ref 1–1.03)
UROBILINOGEN UR QL STRIP.AUTO: 1 EU/DL (ref 0.2–1)
WBC # BLD AUTO: 5.3 K/UL (ref 4.6–13.2)
WBC URNS QL MICRO: ABNORMAL /HPF (ref 0–4)

## 2023-09-14 PROCEDURE — 81001 URINALYSIS AUTO W/SCOPE: CPT

## 2023-09-14 PROCEDURE — 94761 N-INVAS EAR/PLS OXIMETRY MLT: CPT

## 2023-09-14 PROCEDURE — G0378 HOSPITAL OBSERVATION PER HR: HCPCS

## 2023-09-14 PROCEDURE — 97162 PT EVAL MOD COMPLEX 30 MIN: CPT

## 2023-09-14 PROCEDURE — 87086 URINE CULTURE/COLONY COUNT: CPT

## 2023-09-14 PROCEDURE — 97166 OT EVAL MOD COMPLEX 45 MIN: CPT

## 2023-09-14 PROCEDURE — 1100000000 HC RM PRIVATE

## 2023-09-14 PROCEDURE — 80053 COMPREHEN METABOLIC PANEL: CPT

## 2023-09-14 PROCEDURE — 97535 SELF CARE MNGMENT TRAINING: CPT

## 2023-09-14 PROCEDURE — 92526 ORAL FUNCTION THERAPY: CPT

## 2023-09-14 PROCEDURE — 99232 SBSQ HOSP IP/OBS MODERATE 35: CPT | Performed by: INTERNAL MEDICINE

## 2023-09-14 PROCEDURE — 36415 COLL VENOUS BLD VENIPUNCTURE: CPT

## 2023-09-14 PROCEDURE — 80307 DRUG TEST PRSMV CHEM ANLYZR: CPT

## 2023-09-14 PROCEDURE — 6370000000 HC RX 637 (ALT 250 FOR IP): Performed by: INTERNAL MEDICINE

## 2023-09-14 PROCEDURE — 2580000003 HC RX 258: Performed by: INTERNAL MEDICINE

## 2023-09-14 PROCEDURE — 92610 EVALUATE SWALLOWING FUNCTION: CPT

## 2023-09-14 PROCEDURE — 97530 THERAPEUTIC ACTIVITIES: CPT

## 2023-09-14 PROCEDURE — 85025 COMPLETE CBC W/AUTO DIFF WBC: CPT

## 2023-09-14 RX ORDER — NIFEDIPINE 60 MG/1
TABLET, FILM COATED, EXTENDED RELEASE ORAL
COMMUNITY
Start: 2023-09-04

## 2023-09-14 RX ORDER — NIFEDIPINE 30 MG/1
60 TABLET, EXTENDED RELEASE ORAL DAILY
Status: DISCONTINUED | OUTPATIENT
Start: 2023-09-14 | End: 2023-09-16 | Stop reason: HOSPADM

## 2023-09-14 RX ADMIN — DOCUSATE SODIUM 100 MG: 100 CAPSULE, LIQUID FILLED ORAL at 09:35

## 2023-09-14 RX ADMIN — SODIUM CHLORIDE, PRESERVATIVE FREE 10 ML: 5 INJECTION INTRAVENOUS at 11:21

## 2023-09-14 RX ADMIN — NIFEDIPINE 60 MG: 30 TABLET, FILM COATED, EXTENDED RELEASE ORAL at 11:21

## 2023-09-14 RX ADMIN — FOLIC ACID 1 MG: 1 TABLET ORAL at 09:35

## 2023-09-14 RX ADMIN — ATORVASTATIN CALCIUM 40 MG: 40 TABLET, FILM COATED ORAL at 09:35

## 2023-09-14 RX ADMIN — Medication 100 MCG: at 09:35

## 2023-09-14 RX ADMIN — SODIUM CHLORIDE, PRESERVATIVE FREE 5 ML: 5 INJECTION INTRAVENOUS at 01:40

## 2023-09-14 RX ADMIN — ASPIRIN 81 MG: 81 TABLET, CHEWABLE ORAL at 09:35

## 2023-09-14 RX ADMIN — DOCUSATE SODIUM 100 MG: 100 CAPSULE, LIQUID FILLED ORAL at 22:53

## 2023-09-14 RX ADMIN — SODIUM CHLORIDE, PRESERVATIVE FREE 10 ML: 5 INJECTION INTRAVENOUS at 22:53

## 2023-09-14 ASSESSMENT — PAIN SCALES - GENERAL: PAINLEVEL_OUTOF10: 0

## 2023-09-14 NOTE — ED NOTES
I received the patient in signout from Dr. Razia Lamb. Per report patient was brought to the emergency department for altered mental status. Similarly she has been seen for this 2 days ago and had improved symptoms with Narcan. Per report the patient was given Narcan by EMS today resulted in improvement in her symptoms. However she has not prescribed narcotics and currently lives in an assisted living facility. The patient's altered mental status did result in her missing her dialysis session today therefore a run was completed during this emergency department visit. Upon return from dialysis the patient opens eyes to voice and tactile stimulation but is unable to answer questions. Her pupils are pinpoint and reactive. The patient will be given an additional dose of Narcan. The patient was given 1 dose of Narcan and was still persistently unable to answer my questions. She is unable to state her name, where she is, what year it is, or the president is. Per chart review the patient was seen here 2 days ago for altered mental status and at the time of discharge was able to state all of these things. Given this new altered mental status I feel that she would benefit from further inpatient admission for additional evaluation. The patient's case was discussed with the on-call hospitalist Dr. Sherley Su who is agreeable to inpatient admission.     Francisco Israel MD   [address] [place] [address] PGY-2       Francisco Israel MD  Resident  09/13/23 4629

## 2023-09-14 NOTE — H&P
History & Physical    Patient: Emily Sanz MRN: [id number]  [facility]: [id number]    YOB: 1940  Age: 79 y.o. Sex: female      DOA: 9/13/2023    Chief Complaint:   Chief Complaint   Patient presents with    Altered Mental Status          HPI:     Emily Sanz is a 79 y.o.  female with a past medical history of ESRD requiring hemodialysis, hypertension, hyperlipidemia, type 2 diabetes, PAD who presents with a chief complaint of altered mental status. Patient currently resides at [facility] and rehab. Patient was last seen well around 8 AM at her nursing facility. She was found later to be responsive only to painful stimuli and EMS was called. EMS reportedly noted a decreased respiratory rate, pinpoint pupils and the patient was only responsive to painful stimuli. The patient had a similar presentation 2 days ago and she was given Narcan and returned to her baseline mental status, she was evaluated in the [facility] ED at that time and the work-up was negative for any other acute cause. EMS gave the patient 2 mg of Narcan in route to the hospital today which improved her mental status. They gave her another 2 mg of IV Narcan upon arrival.      In the emergency department it was noted that the patient had a respiratory rate of 23, a blood pressure of 24/59 and was needing 3 L of oxygen. Labs were notable for a creatinine of 5.21, a BUN of 39, a potassium of 5.6. Chest x-ray revealed mild pulmonary congestion. CT head was negative for any acute process. Patient given Narcan in the ED with minimal response. Patient was admitted for further work-up.     Past Medical History:   Diagnosis Date    Anemia of renal disease     Chronic kidney disease, stage V (very severe) ([address])     Hypercholesterolemia     Hypertension     Peripheral vascular disease ([address])     Persistent proteinuria     Secondary hyperparathyroidism of renal origin ([address])     Type 2 diabetes mellitus treated

## 2023-09-14 NOTE — CARE COORDINATION
CM spoke with Billie Johnston from [facility] and rehab who confirmed that patient is currently their resident and will return back upon discharge

## 2023-09-14 NOTE — PLAN OF CARE
Problem: Physical Therapy - Adult  Goal: By Discharge: Performs mobility at highest level of function for planned discharge setting. See evaluation for individualized goals. Description: Physical Therapy Goals:  Initiated 9/14/2023 to be met within 7-10 days. 1.  Patient will move from supine to sit and sit to supine  in bed with modified independence. 2.  Patient will transfer from bed to chair and chair to bed with minimal assistance/contact guard assist using the least restrictive device. 3.  Patient will perform sit to stand with minimal assistance/contact guard assist.  4.  Patient will demonstrate fair+ dynamic sitting balance. PLOF: pt reports performing squat pivot transfers to and from w/c, unable to clarify assistance needs; resident in LTC     Outcome: Progressing   PHYSICAL THERAPY EVALUATION    Patient: Halie Jones (82 y.o. female)  Date: 9/14/2023  Primary Diagnosis: Hyperkalemia [E87.5]  Altered mental status [R41.82]  Altered mental status, unspecified altered mental status type [R41.82]  AMS (altered mental status) [R41.82]       Precautions: Fall Risk, Contact Precautions, previous right AKA    ASSESSMENT :  Pt is in bed, pleasantly confused, and agreeable to PT evaluation. Pt required increased time and often tactile cues to follow commands. Pt performed bed mobility with CGA and relied on bed rails to assist.  Pt demonstrated good static sitting balance at the edge of the bed. Pt performed 2 sit to stand transfers with mod A from the edge of the bed. Pt returned to supine with min A and was left with needs in reach and bed alarm set. DEFICITS/IMPAIRMENTS:    , Body Structures, Functions, Activity Limitations Requiring Skilled Therapeutic Intervention: Decreased functional mobility ; Decreased strength;Decreased balance;Decreased endurance;Decreased safe awareness;Decreased cognition;Decreased coordination    Patient will benefit from skilled intervention to address the

## 2023-09-14 NOTE — PLAN OF CARE
Problem: SLP Adult - Impaired Swallowing  Goal: By Discharge: Advance to least restrictive diet without signs or symptoms of aspiration for planned discharge setting. See evaluation for individualized goals. Description: Patient will:  1. Tolerate PO trials with 0 s/s overt distress in 4/5 trials  2. Utilize compensatory swallow strategies/maneuvers (decrease bite/sip, size/rate, alt. liq/sol) with min cues in 4/5 trials  3. Perform oral-motor/laryngeal exercises to increase oropharyngeal swallow function with min cues  4. Complete an objective swallow study (i.e., MBSS) to assess swallow integrity, r/o aspiration, and determine of safest LRD, min A    Recommend:   Soft and bite sized diet with thin liquids   Meds as tolerated  Feeding assistance as needed   Aspiration precautions  HOB >45 degrees during all intake and for at least 30 min after intake  Small bites/sips, Feed slowly, alternating bites/sips   Oral care three times daily     Outcome: Progressing     SPEECH LANGUAGE PATHOLOGY BEDSIDE SWALLOW EVALUATION/TREATMENT    Patient: Barbie Ruiz (10 y.o. female)  Date: 9/14/2023  Primary Diagnosis: Hyperkalemia [E87.5]  Altered mental status [R41.82]  Altered mental status, unspecified altered mental status type [R41.82]  AMS (altered mental status) [R41.82]  Precautions: Aspiration  PLOF: As per H&P    ASSESSMENT:  Based on the objective data described below, the patient presents with mild-mod oral dysphagia. Pt alert and cooperative, able to state name but perseverating on rest of orientation questions stating \"Kaylie\". Oral mech exam revealed decreased orolingual strength/coordination. Pt with mildly reduced vocal intensity. Pt tolerating thin liquids via single sips and consecutive swallows + straw. Demo timely swallow initiation and adequate laryngeal elevation to palpation. Demo prolonged/inefficient mastication of regular solids with moderate lingual spread post swallow.   Able to tolerate Poor endurance, Easily fatigued  Effective Modifications: Alternate liquids/solids, Small sips and bites  Cues for Modifications: Minimal-moderate    DYSPHAGIA DIAGNOSIS: Dysphagia Diagnosis: Mild oral stage dysphagia    PAIN:  Start of Eval: not reported  End of Eval: not reported      After treatment:   []            Patient left in no apparent distress sitting up in chair  [x]            Patient left in no apparent distress in bed  [x]            Call bell left within reach  [x]            Nursing notified  []            Family present  []            Caregiver present  []            Bed alarm activated    COMMUNICATION/EDUCATION:   [x]            Aspiration precautions; swallow safety; compensatory techniques provided via demonstration, verbalization and teach back of comprehension  []         Patient/family have participated as able in goal setting and plan of care. [x]            Patient/family agree to work toward stated goals and plan of care. []            Patient understands intent and goals of therapy, neutral about participation. []            Patient unable to participate in goal setting/plan of care secondary to cognition, hearing/vision deficits; education ongoing with interdisciplinary staff   []            Handout regarding diet recommendations and thickener instructions provided. []         Posted safety precautions in patient's room.     Thank you for this referral,  Tomas Poole M.S., CCC-SLP

## 2023-09-14 NOTE — PROGRESS NOTES
Patient straight cath x 1, 150 ml of kenyon urine removed. Patient tolerated well. Specimen sent to lab. Will continue to monitor. Call bell within reach.

## 2023-09-14 NOTE — ED NOTES
Pt appears to have minimal respond to Narcan, will open eye but wont answer any question and just closed eyes again.      Zaire Medeiros RN  09/13/23 2002

## 2023-09-14 NOTE — PROGRESS NOTES
OT order received and chart reviewed. Patient currently has an active \"strict bedrest\" order. Please discontinue \"strict bedrest\" order for full participation in skilled OT evaluation/treatment.           Thank you for this referral,    Edmar Rodriguez MS, OTR/L

## 2023-09-14 NOTE — PROGRESS NOTES
Received verbal report from Ira Sales in ED. Attempt was made by ED nurse to get an urinalysis and drug screen prior to patient arriving on the floor, but the patient is anuric. Nurse bladder scanned patient for verification. Patient made no attempt to take oral medications.

## 2023-09-14 NOTE — PROGRESS NOTES
Assumed care of patient from [place], [place] (off going nurse). Patient alert. No apparent distress noted. Patient offers no concerns and can verbalize needs. Assessment to follow. Call bell within reach. Bed in lowest, locked position.

## 2023-09-14 NOTE — PROGRESS NOTES
conducted an initial consultation and Spiritual Assessment for Erin Alejandro, who is a 79 y.o.,female. Patient's Primary Language is: [place]. According to the patient's EMR Tenriism Affiliation is: Ambrocio Baldy witness. The reason the Patient came to the hospital is:   Patient Active Problem List    Diagnosis Date Noted    AMS (altered mental status) 09/13/2023    Altered mental status 09/13/2023    ESRD (end stage renal disease) ([address]) 06/22/2023    Chronic hyperkalemia 06/21/2023    Hyperlipidemia 06/21/2023    Chronic kidney disease, stage V (very severe) ([address]) 06/21/2023    Blood transfusion declined because patient is Denominational 06/21/2023    Peripheral vascular disease ([address]) 06/21/2023    Pleural effusion on right 06/21/2023    Hypertensive crisis 06/21/2023    DNR (do not resuscitate) 06/21/2023    Unilateral AKA, right ([address]) 06/21/2023    Hyperkalemia 12/01/2021    Abscess 12/01/2021    Hyperchloremic metabolic acidosis 95/77/7867    HTN (hypertension) 12/01/2021    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 Explored issues of mikel, belief, spirituality and Sabianist/ritual needs while hospitalized. Listened empathically. Provided chaplaincy education concerning Advance Medical Directive. Provided information about Spiritual Care Services. Offered prayer and assurance of continued prayers on patient's behalf. Chart reviewed. The following outcomes where achieved:  Patient shared limited information about both their medical narrative and spiritual journey/beliefs.    confirmed Patient's Jehovah's witness Affiliation. Patient processed feeling about current hospitalization. Patient expressed gratitude for 's visit. Assessment:  Patient does not have any Worship/cultural needs that will affect patient's preferences in health care. There are no spiritual or Worship issues which require intervention at this time. Plan:  Chaplains will continue to follow and will provide pastoral care on an as needed/requested basis.  recommends bedside caregivers page  on duty if patient shows signs of acute spiritual or emotional distress.     [address] [facility]   Staff [address] [facility] [address]  [phone]

## 2023-09-14 NOTE — PROGRESS NOTES
Physical Therapy  PT orders received. Pt currently has a strict bedrest order, please discontinue this order when appropriate in order for PT evaluation to be completed.   Maikol Blancas, PT

## 2023-09-14 NOTE — PROGRESS NOTES
Advance Care Planning     Advance Care Planning Inpatient Note  Spiritual Care Department    Today's Date: 9/14/2023  Unit: [address] [facility]    Received request from sandhya. Upon review of chart and communication with care team, patient's decision making abilities are not in question. . Patient was/were present in the room during visit. Goals of ACP Conversation:  Discuss advance care planning documents    Health Care Decision Makers:       Primary Decision Maker: Rosie Christiansen hemalatha - [phone]    Secondary Decision Maker: Breanne Rodriguez - [phone]  Summary:  Verified Documents    Advance Care Planning Documents (Patient Wishes):  Living Will/Advance Directive     Assessment:     09/14/23 1506   Encounter Summary   Encounter Overview/Reason  Initial Encounter   Service Provided For: Patient   Referral/Consult From:  [address] Family members   Last Encounter  09/14/23  (IV,SA,RRB)   Complexity of Encounter Moderate   Begin Time 1340   End Time  1347   Total Time Calculated 7 min   Spiritual/Emotional needs   Type Spiritual Support   Advance Care Planning   Type ACP conversation   Assessment/Intervention/Outcome   Assessment Calm; Hopeful   Intervention Active listening;Discussed meaning/purpose;Prayer (assurance of)/[place]   Outcome Coping;Encouraged;Engaged in conversation;Expressed Gratitude   Plan and Referrals   Plan/Referrals Continue to visit, (comment)         Interventions:      Care Preferences Communicated:   No    Outcomes/Plan:  Existing advance directive reviewed with patient; no changes to patient's previously recorded wishes.     Electronically signed by Chaplain Danita on 9/14/2023 at 3:08 PM

## 2023-09-14 NOTE — PROGRESS NOTES
Hospitalist Progress Note    Patient: Maco Wolff Age: 79 y.o. : 1940 MR#: [id number] SSN: [id number]  Date/Time: 2023 9:36 AM    DOA: 2023  PCP: PROVIDER UNKNOWN    Assessment/Plan:     Acute mentation change, resolved with Narcan, highly suspicious of narcotic/opioid related pathology. However, her medication list does not indicated that she is on narcotic/opioid. Her LTC facility did not have her on opioid. RESOLVED TO BASELINE   Hypertension, possible hypertensive encephalopathy but it should not be resolved with narcan therefore, doubt that she had hypertensive encephalopathy   ESRD on HD   PAD with h/o right AKA  Hypothyroidism   Hyperlipidemia       She seems to be at her baseline, she knows she is in the hospital but she does not know the reason for her hospitalization.    She does not know if she take narcotic at her facility   Spoke with Nursing to straight cath her and obtain UTOX  Agree to MRI brain to complete workup   Resume her home antihypertensive medications   Resume levothyroxine   Consult Nephrology for HD as scheduled     Full code     Disposition planning:  [] Home/Family  [] SNF vs ARU  [] LTC  [] Pending Placement     [] Estimate time to discharge ( 00 -days)     [] Pending studies need finalization prior to discharge    Family updated (spoke with daughter with clinical update at bedside)      Additional Notes:    Time spent > 35 minutes    Case discussed with:  [x]Patient  [x]Family  [x]Nursing  [x]Case Management  DVT Prophylaxis:  []Lovenox  [x]Hep SQ  []SCDs  []Coumadin   []On Heparin gtt      Signed By: Laurie Lantigua MD     2023 9:36 AM          Subjective:     Overnight event:   She reports that she has no pain complaint   [facility] and that she is in the hospital   She does not know why she is here today   No headache     Urine tox screen is pending         ROS:  No fever/chills, no headache, no dizziness, no facial pain, IntraVENous PRN    magnesium sulfate 2000 mg in 50 mL IVPB premix  2,000 mg IntraVENous PRN    ondansetron (ZOFRAN-ODT) disintegrating tablet 4 mg  4 mg Oral Q8H PRN    Or    ondansetron (ZOFRAN) injection 4 mg  4 mg IntraVENous Q6H PRN    polyethylene glycol (GLYCOLAX) packet 17 g  17 g Oral Daily PRN            Lab/Data Review:    Recent Results (from the past 24 hour(s))   Ammonia    Collection Time: 09/13/23 12:00 PM   Result Value Ref Range    Ammonia 14 11 - 32 UMOL/L   Blood Culture 1    Collection Time: 09/13/23 12:00 PM    Specimen: Blood   Result Value Ref Range    Special Requests NO SPECIAL REQUESTS      Culture NO GROWTH AFTER 17 HOURS     CBC    Collection Time: 09/13/23 12:05 PM   Result Value Ref Range    WBC 6.0 4.6 - 13.2 K/uL    RBC 3.71 (L) 4.20 - 5.30 M/uL    Hemoglobin 12.6 12.0 - 16.0 g/dL    Hematocrit 40.2 35.0 - 45.0 %    .4 (H) 78.0 - 100.0 FL    MCH 34.0 24.0 - 34.0 PG    MCHC 31.3 31.0 - 37.0 g/dL    RDW 15.2 (H) 11.6 - 14.5 %    Platelets 527 623 - 965 K/uL    MPV 10.1 9.2 - 11.8 FL    Nucleated RBCs 0.0 0  WBC    nRBC 0.00 0.00 - 0.01 K/uL   Comprehensive Metabolic Panel    Collection Time: 09/13/23 12:05 PM   Result Value Ref Range    Sodium 137 136 - 145 mmol/L    Potassium 5.6 (H) 3.5 - 5.5 mmol/L    Chloride 103 100 - 111 mmol/L    CO2 24 21 - 32 mmol/L    Anion Gap 10 3.0 - 18 mmol/L    Glucose 100 (H) 74 - 99 mg/dL    BUN 39 (H) 7.0 - 18 MG/DL    Creatinine 5.21 (H) 0.6 - 1.3 MG/DL    Bun/Cre Ratio 7 (L) 12 - 20      Est, Glom Filt Rate 8 (L) >60 ml/min/1.73m2    Calcium 8.8 8.5 - 10.1 MG/DL    Total Bilirubin 0.4 0.2 - 1.0 MG/DL    ALT 23 13 - 56 U/L    AST 40 (H) 10 - 38 U/L    Alk Phosphatase 209 (H) 45 - 117 U/L    Total Protein 7.5 6.4 - 8.2 g/dL    Albumin 3.3 (L) 3.4 - 5.0 g/dL    Globulin 4.2 (H) 2.0 - 4.0 g/dL    Albumin/Globulin Ratio 0.8 0.8 - 1.7     Ethanol    Collection Time: 09/13/23 12:05 PM   Result Value Ref Range    Ethanol Lvl <3 0 - 3 MG/DL Enterovirus PCR Not detected        Influenza A by PCR Not detected        Influenza B PCR Not detected        Parainfluenza 1 PCR Not detected        Parainfluenza 2 PCR Not detected        Parainfluenza 3 PCR Not detected        Parainfluenza 4 PCR Not detected        Respiratory Syncytial Virus by PCR Not detected        Bordetella parapertussis by PCR Not detected        Bordetella pertussis by PCR Not detected        Chlamydophila Pneumonia PCR Not detected        Mycoplasma pneumo by PCR Not detected       Blood Culture 2 [[id number]] Collected: 09/13/23 1207    Order Status: Completed Specimen: Blood Updated: 09/14/23 0618     Special Requests NO SPECIAL REQUESTS        Culture NO GROWTH AFTER 17 HOURS       Blood Culture 1 [[id number]] Collected: 09/13/23 1200    Order Status: Completed Specimen: Blood Updated: 09/14/23 0618     Special Requests NO SPECIAL REQUESTS        Culture NO GROWTH AFTER 17 HOURS       Culture, Urine [[id number]] Collected: 09/11/23 2000    Order Status: Canceled Specimen: Urine, clean catch                 Images:     CT HEAD WO CONTRAST    Result Date: 9/13/2023  EXAM:  CT Head without Contrast CLINICAL INDICATION:  AMS. Found in unresponsive state. COMPARISON:  09/11/23. TECHNIQUE:  Helical volumetric CT imaging of the head is performed from the base of the skull to the vertex without IV contrast administration. Axial, coronal and sagittal images are generated from the volumetric data set. Dose optimization techniques are utilized as appropriate to the performed exam with combination of automated exposure control, adjustment of mA and/or kV according to patient size, and use of iterative reconstructive technique. FINDINGS: Brain: - Hemorrhage/ hematoma:  No acute intracranial hemorrhage/ hematoma. - Mass, mass effect:  None.  - Gray-white matter differentiation:  Mild to moderate periventricular white matter hypodensities are noted bilaterally, suggestive of chronic small vessel

## 2023-09-15 ENCOUNTER — APPOINTMENT (OUTPATIENT)
Facility: HOSPITAL | Age: 83
DRG: 884 | End: 2023-09-15
Payer: MEDICARE

## 2023-09-15 VITALS
OXYGEN SATURATION: 98 % | HEART RATE: 77 BPM | WEIGHT: 113.76 LBS | RESPIRATION RATE: 18 BRPM | TEMPERATURE: 98.1 F | HEIGHT: 65 IN | SYSTOLIC BLOOD PRESSURE: 90 MMHG | DIASTOLIC BLOOD PRESSURE: 66 MMHG | BODY MASS INDEX: 18.95 KG/M2

## 2023-09-15 LAB
ANION GAP SERPL CALC-SCNC: 9 MMOL/L (ref 3–18)
BACTERIA SPEC CULT: NORMAL
BUN SERPL-MCNC: 46 MG/DL (ref 7–18)
BUN/CREAT SERPL: 9 (ref 12–20)
CALCIUM SERPL-MCNC: 8.6 MG/DL (ref 8.5–10.1)
CHLORIDE SERPL-SCNC: 102 MMOL/L (ref 100–111)
CO2 SERPL-SCNC: 26 MMOL/L (ref 21–32)
CREAT SERPL-MCNC: 5.15 MG/DL (ref 0.6–1.3)
ERYTHROCYTE [DISTWIDTH] IN BLOOD BY AUTOMATED COUNT: 14.6 % (ref 11.6–14.5)
GLUCOSE SERPL-MCNC: 115 MG/DL (ref 74–99)
HCT VFR BLD AUTO: 34.9 % (ref 35–45)
HGB BLD-MCNC: 11 G/DL (ref 12–16)
MCH RBC QN AUTO: 33.5 PG (ref 24–34)
MCHC RBC AUTO-ENTMCNC: 31.5 G/DL (ref 31–37)
MCV RBC AUTO: 106.4 FL (ref 78–100)
NRBC # BLD: 0 K/UL (ref 0–0.01)
NRBC BLD-RTO: 0 PER 100 WBC
PLATELET # BLD AUTO: 157 K/UL (ref 135–420)
PMV BLD AUTO: 10.5 FL (ref 9.2–11.8)
POTASSIUM SERPL-SCNC: 4.2 MMOL/L (ref 3.5–5.5)
RBC # BLD AUTO: 3.28 M/UL (ref 4.2–5.3)
SERVICE CMNT-IMP: NORMAL
SODIUM SERPL-SCNC: 137 MMOL/L (ref 136–145)
WBC # BLD AUTO: 4.7 K/UL (ref 4.6–13.2)

## 2023-09-15 PROCEDURE — 90935 HEMODIALYSIS ONE EVALUATION: CPT

## 2023-09-15 PROCEDURE — 85027 COMPLETE CBC AUTOMATED: CPT

## 2023-09-15 PROCEDURE — G0378 HOSPITAL OBSERVATION PER HR: HCPCS

## 2023-09-15 PROCEDURE — 99239 HOSP IP/OBS DSCHRG MGMT >30: CPT | Performed by: INTERNAL MEDICINE

## 2023-09-15 PROCEDURE — 6370000000 HC RX 637 (ALT 250 FOR IP): Performed by: INTERNAL MEDICINE

## 2023-09-15 PROCEDURE — 94761 N-INVAS EAR/PLS OXIMETRY MLT: CPT

## 2023-09-15 PROCEDURE — 70551 MRI BRAIN STEM W/O DYE: CPT

## 2023-09-15 PROCEDURE — 80048 BASIC METABOLIC PNL TOTAL CA: CPT

## 2023-09-15 PROCEDURE — 36415 COLL VENOUS BLD VENIPUNCTURE: CPT

## 2023-09-15 RX ORDER — HEPARIN SODIUM 1000 [USP'U]/ML
1000 INJECTION, SOLUTION INTRAVENOUS; SUBCUTANEOUS PRN
Status: DISCONTINUED | OUTPATIENT
Start: 2023-09-15 | End: 2023-09-16 | Stop reason: HOSPADM

## 2023-09-15 RX ADMIN — LEVOTHYROXINE SODIUM 25 MCG: 0.03 TABLET ORAL at 06:16

## 2023-09-15 ASSESSMENT — PAIN SCALES - GENERAL
PAINLEVEL_OUTOF10: 0

## 2023-09-15 NOTE — DISCHARGE SUMMARY
Discharge Summary    Patient: Anali Hooker               Sex: female          DOA: 9/13/2023         YOB: 1940      Age:  79 y.o.        LOS:  LOS: 2 days                Admit Date: 9/13/2023    Discharge Date: 9/15/2023    Primary care physician: PROVIDER UNKNOWN    Discharge Diagnoses:    Acute mentation change, resolved with Narcan, highly suspicious of narcotic/opioid related pathology. Utox was positive for opioid. MRI brain was negative for acute stroke    Hypertension, ruled out hypertensive encephalopathy  ESRD on HD   PAD with h/o right AKA  Hypothyroidism   Hyperlipidemia     Discharge Condition: Good  Disposition: LTC facility   Code Status: full code     Follow up for Primary Care Physician:  Please be cognizant that this patient does not need or prescribed opioid, please avoid opioid use as this can cause decrease clearance and can result in adverse effect. Hospital Course:   79 y.o.  female with a past medical history of ESRD requiring hemodialysis, hypertension, hyperlipidemia, type 2 diabetes, PAD who presents with a chief complaint of altered mental status. Patient currently resides at [facility] and rehab. Patient was last seen well around 8 AM at her nursing facility. She was found later to be responsive only to painful stimuli and EMS was called. EMS reportedly noted a decreased respiratory rate, pinpoint pupils and the patient was only responsive to painful stimuli. The patient had a similar presentation 2 days ago and she was given Narcan and returned to her baseline mental status, she was evaluated in the [facility] ED at that time and the work-up was negative for any other acute cause. EMS gave the patient 2 mg of Narcan in route to the hospital today which improved her mental status.   They gave her another 2 mg of IV Narcan upon arrival.       In the emergency department it was noted that the patient had a respiratory rate of 23, a blood \"DBIL\"   Thyroid Studies No results for input(s): \"T4\", \"T3RU\", \"TSH\" in the last 72 hours.     Invalid input(s): \"T3U\"       Results       Procedure Component Value Units Date/Time    Culture, Urine [[id number]] Collected: 09/14/23 1118    Order Status: Sent Specimen: Urine, clean catch Updated: 09/15/23 0024    Respiratory Panel, Molecular, with COVID-19 (Restricted: peds pts or suitable admitted adults) [[id number]] Collected: 09/13/23 2123    Order Status: Completed Specimen: Nasopharyngeal Updated: 09/13/23 2242     Adenovirus by PCR Not detected        Coronavirus 229E by PCR Not detected        Coronavirus HKU1 by PCR Not detected        Coronavirus NL63 by PCR Not detected        Coronavirus OC43 by PCR Not detected        SARS-CoV-2, PCR Not detected        Human Metapneumovirus by PCR Not detected        Rhinovirus Enterovirus PCR Not detected        Influenza A by PCR Not detected        Influenza B PCR Not detected        Parainfluenza 1 PCR Not detected        Parainfluenza 2 PCR Not detected        Parainfluenza 3 PCR Not detected        Parainfluenza 4 PCR Not detected        Respiratory Syncytial Virus by PCR Not detected        Bordetella parapertussis by PCR Not detected        Bordetella pertussis by PCR Not detected        Chlamydophila Pneumonia PCR Not detected        Mycoplasma pneumo by PCR Not detected       Blood Culture 2 [[id number]] Collected: 09/13/23 1207    Order Status: Completed Specimen: Blood Updated: 09/15/23 0726     Special Requests NO SPECIAL REQUESTS        Culture NO GROWTH 2 DAYS       Blood Culture 1 [[id number]] Collected: 09/13/23 1200    Order Status: Completed Specimen: Blood Updated: 09/15/23 0726     Special Requests NO SPECIAL REQUESTS        Culture NO GROWTH 2 DAYS       Culture, Urine [[id number]] Collected: 09/11/23 2000    Order Status: Canceled Specimen: Urine, clean catch                    Medications at discharge  including reasons for change and indications

## 2023-09-15 NOTE — PROGRESS NOTES
Received pre HD report from  Rayray Johnson RN. Pt in bed, A+O x1, no s/s of distress noted. Accessed right CVC w/o complications. Tx initiated at 0930. CVC flowing with ease. For hemodynamic stability UF goal 2500 ml. Offered assistance with repositioning every 2 hours. Vascular access visible at all times during treatment, line connections intact at all times. Tx completed at 1233, tolerated well 2L removed. De-accessed per protocol. Heparin indwell 1.9ml in arterial, and 1.9ml in venous catheter. Unit nurse Oly Calderon RN given report.

## 2023-09-15 NOTE — PROGRESS NOTES
Speech Pathology:     Dysphagia tx attempted. Patent off unit at dialysis. Will follow as schedule permits.      Thank you for this referral.     Cheryl Emery M.S., [address] [facility]  Speech-Language Pathologist

## 2023-09-15 NOTE — CARE COORDINATION
[place] [facility] [place], dispatch will call the nurses station with ETA. Trip # [id number].

## 2023-09-15 NOTE — DISCHARGE INSTRUCTIONS
DISCHARGE SUMMARY from Nurse    PATIENT INSTRUCTIONS:    After general anesthesia or intravenous sedation, for 24 hours or while taking prescription Narcotics:  Limit your activities  Do not drive and operate hazardous machinery  Do not make important personal or business decisions  Do  not drink alcoholic beverages  If you have not urinated within 8 hours after discharge, please contact your surgeon on call. Report the following to your surgeon:  Excessive pain, swelling, redness or odor of or around the surgical area  Temperature over 100.5  Nausea and vomiting lasting longer than 4 hours or if unable to take medications  Any signs of decreased circulation or nerve impairment to extremity: change in color, persistent  numbness, tingling, coldness or increase pain  Any questions    What to do at Home:  Recommended activity: activity as tolerated, SNF    If you experience any of the following symptoms  nausea, vomiting, diarrhea, shortness of breath , fever over 101, dizziness, fainting, or any issues or concerns, please follow up with  primary care team.    *  Please give a list of your current medications to your Primary Care Provider. *  Please update this list whenever your medications are discontinued, doses are      changed, or new medications (including over-the-counter products) are added. *  Please carry medication information at all times in case of emergency situations. These are general instructions for a healthy lifestyle:    No smoking/ No tobacco products/ Avoid exposure to second hand smoke  Surgeon General's Warning:  Quitting smoking now greatly reduces serious risk to your health.     Obesity, smoking, and sedentary lifestyle greatly increases your risk for illness    A healthy diet, regular physical exercise & weight monitoring are important for maintaining a healthy lifestyle    You may be retaining fluid if you have a history of heart failure or if you experience any of the

## 2023-09-15 NOTE — PLAN OF CARE
INTERVENTION:  HEMODYNAMIC STABILIZATION  MAINTAIN BP WNL WHILE ON HD. INTERVENTION:  FLUID MANAGEMENT  WILL ATTEMPT 2500 ML TOTAL FLUID REMOVAL AS TOLERATED. INTERVENTION:  METABOLIC/ELECTROLYTE MANAGEMENT  2.0 POTASSIUM 2.5 CALCIUM DIALYSATE USED WITH HD TODAY. INTERVENTION:  HEMODIALYSIS ACCESS SITE MANAGEMENT  RIGHT SIDE CVC ACCESSED USING ASEPTIC TECHNIQUE. GOAL:  SIGNS AND SYMPTOMS OF LISTED POTENTIAL PROBLEMS WILL BE ABSENT OR MANAGEABLE. OUTCOME:  PROGRESSING. HD PLANNED FOR 3 HOURS TODAY.

## 2023-09-15 NOTE — CARE COORDINATION
KAITY made with Cristal Laughlin with [facility] to discuss pt's return to LTC. KAITY sent referral via [address] KAITY could npt provide DC transport details due to [employer] with 3 Hour Transport window. [facility] attempted to make contact with pt's nieceBella Congress, [phone]. KAITY did not receive a response. KAITY left a voicemail message with KAITY contact information.            ..  Anh Sharma, MSW  KAITY Care Manager

## 2023-09-15 NOTE — PROGRESS NOTES
Upon arriving to unit Previous charge RN informed this RN that pt would be DC this evening, with a  time of 2200. This RN informed previous Charge RN that most facilities do not accept admissions past 2100. This RN obtained phone number for [facility] ([phone]) and attempted to contact facility (x12 attempts) to inquire what their cut off time is for accepting admissions.     Facility did not  phone on any of this RN's attempts, phone would ring approx 2-8 times before going to a busy signal.

## 2023-09-15 NOTE — CARE COORDINATION
.. Requested Case Management specialist to assist with transportation to [address] [place] [address] Drive and Rehab . Address is [place] [address] and phone number is [phone]    Patient will require ALS transport. Pt requires Stretcher If stretcher, reason: Hyperkalemia [E87.5] Hyperkalemia, Altered mental status, Altered mental status, unspecified altered mental status type, AMS (altered mental status      Patient is currently requiring oxygen NO      Height: 5'5   Weight: 113 lbs      Pt is on isolation: YES Isolation is for: MRSA, and ESBL     Is the pt ready now: NO  Requested time: 4:00 pm  PCS Faxed: NO  Insurance verified on face sheet: YES  Auth needed for transport: YES  CM completed PCS/ Envelope and placed on chart.           ..  MICAH Mckeon   Care Manager

## 2023-09-16 NOTE — PROGRESS NOTES
Multiple attempts made to call report to [facility] by various [address] staff members. This writer attempts x5 using [phone] phone number provided in all documentation from [facility] and rehab facility.   Automated Greeting provided telephone prompt options as follows:    Option 1: Admission-No Answer (left detailed voicemail)     Option 5: Nurse's Station-No Answer (Left detailed voicemail)    Option 9: -No Answer (Left Detailed Voicemail)

## 2023-09-21 NOTE — PROGRESS NOTES
Physician Progress Note      Lucius Ambrocio  CSN #:                  [id number]  :                       1940  ADMIT DATE:       2023 11:26 AM  DISCH DATE:        9/15/2023 11:45 PM  RESPONDING  PROVIDER #:        Emile Chu MD          QUERY TEXT:    Pt admitted with AMS. Pt noted to have history of ESRD and positive UDS for   opioids. If possible, please document in the progress notes and discharge   summary if you are evaluating and / or treating any of the following: The medical record reflects the following:  Risk Factors: 80 yo female w/PMH ESRD and HTN    Clinical Indicators: Discharge summary Acute mentation change, resolved with   Narcan, highly suspicious of narcotic/opioid related pathology; EMS gave the   patient 2 mg of Narcan in route to the hospital today which improved her   mental status. They gave her another 2 mg of IV Narcan upon arrival.    Urine drug screen positive for opioids    ED provider notes Upon EMS arrival they did appear to have decreased   respiratory rate, pinpoint pupils, and only responsive to painful stimuli. Of   note, she had a very similar presentation 2 days prior to this emergency   department at which time she was given Narcan and returned to her baseline   mental status. Treatment: Narcan, neuro checks, serial labs, CT scan of head, MRI brain      Thank you for your time,    Kyle ALSTONN, RN, [facility]  [address] [facility] [address]  C: [phone]    [email or web address]  Options provided:  -- Drug-induced encephalopathy due to opioids  -- Metabolic encephalopathy  -- Toxic encephalopathy  -- Other - I will add my own diagnosis  -- Disagree - Not applicable / Not valid  -- Disagree - Clinically unable to determine / Unknown  -- Refer to Clinical Documentation Reviewer    PROVIDER RESPONSE TEXT:    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     Query created by: Billie Braun

## 2023-09-23 NOTE — PROGRESS NOTES
Physician Progress Note      Luiza Ayala  [facility] #:                  [id number]  :                       1940  ADMIT DATE:       2023 11:26 AM  DISCH DATE:        9/15/2023 11:45 PM  RESPONDING  PROVIDER #:        Laurie Lantigua MD          QUERY TEXT:    Patient admitted with AMS. Documentation reflects \"consider overdose\" in H&P. If possible, please document in the progress notes and discharge summary if   overdose was: The medical record reflects the following:  Risk Factors: ESRD  Clinical Indicators: H&P:   Could also consider overdose given patient's   response to Narcan however unclear where she is receiving the potential   narcotic.   Hospitalist note:  Acute mentation change, resolved with Narcan, highly   suspicious of narcotic/opioid related pathology. However, her medication list   does not indicated that she is on narcotic/opioid.   UDS + opiates  Treatment: MRI; UDS; labs; VS    Thank you,  Mercedes Banuelos RN/FILIPE [email or web address]  Options provided:  -- Drug overdose confirmed after study  -- Drug overdose ruled out after study  -- Other - I will add my own diagnosis  -- Disagree - Not applicable / Not valid  -- Disagree - Clinically unable to determine / Unknown  -- Refer to Clinical Documentation Reviewer    PROVIDER RESPONSE TEXT:    Provider is clinically unable to determine a response to this query.     Query created by: Mercedes Banuelos on 2023 3:10 PM      Electronically signed by:  Laurie Lantigua MD 2023 9:11 AM

## 2023-09-27 ENCOUNTER — HOSPITAL ENCOUNTER (EMERGENCY)
Facility: HOSPITAL | Age: 83
Discharge: HOME | End: 2023-09-28
Attending: EMERGENCY MEDICINE
Payer: MEDICARE

## 2023-09-27 VITALS
OXYGEN SATURATION: 98 % | SYSTOLIC BLOOD PRESSURE: 134 MMHG | TEMPERATURE: 97.1 F | WEIGHT: 136 LBS | HEIGHT: 65 IN | BODY MASS INDEX: 22.66 KG/M2 | RESPIRATION RATE: 18 BRPM | DIASTOLIC BLOOD PRESSURE: 65 MMHG | HEART RATE: 93 BPM

## 2023-09-27 DIAGNOSIS — N18.6 ESRD ON HEMODIALYSIS (HCC): Primary | ICD-10-CM

## 2023-09-27 DIAGNOSIS — Z99.2 ESRD ON HEMODIALYSIS (HCC): Primary | ICD-10-CM

## 2023-09-27 LAB
ANION GAP SERPL CALC-SCNC: 9 MMOL/L (ref 3–18)
BASOPHILS # BLD: 0 K/UL (ref 0–0.1)
BASOPHILS NFR BLD: 1 % (ref 0–2)
BUN SERPL-MCNC: 116 MG/DL (ref 7–18)
BUN/CREAT SERPL: 14 (ref 12–20)
CALCIUM SERPL-MCNC: 8.7 MG/DL (ref 8.5–10.1)
CHLORIDE SERPL-SCNC: 112 MMOL/L (ref 100–111)
CO2 SERPL-SCNC: 20 MMOL/L (ref 21–32)
CREAT SERPL-MCNC: 8.2 MG/DL (ref 0.6–1.3)
DIFFERENTIAL METHOD BLD: ABNORMAL
EKG ATRIAL RATE: 65 BPM
EKG DIAGNOSIS: NORMAL
EKG P AXIS: 79 DEGREES
EKG P-R INTERVAL: 134 MS
EKG Q-T INTERVAL: 400 MS
EKG QRS DURATION: 70 MS
EKG QTC CALCULATION (BAZETT): 416 MS
EKG R AXIS: 4 DEGREES
EKG T AXIS: 37 DEGREES
EKG VENTRICULAR RATE: 65 BPM
EOSINOPHIL # BLD: 0.1 K/UL (ref 0–0.4)
EOSINOPHIL NFR BLD: 2 % (ref 0–5)
ERYTHROCYTE [DISTWIDTH] IN BLOOD BY AUTOMATED COUNT: 14.3 % (ref 11.6–14.5)
GLUCOSE SERPL-MCNC: 209 MG/DL (ref 74–99)
HCT VFR BLD AUTO: 34.6 % (ref 35–45)
HGB BLD-MCNC: 10.7 G/DL (ref 12–16)
IMM GRANULOCYTES # BLD AUTO: 0 K/UL (ref 0–0.04)
IMM GRANULOCYTES NFR BLD AUTO: 1 % (ref 0–0.5)
LYMPHOCYTES # BLD: 0.9 K/UL (ref 0.9–3.6)
LYMPHOCYTES NFR BLD: 23 % (ref 21–52)
MCH RBC QN AUTO: 32.9 PG (ref 24–34)
MCHC RBC AUTO-ENTMCNC: 30.9 G/DL (ref 31–37)
MCV RBC AUTO: 106.5 FL (ref 78–100)
MONOCYTES # BLD: 0.4 K/UL (ref 0.05–1.2)
MONOCYTES NFR BLD: 9 % (ref 3–10)
NEUTS SEG # BLD: 2.7 K/UL (ref 1.8–8)
NEUTS SEG NFR BLD: 66 % (ref 40–73)
NRBC # BLD: 0 K/UL (ref 0–0.01)
NRBC BLD-RTO: 0 PER 100 WBC
PLATELET # BLD AUTO: 144 K/UL (ref 135–420)
PMV BLD AUTO: 10.5 FL (ref 9.2–11.8)
POTASSIUM SERPL-SCNC: 5.5 MMOL/L (ref 3.5–5.5)
RBC # BLD AUTO: 3.25 M/UL (ref 4.2–5.3)
SODIUM SERPL-SCNC: 141 MMOL/L (ref 136–145)
WBC # BLD AUTO: 4.1 K/UL (ref 4.6–13.2)

## 2023-09-27 PROCEDURE — 94761 N-INVAS EAR/PLS OXIMETRY MLT: CPT

## 2023-09-27 PROCEDURE — 90935 HEMODIALYSIS ONE EVALUATION: CPT

## 2023-09-27 PROCEDURE — 93010 ELECTROCARDIOGRAM REPORT: CPT | Performed by: INTERNAL MEDICINE

## 2023-09-27 PROCEDURE — 93005 ELECTROCARDIOGRAM TRACING: CPT | Performed by: EMERGENCY MEDICINE

## 2023-09-27 PROCEDURE — 85025 COMPLETE CBC W/AUTO DIFF WBC: CPT

## 2023-09-27 PROCEDURE — 4500000002 HC ER NO CHARGE

## 2023-09-27 PROCEDURE — 80048 BASIC METABOLIC PNL TOTAL CA: CPT

## 2023-09-27 ASSESSMENT — PAIN - FUNCTIONAL ASSESSMENT: PAIN_FUNCTIONAL_ASSESSMENT: NONE - DENIES PAIN

## 2023-09-27 NOTE — ED PROVIDER NOTES
[facility] EMERGENCY DEPT  EMERGENCY DEPARTMENT ENCOUNTER    Patient Name: Stella Pickens  MRN: [id number]  YOB: 1940  Provider: Yaya Gracia DO  PCP: PROVIDER UNKNOWN   Time/Date of evaluation: 8:46 AM EDT on 9/27/23    History of Presenting Illness     History Provided by: Patient  History is limited by: Nothing     HISTORY:   Stella Pickens is a 79 y.o.  female with a past medical history of ESRD requiring hemodialysis, hypertension, hyperlipidemia, type 2 diabetes, PAD who presents resting hemodialysis. Patient states that she missed her dialysis session yesterday because transport did not arrive. Her last hemodialysis session was Saturday, 9/23/2023. She denies any chest pain, shortness of breath, sore throat, fever, chills, dysuria, hematuria, abdominal pain, nausea, vomiting, diarrhea, melena, or rectal bleeding. Nursing Notes were all reviewed and agreed with or any disagreements were addressed in the HPI.     Past History     PAST MEDICAL HISTORY:  Past Medical History:   Diagnosis Date    Anemia of renal disease     Chronic kidney disease, stage V (very severe) (HCC)     Hypercholesterolemia     Hypertension     Peripheral vascular disease (HCC)     Persistent proteinuria     Secondary hyperparathyroidism of renal origin ([address])     Type 2 diabetes mellitus treated with insulin ([address])        PAST SURGICAL HISTORY:  Past Surgical History:   Procedure Laterality Date    PORT SURGERY Right 6/23/2023    TUNNELED CATHETER PLACEMENT; C-ARM performed by Christ Sterling MD at [facility] Right 7/6/2023    PERM CATHETER EXCHANGE; C-ARM performed by Kami Layne MD [address]:  Family History   Problem Relation Age of Onset    Hypertension Mother        SOCIAL HISTORY:  Social History     Tobacco Use    Smoking status: Former     Packs/day: 3     Types: Cigarettes     Quit date: 2/18/1996     Years since

## 2023-09-27 NOTE — ED TRIAGE NOTES
Patient arrived from [facility] via EMS. Patient was not transported for dialysis on yesterday. Patient schedule is Tuesday, Thursday, and Saturday. Patient brought to ED for dialysis.

## 2023-09-27 NOTE — ED NOTES
Spoke with the . Transport will be set up for the patient to return to [facility].      ANDRY Henderson  09/27/23 1921

## 2023-09-27 NOTE — ED NOTES
Patient incontinent of bowel. Large BM noted and paste consistency. Incontinent care provided. Clean brief and paper scrub pants applied.  Pt pants placed in belongings bag per pt request.      Gonzalez Elizabeth RN  09/27/23 [id number]

## 2023-09-27 NOTE — DIALYSIS
HD Care plan  Time: 3 hrs  Dialysate: 2  K  2.5 Ca  Bath  Net UF: 2.5 L  Access: Aseptically care for Rt chest TDC  Hemodynamic stability: Maintain BP WNL    Pre Dialysis:  Pt received from ER Nurse Rashel Robertson, pt on a stretcher ,A+O X 3, No s/s of distress noted  on RA . RT Chest [facility]  assessed no abnormalities noted, dressing changed, line patent with good flow. TDC accessed  per protocol without any difficulty    Intra Dialysis:  Time out / safety process performed per policy, Tx initiated at 1410. TDC flowing with ease. For hemodynamic stability UF goal  set at 2500 ml as tolerated   Pt offered assistance with repositioning every 2 hours/prn    Vascular access visible  and line connections remained intact throughout entire duration of treatment. Vital signs checked every 15 mins, Had an episode of low BP UF paused and resumed once BP improved    Post Dialysis: Tx completed at 1740,   Tolerated Fairly  well ,2.3 L removed. De-accessed per protocol. Dialysis catheter  locked accordingly with Heparin 1.9 ml in arterial port, and 1.9 ml in venous port ,catheter dressing clean, dry and intact. Post Dialysis report given to ER Nurse Stella Millard.

## 2023-12-22 NOTE — PROGRESS NOTES
Instructions for your procedure at [facility]      Today's Date: 12/22/2023      Patient's Name: Kaylie Granger      Procedure Date: 1/15        Please enter the main entrance of the hospital and check-in at the  located in the lobby.      Do NOT eat or drink anything, including candy, gum, or ice chips after midnight prior to your procedure, unless it is part of your prep.  Brush your teeth before coming to the hospital.You may swish with water, but do not swallow.  No smoking/Vaping/E-Cigarettes 24 hours prior to the day of procedure.  No alcohol 24 hours prior to the day of procedure.  No recreational drugs for one week prior to the day of procedure.  Bring Photo ID, Insurance information, and Co-pay if required on day of procedure.  Bring in pertinent legal documents, such as, Medical Power of , DNR, Advance Directive, etc.  Leave all other valuables, including money/purse, at home.  Remove jewelry, including ALL body piercings, nail polish, acrylic nails, and makeup (including mascara); no lotions, powders, deodorant, and/or perfume/cologne/after shave on the skin.  Glasses and dentures may be worn to the hospital.  They must be removed prior to procedure. Please bring case/container for glasses or dentures.  11. Contacts should not be worn on day of procedure.   12. Call the office ([phone]) if you have symptoms of a cold or illness within 24-48 hours prior to your procedure.   13. AN ADULT (relative or friend 18 years or older) MUST DRIVE YOU HOME AFTER YOUR PROCEDURE.   14. Please make arrangements for a responsible adult (18 years or older) to be with you for 24 hours after your procedure.   15. ONE VISITOR will be allowed in the waiting area during your procedure.       Special Instructions:      Bring list of CURRENT medications.  Follow instructions from the office regarding Bowel Prep, Vitamins, Iron, Blood Thinners, Insulin, Seizure, and Blood

## 2024-01-19 ENCOUNTER — ANESTHESIA EVENT (OUTPATIENT)
Dept: CARDIOTHORACIC SURGERY | Facility: HOSPITAL | Age: 84
End: 2024-01-19
Payer: MEDICARE

## 2024-01-22 ENCOUNTER — ANESTHESIA (OUTPATIENT)
Dept: CARDIOTHORACIC SURGERY | Facility: HOSPITAL | Age: 84
End: 2024-01-22
Payer: MEDICARE

## 2024-01-22 ENCOUNTER — HOSPITAL ENCOUNTER (OUTPATIENT)
Facility: HOSPITAL | Age: 84
Setting detail: OUTPATIENT SURGERY
Discharge: NURSING, REHAB OR LONG-TERM CARE | End: 2024-01-22
Attending: SURGERY | Admitting: SURGERY
Payer: MEDICARE

## 2024-01-22 VITALS
RESPIRATION RATE: 18 BRPM | HEIGHT: 64 IN | WEIGHT: 118 LBS | DIASTOLIC BLOOD PRESSURE: 39 MMHG | TEMPERATURE: 98.6 F | SYSTOLIC BLOOD PRESSURE: 140 MMHG | BODY MASS INDEX: 20.14 KG/M2 | HEART RATE: 76 BPM | OXYGEN SATURATION: 97 %

## 2024-01-22 DIAGNOSIS — N18.6 ESRD (END STAGE RENAL DISEASE) (HCC): Primary | ICD-10-CM

## 2024-01-22 LAB
ANION GAP SERPL CALC-SCNC: 8 MMOL/L (ref 3–18)
BUN SERPL-MCNC: 51 MG/DL (ref 7–18)
BUN/CREAT SERPL: 10 (ref 12–20)
CALCIUM SERPL-MCNC: 9.7 MG/DL (ref 8.5–10.1)
CHLORIDE SERPL-SCNC: 101 MMOL/L (ref 100–111)
CO2 SERPL-SCNC: 24 MMOL/L (ref 21–32)
CREAT SERPL-MCNC: 5.32 MG/DL (ref 0.6–1.3)
ERYTHROCYTE [DISTWIDTH] IN BLOOD BY AUTOMATED COUNT: 12.9 % (ref 11.6–14.5)
GLUCOSE BLD STRIP.AUTO-MCNC: 117 MG/DL (ref 70–110)
GLUCOSE BLD STRIP.AUTO-MCNC: 126 MG/DL (ref 70–110)
GLUCOSE SERPL-MCNC: 130 MG/DL (ref 74–99)
HCT VFR BLD AUTO: 33.4 % (ref 35–45)
HGB BLD-MCNC: 10.9 G/DL (ref 12–16)
MCH RBC QN AUTO: 35.4 PG (ref 24–34)
MCHC RBC AUTO-ENTMCNC: 32.6 G/DL (ref 31–37)
MCV RBC AUTO: 108.4 FL (ref 78–100)
NRBC # BLD: 0 K/UL (ref 0–0.01)
NRBC BLD-RTO: 0 PER 100 WBC
PLATELET # BLD AUTO: 181 K/UL (ref 135–420)
PMV BLD AUTO: 10.8 FL (ref 9.2–11.8)
POTASSIUM SERPL-SCNC: 5.3 MMOL/L (ref 3.5–5.5)
RBC # BLD AUTO: 3.08 M/UL (ref 4.2–5.3)
SODIUM SERPL-SCNC: 133 MMOL/L (ref 136–145)
WBC # BLD AUTO: 4.3 K/UL (ref 4.6–13.2)

## 2024-01-22 PROCEDURE — 85027 COMPLETE CBC AUTOMATED: CPT

## 2024-01-22 PROCEDURE — 2709999900 HC NON-CHARGEABLE SUPPLY: Performed by: SURGERY

## 2024-01-22 PROCEDURE — 3600000012 HC SURGERY LEVEL 2 ADDTL 15MIN: Performed by: SURGERY

## 2024-01-22 PROCEDURE — A4217 STERILE WATER/SALINE, 500 ML: HCPCS | Performed by: SURGERY

## 2024-01-22 PROCEDURE — 2580000003 HC RX 258: Performed by: ANESTHESIOLOGY

## 2024-01-22 PROCEDURE — 82962 GLUCOSE BLOOD TEST: CPT

## 2024-01-22 PROCEDURE — 2580000003 HC RX 258: Performed by: SURGERY

## 2024-01-22 PROCEDURE — 6360000002 HC RX W HCPCS: Performed by: ANESTHESIOLOGY

## 2024-01-22 PROCEDURE — 2580000003 HC RX 258: Performed by: NURSE ANESTHETIST, CERTIFIED REGISTERED

## 2024-01-22 PROCEDURE — 3600000002 HC SURGERY LEVEL 2 BASE: Performed by: SURGERY

## 2024-01-22 PROCEDURE — 80048 BASIC METABOLIC PNL TOTAL CA: CPT

## 2024-01-22 PROCEDURE — 2500000003 HC RX 250 WO HCPCS: Performed by: SURGERY

## 2024-01-22 PROCEDURE — 3700000000 HC ANESTHESIA ATTENDED CARE: Performed by: SURGERY

## 2024-01-22 PROCEDURE — 2500000003 HC RX 250 WO HCPCS: Performed by: ANESTHESIOLOGY

## 2024-01-22 PROCEDURE — 6370000000 HC RX 637 (ALT 250 FOR IP): Performed by: NURSE ANESTHETIST, CERTIFIED REGISTERED

## 2024-01-22 PROCEDURE — 7100000010 HC PHASE II RECOVERY - FIRST 15 MIN: Performed by: SURGERY

## 2024-01-22 PROCEDURE — 6360000002 HC RX W HCPCS: Performed by: SURGERY

## 2024-01-22 PROCEDURE — 7100000000 HC PACU RECOVERY - FIRST 15 MIN: Performed by: SURGERY

## 2024-01-22 PROCEDURE — 7100000011 HC PHASE II RECOVERY - ADDTL 15 MIN: Performed by: SURGERY

## 2024-01-22 PROCEDURE — 3700000001 HC ADD 15 MINUTES (ANESTHESIA): Performed by: SURGERY

## 2024-01-22 PROCEDURE — 7100000001 HC PACU RECOVERY - ADDTL 15 MIN: Performed by: SURGERY

## 2024-01-22 PROCEDURE — C1768 GRAFT, VASCULAR: HCPCS | Performed by: SURGERY

## 2024-01-22 DEVICE — GRAFT VASC L45CM DIA4-7MM PTFE CBAS HEP SURF STD WALLED: Type: IMPLANTABLE DEVICE | Site: ARM | Status: FUNCTIONAL

## 2024-01-22 RX ORDER — HYDROCODONE BITARTRATE AND ACETAMINOPHEN 5; 325 MG/1; MG/1
1 TABLET ORAL EVERY 4 HOURS PRN
Qty: 30 TABLET | Refills: 0 | Status: SHIPPED | OUTPATIENT
Start: 2024-01-22 | End: 2024-01-27

## 2024-01-22 RX ORDER — SODIUM CHLORIDE 9 MG/ML
INJECTION, SOLUTION INTRAVENOUS PRN
Status: DISCONTINUED | OUTPATIENT
Start: 2024-01-22 | End: 2024-01-22 | Stop reason: HOSPADM

## 2024-01-22 RX ORDER — DEXTROSE MONOHYDRATE 100 MG/ML
INJECTION, SOLUTION INTRAVENOUS CONTINUOUS PRN
Status: DISCONTINUED | OUTPATIENT
Start: 2024-01-22 | End: 2024-01-22 | Stop reason: HOSPADM

## 2024-01-22 RX ORDER — LIDOCAINE HYDROCHLORIDE 10 MG/ML
1 INJECTION, SOLUTION EPIDURAL; INFILTRATION; INTRACAUDAL; PERINEURAL
Status: DISCONTINUED | OUTPATIENT
Start: 2024-01-22 | End: 2024-01-22 | Stop reason: HOSPADM

## 2024-01-22 RX ORDER — MAGNESIUM HYDROXIDE 1200 MG/15ML
LIQUID ORAL CONTINUOUS PRN
Status: COMPLETED | OUTPATIENT
Start: 2024-01-22 | End: 2024-01-22

## 2024-01-22 RX ORDER — HYDRALAZINE HYDROCHLORIDE 20 MG/ML
INJECTION INTRAMUSCULAR; INTRAVENOUS PRN
Status: DISCONTINUED | OUTPATIENT
Start: 2024-01-22 | End: 2024-01-22 | Stop reason: SDUPTHER

## 2024-01-22 RX ORDER — SODIUM CHLORIDE 0.9 % (FLUSH) 0.9 %
5-40 SYRINGE (ML) INJECTION PRN
Status: DISCONTINUED | OUTPATIENT
Start: 2024-01-22 | End: 2024-01-22 | Stop reason: HOSPADM

## 2024-01-22 RX ORDER — FAMOTIDINE 20 MG/1
20 TABLET, FILM COATED ORAL ONCE
Status: COMPLETED | OUTPATIENT
Start: 2024-01-22 | End: 2024-01-22

## 2024-01-22 RX ORDER — CEFAZOLIN SODIUM 1 G/3ML
INJECTION, POWDER, FOR SOLUTION INTRAMUSCULAR; INTRAVENOUS
Status: DISCONTINUED
Start: 2024-01-22 | End: 2024-01-22 | Stop reason: HOSPADM

## 2024-01-22 RX ORDER — HEPARIN SODIUM 200 [USP'U]/100ML
INJECTION, SOLUTION INTRAVENOUS CONTINUOUS PRN
Status: COMPLETED | OUTPATIENT
Start: 2024-01-22 | End: 2024-01-22

## 2024-01-22 RX ORDER — LIDOCAINE HYDROCHLORIDE 20 MG/ML
INJECTION, SOLUTION EPIDURAL; INFILTRATION; INTRACAUDAL; PERINEURAL PRN
Status: DISCONTINUED | OUTPATIENT
Start: 2024-01-22 | End: 2024-01-22 | Stop reason: SDUPTHER

## 2024-01-22 RX ORDER — ONDANSETRON 2 MG/ML
4 INJECTION INTRAMUSCULAR; INTRAVENOUS ONCE
Status: COMPLETED | OUTPATIENT
Start: 2024-01-22 | End: 2024-01-22

## 2024-01-22 RX ORDER — HEPARIN SODIUM 1000 [USP'U]/ML
INJECTION, SOLUTION INTRAVENOUS; SUBCUTANEOUS PRN
Status: DISCONTINUED | OUTPATIENT
Start: 2024-01-22 | End: 2024-01-22 | Stop reason: SDUPTHER

## 2024-01-22 RX ORDER — SODIUM CHLORIDE 0.9 % (FLUSH) 0.9 %
5-40 SYRINGE (ML) INJECTION EVERY 12 HOURS SCHEDULED
Status: DISCONTINUED | OUTPATIENT
Start: 2024-01-22 | End: 2024-01-22 | Stop reason: HOSPADM

## 2024-01-22 RX ORDER — FENTANYL CITRATE 50 UG/ML
INJECTION, SOLUTION INTRAMUSCULAR; INTRAVENOUS PRN
Status: DISCONTINUED | OUTPATIENT
Start: 2024-01-22 | End: 2024-01-22 | Stop reason: SDUPTHER

## 2024-01-22 RX ORDER — PROPOFOL 10 MG/ML
INJECTION, EMULSION INTRAVENOUS PRN
Status: DISCONTINUED | OUTPATIENT
Start: 2024-01-22 | End: 2024-01-22 | Stop reason: SDUPTHER

## 2024-01-22 RX ORDER — INSULIN LISPRO 100 [IU]/ML
0-4 INJECTION, SOLUTION INTRAVENOUS; SUBCUTANEOUS EVERY 4 HOURS
Status: DISCONTINUED | OUTPATIENT
Start: 2024-01-22 | End: 2024-01-22 | Stop reason: HOSPADM

## 2024-01-22 RX ADMIN — HEPARIN SODIUM 3000 UNITS: 1000 INJECTION, SOLUTION INTRAVENOUS; SUBCUTANEOUS at 10:59

## 2024-01-22 RX ADMIN — PROPOFOL 20 MG: 10 INJECTION, EMULSION INTRAVENOUS at 10:35

## 2024-01-22 RX ADMIN — PROPOFOL 20 MG: 10 INJECTION, EMULSION INTRAVENOUS at 10:30

## 2024-01-22 RX ADMIN — FAMOTIDINE 20 MG: 20 TABLET, FILM COATED ORAL at 07:40

## 2024-01-22 RX ADMIN — FENTANYL CITRATE 25 MCG: 50 INJECTION INTRAMUSCULAR; INTRAVENOUS at 10:50

## 2024-01-22 RX ADMIN — SODIUM CHLORIDE 2 MG: 9 INJECTION, SOLUTION INTRAVENOUS at 10:27

## 2024-01-22 RX ADMIN — FENTANYL CITRATE 25 MCG: 50 INJECTION INTRAMUSCULAR; INTRAVENOUS at 11:08

## 2024-01-22 RX ADMIN — HYDRALAZINE HYDROCHLORIDE 10 MG: 20 INJECTION, SOLUTION INTRAMUSCULAR; INTRAVENOUS at 11:39

## 2024-01-22 RX ADMIN — FENTANYL CITRATE 25 MCG: 50 INJECTION INTRAMUSCULAR; INTRAVENOUS at 10:41

## 2024-01-22 RX ADMIN — FENTANYL CITRATE 25 MCG: 50 INJECTION INTRAMUSCULAR; INTRAVENOUS at 10:26

## 2024-01-22 RX ADMIN — ONDANSETRON 4 MG: 2 INJECTION INTRAMUSCULAR; INTRAVENOUS at 12:11

## 2024-01-22 RX ADMIN — LIDOCAINE HYDROCHLORIDE 40 MG: 20 INJECTION, SOLUTION EPIDURAL; INFILTRATION; INTRACAUDAL; PERINEURAL at 10:32

## 2024-01-22 RX ADMIN — SODIUM CHLORIDE: 9 INJECTION, SOLUTION INTRAVENOUS at 08:25

## 2024-01-22 ASSESSMENT — PAIN - FUNCTIONAL ASSESSMENT
PAIN_FUNCTIONAL_ASSESSMENT: 0-10
PAIN_FUNCTIONAL_ASSESSMENT: NONE - DENIES PAIN

## 2024-01-22 ASSESSMENT — PAIN SCALES - GENERAL
PAINLEVEL_OUTOF10: 0
PAINLEVEL_OUTOF10: 0

## 2024-01-22 NOTE — H&P
comprehensive review of systems was negative except for that written in the History of Present Illness.    Objective:     Patient Vitals for the past 8 hrs:   BP Temp Temp src Pulse Resp SpO2 Weight   24 0835 -- -- -- -- 20 -- --   24 0832 -- -- -- -- -- -- 53.5 kg (118 lb)   24 0700 (!) 151/50 98.5 °F (36.9 °C) Oral 72 -- 99 % --       Temp (24hrs), Av.5 °F (36.9 °C), Min:98.5 °F (36.9 °C), Max:98.5 °F (36.9 °C)      Physical Exam:  Alert and comfortable  Head is normocephalic  Neck no JVD  Chest is clear  Cardiac is regular  Chest wall catheter intact  Abdomen soft nontender  No peripheral edema today  Skin is intact without ulceration    Labs:   Recent Results (from the past 24 hour(s))   Basic Metabolic Panel    Collection Time: 24  7:50 AM   Result Value Ref Range    Sodium 133 (L) 136 - 145 mmol/L    Potassium 5.3 3.5 - 5.5 mmol/L    Chloride 101 100 - 111 mmol/L    CO2 24 21 - 32 mmol/L    Anion Gap 8 3.0 - 18 mmol/L    Glucose 130 (H) 74 - 99 mg/dL    BUN 51 (H) 7.0 - 18 MG/DL    Creatinine 5.32 (H) 0.6 - 1.3 MG/DL    Bun/Cre Ratio 10 (L) 12 - 20      Est, Glom Filt Rate 7 (L) >60 ml/min/1.73m2    Calcium 9.7 8.5 - 10.1 MG/DL   POCT Glucose    Collection Time: 24  7:57 AM   Result Value Ref Range    POC Glucose 126 (H) 70 - 110 mg/dL       Data Review: Vein mapping reviewed    Assessment:     ESRD    Plan:     Plan for right arm arteriovenous graft creation    Signed By: Angel Peters MD     2024

## 2024-01-22 NOTE — OP NOTE
Operative Note      Patient: Kaylie Granger  YOB: 1940  MRN: [id number]    Date of Procedure: 1/22/2024    Pre-Op Diagnosis Codes:     * ESRD (end stage renal disease) ([facility]) [N18.6]    Post-Op Diagnosis: Same       Procedure(s):  RIGHT ARM ARTERIOVENOUS GRAFT CREATION    Surgeon(s):  Angel Rowland MD    Assistant:   Surgical Assistant: Rubina Noel    Anesthesia: Monitor Anesthesia Care    Estimated Blood Loss (mL): Minimal    Complications: None    Specimens:   * No specimens in log *    Implants:  Implant Name Type Inv. Item Serial No.  Lot No. LRB No. Used Action   GRAFT VASC L45CM DIA4-7MM PTFE CBAS HEP SURF STD WALLED - [id number] Vascular grafts GRAFT VASC L45CM DIA4-7MM PTFE CBAS HEP SURF STD WALLED [id number] [employer]  Right 1 Implanted         Drains: * No LDAs found *    Findings: Small brachial artery, small axillary vein        Detailed Description of Procedure:   Patient had moderate sedation spine position the right arm was prepped draped usual standard fashion.   localized in the axilla at the antecubital fossa with 1% lidocaine.  The cutdown in the axilla first I observed the basilic vein which was 1 to 2 mm in size.  Next I found the axillary vein which was still very small but best usable vein at this location.  Second incision was made at the antecubital fossa directly over the brachial artery pulse.  Dissected expose the brachial artery also very small in size but definitely identified in his position in his brachial artery.  I made a  curvilinear tunnel between the 2 sites and placed a 4 to 7 mm propatent graft.  Gave 3000 of heparin.  After the appropriate waiting.  Used to best of skin control and artery made arteriotomy cut the 4 mm end of the graft on the bevel and sewed it into side to the artery using CV 6 suture in circular standard fashion.  Good visualization of the lumen.  I clamped the graft and took the controls off the

## 2024-01-22 NOTE — PROGRESS NOTES
Physician, Dr. CELESTE Rowland called and made aware of SNF facility not able to take medication that has been prefilled by non affiliated pharmacy. SNF facility called and made this nurse aware of their affiliated pharmacy, [facility]. Pharmacy information changed. Dr. Rowland made aware via telephone and  stated he will send script for Norco at this time. Patient denies pain or discomfort at this time.

## 2024-01-22 NOTE — DISCHARGE INSTRUCTIONS
Hemodialysis Access Surgery: What to Expect at Home  Your Recovery  Hemodialysis is a way to remove wastes from the blood when your kidneys can no longer do the job. It's not a cure, but it can help you live longer and feel better. It's a lifesaving treatment when you have kidney failure. Hemodialysis is often called dialysis. Your doctor created a place (called an access) in your arm for your blood to flow in and out of your body during your dialysis sessions.  Your arm will probably be bruised and swollen. It may hurt. The cut (incision) may bleed. The pain and bleeding will get better over several days. You will probably need only over-the-counter pain medicine. You can reduce swelling by propping up your arm on 1 or 2 pillows and keeping your elbow straight.  You will have stitches. These may dissolve on their own, or your doctor will tell you when to come in to have them removed. You should also be able to return to work in a few days.  You may feel some coolness or numbness in your hand. These feelings usually go away in a few weeks. Your doctor may suggest squeezing a soft object. This will strengthen your access and may make hemodialysis faster and easier.  You should always be able to feel blood rushing through the fistula or graft. It feels like a slight vibration when you put your fingers on the skin over the fistula or graft. This feeling is called a thrill or a pulse.  This care sheet gives you a general idea about how long it will take for you to recover. But each person recovers at a different pace. Follow the steps below to get better as quickly as possible.  How can you care for yourself at home?  Activity    Rest when you feel tired. Getting enough sleep will help you recover. Do not lie on or sleep on the arm with the access.     Avoid activities such as washing windows or gardening that put stress on the arm with the access.     You may use your arm, but do not lift anything that weighs more

## 2024-01-22 NOTE — ANESTHESIA PRE PROCEDURE
2 days ago): ESRD and dialysis          Endo/Other:    (+) DiabetesType II DM, no interval change.                 Abdominal:             Vascular:   + PVD, aortic or cerebral.       Other Findings:       Anesthesia Plan      MAC     ASA 4       Induction: intravenous.      Anesthetic plan and risks discussed with patient.      Plan discussed with CRNA.                SKYLER HOUGH MD   1/22/2024

## 2024-01-22 NOTE — PERIOP NOTE
Patient /Family /Designee has been informed that [facility] is not responsible for patient belongings per policy and the signed [facility] Patient Agreement document.  Personal items should be sent home or checked in with security.  Patient /Family /Designee selected the following action:                            []  Send personal items home with a family member or friend                                                 []  Check in personal items with security, excluding clothing                            [x]  Maintain personal items at the bedside, against recommendation                                 by Rico Gary [facility]                                   ** If patient /family /designee chooses to maintain personal items at the bedside,                                      Complete the patient belongings inventory in the EMR.    Spoke with Pt's wife (OK per HIPAA) and informed her that Pt's Stress Echo was normal. Per Dr. Zeng, Pt should recheck his lipid profile now and schedule an OV with her to discuss all testing and recommended plan of care. Pt's wife verbalized understanding and agreement. Message routed to A.       ----- Message from Neil Zeng MD sent at 12/16/2022 11:36 AM CST -----  Normal stress echo.  Have patient make follow-up appointment to discuss.  Needs to do a fasting lipid profile before appointment

## 2024-01-23 NOTE — ANESTHESIA POSTPROCEDURE EVALUATION
Department of Anesthesiology  Postprocedure Note    Patient: Kaylie Granger  MRN: [id number]  YOB: 1940  Date of evaluation: 1/22/2024    Procedure Summary     Date: 01/22/24 Room / Location: [facility] CV 02 / [facility] CARDIAC SURGERY    Anesthesia Start: 1022 Anesthesia Stop: 1156    Procedure: RIGHT ARM ARTERIOVENOUS GRAFT CREATION (Right: Arm Upper) Diagnosis:       ESRD (end stage renal disease) (HCC)      (ESRD (end stage renal disease) (HCC) [N18.6])    Surgeons: Angel Rowland MD Responsible Provider: Jenny Luevano MD    Anesthesia Type: MAC ASA Status: 4          Anesthesia Type: No value filed.    Berta Phase I: Berta Score: 10    Berta Phase II: Berta Score: 10    Anesthesia Post Evaluation    Patient location during evaluation: bedside  Patient participation: complete - patient participated  Level of consciousness: awake  Pain score: 0  Airway patency: patent  Nausea & Vomiting: no nausea  Cardiovascular status: hemodynamically stable  Respiratory status: acceptable  Hydration status: euvolemic  Pain management: adequate        No notable events documented.

## 2024-08-20 ENCOUNTER — APPOINTMENT (OUTPATIENT)
Facility: HOSPITAL | Age: 84
DRG: 542 | End: 2024-08-20
Payer: MEDICARE

## 2024-08-20 ENCOUNTER — HOSPITAL ENCOUNTER (INPATIENT)
Facility: HOSPITAL | Age: 84
LOS: 1 days | Discharge: NURSING, REHAB OR LONG-TERM CARE | DRG: 542 | End: 2024-08-21
Attending: EMERGENCY MEDICINE | Admitting: STUDENT IN AN ORGANIZED HEALTH CARE EDUCATION/TRAINING PROGRAM
Payer: MEDICARE

## 2024-08-20 DIAGNOSIS — S82.402A CLOSED FRACTURE OF LEFT TIBIA AND FIBULA, INITIAL ENCOUNTER: Primary | ICD-10-CM

## 2024-08-20 DIAGNOSIS — R41.0 CONFUSION: ICD-10-CM

## 2024-08-20 DIAGNOSIS — E83.39 HYPERPHOSPHATEMIA: ICD-10-CM

## 2024-08-20 DIAGNOSIS — S82.202A CLOSED FRACTURE OF LEFT TIBIA AND FIBULA, INITIAL ENCOUNTER: Primary | ICD-10-CM

## 2024-08-20 PROBLEM — G93.40 ENCEPHALOPATHY: Status: ACTIVE | Noted: 2024-08-20

## 2024-08-20 LAB
ALBUMIN SERPL-MCNC: 3.7 G/DL (ref 3.4–5)
ALBUMIN/GLOB SERPL: 1.1 (ref 0.8–1.7)
ALP SERPL-CCNC: 121 U/L (ref 45–117)
ALT SERPL-CCNC: 20 U/L (ref 13–56)
ANION GAP SERPL CALC-SCNC: 11 MMOL/L (ref 3–18)
AST SERPL-CCNC: 21 U/L (ref 10–38)
B PERT DNA SPEC QL NAA+PROBE: NOT DETECTED
BASOPHILS # BLD: 0 K/UL (ref 0–0.1)
BASOPHILS NFR BLD: 0 % (ref 0–2)
BILIRUB DIRECT SERPL-MCNC: 0.1 MG/DL (ref 0–0.2)
BILIRUB SERPL-MCNC: 0.4 MG/DL (ref 0.2–1)
BORDETELLA PARAPERTUSSIS BY PCR: NOT DETECTED
BUN SERPL-MCNC: 78 MG/DL (ref 7–18)
BUN/CREAT SERPL: 9 (ref 12–20)
C PNEUM DNA SPEC QL NAA+PROBE: NOT DETECTED
CALCIUM SERPL-MCNC: 9.1 MG/DL (ref 8.5–10.1)
CHLORIDE SERPL-SCNC: 103 MMOL/L (ref 100–111)
CO2 SERPL-SCNC: 25 MMOL/L (ref 21–32)
CREAT SERPL-MCNC: 8.47 MG/DL (ref 0.6–1.3)
DIFFERENTIAL METHOD BLD: ABNORMAL
EOSINOPHIL # BLD: 0.1 K/UL (ref 0–0.4)
EOSINOPHIL NFR BLD: 1 % (ref 0–5)
ERYTHROCYTE [DISTWIDTH] IN BLOOD BY AUTOMATED COUNT: 14.7 % (ref 11.6–14.5)
FLUAV SUBTYP SPEC NAA+PROBE: NOT DETECTED
FLUBV RNA SPEC QL NAA+PROBE: NOT DETECTED
GLOBULIN SER CALC-MCNC: 3.4 G/DL (ref 2–4)
GLUCOSE BLD STRIP.AUTO-MCNC: 192 MG/DL (ref 70–110)
GLUCOSE SERPL-MCNC: 261 MG/DL (ref 74–99)
HADV DNA SPEC QL NAA+PROBE: NOT DETECTED
HCOV 229E RNA SPEC QL NAA+PROBE: NOT DETECTED
HCOV HKU1 RNA SPEC QL NAA+PROBE: NOT DETECTED
HCOV NL63 RNA SPEC QL NAA+PROBE: NOT DETECTED
HCOV OC43 RNA SPEC QL NAA+PROBE: NOT DETECTED
HCT VFR BLD AUTO: 28 % (ref 35–45)
HGB BLD-MCNC: 9.1 G/DL (ref 12–16)
HMPV RNA SPEC QL NAA+PROBE: NOT DETECTED
HPIV1 RNA SPEC QL NAA+PROBE: NOT DETECTED
HPIV2 RNA SPEC QL NAA+PROBE: NOT DETECTED
HPIV3 RNA SPEC QL NAA+PROBE: NOT DETECTED
HPIV4 RNA SPEC QL NAA+PROBE: NOT DETECTED
IMM GRANULOCYTES # BLD AUTO: 0 K/UL (ref 0–0.04)
IMM GRANULOCYTES NFR BLD AUTO: 0 % (ref 0–0.5)
LIPASE SERPL-CCNC: 61 U/L (ref 13–75)
LYMPHOCYTES # BLD: 0.5 K/UL (ref 0.9–3.6)
LYMPHOCYTES NFR BLD: 9 % (ref 21–52)
M PNEUMO DNA SPEC QL NAA+PROBE: NOT DETECTED
MAGNESIUM SERPL-MCNC: 2.3 MG/DL (ref 1.6–2.6)
MCH RBC QN AUTO: 35.3 PG (ref 24–34)
MCHC RBC AUTO-ENTMCNC: 32.5 G/DL (ref 31–37)
MCV RBC AUTO: 108.5 FL (ref 78–100)
MONOCYTES # BLD: 0.4 K/UL (ref 0.05–1.2)
MONOCYTES NFR BLD: 8 % (ref 3–10)
NEUTS SEG # BLD: 4.4 K/UL (ref 1.8–8)
NEUTS SEG NFR BLD: 82 % (ref 40–73)
NRBC # BLD: 0 K/UL (ref 0–0.01)
NRBC BLD-RTO: 0 PER 100 WBC
PHOSPHATE SERPL-MCNC: 6.3 MG/DL (ref 2.5–4.9)
PLATELET # BLD AUTO: 163 K/UL (ref 135–420)
PMV BLD AUTO: 10.5 FL (ref 9.2–11.8)
POTASSIUM SERPL-SCNC: 5.4 MMOL/L (ref 3.5–5.5)
PROT SERPL-MCNC: 7.1 G/DL (ref 6.4–8.2)
RBC # BLD AUTO: 2.58 M/UL (ref 4.2–5.3)
RSV RNA SPEC QL NAA+PROBE: NOT DETECTED
RV+EV RNA SPEC QL NAA+PROBE: NOT DETECTED
SARS-COV-2 RNA RESP QL NAA+PROBE: NOT DETECTED
SODIUM SERPL-SCNC: 139 MMOL/L (ref 136–145)
WBC # BLD AUTO: 5.4 K/UL (ref 4.6–13.2)

## 2024-08-20 PROCEDURE — 73700 CT LOWER EXTREMITY W/O DYE: CPT

## 2024-08-20 PROCEDURE — 70450 CT HEAD/BRAIN W/O DYE: CPT

## 2024-08-20 PROCEDURE — 83735 ASSAY OF MAGNESIUM: CPT

## 2024-08-20 PROCEDURE — 72125 CT NECK SPINE W/O DYE: CPT

## 2024-08-20 PROCEDURE — 80076 HEPATIC FUNCTION PANEL: CPT

## 2024-08-20 PROCEDURE — 73620 X-RAY EXAM OF FOOT: CPT

## 2024-08-20 PROCEDURE — G0378 HOSPITAL OBSERVATION PER HR: HCPCS

## 2024-08-20 PROCEDURE — 83690 ASSAY OF LIPASE: CPT

## 2024-08-20 PROCEDURE — 2580000003 HC RX 258: Performed by: PHYSICIAN ASSISTANT

## 2024-08-20 PROCEDURE — 6370000000 HC RX 637 (ALT 250 FOR IP): Performed by: PHYSICIAN ASSISTANT

## 2024-08-20 PROCEDURE — 84100 ASSAY OF PHOSPHORUS: CPT

## 2024-08-20 PROCEDURE — 96374 THER/PROPH/DIAG INJ IV PUSH: CPT

## 2024-08-20 PROCEDURE — 6360000002 HC RX W HCPCS: Performed by: HOSPITALIST

## 2024-08-20 PROCEDURE — 73600 X-RAY EXAM OF ANKLE: CPT

## 2024-08-20 PROCEDURE — 99285 EMERGENCY DEPT VISIT HI MDM: CPT

## 2024-08-20 PROCEDURE — 82962 GLUCOSE BLOOD TEST: CPT

## 2024-08-20 PROCEDURE — 93005 ELECTROCARDIOGRAM TRACING: CPT

## 2024-08-20 PROCEDURE — 85025 COMPLETE CBC W/AUTO DIFF WBC: CPT

## 2024-08-20 PROCEDURE — 0202U NFCT DS 22 TRGT SARS-COV-2: CPT

## 2024-08-20 PROCEDURE — 99223 1ST HOSP IP/OBS HIGH 75: CPT | Performed by: PHYSICIAN ASSISTANT

## 2024-08-20 PROCEDURE — 80048 BASIC METABOLIC PNL TOTAL CA: CPT

## 2024-08-20 PROCEDURE — 1100000003 HC PRIVATE W/ TELEMETRY

## 2024-08-20 PROCEDURE — 71045 X-RAY EXAM CHEST 1 VIEW: CPT

## 2024-08-20 RX ORDER — ONDANSETRON 4 MG/1
4 TABLET, ORALLY DISINTEGRATING ORAL EVERY 8 HOURS PRN
Status: DISCONTINUED | OUTPATIENT
Start: 2024-08-20 | End: 2024-08-21 | Stop reason: HOSPADM

## 2024-08-20 RX ORDER — SODIUM CHLORIDE 0.9 % (FLUSH) 0.9 %
5-40 SYRINGE (ML) INJECTION PRN
Status: DISCONTINUED | OUTPATIENT
Start: 2024-08-20 | End: 2024-08-21 | Stop reason: HOSPADM

## 2024-08-20 RX ORDER — ACETAMINOPHEN 650 MG/1
650 SUPPOSITORY RECTAL EVERY 6 HOURS PRN
Status: DISCONTINUED | OUTPATIENT
Start: 2024-08-20 | End: 2024-08-21 | Stop reason: HOSPADM

## 2024-08-20 RX ORDER — HEPARIN SODIUM 5000 [USP'U]/ML
5000 INJECTION, SOLUTION INTRAVENOUS; SUBCUTANEOUS EVERY 8 HOURS SCHEDULED
Status: DISCONTINUED | OUTPATIENT
Start: 2024-08-21 | End: 2024-08-21 | Stop reason: HOSPADM

## 2024-08-20 RX ORDER — TRAMADOL HYDROCHLORIDE 50 MG/1
50 TABLET ORAL
Status: ON HOLD | COMMUNITY
Start: 2024-08-20 | End: 2024-09-01 | Stop reason: HOSPADM

## 2024-08-20 RX ORDER — DEXTROSE MONOHYDRATE 100 MG/ML
INJECTION, SOLUTION INTRAVENOUS CONTINUOUS PRN
Status: DISCONTINUED | OUTPATIENT
Start: 2024-08-20 | End: 2024-08-21 | Stop reason: HOSPADM

## 2024-08-20 RX ORDER — SODIUM CHLORIDE 0.9 % (FLUSH) 0.9 %
5-40 SYRINGE (ML) INJECTION EVERY 12 HOURS SCHEDULED
Status: DISCONTINUED | OUTPATIENT
Start: 2024-08-20 | End: 2024-08-21 | Stop reason: HOSPADM

## 2024-08-20 RX ORDER — SODIUM CHLORIDE 9 MG/ML
INJECTION, SOLUTION INTRAVENOUS PRN
Status: DISCONTINUED | OUTPATIENT
Start: 2024-08-20 | End: 2024-08-21 | Stop reason: HOSPADM

## 2024-08-20 RX ORDER — ATORVASTATIN CALCIUM 20 MG/1
20 TABLET, FILM COATED ORAL DAILY
Status: DISCONTINUED | OUTPATIENT
Start: 2024-08-21 | End: 2024-08-21 | Stop reason: HOSPADM

## 2024-08-20 RX ORDER — POLYETHYLENE GLYCOL 3350 17 G/17G
17 POWDER, FOR SOLUTION ORAL DAILY PRN
Status: DISCONTINUED | OUTPATIENT
Start: 2024-08-20 | End: 2024-08-21 | Stop reason: HOSPADM

## 2024-08-20 RX ORDER — LORAZEPAM 2 MG/ML
1 INJECTION INTRAMUSCULAR ONCE
Status: COMPLETED | OUTPATIENT
Start: 2024-08-20 | End: 2024-08-20

## 2024-08-20 RX ORDER — FOLIC ACID/VIT B COMPLEX AND C 0.8 MG
TABLET ORAL
COMMUNITY
Start: 2024-08-14

## 2024-08-20 RX ORDER — INSULIN LISPRO 100 [IU]/ML
0-8 INJECTION, SOLUTION INTRAVENOUS; SUBCUTANEOUS
Status: DISCONTINUED | OUTPATIENT
Start: 2024-08-21 | End: 2024-08-21 | Stop reason: HOSPADM

## 2024-08-20 RX ORDER — HYDRALAZINE HYDROCHLORIDE 50 MG/1
100 TABLET, FILM COATED ORAL 3 TIMES DAILY
Status: DISCONTINUED | OUTPATIENT
Start: 2024-08-20 | End: 2024-08-21 | Stop reason: HOSPADM

## 2024-08-20 RX ORDER — OLANZAPINE 5 MG/1
5 TABLET ORAL 2 TIMES DAILY
Status: ON HOLD | COMMUNITY
Start: 2024-08-13 | End: 2024-09-01

## 2024-08-20 RX ORDER — HYDRALAZINE HYDROCHLORIDE 100 MG/1
100 TABLET, FILM COATED ORAL 3 TIMES DAILY
Status: ON HOLD | COMMUNITY
Start: 2024-07-27 | End: 2024-09-01 | Stop reason: HOSPADM

## 2024-08-20 RX ORDER — INSULIN LISPRO 100 [IU]/ML
0-4 INJECTION, SOLUTION INTRAVENOUS; SUBCUTANEOUS NIGHTLY
Status: DISCONTINUED | OUTPATIENT
Start: 2024-08-20 | End: 2024-08-21 | Stop reason: HOSPADM

## 2024-08-20 RX ORDER — POTASSIUM CHLORIDE 7.45 MG/ML
10 INJECTION INTRAVENOUS PRN
Status: DISCONTINUED | OUTPATIENT
Start: 2024-08-20 | End: 2024-08-21 | Stop reason: HOSPADM

## 2024-08-20 RX ORDER — GLUCAGON 1 MG
1 KIT INJECTION PRN
Status: DISCONTINUED | OUTPATIENT
Start: 2024-08-20 | End: 2024-08-21 | Stop reason: HOSPADM

## 2024-08-20 RX ORDER — ONDANSETRON 2 MG/ML
4 INJECTION INTRAMUSCULAR; INTRAVENOUS EVERY 6 HOURS PRN
Status: DISCONTINUED | OUTPATIENT
Start: 2024-08-20 | End: 2024-08-21 | Stop reason: HOSPADM

## 2024-08-20 RX ORDER — ACETAMINOPHEN 325 MG/1
650 TABLET ORAL EVERY 6 HOURS PRN
Status: DISCONTINUED | OUTPATIENT
Start: 2024-08-20 | End: 2024-08-21 | Stop reason: HOSPADM

## 2024-08-20 RX ORDER — ASPIRIN 81 MG/1
81 TABLET, CHEWABLE ORAL DAILY
Status: DISCONTINUED | OUTPATIENT
Start: 2024-08-21 | End: 2024-08-21 | Stop reason: HOSPADM

## 2024-08-20 RX ORDER — BISACODYL 10 MG
10 SUPPOSITORY, RECTAL RECTAL DAILY PRN
Status: DISCONTINUED | OUTPATIENT
Start: 2024-08-20 | End: 2024-08-21 | Stop reason: HOSPADM

## 2024-08-20 RX ORDER — ENOXAPARIN SODIUM 100 MG/ML
40 INJECTION SUBCUTANEOUS DAILY
Status: DISCONTINUED | OUTPATIENT
Start: 2024-08-22 | End: 2024-08-20

## 2024-08-20 RX ORDER — MAGNESIUM SULFATE IN WATER 40 MG/ML
2000 INJECTION, SOLUTION INTRAVENOUS PRN
Status: DISCONTINUED | OUTPATIENT
Start: 2024-08-20 | End: 2024-08-21 | Stop reason: HOSPADM

## 2024-08-20 RX ORDER — POTASSIUM CHLORIDE 1500 MG/1
40 TABLET, EXTENDED RELEASE ORAL PRN
Status: DISCONTINUED | OUTPATIENT
Start: 2024-08-20 | End: 2024-08-21 | Stop reason: HOSPADM

## 2024-08-20 RX ORDER — NIFEDIPINE 60 MG/1
60 TABLET, EXTENDED RELEASE ORAL DAILY
Status: DISCONTINUED | OUTPATIENT
Start: 2024-08-21 | End: 2024-08-21 | Stop reason: HOSPADM

## 2024-08-20 RX ORDER — LEVOTHYROXINE SODIUM 25 UG/1
25 TABLET ORAL
Status: DISCONTINUED | OUTPATIENT
Start: 2024-08-21 | End: 2024-08-21 | Stop reason: HOSPADM

## 2024-08-20 RX ORDER — INSULIN GLARGINE 100 [IU]/ML
5 INJECTION, SOLUTION SUBCUTANEOUS NIGHTLY
Status: DISCONTINUED | OUTPATIENT
Start: 2024-08-20 | End: 2024-08-21 | Stop reason: HOSPADM

## 2024-08-20 RX ORDER — FOLIC ACID 1 MG/1
TABLET ORAL
Status: ON HOLD | COMMUNITY
Start: 2024-07-27 | End: 2024-09-01

## 2024-08-20 RX ORDER — INSULIN GLARGINE-YFGN 100 [IU]/ML
INJECTION, SOLUTION SUBCUTANEOUS
Status: ON HOLD | COMMUNITY
Start: 2024-08-09 | End: 2024-09-01 | Stop reason: HOSPADM

## 2024-08-20 RX ADMIN — LORAZEPAM 1 MG: 2 INJECTION INTRAMUSCULAR; INTRAVENOUS at 21:50

## 2024-08-20 RX ADMIN — SODIUM CHLORIDE, PRESERVATIVE FREE 10 ML: 5 INJECTION INTRAVENOUS at 21:50

## 2024-08-20 RX ADMIN — HYDRALAZINE HYDROCHLORIDE 100 MG: 50 TABLET ORAL at 21:50

## 2024-08-20 RX ADMIN — INSULIN GLARGINE 5 UNITS: 100 INJECTION, SOLUTION SUBCUTANEOUS at 22:55

## 2024-08-20 ASSESSMENT — PAIN SCALES - GENERAL: PAINLEVEL_OUTOF10: 0

## 2024-08-20 NOTE — ED TRIAGE NOTES
Pt arrived from [address] [place] [address] via EMS, reporting   L ankle tib/fib fracture Pt reports she fell asleep in bed then felt herself falling and woke up on floor having hurt L ankle  Pt is dialysis pt and missed HD today due to injury. (HD days T Th S)  Pt alert and oriented, very tender to touch on L ankle

## 2024-08-20 NOTE — ED PROVIDER NOTES
EMERGENCY DEPARTMENT HISTORY AND PHYSICAL EXAM    5:11 PM EDT        Date: 8/20/2024  Patient Name: Kaylie Granger    History of Presenting Illness     Chief Complaint   Patient presents with    Ankle Pain     Injury         History Provided By: patient    Additional History (Context): Kaylie Granger is a 84 y.o. female With past medical history hypertension, diabetes, hyperlipidemia, CKD stage V currently a TTS dialysis, presenting to the emergency department due to injury sustained from a fall that occurred proximately 1-2 nights prior to arrival. EMS states that patient is from nursing home, states that reportedly patient fell overnight out of bed onto the ground.  Patient reportedly had radiography performed yesterday, results returned today concerning for a mildly displaced distal tibia/fibula fractures which were acute appearing, additionally 3-5th metatarsal fracture without displacement.  EMS was called for patient transferred here for further evaluation.  Patient currently presents with severe tenderness to palpation about the left anterior leg.     Reportedly, patient also missed dialysis secondary to increased confusion, last dialysis session unclear, maybe last Thursday or Saturday.  EMS also endorses some element of confusion.  She waxes and wanes between A/O x 3 and talking to people who are not in the room for me.  Denies any fever, chills, chest pain, abdominal pain, vomiting, diarrhea, dizziness, syncope, or other symptoms at this time    PCP: Unknown, Provider, APRN - NP    Current Facility-Administered Medications   Medication Dose Route Frequency Provider Last Rate Last Admin    sodium chloride flush 0.9 % injection 5-40 mL  5-40 mL IntraVENous 2 times per day Iram Crisostomo PA   10 mL at 08/20/24 2150    sodium chloride flush 0.9 % injection 5-40 mL  5-40 mL IntraVENous PRN Iram Crsiostomo PA        0.9 % sodium chloride infusion   IntraVENous PRN Iram Crisostomo PA         potassium chloride (KLOR-CON M) extended release tablet 40 mEq  40 mEq Oral PRN Iram Crisostomo PA        Or    potassium bicarb-citric acid (EFFER-K) effervescent tablet 40 mEq  40 mEq Oral PRN Iram Crisostomo PA        Or    potassium chloride 10 mEq/100 mL IVPB (Peripheral Line)  10 mEq IntraVENous PRN Iram Crisostomo PA        magnesium sulfate 2000 mg in 50 mL IVPB premix  2,000 mg IntraVENous PRN Iram Crisostomo PA        ondansetron (ZOFRAN-ODT) disintegrating tablet 4 mg  4 mg Oral Q8H PRN Iram Crisostomo PA        Or    ondansetron (ZOFRAN) injection 4 mg  4 mg IntraVENous Q6H PRN Iram Crisostomo PA        polyethylene glycol (GLYCOLAX) packet 17 g  17 g Oral Daily PRN Iram Crisostomo PA        bisacodyl (DULCOLAX) suppository 10 mg  10 mg Rectal Daily PRN Iram Crisostomo PA        acetaminophen (TYLENOL) tablet 650 mg  650 mg Oral Q6H PRN Iram Crisostomo PA        Or    acetaminophen (TYLENOL) suppository 650 mg  650 mg Rectal Q6H PRN Iram Crisostomo PA        [START ON 8/21/2024] aspirin chewable tablet 81 mg  81 mg Oral Daily Iram Crisostomo PA        [START ON 8/21/2024] atorvastatin (LIPITOR) tablet 20 mg  20 mg Oral Daily Iram Crisostomo PA        hydrALAZINE (APRESOLINE) tablet 100 mg  100 mg Oral TID Iram Crisostomo PA   100 mg at 08/20/24 2150    [START ON 8/21/2024] levothyroxine (SYNTHROID) tablet 25 mcg  25 mcg Oral QAM AC Iram Crisostomo PA        [START ON 8/21/2024] NIFEdipine (PROCARDIA XL) extended release tablet 60 mg  60 mg Oral Daily Iram Crisostomo PA        glucose chewable tablet 16 g  4 tablet Oral PRN Iram Crisostomo PA        dextrose bolus 10% 125 mL  125 mL IntraVENous PRN Iram Crisostomo PA        Or    dextrose bolus 10% 250 mL  250 mL IntraVENous PRN Iram Crisostomo, PA        Glucagon Emergency SOLR 1 mg  1 mg SubCUTAneous PRN Iram Crisostomo, PA        dextrose 10 % infusion    IntraVENous Continuous PRN Iram Crisostomo PA        insulin glargine (LANTUS) injection vial 5 Units  5 Units SubCUTAneous Nightly Iram Crisostomo PA        [START ON 2024] insulin lispro (HUMALOG,ADMELOG) injection vial 0-8 Units  0-8 Units SubCUTAneous TID WC Iram Crisostomo PA        insulin lispro (HUMALOG,ADMELOG) injection vial 0-4 Units  0-4 Units SubCUTAneous Nightly Iram Crisostomo PA        [START ON 2024] heparin (porcine) injection 5,000 Units  5,000 Units SubCUTAneous 3 times per day Iram Crisostomo PA           Past History     Past Medical History:  Past Medical History:   Diagnosis Date    Anemia of renal disease     CKD (chronic kidney disease) requiring chronic dialysis (HCC)     T//SAT at [place] [phone]. [phone]    Hypercholesterolemia     Hypertension     Peripheral vascular disease (HCC)     Secondary hyperparathyroidism of renal origin ([facility])     Type 2 diabetes mellitus treated with insulin ([facility])     no meds per patient on 23       Past Surgical History:  Past Surgical History:   Procedure Laterality Date    ABOVE KNEE AMPUTATION Right 2020    CATARACT EXTRACTION, BILATERAL Bilateral     COLONOSCOPY      DIALYSIS FISTULA CREATION Right 2024    RIGHT ARM ARTERIOVENOUS GRAFT CREATION performed by Angel Rowland MD at [facility] CARDIAC SURGERY    PORT SURGERY Right 2023    TUNNELED CATHETER PLACEMENT; C-ARM performed by Sherri Jean MD at [facility] CARDIAC SURGERY    PORT SURGERY Right 2023    PERM CATHETER EXCHANGE; C-ARM performed by Angel Rowland MD at [facility] CARDIAC SURGERY       Family History:  Family History   Problem Relation Age of Onset    Hypertension Mother        Social History:  Social History     Tobacco Use    Smoking status: Former     Current packs/day: 0.00     Types: Cigarettes     Quit date: 1996     Years since quittin.5    Smokeless tobacco: Never   Vaping Use    Vaping

## 2024-08-20 NOTE — ED NOTES
Assumed care of pt. Pt A+Ox4. Pt C/O pain to L foot after fall. L leg fracture. Pt missed dialysis today due to injury. Vitals stable.

## 2024-08-21 ENCOUNTER — APPOINTMENT (OUTPATIENT)
Facility: HOSPITAL | Age: 84
DRG: 542 | End: 2024-08-21
Payer: MEDICARE

## 2024-08-21 VITALS
BODY MASS INDEX: 24.6 KG/M2 | HEIGHT: 61 IN | SYSTOLIC BLOOD PRESSURE: 132 MMHG | WEIGHT: 130.29 LBS | HEART RATE: 96 BPM | DIASTOLIC BLOOD PRESSURE: 53 MMHG | RESPIRATION RATE: 18 BRPM | TEMPERATURE: 98.5 F | OXYGEN SATURATION: 97 %

## 2024-08-21 LAB
ANION GAP SERPL CALC-SCNC: 12 MMOL/L (ref 3–18)
BASOPHILS # BLD: 0 K/UL (ref 0–0.1)
BASOPHILS NFR BLD: 0 % (ref 0–2)
BUN SERPL-MCNC: 86 MG/DL (ref 7–18)
BUN/CREAT SERPL: 10 (ref 12–20)
CALCIUM SERPL-MCNC: 9.3 MG/DL (ref 8.5–10.1)
CHLORIDE SERPL-SCNC: 105 MMOL/L (ref 100–111)
CO2 SERPL-SCNC: 22 MMOL/L (ref 21–32)
CREAT SERPL-MCNC: 8.93 MG/DL (ref 0.6–1.3)
DIFFERENTIAL METHOD BLD: ABNORMAL
EKG ATRIAL RATE: 111 BPM
EKG DIAGNOSIS: NORMAL
EKG P AXIS: 69 DEGREES
EKG P-R INTERVAL: 122 MS
EKG Q-T INTERVAL: 338 MS
EKG QRS DURATION: 72 MS
EKG QTC CALCULATION (BAZETT): 459 MS
EKG R AXIS: -9 DEGREES
EKG T AXIS: 49 DEGREES
EKG VENTRICULAR RATE: 111 BPM
EOSINOPHIL # BLD: 0.1 K/UL (ref 0–0.4)
EOSINOPHIL NFR BLD: 2 % (ref 0–5)
ERYTHROCYTE [DISTWIDTH] IN BLOOD BY AUTOMATED COUNT: 14.9 % (ref 11.6–14.5)
EST. AVERAGE GLUCOSE BLD GHB EST-MCNC: 143 MG/DL
GLUCOSE BLD STRIP.AUTO-MCNC: 170 MG/DL (ref 70–110)
GLUCOSE BLD STRIP.AUTO-MCNC: 89 MG/DL (ref 70–110)
GLUCOSE BLD STRIP.AUTO-MCNC: 91 MG/DL (ref 70–110)
GLUCOSE SERPL-MCNC: 135 MG/DL (ref 74–99)
HBA1C MFR BLD: 6.6 % (ref 4.2–5.6)
HBV SURFACE AG SER QL: <0.1 INDEX
HBV SURFACE AG SER QL: NEGATIVE
HCT VFR BLD AUTO: 26.8 % (ref 35–45)
HGB BLD-MCNC: 8.6 G/DL (ref 12–16)
IMM GRANULOCYTES # BLD AUTO: 0 K/UL (ref 0–0.04)
IMM GRANULOCYTES NFR BLD AUTO: 0 % (ref 0–0.5)
LYMPHOCYTES # BLD: 0.8 K/UL (ref 0.9–3.6)
LYMPHOCYTES NFR BLD: 15 % (ref 21–52)
MCH RBC QN AUTO: 35 PG (ref 24–34)
MCHC RBC AUTO-ENTMCNC: 32.1 G/DL (ref 31–37)
MCV RBC AUTO: 108.9 FL (ref 78–100)
MONOCYTES # BLD: 0.5 K/UL (ref 0.05–1.2)
MONOCYTES NFR BLD: 10 % (ref 3–10)
NEUTS SEG # BLD: 3.6 K/UL (ref 1.8–8)
NEUTS SEG NFR BLD: 72 % (ref 40–73)
NRBC # BLD: 0 K/UL (ref 0–0.01)
NRBC BLD-RTO: 0 PER 100 WBC
PLATELET # BLD AUTO: 166 K/UL (ref 135–420)
PMV BLD AUTO: 10.9 FL (ref 9.2–11.8)
POTASSIUM SERPL-SCNC: 5.6 MMOL/L (ref 3.5–5.5)
RBC # BLD AUTO: 2.46 M/UL (ref 4.2–5.3)
SODIUM SERPL-SCNC: 139 MMOL/L (ref 136–145)
WBC # BLD AUTO: 5 K/UL (ref 4.6–13.2)

## 2024-08-21 PROCEDURE — 93010 ELECTROCARDIOGRAM REPORT: CPT | Performed by: INTERNAL MEDICINE

## 2024-08-21 PROCEDURE — 2500000003 HC RX 250 WO HCPCS: Performed by: INTERNAL MEDICINE

## 2024-08-21 PROCEDURE — 73610 X-RAY EXAM OF ANKLE: CPT

## 2024-08-21 PROCEDURE — 99239 HOSP IP/OBS DSCHRG MGMT >30: CPT | Performed by: STUDENT IN AN ORGANIZED HEALTH CARE EDUCATION/TRAINING PROGRAM

## 2024-08-21 PROCEDURE — 86706 HEP B SURFACE ANTIBODY: CPT

## 2024-08-21 PROCEDURE — 82962 GLUCOSE BLOOD TEST: CPT

## 2024-08-21 PROCEDURE — 92526 ORAL FUNCTION THERAPY: CPT

## 2024-08-21 PROCEDURE — 36415 COLL VENOUS BLD VENIPUNCTURE: CPT

## 2024-08-21 PROCEDURE — 87340 HEPATITIS B SURFACE AG IA: CPT

## 2024-08-21 PROCEDURE — 2580000003 HC RX 258: Performed by: PHYSICIAN ASSISTANT

## 2024-08-21 PROCEDURE — 97530 THERAPEUTIC ACTIVITIES: CPT

## 2024-08-21 PROCEDURE — 5A1D70Z PERFORMANCE OF URINARY FILTRATION, INTERMITTENT, LESS THAN 6 HOURS PER DAY: ICD-10-PCS | Performed by: STUDENT IN AN ORGANIZED HEALTH CARE EDUCATION/TRAINING PROGRAM

## 2024-08-21 PROCEDURE — 90935 HEMODIALYSIS ONE EVALUATION: CPT

## 2024-08-21 PROCEDURE — 97161 PT EVAL LOW COMPLEX 20 MIN: CPT

## 2024-08-21 PROCEDURE — 92610 EVALUATE SWALLOWING FUNCTION: CPT

## 2024-08-21 PROCEDURE — 96372 THER/PROPH/DIAG INJ SC/IM: CPT

## 2024-08-21 PROCEDURE — 6360000002 HC RX W HCPCS: Performed by: PHYSICIAN ASSISTANT

## 2024-08-21 PROCEDURE — 6370000000 HC RX 637 (ALT 250 FOR IP): Performed by: PHYSICIAN ASSISTANT

## 2024-08-21 PROCEDURE — 97166 OT EVAL MOD COMPLEX 45 MIN: CPT

## 2024-08-21 PROCEDURE — 80048 BASIC METABOLIC PNL TOTAL CA: CPT

## 2024-08-21 PROCEDURE — 85025 COMPLETE CBC W/AUTO DIFF WBC: CPT

## 2024-08-21 PROCEDURE — 94761 N-INVAS EAR/PLS OXIMETRY MLT: CPT

## 2024-08-21 PROCEDURE — G0378 HOSPITAL OBSERVATION PER HR: HCPCS

## 2024-08-21 PROCEDURE — 6360000002 HC RX W HCPCS: Performed by: HOSPITALIST

## 2024-08-21 PROCEDURE — 96375 TX/PRO/DX INJ NEW DRUG ADDON: CPT

## 2024-08-21 PROCEDURE — 83036 HEMOGLOBIN GLYCOSYLATED A1C: CPT

## 2024-08-21 RX ORDER — CALCIUM ACETATE 667 MG/1
667 CAPSULE ORAL
Status: DISCONTINUED | OUTPATIENT
Start: 2024-08-21 | End: 2024-08-21 | Stop reason: SDUPTHER

## 2024-08-21 RX ORDER — MORPHINE SULFATE 2 MG/ML
2 INJECTION, SOLUTION INTRAMUSCULAR; INTRAVENOUS EVERY 6 HOURS PRN
Status: DISCONTINUED | OUTPATIENT
Start: 2024-08-21 | End: 2024-08-21 | Stop reason: HOSPADM

## 2024-08-21 RX ORDER — MORPHINE SULFATE 2 MG/ML
2 INJECTION, SOLUTION INTRAMUSCULAR; INTRAVENOUS EVERY 4 HOURS PRN
Status: DISCONTINUED | OUTPATIENT
Start: 2024-08-21 | End: 2024-08-21

## 2024-08-21 RX ORDER — CALCIUM ACETATE 667 MG/1
667 CAPSULE ORAL
Status: DISCONTINUED | OUTPATIENT
Start: 2024-08-21 | End: 2024-08-21 | Stop reason: HOSPADM

## 2024-08-21 RX ADMIN — HEPARIN SODIUM 5000 UNITS: 5000 INJECTION INTRAVENOUS; SUBCUTANEOUS at 13:48

## 2024-08-21 RX ADMIN — HYDRALAZINE HYDROCHLORIDE 100 MG: 50 TABLET ORAL at 10:21

## 2024-08-21 RX ADMIN — LEVOTHYROXINE SODIUM 25 MCG: 25 TABLET ORAL at 06:31

## 2024-08-21 RX ADMIN — SODIUM CHLORIDE, PRESERVATIVE FREE 10 ML: 5 INJECTION INTRAVENOUS at 10:22

## 2024-08-21 RX ADMIN — CALCIUM ACETATE 667 MG: 667 CAPSULE ORAL at 12:19

## 2024-08-21 RX ADMIN — HEPARIN SODIUM 5000 UNITS: 5000 INJECTION INTRAVENOUS; SUBCUTANEOUS at 05:30

## 2024-08-21 RX ADMIN — MORPHINE SULFATE 2 MG: 2 INJECTION, SOLUTION INTRAMUSCULAR; INTRAVENOUS at 04:36

## 2024-08-21 RX ADMIN — ASPIRIN 81 MG CHEWABLE TABLET 81 MG: 81 TABLET CHEWABLE at 10:22

## 2024-08-21 RX ADMIN — ATORVASTATIN CALCIUM 20 MG: 20 TABLET, FILM COATED ORAL at 10:22

## 2024-08-21 RX ADMIN — NIFEDIPINE 60 MG: 60 TABLET, FILM COATED, EXTENDED RELEASE ORAL at 10:21

## 2024-08-21 ASSESSMENT — PAIN SCALES - GENERAL
PAINLEVEL_OUTOF10: 0
PAINLEVEL_OUTOF10: 0
PAINLEVEL_OUTOF10: 7
PAINLEVEL_OUTOF10: 0

## 2024-08-21 ASSESSMENT — PAIN DESCRIPTION - ORIENTATION: ORIENTATION: RIGHT

## 2024-08-21 ASSESSMENT — PAIN DESCRIPTION - DESCRIPTORS: DESCRIPTORS: ACHING

## 2024-08-21 ASSESSMENT — PAIN DESCRIPTION - ONSET: ONSET: ON-GOING

## 2024-08-21 ASSESSMENT — PAIN - FUNCTIONAL ASSESSMENT: PAIN_FUNCTIONAL_ASSESSMENT: ACTIVITIES ARE NOT PREVENTED

## 2024-08-21 ASSESSMENT — PAIN DESCRIPTION - LOCATION: LOCATION: LEG

## 2024-08-21 ASSESSMENT — PAIN DESCRIPTION - PAIN TYPE: TYPE: ACUTE PAIN

## 2024-08-21 ASSESSMENT — PAIN DESCRIPTION - FREQUENCY: FREQUENCY: INTERMITTENT

## 2024-08-21 NOTE — H&P
History and Physical          Subjective     HPI: Kaylie Granger is a 84 y.o. female with a PMHx of HTN, DM2, HLD, PAD s/p right AKA, ESRD on HD TTS, chronic anemia, mild cognitive impairment who presented to the ED from LTC at [facility] due to fall and resultant left ankle pain. She states the fall happened yesterday and she felt like she twisted her ankle. She also states she fell this morning. Currently pain is mild, but movement makes it worse. She was also reportedly confused in the ED, however, she is currently AxO x4 and able to confirm medications she takes at the facility. She denies fever, chills, cp, sob, abd pain, nvd,increased edema. She does report cough, but states it only happens when she is eating. The only special diet she states she's on is a renal diet.     In the ED, HR elevated 100s, temp 100.1, /54. Labs with , hgb 9.1, .5. Ortho was consulted and will try to manage nonoperatively, but requested a CT distal tib/fib.       PMHx:  Past Medical History:   Diagnosis Date    Anemia of renal disease     CKD (chronic kidney disease) requiring chronic dialysis (HCC)     T/TH/SAT at [facility] [phone]. [phone]    Hypercholesterolemia     Hypertension     Peripheral vascular disease (HCC)     Secondary hyperparathyroidism of renal origin (HCC)     Type 2 diabetes mellitus treated with insulin (HCC)     no meds per patient on 12/22/23       PSurgHx:  Past Surgical History:   Procedure Laterality Date    ABOVE KNEE AMPUTATION Right 01/30/2020    CATARACT EXTRACTION, BILATERAL Bilateral     COLONOSCOPY      DIALYSIS FISTULA CREATION Right 1/22/2024    RIGHT ARM ARTERIOVENOUS GRAFT CREATION performed by Angel Rowland MD at [facility] CARDIAC SURGERY    PORT SURGERY Right 06/23/2023    TUNNELED CATHETER PLACEMENT; C-ARM performed by Sherri Jean MD at [facility] CARDIAC SURGERY    PORT SURGERY Right 07/06/2023    PERM CATHETER EXCHANGE; C-ARM  degrees    T Axis 49 degrees    Diagnosis       Sinus tachycardia  Anterior infarct , age undetermined  Abnormal ECG  When compared with ECG of 27-SEP-2023 09:01,  Vent. rate has increased BY  46 BPM  Anterior infarct is now present         Imaging Reviewed:  CT CERVICAL SPINE WO CONTRAST    Result Date: 8/20/2024  CT CERVICAL SPINE HISTORY: Fall, Trauma COMPARISON: None TECHNIQUE: 2.5 mm helically acquired images from the base of skull to the lung apex. Sagittal and coronal reformations were obtained for better evaluation of alignment, disk space height, interfacet relations, and vertebral integrity. All CT scans at this facility are performed using dose optimization technique as appropriate to a performed exam, to include automated exposure control, adjustment of the MA and/or kV according to patient size (including appropriate matching for site-specific examinations) or use of  iterative reconstruction technique. FINDINGS: Alignment: Normal. Vertebral body heights: Preserved. Osseous structures: No fractures identified. 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 Degenerative changes: Multilevel facet arthropathy and uncovertebral spurring. Mild multilevel degenerative disc disease. No critical central canal stenosis. Soft tissues: Normal. Visualized lung apices: Clear.     No fracture identified. Degenerative disc disease and facet arthropathy without critical central canal stenosis Electronically signed by Ken Shaikh    CT HEAD WO CONTRAST    Result Date: 8/20/2024  EXAM: CT HEAD WO CONTRAST CLINICAL INDICATION/HISTORY: Fall, Trauma TECHNIQUE: Contiguous axial images were obtained through the brain.  Sagittal and coronal reconstructions were generated. CT scans at this facility are performed using dose optimization technique as appropriate with performed exam, to include automated exposure control, adjustment of mA and/or kV according to patient's size (including appropriate  matching for site-specific examinations), or use of iterative reconstruction technique. COMPARISON: 9/15/2023 FINDINGS: Brain Parenchyma: There is no acute intracranial hemorrhage or territorial infarction. White matter hypoattenuation suggestive of chronic microvessel ischemic disease. Extra-Axial Spaces, Ventricles and Cisterns: The ventricles and sulci are within normal limits for patient's age. No extra-axial fluid collections are identified. The basal cisterns are patent. Orbits: Bilateral ocular lens replacements. Paranasal Sinuses: Included paranasal sinuses are clear. Skull base/calvarium: No displaced fractures identified. Other findings: None     No acute process is identified. Electronically signed by Ken Shaikh          Assessment/Plan       Left tib/fib fracture  - NPO p MN  - PRN pain control  - appreciate ortho assistance  - PT/OT pending ortho recs on WBS  - fall precautions     Intermittent encephalopathy: was Ax0 x4 on my exam, but intermittently confused to ER provider. Temp increased to 100.1 with elevated HR. possible viral vs bacterial illness causing intermittent confusion in a patient with baseline mild cognitive impairment? Vs medications given by EMS.  - check RVP  - obtain UA if possible  - obtain CXR  - hold on abx until source of possible infection identified    Suspected dysphagia: patient states she tends to cough while she's eating  - SLP consult  - aspiration precautions    ESRD on dialysis  - appreciate nephrology assistance  - monitor BMP    DM2 with hyperglycemia  - lantus, SSI    HTN  - resume home medications    Chronic macrocytic anemia  - monitor CBC    PAD s/p right AKA  - asa, statin        Anticipated Discharge: 2 days    DVT Prophylaxis:  [x]Lovenox  []Hep SQ  []SCDs  []Coumadin []DOAC  []On Heparin gtt     I have personally reviewed all pertinent labs, films and EKGs that have officially resulted. I reviewed available electronic documentation outlining the initial

## 2024-08-21 NOTE — CARE COORDINATION
08/21/24 0935   Service Assessment   Patient Orientation Alert and Oriented;Person   Cognition Dementia / Early Alzheimer's   History Provided By Medical Record   Primary Caregiver Other (Comment)  (LT resident at [facility])   Support Systems Family Members   Patient's Healthcare Decision Maker is: Legal Next of Kin   PCP Verified by CM Yes   Last Visit to PCP Within last 3 months   Can patient return to prior living arrangement Yes   Ability to make needs known: Poor   Financial Resources Medicaid;Medicare   Community Resources Other (Comment)  (LT)   CM/SW Referral Other (see comment)  (N/A)   Social/Functional History   Lives With Other (comment)  (LTC resident at [facility])   Type of Home   (Nursing Facility)   Receives Help From Other (comment)  (Staff at [facility])   Discharge Planning   Type of Residence Long-Term Care   Potential Assistance Needed Skilled Nursing Facility   DME Ordered? No   Potential Assistance Purchasing Medications No   Type of Home Care Services None   Patient expects to be discharged to: Long-term care   Follow Up Appointment: Best Day/Time    (N/A)   History of falls? 1   Services At/After Discharge   Transition of Care Consult (CM Consult) Long Term Care   Services At/After Discharge Long term care   Mode of Transport at Discharge BLS   Hospital Transport Time of Discharge   (To be determined on day of discharge)   Condition of Participation: Discharge Planning   The Plan for Transition of Care is related to the following treatment goals: Dispostion is to return to [facility] for long-term care   Freedom of Choice list was provided with basic dialogue that supports the patient's individualized plan of care/goals, treatment preferences, and shares the quality data associated with the providers?  No  (Pt LT resident)   Documentation for Discharge Appeal   Discharge Appealed by Other (comment)  (N/A)     Patient  is alert and oriented to self only. Assessment completed by medical record. Spoke to Gloria with [facility] [phone]. who stated patient is a Long-term care resident and can return upon discharge. Patient placed in CC link.     Amy HARRISON, RN   Case Management   [phone]

## 2024-08-21 NOTE — PROGRESS NOTES
4 Eyes Skin Assessment     NAME:  Kaylie Granger  YOB: 1940  MEDICAL RECORD NUMBER:  [id number]    The patient is being assessed for  Shift Handoff    I agree that at least one RN has performed a thorough Head to Toe Skin Assessment on the patient. ALL assessment sites listed below have been assessed.      Areas assessed by both nurses:    Head, Face, Ears, Shoulders, Back, Chest, Arms, Elbows, Hands, Sacrum. Buttock, Coccyx, Ischium, and Legs. Feet and Heels        Does the Patient have a Wound? No noted wound(s)       Nghia Prevention initiated by RN: Yes  Wound Care Orders initiated by RN: No    Pressure Injury (Stage 3,4, Unstageable, DTI, NWPT, and Complex wounds) if present, place Wound referral order by RN under : No    New Ostomies, if present place, Ostomy referral order under : No     Nurse 1 eSignature: Electronically signed by Aida Rodrigues RN on 8/21/24 at 7:07 PM EDT    **SHARE this note so that the co-signing nurse can place an eSignature**    Nurse 2 eSignature: {Esignature:[id number]}

## 2024-08-21 NOTE — PROGRESS NOTES
0442: Patient's BP elevated during early mornign vitals. Able to express that she is in pain. Morphine given per MD order. Will recheck BP    0632: patient's /64

## 2024-08-21 NOTE — CONSULTS
Consult Note  Consult requested by: Live Romero MD   Kaylie Granger is a 84 y.o. female Black /  who is being seen on consult for esrd  Chief Complaint   Patient presents with    Ankle Pain     Injury     Admission diagnosis: Closed fracture of left tibia and fibula     HPI:    84 yr old with hx of hypertension, ESRD, PVD,diabetes presents in to ED for fall and Impacted and comminuted fracture of the distal tibial metaphysis. .  Right LE with previous AKA.  Appears mildy confused  Denies any chest pain or sob.  She missed her scheduled dialysis on Tuesday.    Past Medical History:   Diagnosis Date    Anemia of renal disease     CKD (chronic kidney disease) requiring chronic dialysis (HCC)     T/TH/SAT at [place] [phone]. [phone]    Hypercholesterolemia     Hypertension     Peripheral vascular disease (HCC)     Secondary hyperparathyroidism of renal origin (HCC)     Type 2 diabetes mellitus treated with insulin (HCC)     no meds per patient on 12/22/23      Past Surgical History:   Procedure Laterality Date    ABOVE KNEE AMPUTATION Right 01/30/2020    CATARACT EXTRACTION, BILATERAL Bilateral     COLONOSCOPY      DIALYSIS FISTULA CREATION Right 1/22/2024    RIGHT ARM ARTERIOVENOUS GRAFT CREATION performed by Angel Rowland MD at [facility] CARDIAC SURGERY    PORT SURGERY Right 06/23/2023    TUNNELED CATHETER PLACEMENT; C-ARM performed by Sherri Jean MD at [facility] CARDIAC SURGERY    PORT SURGERY Right 07/06/2023    PERM CATHETER EXCHANGE; C-ARM performed by Angel Rowland MD at [facility] CARDIAC SURGERY       Social History     Socioeconomic History    Marital status: Single     Spouse name: Not on file    Number of children: Not on file    Years of education: Not on file    Highest education level: Not on file   Occupational History    Not on file   Tobacco Use    Smoking status: Former     Current packs/day: 0.00     Types: Cigarettes     Quit date: 2/18/1996        SpO2 93%   BMI 24.62 kg/m²     Weight change:   No intake or output data in the 24 hours ending 08/21/24 0939  Physical Exam:   General: comfortable, no acute distress   HEENT sclera anicteric, supple neck, no thyromegaly  CVS: S1S2 heard,  no rub  RS: + air entry b/l,   Abd: Soft  Neuro: non focal, awake, alert  Extrm: right aka.left distal tibial fx  Skin: no visible  Rash      Procedures/imaging: see electronic medical records for all procedures, Xrays and details which were not copied into this note but were reviewed prior to creation of Plan      Impression & Plan:   IMPRESSION:   ESRD on HD  Fall and distal tibial communited fx on left  Hyperkalemia  Right AKA and PVD  Hypertension and diabetes  Anemia of chronic disease  Secondary hyperparathyroidism   PLAN:    Conservative therapy per orthopedics  K, sodium,phos,fluid restriction in diet  HD today  Phoslo for phos binding  Epogen            CRISTHIAN ZAVALA MD  August 21, 2024  [facility]  Office [phone]

## 2024-08-21 NOTE — DIALYSIS
HD Care plan  Time: 3 hrs  Dialysate: 2 K+  2.5 Ca++  Bath  Net UF: 3 L  Access: Aseptic care for NASRIN AVG  Hemodynamic stability: Maintain BP WNL     Pre Dialysis:  Patient received from DIE[facility] Shukla. Patient arrived on a bed, A+O X 2-3, on RA,  no s/s of acute distress noted. NASRIN AVG assessed, no abnormalities noted, bruit and thrill strong. AVG accessed using 15G needles without any difficulty. Good flow achieved from both needles.    Intra Dialysis:  Time out / safety process performed per policy. Tx initiated at 1500.    AVG flowing with ease. For hemodynamic stability UF goal set at 3000 ml as tolerated.  Pt offered assistance with repositioning every 2 hours/prn    Vascular access visible and line connections remained intact throughout entire duration of treatment.   Vital signs checked every 15 mins.     Post Dialysis:  Tx completed at 1800,   Tolerated well, 3 L  removed. De-accessed per protocol.    Clot time 6 minutes for arterial, and 6 minutes for venous.   Dry dressings applied. Bruit/Thrill present above and below dressings.  Post Dialysis report given to DIE[facility] Shukla

## 2024-08-21 NOTE — PLAN OF CARE
Problem: Discharge Planning  Goal: Discharge to home or other facility with appropriate resources  8/21/2024 1615 by Aida Pina, RN  Outcome: Completed  8/21/2024 1215 by Aida iPna, RN  Outcome: Progressing  8/21/2024 1115 by Aida Pina, RN  Outcome: Progressing

## 2024-08-21 NOTE — CARE COORDINATION
Transition of Care Plan to SNF/Rehab      Patient Name: Kaylie Granger                   YOB: 1940    SNF/Rehab Transition:  Patient has been accepted to [facility] and meets criteria for admission. Confirmed with Gloria that bed is available for today.   Patient will transported by Medical Transport and expected to leave at 1530.  Met with patient and nephew and they are agreeable to the transition plan.    Medicaid Long-term Services and Supports(LTSS) screening completion: Yes    Three Inpatient Midnights for Medicare: NA    Current Code Status:   Code Status: DNR     Last Weight:   Wt Readings from Last 1 Encounters:   08/20/24 59.1 kg (130 lb 4.7 oz)       Weightbearing Status:     IV Medication, IV Site, Device Type: No    Dialysis: No      O2 Needs (including O2, Bipap, Cpap, ect.): No    Covid Vaccine Dates/ if known:    Internal Administration   First Dose      Second Dose           Last COVID Lab SARS-CoV-2, PCR ( )   Date Value   08/20/2024 Not detected            Last COVID Lab SARS-CoV-2, PCR ( )   Date Value   08/20/2024 Not detected     Rapid Influenza A By PCR ( )   Date Value   06/27/2023 Not detected     Rapid Influenza B By PCR ( )   Date Value   06/27/2023 Not detected              Current Diet: ADULT DIET; Regular; Low Sodium (2 gm); Low Potassium (Less than 3000 mg/day); Low Phosphorus (Less than 1000 mg)    Wound Vac or Other Equipment Needs:     Patient requires Isolation: No    Follow-up Appointment needed or scheduled: No    Communication to SNF/Rehab:  Bedside RN, Aida, has been notified to update the transition plan to the facility and call report (phone number).  Discharge information has been updated on the AVS and communicated to facility via [facility].    Nursing Please include all hard scripts for controlled substances, med rec and dc summary, and AVS in packet.     Spoke to Izabela with [facility] [phone].

## 2024-08-21 NOTE — PROGRESS NOTES
Speech Pathology    Bedside eval orders received. Per RN, Pt to remain NPO for possible surgery today. Will complete evaluation at a later date/time or as medical condition permits.     Thank you for this referral.    Belle Gusman M.S. CCC-SLP  Speech Language Pathologist

## 2024-08-21 NOTE — PROGRESS NOTES
4 Eyes Skin Assessment     NAME:  Kaylie Granger  YOB: 1940  MEDICAL RECORD NUMBER:  [id number]    The patient is being assessed for  Admission    I agree that at least one RN has performed a thorough Head to Toe Skin Assessment on the patient. ALL assessment sites listed below have been assessed.      Areas assessed by both nurses:    Head, Face, Ears, Shoulders, Back, Chest, Arms, Elbows, Hands, Sacrum. Buttock, Coccyx, Ischium, Legs. Feet and Heels, and Under Medical Devices         Does the Patient have a Wound? Yes wound(s) were present on assessment. LDA wound assessment was Initiated and completed by RN       Nghia Prevention initiated by RN: Yes  Wound Care Orders initiated by RN: No    Pressure Injury (Stage 3,4, Unstageable, DTI, NWPT, and Complex wounds) if present, place Wound referral order by RN under : No    New Ostomies, if present place, Ostomy referral order under : No     Nurse 1 eSignature: Electronically signed by ERICA SANDOVAL RN on 8/20/24 at 11:28 PM EDT    **SHARE this note so that the co-signing nurse can place an eSignature**    Nurse 2 eSignature: {Esignature:[id number]}

## 2024-08-21 NOTE — PROGRESS NOTES
SPEECH LANGUAGE PATHOLOGY BEDSIDE SWALLOW EVALUATION/TREATMENT AND DISCHARGE    Patient: Kaylie Granger (84 y.o. female)  Date: 8/21/2024  Primary Diagnosis: Hyperphosphatemia [E83.39]  Confusion [R41.0]  Closed fracture of left tibia and fibula, initial encounter [S82.202A, S82.402A]       Precautions: Aspiration  PLOF: As per H&P  ASSESSMENT:  Based on the objective data described below, the patient presents with oropharyngeal swallow fxn essentially WFL. Pt seen in unison with PT. She was lethargic upon arrival with reg solid and thin liquid lunch tray at bedside. Pt initially demo mod cues to alert for PO trials; improved mentation throughout treatment. Pt with throat clear x 1/10 thin liquid trials; no overt s/sx of aspiration observed while alert and self feeding reg solids and thin liquids. Mastication was functional/timely with positive oral clearance. Rec reg solid diet with thin liquids, aspiration precautions, oral care TID, and meds as tolerated.     TREATMENT:  Skilled therapy initiated; Educated patient on aspiration precautions and importance of compensatory swallow techniques to decrease aspiration risk (decrease rate of intake & sip/bite size, upright @HOB for all po intake and ~30 minutes after po); verbalized comprehension. No further skilled SLP services are indicated at this time as pt appears at baseline with PO intake. Please re-consult if needed.      PLAN :  Recommendations and Planned Interventions:  Recommendations  Requires SLP Intervention: No  Recommendations: Assistance with meals  D/C Recommendations: No follow up therapy recommended post discharge  Diet Solids Recommendation: Regular  Liquid Consistency Recommendation: Thin  Compensatory Swallowing Strategies : Remain upright for 30-45 minutes after meals;Alternate solids and liquids;Upright as possible for all oral intake;Small bites/sips;Eat/Feed slowly  Recommended Form of Meds: PO  Therapeutic Interventions: Diet tolerance  Liquid Diet : Thin  Dentition: Some missing teeth  Patient Positioning: Upright in bed  Baseline Vocal Quality: Normal    Orientation:  Person, Place, and Situation    Oral Assessment:  Oral Motor   Labial: No impairment  Dentition: Natural  Oral Hygiene: Moist, Clean  Lingual: No impairment  Velum: No Impairment  Mandible: No impairment        P.O. Trials:  PO Trials  Neuromuscular Estim Used: No  Assessment Method(s): Observation, Palpation  Patient Position: 55 at HOB  Vocal Quality: No Impairment  Consistency Presented: Regular, Thin  How Presented: Self-fed/presented, Straw, Successive Swallows  Bolus Acceptance: No impairment  Bolus Formation/Control: No impairment  Propulsion: No impairment  Oral Residue: None  Initiation of Swallow: No impairment  Laryngeal Elevation: Functional  Aspiration Signs/Symptoms:  (x1/10 thin trials - improved with increased alertness)  Pharyngeal Phase Characteristics: No impairment, issues, or problems  Effective Modifications: Small sips and bites, Alternate liquids/solids  Cues for Modifications: Minimal          DYSPHAGIA DIAGNOSIS: Dysphagia Diagnosis: Swallow function appears WFL    PAIN:  Start of Eval: 0  End of Eval: 0     After treatment:   []            Patient left in no apparent distress sitting up in chair  [x]            Patient left in no apparent distress in bed  [x]            Call bell left within reach  [x]            Nursing notified  []            Family present  []            Caregiver present  []            Bed alarm activated    COMMUNICATION/EDUCATION:   [x]            Aspiration precautions; swallow safety; compensatory techniques provided via demonstration, verbalization and teach back of comprehension  [x]         Patient/family have participated as able in goal setting and plan of care.  []            Patient/family agree to work toward stated goals and plan of care.  []            Patient understands intent and goals of therapy, neutral about

## 2024-08-21 NOTE — PROGRESS NOTES
Patient admitted by my colleague earlier today.  CT called earlier to clarify CT orders.  Recommended that they reach out to ER who has ordered the CT.  Nurse paged to state that patient is unable to lay that leg flat to get CT done, ordered 1 mg of Ativan IV x 1 prior to CT.  Continue to follow.

## 2024-08-21 NOTE — ED NOTES
TRANSFER - OUT REPORT:    Verbal report given to DIEGO Santos on Kaylie Granger  being transferred to [facility] for routine progression of patient care       Report consisted of patient's Situation, Background, Assessment and   Recommendations(SBAR).     Information from the following report(s) Nurse Handoff Report, ED Encounter Summary, ED SBAR, MAR, and Neuro Assessment was reviewed with the receiving nurse.      Lines:   Tunneled Hemodialysis Catheter Right Neck (Active)        Opportunity for questions and clarification was provided.      Patient transported with:  Tech

## 2024-08-21 NOTE — PLAN OF CARE
Problem: Discharge Planning  Goal: Discharge to home or other facility with appropriate resources  Outcome: Progressing     Problem: Pain  Goal: Verbalizes/displays adequate comfort level or baseline comfort level  Outcome: Progressing     Problem: Skin/Tissue Integrity  Goal: Absence of new skin breakdown  Description: 1.  Monitor for areas of redness and/or skin breakdown  2.  Assess vascular access sites hourly  3.  Every 4-6 hours minimum:  Change oxygen saturation probe site  4.  Every 4-6 hours:  If on nasal continuous positive airway pressure, respiratory therapy assess nares and determine need for appliance change or resting period.  Outcome: Progressing     Problem: Safety - Adult  Goal: Free from fall injury  Outcome: Progressing     Problem: ABCDS Injury Assessment  Goal: Absence of physical injury  Outcome: Progressing     Problem: Neurosensory - Adult  Goal: Achieves stable or improved neurological status  Outcome: Progressing  Goal: Absence of seizures  Outcome: Progressing  Goal: Remains free of injury related to seizures activity  Outcome: Progressing  Goal: Achieves maximal functionality and self care  Outcome: Progressing     Problem: Skin/Tissue Integrity - Adult  Goal: Skin integrity remains intact  Outcome: Progressing  Goal: Incisions, wounds, or drain sites healing without S/S of infection  Outcome: Progressing  Goal: Oral mucous membranes remain intact  Outcome: Progressing     Problem: Musculoskeletal - Adult  Goal: Return mobility to safest level of function  Outcome: Progressing  Goal: Maintain proper alignment of affected body part  Outcome: Progressing  Goal: Return ADL status to a safe level of function  Outcome: Progressing     Problem: Genitourinary - Adult  Goal: Absence of urinary retention  Outcome: Progressing  Goal: Urinary catheter remains patent  Outcome: Progressing     Problem: Infection - Adult  Goal: Absence of infection at discharge  Outcome: Progressing  Goal: Absence

## 2024-08-21 NOTE — PROGRESS NOTES
OCCUPATIONAL THERAPY EVALUATION/DISCHARGE    Patient: Kaylie Granger (84 y.o. female)  Date: 8/21/2024  Primary Diagnosis: Hyperphosphatemia [E83.39]  Confusion [R41.0]  Closed fracture of left tibia and fibula, initial encounter [S82.202A, S82.402A]       Precautions: Fall Risk, Contact Precautions, General Precautions,  ,  ,  ,  ,  ,  ,    PLOF: Pt receives assist with ADLs and functional mobility     ASSESSMENT AND RECOMMENDATIONS:  Pt laying semi-reclined in bed, dep x 2 scooting up in bed for optimal bed positioning in prep for p.o. intake. BUE AROM WFL for retrieval of cup from bedside table when given vc's to initiate, pt able to bring cup to mouth with BUEs and drink from straw with Supv/set up.  Pt laying semi-reclined in bed at end of tx session, call bell within reach & pt verbalized understanding to utilize for assist e.g. functional transfers in order to prevent falls.     Maximum therapeutic gains met at current level of care and patient will be discharged from occupational therapy at this time.    Further Equipment Recommendations for Discharge: hospital bed, mechanical lift, and wheelchair 18 inch    [facility]: AM-PAC Inpatient Daily Activity Raw Score: 12    Current research shows that an AM-PAC score of 17 or less is not associated with a discharge to the patient's home setting.    This AMPA score should be considered in conjunction with interdisciplinary team recommendations to determine the most appropriate discharge setting. Patient's social support, diagnosis, medical stability, and prior level of function should also be taken into consideration.     SUBJECTIVE:   Patient stated “It's August.”    OBJECTIVE DATA SUMMARY:     Past Medical History:   Diagnosis Date    Anemia of renal disease     CKD (chronic kidney disease) requiring chronic dialysis (HCC)     T/TH/SAT at [place] [phone]. [phone]    Hypercholesterolemia     Hypertension     Peripheral vascular disease (HCC)

## 2024-08-21 NOTE — CONSULTS
Ortho    Pt seen and evaluated for left LE injury after a fall.  She was transported to  ER for further examination.    Past Medical History:   Diagnosis Date    Anemia of renal disease     CKD (chronic kidney disease) requiring chronic dialysis (HCC)     T/TH/SAT at [place] [phone]. [phone]    Hypercholesterolemia     Hypertension     Peripheral vascular disease (HCC)     Secondary hyperparathyroidism of renal origin (HCC)     Type 2 diabetes mellitus treated with insulin ([facility])     no meds per patient on 12/22/23     Scheduled Meds:   sodium chloride flush  5-40 mL IntraVENous 2 times per day    aspirin  81 mg Oral Daily    atorvastatin  20 mg Oral Daily    hydrALAZINE  100 mg Oral TID    levothyroxine  25 mcg Oral QAM AC    NIFEdipine  60 mg Oral Daily    insulin glargine  5 Units SubCUTAneous Nightly    insulin lispro  0-8 Units SubCUTAneous TID WC    insulin lispro  0-4 Units SubCUTAneous Nightly    heparin (porcine)  5,000 Units SubCUTAneous 3 times per day     Continuous Infusions:   sodium chloride      dextrose       PRN Meds:.morphine, sodium chloride flush, sodium chloride, potassium chloride **OR** potassium alternative oral replacement **OR** potassium chloride, magnesium sulfate, ondansetron **OR** ondansetron, polyethylene glycol, bisacodyl, acetaminophen **OR** acetaminophen, glucose, dextrose bolus **OR** dextrose bolus, glucagon (rDNA), dextrose    No Known Allergies    PE: general, Alert, nad  BP (!) 147/64   Pulse 98   Temp 98.7 °F (37.1 °C) (Oral)   Resp 18   Ht 1.549 m (5' 1\")   Wt 59.1 kg (130 lb 4.7 oz)   SpO2 96%   BMI 24.62 kg/m²   Right le with AKA  Left LE- brace and ace in place (brace from emergency transport, no padding)  Compartments soft  Foot warm    CT scan left ankle/tib/fib  IMPRESSION:  Impacted and comminuted fracture of the distal tibial metaphysis.     Possible fracture of the distal fibula at the level of the proximal aspect of  the pre-existing  ORIF sideplate    A: as above.    P:  ER contacted by Dr. Orellana in regards of applying a well padded U and sugar tong splint to left LE and this should have been done in the ER as ordered. .  Will continue with conservative treatment.  Continue with current medical management.

## 2024-08-21 NOTE — PROGRESS NOTES
Physical Therapy  PHYSICAL THERAPY EVALUATION/DISCHARGE    Patient: Kaylie Granger (84 y.o. female)  Date: 8/21/2024  Primary Diagnosis: Hyperphosphatemia [E83.39]  Confusion [R41.0]  Closed fracture of left tibia and fibula, initial encounter [S82.202A, S82.402A]       Precautions: Fall Risk, Contact Precautions, General Precautions,  ,  ,  ,  ,  ,  ,    PLOF: Pt resides in LTC facility, non ambulatory, lateral transfer to w/c with use of L LE      ASSESSMENT AND RECOMMENDATIONS:  Pt asleep in bed upon entering room, easily awoken but difficulty staying awake, agreeable to PT evaluation.  Pt with difficulty lifting L LE due to heaviness of splint on distal LE, no reports of pain. Pt was able to perform supine to sit transfer with ModA, fair sitting balance but continued difficulty staying awake.  Pt normally performs lateral transfer to w/c with bearing weight through L LE, pt is now NWB and will have to utilize mechanical lift at LTC facility to perform transfer.  Pt returned supine with Ld and able to position herself comfortably, falling asleep quickly.  Pt with all needs met and call button at her side. Patient does not require further skilled physical therapy intervention at this level of care and will be discharged at this time.     Further Equipment Recommendations for Discharge:  none    [facility]: AM-PAC Inpatient Mobility Raw Score : 9      Current research shows that an AM-PAC score of 17 (13 without stairs) or less is not associated with a discharge to the patient's home setting.    This AMPAC score should be considered in conjunction with interdisciplinary team recommendations to determine the most appropriate discharge setting. Patient's social support, diagnosis, medical stability, and prior level of function should also be taken into consideration.     SUBJECTIVE:   Patient stated “I'm so tired.”    OBJECTIVE DATA SUMMARY:     Past Medical History:   Diagnosis Date    Anemia of renal disease      CKD (chronic kidney disease) requiring chronic dialysis (HCC)     T/TH/SAT at [place] [phone]. [phone]    Hypercholesterolemia     Hypertension     Peripheral vascular disease (HCC)     Secondary hyperparathyroidism of renal origin (HCC)     Type 2 diabetes mellitus treated with insulin (HCC)     no meds per patient on 12/22/23     Past Surgical History:   Procedure Laterality Date    ABOVE KNEE AMPUTATION Right 01/30/2020    CATARACT EXTRACTION, BILATERAL Bilateral     COLONOSCOPY      DIALYSIS FISTULA CREATION Right 1/22/2024    RIGHT ARM ARTERIOVENOUS GRAFT CREATION performed by Angel Rowland MD at [facility] CARDIAC SURGERY    PORT SURGERY Right 06/23/2023    TUNNELED CATHETER PLACEMENT; C-ARM performed by Sherri Jean MD at [facility] CARDIAC SURGERY    PORT SURGERY Right 07/06/2023    PERM CATHETER EXCHANGE; C-ARM performed by Angel Rowland MD at [facility] CARDIAC SURGERY       Home Situation:  Social/Functional History  Lives With: Other (comment)  Type of Home: Facility  Home Layout: One level  Home Access: Level entry  Receives Help From: Other (comment) (Staff at [facility])  ADL Assistance: Needs assistance  Ambulation Assistance: Non-ambulatory  Transfer Assistance: Needs assistance  Critical Behavior:  Orientation  Overall Orientation Status: Within Functional Limits  Orientation Level: Oriented X4  Cognition  Overall Cognitive Status: WFL  Arousal/Alertness: Inconsistent responses to stimuli  Following Commands: Follows one step commands with repetition;Follows one step commands with increased time  Attention Span: Attends with cues to redirect  Memory: Impaired  Safety Judgement: Decreased awareness of need for assistance  Problem Solving: Assistance required to generate solutions;Assistance required to implement solutions  Insights: Impaired  Initiation: Requires cues for some  Sequencing: Requires cues for some    Strength:    Strength: Generally decreased,

## 2024-08-21 NOTE — DISCHARGE SUMMARY
Discharge Summary    Patient: Kaylie Granger MRN: [id number]  CSN: [id number]    YOB: 1940  Age: 84 y.o.  Sex: female    DOA: 8/20/2024 LOS:  LOS: 1 day   Discharge Date:  8/21/2024      Admission Diagnosis: Hyperphosphatemia [E83.39]  Confusion [R41.0]  Closed fracture of left tibia and fibula, initial encounter [S82.202A, S82.402A]    Discharge Diagnosis: Left tibia and fibula fracture closed  Intermittent encephalopathy  Suspected dysphagia  ESRD on dialysis  Type 2 diabetes  Hypertension  Chronic microcytic anemia  PAD status post right AKA    Discharge Condition: Stable    Discharge Disposition: LTC    PHYSICAL EXAM    Visit Vitals  BP (!) 152/62   Pulse 85   Temp 99 °F (37.2 °C) (Oral)   Resp 19   Ht 1.549 m (5' 1\")   Wt 59.1 kg (130 lb 4.7 oz)   SpO2 93%   BMI 24.62 kg/m²       General: Alert, cooperative, no acute distress    HEENT: NC, Atraumatic.  PERRLA, EOMI. Anicteric sclerae.  Lungs:  CTA Bilaterally. No Wheezing/Rhonchi/Rales.  Heart:  Regular  rhythm,  No murmur, No Rubs, No Gallops  Abdomen: Soft, Non distended, Non tender.  +Bowel sounds, no HSM  Extremities: No c/c/e, right AKA, left leg splinted appropriately  Psych:   Good insight. Not anxious or agitated.  Neurologic:  CN 2-12 grossly intact, oriented X 4.  No acute neurological deficits,     Hospital Course By Problem:   Patient presented to the emergency room secondary to fall resulting in left leg injury.  Patient with some confusion occasionally which did complicate story but overall seems to have been a primary mechanical fall which related to twisting her ankle with subsequent pain with movement.  Patient was worked up in the emergency room found to be mildly tachycardic.  Patient also found to have tib-fib fracture and Ortho was consulted.  Advised further imaging and conservative treatment.  Patient was placed in a splint, further evaluation via lab for causes of confusion showed no clear acute causes,  pre-existing ORIF sideplate.          Electronically signed by Gamal Guan     MRI Result (most recent):  MRI BRAIN WO CONTRAST 09/15/2023    Narrative  Brain MR without contrast    HISTORY: Altered mental status    COMPARISON: CT 9/13/2023, CT 9/11/2023    TECHNIQUE: Brain scanned with sagittal and axial T1W scans, axial T2W , axial  FLAIR, axial diffusion weighted images and SWAN.    FINDINGS:    Brain: No restricted diffusion abnormalities to suggest acute ischemic infarct.  Moderate burden of patchy and confluent T2/FLAIR hyperintense supratentorial  white matter lesions. Quite extensive hyperintense signal in the central isidoro.  Cerebellum is unremarkable. No mass effect, mass lesion or hemorrhage. Brain  volumes and ventricular spaces are within normal range for age.    Vascular system: Expected arterial flow voids are present at the base of brain.    Calvarium and skull base: Unremarkable.    Paranasal sinuses and mastoid air cells: Free of significant mucosal disease.    Visualized orbits: Unremarkable for nondedicated exam.    Craniocervical junction: Unremarkable for nondedicated exam.    Impression  1.  No acute stroke or other acute intracranial process.  2.  Moderate to severe amount of chronic microvascular ischemic changes most  pronounced in the isidoro.      Discharge Medications:        Medication List        ASK your doctor about these medications      acetaminophen 325 MG tablet  Commonly known as: TYLENOL     aspirin 81 MG chewable tablet     atorvastatin 20 MG tablet  Commonly known as: LIPITOR     folic acid 1 MG tablet  Commonly known as: FOLVITE     hydrALAZINE 100 MG tablet  Commonly known as: APRESOLINE     Insulin Glargine-yfgn 100 UNIT/ML Sopn     insulin lispro 100 UNIT/ML Soln injection vial  Commonly known as: HUMALOG,ADMELOG     levothyroxine 25 MCG tablet  Commonly known as: SYNTHROID  Take 1 tablet by mouth every morning (before breakfast)     NIFEdipine 60 MG extended

## 2024-08-21 NOTE — PLAN OF CARE
Problem: Discharge Planning  Goal: Discharge to home or other facility with appropriate resources  8/21/2024 1215 by Aida Pina RN  Outcome: Progressing  8/21/2024 1115 by Aida Pina RN  Outcome: Progressing  8/21/2024 0119 by Danielle Vanessa RN  Outcome: Progressing     Problem: Pain  Goal: Verbalizes/displays adequate comfort level or baseline comfort level  8/21/2024 1215 by Aida Pina RN  Outcome: Progressing  8/21/2024 1115 by Aida Pina RN  Outcome: Progressing  8/21/2024 0119 by Danielle Vanessa RN  Outcome: Progressing     Problem: Skin/Tissue Integrity  Goal: Absence of new skin breakdown  Description: 1.  Monitor for areas of redness and/or skin breakdown  2.  Assess vascular access sites hourly  3.  Every 4-6 hours minimum:  Change oxygen saturation probe site  4.  Every 4-6 hours:  If on nasal continuous positive airway pressure, respiratory therapy assess nares and determine need for appliance change or resting period.  8/21/2024 1215 by Aida Pina RN  Outcome: Progressing  8/21/2024 1115 by Aida Pina RN  Outcome: Progressing  8/21/2024 0119 by Danielle Vanessa RN  Outcome: Progressing     Problem: Safety - Adult  Goal: Free from fall injury  8/21/2024 1215 by Aida Pina RN  Outcome: Progressing  8/21/2024 1115 by Aida Pina RN  Outcome: Progressing  8/21/2024 0119 by Danielle Vanessa RN  Outcome: Progressing     Problem: ABCDS Injury Assessment  Goal: Absence of physical injury  8/21/2024 1215 by Aida Pina RN  Outcome: Progressing  8/21/2024 1115 by Aida Pina RN  Outcome: Progressing  8/21/2024 0119 by Danielle Vanessa RN  Outcome: Progressing     Problem: Neurosensory - Adult  Goal: Achieves stable or improved neurological status  8/21/2024 1215 by Aida Pina RN  Outcome: Progressing  8/21/2024 1115 by Adia Pina RN  Outcome:

## 2024-08-21 NOTE — PROGRESS NOTES
4 Eyes Skin Assessment     NAME:  Kaylie Granger  YOB: 1940  MEDICAL RECORD NUMBER:  [id number]    The patient is being assessed for  Shift Handoff    I agree that at least one RN has performed a thorough Head to Toe Skin Assessment on the patient. ALL assessment sites listed below have been assessed.      Areas assessed by both nurses:    Head, Face, Ears, Shoulders, Back, Chest, Arms, Elbows, Hands, Sacrum. Buttock, Coccyx, Ischium, Legs. Feet and Heels, and Under Medical Devices         Does the Patient have a Wound? Yes wound(s) were present on assessment. LDA wound assessment was Initiated and completed by RN       Nghia Prevention initiated by RN: Yes  Wound Care Orders initiated by RN: No    Pressure Injury (Stage 3,4, Unstageable, DTI, NWPT, and Complex wounds) if present, place Wound referral order by RN under : No    New Ostomies, if present place, Ostomy referral order under : No     Nurse 1 eSignature: Electronically signed by ERICA SANDOVAL RN on 8/21/24 at 6:33 AM EDT    **SHARE this note so that the co-signing nurse can place an eSignature**    Nurse 2 eSignature: {Esignature:[id number]}

## 2024-08-21 NOTE — PLAN OF CARE
Problem: Discharge Planning  Goal: Discharge to home or other facility with appropriate resources  8/21/2024 1115 by Aida Pina RN  Outcome: Progressing  8/21/2024 0119 by Danielle Vanessa RN  Outcome: Progressing     Problem: Pain  Goal: Verbalizes/displays adequate comfort level or baseline comfort level  8/21/2024 1115 by Aida Pina RN  Outcome: Progressing  8/21/2024 0119 by Danielle Vanessa RN  Outcome: Progressing     Problem: Skin/Tissue Integrity  Goal: Absence of new skin breakdown  Description: 1.  Monitor for areas of redness and/or skin breakdown  2.  Assess vascular access sites hourly  3.  Every 4-6 hours minimum:  Change oxygen saturation probe site  4.  Every 4-6 hours:  If on nasal continuous positive airway pressure, respiratory therapy assess nares and determine need for appliance change or resting period.  8/21/2024 1115 by Aida Pina RN  Outcome: Progressing  8/21/2024 0119 by Dainelle Vanessa RN  Outcome: Progressing     Problem: Safety - Adult  Goal: Free from fall injury  8/21/2024 1115 by Aida Pina RN  Outcome: Progressing  8/21/2024 0119 by Danielle Vanessa RN  Outcome: Progressing     Problem: ABCDS Injury Assessment  Goal: Absence of physical injury  8/21/2024 1115 by Aida Pina RN  Outcome: Progressing  8/21/2024 0119 by Danielle Vanessa RN  Outcome: Progressing     Problem: Neurosensory - Adult  Goal: Achieves stable or improved neurological status  8/21/2024 1115 by Aida Pina RN  Outcome: Progressing  8/21/2024 0119 by Danielle Vanessa RN  Outcome: Progressing  Goal: Absence of seizures  8/21/2024 1115 by Aida Pina RN  Outcome: Progressing  8/21/2024 0119 by Danielle Vanessa RN  Outcome: Progressing  Goal: Remains free of injury related to seizures activity  8/21/2024 1115 by Aida Pina RN  Outcome: Progressing  8/21/2024 0119 by Danielle Vanessa RN  Outcome: Progressing  Goal:  hospitalization  8/21/2024 1115 by Aida Pina RN  Outcome: Progressing  8/21/2024 0119 by Danielle Vanessa RN  Outcome: Progressing  Goal: Absence of fever/infection during anticipated neutropenic period  8/21/2024 1115 by Aida Pina RN  Outcome: Progressing  8/21/2024 0119 by Danielle Vanessa RN  Outcome: Progressing     Problem: Metabolic/Fluid and Electrolytes - Adult  Goal: Electrolytes maintained within normal limits  8/21/2024 1115 by Aida Pina RN  Outcome: Progressing  8/21/2024 0119 by Danielle Vanessa RN  Outcome: Progressing  Goal: Hemodynamic stability and optimal renal function maintained  8/21/2024 1115 by Aida Pina RN  Outcome: Progressing  8/21/2024 0119 by Danielle Vanessa RN  Outcome: Progressing  Goal: Glucose maintained within prescribed range  8/21/2024 1115 by Aida Pina RN  Outcome: Progressing  8/21/2024 0119 by Danielle Vanessa RN  Outcome: Progressing

## 2024-08-21 NOTE — PROGRESS NOTES
Attempted to call report to [facility] at 7:38 pm. Received call back and was given another phone number for Gloria nurse, phone number [phone]. Unable to leave voicemail.   Attempted to call back at 1958, Gloria answered and stated she was not the nurse to give report to and to call the facility back. Informed Gloria, patient was on her way back.  Attempted to call facility at 2003, no answer.

## 2024-08-21 NOTE — PROGRESS NOTES
Pharmacist Review and Automatic Dose Adjustment of Prophylactic Enoxaparin    *Review reason for admission/hospital problem list*    The reviewing pharmacist has made an adjustment to the ordered enoxaparin dose or converted to UFH per the approved [facility] protocol and table as identified below.        Kaylie Granger is a 84 y.o. female.     Recent Labs     08/20/24  1820   CREATININE 8.47*       Estimated Creatinine Clearance: 4 mL/min (A) (based on SCr of 8.47 mg/dL (H)).    Height:   Ht Readings from Last 1 Encounters:   08/20/24 1.549 m (5' 1\")     Weight:  Wt Readings from Last 1 Encounters:   08/20/24 59.1 kg (130 lb 4.7 oz)           Plan: Based upon the patient's weight and renal function, the ordered enoxaparin dose of Enoxaparin 40 mg daily has been changed/converted to UFH 5,000 units SQ TID.       Thank you,  SURINDER KERR [facility]

## 2024-08-21 NOTE — PLAN OF CARE
HEMODIALYSIS Plan:  Time: 3 hrs  Dialysate: 2 K+  2.5Ca++  Bath  Net UF: 3 L As tolerated  Access: Aseptic care for NASRIN AVG  Hemodynamic stability: Maintain BP WNL    Problem: Chronic Conditions and Co-morbidities  Goal: Patient's chronic conditions and co-morbidity symptoms are monitored and maintained or improved  Outcome: Progressing

## 2024-08-21 NOTE — PLAN OF CARE
Aida, DIEGO  Outcome: Progressing  8/21/2024 1115 by Aida Pina, RN  Outcome: Progressing  Goal: Absence of seizures  8/21/2024 1615 by Aida Pina, RN  Outcome: Completed  8/21/2024 1215 by Aida Pina, RN  Outcome: Progressing  8/21/2024 1115 by Aida Pina, RN  Outcome: Progressing  Goal: Remains free of injury related to seizures activity  8/21/2024 1615 by Aida Pina, RN  Outcome: Completed  8/21/2024 1215 by Aida Pina, RN  Outcome: Progressing  8/21/2024 1115 by Aida Pina, RN  Outcome: Progressing  Goal: Achieves maximal functionality and self care  8/21/2024 1615 by Aida Pina, RN  Outcome: Completed  8/21/2024 1215 by Aida Pina, RN  Outcome: Progressing  8/21/2024 1115 by Aida Pina, RN  Outcome: Progressing     Problem: Skin/Tissue Integrity - Adult  Goal: Skin integrity remains intact  8/21/2024 1615 by Aida Pina, RN  Outcome: Completed  8/21/2024 1215 by Aida Pina, RN  Outcome: Progressing  8/21/2024 1115 by Aida Pina, RN  Outcome: Progressing  Goal: Incisions, wounds, or drain sites healing without S/S of infection  8/21/2024 1615 by Aida Pina, RN  Outcome: Completed  8/21/2024 1215 by Aida Pina, RN  Outcome: Progressing  8/21/2024 1115 by Aida Pina, RN  Outcome: Progressing  Goal: Oral mucous membranes remain intact  8/21/2024 1615 by Aida Pina, RN  Outcome: Completed  8/21/2024 1215 by Aida Pina, RN  Outcome: Progressing  8/21/2024 1115 by Aida Pina, RN  Outcome: Progressing     Problem: Musculoskeletal - Adult  Goal: Return mobility to safest level of function  8/21/2024 1615 by Aida Pina, RN  Outcome: Completed  8/21/2024 1215 by Aida Pina, RN  Outcome: Progressing  8/21/2024 1115 by Aida Pina,  RN  Outcome: Progressing  Goal: Maintain proper alignment of affected body part  8/21/2024 1615 by Aida Pina, RN  Outcome: Completed  8/21/2024 1215 by Aida Pina, RN  Outcome: Progressing  8/21/2024 1115 by Aida Pina, RN  Outcome: Progressing  Goal: Return ADL status to a safe level of function  8/21/2024 1615 by Aida Pina, RN  Outcome: Completed  8/21/2024 1215 by Aida Pina, RN  Outcome: Progressing  8/21/2024 1115 by Aida Pina, RN  Outcome: Progressing     Problem: Genitourinary - Adult  Goal: Absence of urinary retention  8/21/2024 1615 by Aida Pina, RN  Outcome: Completed  8/21/2024 1215 by Aida Pina, RN  Outcome: Progressing  8/21/2024 1115 by Aida Pina, RN  Outcome: Progressing  Goal: Urinary catheter remains patent  8/21/2024 1615 by Aida Pina, RN  Outcome: Completed  8/21/2024 1215 by Aida Pina, RN  Outcome: Progressing  8/21/2024 1115 by Aida Pina, RN  Outcome: Progressing     Problem: Infection - Adult  Goal: Absence of infection at discharge  8/21/2024 1615 by Aida Pina, RN  Outcome: Completed  8/21/2024 1215 by Aida Pina, RN  Outcome: Progressing  8/21/2024 1115 by Aida Pina, RN  Outcome: Progressing  Goal: Absence of infection during hospitalization  8/21/2024 1615 by Aida Pina, RN  Outcome: Completed  8/21/2024 1215 by Aida Pina, RN  Outcome: Progressing  8/21/2024 1115 by Aida Pina, RN  Outcome: Progressing  Goal: Absence of fever/infection during anticipated neutropenic period  8/21/2024 1615 by Aida Pina, RN  Outcome: Completed  8/21/2024 1215 by Aida Pina, RN  Outcome: Progressing  8/21/2024 1115 by Aida Pina, RN  Outcome: Progressing     Problem: Metabolic/Fluid and Electrolytes - Adult  Goal: Electrolytes

## 2024-08-22 LAB
HBV SURFACE AB SER QL IA: NEGATIVE
HBV SURFACE AB SERPL IA-ACNC: 3.25 MIU/ML
HEP BS AB COMMENT: ABNORMAL

## 2024-08-22 NOTE — PROGRESS NOTES
Physician Progress Note      PATIENT:               SUNIL OBRIEN  CSN #:                  [id number]  :                       1940  ADMIT DATE:       2024 4:35 PM  DISCH DATE:        2024 8:04 PM  RESPONDING  PROVIDER #:        Live Romero MD        QUERY TEXT:    Type of Encephalopathy: Please provide further specificity, if known.    Clinical indicators include: encephalopathy, srd, dialysis, altered mental   status, esrd, fever, ckd, chronic kidney disease, alcohol use, intracranial   hemorrhage, infection, hyperglycemia  Options provided:  -- Anoxic/hypoxic encephalopathy  -- Metabolic encephalopathy  -- Toxic encephalopathy  -- Hepatic encephalopathy  -- Hypertensive encephalopathy  -- Other - I will add my own diagnosis  -- Disagree - Not applicable / Not valid  -- Disagree - Clinically Unable to determine / Unknown        PROVIDER RESPONSE TEXT:    The patient has metabolic encephalopathy.      Electronically signed by:  Live Romero MD 2024 12:03 PM

## 2024-08-25 NOTE — PROGRESS NOTES
Physician Progress Note      PATIENT:               SUNIL OBRIEN  [facility] #:                  [id number]  :                       1940  ADMIT DATE:       2024 4:35 PM  DISCH DATE:        2024 8:04 PM  RESPONDING  PROVIDER #:        Live Romero MD          QUERY TEXT:    Pt admitted with Left tibia and fibula fracture. Pt noted to have Osteopenia   in Ankel Xray, . If possible, please document in progress notes and   discharge summary if you are evaluating and/or treating any of the following:    The medical record reflects the following:  Risk Factors:  Osteopenia, Advance age female,    Clinical Indicators:  H&P  \"presented to the ED from [facility] rehab due to fall and resultant left ankle pain.She states the fall   happened yesterday and she felt like she twisted her ankle; Left tib/fib   fracture, NPO, PT/OT pending ortho recs on WBS\".    Orthopedic Surgery consult  \" ER contacted by Dr. Orellana in regards of   applying a well padded U and sugar tong splint to left LE and this should have   been done in the ER as ordered. .  Will continue with conservative   treatment\".    Ankel Xray  \"Bones are osteopenic\"    Treatment:  Xray, Orthopedic Surgery consult    Thank you  Blas Gaxiola  Options provided:  -- Pathological Left tibia and fibula fracture due to osteopenia following   fall which would not usually break a normal, healthy bone  -- Traumatic Left tibia and fibula fracture.  -- Other - I will add my own diagnosis  -- Disagree - Not applicable / Not valid  -- Disagree - Clinically unable to determine / Unknown  -- Refer to Clinical Documentation Reviewer    PROVIDER RESPONSE TEXT:    This patient has a pathological Left tibia and fibula fracture. due to   osteopenia following fall which would not usually break a normal, healthy   bone.    Query created by: Blas Erikcson on 2024 4:32 AM      Electronically signed by:  Live Romero MD  8/24/2024 5:22 PM

## 2024-08-27 ENCOUNTER — APPOINTMENT (OUTPATIENT)
Facility: HOSPITAL | Age: 84
DRG: 640 | End: 2024-08-27
Payer: MEDICARE

## 2024-08-27 ENCOUNTER — APPOINTMENT (OUTPATIENT)
Facility: HOSPITAL | Age: 84
DRG: 640 | End: 2024-08-27
Attending: STUDENT IN AN ORGANIZED HEALTH CARE EDUCATION/TRAINING PROGRAM
Payer: MEDICARE

## 2024-08-27 ENCOUNTER — HOSPITAL ENCOUNTER (INPATIENT)
Facility: HOSPITAL | Age: 84
LOS: 5 days | Discharge: NURSING, REHAB OR LONG-TERM CARE | DRG: 640 | End: 2024-09-01
Attending: STUDENT IN AN ORGANIZED HEALTH CARE EDUCATION/TRAINING PROGRAM | Admitting: HOSPITALIST
Payer: MEDICARE

## 2024-08-27 DIAGNOSIS — N30.00 ACUTE CYSTITIS WITHOUT HEMATURIA: ICD-10-CM

## 2024-08-27 DIAGNOSIS — I70.90 ARTERIAL OCCLUSION: ICD-10-CM

## 2024-08-27 DIAGNOSIS — S82.302A FRACTURE OF DISTAL END OF TIBIA WITH FIBULA, LEFT, CLOSED, INITIAL ENCOUNTER: ICD-10-CM

## 2024-08-27 DIAGNOSIS — I99.8 ISCHEMIA OF FOOT: Primary | ICD-10-CM

## 2024-08-27 DIAGNOSIS — E87.5 HYPERKALEMIA: ICD-10-CM

## 2024-08-27 DIAGNOSIS — S82.832A FRACTURE OF DISTAL END OF TIBIA WITH FIBULA, LEFT, CLOSED, INITIAL ENCOUNTER: ICD-10-CM

## 2024-08-27 DIAGNOSIS — R40.4 TRANSIENT ALTERATION OF AWARENESS: ICD-10-CM

## 2024-08-27 PROBLEM — I70.202: Status: ACTIVE | Noted: 2024-08-27

## 2024-08-27 LAB
ALBUMIN SERPL-MCNC: 3.1 G/DL (ref 3.4–5)
ALBUMIN/GLOB SERPL: 0.7 (ref 0.8–1.7)
ALP SERPL-CCNC: 101 U/L (ref 45–117)
ALT SERPL-CCNC: 36 U/L (ref 13–56)
AMPHET UR QL SCN: NEGATIVE
ANION GAP SERPL CALC-SCNC: 19 MMOL/L (ref 3–18)
APPEARANCE UR: ABNORMAL
AST SERPL-CCNC: 64 U/L (ref 10–38)
BACTERIA URNS QL MICRO: ABNORMAL /HPF
BARBITURATES UR QL SCN: NEGATIVE
BENZODIAZ UR QL: NEGATIVE
BILIRUB SERPL-MCNC: 0.4 MG/DL (ref 0.2–1)
BILIRUB UR QL: NEGATIVE
BUN SERPL-MCNC: 135 MG/DL (ref 7–18)
BUN/CREAT SERPL: 12 (ref 12–20)
CALCIUM SERPL-MCNC: 9.2 MG/DL (ref 8.5–10.1)
CANNABINOIDS UR QL SCN: NEGATIVE
CHLORIDE SERPL-SCNC: 102 MMOL/L (ref 100–111)
CO2 SERPL-SCNC: 16 MMOL/L (ref 21–32)
COCAINE UR QL SCN: NEGATIVE
COLOR UR: YELLOW
CREAT SERPL-MCNC: 11.6 MG/DL (ref 0.6–1.3)
ECHO BSA: 1.59 M2
ECHO BSA: 1.59 M2
EKG ATRIAL RATE: 101 BPM
EKG DIAGNOSIS: NORMAL
EKG P AXIS: 59 DEGREES
EKG P-R INTERVAL: 112 MS
EKG Q-T INTERVAL: 344 MS
EKG QRS DURATION: 80 MS
EKG QTC CALCULATION (BAZETT): 446 MS
EKG R AXIS: -22 DEGREES
EKG T AXIS: 63 DEGREES
EKG VENTRICULAR RATE: 101 BPM
EPITH CASTS URNS QL MICRO: ABNORMAL /LPF (ref 0–5)
ERYTHROCYTE [DISTWIDTH] IN BLOOD BY AUTOMATED COUNT: 14.5 % (ref 11.6–14.5)
ETHANOL SERPL-MCNC: <3 MG/DL (ref 0–3)
GLOBULIN SER CALC-MCNC: 4.4 G/DL (ref 2–4)
GLUCOSE BLD STRIP.AUTO-MCNC: 152 MG/DL (ref 70–110)
GLUCOSE BLD STRIP.AUTO-MCNC: 192 MG/DL (ref 70–110)
GLUCOSE SERPL-MCNC: 138 MG/DL (ref 74–99)
GLUCOSE UR STRIP.AUTO-MCNC: NEGATIVE MG/DL
HCT VFR BLD AUTO: 26 % (ref 35–45)
HGB BLD-MCNC: 8.4 G/DL (ref 12–16)
HGB UR QL STRIP: ABNORMAL
KETONES UR QL STRIP.AUTO: ABNORMAL MG/DL
LEUKOCYTE ESTERASE UR QL STRIP.AUTO: ABNORMAL
Lab: NORMAL
MCH RBC QN AUTO: 34.3 PG (ref 24–34)
MCHC RBC AUTO-ENTMCNC: 32.3 G/DL (ref 31–37)
MCV RBC AUTO: 106.1 FL (ref 78–100)
METHADONE UR QL: NEGATIVE
MUCOUS THREADS URNS QL MICRO: ABNORMAL /LPF
NITRITE UR QL STRIP.AUTO: NEGATIVE
NRBC # BLD: 0 K/UL (ref 0–0.01)
NRBC BLD-RTO: 0 PER 100 WBC
OPIATES UR QL: NEGATIVE
PCP UR QL: NEGATIVE
PH UR STRIP: 6 (ref 5–8)
PLATELET # BLD AUTO: 196 K/UL (ref 135–420)
PMV BLD AUTO: 10.5 FL (ref 9.2–11.8)
POTASSIUM SERPL-SCNC: 4.1 MMOL/L (ref 3.5–5.5)
POTASSIUM SERPL-SCNC: 6.4 MMOL/L (ref 3.5–5.5)
PROT SERPL-MCNC: 7.5 G/DL (ref 6.4–8.2)
PROT UR STRIP-MCNC: 100 MG/DL
RBC # BLD AUTO: 2.45 M/UL (ref 4.2–5.3)
RBC #/AREA URNS HPF: ABNORMAL /HPF (ref 0–5)
SODIUM SERPL-SCNC: 137 MMOL/L (ref 136–145)
SP GR UR REFRACTOMETRY: 1.01 (ref 1–1.03)
UROBILINOGEN UR QL STRIP.AUTO: 0.2 EU/DL (ref 0.2–1)
VAS AORTA DIST PSV: 111.2 CM/S
VAS AORTA MID PSV: 94.8 CM/S
VAS AORTA PROX PSV: 94.5 CM/S
VAS LEFT ATA PROX PSV: 29.1 CM/S
VAS LEFT CFA DIST PSV: 527.5 CM/S
VAS LEFT CFA MID PSV: 514.1 CM/S
VAS LEFT CFA PROX PSV: 395 CM/S
VAS LEFT CFA VEL RATIO: 2.24
VAS LEFT EXT ILIAC DIST PSV RATIO: 1.02
VAS LEFT EXT ILIAC DIST PSV: 235 CM/S
VAS LEFT EXT ILIAC MID PSV: 231 CM/S
VAS LEFT PFA PROX PSV: 167.8 CM/S
VAS LEFT POP A DIST PSV: 0 CM/S
VAS LEFT PTA DIST PSV: 0 CM/S
VAS LEFT SFA DIST PSV: 17.1 CM/S
VAS LEFT SFA MID PSV: 0 CM/S
VAS LEFT SFA PROX PSV: 0 CM/S
VAS LEFT SFA PROX VEL RATIO: 0
WBC # BLD AUTO: 11.5 K/UL (ref 4.6–13.2)
WBC URNS QL MICRO: ABNORMAL /HPF (ref 0–4)

## 2024-08-27 PROCEDURE — 6360000002 HC RX W HCPCS: Performed by: STUDENT IN AN ORGANIZED HEALTH CARE EDUCATION/TRAINING PROGRAM

## 2024-08-27 PROCEDURE — 80307 DRUG TEST PRSMV CHEM ANLYZR: CPT

## 2024-08-27 PROCEDURE — 70450 CT HEAD/BRAIN W/O DYE: CPT

## 2024-08-27 PROCEDURE — 96375 TX/PRO/DX INJ NEW DRUG ADDON: CPT

## 2024-08-27 PROCEDURE — 93010 ELECTROCARDIOGRAM REPORT: CPT | Performed by: INTERNAL MEDICINE

## 2024-08-27 PROCEDURE — 94640 AIRWAY INHALATION TREATMENT: CPT

## 2024-08-27 PROCEDURE — 87086 URINE CULTURE/COLONY COUNT: CPT

## 2024-08-27 PROCEDURE — 93922 UPR/L XTREMITY ART 2 LEVELS: CPT

## 2024-08-27 PROCEDURE — 96374 THER/PROPH/DIAG INJ IV PUSH: CPT

## 2024-08-27 PROCEDURE — 85027 COMPLETE CBC AUTOMATED: CPT

## 2024-08-27 PROCEDURE — 87147 CULTURE TYPE IMMUNOLOGIC: CPT

## 2024-08-27 PROCEDURE — 82962 GLUCOSE BLOOD TEST: CPT

## 2024-08-27 PROCEDURE — 6360000004 HC RX CONTRAST MEDICATION: Performed by: STUDENT IN AN ORGANIZED HEALTH CARE EDUCATION/TRAINING PROGRAM

## 2024-08-27 PROCEDURE — 2580000003 HC RX 258: Performed by: HOSPITALIST

## 2024-08-27 PROCEDURE — 6360000002 HC RX W HCPCS: Performed by: HOSPITALIST

## 2024-08-27 PROCEDURE — 94761 N-INVAS EAR/PLS OXIMETRY MLT: CPT

## 2024-08-27 PROCEDURE — 5A1D70Z PERFORMANCE OF URINARY FILTRATION, INTERMITTENT, LESS THAN 6 HOURS PER DAY: ICD-10-PCS | Performed by: INTERNAL MEDICINE

## 2024-08-27 PROCEDURE — 93005 ELECTROCARDIOGRAM TRACING: CPT | Performed by: STUDENT IN AN ORGANIZED HEALTH CARE EDUCATION/TRAINING PROGRAM

## 2024-08-27 PROCEDURE — 99223 1ST HOSP IP/OBS HIGH 75: CPT | Performed by: HOSPITALIST

## 2024-08-27 PROCEDURE — 73706 CT ANGIO LWR EXTR W/O&W/DYE: CPT

## 2024-08-27 PROCEDURE — 93926 LOWER EXTREMITY STUDY: CPT | Performed by: INTERNAL MEDICINE

## 2024-08-27 PROCEDURE — 6370000000 HC RX 637 (ALT 250 FOR IP): Performed by: HOSPITALIST

## 2024-08-27 PROCEDURE — 93922 UPR/L XTREMITY ART 2 LEVELS: CPT | Performed by: INTERNAL MEDICINE

## 2024-08-27 PROCEDURE — 82077 ASSAY SPEC XCP UR&BREATH IA: CPT

## 2024-08-27 PROCEDURE — 93926 LOWER EXTREMITY STUDY: CPT

## 2024-08-27 PROCEDURE — 2580000003 HC RX 258: Performed by: STUDENT IN AN ORGANIZED HEALTH CARE EDUCATION/TRAINING PROGRAM

## 2024-08-27 PROCEDURE — 80053 COMPREHEN METABOLIC PANEL: CPT

## 2024-08-27 PROCEDURE — 6370000000 HC RX 637 (ALT 250 FOR IP): Performed by: STUDENT IN AN ORGANIZED HEALTH CARE EDUCATION/TRAINING PROGRAM

## 2024-08-27 PROCEDURE — 36415 COLL VENOUS BLD VENIPUNCTURE: CPT

## 2024-08-27 PROCEDURE — 1100000003 HC PRIVATE W/ TELEMETRY

## 2024-08-27 PROCEDURE — 99285 EMERGENCY DEPT VISIT HI MDM: CPT

## 2024-08-27 PROCEDURE — 84132 ASSAY OF SERUM POTASSIUM: CPT

## 2024-08-27 PROCEDURE — 90935 HEMODIALYSIS ONE EVALUATION: CPT

## 2024-08-27 PROCEDURE — 81001 URINALYSIS AUTO W/SCOPE: CPT

## 2024-08-27 RX ORDER — SODIUM CHLORIDE 9 MG/ML
INJECTION, SOLUTION INTRAVENOUS PRN
Status: DISCONTINUED | OUTPATIENT
Start: 2024-08-27 | End: 2024-09-01 | Stop reason: HOSPADM

## 2024-08-27 RX ORDER — ACETAMINOPHEN 325 MG/1
650 TABLET ORAL EVERY 6 HOURS PRN
Status: DISCONTINUED | OUTPATIENT
Start: 2024-08-27 | End: 2024-08-29

## 2024-08-27 RX ORDER — FOLIC ACID/VIT B COMPLEX AND C 0.8 MG
1 TABLET ORAL DAILY
Status: DISCONTINUED | OUTPATIENT
Start: 2024-08-27 | End: 2024-08-27 | Stop reason: CLARIF

## 2024-08-27 RX ORDER — SODIUM CHLORIDE 0.9 % (FLUSH) 0.9 %
5-40 SYRINGE (ML) INJECTION PRN
Status: DISCONTINUED | OUTPATIENT
Start: 2024-08-27 | End: 2024-09-01 | Stop reason: HOSPADM

## 2024-08-27 RX ORDER — NEPHROCAP 1 MG
1 CAP ORAL DAILY
Status: DISCONTINUED | OUTPATIENT
Start: 2024-08-28 | End: 2024-09-01 | Stop reason: HOSPADM

## 2024-08-27 RX ORDER — ASPIRIN 81 MG/1
81 TABLET, CHEWABLE ORAL DAILY
Status: DISCONTINUED | OUTPATIENT
Start: 2024-08-27 | End: 2024-09-01 | Stop reason: HOSPADM

## 2024-08-27 RX ORDER — CALCIUM GLUCONATE 20 MG/ML
1000 INJECTION, SOLUTION INTRAVENOUS ONCE
Status: COMPLETED | OUTPATIENT
Start: 2024-08-27 | End: 2024-08-27

## 2024-08-27 RX ORDER — FOLIC ACID 1 MG/1
1 TABLET ORAL DAILY
Status: DISCONTINUED | OUTPATIENT
Start: 2024-08-28 | End: 2024-09-01 | Stop reason: HOSPADM

## 2024-08-27 RX ORDER — DEXTROSE MONOHYDRATE 100 MG/ML
INJECTION, SOLUTION INTRAVENOUS CONTINUOUS PRN
Status: DISCONTINUED | OUTPATIENT
Start: 2024-08-27 | End: 2024-09-01 | Stop reason: HOSPADM

## 2024-08-27 RX ORDER — ONDANSETRON 2 MG/ML
4 INJECTION INTRAMUSCULAR; INTRAVENOUS EVERY 6 HOURS PRN
Status: DISCONTINUED | OUTPATIENT
Start: 2024-08-27 | End: 2024-09-01 | Stop reason: HOSPADM

## 2024-08-27 RX ORDER — OLANZAPINE 2.5 MG/1
5 TABLET, FILM COATED ORAL 2 TIMES DAILY
Status: DISCONTINUED | OUTPATIENT
Start: 2024-08-27 | End: 2024-09-01 | Stop reason: HOSPADM

## 2024-08-27 RX ORDER — ALBUTEROL SULFATE 0.83 MG/ML
10 SOLUTION RESPIRATORY (INHALATION) ONCE
Status: COMPLETED | OUTPATIENT
Start: 2024-08-27 | End: 2024-08-27

## 2024-08-27 RX ORDER — ACETAMINOPHEN 650 MG/1
650 SUPPOSITORY RECTAL EVERY 6 HOURS PRN
Status: DISCONTINUED | OUTPATIENT
Start: 2024-08-27 | End: 2024-08-29

## 2024-08-27 RX ORDER — SODIUM POLYSTYRENE SULFONATE 15 G/60ML
30 SUSPENSION ORAL; RECTAL
Status: COMPLETED | OUTPATIENT
Start: 2024-08-27 | End: 2024-08-27

## 2024-08-27 RX ORDER — IOPAMIDOL 755 MG/ML
100 INJECTION, SOLUTION INTRAVASCULAR
Status: COMPLETED | OUTPATIENT
Start: 2024-08-27 | End: 2024-08-27

## 2024-08-27 RX ORDER — ATORVASTATIN CALCIUM 20 MG/1
20 TABLET, FILM COATED ORAL DAILY
Status: DISCONTINUED | OUTPATIENT
Start: 2024-08-27 | End: 2024-09-01 | Stop reason: HOSPADM

## 2024-08-27 RX ORDER — HEPARIN SODIUM 5000 [USP'U]/ML
5000 INJECTION, SOLUTION INTRAVENOUS; SUBCUTANEOUS EVERY 8 HOURS SCHEDULED
Status: DISCONTINUED | OUTPATIENT
Start: 2024-08-27 | End: 2024-09-01 | Stop reason: HOSPADM

## 2024-08-27 RX ORDER — ONDANSETRON 4 MG/1
4 TABLET, ORALLY DISINTEGRATING ORAL EVERY 8 HOURS PRN
Status: DISCONTINUED | OUTPATIENT
Start: 2024-08-27 | End: 2024-09-01 | Stop reason: HOSPADM

## 2024-08-27 RX ORDER — LEVOTHYROXINE SODIUM 25 UG/1
25 TABLET ORAL
Status: DISCONTINUED | OUTPATIENT
Start: 2024-08-28 | End: 2024-09-01 | Stop reason: HOSPADM

## 2024-08-27 RX ORDER — SODIUM CHLORIDE 0.9 % (FLUSH) 0.9 %
5-40 SYRINGE (ML) INJECTION EVERY 12 HOURS SCHEDULED
Status: DISCONTINUED | OUTPATIENT
Start: 2024-08-27 | End: 2024-09-01 | Stop reason: HOSPADM

## 2024-08-27 RX ORDER — POLYETHYLENE GLYCOL 3350 17 G/17G
17 POWDER, FOR SOLUTION ORAL DAILY PRN
Status: DISCONTINUED | OUTPATIENT
Start: 2024-08-27 | End: 2024-09-01 | Stop reason: HOSPADM

## 2024-08-27 RX ADMIN — DEXTROSE MONOHYDRATE 250 ML: 100 INJECTION, SOLUTION INTRAVENOUS at 12:58

## 2024-08-27 RX ADMIN — IOPAMIDOL 100 ML: 755 INJECTION, SOLUTION INTRAVENOUS at 12:38

## 2024-08-27 RX ADMIN — SODIUM POLYSTYRENE SULFONATE 30 G: 15 SUSPENSION ORAL; RECTAL at 14:52

## 2024-08-27 RX ADMIN — OLANZAPINE 5 MG: 2.5 TABLET, FILM COATED ORAL at 21:13

## 2024-08-27 RX ADMIN — ALBUTEROL SULFATE 10 MG: 2.5 SOLUTION RESPIRATORY (INHALATION) at 12:39

## 2024-08-27 RX ADMIN — ASPIRIN 81 MG CHEWABLE TABLET 81 MG: 81 TABLET CHEWABLE at 21:34

## 2024-08-27 RX ADMIN — INSULIN HUMAN 5 UNITS: 100 INJECTION, SOLUTION PARENTERAL at 12:45

## 2024-08-27 RX ADMIN — ATORVASTATIN CALCIUM 20 MG: 20 TABLET, FILM COATED ORAL at 21:13

## 2024-08-27 RX ADMIN — CALCIUM GLUCONATE 1000 MG: 20 INJECTION, SOLUTION INTRAVENOUS at 12:55

## 2024-08-27 RX ADMIN — SODIUM CHLORIDE, PRESERVATIVE FREE 10 ML: 5 INJECTION INTRAVENOUS at 21:13

## 2024-08-27 RX ADMIN — WATER 1000 MG: 1 INJECTION INTRAMUSCULAR; INTRAVENOUS; SUBCUTANEOUS at 12:48

## 2024-08-27 RX ADMIN — HEPARIN SODIUM 5000 UNITS: 5000 INJECTION INTRAVENOUS; SUBCUTANEOUS at 21:13

## 2024-08-27 ASSESSMENT — PAIN SCALES - GENERAL
PAINLEVEL_OUTOF10: 0

## 2024-08-27 ASSESSMENT — LIFESTYLE VARIABLES
HOW OFTEN DO YOU HAVE A DRINK CONTAINING ALCOHOL: NEVER
HOW MANY STANDARD DRINKS CONTAINING ALCOHOL DO YOU HAVE ON A TYPICAL DAY: PATIENT DOES NOT DRINK

## 2024-08-27 NOTE — PROGRESS NOTES
Pt has a small open pressure injury to the R buttocks. L foot is cold to touch with +3 pitting edema. DNR band present.    2143  Patient extremely hard of hearing. Passed swallow eval and took meds whole. Declined wanting to receive any blood. Unable to answer med rec.

## 2024-08-27 NOTE — ED PROVIDER NOTES
EMERGENCY DEPARTMENT HISTORY AND PHYSICAL EXAM    1:57 PM      Date: 8/27/2024  Patient Name: Kaylie Granger    History of Presenting Illness     Chief Complaint   Patient presents with    Altered Mental Status       History From:EMS     Kaylie Granger is a 84 y.o. female   HPI  84-year-old female with history of ESRD on Tuesday Thursday Saturday, diabetes, hypertension, CKD, PVD, DNR, left tib-fib fracture status post splint who presents with altered mental status.  As per EMS patient had missed dialysis for a week.  Patient was in dialysis.  States that she became unresponsive.  EMS gave her 1 dose of Narcan.  Patient became more responsive.  Denies any ingestion of new medications, changes in meds, sick contacts, or any other changes.  When assessing ROS she has no nausea, vomiting, chest pain, abdominal pain, changes in bowel movement, or any other changes.      Nursing Notes were all reviewed and agreed with or any disagreements were addressed in the HPI.    PCP: Unknown, Provider    Current Facility-Administered Medications   Medication Dose Route Frequency Provider Last Rate Last Admin    epoetin joanie-epbx (RETACRIT) injection 3,000 Units  3,000 Units SubCUTAneous Once per day on Tuesday Thursday Saturday Andrea Riley MD   3,000 Units at 08/29/24 1704    oxyCODONE-acetaminophen (PERCOCET) 7.5-325 MG per tablet 1 tablet  1 tablet Oral Q4H PRN Caitlyn Rodrigues MD        HYDROmorphone (DILAUDID) injection 0.25 mg  0.25 mg IntraVENous Q6H PRN Caitlyn Rodrigues MD        insulin lispro (HUMALOG,ADMELOG) injection vial 0-8 Units  0-8 Units SubCUTAneous TID WC Chad Abdi DO   2 Units at 08/31/24 1154    insulin lispro (HUMALOG,ADMELOG) injection vial 0-4 Units  0-4 Units SubCUTAneous Nightly SeneviratneRayo N, DO        cefTRIAXone (ROCEPHIN) 1,000 mg in sterile water 10 mL IV syringe  1,000 mg IntraVENous Q24H Caitlyn Rodrigues MD   1,000 mg at 08/30/24 1408    glucose  chewable tablet 81 mg (81 mg Oral Given 8/31/24 0839)   atorvastatin (LIPITOR) tablet 20 mg (20 mg Oral Given 8/30/24 2041)   folic acid (FOLVITE) tablet 1 mg (1 mg Oral Given 8/31/24 0839)   levothyroxine (SYNTHROID) tablet 25 mcg (25 mcg Oral Given 8/31/24 0640)   OLANZapine (ZYPREXA) tablet 5 mg (5 mg Oral Given 8/31/24 0838)   sodium chloride flush 0.9 % injection 5-40 mL ( IntraVENous Not Given 8/31/24 0841)   sodium chloride flush 0.9 % injection 5-40 mL (10 mLs IntraVENous Given 8/30/24 1412)   0.9 % sodium chloride infusion (has no administration in time range)   heparin (porcine) injection 5,000 Units (5,000 Units SubCUTAneous Given 8/31/24 0640)   ondansetron (ZOFRAN-ODT) disintegrating tablet 4 mg ( Oral See Alternative 8/29/24 1329)     Or   ondansetron (ZOFRAN) injection 4 mg (4 mg IntraVENous Given 8/29/24 1329)   polyethylene glycol (GLYCOLAX) packet 17 g (has no administration in time range)   Virt-Caps 1 mg (1 mg Oral Given 8/31/24 0839)   insulin lispro (HUMALOG,ADMELOG) injection vial 0-8 Units (2 Units SubCUTAneous Given 8/31/24 1154)   insulin lispro (HUMALOG,ADMELOG) injection vial 0-4 Units (0 Units SubCUTAneous Held 8/30/24 2121)   cefTRIAXone (ROCEPHIN) 1,000 mg in sterile water 10 mL IV syringe (1,000 mg IntraVENous Given 8/30/24 1408)   epoetin joanie-epbx (RETACRIT) injection 3,000 Units (3,000 Units SubCUTAneous Given 8/29/24 1704)   oxyCODONE-acetaminophen (PERCOCET) 7.5-325 MG per tablet 1 tablet (has no administration in time range)   HYDROmorphone (DILAUDID) injection 0.25 mg (has no administration in time range)   calcium gluconate 1,000 mg in sodium chloride 50 mL ( IntraVENous Stopped 8/27/24 1304)   insulin regular (HumuLIN R;NovoLIN R) injection 5 Units (5 Units IntraVENous Given 8/27/24 1245)     And   dextrose bolus 10% 250 mL ( IntraVENous Stopped 8/27/24 1316)   albuterol (PROVENTIL) (2.5 MG/3ML) 0.083% nebulizer solution 10 mg (10 mg Nebulization Given 8/27/24 1239)

## 2024-08-27 NOTE — ED TRIAGE NOTES
Pt arrived from dialysis via EMS. Per EMS, pt was unresponsive except to pain on arrival from [facility], which is not her baseline.   Per EMS, pupils were pin point, narcan 1 mg was given and pt responded \"immediately\" to narcan and became responsive and alert.   No dialysis was done today Per report, pt has not had dialysis x 1 week due to no transportation from nursing home.

## 2024-08-27 NOTE — PROGRESS NOTES
Received pre HD report from DIEGO Burgess.      Pt arrived to unit via stretcher, A+O x 3, no s/s of distress noted.      Accessed RUE AVG  w/15G needle w/o difficulty.    Tx initiated at 1530.      AVG flowing with ease.  For hemodynamic stability UF goal 2500 ml.      Offered assistance with repositioning every 2 hours.  Vascular access visible at all times throughout entire duration of treatment and line connections remain intact throughout entire duration of treatment. Patient became restless during last hour, kept bending arm, stopping blood pump. Treatment suspended with 15 minutes remaining.    Tx completed at 1815, tolerated well 1.5L removed.  De-accessed per protocol.    Clot time 5 minutes for arterial, and 5 minutes for venous.      Post HD report given to DIEGO Roberts.

## 2024-08-27 NOTE — PLAN OF CARE
Problem: Chronic Conditions and Co-morbidities  Goal: Patient's chronic conditions and co-morbidity symptoms are monitored and maintained or improved  Outcome: Progressing    INTERVENTION:  HEMODYNAMIC STABILIZATION  MAINTAIN BP WNL WHILE ON HD.    INTERVENTION:  FLUID MANAGEMENT  WILL ATTEMPT 2500 ML TOTAL FLUID REMOVAL AS TOLERATED.    INTERVENTION:  METABOLIC/ELECTROLYTE MANAGEMENT  2.0 POTASSIUM 2.5 CALCIUM DIALYSATE USED WITH HD TODAY.    INTERVENTION:  HEMODIALYSIS ACCESS SITE MANAGEMENT  RUE AVG ACCESSED WITH 15G NEEDLE USING ASEPTIC TECHNIQUE.    GOAL:  SIGNS AND SYMPTOMS OF LISTED POTENTIAL PROBLEMS WILL BE ABSENT OR MANAGEABLE.    OUTCOME:  PROGRESSING.    HD PLANNED FOR 3 HOURS TODAY.

## 2024-08-27 NOTE — H&P
0  WBC    nRBC 0.00 0.00 - 0.01 K/uL   Comprehensive Metabolic Panel    Collection Time: 08/27/24 11:11 AM   Result Value Ref Range    Sodium 137 136 - 145 mmol/L    Potassium 6.4 (HH) 3.5 - 5.5 mmol/L    Chloride 102 100 - 111 mmol/L    CO2 16 (L) 21 - 32 mmol/L    Anion Gap 19 (H) 3.0 - 18 mmol/L    Glucose 138 (H) 74 - 99 mg/dL     (H) 7.0 - 18 MG/DL    Creatinine 11.60 (H) 0.6 - 1.3 MG/DL    BUN/Creatinine Ratio 12 12 - 20      Est, Glom Filt Rate 3 (L) >60 ml/min/1.73m2    Calcium 9.2 8.5 - 10.1 MG/DL    Total Bilirubin 0.4 0.2 - 1.0 MG/DL    ALT 36 13 - 56 U/L    AST 64 (H) 10 - 38 U/L    Alk Phosphatase 101 45 - 117 U/L    Total Protein 7.5 6.4 - 8.2 g/dL    Albumin 3.1 (L) 3.4 - 5.0 g/dL    Globulin 4.4 (H) 2.0 - 4.0 g/dL    Albumin/Globulin Ratio 0.7 (L) 0.8 - 1.7     Ethanol    Collection Time: 08/27/24 11:11 AM   Result Value Ref Range    Ethanol Lvl <3 0 - 3 MG/DL   Urinalysis    Collection Time: 08/27/24 11:32 AM   Result Value Ref Range    Color, UA YELLOW      Appearance CLOUDY      Specific Sandy, UA 1.015 1.005 - 1.030      pH, Urine 6.0 5.0 - 8.0      Protein,  (A) NEG mg/dL    Glucose, Ur Negative NEG mg/dL    Ketones, Urine TRACE (A) NEG mg/dL    Bilirubin, Urine Negative NEG      Blood, Urine MODERATE (A) NEG      Urobilinogen, Urine 0.2 0.2 - 1.0 EU/dL    Nitrite, Urine Negative NEG      Leukocyte Esterase, Urine LARGE (A) NEG     Urine Drug Screen    Collection Time: 08/27/24 11:32 AM   Result Value Ref Range    Benzodiazepines, Urine Negative NEG      Barbiturates, Urine Negative NEG      THC, TH-Cannabinol, Urine Negative NEG      Opiates, Urine Negative NEG      Phencyclidine, Urine Negative NEG      Cocaine, Urine Negative NEG      Amphetamine, Urine Negative NEG      Methadone, Urine Negative NEG      Comments: (NOTE)    Urinalysis, Micro    Collection Time: 08/27/24 11:32 AM   Result Value Ref Range    WBC, UA 18 to 25 0 - 4 /hpf    RBC, UA 6 to 8 0 - 5 /hpf     artery. Tibioperoneal trunk and its branches are suboptimally evaluated due to multisegment coarse calcification and small caliber and thus not optimally patent. 2.  Postsurgical metallic fixation of the distal tibia acute comminuted fracture. Electronically signed by Myla Araiza    CT HEAD WO CONTRAST    Result Date: 8/27/2024  1.  No acute intracranial hemorrhage or acute large territorial infarct. No interval change. Electronically signed by Myla Horvath MD  8/27/2024, 7:42 PM        Disclaimer: Sections of this note are dictated using utilizing voice recognition software.  Minor typographical errors may be present. If questions arise, please do not hesitate to contact me or call our department.

## 2024-08-27 NOTE — ED NOTES
TRANSFER - OUT REPORT:    Verbal report given to DIEGO Rodriguez on Kaylie Granger  being transferred to 4 S for ordered procedure       Report consisted of patient's Situation, Background, Assessment and   Recommendations(SBAR).     Information from the following report(s) Nurse Handoff Report, ED Encounter Summary, MAR, Recent Results, Cardiac Rhythm sinus tachycardia, and Neuro Assessment was reviewed with the receiving nurse.    [place] Fall Assessment:    Presents to emergency department  because of falls (Syncope, seizure, or loss of consciousness): No  Age > 70: Yes  Altered Mental Status, Intoxication with alcohol or substance confusion (Disorientation, impaired judgment, poor safety awaremess, or inability to follow instructions): No  Impaired Mobility: Ambulates or transfers with assistive devices or assistance; Unable to ambulate or transer.: Yes  Nursing Judgement: Yes          Lines:   Peripheral IV 08/27/24 Left;Anterior Forearm (Active)        Opportunity for questions and clarification was provided.      Patient transported with:  Tech  Pt currently in HD, 4 S RN aware, HD RN called and RN requested pt be sent to [phone] after HD completed.

## 2024-08-27 NOTE — PROGRESS NOTES
TRANSFER - IN REPORT:    Verbal report received from DIEGO Roldan on Kaylie Granger  being received from Dialysis for routine progression of patient care      Report consisted of patient's Situation, Background, Assessment and   Recommendations(SBAR).     Information from the following report(s) Nurse Handoff Report and Recent Results was reviewed with the receiving nurse.    Opportunity for questions and clarification was provided.      Assessment completed upon patient's arrival to unit and care assumed.

## 2024-08-27 NOTE — PROGRESS NOTES
Progress Note      84-year-old female with past medical history of diabetes hypertension, ESRD admitted for hyperkalemia renal failure, following for ESRD management  Interim event     Examined during dialysis   Blood pressure acceptable    IMPRESSION:   ESRD, Tuesday Thursday Saturday schedule missed several treatment  Access tAV graft  Hyperkalemia, she received initial conservative treatment in the ER  Severe uremia  Dyspnea, fluid overload  Anemia  History of severe peripheral vascular disease, right above-knee amputation  Extensive left lower extremity vascular blockage   PLAN:   Arrange urgent dialysis today, plan for dialysis with 2K bath UF goal 1.5 to 2 L as tolerated.  Plan to arrange dialysis tomorrow.  Awaiting input from vascular clinic.  Adjust medication for ESRD status.     At 3:51 PM on 8/27/2024, I saw and examined patient during hemodialysis treatment. The patient was receiving hemodialysis for treatment of  renal failure. I have also reviewed vital signs, intake and output, lab results and recent events, and agreed with today's dialysis order.        Current Facility-Administered Medications   Medication Dose Route Frequency    glucose chewable tablet 16 g  4 tablet Oral PRN    dextrose bolus 10% 125 mL  125 mL IntraVENous PRN    Or    dextrose bolus 10% 250 mL  250 mL IntraVENous PRN    glucagon (rDNA) injection 1 mg  1 mg SubCUTAneous PRN    dextrose 10 % infusion   IntraVENous Continuous PRN     Current Outpatient Medications   Medication Sig    B Complex-C-Folic Acid (BAY-LAYNE) TABS     folic acid (FOLVITE) 1 MG tablet     hydrALAZINE (APRESOLINE) 100 MG tablet Take 1 tablet by mouth 3 times daily    Insulin Glargine-yfgn 100 UNIT/ML SOPN     OLANZapine (ZYPREXA) 5 MG tablet Take 1 tablet by mouth in the morning and at bedtime    traMADol (ULTRAM) 50 MG tablet 1 tablet.    NIFEdipine (ADALAT CC) 60 MG extended release tablet     levothyroxine (SYNTHROID) 25 MCG tablet Take 1 tablet by

## 2024-08-27 NOTE — CONSULTS
Consult Note  Consult requested by: Dr. Yuliet Lott SHEREE Granger is a 84 y.o. female Black /  who is being seen on consult for ESRD management.   Chief Complaint   Patient presents with    Altered Mental Status     Admission diagnosis: Hyperkalemia     84-year-old female with past medical history of diabetes hypertension, ESRD admitted for hyperkalemia renal failure, following for ESRD management  Impression & Plan:   IMPRESSION:   ESRD, Tuesday Thursday Saturday schedule missed several treatment  Access AV graft  Hyperkalemia, she received initial conservative treatment in the ER  Severe uremia  Dyspnea, fluid overload  Anemia  History of severe peripheral vascular disease, right above-knee amputation  Extensive left lower extremity vascular blockage   PLAN:   Arrange urgent dialysis today, plan for dialysis with 2K bath UF goal 1.5 to 2 L as tolerated.  Plan to arrange dialysis tomorrow.  Awaiting input from vascular clinic.  Adjust medication for ESRD status.       HPI: 84-year-old female with past medical history of ESRD diabetes hypertension history of peripheral vascular disease status post right AKA was brought to ER from dialysis unit for altered mental status.    Her mental status improved after receiving Narcan by EMS.    She was recently admitted to the hospital and discharged to nursing home.  Not able to go to dialysis unit for 1 week.    Workup in ER shows severe uremia, hyperkalemia and worsening of lower extremity swelling.  Her CT angiogram shows completely occluded distal femoral artery and entire length of popliteal artery.  She is admitted to the floor, evaluated by vascular colleague.    Nephrology service consulted for ESRD management.      Past Medical History:   Diagnosis Date    Anemia of renal disease     CKD (chronic kidney disease) requiring chronic dialysis (HCC)     T/TH/SAT at [facility] [phone]. [phone]    Hypercholesterolemia     Hypertension      Violence: Not on file   Housing Stability: Low Risk  (2024)    Housing Stability Vital Sign     Unable to Pay for Housing in the Last Year: No     Number of Times Moved in the Last Year: 1     Homeless in the Last Year: No       Family History   Problem Relation Age of Onset    Hypertension Mother      No Known Allergies     Home Medications:     Prior to Admission Medications   Prescriptions Last Dose Informant Patient Reported? Taking?   B Complex-C-Folic Acid (BAY-LAYNE) TABS   Yes No   Insulin Glargine-yfgn 100 UNIT/ML SOPN   Yes No   NIFEdipine (ADALAT CC) 60 MG extended release tablet   Yes No   OLANZapine (ZYPREXA) 5 MG tablet   Yes No   Sig: Take 1 tablet by mouth in the morning and at bedtime   acetaminophen (TYLENOL) 325 MG tablet   Yes No   Sig: Take 2 tablets by mouth 3 times daily   aspirin 81 MG chewable tablet   Yes No   Sig: Take 1 tablet by mouth daily Indications: Disease involving Lipid Deposits in the Arteries   atorvastatin (LIPITOR) 20 MG tablet   Yes No   Sig: Take by mouth daily   folic acid (FOLVITE) 1 MG tablet   Yes No   hydrALAZINE (APRESOLINE) 100 MG tablet   Yes No   Sig: Take 1 tablet by mouth 3 times daily   insulin lispro (HUMALOG) 100 UNIT/ML SOLN injection vial   Yes No   Sig: Less than 150 =   0 units  150 -199 =   3 units  200 -249 =   6 units  250 -299 =   9 units  300 -349 =   12 units  350 and above =   15 units, CALL MD   levothyroxine (SYNTHROID) 25 MCG tablet   No No   Sig: Take 1 tablet by mouth every morning (before breakfast)   traMADol (ULTRAM) 50 MG tablet   Yes No   Si tablet.      Facility-Administered Medications: None       Current Facility-Administered Medications   Medication Dose Route Frequency    glucose chewable tablet 16 g  4 tablet Oral PRN    dextrose bolus 10% 125 mL  125 mL IntraVENous PRN    Or    dextrose bolus 10% 250 mL  250 mL IntraVENous PRN    glucagon (rDNA) injection 1 mg  1 mg SubCUTAneous PRN    dextrose 10 % infusion   IntraVENous

## 2024-08-28 LAB
ANION GAP SERPL CALC-SCNC: 13 MMOL/L (ref 3–18)
BASOPHILS # BLD: 0 K/UL (ref 0–0.1)
BASOPHILS NFR BLD: 0 % (ref 0–2)
BUN SERPL-MCNC: 61 MG/DL (ref 7–18)
BUN/CREAT SERPL: 10 (ref 12–20)
CALCIUM SERPL-MCNC: 9.5 MG/DL (ref 8.5–10.1)
CHLORIDE SERPL-SCNC: 101 MMOL/L (ref 100–111)
CO2 SERPL-SCNC: 26 MMOL/L (ref 21–32)
CREAT SERPL-MCNC: 6.41 MG/DL (ref 0.6–1.3)
DIFFERENTIAL METHOD BLD: ABNORMAL
EOSINOPHIL # BLD: 0 K/UL (ref 0–0.4)
EOSINOPHIL NFR BLD: 0 % (ref 0–5)
ERYTHROCYTE [DISTWIDTH] IN BLOOD BY AUTOMATED COUNT: 14.5 % (ref 11.6–14.5)
GLUCOSE BLD STRIP.AUTO-MCNC: 244 MG/DL (ref 70–110)
GLUCOSE BLD STRIP.AUTO-MCNC: 303 MG/DL (ref 70–110)
GLUCOSE BLD STRIP.AUTO-MCNC: 94 MG/DL (ref 70–110)
GLUCOSE BLD STRIP.AUTO-MCNC: 95 MG/DL (ref 70–110)
GLUCOSE SERPL-MCNC: 204 MG/DL (ref 74–99)
HBA1C MFR BLD: 6.1 % (ref 4.2–5.6)
HCT VFR BLD AUTO: 24.1 % (ref 35–45)
HGB BLD-MCNC: 7.8 G/DL (ref 12–16)
IMM GRANULOCYTES # BLD AUTO: 0 K/UL (ref 0–0.04)
IMM GRANULOCYTES NFR BLD AUTO: 0 % (ref 0–0.5)
LYMPHOCYTES # BLD: 0.8 K/UL (ref 0.9–3.6)
LYMPHOCYTES NFR BLD: 10 % (ref 21–52)
MCH RBC QN AUTO: 33.9 PG (ref 24–34)
MCHC RBC AUTO-ENTMCNC: 32.4 G/DL (ref 31–37)
MCV RBC AUTO: 104.8 FL (ref 78–100)
MONOCYTES # BLD: 0.6 K/UL (ref 0.05–1.2)
MONOCYTES NFR BLD: 8 % (ref 3–10)
NEUTS SEG # BLD: 6.5 K/UL (ref 1.8–8)
NEUTS SEG NFR BLD: 82 % (ref 40–73)
NRBC # BLD: 0 K/UL (ref 0–0.01)
NRBC BLD-RTO: 0 PER 100 WBC
PLATELET # BLD AUTO: 183 K/UL (ref 135–420)
PMV BLD AUTO: 10.8 FL (ref 9.2–11.8)
POTASSIUM SERPL-SCNC: 3.7 MMOL/L (ref 3.5–5.5)
RBC # BLD AUTO: 2.3 M/UL (ref 4.2–5.3)
SODIUM SERPL-SCNC: 140 MMOL/L (ref 136–145)
WBC # BLD AUTO: 8 K/UL (ref 4.6–13.2)

## 2024-08-28 PROCEDURE — 2580000003 HC RX 258: Performed by: HOSPITALIST

## 2024-08-28 PROCEDURE — 94761 N-INVAS EAR/PLS OXIMETRY MLT: CPT

## 2024-08-28 PROCEDURE — 2580000003 HC RX 258: Performed by: INTERNAL MEDICINE

## 2024-08-28 PROCEDURE — 6360000002 HC RX W HCPCS: Performed by: INTERNAL MEDICINE

## 2024-08-28 PROCEDURE — 6360000002 HC RX W HCPCS: Performed by: HOSPITALIST

## 2024-08-28 PROCEDURE — 83036 HEMOGLOBIN GLYCOSYLATED A1C: CPT

## 2024-08-28 PROCEDURE — 82962 GLUCOSE BLOOD TEST: CPT

## 2024-08-28 PROCEDURE — 6370000000 HC RX 637 (ALT 250 FOR IP): Performed by: STUDENT IN AN ORGANIZED HEALTH CARE EDUCATION/TRAINING PROGRAM

## 2024-08-28 PROCEDURE — 80048 BASIC METABOLIC PNL TOTAL CA: CPT

## 2024-08-28 PROCEDURE — 99233 SBSQ HOSP IP/OBS HIGH 50: CPT | Performed by: INTERNAL MEDICINE

## 2024-08-28 PROCEDURE — 36415 COLL VENOUS BLD VENIPUNCTURE: CPT

## 2024-08-28 PROCEDURE — 6370000000 HC RX 637 (ALT 250 FOR IP): Performed by: HOSPITALIST

## 2024-08-28 PROCEDURE — 1100000003 HC PRIVATE W/ TELEMETRY

## 2024-08-28 PROCEDURE — 85025 COMPLETE CBC W/AUTO DIFF WBC: CPT

## 2024-08-28 RX ORDER — INSULIN LISPRO 100 [IU]/ML
0-8 INJECTION, SOLUTION INTRAVENOUS; SUBCUTANEOUS
Status: DISCONTINUED | OUTPATIENT
Start: 2024-08-28 | End: 2024-09-01 | Stop reason: HOSPADM

## 2024-08-28 RX ORDER — INSULIN LISPRO 100 [IU]/ML
0-4 INJECTION, SOLUTION INTRAVENOUS; SUBCUTANEOUS NIGHTLY
Status: DISCONTINUED | OUTPATIENT
Start: 2024-08-28 | End: 2024-09-01 | Stop reason: HOSPADM

## 2024-08-28 RX ADMIN — HEPARIN SODIUM 5000 UNITS: 5000 INJECTION INTRAVENOUS; SUBCUTANEOUS at 21:16

## 2024-08-28 RX ADMIN — OLANZAPINE 5 MG: 2.5 TABLET, FILM COATED ORAL at 21:16

## 2024-08-28 RX ADMIN — WATER 1000 MG: 1 INJECTION INTRAMUSCULAR; INTRAVENOUS; SUBCUTANEOUS at 09:46

## 2024-08-28 RX ADMIN — ACETAMINOPHEN 325MG 650 MG: 325 TABLET ORAL at 15:25

## 2024-08-28 RX ADMIN — FOLIC ACID 1 MG: 1 TABLET ORAL at 13:45

## 2024-08-28 RX ADMIN — Medication 1 MG: at 13:47

## 2024-08-28 RX ADMIN — HEPARIN SODIUM 5000 UNITS: 5000 INJECTION INTRAVENOUS; SUBCUTANEOUS at 05:43

## 2024-08-28 RX ADMIN — OLANZAPINE 5 MG: 2.5 TABLET, FILM COATED ORAL at 13:46

## 2024-08-28 RX ADMIN — ATORVASTATIN CALCIUM 20 MG: 20 TABLET, FILM COATED ORAL at 21:16

## 2024-08-28 RX ADMIN — INSULIN LISPRO 2 UNITS: 100 INJECTION, SOLUTION INTRAVENOUS; SUBCUTANEOUS at 16:49

## 2024-08-28 RX ADMIN — LEVOTHYROXINE SODIUM 25 MCG: 0.03 TABLET ORAL at 05:43

## 2024-08-28 RX ADMIN — HEPARIN SODIUM 5000 UNITS: 5000 INJECTION INTRAVENOUS; SUBCUTANEOUS at 13:44

## 2024-08-28 RX ADMIN — INSULIN LISPRO 6 UNITS: 100 INJECTION, SOLUTION INTRAVENOUS; SUBCUTANEOUS at 08:48

## 2024-08-28 RX ADMIN — SODIUM CHLORIDE, PRESERVATIVE FREE 10 ML: 5 INJECTION INTRAVENOUS at 21:16

## 2024-08-28 RX ADMIN — ASPIRIN 81 MG CHEWABLE TABLET 81 MG: 81 TABLET CHEWABLE at 13:45

## 2024-08-28 RX ADMIN — SODIUM CHLORIDE, PRESERVATIVE FREE 10 ML: 5 INJECTION INTRAVENOUS at 08:52

## 2024-08-28 ASSESSMENT — PAIN SCALES - GENERAL
PAINLEVEL_OUTOF10: 0
PAINLEVEL_OUTOF10: 3
PAINLEVEL_OUTOF10: 8
PAINLEVEL_OUTOF10: 0

## 2024-08-28 ASSESSMENT — PAIN DESCRIPTION - FREQUENCY: FREQUENCY: INTERMITTENT

## 2024-08-28 ASSESSMENT — PAIN DESCRIPTION - ORIENTATION: ORIENTATION: LEFT

## 2024-08-28 ASSESSMENT — PAIN DESCRIPTION - DESCRIPTORS: DESCRIPTORS: ACHING;SHARP

## 2024-08-28 ASSESSMENT — PAIN DESCRIPTION - DIRECTION: RADIATING_TOWARDS: LEG

## 2024-08-28 ASSESSMENT — PAIN DESCRIPTION - LOCATION: LOCATION: LEG

## 2024-08-28 ASSESSMENT — PAIN - FUNCTIONAL ASSESSMENT: PAIN_FUNCTIONAL_ASSESSMENT: PREVENTS OR INTERFERES SOME ACTIVE ACTIVITIES AND ADLS

## 2024-08-28 ASSESSMENT — PAIN DESCRIPTION - ONSET: ONSET: ON-GOING

## 2024-08-28 ASSESSMENT — PAIN DESCRIPTION - PAIN TYPE: TYPE: ACUTE PAIN

## 2024-08-28 NOTE — PROGRESS NOTES
Hospitalist Progress Note    NAME:  Kaylie Granger   :   1940   MRN:   [id number]     Date/Time:  2024  Subjective:   Chief Complaint: Altered mental status missed hemodialysis treatments, hyperkalemia;    Patient is awake eating her lunch.  But looks confused does not understand why she is here.          Review of Systems:     [x]  Unable to obtain  ROS due to  [x]  mental status change  []  sedated   []  intubated     Past Med History and Social history reviewed. No changes.   [x]  Current Medication list and allergies reviewed*    Objective:   Vitals:  BP (!) 97/58   Pulse 81   Temp 98.4 °F (36.9 °C) (Oral)   Resp 18   Ht 1.549 m (5' 1\")   Wt 59 kg (130 lb)   SpO2 96%   BMI 24.56 kg/m²   Temp (24hrs), Av.2 °F (36.8 °C), Min:97.3 °F (36.3 °C), Max:98.8 °F (37.1 °C)      Last 24hr Input/Output:    Intake/Output Summary (Last 24 hours) at 2024 1319  Last data filed at 2024 1819  Gross per 24 hour   Intake 794 ml   Output 2000 ml   Net -1206 ml        PHYSICAL EXAM:  Gen:  []  WD []  WN  [x]  cachectic  []  thin  []  obese  []  disheveled             []   Critically ill or  [x]  Chronically ill appearing    HEENT:   []   red/pink conjunctivae [x]  pale conjunctivae                  PERRL  []  yes  []  no        hearing intact to voice []  yes  []  No               []  moist or  [x]  dry  Mucosa  NECK:   supple [x]  yes  []  no      masses []  yes  []  No               thyroid    []  non tender []  tender    RESP:   use of accessory muscles []  yes [x]  no                BS    [x]  clear  []  wheezing []  rhonchi []  crackles   CARD:  murmur  []  yes [x]  no   []  thrill  []  carotid bruits                 LE edema []  yes  []  no    [x]  JVD present    ABD:    tenderness []  yes [x]  no   Liver/spleen enlargement []   yes []   no                distended []   yes [x]  no    bowel sound []  normal []  hypo or  []  hyperactive    MSKL:   Patient with a cyanosis of the left foot toes

## 2024-08-28 NOTE — PROGRESS NOTES
Progress Note      84-year-old female with past medical history of diabetes hypertension, ESRD admitted for hyperkalemia renal failure, following for ESRD management  Interim event     Tolerated dialysis well , UF about 2L       IMPRESSION:   ESRD, Tuesday Thursday Saturday schedule missed several treatment  Access tAV graft  Hyperkalemia, improving   Severe uremia  Dyspnea, fluid overload  Anemia  History of severe peripheral vascular disease, right above-knee amputation  Extensive left lower extremity vascular blockage   PLAN:   NO urgency for extra dialysis treatment, plan for HD tomorrow per her routine schedule.   Adjust medication for ESRD status.             Current Facility-Administered Medications   Medication Dose Route Frequency    insulin lispro (HUMALOG,ADMELOG) injection vial 0-8 Units  0-8 Units SubCUTAneous TID WC    insulin lispro (HUMALOG,ADMELOG) injection vial 0-4 Units  0-4 Units SubCUTAneous Nightly    glucose chewable tablet 16 g  4 tablet Oral PRN    dextrose bolus 10% 125 mL  125 mL IntraVENous PRN    Or    dextrose bolus 10% 250 mL  250 mL IntraVENous PRN    glucagon (rDNA) injection 1 mg  1 mg SubCUTAneous PRN    dextrose 10 % infusion   IntraVENous Continuous PRN    aspirin chewable tablet 81 mg  81 mg Oral Daily    atorvastatin (LIPITOR) tablet 20 mg  20 mg Oral Daily    folic acid (FOLVITE) tablet 1 mg  1 mg Oral Daily    levothyroxine (SYNTHROID) tablet 25 mcg  25 mcg Oral QAM AC    OLANZapine (ZYPREXA) tablet 5 mg  5 mg Oral BID    sodium chloride flush 0.9 % injection 5-40 mL  5-40 mL IntraVENous 2 times per day    sodium chloride flush 0.9 % injection 5-40 mL  5-40 mL IntraVENous PRN    0.9 % sodium chloride infusion   IntraVENous PRN    heparin (porcine) injection 5,000 Units  5,000 Units SubCUTAneous 3 times per day    ondansetron (ZOFRAN-ODT) disintegrating tablet 4 mg  4 mg Oral Q8H PRN    Or    ondansetron (ZOFRAN) injection 4 mg  4 mg IntraVENous Q6H PRN    polyethylene

## 2024-08-28 NOTE — CARE COORDINATION
08/28/24 0836   Service Assessment   Patient Orientation Alert and Oriented;Person   Cognition Dementia / Early Alzheimer's   History Provided By Medical Record   Primary Caregiver Other (Comment)  (LT resident at [facility])   Accompanied By/Relationship Patient is currently alone in room   Support Systems Other (Comment)  (nursing home staff)   Patient's Healthcare Decision Maker is: Legal Next of Kin   Last Visit to PCP Within last 3 months   Prior Functional Level Assistance with the following:;Feeding;Housework;Cooking;Shopping;Mobility;Dressing;Bathing   Current Functional Level Assistance with the following:;Feeding;Cooking;Housework;Shopping;Bathing;Dressing;Mobility   Can patient return to prior living arrangement Yes   Ability to make needs known: Poor   Family able to assist with home care needs: Other (comment)  (Staff at [facility]  [facility]ursing [facility])   Financial Resources Medicaid;Medicare   Community Resources Other (Comment)  (LT)   CM/SW Referral Other (see comment)  (Discharge Planning)   Social/Functional History   Lives With Other (comment)  (LT)   Type of Home Facility   Home Layout One level   Home Access Level entry   Receives Help From Other (comment)   ADL Assistance Needs assistance   Ambulation Assistance Non-ambulatory   Transfer Assistance Needs assistance   Active  No   Patient's  Info Staff at nursing home   Mode of Transportation Other  (Staff at nursing home)   Education Not Applicable   Occupation On disability   Type of Occupation Not Applicable   Discharge Planning   Type of Residence Long-Term Acute Care   Living Arrangements Other (Comment)  ([facility])   Current Services Prior To Admission Other (Comment)  ([facility])   Potential Assistance Needed Skilled Nursing Facility   DME Ordered? No   Potential Assistance Purchasing Medications No   Type of Home Care Services None   Patient expects to be discharged to:

## 2024-08-28 NOTE — PROGRESS NOTES
Saw patient at bedside this morning, chart reviewed.  Nonambulatory 84-year-old with recent left tib-fib fracture.  Hyperkalemia requiring emergent dialysis.  Had missed a week's worth of dialysis  Acute metabolic encephalopathy  Images including CTA and Doppler reviewed  Patient is lethargic and minimally responsive today.  Does relate she has pain on her left  Right arm AV graft is patent with a nice thrill.      Unsure if patient is consentable at this time  Consider left above-knee amputation versus palliative care

## 2024-08-28 NOTE — CARE COORDINATION
08/28/24 0845   Readmission Assessment   Number of Days since last admission? 8-30 days   Previous Disposition Long Term Care  ([facility])   Who is being Interviewed Unable to Complete  (Dementia/ Early Alzheima)   What was the patient's/caregiver's perception as to why they think they needed to return back to the hospital? Other (Comment)  (Per medical records pt came EMD from dialysis treatment because pt became unresponsive.)   Did you visit your Primary Care Physician after you left the hospital, before you returned this time?   (Unknown)   Who advised the patient to return to the hospital? Other (Comment)  (Per medical records pt came EMD from dialysis treatment because pt became unresponsive.)   Does the patient report anything that got in the way of taking their medications?   (Unknown)   In our efforts to provide the best possible care to you and others like you, can you think of anything that we could have done to help you after you left the hospital the first time, so that you might not have needed to return so soon? Other (Comment)  (Per medical records pt came EMD from dialysis treatment because pt became unresponsive.)     Shari Sweeney BSW  Case Management

## 2024-08-28 NOTE — WOUND CARE
Room 406: Focused wound assess    Left buttock, stage 2 pressure injury,  Base tissue pink  No drainage.   No wound odor.  Edges attached & defined.   Periwound hyperpigmented  Currently open to air.   POA.   Exacerbated by stool incontinence.  Pericare provided & chux pad changed.   Pt appears confused, high risk for friction due to bed movements.   Unit nursing staff to turn & reposition q2hrs, use left & right sides, on back only for meals or treatments. Use turning wedges. Use comfort glide sheet with long chux pad.  Wound Care Orders Left buttock: Unit staff nurses to cleanse wound with wound spray from par room, then apply Optifoam Gentle Silicone dressing, change every 2 days & prn soilage.  Pericare per unit based protocol.  Will turn over care to unit nursing staff at this time & sign off.   Mignon ALSTONN, RN, CWOCN, CFCN

## 2024-08-28 NOTE — PROGRESS NOTES
Comprehensive Nutrition Assessment    Type and Reason for Visit:  Initial, Positive Nutrition Screen    Nutrition Recommendations/Plan:   Continue current diet as tolerated.  Order Nepro with Carb Steady (each provides 425 kcal, 19g protein) BID  Recommend/order virt-caps supplementation daily  Daily weights  Continue to monitor tolerance of PO, compliance of oral supplements, weight, labs, and plan of care during admission.     Malnutrition Assessment:  Malnutrition Status:  Mild malnutrition (08/28/24 1535)    Context:  Chronic Illness     Findings of the 6 clinical characteristics of malnutrition:  Energy Intake:  Unable to assess  Weight Loss:  No significant weight loss     Body Fat Loss:  No significant body fat loss     Muscle Mass Loss:  Mild muscle mass loss Temples (temporalis), Clavicles (pectoralis & deltoids)  Fluid Accumulation:  No significant fluid accumulation     Strength:  Not Performed    Nutrition History and Allergies:      Past Medical History:   Diagnosis Date    Anemia of renal disease     CKD (chronic kidney disease) requiring chronic dialysis (HCC)     T/TH/SAT at [place] [phone]. [phone]    Hypercholesterolemia     Hypertension     Peripheral vascular disease (HCC)     Secondary hyperparathyroidism of renal origin ([facility])     Type 2 diabetes mellitus treated with insulin ([facility])     no meds per patient on 12/22/23     PTA: Pt reported decreased appetite prior to admission followed by stating she is eating fine.      Weight Hx: 136 lbs Wt change: -6 lb (4.4%) x 11 months - not significant.   Wt Readings from Last 10 Encounters:   08/27/24 59 kg (130 lb)   08/20/24 59.1 kg (130 lb 4.7 oz)   01/22/24 53.5 kg (118 lb)   09/27/23 61.7 kg (136 lb)   09/15/23 51.6 kg (113 lb 12.1 oz)   09/11/23 63.5 kg (140 lb)   06/28/23 65.6 kg (144 lb 10 oz)   04/29/23 62.6 kg (138 lb)     NFPE: Visual NFPE conducted only. Food Allergies: NKFA    Nutrition Assessment:    Admitted for

## 2024-08-28 NOTE — PLAN OF CARE
Problem: Pain  Goal: Verbalizes/displays adequate comfort level or baseline comfort level  Outcome: Progressing  Flowsheets  Taken 8/27/2024 2333  Verbalizes/displays adequate comfort level or baseline comfort level:   Assess pain using appropriate pain scale   Encourage patient to monitor pain and request assistance  Taken 8/27/2024 1940  Verbalizes/displays adequate comfort level or baseline comfort level:   Encourage patient to monitor pain and request assistance   Assess pain using appropriate pain scale     Problem: Chronic Conditions and Co-morbidities  Goal: Patient's chronic conditions and co-morbidity symptoms are monitored and maintained or improved  8/28/2024 0119 by Sulma Coffman, RN  Outcome: Progressing  Flowsheets (Taken 8/27/2024 1940)  Care Plan - Patient's Chronic Conditions and Co-Morbidity Symptoms are Monitored and Maintained or Improved: Monitor and assess patient's chronic conditions and comorbid symptoms for stability, deterioration, or improvement     Problem: Safety - Adult  Goal: Free from fall injury  Outcome: Progressing

## 2024-08-29 PROBLEM — Z71.89 GOALS OF CARE, COUNSELING/DISCUSSION: Status: ACTIVE | Noted: 2024-08-29

## 2024-08-29 PROBLEM — Z51.5 ENCOUNTER FOR PALLIATIVE CARE: Status: ACTIVE | Noted: 2024-08-29

## 2024-08-29 PROBLEM — Z99.2 ESRD ON HEMODIALYSIS (HCC): Status: ACTIVE | Noted: 2024-08-29

## 2024-08-29 PROBLEM — Z89.611 HX OF AKA (ABOVE KNEE AMPUTATION), RIGHT (HCC): Status: ACTIVE | Noted: 2024-08-29

## 2024-08-29 PROBLEM — N18.6 ESRD ON HEMODIALYSIS (HCC): Status: ACTIVE | Noted: 2024-08-29

## 2024-08-29 LAB
ANION GAP SERPL CALC-SCNC: 12 MMOL/L (ref 3–18)
BACTERIA SPEC CULT: ABNORMAL
BACTERIA SPEC CULT: ABNORMAL
BASOPHILS # BLD: 0 K/UL (ref 0–0.1)
BASOPHILS NFR BLD: 0 % (ref 0–2)
BUN SERPL-MCNC: 73 MG/DL (ref 7–18)
BUN/CREAT SERPL: 9 (ref 12–20)
CALCIUM SERPL-MCNC: 9.6 MG/DL (ref 8.5–10.1)
CC UR VC: ABNORMAL
CHLORIDE SERPL-SCNC: 101 MMOL/L (ref 100–111)
CO2 SERPL-SCNC: 24 MMOL/L (ref 21–32)
CREAT SERPL-MCNC: 7.87 MG/DL (ref 0.6–1.3)
DIFFERENTIAL METHOD BLD: ABNORMAL
EOSINOPHIL # BLD: 0 K/UL (ref 0–0.4)
EOSINOPHIL NFR BLD: 0 % (ref 0–5)
ERYTHROCYTE [DISTWIDTH] IN BLOOD BY AUTOMATED COUNT: 14.6 % (ref 11.6–14.5)
FERRITIN SERPL-MCNC: 959 NG/ML (ref 8–388)
GLUCOSE BLD STRIP.AUTO-MCNC: 119 MG/DL (ref 70–110)
GLUCOSE BLD STRIP.AUTO-MCNC: 168 MG/DL (ref 70–110)
GLUCOSE BLD STRIP.AUTO-MCNC: 171 MG/DL (ref 70–110)
GLUCOSE BLD STRIP.AUTO-MCNC: 241 MG/DL (ref 70–110)
GLUCOSE SERPL-MCNC: 130 MG/DL (ref 74–99)
HCT VFR BLD AUTO: 30.5 % (ref 35–45)
HGB BLD-MCNC: 9.9 G/DL (ref 12–16)
IMM GRANULOCYTES # BLD AUTO: 0.1 K/UL (ref 0–0.04)
IMM GRANULOCYTES NFR BLD AUTO: 1 % (ref 0–0.5)
IRON SATN MFR SERPL: 23 % (ref 20–50)
IRON SERPL-MCNC: 54 UG/DL (ref 50–175)
LYMPHOCYTES # BLD: 1 K/UL (ref 0.9–3.6)
LYMPHOCYTES NFR BLD: 11 % (ref 21–52)
MCH RBC QN AUTO: 35.1 PG (ref 24–34)
MCHC RBC AUTO-ENTMCNC: 32.5 G/DL (ref 31–37)
MCV RBC AUTO: 108.2 FL (ref 78–100)
MONOCYTES # BLD: 0.9 K/UL (ref 0.05–1.2)
MONOCYTES NFR BLD: 9 % (ref 3–10)
NEUTS SEG # BLD: 7.4 K/UL (ref 1.8–8)
NEUTS SEG NFR BLD: 79 % (ref 40–73)
NRBC # BLD: 0 K/UL (ref 0–0.01)
NRBC BLD-RTO: 0 PER 100 WBC
PLATELET # BLD AUTO: 183 K/UL (ref 135–420)
PMV BLD AUTO: 11.5 FL (ref 9.2–11.8)
POTASSIUM SERPL-SCNC: 4.1 MMOL/L (ref 3.5–5.5)
RBC # BLD AUTO: 2.82 M/UL (ref 4.2–5.3)
SERVICE CMNT-IMP: ABNORMAL
SODIUM SERPL-SCNC: 137 MMOL/L (ref 136–145)
TIBC SERPL-MCNC: 235 UG/DL (ref 250–450)
WBC # BLD AUTO: 9.4 K/UL (ref 4.6–13.2)

## 2024-08-29 PROCEDURE — 6370000000 HC RX 637 (ALT 250 FOR IP): Performed by: HOSPITALIST

## 2024-08-29 PROCEDURE — 2580000003 HC RX 258: Performed by: INTERNAL MEDICINE

## 2024-08-29 PROCEDURE — 83550 IRON BINDING TEST: CPT

## 2024-08-29 PROCEDURE — 80048 BASIC METABOLIC PNL TOTAL CA: CPT

## 2024-08-29 PROCEDURE — 6360000002 HC RX W HCPCS: Performed by: HOSPITALIST

## 2024-08-29 PROCEDURE — 36415 COLL VENOUS BLD VENIPUNCTURE: CPT

## 2024-08-29 PROCEDURE — 6360000002 HC RX W HCPCS: Performed by: INTERNAL MEDICINE

## 2024-08-29 PROCEDURE — 1100000003 HC PRIVATE W/ TELEMETRY

## 2024-08-29 PROCEDURE — 85025 COMPLETE CBC W/AUTO DIFF WBC: CPT

## 2024-08-29 PROCEDURE — 2580000003 HC RX 258: Performed by: HOSPITALIST

## 2024-08-29 PROCEDURE — 90935 HEMODIALYSIS ONE EVALUATION: CPT

## 2024-08-29 PROCEDURE — 82728 ASSAY OF FERRITIN: CPT

## 2024-08-29 PROCEDURE — 83540 ASSAY OF IRON: CPT

## 2024-08-29 PROCEDURE — 99223 1ST HOSP IP/OBS HIGH 75: CPT

## 2024-08-29 PROCEDURE — 99232 SBSQ HOSP IP/OBS MODERATE 35: CPT | Performed by: INTERNAL MEDICINE

## 2024-08-29 PROCEDURE — 82962 GLUCOSE BLOOD TEST: CPT

## 2024-08-29 RX ORDER — OXYCODONE AND ACETAMINOPHEN 7.5; 325 MG/1; MG/1
1 TABLET ORAL EVERY 4 HOURS PRN
Status: DISCONTINUED | OUTPATIENT
Start: 2024-08-29 | End: 2024-09-01 | Stop reason: HOSPADM

## 2024-08-29 RX ADMIN — HEPARIN SODIUM 5000 UNITS: 5000 INJECTION INTRAVENOUS; SUBCUTANEOUS at 17:06

## 2024-08-29 RX ADMIN — LEVOTHYROXINE SODIUM 25 MCG: 0.03 TABLET ORAL at 04:45

## 2024-08-29 RX ADMIN — Medication 1 MG: at 10:00

## 2024-08-29 RX ADMIN — HEPARIN SODIUM 5000 UNITS: 5000 INJECTION INTRAVENOUS; SUBCUTANEOUS at 22:28

## 2024-08-29 RX ADMIN — FOLIC ACID 1 MG: 1 TABLET ORAL at 10:00

## 2024-08-29 RX ADMIN — OLANZAPINE 5 MG: 2.5 TABLET, FILM COATED ORAL at 22:28

## 2024-08-29 RX ADMIN — ATORVASTATIN CALCIUM 20 MG: 20 TABLET, FILM COATED ORAL at 22:28

## 2024-08-29 RX ADMIN — OLANZAPINE 5 MG: 2.5 TABLET, FILM COATED ORAL at 10:00

## 2024-08-29 RX ADMIN — SODIUM CHLORIDE, PRESERVATIVE FREE 10 ML: 5 INJECTION INTRAVENOUS at 22:29

## 2024-08-29 RX ADMIN — ASPIRIN 81 MG CHEWABLE TABLET 81 MG: 81 TABLET CHEWABLE at 10:00

## 2024-08-29 RX ADMIN — SODIUM CHLORIDE, PRESERVATIVE FREE 10 ML: 5 INJECTION INTRAVENOUS at 17:04

## 2024-08-29 RX ADMIN — WATER 1000 MG: 1 INJECTION INTRAMUSCULAR; INTRAVENOUS; SUBCUTANEOUS at 17:00

## 2024-08-29 RX ADMIN — ONDANSETRON 4 MG: 2 INJECTION INTRAMUSCULAR; INTRAVENOUS at 13:29

## 2024-08-29 RX ADMIN — EPOETIN ALFA-EPBX 3000 UNITS: 3000 INJECTION, SOLUTION INTRAVENOUS; SUBCUTANEOUS at 17:04

## 2024-08-29 RX ADMIN — HEPARIN SODIUM 5000 UNITS: 5000 INJECTION INTRAVENOUS; SUBCUTANEOUS at 04:44

## 2024-08-29 ASSESSMENT — PAIN SCALES - GENERAL
PAINLEVEL_OUTOF10: 0

## 2024-08-29 NOTE — CONSULTS
Palliative Medicine Initial Consult  Patient Name: Kaylie Granger  YOB: 1940  MRN: [id number]  Age: 84 y.o.  Gender: female    Date of Initial Consult: 8/29/2024  Date of Service: 8/29/2024  Time: 3:36 PM  [facility]  [facility] Day: 3  Admit Date: 8/27/2024  Referring Provider: Dr. Rodrigues       Reasons for Consultation:  Goals of Care    HISTORY OF PRESENT ILLNESS (HPI):   Kaylie Granger is a 84 y.o. female with a past medical history of anemia of renal disease, CKD requiring dialysis, HTN, HLD, PVD, DM-2, who was admitted on 8/27/2024 from outpatient dialysis via EMS with a diagnosis of stenosis of artery of left lower extremity and hyperkalemia with a potassium of 6.4. Per chart she was at outpatient dialysis and EMS was called because the patient was unresponsive. Per notes she has not had dialysis in over a week. Per notes, EMS administered Narcan and she responded \"immediately\"  and became alert. Ultrasound was performed in ED which showed greater than 70% stenosis, vascular surgery was consulted, and they recommended a possible left AKA. Palliative care was consulted for goals of care.    Psychosocial: Patient is  and has no children. She is a retired nurse's aide. She is a Judaism.    8/29/2024 Patient seen in dialysis unit. A/O, some forgetfulness but easily corrected. Answered questions appropriately. Complained of intermittent pain in left leg. On room air, no acute distress.      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 She was seen in dialysis unit with permission from dialysis RN. She was alert to self and year, forgetful of place but easily was able to correct herself or knew she was confused about it.  She

## 2024-08-29 NOTE — PROGRESS NOTES
SUBJECTIVE:      Patient ID: Sonia GIL Almaz is a 61 y.o. female.    Chief Complaint: Urinary Tract Infection (5 days ago. Symptoms: burning urination, cloudy urine, bladder pain, flank pain, frequency, urgency) and COVID-19 Concerns (3 days ago started, headache, post nasal drip, cough, fever, chills, sweats,exposed to covid)    Mrs. Scott is a 61-year-old female, who is a patient of RISHI Duval.  She is here today with concerns of COVID and UTI.  Reports that she has been having burning on urination, cloudy urine, bladder spasm, frequency, and flank pain over the last 4 days.  Denies hematuria or vaginal discharge.  Reports that she feels like she has had a fever, but has not had her temperature checked.  Has not taking anything over-the-counter for symptoms at this time.  Patient also is concerned about possible COVID.  She was exposed last week.  She started having headaches, postnasal drip, fever (on checked by a thermometer), chills 3 days ago. Denies SOB, CP, Chest tightness, or wheezing. Nothing makes her symptoms better or worse.     Urinary Tract Infection   This is a new problem. The current episode started in the past 7 days. The problem occurs intermittently. The problem has been waxing and waning. The quality of the pain is described as aching. The pain is mild. Maximum temperature: Feels that she has fever, but did not check her temperature. The fever has been present for 1 - 2 days. Associated symptoms include chills, flank pain, frequency, nausea and urgency. Pertinent negatives include no behavior changes, discharge, hematuria, hesitancy, possible pregnancy, sweats, vomiting, weight loss, bubble bath use, constipation, rash or withholding. She has tried nothing for the symptoms. The treatment provided no relief.           Review of patient's allergies indicates:   Allergen Reactions    Erythromycin Swelling    Codeine Nausea And Vomiting     And migraine h/a    [place] Blisters      Past  Hospitalist Progress Note    NAME:  Kaylie Granger   :   1940   MRN:   [id number]     Date/Time:  2024  Subjective:  Barriers for discharge; left leg ischemia need for left AKA  Expected discharge date to be determined as waiting on surgery  Disposition probably be SNF after surgery;       Chief Complaint: Altered mental status missed hemodialysis treatments, hyperkalemia;    Patient seen during hemodialysis.  Denies having any chest pain shortness of breath  Complaining of left leg foot pain.    Vascular eval noted about possible surgery wanted palliative care consult;             Past Med History and Social history reviewed. No changes.   [x]  Current Medication list and allergies reviewed*    Objective:   Vitals:  BP (!) 119/100   Pulse 61   Temp 98.6 °F (37 °C) (Axillary)   Resp 18   Ht 1.549 m (5' 0.98\")   Wt 59 kg (130 lb)   SpO2 100%   BMI 24.58 kg/m²   Temp (24hrs), Av.4 °F (36.9 °C), Min:98 °F (36.7 °C), Max:98.8 °F (37.1 °C)      Last 24hr Input/Output:    Intake/Output Summary (Last 24 hours) at 2024 1341  Last data filed at 2024 1700  Gross per 24 hour   Intake --   Output 0 ml   Net 0 ml        PHYSICAL EXAM:  Gen:  []  WD []  WN  [x]  cachectic  []  thin  []  obese  []  disheveled             []   Critically ill or  [x]  Chronically ill appearing    HEENT:   []   red/pink conjunctivae [x]  pale conjunctivae                  PERRL  []  yes  []  no        hearing intact to voice []  yes  []  No               []  moist or  [x]  dry  Mucosa  NECK:   supple [x]  yes  []  no      masses []  yes  []  No               thyroid    []  non tender []  tender    RESP:   use of accessory muscles []  yes [x]  no                BS    [x]  clear  []  wheezing []  rhonchi []  crackles   CARD:  murmur  []  yes [x]  no   []  thrill  []  carotid bruits                 LE edema []  yes  []  no    [x]  JVD present    ABD:    tenderness []  yes [x]  no   Liver/spleen enlargement []   yes []  Medical History:   Diagnosis Date    Allergy     Anxiety     Asthma     DDD (degenerative disc disease), cervical     Depression     Fibromyalgia     Hypertension     Neuromuscular disorder     PONV (postoperative nausea and vomiting)      Past Surgical History:   Procedure Laterality Date    ADENOIDECTOMY      carpel tunnel Right      SECTION      JOINT REPLACEMENT Right 2016    knee    KNEE ARTHROPLASTY Left 10/19/2020    Procedure: ARTHROPLASTY, KNEE;  Surgeon: Felipe Herring MD;  [facility]  Service: Orthopedics;  Laterality: Left;  Louie Liz    knee replacement Right     ROTATOR CUFF REPAIR Right     TONSILLECTOMY       Current Outpatient Medications   Medication Sig Dispense Refill    albuterol (VENTOLIN HFA) 90 mcg/actuation inhaler Inhale 2 puffs into the lungs every 6 (six) hours as needed for Wheezing or Shortness of Breath. Rescue 18 g 1    ALPRAZolam (XANAX) 1 MG tablet Take 1 tablet (1 mg total) by mouth once daily. 30 tablet 0    amLODIPine (NORVASC) 5 MG tablet Take 1 tablet (5 mg total) by mouth once daily. 90 tablet 1    cetirizine (ZYRTEC) 10 MG tablet Take 1 tablet (10 mg total) by mouth once daily. 90 tablet 0    DULoxetine (CYMBALTA) 60 MG capsule Take 1 capsule (60 mg total) by mouth once daily. 90 capsule 1    hydroCHLOROthiazide (HYDRODIURIL) 25 MG tablet Take 1 tablet (25 mg total) by mouth once daily. 90 tablet 1    levothyroxine (SYNTHROID) 25 MCG tablet Take 1 tablet (25 mcg total) by mouth before breakfast. 30 tablet 2    losartan (COZAAR) 100 MG tablet Take 1 tablet (100 mg total) by mouth once daily. 90 tablet 0    meloxicam (MOBIC) 15 MG tablet Take 1 tablet (15 mg total) by mouth once daily. 90 tablet 0    metoprolol succinate (TOPROL-XL) 25 MG 24 hr tablet Take 1 tablet (25 mg total) by mouth once daily. 90 tablet 0    montelukast (SINGULAIR) 10 mg tablet Take 1 tablet (10 mg total) by mouth every evening. 90 tablet 1    ciprofloxacin HCl (CIPRO) 500 MG tablet  Take 1 tablet (500 mg total) by mouth every 12 (twelve) hours. 10 tablet 0    phenazopyridine (PYRIDIUM) 100 MG tablet Take 1 tablet (100 mg total) by mouth 3 (three) times daily as needed for Pain. 9 tablet 0     No current facility-administered medications for this visit.     Family History   Problem Relation Name Age of Onset    Diabetes Mother      Hypertension Mother      Heart disease Mother      Stroke Mother      Glaucoma Mother      Diabetes Father      Hypertension Father      Heart disease Father      Hypertension Sister 1     Heart disease Sister 1     Graves' disease Daughter      Anxiety disorder Daughter        Social History     Socioeconomic History    Marital status:     Number of children: 3   Occupational History    Occupation: [id number]     Comment: primary sub for [employer]   Tobacco Use    Smoking status: Former     Current packs/day: 0.00     Average packs/day: 1 pack/day for 11.0 years (11.0 ttl pk-yrs)     Types: Cigarettes     Start date:      Quit date:      Years since quittin.6    Smokeless tobacco: Never   Substance and Sexual Activity    Alcohol use: Yes     Comment: occasional 2x/y    Drug use: Never    Sexual activity: Yes     Partners: Male     Social Determinants of Health     Stress: Stress Concern Present (2020)    [facility] [place] [facility] - Occupational Stress Questionnaire     Feeling of Stress : Rather much       Review of Systems   Constitutional:  Positive for chills. Negative for night sweats, unexpected weight change and weight loss.   HENT:  Positive for nasal congestion, postnasal drip and rhinorrhea. Negative for facial swelling, sinus pressure/congestion, sore throat and trouble swallowing.    Eyes:  Negative for photophobia and visual disturbance.   Respiratory:  Positive for cough. Negative for chest tightness, shortness of breath and wheezing.    Cardiovascular:  Negative for chest pain, palpitations and leg swelling.  "  Gastrointestinal:  Positive for nausea. Negative for abdominal pain, constipation, diarrhea and vomiting.   Genitourinary:  Positive for dysuria, flank pain, frequency and urgency. Negative for bladder incontinence, difficulty urinating, enuresis, hematuria, hesitancy and vaginal discharge.   Musculoskeletal:  Negative for back pain and neck stiffness.   Integumentary:  Negative for rash and wound.   Neurological:  Positive for headaches. Negative for dizziness, seizures, weakness and light-headedness.   Hematological:  Does not bruise/bleed easily.   Psychiatric/Behavioral:  Negative for dysphoric mood, self-injury, sleep disturbance and suicidal ideas.       OBJECTIVE:      Vitals:    08/13/24 1410   BP: 117/83   BP Location: Left arm   Patient Position: Sitting   BP Method: Medium (Automatic)   Pulse: 92   Resp: 18   Temp: 99.1 °F (37.3 °C)   TempSrc: Oral   SpO2: 97%   Weight: 125.2 kg (276 lb)   Height: 5' 4" (1.626 m)     Physical Exam  Vitals and nursing note reviewed.   Constitutional:       General: She is not in acute distress.     Appearance: Normal appearance. She is well-developed. She is obese. She is ill-appearing.   HENT:      Head: Normocephalic and atraumatic.      Right Ear: Tympanic membrane, ear canal and external ear normal. No swelling or tenderness. No middle ear effusion. Tympanic membrane is not perforated or erythematous. Tympanic membrane has normal mobility.      Left Ear: Tympanic membrane, ear canal and external ear normal. No swelling or tenderness.  No middle ear effusion. Tympanic membrane is not perforated, erythematous or bulging. Tympanic membrane has normal mobility.      Nose: Mucosal edema, congestion and rhinorrhea present. Rhinorrhea is clear.      Right Turbinates: Swollen.      Left Turbinates: Swollen.      Right Sinus: No maxillary sinus tenderness or frontal sinus tenderness.      Left Sinus: No maxillary sinus tenderness or frontal sinus tenderness.      " Mouth/Throat:      Mouth: Mucous membranes are moist.      Pharynx: Oropharynx is clear. Uvula midline. Posterior oropharyngeal erythema present. No oropharyngeal exudate.      Tonsils: Tonsillar exudate present. No tonsillar abscesses.   Eyes:      General:         Right eye: No discharge.         Left eye: No discharge.      Conjunctiva/sclera: Conjunctivae normal.      Pupils: Pupils are equal, round, and reactive to light.   Neck:      Thyroid: No thyromegaly.   Cardiovascular:      Rate and Rhythm: Normal rate and regular rhythm.      Pulses: Normal pulses.      Heart sounds: Normal heart sounds. No murmur heard.  Pulmonary:      Effort: Pulmonary effort is normal. No respiratory distress.      Breath sounds: Normal breath sounds. No wheezing, rhonchi or rales.   Abdominal:      General: Bowel sounds are normal.      Palpations: There is no mass.      Tenderness: There is no abdominal tenderness. There is left CVA tenderness. There is no right CVA tenderness.   Musculoskeletal:         General: Normal range of motion.      Cervical back: Normal range of motion and neck supple.      Right lower leg: No edema.   Lymphadenopathy:      Head:      Right side of head: No submental, submandibular or tonsillar adenopathy.      Left side of head: No submental, submandibular or tonsillar adenopathy.      Cervical: No cervical adenopathy.      Right cervical: No superficial cervical adenopathy.     Left cervical: No superficial cervical adenopathy.   Skin:     General: Skin is warm and dry.      Coloration: Skin is not pale.   Neurological:      Mental Status: She is alert and oriented to person, place, and time.   Psychiatric:         Behavior: Behavior normal.         Thought Content: Thought content normal.         Judgment: Judgment normal.        Assessment:       1. Dysuria    2. Acute cystitis without hematuria    3. Upper respiratory tract infection, unspecified type    4. Exposure to confirmed case of COVID-19         Plan:       Dysuria  -     Urine culture; Future; Expected date: 08/13/2024  -     POCT Urinalysis, Dipstick, Automated, W/O Scope  -     phenazopyridine (PYRIDIUM) 100 MG tablet; Take 1 tablet (100 mg total) by mouth 3 (three) times daily as needed for Pain.  Dispense: 9 tablet; Refill: 0    Acute cystitis without hematuria  Urinalysis is consistent with a urinary tract infection. Please take Antibiotics as prescribed, complete the full prescription. Will call with culture results if a change in treatment is required. Encouraged patient to increase clear fluid intake, avoid bladder irritants (caffeine, alcohol, spicy foods, and tomatoes), use bathroom when urge is felt (do not hold), be sure to empty bladder, no bubble baths, and urinate after soaking in tub or swimming. Seek re-evaluation for any worsening, unresolved or new symptoms.   -     ciprofloxacin HCl (CIPRO) 500 MG tablet; Take 1 tablet (500 mg total) by mouth every 12 (twelve) hours.  Dispense: 10 tablet; Refill: 0    Upper respiratory tract infection, unspecified type  Covid was Negative.   Advised patient to remain hydrated and take the following medications for symptomatic relief:   - Alternate Tylenol 500 mg 2 tablets or Ibuprofen 200 mg 2 tablets every 4-6 hours as needed for fever, headaches, sore throat, ear pain, body aches, and/or nasal/sinus inflammation  - Regular Mucinex / mucus relief 1200mg twice a day with plenty of fluids for chest congestion  - Nasal Saline spray (Over the counter) 2 sprays each nostril 2-3 times a day for nasal congestion  - Flonase 1 spray each nostril AM and PM for rhinitis  - Warm salt water gargles with 1/2 cup water and 1 tablespoon salt every 4 hours or Warm tea with honey and lemon for throat irritation  - Anti-tussive for cough suppression  - Decongestants no more than 3 days    Exposure to confirmed case of COVID-19  -     POCT COVID-19 Rapid Screening        No follow-ups on file.      8/16/2024  JODIE Ríos, FNP    This note was created using [employer] voice recognition software that occasionally misinterprets phrases or words.

## 2024-08-29 NOTE — PROGRESS NOTES
Hemoglobin up to 9.9, not sure if transfused  Would like for palliative care team to consult prior to any decisions for surgery

## 2024-08-29 NOTE — PROGRESS NOTES
times per day    ondansetron (ZOFRAN-ODT) disintegrating tablet 4 mg  4 mg Oral Q8H PRN    Or    ondansetron (ZOFRAN) injection 4 mg  4 mg IntraVENous Q6H PRN    polyethylene glycol (GLYCOLAX) packet 17 g  17 g Oral Daily PRN    acetaminophen (TYLENOL) tablet 650 mg  650 mg Oral Q6H PRN    Or    acetaminophen (TYLENOL) suppository 650 mg  650 mg Rectal Q6H PRN    Virt-Caps 1 mg  1 capsule Oral Daily       Review of Systems:      Complete 10-point review of systems were obtained and discussed in length  with the patient. Complete review of systems was negative/unremarkable  except as mentioned in HPI section.  Data Review:    Labs: Results:       Chemistry Recent Labs     08/27/24  1111 08/27/24 2046 08/28/24  0540 08/29/24  0406     --  140 137   K 6.4* 4.1 3.7 4.1     --  101 101   CO2 16*  --  26 24   *  --  61* 73*   GLOB 4.4*  --   --   --       CBC w/Diff Recent Labs     08/27/24 1111 08/28/24 0540 08/29/24  0406   WBC 11.5 8.0 9.4   RBC 2.45* 2.30* 2.82*   HGB 8.4* 7.8* 9.9*   HCT 26.0* 24.1* 30.5*    183 183      Coagulation No results for input(s): \"INR\", \"APTT\" in the last 72 hours.    Invalid input(s): \"PTP\"    Iron/Ferritin No results for input(s): \"IRON\" in the last 72 hours.    Invalid input(s): \"TIBCCALC\"   BNP Invalid input(s): \"BNPP\"   Cardiac Enzymes No results for input(s): \"CPK\" in the last 72 hours.    Invalid input(s): \"CKRMB\", \"CKND1\", \"TROIP\", \"ARIEL\"   Liver Enzymes No results for input(s): \"TP\" in the last 72 hours.    Invalid input(s): \"ALB\", \"TBIL\", \"AP\", \"SGOT\", \"GPT\", \"DBIL\"   Thyroid Studies Lab Results   Component Value Date/Time    TSH 1.74 09/13/2023 12:05 PM         EKG: normal sinus rhythm.    Physical Assessment:   Visit Vitals  BP (!) 163/66   Pulse 96   Temp 98.3 °F (36.8 °C) (Oral)   Resp 18   Ht 1.549 m (5' 0.98\")   Wt 59 kg (130 lb)   SpO2 96%   BMI 24.58 kg/m²     Weight change:     Intake/Output Summary (Last 24 hours) at 8/29/2024 1141  Last data  filed at 8/28/2024 1700  Gross per 24 hour   Intake --   Output 0 ml   Net 0 ml     Physical Exam:   General: comfortable, no acute distress   CVS: S1S2 heard,  no rub  RS: + air entry b/l,   Abd: Soft, Non tender, Not distended,   Neuro: non focal, awake,   Skin: no visible  Rash  Musculoskeletal:left leg swllen,   Right AKA    Procedures/imaging: see electronic medical records for all procedures, Xrays and details which were not copied into this note but were reviewed prior to creation of Plan            Andrea Riley MD  August 29, 2024  [facility]  Office [phone]

## 2024-08-29 NOTE — PLAN OF CARE
Problem: Chronic Conditions and Co-morbidities  Goal: Patient's chronic conditions and co-morbidity symptoms are monitored and maintained or improved  Outcome: Progressing  Flowsheets (Taken 8/28/2024 2840)  Care Plan - Patient's Chronic Conditions and Co-Morbidity Symptoms are Monitored and Maintained or Improved: Monitor and assess patient's chronic conditions and comorbid symptoms for stability, deterioration, or improvement

## 2024-08-29 NOTE — PLAN OF CARE
Problem: Chronic Conditions and Co-morbidities  Goal: Patient's chronic conditions and co-morbidity symptoms are monitored and maintained or improved  8/29/2024 0053 by Jael Chakraborty RN  Outcome: Progressing  8/28/2024 2017 by Sabina Can RN  Outcome: Progressing  Flowsheets  Taken 8/28/2024 2000 by Jael Chakraborty RN  Care Plan - Patient's Chronic Conditions and Co-Morbidity Symptoms are Monitored and Maintained or Improved: Monitor and assess patient's chronic conditions and comorbid symptoms for stability, deterioration, or improvement  Taken 8/28/2024 0840 by Sabina Can RN  Care Plan - Patient's Chronic Conditions and Co-Morbidity Symptoms are Monitored and Maintained or Improved: Monitor and assess patient's chronic conditions and comorbid symptoms for stability, deterioration, or improvement     Problem: Discharge Planning  Goal: Discharge to home or other facility with appropriate resources  8/29/2024 0053 by Jael Chakraborty RN  Outcome: Progressing  8/28/2024 2017 by Sabina Can RN  Outcome: Progressing  Flowsheets (Taken 8/28/2024 0840)  Discharge to home or other facility with appropriate resources: Identify barriers to discharge with patient and caregiver     Problem: Pain  Goal: Verbalizes/displays adequate comfort level or baseline comfort level  8/29/2024 0053 by Jael Chakarborty RN  Outcome: Progressing  8/28/2024 2017 by Sabina Can RN  Outcome: Progressing     Problem: Skin/Tissue Integrity  Goal: Absence of new skin breakdown  Description: 1.  Monitor for areas of redness and/or skin breakdown  2.  Assess vascular access sites hourly  3.  Every 4-6 hours minimum:  Change oxygen saturation probe site  4.  Every 4-6 hours:  If on nasal continuous positive airway pressure, respiratory therapy assess nares and determine need for appliance change or resting period.  8/29/2024 0053 by Jael Chakraborty RN  Outcome: Progressing  8/28/2024 2017 by Sabina Can RN  Outcome: Progressing

## 2024-08-29 NOTE — ACP (ADVANCE CARE PLANNING)
Advance Care Planning     Palliative Team Advance Care Planning (ACP) Conversation    Date of Conversation: 08/29/24    Individuals present for the conversation: Patient with decision making capacity. Able to update her Advance Medical Directive. Naming her nephew Edgardo Obrien Sr as primary healthcare agent. Form was scanned into EMR      ACP documents on file prior to discussion:  -Power of  for Healthcare  -Portable DNR    Previously completed document/s not on file: Not discussed.    Healthcare Decision Maker:    Primary Decision Maker: Edgardo Obrien Sr - Niece/Nephew - [phone]    Secondary Decision Maker: NATALY OBRIEN - Niece/Nephew - [phone]     Conversation Summary:  Mr Obrien is a 84-year-old female with h/o PAD status post right AKA, anemia of chronic disease, ESRD on HD, type 2 diabetes, hypertension, intermittent encephalopathy. She was admitted via the ED from HD with altered mental status and sepsis.     Palliative Medicine team viewed chart and saw patient while in hemodialysis. Patient alert quickly to her name and engaged in conversation with team. Our role was introduced as extra support and to assist when patient have difficult care decisions to make. Ms Obrien was able to share the information Dr Rowland shared with her about her need for amputation. She stated that she was conflicted since \"I don't want to loss my leg\". She did not know she had the options of not having surgery. Palliative team shared there was another option which focused on comfort and no aggressive measures including surgery and dialysis. Team did explained this choice would shorten her life due to infection and kidney failure. Ms. Obrien stated her nephew, Edgardo Obrien and Nataly Obrien are her main people and completed  a new AMD. She would like to speak with her family about the options. She gave verbal permission to call her nephew Edgardo Obrien Sr. to update him on the discussion.   Call was

## 2024-08-30 PROBLEM — Z99.2 ESRD NEEDING DIALYSIS (HCC): Status: ACTIVE | Noted: 2023-06-22

## 2024-08-30 PROBLEM — E11.8 DM (DIABETES MELLITUS), TYPE 2 WITH COMPLICATIONS (HCC): Status: ACTIVE | Noted: 2018-12-25

## 2024-08-30 PROBLEM — I73.9 PAD (PERIPHERAL ARTERY DISEASE) (HCC): Status: ACTIVE | Noted: 2023-06-21

## 2024-08-30 PROBLEM — I99.8 ISCHEMIC LEG: Status: ACTIVE | Noted: 2020-01-20

## 2024-08-30 LAB
ANION GAP SERPL CALC-SCNC: 11 MMOL/L (ref 3–18)
BASOPHILS # BLD: 0 K/UL (ref 0–0.1)
BASOPHILS NFR BLD: 0 % (ref 0–2)
BUN SERPL-MCNC: 34 MG/DL (ref 7–18)
BUN/CREAT SERPL: 7 (ref 12–20)
CALCIUM SERPL-MCNC: 9.8 MG/DL (ref 8.5–10.1)
CHLORIDE SERPL-SCNC: 100 MMOL/L (ref 100–111)
CO2 SERPL-SCNC: 27 MMOL/L (ref 21–32)
CREAT SERPL-MCNC: 4.79 MG/DL (ref 0.6–1.3)
DIFFERENTIAL METHOD BLD: ABNORMAL
EOSINOPHIL # BLD: 0 K/UL (ref 0–0.4)
EOSINOPHIL NFR BLD: 0 % (ref 0–5)
ERYTHROCYTE [DISTWIDTH] IN BLOOD BY AUTOMATED COUNT: 14.3 % (ref 11.6–14.5)
GLUCOSE BLD STRIP.AUTO-MCNC: 154 MG/DL (ref 70–110)
GLUCOSE BLD STRIP.AUTO-MCNC: 158 MG/DL (ref 70–110)
GLUCOSE BLD STRIP.AUTO-MCNC: 294 MG/DL (ref 70–110)
GLUCOSE BLD STRIP.AUTO-MCNC: 367 MG/DL (ref 70–110)
GLUCOSE BLD STRIP.AUTO-MCNC: 374 MG/DL (ref 70–110)
GLUCOSE SERPL-MCNC: 178 MG/DL (ref 74–99)
HCT VFR BLD AUTO: 26.6 % (ref 35–45)
HGB BLD-MCNC: 8.4 G/DL (ref 12–16)
IMM GRANULOCYTES # BLD AUTO: 0.1 K/UL (ref 0–0.04)
IMM GRANULOCYTES NFR BLD AUTO: 1 % (ref 0–0.5)
LYMPHOCYTES # BLD: 1.2 K/UL (ref 0.9–3.6)
LYMPHOCYTES NFR BLD: 10 % (ref 21–52)
MCH RBC QN AUTO: 34.6 PG (ref 24–34)
MCHC RBC AUTO-ENTMCNC: 31.6 G/DL (ref 31–37)
MCV RBC AUTO: 109.5 FL (ref 78–100)
MONOCYTES # BLD: 1.2 K/UL (ref 0.05–1.2)
MONOCYTES NFR BLD: 10 % (ref 3–10)
NEUTS SEG # BLD: 9.2 K/UL (ref 1.8–8)
NEUTS SEG NFR BLD: 78 % (ref 40–73)
NRBC # BLD: 0 K/UL (ref 0–0.01)
NRBC BLD-RTO: 0 PER 100 WBC
PLATELET # BLD AUTO: 190 K/UL (ref 135–420)
PMV BLD AUTO: 10.8 FL (ref 9.2–11.8)
POTASSIUM SERPL-SCNC: 3.6 MMOL/L (ref 3.5–5.5)
RBC # BLD AUTO: 2.43 M/UL (ref 4.2–5.3)
SODIUM SERPL-SCNC: 138 MMOL/L (ref 136–145)
WBC # BLD AUTO: 11.8 K/UL (ref 4.6–13.2)

## 2024-08-30 PROCEDURE — 94761 N-INVAS EAR/PLS OXIMETRY MLT: CPT

## 2024-08-30 PROCEDURE — 85025 COMPLETE CBC W/AUTO DIFF WBC: CPT

## 2024-08-30 PROCEDURE — 80048 BASIC METABOLIC PNL TOTAL CA: CPT

## 2024-08-30 PROCEDURE — 99232 SBSQ HOSP IP/OBS MODERATE 35: CPT | Performed by: INTERNAL MEDICINE

## 2024-08-30 PROCEDURE — 6370000000 HC RX 637 (ALT 250 FOR IP): Performed by: STUDENT IN AN ORGANIZED HEALTH CARE EDUCATION/TRAINING PROGRAM

## 2024-08-30 PROCEDURE — 82962 GLUCOSE BLOOD TEST: CPT

## 2024-08-30 PROCEDURE — 6370000000 HC RX 637 (ALT 250 FOR IP): Performed by: HOSPITALIST

## 2024-08-30 PROCEDURE — 2580000003 HC RX 258: Performed by: HOSPITALIST

## 2024-08-30 PROCEDURE — 1100000003 HC PRIVATE W/ TELEMETRY

## 2024-08-30 PROCEDURE — 6360000002 HC RX W HCPCS: Performed by: INTERNAL MEDICINE

## 2024-08-30 PROCEDURE — 2580000003 HC RX 258: Performed by: INTERNAL MEDICINE

## 2024-08-30 PROCEDURE — 36415 COLL VENOUS BLD VENIPUNCTURE: CPT

## 2024-08-30 PROCEDURE — 99233 SBSQ HOSP IP/OBS HIGH 50: CPT

## 2024-08-30 PROCEDURE — 6360000002 HC RX W HCPCS: Performed by: HOSPITALIST

## 2024-08-30 RX ADMIN — ATORVASTATIN CALCIUM 20 MG: 20 TABLET, FILM COATED ORAL at 20:41

## 2024-08-30 RX ADMIN — LEVOTHYROXINE SODIUM 25 MCG: 0.03 TABLET ORAL at 05:13

## 2024-08-30 RX ADMIN — HEPARIN SODIUM 5000 UNITS: 5000 INJECTION INTRAVENOUS; SUBCUTANEOUS at 20:41

## 2024-08-30 RX ADMIN — WATER 1000 MG: 1 INJECTION INTRAMUSCULAR; INTRAVENOUS; SUBCUTANEOUS at 14:08

## 2024-08-30 RX ADMIN — ASPIRIN 81 MG CHEWABLE TABLET 81 MG: 81 TABLET CHEWABLE at 08:55

## 2024-08-30 RX ADMIN — Medication 1 MG: at 08:55

## 2024-08-30 RX ADMIN — SODIUM CHLORIDE, PRESERVATIVE FREE 10 ML: 5 INJECTION INTRAVENOUS at 12:24

## 2024-08-30 RX ADMIN — SODIUM CHLORIDE, PRESERVATIVE FREE 10 ML: 5 INJECTION INTRAVENOUS at 14:12

## 2024-08-30 RX ADMIN — OLANZAPINE 5 MG: 2.5 TABLET, FILM COATED ORAL at 20:41

## 2024-08-30 RX ADMIN — OLANZAPINE 5 MG: 2.5 TABLET, FILM COATED ORAL at 08:56

## 2024-08-30 RX ADMIN — FOLIC ACID 1 MG: 1 TABLET ORAL at 08:56

## 2024-08-30 RX ADMIN — HEPARIN SODIUM 5000 UNITS: 5000 INJECTION INTRAVENOUS; SUBCUTANEOUS at 14:09

## 2024-08-30 RX ADMIN — INSULIN LISPRO 8 UNITS: 100 INJECTION, SOLUTION INTRAVENOUS; SUBCUTANEOUS at 12:23

## 2024-08-30 RX ADMIN — HEPARIN SODIUM 5000 UNITS: 5000 INJECTION INTRAVENOUS; SUBCUTANEOUS at 05:13

## 2024-08-30 ASSESSMENT — PAIN SCALES - GENERAL
PAINLEVEL_OUTOF10: 0

## 2024-08-30 NOTE — PLAN OF CARE
Problem: Chronic Conditions and Co-morbidities  Goal: Patient's chronic conditions and co-morbidity symptoms are monitored and maintained or improved  Outcome: Progressing     Problem: Discharge Planning  Goal: Discharge to home or other facility with appropriate resources  Outcome: Progressing     Problem: Pain  Goal: Verbalizes/displays adequate comfort level or baseline comfort level  Outcome: Progressing     Problem: Skin/Tissue Integrity  Goal: Absence of new skin breakdown  Description: 1.  Monitor for areas of redness and/or skin breakdown  2.  Assess vascular access sites hourly  3.  Every 4-6 hours minimum:  Change oxygen saturation probe site  4.  Every 4-6 hours:  If on nasal continuous positive airway pressure, respiratory therapy assess nares and determine need for appliance change or resting period.  Outcome: Progressing     Problem: Safety - Adult  Goal: Free from fall injury  Outcome: Progressing     Problem: ABCDS Injury Assessment  Goal: Absence of physical injury  Outcome: Progressing     Problem: Nutrition Deficit:  Goal: Optimize nutritional status  Outcome: Progressing     Problem: Neurosensory - Adult  Goal: Achieves stable or improved neurological status  Outcome: Progressing     Problem: Cardiovascular - Adult  Goal: Absence of cardiac dysrhythmias or at baseline  Outcome: Progressing     Problem: Skin/Tissue Integrity - Adult  Goal: Skin integrity remains intact  Outcome: Progressing  Flowsheets (Taken 8/30/2024 8795)  Skin Integrity Remains Intact: Monitor for areas of redness and/or skin breakdown     Problem: Musculoskeletal - Adult  Goal: Return mobility to safest level of function  Outcome: Progressing     Problem: Genitourinary - Adult  Goal: Absence of urinary retention  Outcome: Progressing     Problem: Infection - Adult  Goal: Absence of infection at discharge  Outcome: Progressing     Problem: Metabolic/Fluid and Electrolytes - Adult  Goal: Electrolytes maintained within normal  limits  Outcome: Progressing     Problem: Hematologic - Adult  Goal: Maintains hematologic stability  Outcome: Progressing

## 2024-08-30 NOTE — PROGRESS NOTES
Report received from DIEGO Colunga.    Patient resting in bed, sitting oddly, sitting with back to the railing in the middle of the bed. Patient insists she is comfortable, will not move to middle of bed and turn around. Bed alarm set.     2043 patient appears to be hallucinating, stating she is talking to someone in the corner of the room.     2130 spoke with provider Wilner about patient's fracture and the fact that the fracture appears displaced and is movable. Provider agrees that brace can be applied.    2150 spoke with ED tech that will come and help place brace    2230 ED techs braced patients ankle with no difficulty. Patient comfortable.    0643 patient has been awake all night, has not slept at all. Patient did get agitated when I attempted to place PIV.

## 2024-08-30 NOTE — PLAN OF CARE
Problem: Chronic Conditions and Co-morbidities  Goal: Patient's chronic conditions and co-morbidity symptoms are monitored and maintained or improved  8/29/2024 2319 by Jael Chakraborty, RN  Outcome: Progressing  8/29/2024 1611 by Marsha Brewer RN  Outcome: Progressing     Problem: Discharge Planning  Goal: Discharge to home or other facility with appropriate resources  Outcome: Progressing     Problem: Pain  Goal: Verbalizes/displays adequate comfort level or baseline comfort level  Outcome: Progressing     Problem: Skin/Tissue Integrity  Goal: Absence of new skin breakdown  Description: 1.  Monitor for areas of redness and/or skin breakdown  2.  Assess vascular access sites hourly  3.  Every 4-6 hours minimum:  Change oxygen saturation probe site  4.  Every 4-6 hours:  If on nasal continuous positive airway pressure, respiratory therapy assess nares and determine need for appliance change or resting period.  Outcome: Progressing     Problem: Safety - Adult  Goal: Free from fall injury  Outcome: Progressing     Problem: ABCDS Injury Assessment  Goal: Absence of physical injury  Outcome: Progressing     Problem: Nutrition Deficit:  Goal: Optimize nutritional status  Outcome: Progressing     Problem: Neurosensory - Adult  Goal: Achieves stable or improved neurological status  Outcome: Progressing     Problem: Cardiovascular - Adult  Goal: Absence of cardiac dysrhythmias or at baseline  Outcome: Progressing     Problem: Skin/Tissue Integrity - Adult  Goal: Skin integrity remains intact  Outcome: Progressing     Problem: Musculoskeletal - Adult  Goal: Return mobility to safest level of function  Outcome: Progressing     Problem: Genitourinary - Adult  Goal: Absence of urinary retention  Outcome: Progressing     Problem: Infection - Adult  Goal: Absence of infection at discharge  Outcome: Progressing     Problem: Metabolic/Fluid and Electrolytes - Adult  Goal: Electrolytes maintained within normal limits  Outcome:

## 2024-08-30 NOTE — PROGRESS NOTES
Advance Care Planning   Healthcare Decision Maker:    Primary Decision Maker: Edgardo Granger Sr - Niece/Nephew - [phone]    Secondary Decision Maker: CAMILLE,CORRINE - Niece/Nephew - [phone]    Today we documented Decision Maker(s) consistent with ACP documents on file.       Spiritual Health Assessment/Progress Note  [facility]    Palliative Care,  ,  ,      Name: Kaylie Granger MRN: [id number]    Age: 84 y.o.     Sex: female   Language: English   Church: [employer]   Hyperkalemia     Date: 8/30/2024            Total Time Calculated: (P) 7 min              Spiritual Assessment began in [facility] MEDICAL        Referral/Consult From: Palliative Care   Encounter Overview/Reason: Palliative Care  Service Provided For: Patient    Charisse, Belief, Meaning:   Patient has beliefs or practices that help with coping during difficult times  Family/Friends No family/friends present      Importance and Influence:  Patient Other: unknown  Family/Friends no family/friends present    Community:  Patient is connected with a spiritual community  Family/Friends no family/friends present    Assessment and Plan of Care:     Patient Interventions include: Affirmed coping skills/support systems  Family/Friends Interventions include: no family/friends present    Patient Plan of Care: Spiritual Care available upon further referral  Family/Friends Plan of Care: Spiritual Care available upon further referral    Electronically signed by ALISHA Ruiz on 8/30/2024 at 5:20 PM

## 2024-08-30 NOTE — ACP (ADVANCE CARE PLANNING)
Advance Care Planning     Palliative Team Advance Care Planning (ACP) Conversation    Date of Conversation: 08/30/24    Individuals present for the conversation: Patient with decision making capacity, Legal healthcare agent named below, and Nataly Obrien      ACP documents on file prior to discussion:  -Power of  for Healthcare  -Portable DNR    Previously completed document/s not on file: Patient / participant reports that there are no previously executed ACP documents.    Healthcare Decision Maker:    Primary Decision Maker: Edgardo Obrien Sr - Niece/Nephew - [phone]    Secondary Decision Maker: NATALY OBRIEN - Niece/Nephew - [phone]     Conversation Summary:  Palliative team members, Trang Pereyra NP and this writer for family meeting at bedside. Ms. Obrien was A/O x4, in no acute distress, on room air, very articulate this morning. Nephew and niece in law at bedside for meeting to discuss goals of care and next steps in her care. The decision for or against right lower limb amputation was addressed. Ms Kaylie Obrien was very clear she don not want to undergo amputation of her right leg.  Patient expressed understanding of the risks of continued infection with sepsis. Team discussed her desire to continue hemodialysis. She added she has missed some not because she did not want to go but because of transportation ans scheduling. The supportive service of hospice was introduced when her condition declines. Mr Reynoso and Nataly Obrien expressed understanding of the possible progression of her right leg issues.  They will honor patient's wishes at this time but understand they may have to make decisions for patient in the future.     Attending and Vascular MD updated to patient's choice.     Resuscitation Status:   Code Status: DNR/DNI     Documentation Completed:  -No new documents completed.    Team spent 50 minutes with the patient and/or surrogate decision maker discussing the patient's  wishes and goals.      Montserrat Townsend BSN, RN, CHPN  Palliative Medicine Inpatient RN  Palliative COPE Line: [phone]    Montserrat Townsend, RN

## 2024-08-30 NOTE — CARE COORDINATION
08/30/24 1118   /Social Work Whiteboard Notes   /Social Work Whiteboard Red 8/30 When medically cleared,  Dispo back to LTC at [facility]. -TV     MICAH Rehman     [phone]

## 2024-08-30 NOTE — PROGRESS NOTES
Progress Note      84-year-old female with past medical history of diabetes hypertension, ESRD admitted for hyperkalemia renal failure, following for ESRD management  Interim event     Denies for any chest pain or short of breath  Discussed plan for dialysis today she agrees      IMPRESSION:   ESRD, Tuesday Thursday Saturday schedule missed several treatment  Access AV graft  Hyperkalemia, improving   Severe uremia,improving  Dyspnea, fluid overload  Anemia,   History of severe peripheral vascular disease, right above-knee amputation  Extensive left lower extremity vascular blockage   PLAN:   Plan dialysis tomorrow  Check anemia panel, resume Epogen.  Awaiting input from palliative care team.  Adjust medication for ESRD status.             Current Facility-Administered Medications   Medication Dose Route Frequency    epoetin joanie-epbx (RETACRIT) injection 3,000 Units  3,000 Units SubCUTAneous Once per day on Tuesday Thursday Saturday    oxyCODONE-acetaminophen (PERCOCET) 7.5-325 MG per tablet 1 tablet  1 tablet Oral Q4H PRN    HYDROmorphone (DILAUDID) injection 0.25 mg  0.25 mg IntraVENous Q6H PRN    insulin lispro (HUMALOG,ADMELOG) injection vial 0-8 Units  0-8 Units SubCUTAneous TID WC    insulin lispro (HUMALOG,ADMELOG) injection vial 0-4 Units  0-4 Units SubCUTAneous Nightly    cefTRIAXone (ROCEPHIN) 1,000 mg in sterile water 10 mL IV syringe  1,000 mg IntraVENous Q24H    glucose chewable tablet 16 g  4 tablet Oral PRN    dextrose bolus 10% 125 mL  125 mL IntraVENous PRN    Or    dextrose bolus 10% 250 mL  250 mL IntraVENous PRN    glucagon (rDNA) injection 1 mg  1 mg SubCUTAneous PRN    dextrose 10 % infusion   IntraVENous Continuous PRN    aspirin chewable tablet 81 mg  81 mg Oral Daily    atorvastatin (LIPITOR) tablet 20 mg  20 mg Oral Daily    folic acid (FOLVITE) tablet 1 mg  1 mg Oral Daily    levothyroxine (SYNTHROID) tablet 25 mcg  25 mcg Oral QAM AC    OLANZapine (ZYPREXA) tablet 5 mg  5 mg Oral BID

## 2024-08-30 NOTE — PROGRESS NOTES
Hospitalist Progress Note    NAME:  Kaylie Granger   :   1940   MRN:   [id number]     Date/Time:  2024  Subjective:  Barriers for discharge; left leg ischemia need for left AKA  Expected discharge date to be determined as waiting on surgery  Disposition probably be SNF after surgery;       Chief Complaint: Altered mental status missed hemodialysis treatments, hyperkalemia;    Patient met with the palliative care looks like she does not want the AKA      Plan per vascular           Past Med History and Social history reviewed. No changes.   [x]  Current Medication list and allergies reviewed*    Objective:   Vitals:  /78   Pulse 91   Temp 97.4 °F (36.3 °C) (Oral)   Resp 16   Ht 1.549 m (5' 0.98\")   Wt 59 kg (130 lb)   SpO2 98%   BMI 24.58 kg/m²   Temp (24hrs), Av.4 °F (36.9 °C), Min:97.4 °F (36.3 °C), Max:99.3 °F (37.4 °C)      Last 24hr Input/Output:    Intake/Output Summary (Last 24 hours) at 2024 1438  Last data filed at 2024 1328  Gross per 24 hour   Intake 980 ml   Output 2033 ml   Net -1053 ml        PHYSICAL EXAM:  Gen:  []  WD []  WN  [x]  cachectic  []  thin  []  obese  []  disheveled             []   Critically ill or  [x]  Chronically ill appearing    HEENT:   []   red/pink conjunctivae [x]  pale conjunctivae                  PERRL  []  yes  []  no        hearing intact to voice []  yes  []  No               []  moist or  [x]  dry  Mucosa  NECK:   supple [x]  yes  []  no      masses []  yes  []  No               thyroid    []  non tender []  tender    RESP:   use of accessory muscles []  yes [x]  no                BS    [x]  clear  []  wheezing []  rhonchi []  crackles   CARD:  murmur  []  yes [x]  no   []  thrill  []  carotid bruits                 LE edema []  yes  []  no    [x]  JVD present    ABD:    tenderness []  yes [x]  no   Liver/spleen enlargement []   yes []   no                distended []   yes [x]  no    bowel sound []  normal []  hypo or  []

## 2024-08-30 NOTE — PROGRESS NOTES
Palliative Medicine Progress Note  Patient Name: Kaylie Granger  YOB: 1940  MRN: [id number]  Age: 84 y.o.  Gender: female    Date of Initial Consult: 8/29/2024  Date of Service: 8/30/2024  Time: 11:32 AM  Provider: Jennifer Pereyra BannerERIKA  Hospital Day: 4  Admit Date: 8/27/2024  Referring Provider: Dr. Rodrigues       Reasons for Consultation:  Goals of Care    HISTORY OF PRESENT ILLNESS (HPI):   Kaylie Granger is a 84 y.o. female with a past medical history of anemia of renal disease, CKD requiring dialysis, HTN, HLD, PVD, DM-2, who was admitted on 8/27/2024 from outpatient dialysis via EMS with a diagnosis of stenosis of artery of left lower extremity and hyperkalemia with a potassium of 6.4. Per chart she was at outpatient dialysis and EMS was called because the patient was unresponsive. Per notes she has not had dialysis in over a week. Per notes, EMS administered Narcan and she responded \"immediately\"  and became alert. Ultrasound was performed in ED which showed greater than 70% stenosis, vascular surgery was consulted, and they recommended a possible left AKA. Palliative care was consulted for goals of care.    Psychosocial: Patient is  and has no children. She is a retired nurse's aide. She is a [employer].    8/30/2024 Patient A/O x4, in no acute distress, on room air, very articulate this morning. Nephew and niece in law at bedside for meeting to discuss goals of care.    8/29/2024 Patient seen in dialysis unit. A/O, some forgetfulness but easily corrected. Answered questions appropriately. Complained of intermittent pain in left leg. On room air, no acute distress.      PALLIATIVE DIAGNOSES:    Goals of care  Encounter for palliative care  Stenosis of artery of left lower extremity  Hyperkalemia  ESRD on dialysis  History of right AKA      ASSESSMENT AND PLAN:   Goals of care    8/30/2024 Patient seen at bedside for family meeting along with Montserrat Townsend RN. Maria L Reynoso and

## 2024-08-31 PROBLEM — S82.832A FRACTURE OF DISTAL END OF TIBIA WITH FIBULA, LEFT, CLOSED, INITIAL ENCOUNTER: Status: ACTIVE | Noted: 2024-08-31

## 2024-08-31 PROBLEM — M80.00XA AGE-RELATED OSTEOPOROSIS WITH CURRENT PATHOLOGICAL FRACTURE: Status: ACTIVE | Noted: 2024-08-31

## 2024-08-31 PROBLEM — S82.302A FRACTURE OF DISTAL END OF TIBIA WITH FIBULA, LEFT, CLOSED, INITIAL ENCOUNTER: Status: ACTIVE | Noted: 2024-08-31

## 2024-08-31 LAB
GLUCOSE BLD STRIP.AUTO-MCNC: 110 MG/DL (ref 70–110)
GLUCOSE BLD STRIP.AUTO-MCNC: 152 MG/DL (ref 70–110)
GLUCOSE BLD STRIP.AUTO-MCNC: 169 MG/DL (ref 70–110)
GLUCOSE BLD STRIP.AUTO-MCNC: 196 MG/DL (ref 70–110)
GLUCOSE BLD STRIP.AUTO-MCNC: 210 MG/DL (ref 70–110)
GLUCOSE BLD STRIP.AUTO-MCNC: 247 MG/DL (ref 70–110)

## 2024-08-31 PROCEDURE — 82962 GLUCOSE BLOOD TEST: CPT

## 2024-08-31 PROCEDURE — P9047 ALBUMIN (HUMAN), 25%, 50ML: HCPCS | Performed by: INTERNAL MEDICINE

## 2024-08-31 PROCEDURE — 1100000003 HC PRIVATE W/ TELEMETRY

## 2024-08-31 PROCEDURE — 6370000000 HC RX 637 (ALT 250 FOR IP): Performed by: HOSPITALIST

## 2024-08-31 PROCEDURE — 99223 1ST HOSP IP/OBS HIGH 75: CPT | Performed by: SPECIALIST

## 2024-08-31 PROCEDURE — 94761 N-INVAS EAR/PLS OXIMETRY MLT: CPT

## 2024-08-31 PROCEDURE — 6360000002 HC RX W HCPCS: Performed by: INTERNAL MEDICINE

## 2024-08-31 PROCEDURE — 2580000003 HC RX 258: Performed by: HOSPITALIST

## 2024-08-31 PROCEDURE — 6360000002 HC RX W HCPCS: Performed by: HOSPITALIST

## 2024-08-31 PROCEDURE — 6370000000 HC RX 637 (ALT 250 FOR IP): Performed by: STUDENT IN AN ORGANIZED HEALTH CARE EDUCATION/TRAINING PROGRAM

## 2024-08-31 PROCEDURE — 99232 SBSQ HOSP IP/OBS MODERATE 35: CPT | Performed by: HOSPITALIST

## 2024-08-31 PROCEDURE — 90935 HEMODIALYSIS ONE EVALUATION: CPT

## 2024-08-31 RX ORDER — AMOXICILLIN 250 MG/1
250 CAPSULE ORAL EVERY 8 HOURS SCHEDULED
Status: DISCONTINUED | OUTPATIENT
Start: 2024-08-31 | End: 2024-09-01

## 2024-08-31 RX ORDER — ALBUMIN (HUMAN) 12.5 G/50ML
25 SOLUTION INTRAVENOUS
Status: COMPLETED | OUTPATIENT
Start: 2024-08-31 | End: 2024-08-31

## 2024-08-31 RX ADMIN — OLANZAPINE 5 MG: 2.5 TABLET, FILM COATED ORAL at 08:38

## 2024-08-31 RX ADMIN — AMOXICILLIN 250 MG: 250 CAPSULE ORAL at 20:55

## 2024-08-31 RX ADMIN — HEPARIN SODIUM 5000 UNITS: 5000 INJECTION INTRAVENOUS; SUBCUTANEOUS at 20:54

## 2024-08-31 RX ADMIN — OLANZAPINE 5 MG: 2.5 TABLET, FILM COATED ORAL at 20:55

## 2024-08-31 RX ADMIN — LEVOTHYROXINE SODIUM 25 MCG: 0.03 TABLET ORAL at 06:40

## 2024-08-31 RX ADMIN — SODIUM CHLORIDE, PRESERVATIVE FREE 10 ML: 5 INJECTION INTRAVENOUS at 20:59

## 2024-08-31 RX ADMIN — EPOETIN ALFA-EPBX 3000 UNITS: 3000 INJECTION, SOLUTION INTRAVENOUS; SUBCUTANEOUS at 17:44

## 2024-08-31 RX ADMIN — Medication 1 MG: at 08:39

## 2024-08-31 RX ADMIN — ASPIRIN 81 MG CHEWABLE TABLET 81 MG: 81 TABLET CHEWABLE at 08:39

## 2024-08-31 RX ADMIN — FOLIC ACID 1 MG: 1 TABLET ORAL at 08:39

## 2024-08-31 RX ADMIN — ATORVASTATIN CALCIUM 20 MG: 20 TABLET, FILM COATED ORAL at 20:55

## 2024-08-31 RX ADMIN — INSULIN LISPRO 2 UNITS: 100 INJECTION, SOLUTION INTRAVENOUS; SUBCUTANEOUS at 11:54

## 2024-08-31 RX ADMIN — HEPARIN SODIUM 5000 UNITS: 5000 INJECTION INTRAVENOUS; SUBCUTANEOUS at 06:40

## 2024-08-31 RX ADMIN — HEPARIN SODIUM 5000 UNITS: 5000 INJECTION INTRAVENOUS; SUBCUTANEOUS at 17:44

## 2024-08-31 RX ADMIN — ALBUMIN (HUMAN) 25 G: 0.25 INJECTION, SOLUTION INTRAVENOUS at 15:40

## 2024-08-31 ASSESSMENT — PAIN SCALES - GENERAL
PAINLEVEL_OUTOF10: 0

## 2024-08-31 NOTE — PROGRESS NOTES
Received pre HD report from  DIEGO Escalante.        Pt in bed, A+O x1, no s/s of distress noted.      Accessed AVG Left upper arm w/15G needle w/o difficulty.    Tx initiated at 1406.      AVG flowing with ease.  For hemodynamic stability UF goal 3000 ml.      Offered assistance with repositioning every 2 hours.  Vascular access visible at all times throughout entire duration of treatment and line connections remain intact throughout entire duration of treatment.     @1515 pt bp dropped, UF was turned off bolus 100cc NS given, patient denied and s/s of hypotension.  25g Albumin started.  Md made aware.    @1545 UF turned on and UF goal adjusted.      Tx completed at 1709, tolerated well 1.1L removed.  De-accessed per protocol.    Clot time 5 minutes for arterial, and 5 minutes for venous.      Patient returned to unit in no acute distress.  Unit nurse JAM Benson RN given report.

## 2024-08-31 NOTE — PROGRESS NOTES
Nighttime hospitalist note  RN advised me that patient had a recent left tib-fib fracture and currently does not have any splint on.  I reviewed chart.  It appears that patient was admitted to the hospital on August 20 and was noted to have a left tib-fib fracture and orthopedics had recommended to apply a well-padded U and sugar-tong splint to the left lower extremity.  I will place orders for this splint to be placed but if the staff are not able to then left lower extremity brace can be placed with Ace bandage in place with padding.  I will sign out to the daytime hospitalist to consult orthopedics tomorrow morning.  I have discussed with the nursing staff.  I also discussed with nursing supervisor

## 2024-08-31 NOTE — PROGRESS NOTES
Progress Note      84-year-old female with past medical history of diabetes hypertension, ESRD admitted for hyperkalemia renal failure, following for ESRD management  Interim event     Denies for any chest pain or short of breath        IMPRESSION:   ESRD, Tuesday Thursday Saturday schedule missed several treatment  Access AV graft  Hyperkalemia, improving   Severe uremia,improving  Dyspnea, fluid overload  Anemia,   History of severe peripheral vascular disease, right above-knee amputation  Extensive left lower extremity vascular blockage,refused surgery   PLAN:   Tolerated dialysis today  Check anemia panel, resume Epogen.  Awaiting input from palliative care team.  Adjust medication for ESRD status.             Current Facility-Administered Medications   Medication Dose Route Frequency    epoetin joanie-epbx (RETACRIT) injection 3,000 Units  3,000 Units SubCUTAneous Once per day on Tuesday Thursday Saturday    oxyCODONE-acetaminophen (PERCOCET) 7.5-325 MG per tablet 1 tablet  1 tablet Oral Q4H PRN    HYDROmorphone (DILAUDID) injection 0.25 mg  0.25 mg IntraVENous Q6H PRN    insulin lispro (HUMALOG,ADMELOG) injection vial 0-8 Units  0-8 Units SubCUTAneous TID WC    insulin lispro (HUMALOG,ADMELOG) injection vial 0-4 Units  0-4 Units SubCUTAneous Nightly    cefTRIAXone (ROCEPHIN) 1,000 mg in sterile water 10 mL IV syringe  1,000 mg IntraVENous Q24H    glucose chewable tablet 16 g  4 tablet Oral PRN    dextrose bolus 10% 125 mL  125 mL IntraVENous PRN    Or    dextrose bolus 10% 250 mL  250 mL IntraVENous PRN    glucagon (rDNA) injection 1 mg  1 mg SubCUTAneous PRN    dextrose 10 % infusion   IntraVENous Continuous PRN    aspirin chewable tablet 81 mg  81 mg Oral Daily    atorvastatin (LIPITOR) tablet 20 mg  20 mg Oral Daily    folic acid (FOLVITE) tablet 1 mg  1 mg Oral Daily    levothyroxine (SYNTHROID) tablet 25 mcg  25 mcg Oral QAM AC    OLANZapine (ZYPREXA) tablet 5 mg  5 mg Oral BID    sodium chloride flush

## 2024-08-31 NOTE — PLAN OF CARE
INTERVENTION:  HEMODYNAMIC STABILIZATION  MAINTAIN BP WNL WHILE ON HD.    INTERVENTION:  FLUID MANAGEMENT  WILL ATTEMPT 3000 ML TOTAL FLUID REMOVAL AS TOLERATED.    INTERVENTION:  METABOLIC/ELECTROLYTE MANAGEMENT  2.0 POTASSIUM 2.5 CALCIUM DIALYSATE USED WITH HD TODAY.    INTERVENTION:  HEMODIALYSIS ACCESS SITE MANAGEMENT  RIGHT UPPER ARM AVG ACCESSED WITH 15G NEEDLE USING ASEPTIC TECHNIQUE.    GOAL:  SIGNS AND SYMPTOMS OF LISTED POTENTIAL PROBLEMS WILL BE ABSENT OR MANAGEABLE.    OUTCOME:  PROGRESSING.    HD PLANNED FOR 3 HOURS TODAY.      Problem: Chronic Conditions and Co-morbidities  Goal: Patient's chronic conditions and co-morbidity symptoms are monitored and maintained or improved  8/31/2024 1455 by Jes Parra RN  Outcome: Progressing  Flowsheets (Taken 8/31/2024 1455)  Care Plan - Patient's Chronic Conditions and Co-Morbidity Symptoms are Monitored and Maintained or Improved:   Monitor and assess patient's chronic conditions and comorbid symptoms for stability, deterioration, or improvement   Collaborate with multidisciplinary team to address chronic and comorbid conditions and prevent exacerbation or deterioration   Update acute care plan with appropriate goals if chronic or comorbid symptoms are exacerbated and prevent overall improvement and discharge

## 2024-08-31 NOTE — PROGRESS NOTES
Hospitalist Progress Note    NAME:  Kaylie Granger   :   1940   MRN:   [id number]     Date/Time:  2024    Subjective:            Past Med History and Social history reviewed. No changes.   [x]  Current Medication list and allergies reviewed    Patient seen and examined on dialysis, she voices no complaint.  Denies pain anywhere.  Dialysis nurse reports that patient will not hold her arm still during treatment.  Access has been working fine so far.           Assessment/Plan:     84-year-old female with h/o PAD status post right AKA, anemia of chronic disease, ESRD on HD, type 2 diabetes, hypertension, intermittent encephalopathy presents to the emergency room secondary to an altered mental status during hemodialysis.  Patient was recently admitted to the hospital secondary to a left tib-fib fracture, orthopedics consulted, recommended conservative management, patient discharged back to long-term care facility.     Patient underwent emergent dialysis. In the emergency room the patient's mental status had improved however she was noted to have dusky and cool left lower extremity toes.  Ultrasound left lower extremity completed, showed greater than 70% stenosis. ER physician contacted vascular surgery, recommended admission with possible left AKA. Patient was also noted to be hyperkalemic, nephrology consulted, patient underwent urgent hemodialysis.    Acute metabolic encephalopathy during hemodialysis.  Head CT nil acute.  Chest patient is more alert now but remains confused, seems to be at baseline mental status.     Left leg toes PAD, ultrasound left lower extremity showing greater than 70% stenosis, vascular surgery consulted, recommended possible left AKA.  as needed Percocet and IV Dilaudid for severe left leg pain.  Patient has declined vascular surgery to left lower extremity.  Palliative care input appreciated    Rule out acute cystitis,; urine looks dirty   Continue iv Rocephin and await urine

## 2024-08-31 NOTE — CONSULTS
Consult    Patient: Kaylie Granger MRN: [id number]  SSN: [id number]    YOB: 1940  Age: 84 y.o.  Sex: female      Subjective:      Kaylie Granger is a 84 y.o. female referred by the Dr. Willis for left distal tib-fib fracture.  She was seen by DAYA Torrez 2024 following a fall at her nursing home.  She has a history of right AKA and is a nonambulator.  Nonsurgical treatment was chosen and a posterior splint was applied.  She has a history ofhypertension, diabetes, hyperlipidemia, CKD stage V on dialysis.  She is a poor historian.    Past Medical History:   Diagnosis Date    Anemia of renal disease     CKD (chronic kidney disease) requiring chronic dialysis (HCC)     T//SAT at [facility] [phone]. [phone]    Hypercholesterolemia     Hypertension     Peripheral vascular disease (HCC)     Secondary hyperparathyroidism of renal origin ([facility])     Type 2 diabetes mellitus treated with insulin ([facility])     no meds per patient on 23     Past Surgical History:   Procedure Laterality Date    ABOVE KNEE AMPUTATION Right 2020    CATARACT EXTRACTION, BILATERAL Bilateral     COLONOSCOPY      DIALYSIS FISTULA CREATION Right 2024    RIGHT ARM ARTERIOVENOUS GRAFT CREATION performed by Angel Rowland MD at [facility] CARDIAC SURGERY    PORT SURGERY Right 2023    TUNNELED CATHETER PLACEMENT; C-ARM performed by Sherri Jean MD at [facility] CARDIAC SURGERY    PORT SURGERY Right 2023    PERM CATHETER EXCHANGE; C-ARM performed by Angel Rowland MD at [facility] CARDIAC SURGERY      Family History   Problem Relation Age of Onset    Hypertension Mother      Social History     Tobacco Use    Smoking status: Former     Current packs/day: 0.00     Types: Cigarettes     Quit date: 1996     Years since quittin.5    Smokeless tobacco: Never   Substance Use Topics    Alcohol use: Never      Current Facility-Administered Medications   Medication Dose Route Frequency  IntraVENous PRN Darrius Horvath MD   10 mL at 08/30/24 1412    0.9 % sodium chloride infusion   IntraVENous PRN Darrius Horvath MD        heparin (porcine) injection 5,000 Units  5,000 Units SubCUTAneous 3 times per day Darrius Horvaht MD   5,000 Units at 08/31/24 0640    ondansetron (ZOFRAN-ODT) disintegrating tablet 4 mg  4 mg Oral Q8H PRN Darrius Horvath MD        Or    ondansetron (ZOFRAN) injection 4 mg  4 mg IntraVENous Q6H PRN Darrius Horvath MD   4 mg at 08/29/24 1329    polyethylene glycol (GLYCOLAX) packet 17 g  17 g Oral Daily PRN Darrius Horvath MD        Virt-Caps 1 mg  1 capsule Oral Daily Darrius Horvath MD   1 mg at 08/31/24 0839        No Known Allergies    Objective:     Vitals:    08/30/24 2359 08/31/24 0400 08/31/24 0600 08/31/24 0730   BP: (!) 116/51 (!) 152/59  128/65   Pulse: 82 60  55   Resp: 18 18  18   Temp: 98 °F (36.7 °C) 98.2 °F (36.8 °C)  98.1 °F (36.7 °C)   TempSrc: Oral Oral  Oral   SpO2: 95% 99%  98%   Weight:   59 kg (130 lb)    Height:            Physical Exam:  Not oriented X 3  Posterior splint in good repair and clean  Sitting on side of bed with left leg down    X ray:  Right ankle 8/21/2024  FINDINGS:     Overlying material and osseous demineralization limits examination.     2 transfixing screws are seen obliquely oriented traversing the medial  malleolus. A plate and screw construct is seen along the distal fibula.     There is redemonstration of transversely oriented, nondisplaced and possibly  impacted fracture involving the distal tibial metadiaphysis with similar  alignment when compared to prior.    CT right distal tib-fib 8/20/2024  FINDINGS:   There is a minimally displaced impacted fracture of the distal tibial metaphysis  above the tip of the lag screw inserted through the medial malleolus. The  fracture appears mildly comminuted and impacted. There is a possible fracture in  the distal fibula at the level of the proximal sideplate. This

## 2024-09-01 VITALS
DIASTOLIC BLOOD PRESSURE: 65 MMHG | OXYGEN SATURATION: 100 % | SYSTOLIC BLOOD PRESSURE: 146 MMHG | BODY MASS INDEX: 22.98 KG/M2 | TEMPERATURE: 98.3 F | WEIGHT: 121.69 LBS | HEART RATE: 87 BPM | HEIGHT: 61 IN | RESPIRATION RATE: 18 BRPM

## 2024-09-01 LAB
ANION GAP SERPL CALC-SCNC: 14 MMOL/L (ref 3–18)
BASOPHILS # BLD: 0 K/UL (ref 0–0.1)
BASOPHILS NFR BLD: 0 % (ref 0–2)
BUN SERPL-MCNC: 34 MG/DL (ref 7–18)
BUN/CREAT SERPL: 8 (ref 12–20)
CALCIUM SERPL-MCNC: 11.5 MG/DL (ref 8.5–10.1)
CHLORIDE SERPL-SCNC: 96 MMOL/L (ref 100–111)
CO2 SERPL-SCNC: 25 MMOL/L (ref 21–32)
CREAT SERPL-MCNC: 4.3 MG/DL (ref 0.6–1.3)
DIFFERENTIAL METHOD BLD: ABNORMAL
EOSINOPHIL # BLD: 0.1 K/UL (ref 0–0.4)
EOSINOPHIL NFR BLD: 1 % (ref 0–5)
ERYTHROCYTE [DISTWIDTH] IN BLOOD BY AUTOMATED COUNT: 14.6 % (ref 11.6–14.5)
GLUCOSE BLD STRIP.AUTO-MCNC: 144 MG/DL (ref 70–110)
GLUCOSE BLD STRIP.AUTO-MCNC: 201 MG/DL (ref 70–110)
GLUCOSE SERPL-MCNC: 119 MG/DL (ref 74–99)
HCT VFR BLD AUTO: 28.7 % (ref 35–45)
HGB BLD-MCNC: 9 G/DL (ref 12–16)
IMM GRANULOCYTES # BLD AUTO: 0.1 K/UL (ref 0–0.04)
IMM GRANULOCYTES NFR BLD AUTO: 1 % (ref 0–0.5)
LYMPHOCYTES # BLD: 1.3 K/UL (ref 0.9–3.6)
LYMPHOCYTES NFR BLD: 11 % (ref 21–52)
MCH RBC QN AUTO: 35.2 PG (ref 24–34)
MCHC RBC AUTO-ENTMCNC: 31.4 G/DL (ref 31–37)
MCV RBC AUTO: 112.1 FL (ref 78–100)
MONOCYTES # BLD: 1.1 K/UL (ref 0.05–1.2)
MONOCYTES NFR BLD: 9 % (ref 3–10)
NEUTS SEG # BLD: 9.1 K/UL (ref 1.8–8)
NEUTS SEG NFR BLD: 78 % (ref 40–73)
NRBC # BLD: 0 K/UL (ref 0–0.01)
NRBC BLD-RTO: 0 PER 100 WBC
PLATELET # BLD AUTO: 183 K/UL (ref 135–420)
PLATELET COMMENT: ABNORMAL
PMV BLD AUTO: 12.1 FL (ref 9.2–11.8)
POTASSIUM SERPL-SCNC: 4 MMOL/L (ref 3.5–5.5)
RBC # BLD AUTO: 2.56 M/UL (ref 4.2–5.3)
RBC MORPH BLD: ABNORMAL
SODIUM SERPL-SCNC: 135 MMOL/L (ref 136–145)
WBC # BLD AUTO: 11.7 K/UL (ref 4.6–13.2)

## 2024-09-01 PROCEDURE — 94761 N-INVAS EAR/PLS OXIMETRY MLT: CPT

## 2024-09-01 PROCEDURE — 6370000000 HC RX 637 (ALT 250 FOR IP): Performed by: HOSPITALIST

## 2024-09-01 PROCEDURE — 6360000002 HC RX W HCPCS: Performed by: HOSPITALIST

## 2024-09-01 PROCEDURE — 82962 GLUCOSE BLOOD TEST: CPT

## 2024-09-01 PROCEDURE — 36415 COLL VENOUS BLD VENIPUNCTURE: CPT

## 2024-09-01 PROCEDURE — 80048 BASIC METABOLIC PNL TOTAL CA: CPT

## 2024-09-01 PROCEDURE — 85025 COMPLETE CBC W/AUTO DIFF WBC: CPT

## 2024-09-01 PROCEDURE — 99239 HOSP IP/OBS DSCHRG MGMT >30: CPT | Performed by: HOSPITALIST

## 2024-09-01 RX ORDER — AMOXICILLIN 250 MG/1
500 CAPSULE ORAL
Status: DISCONTINUED | OUTPATIENT
Start: 2024-09-02 | End: 2024-09-01 | Stop reason: HOSPADM

## 2024-09-01 RX ORDER — AMOXICILLIN 500 MG/1
500 CAPSULE ORAL
Qty: 5 CAPSULE | Refills: 0 | Status: ON HOLD | DISCHARGE
Start: 2024-09-02 | End: 2024-09-10 | Stop reason: HOSPADM

## 2024-09-01 RX ORDER — FOLIC ACID 1 MG/1
1 TABLET ORAL DAILY
Status: ON HOLD | DISCHARGE
Start: 2024-09-01 | End: 2024-09-10 | Stop reason: HOSPADM

## 2024-09-01 RX ORDER — AMOXICILLIN 250 MG/1
250 CAPSULE ORAL EVERY 8 HOURS SCHEDULED
Qty: 15 CAPSULE | Refills: 0 | Status: SHIPPED | DISCHARGE
Start: 2024-09-01 | End: 2024-09-01 | Stop reason: HOSPADM

## 2024-09-01 RX ORDER — OLANZAPINE 5 MG/1
5 TABLET ORAL 2 TIMES DAILY
Qty: 60 TABLET | Refills: 0 | Status: SHIPPED | OUTPATIENT
Start: 2024-09-01

## 2024-09-01 RX ORDER — OXYCODONE AND ACETAMINOPHEN 7.5; 325 MG/1; MG/1
1 TABLET ORAL EVERY 8 HOURS PRN
Qty: 5 TABLET | Refills: 0 | Status: SHIPPED | OUTPATIENT
Start: 2024-09-01 | End: 2024-09-04

## 2024-09-01 RX ADMIN — ASPIRIN 81 MG CHEWABLE TABLET 81 MG: 81 TABLET CHEWABLE at 10:01

## 2024-09-01 RX ADMIN — HEPARIN SODIUM 5000 UNITS: 5000 INJECTION INTRAVENOUS; SUBCUTANEOUS at 06:27

## 2024-09-01 RX ADMIN — AMOXICILLIN 250 MG: 250 CAPSULE ORAL at 06:27

## 2024-09-01 RX ADMIN — FOLIC ACID 1 MG: 1 TABLET ORAL at 10:01

## 2024-09-01 RX ADMIN — Medication 1 MG: at 10:00

## 2024-09-01 RX ADMIN — OLANZAPINE 5 MG: 2.5 TABLET, FILM COATED ORAL at 10:01

## 2024-09-01 RX ADMIN — LEVOTHYROXINE SODIUM 25 MCG: 0.03 TABLET ORAL at 06:27

## 2024-09-01 ASSESSMENT — PAIN SCALES - GENERAL
PAINLEVEL_OUTOF10: 0
PAINLEVEL_OUTOF10: 0

## 2024-09-01 NOTE — PROGRESS NOTES
Progress Note      84-year-old female with past medical history of diabetes hypertension, ESRD admitted for hyperkalemia renal failure, following for ESRD management  Interim event     Denies for any chest pain or short of breath        IMPRESSION:   ESRD, Tuesday Thursday Saturday schedule missed several treatment  Access AV graft  Hyperkalemia, improving   Severe uremia,improving  Dyspnea, fluid overload,resolved  Anemia,   History of severe peripheral vascular disease, right above-knee amputation  Extensive left lower extremity vascular blockage,refused surgery   PLAN:   She wants to continue,discussed with poa,needs stretcher transport to dialysis  resume Epogen.  Adjust medication for ESRD status.     D/w dr kang        Current Facility-Administered Medications   Medication Dose Route Frequency    [START ON 9/2/2024] amoxicillin (AMOXIL) capsule 500 mg  500 mg Oral Dinner    epoetin joanie-epbx (RETACRIT) injection 3,000 Units  3,000 Units SubCUTAneous Once per day on Tuesday Thursday Saturday    oxyCODONE-acetaminophen (PERCOCET) 7.5-325 MG per tablet 1 tablet  1 tablet Oral Q4H PRN    HYDROmorphone (DILAUDID) injection 0.25 mg  0.25 mg IntraVENous Q6H PRN    insulin lispro (HUMALOG,ADMELOG) injection vial 0-8 Units  0-8 Units SubCUTAneous TID WC    insulin lispro (HUMALOG,ADMELOG) injection vial 0-4 Units  0-4 Units SubCUTAneous Nightly    glucose chewable tablet 16 g  4 tablet Oral PRN    dextrose bolus 10% 125 mL  125 mL IntraVENous PRN    Or    dextrose bolus 10% 250 mL  250 mL IntraVENous PRN    glucagon (rDNA) injection 1 mg  1 mg SubCUTAneous PRN    dextrose 10 % infusion   IntraVENous Continuous PRN    aspirin chewable tablet 81 mg  81 mg Oral Daily    atorvastatin (LIPITOR) tablet 20 mg  20 mg Oral Daily    folic acid (FOLVITE) tablet 1 mg  1 mg Oral Daily    levothyroxine (SYNTHROID) tablet 25 mcg  25 mcg Oral QAM AC    OLANZapine (ZYPREXA) tablet 5 mg  5 mg Oral BID    sodium chloride flush 0.9  1358  Last data filed at 8/31/2024 1725  Gross per 24 hour   Intake 600 ml   Output 1700 ml   Net -1100 ml     Physical Exam:   General: comfortable, no acute distress   CVS: S1S2 heard,  no rub  RS: + air entry b/l,   Abd: Soft, Non tender, Not distended,   Neuro: non focal, awake,   Skin: no visible  Rash  Musculoskeletal:left leg swllen,   Right AKA    Procedures/imaging: see electronic medical records for all procedures, Xrays and details which were not copied into this note but were reviewed prior to creation of Plan            PANTERA RAMIREZ MD  September 1, 2024  [facility]  Office [phone]

## 2024-09-01 NOTE — PLAN OF CARE
Problem: Chronic Conditions and Co-morbidities  Goal: Patient's chronic conditions and co-morbidity symptoms are monitored and maintained or improved  9/1/2024 1208 by Mike Joe RN  Outcome: Progressing  8/31/2024 2358 by Crystal Pink RN  Outcome: /[facility] Progressing     Problem: Discharge Planning  Goal: Discharge to home or other facility with appropriate resources  9/1/2024 1208 by Mike Joe RN  Outcome: Progressing  8/31/2024 2358 by Crystal Pink RN  Outcome: /[facility] Progressing     Problem: Pain  Goal: Verbalizes/displays adequate comfort level or baseline comfort level  9/1/2024 1208 by Mike Joe RN  Outcome: Progressing  8/31/2024 2358 by Crystal Pink RN  Outcome: /[facility] Progressing     Problem: Skin/Tissue Integrity  Goal: Absence of new skin breakdown  Description: 1.  Monitor for areas of redness and/or skin breakdown  2.  Assess vascular access sites hourly  3.  Every 4-6 hours minimum:  Change oxygen saturation probe site  4.  Every 4-6 hours:  If on nasal continuous positive airway pressure, respiratory therapy assess nares and determine need for appliance change or resting period.  9/1/2024 1208 by Mike Joe RN  Outcome: Progressing  8/31/2024 2358 by Crystal Pink RN  Outcome: /[facility] Progressing     Problem: Safety - Adult  Goal: Free from fall injury  9/1/2024 1208 by Mike Joe RN  Outcome: Progressing  8/31/2024 2358 by Crystal Pink RN  Outcome: /[facility] Progressing     Problem: ABCDS Injury Assessment  Goal: Absence of physical injury  9/1/2024 1208 by Mike Joe RN  Outcome: Progressing  8/31/2024 2358 by Crystal Pink RN  Outcome: /[facility] Progressing     Problem: Nutrition Deficit:  Goal: Optimize nutritional status  9/1/2024 1208 by Mike Joe RN  Outcome: Progressing  8/31/2024 2358 by Crystal Pink RN  [facility]     Problem: Neurosensory - Adult  Goal: Achieves stable or improved  neurological status  9/1/2024 1208 by Mike Joe RN  Outcome: Progressing  8/31/2024 2358 by Crystal Pink RN  Outcome: [facility] Progressing     Problem: Cardiovascular - Adult  Goal: Absence of cardiac dysrhythmias or at baseline  9/1/2024 1208 by Mike Joe RN  Outcome: Progressing  8/31/2024 2358 by Crystal Pink RN  Outcome: [facility] Progressing     Problem: Skin/Tissue Integrity - Adult  Goal: Skin integrity remains intact  9/1/2024 1208 by Mike Joe RN  Outcome: Progressing  8/31/2024 2358 by Crystal Pink RN  Outcome: [facility] Progressing     Problem: Musculoskeletal - Adult  Goal: Return mobility to safest level of function  9/1/2024 1208 by Mike Joe RN  Outcome: Progressing  8/31/2024 2358 by Crystal Pink RN  Outcome: [facility] Progressing     Problem: Genitourinary - Adult  Goal: Absence of urinary retention  9/1/2024 1208 by Mike Joe RN  Outcome: Progressing  8/31/2024 2358 by Crystal Pink RN  Outcome: [facility] Progressing     Problem: Infection - Adult  Goal: Absence of infection at discharge  9/1/2024 1208 by Mike Joe RN  Outcome: Progressing  8/31/2024 2358 by Crystal Pink RN  Outcome: [facility] Progressing     Problem: Metabolic/Fluid and Electrolytes - Adult  Goal: Electrolytes maintained within normal limits  9/1/2024 1208 by Mike Joe RN  Outcome: Progressing  8/31/2024 2358 by Crystal Pink RN  Outcome: [facility] Progressing     Problem: Hematologic - Adult  Goal: Maintains hematologic stability  9/1/2024 1208 by Mike Joe RN  Outcome: Progressing  8/31/2024 2358 by Crystal Pink RN  Outcome: [facility] Progressing

## 2024-09-01 NOTE — PROGRESS NOTES
RN called [facility] and rehab and give report to Ms Sakina Lamas LPN she was given a chance to ask some questions and were answered.

## 2024-09-01 NOTE — CARE COORDINATION
Called [facility] [phone] and spoke to Yanci. Patient's medical transportation to dialysis had been arranged by stretcher and is a standing order for Tuesday, Thursday, and Saturday from [facility] [address] [place] [address] with a pick-up time of 9:00am [place] [address] and a return pick-up time of 1:45pm.     Amy ALSTONN, RN   Case Management   [phone]

## 2024-09-01 NOTE — CARE COORDINATION
Transition of Care Plan to SNF/Rehab      Patient Name: Kaylie Granger                   YOB: 1940    SNF/Rehab Transition:  Patient has been accepted to [facility] and meets criteria for admission. Confirmed with Gloria that bed is available for today.   Patient will transported by medical transport and expected to leave at 3:00pm.  Met with patient and nephew and they are agreeable to the transition plan.    Medicaid Long-term Services and Supports(LTSS) screening completion: Yes    Three Inpatient Midnights for Medicare: NA    Current Code Status:   Code Status: DNR     Last Weight:   Wt Readings from Last 1 Encounters:   09/01/24 55.2 kg (121 lb 11.1 oz)       Weightbearing Status:     IV Medication, IV Site, Device Type: No    Dialysis: Yes: Comment: Gloria stated the facility will arrange transport to dialysis.      O2 Needs (including O2, Bipap, Cpap, ect.): No    Covid Vaccine Dates/ if known:    Internal Administration   First Dose      Second Dose           Last COVID Lab SARS-CoV-2, PCR ( )   Date Value   08/20/2024 Not detected            Last COVID Lab SARS-CoV-2, PCR ( )   Date Value   08/20/2024 Not detected     Rapid Influenza A By PCR ( )   Date Value   06/27/2023 Not detected     Rapid Influenza B By PCR ( )   Date Value   06/27/2023 Not detected              Current Diet: ADULT DIET; Regular; Low Fat/Low Chol/High Fiber/2 gm Na  ADULT ORAL NUTRITION SUPPLEMENT; Breakfast, Dinner; Renal Oral Supplement    Wound Vac or Other Equipment Needs:     Patient requires Isolation: Yes: Comment: MRSA, ESBL    Follow-up Appointment needed or scheduled: No    Communication to SNF/Rehab:  Bedside RNMike, has been notified to update the transition plan to the facility and call report (phone number).  Discharge information has been updated on the AVS and communicated to facility via [facility]Port.    Nursing Please include all hard scripts for controlled

## 2024-09-01 NOTE — PROGRESS NOTES
Patient discharged with stable condition, IV catheter removed aseptically, discharged instruction given to transport group.

## 2024-09-01 NOTE — PROGRESS NOTES
Per orthopedics:      Addendum from consult done 8/31/2024 by Dr. Pena.    Patient seen at bedside sitting up naked with a request for nourishment \"coffee\"    Noting patient right AKA with left lower extremity no complaint despite evidence radiographically of left distal tib-fib fracture nonsurgical    From appearance posterior splint left lower extremity with Ace wrap surrounding patient not cooperative for exam of left leg.    Patient generally uncooperative with bedside exam.  Alert and oriented x 2    Will follow    Patient: Kaylie Granger MRN: [id number]  SSN: [id number]    YOB: 1940  Age: 84 y.o.  Sex: female      Subjective:      Kaylie Granger is a 84 y.o. female referred by the Dr. Willis for left distal tib-fib fracture.  She was seen by DAYA Torrez 8/21/2024 following a fall at her nursing home.  She has a history of right AKA and is a nonambulator.  Nonsurgical treatment was chosen and a posterior splint was applied.  She has a history ofhypertension, diabetes, hyperlipidemia, CKD stage V on dialysis.  She is a poor historian.    Past Medical History:   Diagnosis Date    Anemia of renal disease     CKD (chronic kidney disease) requiring chronic dialysis (HCC)     T/TH/SAT at [facility] [phone]. [phone]    Hypercholesterolemia     Hypertension     Peripheral vascular disease (HCC)     Secondary hyperparathyroidism of renal origin (HCC)     Type 2 diabetes mellitus treated with insulin (HCC)     no meds per patient on 12/22/23     Past Surgical History:   Procedure Laterality Date    ABOVE KNEE AMPUTATION Right 01/30/2020    CATARACT EXTRACTION, BILATERAL Bilateral     COLONOSCOPY      DIALYSIS FISTULA CREATION Right 1/22/2024    RIGHT ARM ARTERIOVENOUS GRAFT CREATION performed by Angel Rowland MD at [facility] CARDIAC SURGERY    PORT SURGERY Right 06/23/2023    TUNNELED CATHETER PLACEMENT; C-ARM performed by Sherri Jean MD at [facility] CARDIAC SURGERY  nondisplaced and possibly  impacted fracture involving the distal tibial metadiaphysis with similar  alignment when compared to prior.    CT right distal tib-fib 8/20/2024  FINDINGS:   There is a minimally displaced impacted fracture of the distal tibial metaphysis  above the tip of the lag screw inserted through the medial malleolus. The  fracture appears mildly comminuted and impacted. There is a possible fracture in  the distal fibula at the level of the proximal sideplate. This fracture is best  seen on coronal series 501 image 56. There is no other acute fracture. There are  degenerative changes of the midfoot and ankle.     There is diffuse muscular atrophy. There are atherosclerotic calcifications.     IMPRESSION:  Impacted and comminuted fracture of the distal tibial metaphysis.     Possible fracture of the distal fibula at the level of the proximal aspect of  the pre-existing ORIF sideplate.     Nondisplaced fracture involving the distal fibula again suspected.    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 ([facility]) 8/27/2024 Yes    Goals of care, counseling/discussion 8/29/2024 Yes    Encounter for palliative care 8/29/2024 Yes    Hx of AKA (above knee amputation), right ([facility]) 8/29/2024 Yes    ESRD on hemodialysis ([facility]) 8/29/2024 Yes    Fracture of distal end of tibia with fibula, left, closed, 8/31/2024 Yes    Age-related osteoporosis with current pathological fracture 8/31/2024 Yes       Plan:   Nonsurgical treatment  Continue current posterior splint  Elevation  Palliative care  Will follow      Signed By: Clifford Hardwick PA-C     September 1, 2024

## 2024-09-01 NOTE — PROGRESS NOTES
Report received from DIEGO Colunga.    Patient resting in bed, appears comfortable. Continues to speak to as if people are in the room.     Uneventful shift, patient appeared to have slept most of the night, as opposed to previous night shift.

## 2024-09-01 NOTE — DISCHARGE SUMMARY
optimally patent.  2.  Postsurgical metallic fixation of the distal tibia acute comminuted  fracture.        Electronically signed by Myla Araiza    Encounter Date: 08/27/24   EKG 12 Lead   Result Value    Ventricular Rate 101    Atrial Rate 101    P-R Interval 112    QRS Duration 80    Q-T Interval 344    QTc Calculation (Bazett) 446    P Axis 59    R Axis -22    T Axis 63    Diagnosis      Sinus tachycardia  Cannot exclude anterior MI versus lead placement issue.  Abnormal ECG  When compared with ECG of 20-AUG-2024 18:25,  No significant change was found  Confirmed by MD Camryn, Kevin ([id number]) on 8/27/2024 4:32:25 PM         Discharge Medications:     Current Discharge Medication List        START taking these medications    Details   oxyCODONE-acetaminophen (PERCOCET) 7.5-325 MG per tablet Take 1 tablet by mouth every 8 hours as needed for Pain for up to 3 days. Max Daily Amount: 3 tablets  Qty: 5 tablet, Refills: 0    Comments: Reduce doses taken as pain becomes manageable  Associated Diagnoses: Fracture of distal end of tibia with fibula, left, closed, initial encounter      amoxicillin (AMOXIL) 500 MG capsule Take 1 capsule by mouth Daily with supper for 5 doses  Qty: 5 capsule, Refills: 0           CONTINUE these medications which have CHANGED    Details   folic acid (FOLVITE) 1 MG tablet Take 1 tablet by mouth daily           CONTINUE these medications which have NOT CHANGED    Details   B Complex-C-Folic Acid (BAY-LAYNE) TABS       OLANZapine (ZYPREXA) 5 MG tablet Take 1 tablet by mouth in the morning and at bedtime      levothyroxine (SYNTHROID) 25 MCG tablet Take 1 tablet by mouth every morning (before breakfast)  Qty: 30 tablet, Refills: 0      acetaminophen (TYLENOL) 325 MG tablet Take 2 tablets by mouth 3 times daily      aspirin 81 MG chewable tablet Take 1 tablet by mouth daily Indications: Disease involving Lipid Deposits in the Arteries      atorvastatin (LIPITOR) 20 MG tablet Take by

## 2024-09-01 NOTE — PLAN OF CARE
Problem: Chronic Conditions and Co-morbidities  Goal: Patient's chronic conditions and co-morbidity symptoms are monitored and maintained or improved  8/31/2024 2358 by Crystal Pink RN  Outcome: [facility] Progressing  8/31/2024 1455 by Jes Parra RN  Outcome: Progressing  Flowsheets (Taken 8/31/2024 1455)  Care Plan - Patient's Chronic Conditions and Co-Morbidity Symptoms are Monitored and Maintained or Improved:   Monitor and assess patient's chronic conditions and comorbid symptoms for stability, deterioration, or improvement   Collaborate with multidisciplinary team to address chronic and comorbid conditions and prevent exacerbation or deterioration   Update acute care plan with appropriate goals if chronic or comorbid symptoms are exacerbated and prevent overall improvement and discharge  8/31/2024 1246 by Cristine Benson RN  Outcome: Progressing     Problem: Discharge Planning  Goal: Discharge to home or other facility with appropriate resources  8/31/2024 2358 by Crystal Pink RN  Outcome: [facility] Progressing  8/31/2024 1246 by Cristine Benson RN  Outcome: Progressing     Problem: Pain  Goal: Verbalizes/displays adequate comfort level or baseline comfort level  8/31/2024 2358 by Crystal Pink RN  Outcome: [facility] Progressing  8/31/2024 1246 by Cristine Benson RN  Outcome: Progressing     Problem: Skin/Tissue Integrity  Goal: Absence of new skin breakdown  Description: 1.  Monitor for areas of redness and/or skin breakdown  2.  Assess vascular access sites hourly  3.  Every 4-6 hours minimum:  Change oxygen saturation probe site  4.  Every 4-6 hours:  If on nasal continuous positive airway pressure, respiratory therapy assess nares and determine need for appliance change or resting period.  8/31/2024 2358 by Crystal Pink RN  Outcome: [facility] Progressing  8/31/2024 1246 by Cristine Benson RN  Outcome: Progressing     Problem: Safety - Adult  Goal: Free from fall  Progressing     Problem: Metabolic/Fluid and Electrolytes - Adult  Goal: Electrolytes maintained within normal limits  8/31/2024 2358 by Crystal Pink RN  Outcome: [facility] Progressing  8/31/2024 1246 by Cristine Benson RN  Outcome: Progressing     Problem: Hematologic - Adult  Goal: Maintains hematologic stability  8/31/2024 2358 by Crystal Pink RN  Outcome: HH/[facility] Progressing  8/31/2024 1246 by Cristine Benson RN  Outcome: Progressing

## 2024-09-03 ENCOUNTER — APPOINTMENT (OUTPATIENT)
Facility: HOSPITAL | Age: 84
DRG: 689 | End: 2024-09-03
Payer: MEDICARE

## 2024-09-03 ENCOUNTER — HOSPITAL ENCOUNTER (EMERGENCY)
Facility: HOSPITAL | Age: 84
Discharge: HOME | DRG: 689 | End: 2024-09-04
Payer: MEDICARE

## 2024-09-03 DIAGNOSIS — K59.00 CONSTIPATION, UNSPECIFIED CONSTIPATION TYPE: ICD-10-CM

## 2024-09-03 DIAGNOSIS — N18.6 ESRD (END STAGE RENAL DISEASE) ON DIALYSIS (HCC): ICD-10-CM

## 2024-09-03 DIAGNOSIS — S82.892G CLOSED FRACTURE OF LEFT ANKLE WITH DELAYED HEALING, SUBSEQUENT ENCOUNTER: ICD-10-CM

## 2024-09-03 DIAGNOSIS — Z99.2 ESRD (END STAGE RENAL DISEASE) ON DIALYSIS (HCC): ICD-10-CM

## 2024-09-03 DIAGNOSIS — F03.B0 MODERATE DEMENTIA, UNSPECIFIED DEMENTIA TYPE, UNSPECIFIED WHETHER BEHAVIORAL, PSYCHOTIC, OR MOOD DISTURBANCE OR ANXIETY (HCC): ICD-10-CM

## 2024-09-03 DIAGNOSIS — L89.152 SACRAL DECUBITUS ULCER, STAGE II (HCC): ICD-10-CM

## 2024-09-03 DIAGNOSIS — I99.8 ISCHEMIC LEG: ICD-10-CM

## 2024-09-03 DIAGNOSIS — R09.02 HYPOXEMIA: Primary | ICD-10-CM

## 2024-09-03 LAB
ALBUMIN SERPL-MCNC: 2.9 G/DL (ref 3.4–5)
ALBUMIN/GLOB SERPL: 0.6 (ref 0.8–1.7)
ALP SERPL-CCNC: 98 U/L (ref 45–117)
ALT SERPL-CCNC: 50 U/L (ref 13–56)
ANION GAP BLD CALC-SCNC: ABNORMAL MMOL/L (ref 10–20)
ANION GAP SERPL CALC-SCNC: 16 MMOL/L (ref 3–18)
AST SERPL-CCNC: 104 U/L (ref 10–38)
BASE EXCESS BLD CALC-SCNC: 0.5 MMOL/L
BASOPHILS # BLD: 0 K/UL (ref 0–0.1)
BASOPHILS NFR BLD: 0 % (ref 0–2)
BDY SITE: ABNORMAL
BILIRUB SERPL-MCNC: 0.9 MG/DL (ref 0.2–1)
BUN SERPL-MCNC: 60 MG/DL (ref 7–18)
BUN/CREAT SERPL: 10 (ref 12–20)
CA-I BLD-MCNC: 1.12 MMOL/L (ref 1.12–1.32)
CALCIUM SERPL-MCNC: 9.7 MG/DL (ref 8.5–10.1)
CHLORIDE BLD-SCNC: 102 MMOL/L (ref 98–107)
CHLORIDE SERPL-SCNC: 96 MMOL/L (ref 100–111)
CO2 BLD-SCNC: 23 MMOL/L (ref 19–24)
CO2 SERPL-SCNC: 24 MMOL/L (ref 21–32)
CREAT BLD-MCNC: 5.08 MG/DL (ref 0.6–1.3)
CREAT SERPL-MCNC: 6.22 MG/DL (ref 0.6–1.3)
DIFFERENTIAL METHOD BLD: ABNORMAL
EKG ATRIAL RATE: 98 BPM
EKG DIAGNOSIS: NORMAL
EKG P AXIS: 49 DEGREES
EKG P-R INTERVAL: 126 MS
EKG Q-T INTERVAL: 366 MS
EKG QRS DURATION: 78 MS
EKG QTC CALCULATION (BAZETT): 467 MS
EKG R AXIS: 7 DEGREES
EKG T AXIS: -30 DEGREES
EKG VENTRICULAR RATE: 98 BPM
EOSINOPHIL # BLD: 0 K/UL (ref 0–0.4)
EOSINOPHIL NFR BLD: 0 % (ref 0–5)
ERYTHROCYTE [DISTWIDTH] IN BLOOD BY AUTOMATED COUNT: 14.6 % (ref 11.6–14.5)
FLUAV RNA SPEC QL NAA+PROBE: NOT DETECTED
FLUBV RNA SPEC QL NAA+PROBE: NOT DETECTED
GAS FLOW.O2 O2 DELIVERY SYS: ABNORMAL
GLOBULIN SER CALC-MCNC: 4.6 G/DL (ref 2–4)
GLUCOSE BLD-MCNC: 246 MG/DL (ref 70–110)
GLUCOSE SERPL-MCNC: 232 MG/DL (ref 74–99)
HCO3 BLD-SCNC: 23.6 MMOL/L (ref 22–26)
HCT VFR BLD AUTO: 26.9 % (ref 35–45)
HGB BLD-MCNC: 8.5 G/DL (ref 12–16)
IMM GRANULOCYTES # BLD AUTO: 0.1 K/UL (ref 0–0.04)
IMM GRANULOCYTES NFR BLD AUTO: 1 % (ref 0–0.5)
LACTATE BLD-SCNC: 0.97 MMOL/L (ref 0.4–2)
LYMPHOCYTES # BLD: 0.5 K/UL (ref 0.9–3.6)
LYMPHOCYTES NFR BLD: 3 % (ref 21–52)
MCH RBC QN AUTO: 34.7 PG (ref 24–34)
MCHC RBC AUTO-ENTMCNC: 31.6 G/DL (ref 31–37)
MCV RBC AUTO: 109.8 FL (ref 78–100)
MONOCYTES # BLD: 0.7 K/UL (ref 0.05–1.2)
MONOCYTES NFR BLD: 5 % (ref 3–10)
NEUTS SEG # BLD: 13.4 K/UL (ref 1.8–8)
NEUTS SEG NFR BLD: 90 % (ref 40–73)
NRBC # BLD: 0 K/UL (ref 0–0.01)
NRBC BLD-RTO: 0 PER 100 WBC
PCO2 BLD: 31.1 MMHG (ref 35–45)
PH BLD: 7.49 (ref 7.35–7.45)
PLATELET # BLD AUTO: 264 K/UL (ref 135–420)
PMV BLD AUTO: 10.2 FL (ref 9.2–11.8)
PO2 BLD: 71 MMHG (ref 80–100)
POTASSIUM BLD-SCNC: 3.8 MMOL/L (ref 3.5–5.1)
POTASSIUM SERPL-SCNC: 4.3 MMOL/L (ref 3.5–5.5)
PROCALCITONIN SERPL-MCNC: 4.6 NG/ML
PROT SERPL-MCNC: 7.5 G/DL (ref 6.4–8.2)
RBC # BLD AUTO: 2.45 M/UL (ref 4.2–5.3)
SAO2 % BLD: 96 %
SARS-COV-2 RNA RESP QL NAA+PROBE: NOT DETECTED
SERVICE CMNT-IMP: ABNORMAL
SODIUM BLD-SCNC: 137 MMOL/L (ref 136–145)
SODIUM SERPL-SCNC: 136 MMOL/L (ref 136–145)
SOURCE: NORMAL
SPECIMEN SITE: ABNORMAL
TROPONIN I SERPL HS-MCNC: 81 NG/L (ref 0–54)
TROPONIN I SERPL HS-MCNC: 81 NG/L (ref 0–54)
WBC # BLD AUTO: 14.8 K/UL (ref 4.6–13.2)

## 2024-09-03 PROCEDURE — 85025 COMPLETE CBC W/AUTO DIFF WBC: CPT

## 2024-09-03 PROCEDURE — 93005 ELECTROCARDIOGRAM TRACING: CPT | Performed by: EMERGENCY MEDICINE

## 2024-09-03 PROCEDURE — 70450 CT HEAD/BRAIN W/O DYE: CPT

## 2024-09-03 PROCEDURE — 87636 SARSCOV2 & INF A&B AMP PRB: CPT

## 2024-09-03 PROCEDURE — 85014 HEMATOCRIT: CPT

## 2024-09-03 PROCEDURE — 6360000002 HC RX W HCPCS: Performed by: EMERGENCY MEDICINE

## 2024-09-03 PROCEDURE — 84484 ASSAY OF TROPONIN QUANT: CPT

## 2024-09-03 PROCEDURE — 82330 ASSAY OF CALCIUM: CPT

## 2024-09-03 PROCEDURE — 71275 CT ANGIOGRAPHY CHEST: CPT

## 2024-09-03 PROCEDURE — 87040 BLOOD CULTURE FOR BACTERIA: CPT

## 2024-09-03 PROCEDURE — 93010 ELECTROCARDIOGRAM REPORT: CPT | Performed by: INTERNAL MEDICINE

## 2024-09-03 PROCEDURE — 84145 PROCALCITONIN (PCT): CPT

## 2024-09-03 PROCEDURE — 74176 CT ABD & PELVIS W/O CONTRAST: CPT

## 2024-09-03 PROCEDURE — 71045 X-RAY EXAM CHEST 1 VIEW: CPT

## 2024-09-03 PROCEDURE — 84132 ASSAY OF SERUM POTASSIUM: CPT

## 2024-09-03 PROCEDURE — 6360000002 HC RX W HCPCS: Performed by: PHYSICIAN ASSISTANT

## 2024-09-03 PROCEDURE — 82803 BLOOD GASES ANY COMBINATION: CPT

## 2024-09-03 PROCEDURE — 83605 ASSAY OF LACTIC ACID: CPT

## 2024-09-03 PROCEDURE — 80053 COMPREHEN METABOLIC PANEL: CPT

## 2024-09-03 PROCEDURE — 84295 ASSAY OF SERUM SODIUM: CPT

## 2024-09-03 PROCEDURE — 36600 WITHDRAWAL OF ARTERIAL BLOOD: CPT

## 2024-09-03 PROCEDURE — 6360000004 HC RX CONTRAST MEDICATION: Performed by: PHYSICIAN ASSISTANT

## 2024-09-03 PROCEDURE — 2580000003 HC RX 258: Performed by: EMERGENCY MEDICINE

## 2024-09-03 PROCEDURE — 82947 ASSAY GLUCOSE BLOOD QUANT: CPT

## 2024-09-03 RX ORDER — IOPAMIDOL 755 MG/ML
80 INJECTION, SOLUTION INTRAVASCULAR
Status: COMPLETED | OUTPATIENT
Start: 2024-09-03 | End: 2024-09-03

## 2024-09-03 RX ORDER — LORAZEPAM 2 MG/ML
1 INJECTION INTRAMUSCULAR ONCE
Status: COMPLETED | OUTPATIENT
Start: 2024-09-03 | End: 2024-09-03

## 2024-09-03 RX ORDER — KIT FOR THE PREPARATION OF TECHNETIUM TC 99M PENTETATE 20 MG/1
5 INJECTION, POWDER, LYOPHILIZED, FOR SOLUTION INTRAVENOUS; RESPIRATORY (INHALATION)
Status: DISCONTINUED | OUTPATIENT
Start: 2024-09-03 | End: 2024-09-04 | Stop reason: HOSPADM

## 2024-09-03 RX ORDER — VANCOMYCIN 1.75 G/350ML
25 INJECTION, SOLUTION INTRAVENOUS ONCE
Status: COMPLETED | OUTPATIENT
Start: 2024-09-03 | End: 2024-09-03

## 2024-09-03 RX ADMIN — LORAZEPAM 1 MG: 2 INJECTION INTRAMUSCULAR; INTRAVENOUS at 21:00

## 2024-09-03 RX ADMIN — VANCOMYCIN 1250 MG: 1.75 INJECTION, SOLUTION INTRAVENOUS at 17:03

## 2024-09-03 RX ADMIN — IOPAMIDOL 80 ML: 755 INJECTION, SOLUTION INTRAVENOUS at 21:56

## 2024-09-03 RX ADMIN — PIPERACILLIN AND TAZOBACTAM 4500 MG: 4; .5 INJECTION, POWDER, FOR SOLUTION INTRAVENOUS at 14:01

## 2024-09-03 ASSESSMENT — ENCOUNTER SYMPTOMS
SHORTNESS OF BREATH: 1
GASTROINTESTINAL NEGATIVE: 1
EYES NEGATIVE: 1

## 2024-09-03 ASSESSMENT — PAIN - FUNCTIONAL ASSESSMENT: PAIN_FUNCTIONAL_ASSESSMENT: ADULT NONVERBAL PAIN SCALE (NPVS)

## 2024-09-03 NOTE — ED NOTES
[facility] Updated pts meds and medical hx. Per Jenifer, Pt does not make much urine, approx 30mL or less per day. DAYA Sahni notified.

## 2024-09-03 NOTE — ED NOTES
Pt incontinent of stool. Pt has stage 2 decub on right buttocks. Pt cleaned, pericare performed, new diaper placed.

## 2024-09-03 NOTE — ED NOTES
Pt continues to bend arm and occluding IV medications. Repositioned pts arm and elbow placed under arm to help keep it straight.

## 2024-09-03 NOTE — PROGRESS NOTES
Latest Reference Range & Units 09/03/24 13:54   POC Glucose 70 - 110 mg/dL 246 (H)   POC Sodium 136 - 145 mmol/L 137   POC Potassium 3.5 - 5.1 mmol/L 3.8   POC Chloride 98 - 107 mmol/L 102   POC Creatinine 0.6 - 1.3 mg/dL 5.08 (H)   POC Ionized Calcium 1.12 - 1.32 mmol/L 1.12   POC Lactic Acid 0.40 - 2.00 mmol/L 0.97   POC TCO2 19 - 24 MMOL/L 23   POC HCO3 22 - 26 MMOL/L 23.6   POC O2 SAT % 96   POC pCO2 35.0 - 45.0 MMHG 31.1 (L)   POC pH 7.35 - 7.45   7.49 (H)   POC PO2 80 - 100 MMHG 71 (L)   (H): Data is abnormally high  (L): Data is abnormally low

## 2024-09-03 NOTE — ED PROVIDER NOTES
available diagnostic reports and current treatment plan has been discussed thoroughly.   Bedside rounding on patient occured : Yes  Intended disposition of patient : admit  Pending diagnostics reports and/or labs (please list): Vq      Critical Care Time:  The services I provided to this patient were to treat and/or prevent clinically significant deterioration that could result in the failure of one or more body systems and/or organ systems due to hypoxia.    Services included the following:  -reviewing nursing notes and old charts  -vital sign assessments  -direct patient care  -medication orders and management  -interpreting and reviewing diagnostic studies/labs  -re-evaluations  -documentation time    Aggregate critical care time was 35 minutes, which includes only time during which I was engaged in work directly related to the patient's care as described above, whether I was at bedside or elsewhere in the Emergency Department. It did not include time spent performing other reported procedures or the services of residents, students, nurses, or advance practice providers.       No att. providers found    6:00 PM   REASSESSMENT        FINAL IMPRESSION      1. Hypoxemia    2. Moderate dementia, unspecified dementia type, unspecified whether behavioral, psychotic, or mood disturbance or anxiety ([facility])    3. ESRD (end stage renal disease) on dialysis ([facility])    4. Constipation, unspecified constipation type    5. Sacral decubitus ulcer, stage II ([facility])          DISPOSITION/PLAN   DISPOSITION    Condition at Disposition: Data Unavailable      PATIENT REFERRED TO:  Tadeo Brody MD  [address]  [address]  [place] [address] [phone]            DISCHARGE MEDICATIONS:  New Prescriptions    No medications on file     Controlled Substances Monitoring:          No data to display                (Please note that portions of this note were completed with a voice recognition program.  Efforts were made to edit the

## 2024-09-03 NOTE — PROGRESS NOTES
Pt unable to tolerate Nuc med lung scan. Per ED, send patient back to ER for ultrasound IV attempt for CTA.

## 2024-09-03 NOTE — ED TRIAGE NOTES
Pt in ED via EMS coming from dialysis for being unresponsive. Per ems, pt was brought to dialysis altered and then eventually went unresponsive. Dialysis then called 911. Pt was given 2mg narcan IM by ems with no real change in mental status. Pt currently responding to pain. Pts pupils are constricted at 1mm and sluggish. Pt has a splint/cast on left lower extremity. Pt placed on cardiac monitor.

## 2024-09-04 VITALS
DIASTOLIC BLOOD PRESSURE: 44 MMHG | RESPIRATION RATE: 23 BRPM | SYSTOLIC BLOOD PRESSURE: 102 MMHG | BODY MASS INDEX: 23.75 KG/M2 | HEIGHT: 60 IN | TEMPERATURE: 99 F | HEART RATE: 98 BPM | WEIGHT: 121 LBS | OXYGEN SATURATION: 97 %

## 2024-09-04 RX ORDER — ALBUMIN (HUMAN) 12.5 G/50ML
50 SOLUTION INTRAVENOUS ONCE
Status: DISCONTINUED | OUTPATIENT
Start: 2024-09-04 | End: 2024-09-04

## 2024-09-04 NOTE — ED NOTES
Pt was taken to CT and CTA was performed. Pt had no issues and was not in distress. Provider notified.

## 2024-09-04 NOTE — ED NOTES
Pt easy to arouse and alert, Cardiac monitor in place. Respirations even and unlabored. Pt voiced no concern or pain at this time. Call bell within reach.

## 2024-09-04 NOTE — ED NOTES
Assumed care of PT. Cardiac monitor in place. Pt awake and alert. PT is not in distress at this time. Breathing even and unlabored.     Pt from [facility]. But came from dialysis emergently.

## 2024-09-04 NOTE — ED NOTES
7:00 PM Assumed care of the patient at this time. Discussed with DAYA Meadows concerning patient Kaylie Granger, standard discussion of reason for visit, HPI, ROS, PE, and current results available.  Pt presented to the ED for evaluation after an episode of AMS and decreased responsiveness during her dialysis tx today. After arriving at the ED and speaking with family and the nursing facility it was noted that this is the pt's baseline mentation. She did have an episode of hypoxia with saturations in the 80s on room air air. CTA obtained to r/o PE. Will monitor for CTA results and reassess shortly.Evelia Pope PA-C     1:36 AM CTA negative for PE or acute process.  Patient has been off of her supplemental oxygen for over an hour and her saturations have remained in the upper 90s.  I did consult with the hospitalist on-call Dr. Fontana and discussed this pt. He has reviewed the pt's chart/ED work-up and recommends outpatient tx given that the pt's brief episode of hypoxemia has resolved.     Evelia Pope PA-C      Disposition: d/c    Although this patient was turned over to me by the preceding provider, I have completed the bulk of the patient care for this ED visit, to include interpretation of labs/imaging, ordering additional laboratory tests/imaging, and multiple consultations and/or hospital admission/transfer. I therefore should be considered the primary provider for this patient visit. Evelia Pope PA-C      Dictation disclaimer:  Please note that this dictation was completed with [employer], the computer voice recognition software.  Quite often unanticipated grammatical, syntax, homophones, and other interpretive errors are inadvertently transcribed by the computer software.  Please disregard these errors.  Please excuse any errors that have escaped final proofreading.       Evelia Pope PA-C  09/04/24 0137

## 2024-09-05 ENCOUNTER — HOSPITAL ENCOUNTER (INPATIENT)
Facility: HOSPITAL | Age: 84
LOS: 6 days | Discharge: HOSPICE | DRG: 689 | End: 2024-09-11
Attending: HOSPITALIST | Admitting: HOSPITALIST
Payer: MEDICARE

## 2024-09-05 ENCOUNTER — APPOINTMENT (OUTPATIENT)
Facility: HOSPITAL | Age: 84
DRG: 689 | End: 2024-09-05
Payer: MEDICARE

## 2024-09-05 DIAGNOSIS — N18.6 END STAGE RENAL DISEASE (HCC): ICD-10-CM

## 2024-09-05 DIAGNOSIS — A49.9 UTI (URINARY TRACT INFECTION), BACTERIAL: Primary | ICD-10-CM

## 2024-09-05 DIAGNOSIS — N39.0 UTI (URINARY TRACT INFECTION), BACTERIAL: Primary | ICD-10-CM

## 2024-09-05 DIAGNOSIS — Z71.89 GOALS OF CARE, COUNSELING/DISCUSSION: ICD-10-CM

## 2024-09-05 DIAGNOSIS — M79.605 PAIN OF LEFT LOWER EXTREMITY: ICD-10-CM

## 2024-09-05 DIAGNOSIS — I99.8 ISCHEMIA OF FOOT: ICD-10-CM

## 2024-09-05 DIAGNOSIS — Z51.5 ENCOUNTER FOR PALLIATIVE CARE: ICD-10-CM

## 2024-09-05 LAB
ANION GAP SERPL CALC-SCNC: 20 MMOL/L (ref 3–18)
APPEARANCE UR: ABNORMAL
BACTERIA URNS QL MICRO: ABNORMAL /HPF
BASOPHILS # BLD: 0 K/UL (ref 0–0.1)
BASOPHILS NFR BLD: 0 % (ref 0–2)
BILIRUB UR QL: NEGATIVE
BUN SERPL-MCNC: 88 MG/DL (ref 7–18)
BUN/CREAT SERPL: 9 (ref 12–20)
CALCIUM SERPL-MCNC: 10.2 MG/DL (ref 8.5–10.1)
CHLORIDE SERPL-SCNC: 97 MMOL/L (ref 100–111)
CO2 SERPL-SCNC: 19 MMOL/L (ref 21–32)
COLOR UR: ABNORMAL
CREAT SERPL-MCNC: 10 MG/DL (ref 0.6–1.3)
DIFFERENTIAL METHOD BLD: ABNORMAL
EOSINOPHIL # BLD: 0.1 K/UL (ref 0–0.4)
EOSINOPHIL NFR BLD: 1 % (ref 0–5)
EPITH CASTS URNS QL MICRO: ABNORMAL /LPF (ref 0–5)
ERYTHROCYTE [DISTWIDTH] IN BLOOD BY AUTOMATED COUNT: 15.1 % (ref 11.6–14.5)
GLUCOSE SERPL-MCNC: 159 MG/DL (ref 74–99)
GLUCOSE UR STRIP.AUTO-MCNC: NEGATIVE MG/DL
HCT VFR BLD AUTO: 22.5 % (ref 35–45)
HEMOCCULT STL QL: NEGATIVE
HGB BLD-MCNC: 7.1 G/DL (ref 12–16)
HGB UR QL STRIP: ABNORMAL
IMM GRANULOCYTES # BLD AUTO: 0.1 K/UL (ref 0–0.04)
IMM GRANULOCYTES NFR BLD AUTO: 1 % (ref 0–0.5)
KETONES UR QL STRIP.AUTO: ABNORMAL MG/DL
LEUKOCYTE ESTERASE UR QL STRIP.AUTO: ABNORMAL
LYMPHOCYTES # BLD: 0.5 K/UL (ref 0.9–3.6)
LYMPHOCYTES NFR BLD: 5 % (ref 21–52)
MCH RBC QN AUTO: 34.8 PG (ref 24–34)
MCHC RBC AUTO-ENTMCNC: 31.6 G/DL (ref 31–37)
MCV RBC AUTO: 110.3 FL (ref 78–100)
MONOCYTES # BLD: 1.3 K/UL (ref 0.05–1.2)
MONOCYTES NFR BLD: 12 % (ref 3–10)
NEUTS SEG # BLD: 8.8 K/UL (ref 1.8–8)
NEUTS SEG NFR BLD: 81 % (ref 40–73)
NITRITE UR QL STRIP.AUTO: NEGATIVE
NRBC # BLD: 0 K/UL (ref 0–0.01)
NRBC BLD-RTO: 0 PER 100 WBC
PH UR STRIP: 6.5 (ref 5–8)
PLATELET # BLD AUTO: 254 K/UL (ref 135–420)
PLATELET COMMENT: ABNORMAL
PMV BLD AUTO: 9.9 FL (ref 9.2–11.8)
POTASSIUM SERPL-SCNC: 4.8 MMOL/L (ref 3.5–5.5)
PROT UR STRIP-MCNC: 300 MG/DL
RBC # BLD AUTO: 2.04 M/UL (ref 4.2–5.3)
RBC #/AREA URNS HPF: ABNORMAL /HPF (ref 0–5)
RBC MORPH BLD: ABNORMAL
RBC MORPH BLD: ABNORMAL
SODIUM SERPL-SCNC: 136 MMOL/L (ref 136–145)
SP GR UR REFRACTOMETRY: 1.03 (ref 1–1.03)
UROBILINOGEN UR QL STRIP.AUTO: 1 EU/DL (ref 0.2–1)
WBC # BLD AUTO: 10.8 K/UL (ref 4.6–13.2)
WBC URNS QL MICRO: ABNORMAL /HPF (ref 0–4)

## 2024-09-05 PROCEDURE — 6370000000 HC RX 637 (ALT 250 FOR IP): Performed by: HOSPITALIST

## 2024-09-05 PROCEDURE — 1100000000 HC RM PRIVATE

## 2024-09-05 PROCEDURE — 71045 X-RAY EXAM CHEST 1 VIEW: CPT

## 2024-09-05 PROCEDURE — 85025 COMPLETE CBC W/AUTO DIFF WBC: CPT

## 2024-09-05 PROCEDURE — 2580000003 HC RX 258: Performed by: HOSPITALIST

## 2024-09-05 PROCEDURE — 99285 EMERGENCY DEPT VISIT HI MDM: CPT

## 2024-09-05 PROCEDURE — P9047 ALBUMIN (HUMAN), 25%, 50ML: HCPCS | Performed by: HOSPITALIST

## 2024-09-05 PROCEDURE — 81001 URINALYSIS AUTO W/SCOPE: CPT

## 2024-09-05 PROCEDURE — 6360000002 HC RX W HCPCS: Performed by: HOSPITALIST

## 2024-09-05 PROCEDURE — 99223 1ST HOSP IP/OBS HIGH 75: CPT | Performed by: INTERNAL MEDICINE

## 2024-09-05 PROCEDURE — 87086 URINE CULTURE/COLONY COUNT: CPT

## 2024-09-05 PROCEDURE — 80048 BASIC METABOLIC PNL TOTAL CA: CPT

## 2024-09-05 PROCEDURE — 99223 1ST HOSP IP/OBS HIGH 75: CPT | Performed by: HOSPITALIST

## 2024-09-05 PROCEDURE — 82272 OCCULT BLD FECES 1-3 TESTS: CPT

## 2024-09-05 RX ORDER — POLYETHYLENE GLYCOL 3350 17 G/17G
17 POWDER, FOR SOLUTION ORAL DAILY PRN
Status: DISCONTINUED | OUTPATIENT
Start: 2024-09-05 | End: 2024-09-11 | Stop reason: HOSPADM

## 2024-09-05 RX ORDER — ONDANSETRON 2 MG/ML
4 INJECTION INTRAMUSCULAR; INTRAVENOUS EVERY 6 HOURS PRN
Status: DISCONTINUED | OUTPATIENT
Start: 2024-09-05 | End: 2024-09-11 | Stop reason: HOSPADM

## 2024-09-05 RX ORDER — ATORVASTATIN CALCIUM 20 MG/1
20 TABLET, FILM COATED ORAL NIGHTLY
Status: DISCONTINUED | OUTPATIENT
Start: 2024-09-05 | End: 2024-09-11 | Stop reason: HOSPADM

## 2024-09-05 RX ORDER — ALBUMIN (HUMAN) 12.5 G/50ML
25 SOLUTION INTRAVENOUS ONCE
Status: COMPLETED | OUTPATIENT
Start: 2024-09-05 | End: 2024-09-05

## 2024-09-05 RX ORDER — MIDODRINE HYDROCHLORIDE 5 MG/1
5 TABLET ORAL
Status: DISCONTINUED | OUTPATIENT
Start: 2024-09-05 | End: 2024-09-11 | Stop reason: HOSPADM

## 2024-09-05 RX ORDER — FOLIC ACID 1 MG/1
1 TABLET ORAL DAILY
Status: DISCONTINUED | OUTPATIENT
Start: 2024-09-06 | End: 2024-09-11 | Stop reason: HOSPADM

## 2024-09-05 RX ORDER — SODIUM CHLORIDE 0.9 % (FLUSH) 0.9 %
5-40 SYRINGE (ML) INJECTION PRN
Status: DISCONTINUED | OUTPATIENT
Start: 2024-09-05 | End: 2024-09-11 | Stop reason: HOSPADM

## 2024-09-05 RX ORDER — ACETAMINOPHEN 325 MG/1
650 TABLET ORAL EVERY 6 HOURS PRN
Status: DISCONTINUED | OUTPATIENT
Start: 2024-09-05 | End: 2024-09-11 | Stop reason: HOSPADM

## 2024-09-05 RX ORDER — ASPIRIN 81 MG/1
81 TABLET, CHEWABLE ORAL DAILY
Status: DISCONTINUED | OUTPATIENT
Start: 2024-09-06 | End: 2024-09-11 | Stop reason: HOSPADM

## 2024-09-05 RX ORDER — SODIUM CHLORIDE 9 MG/ML
INJECTION, SOLUTION INTRAVENOUS PRN
Status: DISCONTINUED | OUTPATIENT
Start: 2024-09-05 | End: 2024-09-11 | Stop reason: HOSPADM

## 2024-09-05 RX ORDER — LEVOTHYROXINE SODIUM 25 UG/1
25 TABLET ORAL
Status: DISCONTINUED | OUTPATIENT
Start: 2024-09-06 | End: 2024-09-11 | Stop reason: HOSPADM

## 2024-09-05 RX ORDER — SODIUM CHLORIDE 0.9 % (FLUSH) 0.9 %
5-40 SYRINGE (ML) INJECTION EVERY 12 HOURS SCHEDULED
Status: DISCONTINUED | OUTPATIENT
Start: 2024-09-05 | End: 2024-09-11 | Stop reason: HOSPADM

## 2024-09-05 RX ORDER — ACETAMINOPHEN 650 MG/1
650 SUPPOSITORY RECTAL EVERY 6 HOURS PRN
Status: DISCONTINUED | OUTPATIENT
Start: 2024-09-05 | End: 2024-09-11 | Stop reason: HOSPADM

## 2024-09-05 RX ORDER — HEPARIN SODIUM 5000 [USP'U]/ML
5000 INJECTION, SOLUTION INTRAVENOUS; SUBCUTANEOUS EVERY 8 HOURS SCHEDULED
Status: DISCONTINUED | OUTPATIENT
Start: 2024-09-05 | End: 2024-09-08

## 2024-09-05 RX ORDER — NEPHROCAP 1 MG
1 CAP ORAL DAILY
Status: DISCONTINUED | OUTPATIENT
Start: 2024-09-06 | End: 2024-09-11 | Stop reason: HOSPADM

## 2024-09-05 RX ORDER — INSULIN LISPRO 100 [IU]/ML
0-4 INJECTION, SOLUTION INTRAVENOUS; SUBCUTANEOUS EVERY 6 HOURS
Status: DISCONTINUED | OUTPATIENT
Start: 2024-09-06 | End: 2024-09-07

## 2024-09-05 RX ORDER — ONDANSETRON 4 MG/1
4 TABLET, ORALLY DISINTEGRATING ORAL EVERY 8 HOURS PRN
Status: DISCONTINUED | OUTPATIENT
Start: 2024-09-05 | End: 2024-09-11 | Stop reason: HOSPADM

## 2024-09-05 RX ORDER — ACETAMINOPHEN 325 MG/1
650 TABLET ORAL 3 TIMES DAILY
Status: DISCONTINUED | OUTPATIENT
Start: 2024-09-05 | End: 2024-09-05

## 2024-09-05 RX ADMIN — ALBUMIN (HUMAN) 25 G: 0.25 INJECTION, SOLUTION INTRAVENOUS at 18:17

## 2024-09-05 RX ADMIN — MIDODRINE HYDROCHLORIDE 5 MG: 5 TABLET ORAL at 18:11

## 2024-09-05 RX ADMIN — ACETAMINOPHEN 325MG 650 MG: 325 TABLET ORAL at 16:05

## 2024-09-05 RX ADMIN — HEPARIN SODIUM 5000 UNITS: 5000 INJECTION INTRAVENOUS; SUBCUTANEOUS at 21:26

## 2024-09-05 RX ADMIN — ACETAMINOPHEN 325MG 650 MG: 325 TABLET ORAL at 21:22

## 2024-09-05 RX ADMIN — SODIUM CHLORIDE, PRESERVATIVE FREE 10 ML: 5 INJECTION INTRAVENOUS at 21:24

## 2024-09-05 RX ADMIN — HEPARIN SODIUM 5000 UNITS: 5000 INJECTION INTRAVENOUS; SUBCUTANEOUS at 16:06

## 2024-09-05 RX ADMIN — ATORVASTATIN CALCIUM 20 MG: 20 TABLET, FILM COATED ORAL at 21:23

## 2024-09-05 ASSESSMENT — PAIN SCALES - PAIN ASSESSMENT IN ADVANCED DEMENTIA (PAINAD)
FACIALEXPRESSION: FACIAL GRIMACING
CONSOLABILITY: NO NEED TO CONSOLE
CONSOLABILITY: NO NEED TO CONSOLE
BREATHING: NORMAL
BREATHING: NORMAL
TOTALSCORE: 4
BODYLANGUAGE: TENSE, DISTRESSED PACING, FIDGETING
BODYLANGUAGE: RELAXED
TOTALSCORE: 0
CONSOLABILITY: NO NEED TO CONSOLE
BODYLANGUAGE: RELAXED
FACIALEXPRESSION: SMILING OR INEXPRESSIVE
NEGVOCALIZATION: OCCASIONAL MOAN/GROAN, LOW SPEECH, NEGATIVE/DISAPPROVING QUALITY
TOTALSCORE: 0
BREATHING: NORMAL
FACIALEXPRESSION: SMILING OR INEXPRESSIVE

## 2024-09-05 ASSESSMENT — PAIN SCALES - GENERAL
PAINLEVEL_OUTOF10: 10
PAINLEVEL_OUTOF10: 10

## 2024-09-05 ASSESSMENT — LIFESTYLE VARIABLES
HOW MANY STANDARD DRINKS CONTAINING ALCOHOL DO YOU HAVE ON A TYPICAL DAY: PATIENT DOES NOT DRINK
HOW OFTEN DO YOU HAVE A DRINK CONTAINING ALCOHOL: NEVER

## 2024-09-05 ASSESSMENT — PAIN DESCRIPTION - LOCATION
LOCATION: LEG

## 2024-09-05 ASSESSMENT — PAIN DESCRIPTION - FREQUENCY: FREQUENCY: INTERMITTENT

## 2024-09-05 ASSESSMENT — PAIN DESCRIPTION - ORIENTATION
ORIENTATION: LEFT

## 2024-09-05 ASSESSMENT — PAIN DESCRIPTION - DESCRIPTORS
DESCRIPTORS: BURNING
DESCRIPTORS: ACHING
DESCRIPTORS: BURNING

## 2024-09-05 ASSESSMENT — PAIN DESCRIPTION - ONSET: ONSET: ON-GOING

## 2024-09-05 ASSESSMENT — PAIN DESCRIPTION - PAIN TYPE: TYPE: ACUTE PAIN

## 2024-09-05 ASSESSMENT — PAIN - FUNCTIONAL ASSESSMENT: PAIN_FUNCTIONAL_ASSESSMENT: PREVENTS OR INTERFERES SOME ACTIVE ACTIVITIES AND ADLS

## 2024-09-06 ENCOUNTER — ANESTHESIA (OUTPATIENT)
Facility: HOSPITAL | Age: 84
End: 2024-09-06
Payer: MEDICARE

## 2024-09-06 ENCOUNTER — ANESTHESIA EVENT (OUTPATIENT)
Facility: HOSPITAL | Age: 84
End: 2024-09-06
Payer: MEDICARE

## 2024-09-06 ENCOUNTER — APPOINTMENT (OUTPATIENT)
Facility: HOSPITAL | Age: 84
DRG: 689 | End: 2024-09-06
Payer: MEDICARE

## 2024-09-06 PROBLEM — I99.8 ISCHEMIA OF LEFT LOWER EXTREMITY: Status: ACTIVE | Noted: 2024-09-06

## 2024-09-06 PROBLEM — G93.40 ENCEPHALOPATHY ACUTE: Status: ACTIVE | Noted: 2024-09-06

## 2024-09-06 PROBLEM — N18.6 END STAGE RENAL DISEASE (HCC): Status: ACTIVE | Noted: 2024-09-06

## 2024-09-06 PROBLEM — M79.605 PAIN OF LEFT LOWER EXTREMITY: Status: ACTIVE | Noted: 2024-09-06

## 2024-09-06 LAB
ANION GAP SERPL CALC-SCNC: 21 MMOL/L (ref 3–18)
BACTERIA SPEC CULT: NORMAL
BASOPHILS # BLD: 0 K/UL (ref 0–0.1)
BASOPHILS NFR BLD: 0 % (ref 0–2)
BUN SERPL-MCNC: 102 MG/DL (ref 7–18)
BUN/CREAT SERPL: 9 (ref 12–20)
CALCIUM SERPL-MCNC: 10 MG/DL (ref 8.5–10.1)
CC UR VC: NORMAL
CHLORIDE SERPL-SCNC: 98 MMOL/L (ref 100–111)
CO2 SERPL-SCNC: 18 MMOL/L (ref 21–32)
CREAT SERPL-MCNC: 11.1 MG/DL (ref 0.6–1.3)
DIFFERENTIAL METHOD BLD: ABNORMAL
EOSINOPHIL # BLD: 0.1 K/UL (ref 0–0.4)
EOSINOPHIL NFR BLD: 1 % (ref 0–5)
ERYTHROCYTE [DISTWIDTH] IN BLOOD BY AUTOMATED COUNT: 15.3 % (ref 11.6–14.5)
GLUCOSE BLD STRIP.AUTO-MCNC: 159 MG/DL (ref 70–110)
GLUCOSE BLD STRIP.AUTO-MCNC: 164 MG/DL (ref 70–110)
GLUCOSE BLD STRIP.AUTO-MCNC: 172 MG/DL (ref 70–110)
GLUCOSE SERPL-MCNC: 123 MG/DL (ref 74–99)
HCT VFR BLD AUTO: 25.4 % (ref 35–45)
HGB BLD-MCNC: 7.9 G/DL (ref 12–16)
IMM GRANULOCYTES # BLD AUTO: 0.1 K/UL (ref 0–0.04)
IMM GRANULOCYTES NFR BLD AUTO: 1 % (ref 0–0.5)
LYMPHOCYTES # BLD: 1.1 K/UL (ref 0.9–3.6)
LYMPHOCYTES NFR BLD: 9 % (ref 21–52)
MCH RBC QN AUTO: 34.5 PG (ref 24–34)
MCHC RBC AUTO-ENTMCNC: 31.1 G/DL (ref 31–37)
MCV RBC AUTO: 110.9 FL (ref 78–100)
MONOCYTES # BLD: 1.4 K/UL (ref 0.05–1.2)
MONOCYTES NFR BLD: 12 % (ref 3–10)
NEUTS SEG # BLD: 8.8 K/UL (ref 1.8–8)
NEUTS SEG NFR BLD: 76 % (ref 40–73)
NRBC # BLD: 0 K/UL (ref 0–0.01)
NRBC BLD-RTO: 0 PER 100 WBC
PLATELET # BLD AUTO: 277 K/UL (ref 135–420)
PMV BLD AUTO: 10.1 FL (ref 9.2–11.8)
POTASSIUM SERPL-SCNC: 4.5 MMOL/L (ref 3.5–5.5)
RBC # BLD AUTO: 2.29 M/UL (ref 4.2–5.3)
SERVICE CMNT-IMP: NORMAL
SODIUM SERPL-SCNC: 137 MMOL/L (ref 136–145)
WBC # BLD AUTO: 11.5 K/UL (ref 4.6–13.2)

## 2024-09-06 PROCEDURE — 3700000001 HC ADD 15 MINUTES (ANESTHESIA): Performed by: SURGERY

## 2024-09-06 PROCEDURE — 99233 SBSQ HOSP IP/OBS HIGH 50: CPT | Performed by: NURSE PRACTITIONER

## 2024-09-06 PROCEDURE — 99232 SBSQ HOSP IP/OBS MODERATE 35: CPT | Performed by: INTERNAL MEDICINE

## 2024-09-06 PROCEDURE — 71045 X-RAY EXAM CHEST 1 VIEW: CPT

## 2024-09-06 PROCEDURE — 6360000002 HC RX W HCPCS

## 2024-09-06 PROCEDURE — 5A1D70Z PERFORMANCE OF URINARY FILTRATION, INTERMITTENT, LESS THAN 6 HOURS PER DAY: ICD-10-PCS | Performed by: INTERNAL MEDICINE

## 2024-09-06 PROCEDURE — 2580000003 HC RX 258: Performed by: HOSPITALIST

## 2024-09-06 PROCEDURE — 82962 GLUCOSE BLOOD TEST: CPT

## 2024-09-06 PROCEDURE — B548ZZA ULTRASONOGRAPHY OF SUPERIOR VENA CAVA, GUIDANCE: ICD-10-PCS | Performed by: SURGERY

## 2024-09-06 PROCEDURE — 2709999900 HC NON-CHARGEABLE SUPPLY: Performed by: SURGERY

## 2024-09-06 PROCEDURE — 80048 BASIC METABOLIC PNL TOTAL CA: CPT

## 2024-09-06 PROCEDURE — B5181ZA FLUOROSCOPY OF SUPERIOR VENA CAVA USING LOW OSMOLAR CONTRAST, GUIDANCE: ICD-10-PCS | Performed by: SURGERY

## 2024-09-06 PROCEDURE — 0JH63XZ INSERTION OF TUNNELED VASCULAR ACCESS DEVICE INTO CHEST SUBCUTANEOUS TISSUE AND FASCIA, PERCUTANEOUS APPROACH: ICD-10-PCS | Performed by: SURGERY

## 2024-09-06 PROCEDURE — 2580000003 HC RX 258

## 2024-09-06 PROCEDURE — 36415 COLL VENOUS BLD VENIPUNCTURE: CPT

## 2024-09-06 PROCEDURE — 3600000002 HC SURGERY LEVEL 2 BASE: Performed by: SURGERY

## 2024-09-06 PROCEDURE — 6370000000 HC RX 637 (ALT 250 FOR IP): Performed by: HOSPITALIST

## 2024-09-06 PROCEDURE — C1894 INTRO/SHEATH, NON-LASER: HCPCS | Performed by: SURGERY

## 2024-09-06 PROCEDURE — 7100000000 HC PACU RECOVERY - FIRST 15 MIN: Performed by: SURGERY

## 2024-09-06 PROCEDURE — 1100000000 HC RM PRIVATE

## 2024-09-06 PROCEDURE — 6360000002 HC RX W HCPCS: Performed by: SURGERY

## 2024-09-06 PROCEDURE — 90935 HEMODIALYSIS ONE EVALUATION: CPT

## 2024-09-06 PROCEDURE — 02HV33Z INSERTION OF INFUSION DEVICE INTO SUPERIOR VENA CAVA, PERCUTANEOUS APPROACH: ICD-10-PCS | Performed by: SURGERY

## 2024-09-06 PROCEDURE — 7100000001 HC PACU RECOVERY - ADDTL 15 MIN: Performed by: SURGERY

## 2024-09-06 PROCEDURE — 3600000012 HC SURGERY LEVEL 2 ADDTL 15MIN: Performed by: SURGERY

## 2024-09-06 PROCEDURE — 3700000000 HC ANESTHESIA ATTENDED CARE: Performed by: SURGERY

## 2024-09-06 PROCEDURE — 85025 COMPLETE CBC W/AUTO DIFF WBC: CPT

## 2024-09-06 PROCEDURE — 2500000003 HC RX 250 WO HCPCS: Performed by: SURGERY

## 2024-09-06 DEVICE — IMPLANTABLE DEVICE: Type: IMPLANTABLE DEVICE | Site: CHEST | Status: FUNCTIONAL

## 2024-09-06 RX ORDER — SODIUM CHLORIDE 9 MG/ML
INJECTION, SOLUTION INTRAVENOUS CONTINUOUS PRN
Status: DISCONTINUED | OUTPATIENT
Start: 2024-09-06 | End: 2024-09-06 | Stop reason: SDUPTHER

## 2024-09-06 RX ORDER — PROPOFOL 10 MG/ML
INJECTION, EMULSION INTRAVENOUS PRN
Status: DISCONTINUED | OUTPATIENT
Start: 2024-09-06 | End: 2024-09-06 | Stop reason: SDUPTHER

## 2024-09-06 RX ORDER — PHENYLEPHRINE HCL IN 0.9% NACL 1 MG/10 ML
SYRINGE (ML) INTRAVENOUS PRN
Status: DISCONTINUED | OUTPATIENT
Start: 2024-09-06 | End: 2024-09-06 | Stop reason: SDUPTHER

## 2024-09-06 RX ORDER — HEPARIN SODIUM 200 [USP'U]/100ML
INJECTION, SOLUTION INTRAVENOUS CONTINUOUS PRN
Status: COMPLETED | OUTPATIENT
Start: 2024-09-06 | End: 2024-09-06

## 2024-09-06 RX ORDER — HEPARIN SODIUM 5000 [USP'U]/ML
INJECTION, SOLUTION INTRAVENOUS; SUBCUTANEOUS PRN
Status: DISCONTINUED | OUTPATIENT
Start: 2024-09-06 | End: 2024-09-06 | Stop reason: ALTCHOICE

## 2024-09-06 RX ADMIN — SODIUM CHLORIDE, PRESERVATIVE FREE 10 ML: 5 INJECTION INTRAVENOUS at 11:25

## 2024-09-06 RX ADMIN — Medication 1 MG: at 09:23

## 2024-09-06 RX ADMIN — Medication 100 MCG: at 13:31

## 2024-09-06 RX ADMIN — ASPIRIN 81 MG CHEWABLE TABLET 81 MG: 81 TABLET CHEWABLE at 09:23

## 2024-09-06 RX ADMIN — MIDODRINE HYDROCHLORIDE 5 MG: 5 TABLET ORAL at 09:23

## 2024-09-06 RX ADMIN — ATORVASTATIN CALCIUM 20 MG: 20 TABLET, FILM COATED ORAL at 21:40

## 2024-09-06 RX ADMIN — FOLIC ACID 1 MG: 1 TABLET ORAL at 09:23

## 2024-09-06 RX ADMIN — SODIUM CHLORIDE: 9 INJECTION, SOLUTION INTRAVENOUS at 12:52

## 2024-09-06 RX ADMIN — PROPOFOL 20 MG: 10 INJECTION, EMULSION INTRAVENOUS at 13:01

## 2024-09-06 RX ADMIN — PROPOFOL 20 MG: 10 INJECTION, EMULSION INTRAVENOUS at 13:03

## 2024-09-06 RX ADMIN — LEVOTHYROXINE SODIUM 25 MCG: 25 TABLET ORAL at 06:04

## 2024-09-06 RX ADMIN — ACETAMINOPHEN 325MG 650 MG: 325 TABLET ORAL at 06:03

## 2024-09-06 RX ADMIN — PROPOFOL 75 MCG/KG/MIN: 10 INJECTION, EMULSION INTRAVENOUS at 13:06

## 2024-09-06 ASSESSMENT — PAIN SCALES - PAIN ASSESSMENT IN ADVANCED DEMENTIA (PAINAD)
BREATHING: NORMAL
CONSOLABILITY: NO NEED TO CONSOLE
FACIALEXPRESSION: SMILING OR INEXPRESSIVE
BODYLANGUAGE: RELAXED
TOTALSCORE: 4
FACIALEXPRESSION: SMILING OR INEXPRESSIVE
TOTALSCORE: 0
BREATHING: NORMAL
BODYLANGUAGE: RELAXED
TOTALSCORE: 0
FACIALEXPRESSION: SMILING OR INEXPRESSIVE
TOTALSCORE: 0
CONSOLABILITY: NO NEED TO CONSOLE
CONSOLABILITY: NO NEED TO CONSOLE
BODYLANGUAGE: RELAXED
CONSOLABILITY: NO NEED TO CONSOLE
BREATHING: NORMAL
NEGVOCALIZATION: OCCASIONAL MOAN/GROAN, LOW SPEECH, NEGATIVE/DISAPPROVING QUALITY
FACIALEXPRESSION: FACIAL GRIMACING
BREATHING: NORMAL
CONSOLABILITY: NO NEED TO CONSOLE
BREATHING: NORMAL
TOTALSCORE: 0
FACIALEXPRESSION: SMILING OR INEXPRESSIVE
BODYLANGUAGE: TENSE, DISTRESSED PACING, FIDGETING
BODYLANGUAGE: RELAXED
BREATHING: NORMAL
FACIALEXPRESSION: SMILING OR INEXPRESSIVE
TOTALSCORE: 0
BODYLANGUAGE: RELAXED
CONSOLABILITY: NO NEED TO CONSOLE

## 2024-09-06 ASSESSMENT — PAIN SCALES - GENERAL
PAINLEVEL_OUTOF10: 0

## 2024-09-06 ASSESSMENT — PAIN DESCRIPTION - ORIENTATION: ORIENTATION: LEFT

## 2024-09-06 ASSESSMENT — PAIN DESCRIPTION - DESCRIPTORS: DESCRIPTORS: ACHING

## 2024-09-06 ASSESSMENT — PAIN - FUNCTIONAL ASSESSMENT: PAIN_FUNCTIONAL_ASSESSMENT: PREVENTS OR INTERFERES SOME ACTIVE ACTIVITIES AND ADLS

## 2024-09-06 ASSESSMENT — PAIN DESCRIPTION - LOCATION: LOCATION: BACK;LEG

## 2024-09-06 NOTE — ANESTHESIA PRE PROCEDURE
09/06/2024 02:49 AM    HCT 25.4 09/06/2024 02:49 AM    .9 09/06/2024 02:49 AM    RDW 15.3 09/06/2024 02:49 AM     09/06/2024 02:49 AM       CMP:   Lab Results   Component Value Date/Time     09/06/2024 02:49 AM    K 4.5 09/06/2024 02:49 AM    CL 98 09/06/2024 02:49 AM    CO2 18 09/06/2024 02:49 AM     09/06/2024 02:49 AM    CREATININE 11.10 09/06/2024 02:49 AM    GFRAA 14.0 09/21/2023 02:30 PM    AGRATIO 0.7 12/01/2021 08:05 PM    LABGLOM 3 09/06/2024 02:49 AM    LABGLOM 7 01/22/2024 07:50 AM    GLUCOSE 123 09/06/2024 02:49 AM    GLUCOSE 139 09/21/2023 02:30 PM    CALCIUM 10.0 09/06/2024 02:49 AM    BILITOT 0.9 09/03/2024 02:15 PM    ALKPHOS 98 09/03/2024 02:15 PM    ALKPHOS 105 12/01/2021 08:05 PM     09/03/2024 02:15 PM    ALT 50 09/03/2024 02:15 PM       POC Tests:   Recent Labs     09/03/24  1354 09/05/24  2359 09/06/24  1221   POCGLU 246*   < > 164*   POCNA 137  --   --    POCK 3.8  --   --    POCCL 102  --   --     < > = values in this interval not displayed.       Coags:   Lab Results   Component Value Date/Time    PROTIME 14.0 06/22/2023 05:07 AM    INR 1.0 06/22/2023 05:07 AM       HCG (If Applicable): No results found for: \"PREGTESTUR\", \"PREGSERUM\", \"HCG\", \"HCGQUANT\"     ABGs: No results found for: \"PHART\", \"PO2ART\", \"WGA0NZT\", \"CYM5VXG\", \"BEART\", \"V1KWDUYQ\"     Type & Screen (If Applicable):  Lab Results   Component Value Date    ABORH A POSITIVE 03/20/2020    LABANTI NEG 03/20/2020       Drug/Infectious Status (If Applicable):  No results found for: \"HIV\", \"HEPCAB\"    COVID-19 Screening (If Applicable):   Lab Results   Component Value Date/Time    COVID19 Not detected 09/03/2024 04:32 PM           Anesthesia Evaluation  Patient summary reviewed and Nursing notes reviewed   no history of anesthetic complications:   Airway: Mallampati: II  TM distance: <3 FB   Neck ROM: full  Mouth opening: > = 3 FB   Dental:    (+) poor dentition      Pulmonary:normal exam

## 2024-09-06 NOTE — ANESTHESIA POSTPROCEDURE EVALUATION
Department of Anesthesiology  Postprocedure Note    Patient: Kaylie Granger  MRN: [id number]  YOB: 1940  Date of evaluation: 9/6/2024    Procedure Summary       Date: 09/06/24 Room / Location: [facility] MAIN 06 / [facility] MAIN OR    Anesthesia Start: 1252 Anesthesia Stop: 1351    Procedure: TUNNELED DIALYSIS CATHETER PLACEMENT; C-ARM Diagnosis:       End stage renal disease (HCC)      (End stage renal disease (HCC) [N18.6])    Surgeons: Angel Rowland MD Responsible Provider: Michael Rahman MD    Anesthesia Type: MAC ASA Status: 3            Anesthesia Type: MAC    Berta Phase I: Berta Score: 8    Berta Phase II:      Anesthesia Post Evaluation    Patient location during evaluation: PACU  Patient participation: complete - patient participated  Level of consciousness: sleepy but conscious  Pain score: 0  Airway patency: patent  Nausea & Vomiting: no nausea and no vomiting  Cardiovascular status: blood pressure returned to baseline  Respiratory status: acceptable  Hydration status: euvolemic  Pain management: adequate    There were no known notable events for this encounter.

## 2024-09-07 ENCOUNTER — APPOINTMENT (OUTPATIENT)
Facility: HOSPITAL | Age: 84
DRG: 689 | End: 2024-09-07
Payer: MEDICARE

## 2024-09-07 LAB
ALBUMIN SERPL-MCNC: 2.9 G/DL (ref 3.4–5)
ALBUMIN/GLOB SERPL: 0.5 (ref 0.8–1.7)
ALP SERPL-CCNC: 97 U/L (ref 45–117)
ALT SERPL-CCNC: 38 U/L (ref 13–56)
ANION GAP SERPL CALC-SCNC: 22 MMOL/L (ref 3–18)
AST SERPL-CCNC: 54 U/L (ref 10–38)
BASOPHILS # BLD: 0 K/UL (ref 0–0.1)
BASOPHILS NFR BLD: 0 % (ref 0–2)
BILIRUB SERPL-MCNC: 0.6 MG/DL (ref 0.2–1)
BUN SERPL-MCNC: 30 MG/DL (ref 7–18)
BUN/CREAT SERPL: 6 (ref 12–20)
CALCIUM SERPL-MCNC: 10 MG/DL (ref 8.5–10.1)
CHLORIDE SERPL-SCNC: 97 MMOL/L (ref 100–111)
CO2 SERPL-SCNC: 18 MMOL/L (ref 21–32)
CREAT SERPL-MCNC: 4.62 MG/DL (ref 0.6–1.3)
DIFFERENTIAL METHOD BLD: ABNORMAL
EOSINOPHIL # BLD: 0 K/UL (ref 0–0.4)
EOSINOPHIL NFR BLD: 0 % (ref 0–5)
ERYTHROCYTE [DISTWIDTH] IN BLOOD BY AUTOMATED COUNT: 15.3 % (ref 11.6–14.5)
GLOBULIN SER CALC-MCNC: 5.5 G/DL (ref 2–4)
GLUCOSE BLD STRIP.AUTO-MCNC: 142 MG/DL (ref 70–110)
GLUCOSE BLD STRIP.AUTO-MCNC: 143 MG/DL (ref 70–110)
GLUCOSE BLD STRIP.AUTO-MCNC: 162 MG/DL (ref 70–110)
GLUCOSE BLD STRIP.AUTO-MCNC: 177 MG/DL (ref 70–110)
GLUCOSE BLD STRIP.AUTO-MCNC: 185 MG/DL (ref 70–110)
GLUCOSE SERPL-MCNC: 119 MG/DL (ref 74–99)
HCT VFR BLD AUTO: 23 % (ref 35–45)
HGB BLD-MCNC: 7.5 G/DL (ref 12–16)
IMM GRANULOCYTES # BLD AUTO: 0.2 K/UL (ref 0–0.04)
IMM GRANULOCYTES NFR BLD AUTO: 1 % (ref 0–0.5)
LYMPHOCYTES # BLD: 0.7 K/UL (ref 0.9–3.6)
LYMPHOCYTES NFR BLD: 5 % (ref 21–52)
MCH RBC QN AUTO: 35.7 PG (ref 24–34)
MCHC RBC AUTO-ENTMCNC: 32.6 G/DL (ref 31–37)
MCV RBC AUTO: 109.5 FL (ref 78–100)
MONOCYTES # BLD: 1.1 K/UL (ref 0.05–1.2)
MONOCYTES NFR BLD: 7 % (ref 3–10)
NEUTS SEG # BLD: 12.3 K/UL (ref 1.8–8)
NEUTS SEG NFR BLD: 86 % (ref 40–73)
NRBC # BLD: 0.03 K/UL (ref 0–0.01)
NRBC BLD-RTO: 0.2 PER 100 WBC
PLATELET # BLD AUTO: 256 K/UL (ref 135–420)
PMV BLD AUTO: 9.7 FL (ref 9.2–11.8)
POTASSIUM SERPL-SCNC: 3.6 MMOL/L (ref 3.5–5.5)
PROT SERPL-MCNC: 8.4 G/DL (ref 6.4–8.2)
RBC # BLD AUTO: 2.1 M/UL (ref 4.2–5.3)
SODIUM SERPL-SCNC: 137 MMOL/L (ref 136–145)
WBC # BLD AUTO: 14.3 K/UL (ref 4.6–13.2)

## 2024-09-07 PROCEDURE — 2580000003 HC RX 258: Performed by: HOSPITALIST

## 2024-09-07 PROCEDURE — 92526 ORAL FUNCTION THERAPY: CPT

## 2024-09-07 PROCEDURE — 85025 COMPLETE CBC W/AUTO DIFF WBC: CPT

## 2024-09-07 PROCEDURE — 92610 EVALUATE SWALLOWING FUNCTION: CPT

## 2024-09-07 PROCEDURE — 6360000002 HC RX W HCPCS: Performed by: HOSPITALIST

## 2024-09-07 PROCEDURE — 71045 X-RAY EXAM CHEST 1 VIEW: CPT

## 2024-09-07 PROCEDURE — 80053 COMPREHEN METABOLIC PANEL: CPT

## 2024-09-07 PROCEDURE — 1100000000 HC RM PRIVATE

## 2024-09-07 PROCEDURE — 82962 GLUCOSE BLOOD TEST: CPT

## 2024-09-07 PROCEDURE — 6370000000 HC RX 637 (ALT 250 FOR IP): Performed by: HOSPITALIST

## 2024-09-07 PROCEDURE — 99233 SBSQ HOSP IP/OBS HIGH 50: CPT | Performed by: INTERNAL MEDICINE

## 2024-09-07 PROCEDURE — 6360000002 HC RX W HCPCS: Performed by: INTERNAL MEDICINE

## 2024-09-07 PROCEDURE — 94761 N-INVAS EAR/PLS OXIMETRY MLT: CPT

## 2024-09-07 PROCEDURE — 36415 COLL VENOUS BLD VENIPUNCTURE: CPT

## 2024-09-07 RX ORDER — INSULIN LISPRO 100 [IU]/ML
0-4 INJECTION, SOLUTION INTRAVENOUS; SUBCUTANEOUS
Status: DISCONTINUED | OUTPATIENT
Start: 2024-09-07 | End: 2024-09-11 | Stop reason: HOSPADM

## 2024-09-07 RX ADMIN — SODIUM CHLORIDE, PRESERVATIVE FREE 10 ML: 5 INJECTION INTRAVENOUS at 01:30

## 2024-09-07 RX ADMIN — AMPICILLIN 1000 MG: 1 INJECTION, POWDER, FOR SOLUTION INTRAMUSCULAR; INTRAVENOUS at 01:29

## 2024-09-07 RX ADMIN — MIDODRINE HYDROCHLORIDE 5 MG: 5 TABLET ORAL at 17:29

## 2024-09-07 RX ADMIN — EPOETIN ALFA-EPBX 5000 UNITS: 3000 INJECTION, SOLUTION INTRAVENOUS; SUBCUTANEOUS at 17:27

## 2024-09-07 RX ADMIN — ATORVASTATIN CALCIUM 20 MG: 20 TABLET, FILM COATED ORAL at 21:53

## 2024-09-07 RX ADMIN — AMPICILLIN 1000 MG: 1 INJECTION, POWDER, FOR SOLUTION INTRAMUSCULAR; INTRAVENOUS at 17:26

## 2024-09-07 RX ADMIN — Medication 1 MG: at 07:41

## 2024-09-07 RX ADMIN — ASPIRIN 81 MG CHEWABLE TABLET 81 MG: 81 TABLET CHEWABLE at 07:41

## 2024-09-07 RX ADMIN — MIDODRINE HYDROCHLORIDE 5 MG: 5 TABLET ORAL at 07:41

## 2024-09-07 RX ADMIN — FOLIC ACID 1 MG: 1 TABLET ORAL at 07:41

## 2024-09-07 RX ADMIN — LEVOTHYROXINE SODIUM 25 MCG: 25 TABLET ORAL at 06:33

## 2024-09-07 RX ADMIN — SODIUM CHLORIDE, PRESERVATIVE FREE 10 ML: 5 INJECTION INTRAVENOUS at 21:55

## 2024-09-07 RX ADMIN — SODIUM CHLORIDE, PRESERVATIVE FREE 10 ML: 5 INJECTION INTRAVENOUS at 07:43

## 2024-09-07 ASSESSMENT — PAIN SCALES - PAIN ASSESSMENT IN ADVANCED DEMENTIA (PAINAD)
FACIALEXPRESSION: SMILING OR INEXPRESSIVE
BODYLANGUAGE: RELAXED
BREATHING: NORMAL
BREATHING: NORMAL
TOTALSCORE: 0
FACIALEXPRESSION: SMILING OR INEXPRESSIVE
CONSOLABILITY: NO NEED TO CONSOLE
BODYLANGUAGE: RELAXED
TOTALSCORE: 0
CONSOLABILITY: NO NEED TO CONSOLE

## 2024-09-07 ASSESSMENT — PAIN SCALES - GENERAL
PAINLEVEL_OUTOF10: 0

## 2024-09-08 LAB
ALBUMIN SERPL-MCNC: 2.8 G/DL (ref 3.4–5)
ALBUMIN/GLOB SERPL: 0.5 (ref 0.8–1.7)
ALP SERPL-CCNC: 93 U/L (ref 45–117)
ALT SERPL-CCNC: 38 U/L (ref 13–56)
ANION GAP SERPL CALC-SCNC: 12 MMOL/L (ref 3–18)
AST SERPL-CCNC: 78 U/L (ref 10–38)
BACTERIA SPEC CULT: NORMAL
BACTERIA SPEC CULT: NORMAL
BASOPHILS # BLD: 0 K/UL (ref 0–0.1)
BASOPHILS NFR BLD: 0 % (ref 0–2)
BILIRUB SERPL-MCNC: 0.6 MG/DL (ref 0.2–1)
BUN SERPL-MCNC: 55 MG/DL (ref 7–18)
BUN/CREAT SERPL: 8 (ref 12–20)
CALCIUM SERPL-MCNC: 9.9 MG/DL (ref 8.5–10.1)
CHLORIDE SERPL-SCNC: 102 MMOL/L (ref 100–111)
CO2 SERPL-SCNC: 22 MMOL/L (ref 21–32)
CREAT SERPL-MCNC: 7.31 MG/DL (ref 0.6–1.3)
DIFFERENTIAL METHOD BLD: ABNORMAL
EOSINOPHIL # BLD: 0.1 K/UL (ref 0–0.4)
EOSINOPHIL NFR BLD: 1 % (ref 0–5)
ERYTHROCYTE [DISTWIDTH] IN BLOOD BY AUTOMATED COUNT: 15.6 % (ref 11.6–14.5)
FERRITIN SERPL-MCNC: 1099 NG/ML (ref 8–388)
GLOBULIN SER CALC-MCNC: 5.6 G/DL (ref 2–4)
GLUCOSE BLD STRIP.AUTO-MCNC: 178 MG/DL (ref 70–110)
GLUCOSE BLD STRIP.AUTO-MCNC: 238 MG/DL (ref 70–110)
GLUCOSE BLD STRIP.AUTO-MCNC: 263 MG/DL (ref 70–110)
GLUCOSE BLD STRIP.AUTO-MCNC: 79 MG/DL (ref 70–110)
GLUCOSE SERPL-MCNC: 157 MG/DL (ref 74–99)
HCT VFR BLD AUTO: 23.4 % (ref 35–45)
HGB BLD-MCNC: 7.2 G/DL (ref 12–16)
IMM GRANULOCYTES # BLD AUTO: 0.1 K/UL (ref 0–0.04)
IMM GRANULOCYTES NFR BLD AUTO: 1 % (ref 0–0.5)
IRON SATN MFR SERPL: 38 % (ref 20–50)
IRON SERPL-MCNC: 39 UG/DL (ref 50–175)
LYMPHOCYTES # BLD: 1.4 K/UL (ref 0.9–3.6)
LYMPHOCYTES NFR BLD: 12 % (ref 21–52)
MCH RBC QN AUTO: 33.6 PG (ref 24–34)
MCHC RBC AUTO-ENTMCNC: 30.8 G/DL (ref 31–37)
MCV RBC AUTO: 109.3 FL (ref 78–100)
MONOCYTES # BLD: 1.3 K/UL (ref 0.05–1.2)
MONOCYTES NFR BLD: 11 % (ref 3–10)
NEUTS SEG # BLD: 8.7 K/UL (ref 1.8–8)
NEUTS SEG NFR BLD: 75 % (ref 40–73)
NRBC # BLD: 0 K/UL (ref 0–0.01)
NRBC BLD-RTO: 0 PER 100 WBC
PLATELET # BLD AUTO: 234 K/UL (ref 135–420)
PMV BLD AUTO: 9.9 FL (ref 9.2–11.8)
POTASSIUM SERPL-SCNC: 4 MMOL/L (ref 3.5–5.5)
PROT SERPL-MCNC: 8.4 G/DL (ref 6.4–8.2)
RBC # BLD AUTO: 2.14 M/UL (ref 4.2–5.3)
SERVICE CMNT-IMP: NORMAL
SERVICE CMNT-IMP: NORMAL
SODIUM SERPL-SCNC: 136 MMOL/L (ref 136–145)
TIBC SERPL-MCNC: 103 UG/DL (ref 250–450)
WBC # BLD AUTO: 11.5 K/UL (ref 4.6–13.2)

## 2024-09-08 PROCEDURE — 83540 ASSAY OF IRON: CPT

## 2024-09-08 PROCEDURE — 94761 N-INVAS EAR/PLS OXIMETRY MLT: CPT

## 2024-09-08 PROCEDURE — 85025 COMPLETE CBC W/AUTO DIFF WBC: CPT

## 2024-09-08 PROCEDURE — 36415 COLL VENOUS BLD VENIPUNCTURE: CPT

## 2024-09-08 PROCEDURE — 1100000000 HC RM PRIVATE

## 2024-09-08 PROCEDURE — 6370000000 HC RX 637 (ALT 250 FOR IP): Performed by: HOSPITALIST

## 2024-09-08 PROCEDURE — 83550 IRON BINDING TEST: CPT

## 2024-09-08 PROCEDURE — 2580000003 HC RX 258: Performed by: HOSPITALIST

## 2024-09-08 PROCEDURE — 80053 COMPREHEN METABOLIC PANEL: CPT

## 2024-09-08 PROCEDURE — 6370000000 HC RX 637 (ALT 250 FOR IP): Performed by: INTERNAL MEDICINE

## 2024-09-08 PROCEDURE — 82962 GLUCOSE BLOOD TEST: CPT

## 2024-09-08 PROCEDURE — 6360000002 HC RX W HCPCS: Performed by: HOSPITALIST

## 2024-09-08 PROCEDURE — 82728 ASSAY OF FERRITIN: CPT

## 2024-09-08 PROCEDURE — 99233 SBSQ HOSP IP/OBS HIGH 50: CPT | Performed by: INTERNAL MEDICINE

## 2024-09-08 RX ADMIN — ATORVASTATIN CALCIUM 20 MG: 20 TABLET, FILM COATED ORAL at 20:56

## 2024-09-08 RX ADMIN — FOLIC ACID 1 MG: 1 TABLET ORAL at 09:51

## 2024-09-08 RX ADMIN — MIDODRINE HYDROCHLORIDE 5 MG: 5 TABLET ORAL at 16:15

## 2024-09-08 RX ADMIN — INSULIN LISPRO 2 UNITS: 100 INJECTION, SOLUTION INTRAVENOUS; SUBCUTANEOUS at 16:15

## 2024-09-08 RX ADMIN — SODIUM CHLORIDE, PRESERVATIVE FREE 10 ML: 5 INJECTION INTRAVENOUS at 09:58

## 2024-09-08 RX ADMIN — INSULIN LISPRO 1 UNITS: 100 INJECTION, SOLUTION INTRAVENOUS; SUBCUTANEOUS at 11:48

## 2024-09-08 RX ADMIN — LEVOTHYROXINE SODIUM 25 MCG: 25 TABLET ORAL at 06:07

## 2024-09-08 RX ADMIN — ASPIRIN 81 MG CHEWABLE TABLET 81 MG: 81 TABLET CHEWABLE at 09:51

## 2024-09-08 RX ADMIN — AMPICILLIN 1000 MG: 1 INJECTION, POWDER, FOR SOLUTION INTRAMUSCULAR; INTRAVENOUS at 16:24

## 2024-09-08 RX ADMIN — SODIUM CHLORIDE, PRESERVATIVE FREE 10 ML: 5 INJECTION INTRAVENOUS at 20:57

## 2024-09-08 RX ADMIN — Medication 1 MG: at 09:51

## 2024-09-08 RX ADMIN — MIDODRINE HYDROCHLORIDE 5 MG: 5 TABLET ORAL at 12:00

## 2024-09-08 ASSESSMENT — PAIN SCALES - GENERAL
PAINLEVEL_OUTOF10: 0

## 2024-09-08 ASSESSMENT — PAIN SCALES - PAIN ASSESSMENT IN ADVANCED DEMENTIA (PAINAD)
TOTALSCORE: 0
CONSOLABILITY: NO NEED TO CONSOLE
BREATHING: NORMAL
BODYLANGUAGE: RELAXED
FACIALEXPRESSION: SMILING OR INEXPRESSIVE

## 2024-09-09 LAB
ALBUMIN SERPL-MCNC: 2.9 G/DL (ref 3.4–5)
ALBUMIN/GLOB SERPL: 0.5 (ref 0.8–1.7)
ALP SERPL-CCNC: 101 U/L (ref 45–117)
ALT SERPL-CCNC: 41 U/L (ref 13–56)
ANION GAP SERPL CALC-SCNC: 17 MMOL/L (ref 3–18)
AST SERPL-CCNC: 84 U/L (ref 10–38)
BACTERIA SPEC CULT: NORMAL
BACTERIA SPEC CULT: NORMAL
BASOPHILS # BLD: 0 K/UL (ref 0–0.1)
BASOPHILS NFR BLD: 0 % (ref 0–2)
BILIRUB SERPL-MCNC: 0.7 MG/DL (ref 0.2–1)
BUN SERPL-MCNC: 78 MG/DL (ref 7–18)
BUN/CREAT SERPL: 9 (ref 12–20)
CALCIUM SERPL-MCNC: 9.9 MG/DL (ref 8.5–10.1)
CHLORIDE SERPL-SCNC: 96 MMOL/L (ref 100–111)
CO2 SERPL-SCNC: 23 MMOL/L (ref 21–32)
CREAT SERPL-MCNC: 8.83 MG/DL (ref 0.6–1.3)
DIFFERENTIAL METHOD BLD: ABNORMAL
EOSINOPHIL # BLD: 0.2 K/UL (ref 0–0.4)
EOSINOPHIL NFR BLD: 1 % (ref 0–5)
ERYTHROCYTE [DISTWIDTH] IN BLOOD BY AUTOMATED COUNT: 15.8 % (ref 11.6–14.5)
GLOBULIN SER CALC-MCNC: 5.9 G/DL (ref 2–4)
GLUCOSE BLD STRIP.AUTO-MCNC: 122 MG/DL (ref 70–110)
GLUCOSE BLD STRIP.AUTO-MCNC: 138 MG/DL (ref 70–110)
GLUCOSE BLD STRIP.AUTO-MCNC: 189 MG/DL (ref 70–110)
GLUCOSE SERPL-MCNC: 190 MG/DL (ref 74–99)
HCT VFR BLD AUTO: 26 % (ref 35–45)
HGB BLD-MCNC: 8 G/DL (ref 12–16)
IMM GRANULOCYTES # BLD AUTO: 0.1 K/UL (ref 0–0.04)
IMM GRANULOCYTES NFR BLD AUTO: 1 % (ref 0–0.5)
LYMPHOCYTES # BLD: 1.4 K/UL (ref 0.9–3.6)
LYMPHOCYTES NFR BLD: 11 % (ref 21–52)
MCH RBC QN AUTO: 34.2 PG (ref 24–34)
MCHC RBC AUTO-ENTMCNC: 30.8 G/DL (ref 31–37)
MCV RBC AUTO: 111.1 FL (ref 78–100)
MONOCYTES # BLD: 1.1 K/UL (ref 0.05–1.2)
MONOCYTES NFR BLD: 8 % (ref 3–10)
NEUTS SEG # BLD: 10.3 K/UL (ref 1.8–8)
NEUTS SEG NFR BLD: 79 % (ref 40–73)
NRBC # BLD: 0 K/UL (ref 0–0.01)
NRBC BLD-RTO: 0 PER 100 WBC
PLATELET # BLD AUTO: 234 K/UL (ref 135–420)
PMV BLD AUTO: 10.3 FL (ref 9.2–11.8)
POTASSIUM SERPL-SCNC: 4.3 MMOL/L (ref 3.5–5.5)
PROT SERPL-MCNC: 8.8 G/DL (ref 6.4–8.2)
RBC # BLD AUTO: 2.34 M/UL (ref 4.2–5.3)
SERVICE CMNT-IMP: NORMAL
SERVICE CMNT-IMP: NORMAL
SODIUM SERPL-SCNC: 136 MMOL/L (ref 136–145)
WBC # BLD AUTO: 13.1 K/UL (ref 4.6–13.2)

## 2024-09-09 PROCEDURE — 6370000000 HC RX 637 (ALT 250 FOR IP): Performed by: HOSPITALIST

## 2024-09-09 PROCEDURE — 6360000002 HC RX W HCPCS: Performed by: HOSPITALIST

## 2024-09-09 PROCEDURE — 1100000000 HC RM PRIVATE

## 2024-09-09 PROCEDURE — 99232 SBSQ HOSP IP/OBS MODERATE 35: CPT | Performed by: STUDENT IN AN ORGANIZED HEALTH CARE EDUCATION/TRAINING PROGRAM

## 2024-09-09 PROCEDURE — 80053 COMPREHEN METABOLIC PANEL: CPT

## 2024-09-09 PROCEDURE — 82962 GLUCOSE BLOOD TEST: CPT

## 2024-09-09 PROCEDURE — 36415 COLL VENOUS BLD VENIPUNCTURE: CPT

## 2024-09-09 PROCEDURE — 90935 HEMODIALYSIS ONE EVALUATION: CPT

## 2024-09-09 PROCEDURE — 2580000003 HC RX 258: Performed by: HOSPITALIST

## 2024-09-09 PROCEDURE — 85025 COMPLETE CBC W/AUTO DIFF WBC: CPT

## 2024-09-09 PROCEDURE — 6360000002 HC RX W HCPCS: Performed by: INTERNAL MEDICINE

## 2024-09-09 PROCEDURE — 94761 N-INVAS EAR/PLS OXIMETRY MLT: CPT

## 2024-09-09 RX ORDER — WATER 10 ML/10ML
INJECTION INTRAMUSCULAR; INTRAVENOUS; SUBCUTANEOUS
Status: DISPENSED
Start: 2024-09-09 | End: 2024-09-09

## 2024-09-09 RX ADMIN — ASPIRIN 81 MG CHEWABLE TABLET 81 MG: 81 TABLET CHEWABLE at 08:28

## 2024-09-09 RX ADMIN — SODIUM CHLORIDE, PRESERVATIVE FREE 10 ML: 5 INJECTION INTRAVENOUS at 21:27

## 2024-09-09 RX ADMIN — ALTEPLASE 4 MG: 2.2 INJECTION, POWDER, LYOPHILIZED, FOR SOLUTION INTRAVENOUS at 10:12

## 2024-09-09 RX ADMIN — Medication 1 MG: at 08:28

## 2024-09-09 RX ADMIN — AMPICILLIN 1000 MG: 1 INJECTION, POWDER, FOR SOLUTION INTRAMUSCULAR; INTRAVENOUS at 16:34

## 2024-09-09 RX ADMIN — ATORVASTATIN CALCIUM 20 MG: 20 TABLET, FILM COATED ORAL at 21:26

## 2024-09-09 RX ADMIN — LEVOTHYROXINE SODIUM 25 MCG: 25 TABLET ORAL at 06:39

## 2024-09-09 RX ADMIN — SODIUM CHLORIDE, PRESERVATIVE FREE 10 ML: 5 INJECTION INTRAVENOUS at 08:28

## 2024-09-09 RX ADMIN — FOLIC ACID 1 MG: 1 TABLET ORAL at 08:28

## 2024-09-09 ASSESSMENT — PAIN SCALES - GENERAL
PAINLEVEL_OUTOF10: 0

## 2024-09-09 ASSESSMENT — PAIN SCALES - PAIN ASSESSMENT IN ADVANCED DEMENTIA (PAINAD)
BODYLANGUAGE: RELAXED
FACIALEXPRESSION: SMILING OR INEXPRESSIVE
BREATHING: NORMAL
CONSOLABILITY: NO NEED TO CONSOLE
TOTALSCORE: 0

## 2024-09-09 NOTE — PROGRESS NOTES
Physician Progress Note      PATIENT:               SUNIL OBRIEN  CSN #:                  [id number]  :                       1940  ADMIT DATE:       2024 10:57 AM  DISCH DATE:        2024 3:41 PM  RESPONDING  PROVIDER #:        Danielle Willis MD          QUERY TEXT:    Patient admitted with hyperkalemia. Noted to have Mild malnutrition in   Dietitian PN . If possible, please document in progress notes and   discharge summary if you are evaluating and /or treating any of the following:    The medical record reflects the following:  Risk Factors: Anemia, Age85    Clinical Indicators: Malnutrition Assessment:  Malnutrition Status:  Mild malnutrition (24 1535)  Context:  Chronic Illness  Findings of the 6 clinical characteristics of malnutrition:  Energy Intake:  Unable to assess  Weight Loss:  No significant weight loss  Body Fat Loss:  No significant body fat loss  Muscle Mass Loss:  Mild muscle mass loss Temples (temporalis), Clavicles   (pectoralis & deltoids)  Fluid Accumulation:  No significant fluid accumulation   Strength:  Not Performed  Current BMI (kg/m2): 24.2    Treatment: Order Nepro with Carb Steady (each provides 425 kcal, 19g protein)   BID  Recommend/order virt-caps supplementation daily      Thank You  Jerald RIVERAS CDS    ASPEN Criteria:    https://aspenjournals.onlinelibrary.morris.com/doi/full/10.1177/073346110505552  5  Options provided:  -- Protein calorie malnutrition mild  -- Other - I will add my own diagnosis  -- Disagree - Not applicable / Not valid  -- Disagree - Clinically unable to determine / Unknown  -- Refer to Clinical Documentation Reviewer    PROVIDER RESPONSE TEXT:    This patient has mild protein calorie malnutrition.    Query created by: Jerald Hadley on 2024 12:27 AM      Electronically signed by:  Danielle Willis MD 2024 10:16 AM

## 2024-09-10 LAB
ANION GAP SERPL CALC-SCNC: 11 MMOL/L (ref 3–18)
BUN SERPL-MCNC: 26 MG/DL (ref 7–18)
BUN/CREAT SERPL: 7 (ref 12–20)
CALCIUM SERPL-MCNC: 9.4 MG/DL (ref 8.5–10.1)
CHLORIDE SERPL-SCNC: 99 MMOL/L (ref 100–111)
CO2 SERPL-SCNC: 25 MMOL/L (ref 21–32)
CREAT SERPL-MCNC: 4 MG/DL (ref 0.6–1.3)
ERYTHROCYTE [DISTWIDTH] IN BLOOD BY AUTOMATED COUNT: 15.4 % (ref 11.6–14.5)
GLUCOSE BLD STRIP.AUTO-MCNC: 147 MG/DL (ref 70–110)
GLUCOSE BLD STRIP.AUTO-MCNC: 153 MG/DL (ref 70–110)
GLUCOSE BLD STRIP.AUTO-MCNC: 174 MG/DL (ref 70–110)
GLUCOSE BLD STRIP.AUTO-MCNC: 177 MG/DL (ref 70–110)
GLUCOSE SERPL-MCNC: 148 MG/DL (ref 74–99)
HCT VFR BLD AUTO: 23.4 % (ref 35–45)
HGB BLD-MCNC: 7.4 G/DL (ref 12–16)
MAGNESIUM SERPL-MCNC: 2.4 MG/DL (ref 1.6–2.6)
MCH RBC QN AUTO: 34.6 PG (ref 24–34)
MCHC RBC AUTO-ENTMCNC: 31.6 G/DL (ref 31–37)
MCV RBC AUTO: 109.3 FL (ref 78–100)
NRBC # BLD: 0 K/UL (ref 0–0.01)
NRBC BLD-RTO: 0 PER 100 WBC
PLATELET # BLD AUTO: 200 K/UL (ref 135–420)
PMV BLD AUTO: 10.2 FL (ref 9.2–11.8)
POTASSIUM SERPL-SCNC: 3.3 MMOL/L (ref 3.5–5.5)
RBC # BLD AUTO: 2.14 M/UL (ref 4.2–5.3)
SODIUM SERPL-SCNC: 135 MMOL/L (ref 136–145)
WBC # BLD AUTO: 10.4 K/UL (ref 4.6–13.2)

## 2024-09-10 PROCEDURE — 36415 COLL VENOUS BLD VENIPUNCTURE: CPT

## 2024-09-10 PROCEDURE — 99232 SBSQ HOSP IP/OBS MODERATE 35: CPT | Performed by: STUDENT IN AN ORGANIZED HEALTH CARE EDUCATION/TRAINING PROGRAM

## 2024-09-10 PROCEDURE — 83735 ASSAY OF MAGNESIUM: CPT

## 2024-09-10 PROCEDURE — 82962 GLUCOSE BLOOD TEST: CPT

## 2024-09-10 PROCEDURE — 80048 BASIC METABOLIC PNL TOTAL CA: CPT

## 2024-09-10 PROCEDURE — 6370000000 HC RX 637 (ALT 250 FOR IP): Performed by: HOSPITALIST

## 2024-09-10 PROCEDURE — 1100000000 HC RM PRIVATE

## 2024-09-10 PROCEDURE — 6360000002 HC RX W HCPCS: Performed by: INTERNAL MEDICINE

## 2024-09-10 PROCEDURE — 2580000003 HC RX 258: Performed by: HOSPITALIST

## 2024-09-10 PROCEDURE — 85027 COMPLETE CBC AUTOMATED: CPT

## 2024-09-10 RX ORDER — HYOSCYAMINE SULFATE 0.125 MG
125 TABLET ORAL EVERY 6 HOURS PRN
Qty: 10 TABLET | Refills: 0 | Status: SHIPPED | OUTPATIENT
Start: 2024-09-10

## 2024-09-10 RX ORDER — BISACODYL 10 MG
10 SUPPOSITORY, RECTAL RECTAL DAILY PRN
Qty: 5 SUPPOSITORY | Refills: 0 | Status: SHIPPED | OUTPATIENT
Start: 2024-09-10 | End: 2024-10-10

## 2024-09-10 RX ORDER — ACETAMINOPHEN 650 MG/1
650 SUPPOSITORY RECTAL EVERY 4 HOURS PRN
Qty: 4 SUPPOSITORY | Refills: 0 | Status: SHIPPED | OUTPATIENT
Start: 2024-09-10

## 2024-09-10 RX ORDER — MORPHINE SULFATE 100 MG/5ML
5-20 SOLUTION ORAL
Qty: 30 ML | Refills: 0 | Status: SHIPPED | OUTPATIENT
Start: 2024-09-10 | End: 2024-11-24

## 2024-09-10 RX ORDER — LORAZEPAM 2 MG/ML
.5-1 CONCENTRATE ORAL EVERY 6 HOURS PRN
Qty: 30 ML | Refills: 0 | Status: SHIPPED | OUTPATIENT
Start: 2024-09-10 | End: 2024-09-25

## 2024-09-10 RX ADMIN — SODIUM CHLORIDE, PRESERVATIVE FREE 10 ML: 5 INJECTION INTRAVENOUS at 08:19

## 2024-09-10 RX ADMIN — MIDODRINE HYDROCHLORIDE 5 MG: 5 TABLET ORAL at 08:18

## 2024-09-10 RX ADMIN — ATORVASTATIN CALCIUM 20 MG: 20 TABLET, FILM COATED ORAL at 20:34

## 2024-09-10 RX ADMIN — EPOETIN ALFA-EPBX 5000 UNITS: 3000 INJECTION, SOLUTION INTRAVENOUS; SUBCUTANEOUS at 15:59

## 2024-09-10 RX ADMIN — ASPIRIN 81 MG CHEWABLE TABLET 81 MG: 81 TABLET CHEWABLE at 08:19

## 2024-09-10 RX ADMIN — FOLIC ACID 1 MG: 1 TABLET ORAL at 08:19

## 2024-09-10 RX ADMIN — SODIUM CHLORIDE, PRESERVATIVE FREE 10 ML: 5 INJECTION INTRAVENOUS at 20:34

## 2024-09-10 RX ADMIN — Medication 1 MG: at 08:19

## 2024-09-10 RX ADMIN — LEVOTHYROXINE SODIUM 25 MCG: 25 TABLET ORAL at 05:45

## 2024-09-10 RX ADMIN — ACETAMINOPHEN 325MG 650 MG: 325 TABLET ORAL at 19:20

## 2024-09-10 ASSESSMENT — PAIN SCALES - GENERAL
PAINLEVEL_OUTOF10: 0
PAINLEVEL_OUTOF10: 9
PAINLEVEL_OUTOF10: 0

## 2024-09-10 ASSESSMENT — PAIN - FUNCTIONAL ASSESSMENT: PAIN_FUNCTIONAL_ASSESSMENT: ACTIVITIES ARE NOT PREVENTED

## 2024-09-10 ASSESSMENT — PAIN DESCRIPTION - DESCRIPTORS: DESCRIPTORS: ACHING

## 2024-09-10 ASSESSMENT — PAIN DESCRIPTION - ORIENTATION: ORIENTATION: LEFT

## 2024-09-10 ASSESSMENT — PAIN DESCRIPTION - LOCATION: LOCATION: ANKLE

## 2024-09-11 VITALS
HEART RATE: 84 BPM | SYSTOLIC BLOOD PRESSURE: 121 MMHG | BODY MASS INDEX: 20.69 KG/M2 | OXYGEN SATURATION: 97 % | RESPIRATION RATE: 20 BRPM | WEIGHT: 105.38 LBS | TEMPERATURE: 99.7 F | DIASTOLIC BLOOD PRESSURE: 83 MMHG | HEIGHT: 60 IN

## 2024-09-11 LAB
ANION GAP SERPL CALC-SCNC: 13 MMOL/L (ref 3–18)
BUN SERPL-MCNC: 51 MG/DL (ref 7–18)
BUN/CREAT SERPL: 8 (ref 12–20)
CALCIUM SERPL-MCNC: 9.3 MG/DL (ref 8.5–10.1)
CHLORIDE SERPL-SCNC: 95 MMOL/L (ref 100–111)
CO2 SERPL-SCNC: 23 MMOL/L (ref 21–32)
CREAT SERPL-MCNC: 6.62 MG/DL (ref 0.6–1.3)
ERYTHROCYTE [DISTWIDTH] IN BLOOD BY AUTOMATED COUNT: 15.4 % (ref 11.6–14.5)
GLUCOSE BLD STRIP.AUTO-MCNC: 188 MG/DL (ref 70–110)
GLUCOSE SERPL-MCNC: 357 MG/DL (ref 74–99)
HCT VFR BLD AUTO: 24.3 % (ref 35–45)
HGB BLD-MCNC: 7.5 G/DL (ref 12–16)
MAGNESIUM SERPL-MCNC: 2.4 MG/DL (ref 1.6–2.6)
MCH RBC QN AUTO: 33.8 PG (ref 24–34)
MCHC RBC AUTO-ENTMCNC: 30.9 G/DL (ref 31–37)
MCV RBC AUTO: 109.5 FL (ref 78–100)
NRBC # BLD: 0 K/UL (ref 0–0.01)
NRBC BLD-RTO: 0 PER 100 WBC
PLATELET # BLD AUTO: 182 K/UL (ref 135–420)
PMV BLD AUTO: 9.4 FL (ref 9.2–11.8)
POTASSIUM SERPL-SCNC: 3.5 MMOL/L (ref 3.5–5.5)
RBC # BLD AUTO: 2.22 M/UL (ref 4.2–5.3)
SODIUM SERPL-SCNC: 131 MMOL/L (ref 136–145)
WBC # BLD AUTO: 10.4 K/UL (ref 4.6–13.2)

## 2024-09-11 PROCEDURE — 99239 HOSP IP/OBS DSCHRG MGMT >30: CPT | Performed by: STUDENT IN AN ORGANIZED HEALTH CARE EDUCATION/TRAINING PROGRAM

## 2024-09-11 PROCEDURE — 85027 COMPLETE CBC AUTOMATED: CPT

## 2024-09-11 PROCEDURE — 83735 ASSAY OF MAGNESIUM: CPT

## 2024-09-11 PROCEDURE — 6370000000 HC RX 637 (ALT 250 FOR IP): Performed by: HOSPITALIST

## 2024-09-11 PROCEDURE — 80048 BASIC METABOLIC PNL TOTAL CA: CPT

## 2024-09-11 PROCEDURE — 82962 GLUCOSE BLOOD TEST: CPT

## 2024-09-11 PROCEDURE — 36415 COLL VENOUS BLD VENIPUNCTURE: CPT

## 2024-09-11 PROCEDURE — 2580000003 HC RX 258: Performed by: HOSPITALIST

## 2024-09-11 RX ADMIN — Medication 1 MG: at 08:33

## 2024-09-11 RX ADMIN — LEVOTHYROXINE SODIUM 25 MCG: 25 TABLET ORAL at 05:31

## 2024-09-11 RX ADMIN — ACETAMINOPHEN 325MG 650 MG: 325 TABLET ORAL at 09:00

## 2024-09-11 RX ADMIN — ASPIRIN 81 MG CHEWABLE TABLET 81 MG: 81 TABLET CHEWABLE at 08:34

## 2024-09-11 RX ADMIN — MIDODRINE HYDROCHLORIDE 5 MG: 5 TABLET ORAL at 08:33

## 2024-09-11 RX ADMIN — FOLIC ACID 1 MG: 1 TABLET ORAL at 08:34

## 2024-09-11 RX ADMIN — POLYETHYLENE GLYCOL 3350 17 G: 17 POWDER, FOR SOLUTION ORAL at 09:00

## 2024-09-11 RX ADMIN — POLYETHYLENE GLYCOL 3350 17 G: 17 POWDER, FOR SOLUTION ORAL at 00:26

## 2024-09-11 RX ADMIN — SODIUM CHLORIDE, PRESERVATIVE FREE 10 ML: 5 INJECTION INTRAVENOUS at 08:38

## 2024-09-11 ASSESSMENT — PAIN DESCRIPTION - ONSET: ONSET: ON-GOING

## 2024-09-11 ASSESSMENT — PAIN DESCRIPTION - ORIENTATION
ORIENTATION: RIGHT
ORIENTATION: RIGHT

## 2024-09-11 ASSESSMENT — PAIN DESCRIPTION - FREQUENCY: FREQUENCY: CONTINUOUS

## 2024-09-11 ASSESSMENT — PAIN SCALES - GENERAL
PAINLEVEL_OUTOF10: 5
PAINLEVEL_OUTOF10: 5
PAINLEVEL_OUTOF10: 0
PAINLEVEL_OUTOF10: 0

## 2024-09-11 ASSESSMENT — PAIN DESCRIPTION - DESCRIPTORS
DESCRIPTORS: ACHING
DESCRIPTORS: ACHING

## 2024-09-11 ASSESSMENT — PAIN DESCRIPTION - LOCATION
LOCATION: BUTTOCKS;HIP
LOCATION: HIP;BUTTOCKS

## 2024-09-11 ASSESSMENT — PAIN DESCRIPTION - PAIN TYPE: TYPE: CHRONIC PAIN

## 2025-08-09 NOTE — ROUTINE PROCESS
Bedside shift change report given to Maribell Dela Cruz (oncoming nurse) by Seth Boone (offgoing nurse). Report included the following information SBAR, Kardex and MAR. Patient ID: Linda Yu is a 26 y.o. female.        E-Visit Time Tracking:   Day 1 Time (in minutes): 12  Total Time (in minutes): 12      Chief Complaint: General Illness (Entered automatically based on patient selection in [employer] mobility.)      The patient initiated a request through [employer] mobility on 8/9/2025 for evaluation and management with a chief complaint of General Illness (Entered automatically based on patient selection in [employer] mobility.)     I evaluated the questionnaire submission on 08/09/2025.    [facility]    8/9/2025 10:50 AM CDT - Filed by Patient   What do you need help with? Urinary Symptoms   Do you agree to participate in an E-Visit? Yes   If you have any of the following symptoms, please go to the nearest emergency room or call 911: I acknowledge   Do you have any of the following pregnancy-related conditions? (Pregnant, Possibly pregnant, Breast feeding, None) None   What is the main issue you would like addressed today? Uti symptoms   Do you have any of the following symptoms? ( Discomfort or pain passing urine, Passing urine more frequently, Cloudy urine, Discharge in urine, Other, None) Other   What urinary symptoms are you experiencing? Blood   When did your concern begin? 8/4/2025   What medications or treatments have you used to help your symptoms? (Antibiotics, Cranberry products, Drinking more fluids, Painkillers, Other, None) Antibiotics   What does your urine look like? (Clear, Cloudy, Dark yellow, Light yellow, Pink or red (bloody), Foamy, Not sure) Pink or red (bloody)   Have you had any of the following symptoms during the past 24 hours?   Urgency (a sudden and uncontrollable urge to urinate) (None, Mild, Moderate, Severe) Moderate   Frequency (going to the toilet very often) (None, Mild, Moderate, Severe) Moderate   Burning pain when urinating (None, Mild, Moderate, Severe) None   Incomplete bladder emptying (still feel like you need to urinate again after  urination) (None, Mild, Moderate, Severe) None   Pain in the lower belly when youre not urinating. (None, Mild, Moderate, Severe) None   Please rate how much discomfort these symptoms have caused in the last 24 hours. (None, Mild, Moderate, Severe) None   Have you had any of the following symptoms during the past 24 hours?   Blood seen in the urine (None, Mild, Moderate, Severe) Moderate   Pain in the lower back on one or both sides (flank pain) (None, Mild, Moderate, Severe) None   Abnormal Vaginal Discharge (abnormal amount, color and/or odor) (None, Mild, Moderate, Severe) Severe   Discharge from the opening you urinate from (urethra) when not urinating. (None, Mild, Moderate, Severe) Mild   Tell us how the symptoms have interfered with your every day activities/work in the past 24 hours. (None, Mild, Moderate, Severe) Mild   Do you have a fever? (Less than 100.4°F, 100.4°F or greater, No, Not sure) No   Are you a diabetic? No   If you are female, please tell us if you have any of the following right now (as youre completing the questionnaire): (Menstrual period (menses), PMS (Premenstrual syndrome),Signs of menopause such as hot flashes, Pregnancy, None) None   Do you have a history of kidney stones? No   Provide any information you feel is important to your history not asked above I finshed my antibiotics for my uti yesterday but everytime im wiping im still wiping blood and brown discharge how long is this going to last ? If its doesnt go away Do i still have an uti ?   Please attach any relevant images or files (if you have performed a home test for UTI, please submit a photo of results)    Are you able to take your vitals? No         Encounter Diagnosis   Name Primary?    Acute bacterial simple cystitis Yes        Orders Placed This Encounter   Procedures    Urinalysis, Reflex to Urine Culture Urine, Clean Catch     Standing Status:   Future     Expected Date:   8/9/2025     Expiration Date:   9/9/2025      Preferred Collection Type:   Urine, Clean Catch     Specimen Source:   Urine      Medications Ordered This Encounter   Medications    ciprofloxacin HCl (CIPRO) 500 MG tablet     Sig: Take 1 tablet (500 mg total) by mouth every 12 (twelve) hours. for 3 days     Dispense:  6 tablet     Refill:  0    naproxen (NAPROSYN) 500 MG tablet     Sig: Take 1 tablet (500 mg total) by mouth 2 (two) times daily as needed (pain).     Dispense:  10 tablet     Refill:  0        No follow-ups on file.

## (undated) DEVICE — GLOVE SURG SZ 7.5 L11.73IN FNGR THK9.8MIL STRW LTX POLYMER

## (undated) DEVICE — SUTURE PERMA-HAND SZ 3-0 L24IN NONABSORBABLE BLK W/O NDL SA74H

## (undated) DEVICE — GAUZE,SPONGE,2"X2",8PLY,STERILE,LF,2'S: Brand: MEDLINE

## (undated) DEVICE — SUTURE MCRYL 3-0 L27IN ABSRB VLT SH L26MM 1/2 CIR Y316H

## (undated) DEVICE — 2108 SERIES SAGITTAL BLADE (24.8 X 1.24 X 80.1MM)

## (undated) DEVICE — SUTURE MCRYL SZ 4-0 L18IN ABSRB UD L19MM PS-2 3/8 CIR PRIM Y496G

## (undated) DEVICE — 48" PROBE COVER W/GEL, ULTRASOUND, STERILE: Brand: SITE-RITE

## (undated) DEVICE — COVER US PRB W15XL120CM W/ GEL RUBBERBAND TAPE STRP FLD GEN

## (undated) DEVICE — Device

## (undated) DEVICE — PERM CATH PACK: Brand: MEDLINE INDUSTRIES, INC.

## (undated) DEVICE — LIQUIBAND RAPID ADHESIVE 36/CS 0.8ML: Brand: MEDLINE

## (undated) DEVICE — O.R TOWEL, X-RAY DETECTABLE: Brand: DEROYAL

## (undated) DEVICE — SUTURE COAT VCRL PC 5 PRECIS COSM CONVENTIONAL CUT PRIM 3 8 J823H

## (undated) DEVICE — GLOVE SURG SZ 7 L11.33IN FNGR THK9.8MIL STRW LTX POLYMER

## (undated) DEVICE — SUTURE ABSORBABLE BRAIDED 2-0 CT-1 27 IN UD VICRYL J259H

## (undated) DEVICE — APPLICATOR MEDICATED 26 CC SOLUTION HI LT ORNG CHLORAPREP

## (undated) DEVICE — SYS INCISION MANAGEMENT -- PREVENA

## (undated) DEVICE — KIT CLN UP BON SECOURS MARYV

## (undated) DEVICE — TAPE,CLOTH/SILK,CURAD,3"X10YD,LF,40/CS: Brand: CURAD

## (undated) DEVICE — REM POLYHESIVE ADULT PATIENT RETURN ELECTRODE: Brand: VALLEYLAB

## (undated) DEVICE — DRESSING,GAUZE,XEROFORM,CURAD,1"X8",ST: Brand: CURAD

## (undated) DEVICE — ANGIO-SEAL VIP VASCULAR CLOSURE DEVICE: Brand: ANGIO-SEAL

## (undated) DEVICE — INTENDED FOR TISSUE SEPARATION, AND OTHER PROCEDURES THAT REQUIRE A SHARP SURGICAL BLADE TO PUNCTURE OR CUT.: Brand: BARD-PARKER ® STAINLESS STEEL BLADES

## (undated) DEVICE — Z DUP USE 2142705 GUIDEWIRE VASC STR 0.035 INX180 CM 15 CM BENT PTFE COAT STRT

## (undated) DEVICE — INTRODUCER SHTH 4FR CANN L11CM DIL TIP 25MM RED TUNGSTEN

## (undated) DEVICE — DECANTER BAG 9": Brand: MEDLINE INDUSTRIES, INC.

## (undated) DEVICE — SUTURE PROL SZ 2-0 L36IN NONABSORBABLE BLU V-7 L26MM 1/2 8977H

## (undated) DEVICE — APPLIER CLP L9.38IN M LIG TI DISP STR RNG HNDL LIGACLP

## (undated) DEVICE — DRESSING FOAM DISK DIA1IN H 7MM HYDRPHLC CHG IMPREG IN SL

## (undated) DEVICE — SHEATH INTRO 4FR L11CM EVAR S STL FLX AND KINK RESIST CANN

## (undated) DEVICE — NEEDLE ANGIO 1 WALL ULT SMOOTH 19GAX7CM MERIT AVANCE

## (undated) DEVICE — TABLE COVER: Brand: CONVERTORS

## (undated) DEVICE — TRAY PREP DRY W/ PREM GLV 2 APPL 6 SPNG 2 UNDPD 1 OVERWRAP

## (undated) DEVICE — SHEET, DRAPE, SPLIT, STERILE: Brand: MEDLINE

## (undated) DEVICE — BANDAGE COMPR SGL LAYERED CLP CLSR ELAS 15FT LEN 6IN W

## (undated) DEVICE — SPONGE LAP 18X18IN STRL -- 5/PK

## (undated) DEVICE — SUTURE GOR TX SZ 5-0 L30IN NONABSORBABLE L12MM TTC-12 3/8 6M10A

## (undated) DEVICE — SUTURE NONABSORBABLE MONOFILAMENT 6-0 C-1 1X30 IN PROLENE 8706H

## (undated) DEVICE — PAD,ABDOMINAL,8"X10",ST,LF: Brand: MEDLINE

## (undated) DEVICE — DRAPE,TOP,102X53,STERILE: Brand: MEDLINE

## (undated) DEVICE — SUTURE PERMAHAND SZ 0 L30IN NONABSORBABLE BLK SILK BRAID A306H

## (undated) DEVICE — SHEATH 6FR 11CM BRITETIP

## (undated) DEVICE — CATHETER HAD L36CM INSRT L19CM PALINDROME

## (undated) DEVICE — KIT HAD L40CM CATH SYMMETRIC TIP 2 LUMN CATH SFTY SHTH TISS

## (undated) DEVICE — STRIP,CLOSURE,WOUND,MEDI-STRIP,1/2X4: Brand: MEDLINE

## (undated) DEVICE — THREE-QUARTER SHEET: Brand: CONVERTORS

## (undated) DEVICE — BANDAGE COBAN 4 IN COMPR W4INXL5YD FOAM COHESIVE QUIK STK SELF ADH SFT

## (undated) DEVICE — GUIDEWIRE VASC STR 0.035 INX180 CM 15 CM BENT PTFE COAT STRT

## (undated) DEVICE — SUTURE PERMA-HAND SZ 2-0 L24IN NONABSORBABLE BLK W/O NDL SA75H

## (undated) DEVICE — CULTURETTE SGL EVAC TUBE PALL -- 100/CA

## (undated) DEVICE — 3M(TM) MEDIPORE(TM) +PAD SOFT CLOTH ADHESIVE WOUND DRESSING 3566: Brand: 3M™ MEDIPORE™

## (undated) DEVICE — SUTURE MCRYL SZ 2-0 L36IN ABSRB UD L36MM CT-1 1/2 CIR Y945H

## (undated) DEVICE — SUTURE MCRYL SZ 3-0 L27IN ABSRB UD L26MM SH 1/2 CIR Y416H

## (undated) DEVICE — SUT SLK 0 30IN SH BLK --

## (undated) DEVICE — SWAB CULT LIQ STUART AGR AERB MOD IN BRK SGL RAYON TIP PLAS 220099] BECTON DICKINSON MICRO]

## (undated) DEVICE — PACK PROCEDURE SURG VASC CATH 161 MMC LF

## (undated) DEVICE — SUTURE GOR TX SZ 4-0 L30IN NONABSORBABLE L13MM TTC-13 3/8 5M02A

## (undated) DEVICE — SET FLD ADMIN 3 W STPCOCK FIX FEM L BOR 1IN

## (undated) DEVICE — SUTURE VCRL SZ 3-0 L27IN ABSRB UD L26MM SH 1/2 CIR J416H

## (undated) DEVICE — COVER LT HNDL FLX

## (undated) DEVICE — SUTURE PROL SZ 5-0 L36IN NONABSORBABLE BLU L13MM C-1 3/8 8720H

## (undated) DEVICE — PAD GEN USE BORDERED ADH 14IN 2IN AND 12IN 4IN GZ UNIV ST

## (undated) DEVICE — SUTURE MCRYL SZ 4-0 L18IN ABSRB UD P-3 L13MM 3/8 CIR PRIM Y494G

## (undated) DEVICE — SUT PROL 6-0 30IN C1 DA BLU --

## (undated) DEVICE — MAYO STAND COVER: Brand: CONVERTORS

## (undated) DEVICE — PROBE VASC 8MHZ WTRPRF

## (undated) DEVICE — SUTURE MONOCRYL SZ 4-0 L18IN ABSRB UD P-3 L13MM 3/8 CIR PRIM Y494G

## (undated) DEVICE — Z DISCONTINUED BY MEDLINE USE 2711682 TRAY SKIN PREP DRY W/ PREM GLV

## (undated) DEVICE — BLADE ASSEMB CLP HAIR FINE --

## (undated) DEVICE — TRAY CATH OD16FR SIL URIN M STATLOK STBL DEV SURSTP

## (undated) DEVICE — KIT OR TURNOVER

## (undated) DEVICE — CATHETER ANGIO AD PED 4FR L65CM GWIRE 0.035IN PERIPH

## (undated) DEVICE — PACK PROCEDURE SURG MAJ W/ BASIN LF

## (undated) DEVICE — INTENDED FOR TISSUE SEPARATION, AND OTHER PROCEDURES THAT REQUIRE A SHARP SURGICAL BLADE TO PUNCTURE OR CUT.: Brand: BARD-PARKER SAFETY BLADES SIZE 11, STERILE

## (undated) DEVICE — 3M(TM) MEDIPORE(TM) +PAD SOFT CLOTH ADHESIVE WOUND DRESSING 3569: Brand: 3M™ MEDIPORE™

## (undated) DEVICE — INTENDED FOR TISSUE SEPARATION, AND OTHER PROCEDURES THAT REQUIRE A SHARP SURGICAL BLADE TO PUNCTURE OR CUT.: Brand: BARD-PARKER SAFETY BLADES SIZE 15, STERILE

## (undated) DEVICE — HEX-LOCKING BLADE ELECTRODE: Brand: EDGE

## (undated) DEVICE — INTRODUCER SHTH 5FR CANN L11CM DIL TIP 25MM GRY TUNGSTEN

## (undated) DEVICE — BNDG ELAS HK LOOP 4X5YD NS -- MATRIX

## (undated) DEVICE — ELECTRODE PT RET AD L9FT HI MOIST COND ADH HYDRGEL CORDED

## (undated) DEVICE — GAUZE,SPONGE,4"X4",16PLY,STRL,LF,10/TRAY: Brand: MEDLINE

## (undated) DEVICE — SUTURE MCRYL SZ 4 0 L18IN ABSRB VLT PS 1 L24MM 3 8 CIR REV Y682H

## (undated) DEVICE — TAPE ADH W1INXL10YD CLTH SILK H2O RESIST CURAD

## (undated) DEVICE — POWDER HEMOSTAT GEL 1GR --

## (undated) DEVICE — SYSTEM INF CTRL

## (undated) DEVICE — CLEANSER SKIN 32OZ 4% CHG ANTIMIC ANTISEP SGL WRP HIBICLN

## (undated) DEVICE — CANISTER SUC GEL INFOVAC 500ML --

## (undated) DEVICE — SUTURE PERMAHAND SZ 4-0 L12X30IN NONABSORBABLE BLK SILK A303H

## (undated) DEVICE — SHEATH 5FR 11CM BRITETIP

## (undated) DEVICE — INTRODUCER SHTH 6FR CANN L11CM DIL TIP 35MM GRN TUNGSTEN

## (undated) DEVICE — DRSG VAC ASST CLSR GRNUFM SM --

## (undated) DEVICE — MASTISOL ADHESIVE LIQ 2/3ML

## (undated) DEVICE — DRAPE,EXTREMITY,89X128,STERILE: Brand: MEDLINE

## (undated) DEVICE — ANGIOGRAPHY KIT CUST VASC

## (undated) DEVICE — INTENDED FOR TISSUE SEPARATION, AND OTHER PROCEDURES THAT REQUIRE A SHARP SURGICAL BLADE TO PUNCTURE OR CUT.: Brand: BARD-PARKER SAFETY BLADES SIZE 10, STERILE

## (undated) DEVICE — STOCKINETTE,IMPERVIOUS,12X48,STERILE: Brand: MEDLINE

## (undated) DEVICE — PTA BALLOON DILATATION CATHETER: Brand: CHARGER™

## (undated) DEVICE — SUT SLK 2 60IN TIE MP BLK --

## (undated) DEVICE — INTRODUCER SHTH 7FR CANN L11CM DIL TIP 35MM ORNG TUNGSTEN

## (undated) DEVICE — SOLUTION IV 1000ML 0.9% SOD CHL

## (undated) DEVICE — GUIDEWIRE VASC L180CM DIA0.035IN TIP L5CM PTFE COAT S STL

## (undated) DEVICE — BANDAGE,GAUZE,BULKEE II,4.5"X4.1YD,STRL: Brand: MEDLINE

## (undated) DEVICE — JELLY LUB 2OZ CHLORHEXIDINE BACTSTAT ANTIMIC NONSTAINING

## (undated) DEVICE — (D)PREP SKN CHLRAPRP APPL 26ML -- CONVERT TO ITEM 371833

## (undated) DEVICE — BANDAGE COBAN 6 IN WND 6INX5YD FOAM